# Patient Record
Sex: FEMALE | Race: WHITE | Employment: OTHER | URBAN - METROPOLITAN AREA
[De-identification: names, ages, dates, MRNs, and addresses within clinical notes are randomized per-mention and may not be internally consistent; named-entity substitution may affect disease eponyms.]

---

## 2017-01-02 ENCOUNTER — HOSPITAL ENCOUNTER (INPATIENT)
Facility: HOSPITAL | Age: 72
LOS: 3 days | Discharge: HOME | DRG: 392 | End: 2017-01-06
Attending: EMERGENCY MEDICINE | Admitting: FAMILY MEDICINE
Payer: MEDICARE

## 2017-01-02 ENCOUNTER — APPOINTMENT (OUTPATIENT)
Dept: CT IMAGING | Facility: HOSPITAL | Age: 72
DRG: 392 | End: 2017-01-02
Attending: EMERGENCY MEDICINE
Payer: MEDICARE

## 2017-01-02 DIAGNOSIS — R10.9 ABDOMINAL PAIN, ACUTE: Primary | ICD-10-CM

## 2017-01-02 DIAGNOSIS — K59.00 CONSTIPATION, UNSPECIFIED CONSTIPATION TYPE: ICD-10-CM

## 2017-01-02 LAB
ALBUMIN SERPL-MCNC: 3.4 G/DL (ref 3.5–4.8)
ALP LIVER SERPL-CCNC: 83 U/L (ref 55–142)
ALT SERPL-CCNC: 19 U/L (ref 14–54)
AST SERPL-CCNC: 10 U/L (ref 15–41)
BASOPHILS # BLD AUTO: 0.02 X10(3) UL (ref 0–0.1)
BASOPHILS NFR BLD AUTO: 0.5 %
BILIRUB SERPL-MCNC: 0.5 MG/DL (ref 0.1–2)
BUN BLD-MCNC: 28 MG/DL (ref 8–20)
CALCIUM BLD-MCNC: 8.9 MG/DL (ref 8.3–10.3)
CHLORIDE: 99 MMOL/L (ref 101–111)
CO2: 33 MMOL/L (ref 22–32)
CREAT BLD-MCNC: 1.27 MG/DL (ref 0.55–1.02)
EOSINOPHIL # BLD AUTO: 0.08 X10(3) UL (ref 0–0.3)
EOSINOPHIL NFR BLD AUTO: 2.1 %
ERYTHROCYTE [DISTWIDTH] IN BLOOD BY AUTOMATED COUNT: 13.2 % (ref 11.5–16)
GLUCOSE BLD-MCNC: 227 MG/DL (ref 70–99)
HCT VFR BLD AUTO: 39 % (ref 34–50)
HGB BLD-MCNC: 12.7 G/DL (ref 12–16)
IMMATURE GRANULOCYTE COUNT: 0.01 X10(3) UL (ref 0–1)
IMMATURE GRANULOCYTE RATIO %: 0.3 %
LIPASE: 165 U/L (ref 73–393)
LYMPHOCYTES # BLD AUTO: 0.37 X10(3) UL (ref 0.9–4)
LYMPHOCYTES NFR BLD AUTO: 9.6 %
M PROTEIN MFR SERPL ELPH: 7.3 G/DL (ref 6.1–8.3)
MCH RBC QN AUTO: 32.2 PG (ref 27–33.2)
MCHC RBC AUTO-ENTMCNC: 32.6 G/DL (ref 31–37)
MCV RBC AUTO: 99 FL (ref 81–100)
MONOCYTES # BLD AUTO: 0.31 X10(3) UL (ref 0.1–0.6)
MONOCYTES NFR BLD AUTO: 8 %
NEUTROPHIL ABS PRELIM: 3.07 X10 (3) UL (ref 1.3–6.7)
NEUTROPHILS # BLD AUTO: 3.07 X10(3) UL (ref 1.3–6.7)
NEUTROPHILS NFR BLD AUTO: 79.5 %
PLATELET # BLD AUTO: 151 10(3)UL (ref 150–450)
POTASSIUM SERPL-SCNC: 4.3 MMOL/L (ref 3.6–5.1)
RBC # BLD AUTO: 3.94 X10(6)UL (ref 3.8–5.1)
RED CELL DISTRIBUTION WIDTH-SD: 47.9 FL (ref 35.1–46.3)
SODIUM SERPL-SCNC: 138 MMOL/L (ref 136–144)
WBC # BLD AUTO: 3.9 X10(3) UL (ref 4–13)

## 2017-01-02 PROCEDURE — 80053 COMPREHEN METABOLIC PANEL: CPT | Performed by: EMERGENCY MEDICINE

## 2017-01-02 PROCEDURE — 93005 ELECTROCARDIOGRAM TRACING: CPT

## 2017-01-02 PROCEDURE — 83690 ASSAY OF LIPASE: CPT | Performed by: EMERGENCY MEDICINE

## 2017-01-02 PROCEDURE — 99285 EMERGENCY DEPT VISIT HI MDM: CPT

## 2017-01-02 PROCEDURE — 96361 HYDRATE IV INFUSION ADD-ON: CPT

## 2017-01-02 PROCEDURE — 74177 CT ABD & PELVIS W/CONTRAST: CPT

## 2017-01-02 PROCEDURE — 96375 TX/PRO/DX INJ NEW DRUG ADDON: CPT

## 2017-01-02 PROCEDURE — 96376 TX/PRO/DX INJ SAME DRUG ADON: CPT

## 2017-01-02 PROCEDURE — 85025 COMPLETE CBC W/AUTO DIFF WBC: CPT | Performed by: EMERGENCY MEDICINE

## 2017-01-02 PROCEDURE — 93010 ELECTROCARDIOGRAM REPORT: CPT

## 2017-01-02 PROCEDURE — 96365 THER/PROPH/DIAG IV INF INIT: CPT

## 2017-01-02 RX ORDER — HYDROMORPHONE HYDROCHLORIDE 1 MG/ML
1 INJECTION, SOLUTION INTRAMUSCULAR; INTRAVENOUS; SUBCUTANEOUS EVERY 30 MIN PRN
Status: DISCONTINUED | OUTPATIENT
Start: 2017-01-02 | End: 2017-01-03

## 2017-01-02 RX ORDER — ONDANSETRON 2 MG/ML
4 INJECTION INTRAMUSCULAR; INTRAVENOUS ONCE
Status: COMPLETED | OUTPATIENT
Start: 2017-01-02 | End: 2017-01-02

## 2017-01-03 PROBLEM — R11.2 NAUSEA AND VOMITING: Status: ACTIVE | Noted: 2017-01-03

## 2017-01-03 PROBLEM — K56.609 LARGE BOWEL OBSTRUCTION (HCC): Status: ACTIVE | Noted: 2017-01-03

## 2017-01-03 PROBLEM — I10 HYPERTENSION: Chronic | Status: ACTIVE | Noted: 2017-01-03

## 2017-01-03 PROBLEM — E11.29 TYPE 2 DIABETES MELLITUS WITH RENAL MANIFESTATIONS (HCC): Chronic | Status: ACTIVE | Noted: 2017-01-03

## 2017-01-03 PROBLEM — K63.89: Status: ACTIVE | Noted: 2017-01-03

## 2017-01-03 PROBLEM — K59.00 CONSTIPATION, UNSPECIFIED CONSTIPATION TYPE: Status: ACTIVE | Noted: 2017-01-03

## 2017-01-03 PROBLEM — E86.0 DEHYDRATION: Status: ACTIVE | Noted: 2017-01-03

## 2017-01-03 PROBLEM — R10.9 ABDOMINAL PAIN, ACUTE: Status: ACTIVE | Noted: 2017-01-03

## 2017-01-03 LAB
ATRIAL RATE: 102 BPM
GLUCOSE BLD-MCNC: 131 MG/DL (ref 65–99)
GLUCOSE BLD-MCNC: 159 MG/DL (ref 65–99)
GLUCOSE BLD-MCNC: 267 MG/DL (ref 65–99)
P AXIS: 71 DEGREES
P-R INTERVAL: 132 MS
Q-T INTERVAL: 350 MS
QRS DURATION: 86 MS
QTC CALCULATION (BEZET): 456 MS
R AXIS: 37 DEGREES
T AXIS: 18 DEGREES
VENTRICULAR RATE: 102 BPM

## 2017-01-03 PROCEDURE — 82962 GLUCOSE BLOOD TEST: CPT

## 2017-01-03 RX ORDER — LEVOFLOXACIN 5 MG/ML
750 INJECTION, SOLUTION INTRAVENOUS ONCE
Status: COMPLETED | OUTPATIENT
Start: 2017-01-03 | End: 2017-01-03

## 2017-01-03 RX ORDER — SODIUM CHLORIDE 9 MG/ML
INJECTION, SOLUTION INTRAVENOUS CONTINUOUS
Status: DISCONTINUED | OUTPATIENT
Start: 2017-01-03 | End: 2017-01-06

## 2017-01-03 RX ORDER — METRONIDAZOLE 500 MG/100ML
500 INJECTION, SOLUTION INTRAVENOUS EVERY 8 HOURS
Status: DISCONTINUED | OUTPATIENT
Start: 2017-01-03 | End: 2017-01-06

## 2017-01-03 RX ORDER — METRONIDAZOLE 500 MG/100ML
500 INJECTION, SOLUTION INTRAVENOUS ONCE
Status: COMPLETED | OUTPATIENT
Start: 2017-01-03 | End: 2017-01-03

## 2017-01-03 RX ORDER — ONDANSETRON 2 MG/ML
8 INJECTION INTRAMUSCULAR; INTRAVENOUS EVERY 6 HOURS PRN
Status: DISCONTINUED | OUTPATIENT
Start: 2017-01-03 | End: 2017-01-06

## 2017-01-03 RX ORDER — ONDANSETRON 2 MG/ML
4 INJECTION INTRAMUSCULAR; INTRAVENOUS EVERY 6 HOURS PRN
Status: DISCONTINUED | OUTPATIENT
Start: 2017-01-03 | End: 2017-01-03

## 2017-01-03 RX ORDER — HYDROMORPHONE HYDROCHLORIDE 1 MG/ML
1 INJECTION, SOLUTION INTRAMUSCULAR; INTRAVENOUS; SUBCUTANEOUS EVERY 2 HOUR PRN
Status: DISCONTINUED | OUTPATIENT
Start: 2017-01-03 | End: 2017-01-03

## 2017-01-03 RX ORDER — METOCLOPRAMIDE HYDROCHLORIDE 5 MG/ML
10 INJECTION INTRAMUSCULAR; INTRAVENOUS ONCE
Status: COMPLETED | OUTPATIENT
Start: 2017-01-03 | End: 2017-01-03

## 2017-01-03 RX ORDER — HYDROMORPHONE HYDROCHLORIDE 1 MG/ML
0.5 INJECTION, SOLUTION INTRAMUSCULAR; INTRAVENOUS; SUBCUTANEOUS EVERY 2 HOUR PRN
Status: DISCONTINUED | OUTPATIENT
Start: 2017-01-03 | End: 2017-01-06

## 2017-01-03 RX ORDER — DEXTROSE MONOHYDRATE 25 G/50ML
50 INJECTION, SOLUTION INTRAVENOUS
Status: DISCONTINUED | OUTPATIENT
Start: 2017-01-03 | End: 2017-01-05

## 2017-01-03 RX ORDER — LEVOFLOXACIN 5 MG/ML
750 INJECTION, SOLUTION INTRAVENOUS
Status: DISCONTINUED | OUTPATIENT
Start: 2017-01-05 | End: 2017-01-06

## 2017-01-03 RX ORDER — METOCLOPRAMIDE HYDROCHLORIDE 5 MG/ML
5 INJECTION INTRAMUSCULAR; INTRAVENOUS EVERY 6 HOURS
Status: DISCONTINUED | OUTPATIENT
Start: 2017-01-03 | End: 2017-01-04

## 2017-01-03 RX ORDER — HYDROMORPHONE HYDROCHLORIDE 1 MG/ML
1 INJECTION, SOLUTION INTRAMUSCULAR; INTRAVENOUS; SUBCUTANEOUS EVERY 2 HOUR PRN
Status: DISCONTINUED | OUTPATIENT
Start: 2017-01-03 | End: 2017-01-06

## 2017-01-03 RX ORDER — METOCLOPRAMIDE HYDROCHLORIDE 5 MG/ML
10 INJECTION INTRAMUSCULAR; INTRAVENOUS EVERY 6 HOURS PRN
Status: DISCONTINUED | OUTPATIENT
Start: 2017-01-03 | End: 2017-01-03

## 2017-01-03 NOTE — ED PROVIDER NOTES
Patient Seen in: [facility] 3nw-a    History   Patient presents with:  Nausea/Vomiting/Diarrhea (gastrointestinal)    Stated Complaint: abdominal pain    HPI    Patient is a 60-year-old female comes in the emergency with chief complaint of abdominal UNITS Oral Cap,     CVS VITAMIN B-12 1000 MCG Oral Tab,     cholestyramine light (PREVALITE) 4 G Oral Powd Pack,  Take 4 g by mouth daily.        Family History   Problem Relation Age of Onset   • Cancer Mother      breast and bladder         Smoking Status dry with normal appearance. No rashes or lesions. NEUROLOGICAL:  Motor strength intact all groups.   normal sensation, speech intact    ED Course     Labs Reviewed   COMP METABOLIC PANEL (14) - Abnormal; Notable for the following:     Glucose 227 (*) no other questions, complaints or concerns. Patient will be admitted to the hospital for further workup.         Disposition and Plan     Clinical Impression:  Abdominal pain, acute  (primary encounter diagnosis)  Constipation, unspecified constipation type

## 2017-01-03 NOTE — PROGRESS NOTES
NURSING ADMISSION NOTE      Patient admitted via Cart  Oriented to room. Safety precautions initiated. Bed in low position. Call light in reach. Pt admitted from ER in stable condition.  Pt reports nausea, denies vomiting or abd pain at present ti

## 2017-01-03 NOTE — CONSULTS
[facility] Pharmacy Note:  Renal Adjustment for Levaquin (levofloxacin)    Norbert Ayala is a 70year old female who has been prescribed Levaquin (levofloxacin) 500 mg every 24 hrs. CrCl is estimated creatinine clearance is 38 mL/min (based on Cr of 1.27).  s

## 2017-01-03 NOTE — PROGRESS NOTES
Patient resting calmly in bed. Denies pain or nausea at this time. States it comes and goes intermittantly, but denies at this time.

## 2017-01-03 NOTE — PROGRESS NOTES
Pharmacy Note: Renal dose adjustment (Reglan)    Ramona Lowe has been prescribed Metoclopramide (Reglan) 10 mg every 6 hours. Estimated Creatinine Clearance: 38 mL/min (based on Cr of 1.27).     Her calculated creatinine clearance is < 40 mL/min, t

## 2017-01-03 NOTE — CERTIFICATION
**Certification      PHYSICIAN Certification of Need for Inpatient Hospitalization - Initial Certification    Patient will require inpatient services that will reasonably be expected to span two midnight's based on the clinical documentation in H+P.    Base

## 2017-01-03 NOTE — H&P
[address] [address] Patient Status:  Inpatient    1945 MRN [id number]   [facility] 3NW-A Attending Lolis Khan MD   Hosp Day # 1 PCP Ronda Bullion     Date:  1/3/2017  Date of Admission: 71/ SaO2 75%/ CPAP    • Rheumatoid arthritis(714.0)    • Diabetes ([facility] [place] [address]    • Sleep apnea    • High blood pressure    • Cancer (HCC)      Uterine CA   • Back problem      DISH         Past Surgical History    HERNIA SURGERY       BMI 40.37 kg/m2  SpO2 93%    General Appearance:    Alert, cooperative, no distress, appears stated age   Head:    Normocephalic, without obvious abnormality, atraumatic   Eyes:    PERRL, conjunctiva/corneas clear, EOM's intact, fundi     benign, both eyes 01/02/2017   ALT 19 01/02/2017    01/02/2017       Imaging:  CT Scan Abdomen/Pelvis noted.      Assessment:  Patient Active Problem List:     Recurrent UTI     Incomplete bladder emptying     GAYLE (obstructive sleep apnea)     Chronic gouty arthritis

## 2017-01-04 ENCOUNTER — APPOINTMENT (OUTPATIENT)
Dept: GENERAL RADIOLOGY | Facility: HOSPITAL | Age: 72
DRG: 392 | End: 2017-01-04
Attending: FAMILY MEDICINE
Payer: MEDICARE

## 2017-01-04 LAB
BUN BLD-MCNC: 18 MG/DL (ref 8–20)
CALCIUM BLD-MCNC: 8.4 MG/DL (ref 8.3–10.3)
CHLORIDE: 106 MMOL/L (ref 101–111)
CO2: 30 MMOL/L (ref 22–32)
CREAT BLD-MCNC: 1.16 MG/DL (ref 0.55–1.02)
EST. AVERAGE GLUCOSE BLD GHB EST-MCNC: 169 MG/DL (ref 68–126)
GLUCOSE BLD-MCNC: 139 MG/DL (ref 65–99)
GLUCOSE BLD-MCNC: 152 MG/DL (ref 65–99)
GLUCOSE BLD-MCNC: 153 MG/DL (ref 65–99)
GLUCOSE BLD-MCNC: 158 MG/DL (ref 70–99)
GLUCOSE BLD-MCNC: 171 MG/DL (ref 65–99)
HAV IGM SER QL: 1.9 MG/DL (ref 1.7–3)
HBA1C MFR BLD HPLC: 7.5 % (ref ?–5.7)
POTASSIUM SERPL-SCNC: 4.3 MMOL/L (ref 3.6–5.1)
SODIUM SERPL-SCNC: 141 MMOL/L (ref 136–144)

## 2017-01-04 PROCEDURE — 82962 GLUCOSE BLOOD TEST: CPT

## 2017-01-04 PROCEDURE — 74000 XR ABDOMEN AP (UPRIGHT)  (CPT=74000): CPT

## 2017-01-04 PROCEDURE — 83735 ASSAY OF MAGNESIUM: CPT | Performed by: FAMILY MEDICINE

## 2017-01-04 PROCEDURE — 80048 BASIC METABOLIC PNL TOTAL CA: CPT | Performed by: FAMILY MEDICINE

## 2017-01-04 PROCEDURE — 83036 HEMOGLOBIN GLYCOSYLATED A1C: CPT | Performed by: FAMILY MEDICINE

## 2017-01-04 PROCEDURE — 97162 PT EVAL MOD COMPLEX 30 MIN: CPT

## 2017-01-04 RX ORDER — METOCLOPRAMIDE HYDROCHLORIDE 5 MG/ML
5 INJECTION INTRAMUSCULAR; INTRAVENOUS EVERY 6 HOURS PRN
Status: DISCONTINUED | OUTPATIENT
Start: 2017-01-04 | End: 2017-01-06

## 2017-01-04 NOTE — PROGRESS NOTES
Low grade temp of 100.1. Incentive spirometer given to patient. Instructive patient on use and reasoning for use. Patient demonstrates. Temp turned down in patients room and blankets removed. Will continue to monitor.

## 2017-01-04 NOTE — IMAGING NOTE
PT REFUSED TO LAY DOWN FOR HER OBS SERIES TEST. PT SHARES SHE HASNT LAYED DOWN TO SLEEP IN 2 YEARS. HUA SPOKE TO LOPEZ ARMIJO AT 930AM AND TOLD HER PT REFUSED THE SUPINE IMAGING.  HUA TECH DID UPRIGHT VIEWS ONLY AND CHANGED ORDER TO UPRIGHT KUB SO IMAGING WOULD

## 2017-01-04 NOTE — CONSULTS
[facility]                       Gastroenterology [address] [facility] Gastroenterology    Nacho Hayward Patient Status:  Inpatient    1945 MRN [id number]   [facility] 3NW-A Attending Dannielle Michele MD   [facility] Day # 1 PCP Past Surgical History    HERNIA SURGERY            EYE SURGERY      Comment eye    FOOT SURGERY      Comment foot    SHOULDER SURG PROC UNLISTED      Comment shoulder    CYSTOURETHROSCOPY  3-1-11    Comment cysto flow US, dr Gilford Norma 750 mg 750 mg Intravenous Q48H   metRONIDAZOLE in NaCl (FLAGYL) 5 mg/ml IVPB premix 500 mg 500 mg Intravenous Q8H   [COMPLETED] sodium chloride 0.9% IV bolus 1,000 mL 1,000 mL Intravenous Once   [COMPLETED] ondansetron HCl (ZOFRAN) injection 4 mg 4 mg Intr history of seizure, stroke, or frequent headaches  PE: /75 mmHg  Pulse 98  Temp(Src) 98.5 °F (36.9 °C) (Oral)  Resp 20  Ht 5' 6\" (1.676 m)  Wt 250 lb (113.399 kg)  BMI 40.37 kg/m2  SpO2 100%  Gen: AAO x 3, able to speak in complete sentences  HENT: non-ionic intravenous contrast material. Post contrast coronal MIP imaging was performed.  Dose reduction techniques were used.  Dose information is   transmitted to the [facility] [employer]) Jimmy Soares [address] ([address]) which inclu lymph nodes with a left-sided mildly enlarged 2.3 x 1 cm lymph node noted. PELVIC ORGANS:  Status post hysterectomy. BONES:  Multiple level degenerative disc disease with bridging osteophytes mid to lower lumbar spine and lower dorsal spine.  There is mar CAITLIN  1:41 PM  1/3/2017  [facility] Gastroenterology  [phone]    Attending physician addendum:   I personally saw and examined the patient, agree with the above findings, assessment and plan.  Large bowel obstruction due to inflammatory changes rectosigmo

## 2017-01-04 NOTE — PROGRESS NOTES
Gastroenterology Progress Note  Alva Walsh Patient Status:  Inpatient    1945 MRN [id number]   [facility] 3NW-A Attending Anahi Boggs MD   Hosp Day # 2 PCP Clementeen Page Continue course of antibiotics  3.  Tentatively plan for an outpatient colonoscopy in 6 weeks   CAITLIN Pa  9:53 AM  1/4/2017  [facility] Gastroenterology  [phone]    Attending physician addendum:   I personally saw and examined the patient, agr

## 2017-01-04 NOTE — PHYSICAL THERAPY NOTE
PHYSICAL THERAPY QUICK EVALUATION - INPATIENT    Room Number: 212/809-F  Evaluation Date: 1/4/2017  Presenting Problem: abdominal pain  Physician Order: PT Eval and Treat    Problem List  Principal Problem:    Abdominal pain, acute  Active Problems:    GAYLE promotion; Body mechanics; Relaxation   RANGE OF MOTION AND STRENGTH ASSESSMENT  Upper extremity ROM and strength are within functional limits     Lower extremity ROM is within functional limits     Lower extremity strength is within functional limits     NE verbalizes understanding. Patient End of Session: Up in chair;Needs met;Call light within reach;RN aware of session/findings; All patient questions and concerns addressed    ASSESSMENT   Pt appears at baseline at this time and is not open to increasing

## 2017-01-04 NOTE — CM/SW NOTE
01/04/17 0900   CM/SW Referral Data   Referral Source Social Work (self-referral); Physician   Reason for Referral Discharge planning   Informant Patient   Pertinent Medical Hx   Primary Care Physician Name Jg Pester   Social History   Domestic/Partner Viol

## 2017-01-04 NOTE — PLAN OF CARE
Diabetes/Glucose Control    • Glucose maintained within prescribed range Progressing        GASTROINTESTINAL - ADULT    • Minimal or absence of nausea and vomiting Progressing    • Maintains or returns to baseline bowel function Progressing        PAIN - A

## 2017-01-04 NOTE — PROGRESS NOTES
[facility]  Progress Note    Cristopher Mayers Patient Status:  Inpatient    1945 MRN [id number]   [facility] 3NW-A Attending Paige Nunes MD   [facility] Day # 2 PCP Liss Eubanks       SUBJECTIVE:  No CP, SOB, or N/V - no abd injection 50 mL 50 mL Intravenous Q15 Min PRN   Or      glucose (DEX4) oral liquid 30 g 30 g Oral Q15 Min PRN   Or      Glucose-Vitamin C (DEX-4) 4-0.006 G chewable tab 8 tablet 8 tablet Oral Q15 Min PRN   ondansetron HCl (ZOFRAN) injection 8 mg 8 mg Intra could be given for prophylactic rectosigmoid resection to prevent future episodes of acute Diverticulitis.       Prophylaxis:   DVT with SCDs - due to active bowel inflammation, potential for GI bleed, possible need for surgery, and ambulatory status, I do

## 2017-01-05 LAB
BUN BLD-MCNC: 16 MG/DL (ref 8–20)
CALCIUM BLD-MCNC: 8.5 MG/DL (ref 8.3–10.3)
CHLORIDE: 107 MMOL/L (ref 101–111)
CO2: 30 MMOL/L (ref 22–32)
CREAT BLD-MCNC: 1.12 MG/DL (ref 0.55–1.02)
GLUCOSE BLD-MCNC: 165 MG/DL (ref 65–99)
GLUCOSE BLD-MCNC: 167 MG/DL (ref 65–99)
GLUCOSE BLD-MCNC: 173 MG/DL (ref 70–99)
GLUCOSE BLD-MCNC: 177 MG/DL (ref 65–99)
GLUCOSE BLD-MCNC: 186 MG/DL (ref 65–99)
HAV IGM SER QL: 1.9 MG/DL (ref 1.7–3)
POTASSIUM SERPL-SCNC: 4.3 MMOL/L (ref 3.6–5.1)
SODIUM SERPL-SCNC: 141 MMOL/L (ref 136–144)

## 2017-01-05 PROCEDURE — 82962 GLUCOSE BLOOD TEST: CPT

## 2017-01-05 PROCEDURE — 80048 BASIC METABOLIC PNL TOTAL CA: CPT | Performed by: FAMILY MEDICINE

## 2017-01-05 PROCEDURE — 83735 ASSAY OF MAGNESIUM: CPT | Performed by: FAMILY MEDICINE

## 2017-01-05 RX ORDER — ACETAMINOPHEN 325 MG/1
650 TABLET ORAL EVERY 4 HOURS PRN
Status: DISCONTINUED | OUTPATIENT
Start: 2017-01-05 | End: 2017-01-06

## 2017-01-05 RX ORDER — METOPROLOL SUCCINATE 25 MG/1
25 TABLET, EXTENDED RELEASE ORAL
Status: DISCONTINUED | OUTPATIENT
Start: 2017-01-05 | End: 2017-01-06

## 2017-01-05 RX ORDER — GLIMEPIRIDE 2 MG/1
1 TABLET ORAL
Status: DISCONTINUED | OUTPATIENT
Start: 2017-01-05 | End: 2017-01-06

## 2017-01-05 RX ORDER — LOSARTAN POTASSIUM 100 MG/1
100 TABLET ORAL DAILY
Status: DISCONTINUED | OUTPATIENT
Start: 2017-01-05 | End: 2017-01-06

## 2017-01-05 RX ORDER — ACETAMINOPHEN 325 MG/1
325 TABLET ORAL EVERY 4 HOURS PRN
Status: DISCONTINUED | OUTPATIENT
Start: 2017-01-05 | End: 2017-01-06

## 2017-01-05 NOTE — PROGRESS NOTES
[facility]  Progress Note    Sun Celis Patient Status:  Inpatient    1945 MRN [id number]   [facility] 3NW-A Attending Alina Miller MD   [facility] Day # 3 PCP Mendiola Devoid       SUBJECTIVE:  No CP, SOB.  She tried to ad 325 mg 325 mg Oral Q4H PRN   Or      acetaminophen (TYLENOL) tab 650 mg 650 mg Oral Q4H PRN   Metoclopramide HCl (REGLAN) injection 5 mg 5 mg Intravenous Q6H PRN   0.9%  NaCl infusion  Intravenous Continuous   ondansetron HCl (ZOFRAN) injection 8 mg 8 mg I

## 2017-01-05 NOTE — PLAN OF CARE
Diabetes/Glucose Control    • Glucose maintained within prescribed range Progressing        GASTROINTESTINAL - ADULT    • Minimal or absence of nausea and vomiting Progressing    • Maintains or returns to baseline bowel function Progressing        Impaired

## 2017-01-05 NOTE — PROGRESS NOTES
Gastroenterology Progress Note  Tomás López Patient Status:  Inpatient    1945 MRN [id number]   [facility] 3NW-A Attending Tera Reardon MD   [facility] Day # 3 PCP Della Prinlge

## 2017-01-06 ENCOUNTER — APPOINTMENT (OUTPATIENT)
Dept: GENERAL RADIOLOGY | Facility: HOSPITAL | Age: 72
DRG: 392 | End: 2017-01-06
Attending: FAMILY MEDICINE
Payer: MEDICARE

## 2017-01-06 VITALS
WEIGHT: 250 LBS | SYSTOLIC BLOOD PRESSURE: 145 MMHG | OXYGEN SATURATION: 95 % | RESPIRATION RATE: 20 BRPM | DIASTOLIC BLOOD PRESSURE: 58 MMHG | HEART RATE: 85 BPM | TEMPERATURE: 98 F | HEIGHT: 66 IN | BODY MASS INDEX: 40.18 KG/M2

## 2017-01-06 LAB
GLUCOSE BLD-MCNC: 169 MG/DL (ref 65–99)
GLUCOSE BLD-MCNC: 277 MG/DL (ref 65–99)

## 2017-01-06 PROCEDURE — 74000 XR ABDOMEN AP (UPRIGHT)  (CPT=74000): CPT

## 2017-01-06 PROCEDURE — 82962 GLUCOSE BLOOD TEST: CPT

## 2017-01-06 RX ORDER — AMOXICILLIN AND CLAVULANATE POTASSIUM 562.5; 437.5; 62.5 MG/1; MG/1; MG/1
2 TABLET, FILM COATED, EXTENDED RELEASE ORAL 2 TIMES DAILY
Qty: 28 TABLET | Refills: 0 | Status: SHIPPED | OUTPATIENT
Start: 2017-01-06 | End: 2017-01-06

## 2017-01-06 RX ORDER — SACCHAROMYCES BOULARDII 250 MG
250 CAPSULE ORAL 2 TIMES DAILY
Qty: 14 CAPSULE | Refills: 0 | Status: SHIPPED | OUTPATIENT
Start: 2017-01-06 | End: 2017-01-13

## 2017-01-06 RX ORDER — GLIMEPIRIDE 1 MG/1
1 TABLET ORAL
Qty: 60 TABLET | Refills: 0 | Status: ON HOLD | OUTPATIENT
Start: 2017-01-06 | End: 2018-09-30

## 2017-01-06 RX ORDER — LEVOFLOXACIN 500 MG/1
TABLET, FILM COATED ORAL
Qty: 7 TABLET | Refills: 0 | Status: SHIPPED | OUTPATIENT
Start: 2017-01-06 | End: 2017-02-06 | Stop reason: ALTCHOICE

## 2017-01-06 RX ORDER — ONDANSETRON 4 MG/1
4 TABLET, ORALLY DISINTEGRATING ORAL EVERY 4 HOURS PRN
Qty: 20 TABLET | Refills: 1 | Status: SHIPPED | OUTPATIENT
Start: 2017-01-06 | End: 2017-02-06 | Stop reason: ALTCHOICE

## 2017-01-06 RX ORDER — METRONIDAZOLE 500 MG/1
500 TABLET ORAL 3 TIMES DAILY
Qty: 21 TABLET | Refills: 0 | Status: SHIPPED | OUTPATIENT
Start: 2017-01-06 | End: 2017-01-13

## 2017-01-06 NOTE — PLAN OF CARE
Problem: PAIN - ADULT  Goal: Verbalizes/displays adequate comfort level or patient’s stated pain goal  INTERVENTIONS:  - Encourage pt to monitor pain and request assistance  - Assess pain using appropriate pain scale  - Administer analgesics based on type precautions  - Ambulate 3x/day   Outcome: Adequate for Discharge  Steady gait with walker noted. Comments:   Patient A&OX4 with no neuro deficits noted. Patient encouraged to ambulate at least 3-4 times per day, patient verbalizes understanding.  IV disco

## 2017-01-06 NOTE — PLAN OF CARE
Assumed care at 1600. A&O, VSS, SpO2 >92% on 2L. NSR on tele. Denies pain. IV c/d/i with antbiotic infusing per orders. Denies n/v, tolerating low residue diet. BLE +1 pitting edema, sitting in chair with legs elevated.

## 2017-01-06 NOTE — PROGRESS NOTES
Gastroenterology Progress Note  Sharmila Guanaco Patient Status:  Inpatient    1945 MRN [id number]   [facility] 3NW-A Attending Katharina Trejo MD   [facility] Day # 4 PCP Ghislaine Warner

## 2017-01-06 NOTE — PROGRESS NOTES
[facility]  Progress Note    Neelam Breen Patient Status:  Inpatient    1945 MRN [id number]   [facility] 3NW-A Attending Alhaji Martinez MD   [facility] Day # 4 PCP Jailene Sees     Day 3-4 of Flagyl 500 mg TID and has recei infusion  Intravenous Continuous   ondansetron HCl (ZOFRAN) injection 8 mg 8 mg Intravenous Q6H PRN   HYDROmorphone HCl PF (DILAUDID) 1 MG/ML injection 0.5 mg 0.5 mg Intravenous Q2H PRN   Or      HYDROmorphone HCl PF (DILAUDID) 1 MG/ML injection 1 mg 1 mg

## 2017-01-06 NOTE — PROGRESS NOTES
Written and verbal discharge instructions given to patient and spouse, verbalizes understanding. Prescription given for flagyl, zofran, probiotic, glimiride and possible side effects discussed.  Pt told not to  levoquin due to cost.  per Dr Martir Hill

## 2017-01-07 NOTE — DISCHARGE SUMMARY
[facility]    Discharge Summary    Prudnece Rivera Patient Status:  Inpatient    1945 MRN [id number]   [facility] 3NW-A Attending No att. providers found   Hosp Day # 4 NAGI Parada     Date of Admission: 2017 Disp the discharge day, as well as precautions and monitoring for signs of worsening. Patient will have outpatient Colonoscopy in 6 weeks to confirm bowel healthy otherwise. Consultations: Gastroenterology, PT, Social Work.      Procedures: None    Complicat 100 MCG Tabs   Commonly known as:  SYNTHROID        Take 125 mcg by mouth. Refills:  0       Losartan Potassium 50 MG Tabs   Last time this was given:  100 mg on 1/6/2017  9:46 AM   Commonly known as:  COZAAR        daily.     Refills:  0       MetFORMIN

## 2017-02-28 ENCOUNTER — ANESTHESIA EVENT (OUTPATIENT)
Dept: ENDOSCOPY | Facility: HOSPITAL | Age: 72
End: 2017-02-28
Payer: MEDICARE

## 2017-02-28 ENCOUNTER — HOSPITAL ENCOUNTER (OUTPATIENT)
Facility: HOSPITAL | Age: 72
Setting detail: HOSPITAL OUTPATIENT SURGERY
Discharge: HOME | End: 2017-02-28
Attending: INTERNAL MEDICINE | Admitting: INTERNAL MEDICINE
Payer: MEDICARE

## 2017-02-28 ENCOUNTER — ANESTHESIA (OUTPATIENT)
Dept: ENDOSCOPY | Facility: HOSPITAL | Age: 72
End: 2017-02-28
Payer: MEDICARE

## 2017-02-28 ENCOUNTER — SURGERY (OUTPATIENT)
Age: 72
End: 2017-02-28

## 2017-02-28 VITALS
OXYGEN SATURATION: 95 % | WEIGHT: 274 LBS | SYSTOLIC BLOOD PRESSURE: 140 MMHG | BODY MASS INDEX: 44.03 KG/M2 | HEIGHT: 66 IN | HEART RATE: 80 BPM | RESPIRATION RATE: 18 BRPM | TEMPERATURE: 97 F | DIASTOLIC BLOOD PRESSURE: 68 MMHG

## 2017-02-28 LAB — GLUCOSE BLD-MCNC: 297 MG/DL (ref 65–99)

## 2017-02-28 PROCEDURE — 0DJD8ZZ INSPECTION OF LOWER INTESTINAL TRACT, VIA NATURAL OR ARTIFICIAL OPENING ENDOSCOPIC: ICD-10-PCS | Performed by: INTERNAL MEDICINE

## 2017-02-28 PROCEDURE — 82962 GLUCOSE BLOOD TEST: CPT

## 2017-02-28 RX ORDER — ONDANSETRON 2 MG/ML
4 INJECTION INTRAMUSCULAR; INTRAVENOUS AS NEEDED
Status: DISCONTINUED | OUTPATIENT
Start: 2017-02-28 | End: 2017-02-28

## 2017-02-28 RX ORDER — DEXTROSE MONOHYDRATE 25 G/50ML
50 INJECTION, SOLUTION INTRAVENOUS
Status: DISCONTINUED | OUTPATIENT
Start: 2017-02-28 | End: 2017-02-28

## 2017-02-28 RX ORDER — NALOXONE HYDROCHLORIDE 0.4 MG/ML
80 INJECTION, SOLUTION INTRAMUSCULAR; INTRAVENOUS; SUBCUTANEOUS AS NEEDED
Status: DISCONTINUED | OUTPATIENT
Start: 2017-02-28 | End: 2017-02-28

## 2017-02-28 RX ORDER — SODIUM CHLORIDE, SODIUM LACTATE, POTASSIUM CHLORIDE, CALCIUM CHLORIDE 600; 310; 30; 20 MG/100ML; MG/100ML; MG/100ML; MG/100ML
INJECTION, SOLUTION INTRAVENOUS CONTINUOUS
Status: DISCONTINUED | OUTPATIENT
Start: 2017-02-28 | End: 2017-02-28

## 2017-02-28 NOTE — OPERATIVE REPORT
Laxmi Spence Patient Status:  Hospital Outpatient Surgery    1945 MRN [id number]   [facility] ENDOSCOPY Attending Gianni Foreman MD   Hosp Day # 0 PCP Ayesha Caal     PREOPERATIVE DIAGNOSIS/INDICATION: H/o transient lar adverse drug reactions   - Repeat colonoscopy in 10 years.     Patirce Mcneill  2/28/2017  7:46 AM

## 2017-02-28 NOTE — ANESTHESIA POSTPROCEDURE EVALUATION
[address] [place] [address] Patient Status:  Hospital Outpatient Surgery   Age/Gender 70year old female MRN [id number]   Location [address] [facility]. Attending Flakita Cardozo MD   Hosp Day # 0 PCP Liss Eubanks       Anesthesia Post-o

## 2017-02-28 NOTE — ANESTHESIA PREPROCEDURE EVALUATION
PRE-OP EVALUATION    Patient Name: Jorge Rincon    Pre-op Diagnosis: RECALL COLONOSCOPY    Procedure(s):  COLONOSCOPY    Surgeon(s) and Role:     Valdez Steele MD - Primary    Pre-op vitals reviewed.   Temp: 97.3 °F (36.3 °C)  Pulse: 87  Resp: 1 diabetes  type 2, not using insulin                    (+) rheumatoid arthritis     Pulmonary      (+) asthma              (+) sleep apnea and CPAP      Neuro/Psych                              Sinus tachycardia  Otherwise normal ECG  When compared with EC anesthesia as outlined in the anesthesia consent were reviewed with the patient. The consent was signed without further questions.

## 2017-02-28 NOTE — H&P
[address] [facility] Patient Status:  Hospital Outpatient Surgery    1945 MRN [id number]   [facility] ENDOSCOPY Attending Wero Pena MD   Hosp Day # 0 PCP Monika Gonzales     CC: H/o large bowel 30 g, Oral, Q15 Min PRN **OR** Glucose-Vitamin C (DEX-4) 4-0.006 g chewable tab 8 tablet, 8 tablet, Oral, Q15 Min PRN    Physical Exam:    Blood pressure 145/73, pulse 87, temperature 97.3 °F (36.3 °C), temperature source Oral, resp.  rate 18, height 5' 6\"

## 2017-03-03 PROCEDURE — 87086 URINE CULTURE/COLONY COUNT: CPT | Performed by: PHYSICIAN ASSISTANT

## 2017-03-21 PROCEDURE — 87086 URINE CULTURE/COLONY COUNT: CPT | Performed by: PHYSICIAN ASSISTANT

## 2017-03-29 PROCEDURE — 87086 URINE CULTURE/COLONY COUNT: CPT | Performed by: PHYSICIAN ASSISTANT

## 2017-05-03 PROCEDURE — 81001 URINALYSIS AUTO W/SCOPE: CPT | Performed by: UROLOGY

## 2017-05-03 PROCEDURE — 87086 URINE CULTURE/COLONY COUNT: CPT | Performed by: UROLOGY

## 2017-05-12 PROCEDURE — 87102 FUNGUS ISOLATION CULTURE: CPT | Performed by: PHYSICIAN ASSISTANT

## 2017-05-12 PROCEDURE — 87116 MYCOBACTERIA CULTURE: CPT | Performed by: PHYSICIAN ASSISTANT

## 2017-05-12 PROCEDURE — 87186 SC STD MICRODIL/AGAR DIL: CPT | Performed by: PHYSICIAN ASSISTANT

## 2017-05-12 PROCEDURE — 87086 URINE CULTURE/COLONY COUNT: CPT | Performed by: PHYSICIAN ASSISTANT

## 2017-05-12 PROCEDURE — 87206 SMEAR FLUORESCENT/ACID STAI: CPT | Performed by: PHYSICIAN ASSISTANT

## 2017-05-12 PROCEDURE — 87086 URINE CULTURE/COLONY COUNT: CPT | Performed by: UROLOGY

## 2017-06-01 ENCOUNTER — HOSPITAL ENCOUNTER (EMERGENCY)
Facility: HOSPITAL | Age: 72
Discharge: HOME | End: 2017-06-01
Attending: EMERGENCY MEDICINE
Payer: MEDICARE

## 2017-06-01 ENCOUNTER — APPOINTMENT (OUTPATIENT)
Dept: CT IMAGING | Facility: HOSPITAL | Age: 72
End: 2017-06-01
Attending: EMERGENCY MEDICINE
Payer: MEDICARE

## 2017-06-01 VITALS
HEIGHT: 66 IN | BODY MASS INDEX: 40.18 KG/M2 | SYSTOLIC BLOOD PRESSURE: 118 MMHG | OXYGEN SATURATION: 98 % | DIASTOLIC BLOOD PRESSURE: 61 MMHG | WEIGHT: 250 LBS | HEART RATE: 94 BPM | TEMPERATURE: 97 F | RESPIRATION RATE: 18 BRPM

## 2017-06-01 DIAGNOSIS — R10.9 ABDOMINAL PAIN OF UNKNOWN ETIOLOGY: Primary | ICD-10-CM

## 2017-06-01 PROCEDURE — 96374 THER/PROPH/DIAG INJ IV PUSH: CPT

## 2017-06-01 PROCEDURE — 96376 TX/PRO/DX INJ SAME DRUG ADON: CPT

## 2017-06-01 PROCEDURE — 96361 HYDRATE IV INFUSION ADD-ON: CPT

## 2017-06-01 PROCEDURE — 96375 TX/PRO/DX INJ NEW DRUG ADDON: CPT

## 2017-06-01 PROCEDURE — 87088 URINE BACTERIA CULTURE: CPT | Performed by: EMERGENCY MEDICINE

## 2017-06-01 PROCEDURE — 99285 EMERGENCY DEPT VISIT HI MDM: CPT

## 2017-06-01 PROCEDURE — 80053 COMPREHEN METABOLIC PANEL: CPT | Performed by: EMERGENCY MEDICINE

## 2017-06-01 PROCEDURE — 87086 URINE CULTURE/COLONY COUNT: CPT | Performed by: EMERGENCY MEDICINE

## 2017-06-01 PROCEDURE — 81001 URINALYSIS AUTO W/SCOPE: CPT | Performed by: EMERGENCY MEDICINE

## 2017-06-01 PROCEDURE — 85025 COMPLETE CBC W/AUTO DIFF WBC: CPT | Performed by: EMERGENCY MEDICINE

## 2017-06-01 PROCEDURE — 83690 ASSAY OF LIPASE: CPT | Performed by: EMERGENCY MEDICINE

## 2017-06-01 PROCEDURE — 74177 CT ABD & PELVIS W/CONTRAST: CPT | Performed by: EMERGENCY MEDICINE

## 2017-06-01 PROCEDURE — 87186 SC STD MICRODIL/AGAR DIL: CPT | Performed by: EMERGENCY MEDICINE

## 2017-06-01 RX ORDER — HYDROMORPHONE HYDROCHLORIDE 1 MG/ML
1 INJECTION, SOLUTION INTRAMUSCULAR; INTRAVENOUS; SUBCUTANEOUS EVERY 30 MIN PRN
Status: DISCONTINUED | OUTPATIENT
Start: 2017-06-01 | End: 2017-06-02

## 2017-06-01 RX ORDER — ONDANSETRON 2 MG/ML
4 INJECTION INTRAMUSCULAR; INTRAVENOUS ONCE
Status: COMPLETED | OUTPATIENT
Start: 2017-06-01 | End: 2017-06-01

## 2017-06-01 RX ORDER — TRAMADOL HYDROCHLORIDE 50 MG/1
TABLET ORAL EVERY 4 HOURS PRN
Qty: 20 TABLET | Refills: 0 | Status: SHIPPED | OUTPATIENT
Start: 2017-06-01 | End: 2017-06-08

## 2017-06-01 RX ORDER — ONDANSETRON 4 MG/1
4 TABLET, ORALLY DISINTEGRATING ORAL EVERY 4 HOURS PRN
Qty: 10 TABLET | Refills: 0 | Status: SHIPPED | OUTPATIENT
Start: 2017-06-01 | End: 2017-06-08

## 2017-06-01 NOTE — ED NOTES
Patient reports she was started on antifungal medication for urinary fungal infection, patient reports medication was started x6 days ago, reports abdominal pain started x3 days after taking medication, reports she has been unable to eat for the last x3 da

## 2017-06-01 NOTE — ED PROVIDER NOTES
Patient Seen in: [facility] Emergency Department    History   Patient presents with:  Abdomen/Flank Pain (GI/): Pain in the lower abdominal area after taking an antifungal rxd by her uroloigist.  Nausea and not eating with no bowel movement for 3 2/28/2017    Comment Procedure: COLONOSCOPY;  Surgeon: Kylah Tolbert MD;  Location: [facility] ENDOSCOPY    OTHER SURGICAL HISTORY  5/3/17    Comment cystoscopy Dr. Baxter Courser       Medications :   TraMADol HCl 50 MG Oral Tab,  Take 1-2 tablets ( mg total) by O2 Device 06/01/17 1554 None (Room air)       Current:/50 mmHg  Pulse 91  Temp(Src) 97.4 °F (36.3 °C) (Temporal)  Resp 18  Ht 167.6 cm (5' 6\")  Wt 113.399 kg  BMI 40.37 kg/m2  SpO2 98%        Physical Exam  General: This a pleasant nontoxic appear Lymphocyte Absolute 0.86 (*)     All other components within normal limits   LIPASE - Normal   CBC WITH DIFFERENTIAL WITH PLATELET    Narrative: The following orders were created for panel order CBC WITH DIFFERENTIAL WITH PLATELET.   Procedure tissue at the sigmoid and rectal level that was formed a stricture in the past.  He does not appear to be any acute findings of diverticulitis or colitis. I explained all this to the patient.   I did speak to her primary care physician to arrange for kassandra

## 2017-06-03 RX ORDER — CEPHALEXIN 500 MG/1
500 CAPSULE ORAL 2 TIMES DAILY
Qty: 14 CAPSULE | Refills: 0 | Status: SHIPPED | OUTPATIENT
Start: 2017-06-03 | End: 2017-06-10

## 2017-06-27 PROCEDURE — 87086 URINE CULTURE/COLONY COUNT: CPT | Performed by: UROLOGY

## 2017-06-27 PROCEDURE — 87088 URINE BACTERIA CULTURE: CPT | Performed by: UROLOGY

## 2017-06-27 PROCEDURE — 87186 SC STD MICRODIL/AGAR DIL: CPT | Performed by: UROLOGY

## 2017-07-12 PROCEDURE — 87086 URINE CULTURE/COLONY COUNT: CPT | Performed by: UROLOGY

## 2017-07-26 PROCEDURE — 87086 URINE CULTURE/COLONY COUNT: CPT | Performed by: UROLOGY

## 2017-08-25 ENCOUNTER — HOSPITAL ENCOUNTER (INPATIENT)
Facility: HOSPITAL | Age: 72
LOS: 20 days | Discharge: NURSING, REHAB OR LONG-TERM CARE | DRG: 330 | End: 2017-09-14
Attending: EMERGENCY MEDICINE | Admitting: FAMILY MEDICINE
Payer: MEDICARE

## 2017-08-25 ENCOUNTER — APPOINTMENT (OUTPATIENT)
Dept: CT IMAGING | Facility: HOSPITAL | Age: 72
DRG: 330 | End: 2017-08-25
Attending: EMERGENCY MEDICINE
Payer: MEDICARE

## 2017-08-25 DIAGNOSIS — E11.22 CONTROLLED TYPE 2 DIABETES MELLITUS WITH STAGE 3 CHRONIC KIDNEY DISEASE, WITHOUT LONG-TERM CURRENT USE OF INSULIN (HCC): Chronic | ICD-10-CM

## 2017-08-25 DIAGNOSIS — I10 ESSENTIAL HYPERTENSION: Chronic | ICD-10-CM

## 2017-08-25 DIAGNOSIS — R26.89 IMBALANCE: ICD-10-CM

## 2017-08-25 DIAGNOSIS — K56.609 LARGE BOWEL OBSTRUCTION (HCC): ICD-10-CM

## 2017-08-25 DIAGNOSIS — K63.89 RECTOSIGMOIDITIS: ICD-10-CM

## 2017-08-25 DIAGNOSIS — R93.5 ABNORMAL ABDOMINAL CT SCAN: ICD-10-CM

## 2017-08-25 DIAGNOSIS — D62 ACUTE POSTHEMORRHAGIC ANEMIA: ICD-10-CM

## 2017-08-25 DIAGNOSIS — N18.30 CONTROLLED TYPE 2 DIABETES MELLITUS WITH STAGE 3 CHRONIC KIDNEY DISEASE, WITHOUT LONG-TERM CURRENT USE OF INSULIN (HCC): Chronic | ICD-10-CM

## 2017-08-25 DIAGNOSIS — R10.32 ABDOMINAL PAIN, LEFT LOWER QUADRANT: Primary | ICD-10-CM

## 2017-08-25 PROBLEM — R73.9 HYPERGLYCEMIA: Status: ACTIVE | Noted: 2017-08-25

## 2017-08-25 PROBLEM — R79.89 AZOTEMIA: Status: ACTIVE | Noted: 2017-08-25

## 2017-08-25 PROBLEM — D64.9 ANEMIA: Status: ACTIVE | Noted: 2017-08-25

## 2017-08-25 LAB
ALBUMIN SERPL-MCNC: 3.4 G/DL (ref 3.5–4.8)
ALP LIVER SERPL-CCNC: 70 U/L (ref 55–142)
ALT SERPL-CCNC: 21 U/L (ref 14–54)
ANTIBODY SCREEN: NEGATIVE
AST SERPL-CCNC: 10 U/L (ref 15–41)
BASOPHILS # BLD AUTO: 0.02 X10(3) UL (ref 0–0.1)
BASOPHILS NFR BLD AUTO: 0.5 %
BILIRUB SERPL-MCNC: 0.4 MG/DL (ref 0.1–2)
BILIRUB UR QL STRIP.AUTO: NEGATIVE
BUN BLD-MCNC: 32 MG/DL (ref 8–20)
BUN BLD-MCNC: 35 MG/DL (ref 8–20)
CALCIUM BLD-MCNC: 9.2 MG/DL (ref 8.3–10.3)
CALCIUM BLD-MCNC: 9.4 MG/DL (ref 8.3–10.3)
CHLORIDE: 104 MMOL/L (ref 101–111)
CHLORIDE: 105 MMOL/L (ref 101–111)
CO2: 27 MMOL/L (ref 22–32)
CO2: 28 MMOL/L (ref 22–32)
COLOR UR AUTO: YELLOW
CREAT BLD-MCNC: 1.1 MG/DL (ref 0.55–1.02)
CREAT BLD-MCNC: 1.28 MG/DL (ref 0.55–1.02)
EOSINOPHIL # BLD AUTO: 0.05 X10(3) UL (ref 0–0.3)
EOSINOPHIL NFR BLD AUTO: 1.2 %
ERYTHROCYTE [DISTWIDTH] IN BLOOD BY AUTOMATED COUNT: 13.5 % (ref 11.5–16)
GLUCOSE BLD-MCNC: 132 MG/DL (ref 65–99)
GLUCOSE BLD-MCNC: 143 MG/DL (ref 70–99)
GLUCOSE BLD-MCNC: 154 MG/DL (ref 70–99)
GLUCOSE BLD-MCNC: 160 MG/DL (ref 65–99)
GLUCOSE BLD-MCNC: 165 MG/DL (ref 65–99)
GLUCOSE UR STRIP.AUTO-MCNC: NEGATIVE MG/DL
HCT VFR BLD AUTO: 36 % (ref 34–50)
HGB BLD-MCNC: 11.9 G/DL (ref 12–16)
IMMATURE GRANULOCYTE COUNT: 0.02 X10(3) UL (ref 0–1)
IMMATURE GRANULOCYTE RATIO %: 0.5 %
KETONES UR STRIP.AUTO-MCNC: NEGATIVE MG/DL
LIPASE: 141 U/L (ref 73–393)
LYMPHOCYTES # BLD AUTO: 0.77 X10(3) UL (ref 0.9–4)
LYMPHOCYTES NFR BLD AUTO: 19 %
M PROTEIN MFR SERPL ELPH: 7.2 G/DL (ref 6.1–8.3)
MCH RBC QN AUTO: 30.8 PG (ref 27–33.2)
MCHC RBC AUTO-ENTMCNC: 33.1 G/DL (ref 31–37)
MCV RBC AUTO: 93.3 FL (ref 81–100)
MONOCYTES # BLD AUTO: 0.28 X10(3) UL (ref 0.1–0.6)
MONOCYTES NFR BLD AUTO: 6.9 %
NEUTROPHIL ABS PRELIM: 2.92 X10 (3) UL (ref 1.3–6.7)
NEUTROPHILS # BLD AUTO: 2.92 X10(3) UL (ref 1.3–6.7)
NEUTROPHILS NFR BLD AUTO: 71.9 %
NITRITE UR QL STRIP.AUTO: NEGATIVE
PH UR STRIP.AUTO: 6 [PH] (ref 4.5–8)
PLATELET # BLD AUTO: 150 10(3)UL (ref 150–450)
POTASSIUM SERPL-SCNC: 3.9 MMOL/L (ref 3.6–5.1)
POTASSIUM SERPL-SCNC: 4 MMOL/L (ref 3.6–5.1)
PROT UR STRIP.AUTO-MCNC: 30 MG/DL
RBC # BLD AUTO: 3.86 X10(6)UL (ref 3.8–5.1)
RBC UR QL AUTO: NEGATIVE
RED CELL DISTRIBUTION WIDTH-SD: 45.4 FL (ref 35.1–46.3)
RH BLOOD TYPE: NEGATIVE
SODIUM SERPL-SCNC: 140 MMOL/L (ref 136–144)
SODIUM SERPL-SCNC: 140 MMOL/L (ref 136–144)
SP GR UR STRIP.AUTO: 1.01 (ref 1–1.03)
UROBILINOGEN UR STRIP.AUTO-MCNC: <2 MG/DL
WBC # BLD AUTO: 4.1 X10(3) UL (ref 4–13)
WBC #/AREA URNS AUTO: >50 /HPF

## 2017-08-25 PROCEDURE — 86901 BLOOD TYPING SEROLOGIC RH(D): CPT | Performed by: SURGERY

## 2017-08-25 PROCEDURE — 96361 HYDRATE IV INFUSION ADD-ON: CPT

## 2017-08-25 PROCEDURE — 86850 RBC ANTIBODY SCREEN: CPT | Performed by: SURGERY

## 2017-08-25 PROCEDURE — 99285 EMERGENCY DEPT VISIT HI MDM: CPT

## 2017-08-25 PROCEDURE — 87086 URINE CULTURE/COLONY COUNT: CPT | Performed by: EMERGENCY MEDICINE

## 2017-08-25 PROCEDURE — 81001 URINALYSIS AUTO W/SCOPE: CPT | Performed by: EMERGENCY MEDICINE

## 2017-08-25 PROCEDURE — 83690 ASSAY OF LIPASE: CPT

## 2017-08-25 PROCEDURE — 96374 THER/PROPH/DIAG INJ IV PUSH: CPT

## 2017-08-25 PROCEDURE — 80053 COMPREHEN METABOLIC PANEL: CPT | Performed by: EMERGENCY MEDICINE

## 2017-08-25 PROCEDURE — 86920 COMPATIBILITY TEST SPIN: CPT

## 2017-08-25 PROCEDURE — 80053 COMPREHEN METABOLIC PANEL: CPT

## 2017-08-25 PROCEDURE — 85025 COMPLETE CBC W/AUTO DIFF WBC: CPT | Performed by: EMERGENCY MEDICINE

## 2017-08-25 PROCEDURE — 85025 COMPLETE CBC W/AUTO DIFF WBC: CPT

## 2017-08-25 PROCEDURE — 96375 TX/PRO/DX INJ NEW DRUG ADDON: CPT

## 2017-08-25 PROCEDURE — 86900 BLOOD TYPING SEROLOGIC ABO: CPT | Performed by: SURGERY

## 2017-08-25 PROCEDURE — 74177 CT ABD & PELVIS W/CONTRAST: CPT | Performed by: EMERGENCY MEDICINE

## 2017-08-25 PROCEDURE — 83690 ASSAY OF LIPASE: CPT | Performed by: EMERGENCY MEDICINE

## 2017-08-25 PROCEDURE — 82962 GLUCOSE BLOOD TEST: CPT

## 2017-08-25 RX ORDER — METOPROLOL TARTRATE 5 MG/5ML
5 INJECTION INTRAVENOUS EVERY 6 HOURS
Status: DISCONTINUED | OUTPATIENT
Start: 2017-08-25 | End: 2017-08-27

## 2017-08-25 RX ORDER — ONDANSETRON 2 MG/ML
4 INJECTION INTRAMUSCULAR; INTRAVENOUS EVERY 6 HOURS PRN
Status: DISCONTINUED | OUTPATIENT
Start: 2017-08-25 | End: 2017-08-26 | Stop reason: SDUPTHER

## 2017-08-25 RX ORDER — HYDROMORPHONE HYDROCHLORIDE 1 MG/ML
0.5 INJECTION, SOLUTION INTRAMUSCULAR; INTRAVENOUS; SUBCUTANEOUS EVERY 30 MIN PRN
Status: CANCELLED | OUTPATIENT
Start: 2017-08-25 | End: 2017-08-25

## 2017-08-25 RX ORDER — ONDANSETRON 2 MG/ML
4 INJECTION INTRAMUSCULAR; INTRAVENOUS ONCE
Status: COMPLETED | OUTPATIENT
Start: 2017-08-25 | End: 2017-08-25

## 2017-08-25 RX ORDER — ONDANSETRON 2 MG/ML
4 INJECTION INTRAMUSCULAR; INTRAVENOUS EVERY 4 HOURS PRN
Status: CANCELLED | OUTPATIENT
Start: 2017-08-25

## 2017-08-25 RX ORDER — DEXTROSE MONOHYDRATE 25 G/50ML
50 INJECTION, SOLUTION INTRAVENOUS
Status: DISCONTINUED | OUTPATIENT
Start: 2017-08-25 | End: 2017-09-14

## 2017-08-25 RX ORDER — METOPROLOL TARTRATE 5 MG/5ML
5 INJECTION INTRAVENOUS EVERY 6 HOURS PRN
Status: DISCONTINUED | OUTPATIENT
Start: 2017-08-25 | End: 2017-08-25

## 2017-08-25 RX ORDER — SODIUM CHLORIDE AND POTASSIUM CHLORIDE .9; .15 G/100ML; G/100ML
SOLUTION INTRAVENOUS CONTINUOUS
Status: DISCONTINUED | OUTPATIENT
Start: 2017-08-25 | End: 2017-08-26

## 2017-08-25 RX ORDER — HYDROMORPHONE HYDROCHLORIDE 1 MG/ML
1 INJECTION, SOLUTION INTRAMUSCULAR; INTRAVENOUS; SUBCUTANEOUS EVERY 2 HOUR PRN
Status: DISCONTINUED | OUTPATIENT
Start: 2017-08-25 | End: 2017-08-26

## 2017-08-25 RX ORDER — SODIUM CHLORIDE 9 MG/ML
INJECTION, SOLUTION INTRAVENOUS ONCE
Status: COMPLETED | OUTPATIENT
Start: 2017-08-25 | End: 2017-08-25

## 2017-08-25 RX ORDER — HYDROMORPHONE HYDROCHLORIDE 1 MG/ML
0.5 INJECTION, SOLUTION INTRAMUSCULAR; INTRAVENOUS; SUBCUTANEOUS ONCE
Status: COMPLETED | OUTPATIENT
Start: 2017-08-25 | End: 2017-08-25

## 2017-08-25 RX ORDER — LEVOTHYROXINE SODIUM 0.12 MG/1
125 TABLET ORAL
Status: DISCONTINUED | OUTPATIENT
Start: 2017-08-26 | End: 2017-09-14

## 2017-08-25 RX ORDER — SODIUM CHLORIDE 9 MG/ML
INJECTION, SOLUTION INTRAVENOUS CONTINUOUS
Status: CANCELLED | OUTPATIENT
Start: 2017-08-25 | End: 2017-08-25

## 2017-08-25 RX ORDER — HYDROMORPHONE HYDROCHLORIDE 1 MG/ML
0.5 INJECTION, SOLUTION INTRAMUSCULAR; INTRAVENOUS; SUBCUTANEOUS EVERY 2 HOUR PRN
Status: DISCONTINUED | OUTPATIENT
Start: 2017-08-25 | End: 2017-08-26

## 2017-08-25 RX ORDER — METOCLOPRAMIDE HYDROCHLORIDE 5 MG/ML
10 INJECTION INTRAMUSCULAR; INTRAVENOUS EVERY 6 HOURS PRN
Status: DISCONTINUED | OUTPATIENT
Start: 2017-08-25 | End: 2017-08-27

## 2017-08-25 RX ORDER — LOSARTAN POTASSIUM 50 MG/1
50 TABLET ORAL
Status: DISCONTINUED | OUTPATIENT
Start: 2017-08-25 | End: 2017-08-27

## 2017-08-25 RX ORDER — HYDROMORPHONE HYDROCHLORIDE 1 MG/ML
0.5 INJECTION, SOLUTION INTRAMUSCULAR; INTRAVENOUS; SUBCUTANEOUS EVERY 4 HOURS PRN
Status: DISCONTINUED | OUTPATIENT
Start: 2017-08-25 | End: 2017-08-25

## 2017-08-25 RX ORDER — FEBUXOSTAT 40 MG/1
80 TABLET, FILM COATED ORAL DAILY
Status: DISCONTINUED | OUTPATIENT
Start: 2017-08-25 | End: 2017-09-14

## 2017-08-25 NOTE — PROGRESS NOTES
NURSING ADMISSION NOTE      Patient admitted via Cart  Oriented to room. Safety precautions initiated. Bed in low position. Call light in reach. Patient to SCU from ED in stable condition. A&O X4, VS stable, afebrile.  Patient breathing easy on ro

## 2017-08-25 NOTE — ED PROVIDER NOTES
Patient Seen in: [facility] Emergency Department    History   Patient presents with:  Abdomen/Flank Pain (GI/)    Stated Complaint: abd pain constipation pt seen twice for same symptoms pt thinks she has a block*    HPI    Patient is a pleasant 67 Comment: Procedure: COLONOSCOPY;  Surgeon: Allegra Zavaleta MD;  Location: [facility] ENDOSCOPY  3-1-11: CYSTOURETHROSCOPY      Comment: cysto flow , dr Bertha Bullard  No date: EYE SURGERY      Comment: eye  No date: FOOT SURGERY      Comment: foot  No 0917]  BP: 160/78  Pulse: 70  Resp: 16  Temp: 98.5 °F (36.9 °C)  Temp src: Temporal  SpO2: 94 %  O2 Device: None (Room air)    Current:BP (!) 183/75   Pulse 75   Temp 98.5 °F (36.9 °C) (Temporal)   Resp 16   Ht 167.6 cm (5' 6\")   Wt 113.4 kg   SpO2 97% DIFFERENTIAL WITH PLATELET    Narrative: The following orders were created for panel order CBC WITH DIFFERENTIAL WITH PLATELET.   Procedure                               Abnormality         Status                     --------- the colon the low sigmoid/ high rectal region measuring 6.7 x 2.4 cm. There is extensive dilation of the colon primarily by fluid. There is wall thickening involving the sigmoid colon and portions of the left colon suggesting edema/inflammatory change.  The discussed with the patient/family. History and emergency room evaluation was discussed with Dr. Fred Sheriff. Dr. Fred Sheriff will admit the patient to facilitate additional treatment and surgical intervention. Patient was reviewed with Dr. Salma Glass, who will follow.

## 2017-08-25 NOTE — ED INITIAL ASSESSMENT (HPI)
Pt c/o intermittent LLQ abd pain onset yesterday afternoon, pt sts she has had bladder infection since last November, pt c/o nausea, dry heaves, and constipation, last BM small yesterday afternoon.

## 2017-08-26 ENCOUNTER — SURGERY (OUTPATIENT)
Age: 72
End: 2017-08-26

## 2017-08-26 LAB
GLUCOSE BLD-MCNC: 144 MG/DL (ref 65–99)
GLUCOSE BLD-MCNC: 150 MG/DL (ref 65–99)
GLUCOSE BLD-MCNC: 196 MG/DL (ref 65–99)
GLUCOSE BLD-MCNC: 205 MG/DL (ref 65–99)
HAV IGM SER QL: 1.8 MG/DL (ref 1.7–3)

## 2017-08-26 PROCEDURE — 82962 GLUCOSE BLOOD TEST: CPT

## 2017-08-26 PROCEDURE — 83735 ASSAY OF MAGNESIUM: CPT | Performed by: FAMILY MEDICINE

## 2017-08-26 PROCEDURE — 0TQB0ZZ REPAIR BLADDER, OPEN APPROACH: ICD-10-PCS | Performed by: SURGERY

## 2017-08-26 PROCEDURE — 0DTN0ZZ RESECTION OF SIGMOID COLON, OPEN APPROACH: ICD-10-PCS | Performed by: SURGERY

## 2017-08-26 PROCEDURE — 0D1M0Z4 BYPASS DESCENDING COLON TO CUTANEOUS, OPEN APPROACH: ICD-10-PCS | Performed by: SURGERY

## 2017-08-26 PROCEDURE — 88307 TISSUE EXAM BY PATHOLOGIST: CPT | Performed by: SURGERY

## 2017-08-26 PROCEDURE — 0WQF0ZZ REPAIR ABDOMINAL WALL, OPEN APPROACH: ICD-10-PCS | Performed by: SURGERY

## 2017-08-26 RX ORDER — ONDANSETRON 2 MG/ML
4 INJECTION INTRAMUSCULAR; INTRAVENOUS AS NEEDED
Status: DISCONTINUED | OUTPATIENT
Start: 2017-08-26 | End: 2017-08-26 | Stop reason: HOSPADM

## 2017-08-26 RX ORDER — SODIUM CHLORIDE, SODIUM LACTATE, POTASSIUM CHLORIDE, CALCIUM CHLORIDE 600; 310; 30; 20 MG/100ML; MG/100ML; MG/100ML; MG/100ML
INJECTION, SOLUTION INTRAVENOUS CONTINUOUS
Status: DISCONTINUED | OUTPATIENT
Start: 2017-08-26 | End: 2017-08-27

## 2017-08-26 RX ORDER — HYDROMORPHONE HYDROCHLORIDE 1 MG/ML
0.4 INJECTION, SOLUTION INTRAMUSCULAR; INTRAVENOUS; SUBCUTANEOUS EVERY 30 MIN PRN
Status: DISCONTINUED | OUTPATIENT
Start: 2017-08-26 | End: 2017-09-13

## 2017-08-26 RX ORDER — DIPHENHYDRAMINE HYDROCHLORIDE 50 MG/ML
12.5 INJECTION INTRAMUSCULAR; INTRAVENOUS EVERY 4 HOURS PRN
Status: DISCONTINUED | OUTPATIENT
Start: 2017-08-26 | End: 2017-09-14

## 2017-08-26 RX ORDER — HYDROMORPHONE HYDROCHLORIDE 1 MG/ML
INJECTION, SOLUTION INTRAMUSCULAR; INTRAVENOUS; SUBCUTANEOUS
Status: COMPLETED
Start: 2017-08-26 | End: 2017-08-26

## 2017-08-26 RX ORDER — HYDROCODONE BITARTRATE AND ACETAMINOPHEN 5; 325 MG/1; MG/1
1 TABLET ORAL AS NEEDED
Status: DISCONTINUED | OUTPATIENT
Start: 2017-08-26 | End: 2017-08-26 | Stop reason: HOSPADM

## 2017-08-26 RX ORDER — ONDANSETRON 2 MG/ML
4 INJECTION INTRAMUSCULAR; INTRAVENOUS EVERY 6 HOURS PRN
Status: DISCONTINUED | OUTPATIENT
Start: 2017-08-26 | End: 2017-09-14

## 2017-08-26 RX ORDER — SODIUM CHLORIDE AND POTASSIUM CHLORIDE .9; .15 G/100ML; G/100ML
SOLUTION INTRAVENOUS CONTINUOUS
Status: DISCONTINUED | OUTPATIENT
Start: 2017-08-26 | End: 2017-08-27

## 2017-08-26 RX ORDER — ENOXAPARIN SODIUM 100 MG/ML
40 INJECTION SUBCUTANEOUS NIGHTLY
Status: DISCONTINUED | OUTPATIENT
Start: 2017-08-26 | End: 2017-08-30

## 2017-08-26 RX ORDER — INSULIN ASPART 100 [IU]/ML
INJECTION, SOLUTION INTRAVENOUS; SUBCUTANEOUS
Status: COMPLETED
Start: 2017-08-26 | End: 2017-08-26

## 2017-08-26 RX ORDER — HYDROCODONE BITARTRATE AND ACETAMINOPHEN 5; 325 MG/1; MG/1
2 TABLET ORAL AS NEEDED
Status: DISCONTINUED | OUTPATIENT
Start: 2017-08-26 | End: 2017-08-26 | Stop reason: HOSPADM

## 2017-08-26 RX ORDER — FUROSEMIDE 10 MG/ML
20 INJECTION INTRAMUSCULAR; INTRAVENOUS ONCE
Status: COMPLETED | OUTPATIENT
Start: 2017-08-26 | End: 2017-08-26

## 2017-08-26 RX ORDER — MIDAZOLAM HYDROCHLORIDE 1 MG/ML
INJECTION INTRAMUSCULAR; INTRAVENOUS
Status: COMPLETED
Start: 2017-08-26 | End: 2017-08-26

## 2017-08-26 RX ORDER — NALOXONE HYDROCHLORIDE 0.4 MG/ML
0.08 INJECTION, SOLUTION INTRAMUSCULAR; INTRAVENOUS; SUBCUTANEOUS
Status: DISCONTINUED | OUTPATIENT
Start: 2017-08-26 | End: 2017-08-28

## 2017-08-26 RX ORDER — MEPERIDINE HYDROCHLORIDE 25 MG/ML
INJECTION INTRAMUSCULAR; INTRAVENOUS; SUBCUTANEOUS
Status: COMPLETED
Start: 2017-08-26 | End: 2017-08-26

## 2017-08-26 RX ORDER — INSULIN ASPART 100 [IU]/ML
INJECTION, SOLUTION INTRAVENOUS; SUBCUTANEOUS ONCE
Status: COMPLETED | OUTPATIENT
Start: 2017-08-26 | End: 2017-08-26

## 2017-08-26 RX ORDER — DEXTROSE MONOHYDRATE 25 G/50ML
50 INJECTION, SOLUTION INTRAVENOUS
Status: DISCONTINUED | OUTPATIENT
Start: 2017-08-26 | End: 2017-08-26 | Stop reason: HOSPADM

## 2017-08-26 RX ORDER — MIDAZOLAM HYDROCHLORIDE 1 MG/ML
1 INJECTION INTRAMUSCULAR; INTRAVENOUS EVERY 5 MIN PRN
Status: DISCONTINUED | OUTPATIENT
Start: 2017-08-26 | End: 2017-08-26 | Stop reason: HOSPADM

## 2017-08-26 RX ORDER — NALOXONE HYDROCHLORIDE 0.4 MG/ML
80 INJECTION, SOLUTION INTRAMUSCULAR; INTRAVENOUS; SUBCUTANEOUS AS NEEDED
Status: DISCONTINUED | OUTPATIENT
Start: 2017-08-26 | End: 2017-08-26 | Stop reason: HOSPADM

## 2017-08-26 RX ORDER — ACETAMINOPHEN 10 MG/ML
1000 INJECTION, SOLUTION INTRAVENOUS ONCE
Status: COMPLETED | OUTPATIENT
Start: 2017-08-26 | End: 2017-08-26

## 2017-08-26 RX ORDER — NALBUPHINE HCL 10 MG/ML
2.5 AMPUL (ML) INJECTION EVERY 4 HOURS PRN
Status: DISCONTINUED | OUTPATIENT
Start: 2017-08-26 | End: 2017-08-28

## 2017-08-26 RX ORDER — HYDROMORPHONE HYDROCHLORIDE 1 MG/ML
0.4 INJECTION, SOLUTION INTRAMUSCULAR; INTRAVENOUS; SUBCUTANEOUS EVERY 5 MIN PRN
Status: DISCONTINUED | OUTPATIENT
Start: 2017-08-26 | End: 2017-08-26 | Stop reason: HOSPADM

## 2017-08-26 RX ORDER — ACETAMINOPHEN 10 MG/ML
INJECTION, SOLUTION INTRAVENOUS
Status: COMPLETED
Start: 2017-08-26 | End: 2017-08-26

## 2017-08-26 RX ORDER — KETOROLAC TROMETHAMINE 30 MG/ML
30 INJECTION, SOLUTION INTRAMUSCULAR; INTRAVENOUS EVERY 6 HOURS PRN
Status: DISCONTINUED | OUTPATIENT
Start: 2017-08-26 | End: 2017-08-27

## 2017-08-26 RX ORDER — MEPERIDINE HYDROCHLORIDE 25 MG/ML
12.5 INJECTION INTRAMUSCULAR; INTRAVENOUS; SUBCUTANEOUS AS NEEDED
Status: COMPLETED | OUTPATIENT
Start: 2017-08-26 | End: 2017-08-26

## 2017-08-26 NOTE — RESPIRATORY THERAPY NOTE
Pt denies CPAP use at home. Pt refuses to wear CPAP during hospital stay. GAYLE protocol in place. Will continue to monitor.

## 2017-08-26 NOTE — PROGRESS NOTES
Patient to SCU from PACU in stable condition. Drowsy, responds to verbal stimuli and oriented X3. VS stable. Will continue to monitor.

## 2017-08-26 NOTE — PROGRESS NOTES
Call to dr. Tejas Vaz regarding  Iv lopressor given over night and po cozaar due to be given po this am. B/p 121/57. Left message for dr. Tejas Vaz will hold cozaar for now.

## 2017-08-26 NOTE — CONSULTS
[facility]    PATIENT'S NAME: Delroy Raoryland   ATTENDING PHYSICIAN: Angi Lawson M.D.   CONSULTING PHYSICIAN: Loreta Garcia M.D.    PATIENT ACCOUNT#:   [de-identified]    LOCATION:  [address] [place] [address]  MEDICAL RECORD #:   [id number]       DATE OF ANASTASIA colovesicular fistula; however, history is consistent with that. IMPRESSION:  Large bowel obstruction. No obvious acute surgical abdomen at this time.       PLAN:  The patient will need a sigmoid resection and diverting colostomy, possible repair of col

## 2017-08-26 NOTE — BRIEF OP NOTE
Pre-Operative Diagnosis: Bowel obstruction     Post-Operative Diagnosis: Bowel obstruction     Procedure Performed:   Procedure(s):  EXPLORATORY LAPAROTOMY, SIGMOID RESECTION  WITH COLOSTOMY, primary repair of ventral hernia    Surgeon(s) and Role:

## 2017-08-26 NOTE — H&P
[address] Patient Status:  Inpatient    1945 MRN [id number]   [facility] 3[facility]-A Attending Brian Haro MD   Hosp Day # 0 PCP Carter Harris (Inactive)     Date:  2017  Date o co-morbid conditions.      History:  Past Medical History:   Diagnosis Date   • Anesthesia complication     patient states 'it took me 6 hours to wake up after my hysterectomy'   • Back problem     DISH   • CANCER     uterine   • Cancer [facility])     Uterine CA tablet (1 mg total) by mouth 2 (two) times daily before meals. Disp: 60 tablet Rfl: 0 8/24/2017 at Unknown time   Febuxostat 80 MG Oral Tab Take by mouth daily. Disp:  Rfl:  8/24/2017 at Unknown time   Losartan Potassium (COZAAR) 50 MG Oral Tab daily. tender with guarding in the LLQ, bowel sounds hypoactive all four quadrants, mild distention, no masses, no organomegaly   Genitalia:    Deferred   Rectal:    Deferred   Extremities:   Extremities normal, atraumatic, no cyanosis, stable mild stasis edema Glucose controlled, VSS stable. Her co-morbidities do increase her risk for surgical and post-operative complications - however, she has an unstable problem in the abdomen, and there are no other acceptable options. CLEARED FOR SURGERY TOMORROW.      Pl

## 2017-08-26 NOTE — CONSULTS
[facility] Pharmacy Note:  Pain Consult    Haris López is a 67year old female started on Dilaudid PCA by Dr. Kayla Baum. Pharmacy was consulted to review medication profile and to discontinue previously ordered narcotics and sedatives.     Medication profil

## 2017-08-26 NOTE — OR NURSING
Patient to PACU moaning and crying in pain. Patient will not give a pain score to rate pain. See MAR for interventions.

## 2017-08-26 NOTE — OR NURSING
Patient remains drowsy, but opens eyes to verbal stimuli. Patient asked to rate pain on 0-10 scale. Patient unable to rate pain, quickly falls back to sleep. Patient desats to 88% on 5L, continues to take fast, shallow breaths.   Encouraged patient to ta

## 2017-08-26 NOTE — PLAN OF CARE
Minimal or absence of nausea and vomiting Not Progressing      Maintains or returns to baseline bowel function Not Progressing      Glucose maintained within prescribed range Progressing      Verbalizes/displays adequate comfort level or patient's stated p

## 2017-08-26 NOTE — PROGRESS NOTES
Patient stable. Low urine output noted. 75ml in 3 hours. VS stable. Dr. Beni Mccallum notifiied. Orders received for 1L bolus. Will implement and continue to monitor.

## 2017-08-26 NOTE — OR NURSING
Dilaudid PCA initiated as ordered per anesthesia. Patient unable to learn usage due to drowsiness after narcotic administration. Patient will need reinforcement once more awake and alert.   Continue to encourage patient to take slow deep breaths and cough

## 2017-08-26 NOTE — PROGRESS NOTES
[facility]  Progress Note    Jorge Rincon Patient Status:  Inpatient    1945 MRN [id number]   [facility] PRE OP HOLDING Attending Angélica Altamirano MD   Hosp Day # 1 PCP Mai Cardoso (Inactive)     I am seeing the patien chewable tab 4 tablet 4 tablet Oral Q15 Min PRN   Or      [MAR Hold] dextrose 50% injection 50 mL 50 mL Intravenous Q15 Min PRN   Or      [MAR Hold] glucose (DEX4) oral liquid 30 g 30 g Oral Q15 Min PRN   Or      [MAR Hold] Glucose-Vitamin C (DEX-4) 4-0.00

## 2017-08-27 PROBLEM — E11.22 CONTROLLED TYPE 2 DIABETES MELLITUS WITH STAGE 3 CHRONIC KIDNEY DISEASE, WITHOUT LONG-TERM CURRENT USE OF INSULIN (HCC): Chronic | Status: ACTIVE | Noted: 2017-01-03

## 2017-08-27 PROBLEM — N17.9 ACUTE RENAL FAILURE SUPERIMPOSED ON STAGE 3 CHRONIC KIDNEY DISEASE (HCC): Status: ACTIVE | Noted: 2017-08-27

## 2017-08-27 PROBLEM — Z79.899 HIGH RISK MEDICATIONS (NOT ANTICOAGULANTS) LONG-TERM USE: Chronic | Status: ACTIVE | Noted: 2017-08-27

## 2017-08-27 PROBLEM — N18.30 CONTROLLED TYPE 2 DIABETES MELLITUS WITH STAGE 3 CHRONIC KIDNEY DISEASE, WITHOUT LONG-TERM CURRENT USE OF INSULIN (HCC): Chronic | Status: ACTIVE | Noted: 2017-01-03

## 2017-08-27 PROBLEM — N18.30 ACUTE RENAL FAILURE SUPERIMPOSED ON STAGE 3 CHRONIC KIDNEY DISEASE (HCC): Status: ACTIVE | Noted: 2017-08-27

## 2017-08-27 LAB
ALBUMIN SERPL-MCNC: 2.4 G/DL (ref 3.5–4.8)
ALP LIVER SERPL-CCNC: 52 U/L (ref 55–142)
ALT SERPL-CCNC: 18 U/L (ref 14–54)
AST SERPL-CCNC: 18 U/L (ref 15–41)
BASOPHILS # BLD AUTO: 0.02 X10(3) UL (ref 0–0.1)
BASOPHILS NFR BLD AUTO: 0.2 %
BILIRUB SERPL-MCNC: 0.4 MG/DL (ref 0.1–2)
BUN BLD-MCNC: 24 MG/DL (ref 8–20)
BUN BLD-MCNC: 24 MG/DL (ref 8–20)
CALCIUM BLD-MCNC: 7.2 MG/DL (ref 8.3–10.3)
CALCIUM BLD-MCNC: 7.3 MG/DL (ref 8.3–10.3)
CHLORIDE: 115 MMOL/L (ref 101–111)
CHLORIDE: 116 MMOL/L (ref 101–111)
CO2: 23 MMOL/L (ref 22–32)
CO2: 25 MMOL/L (ref 22–32)
CREAT BLD-MCNC: 1.84 MG/DL (ref 0.55–1.02)
CREAT BLD-MCNC: 1.88 MG/DL (ref 0.55–1.02)
EOSINOPHIL # BLD AUTO: 0 X10(3) UL (ref 0–0.3)
EOSINOPHIL NFR BLD AUTO: 0 %
ERYTHROCYTE [DISTWIDTH] IN BLOOD BY AUTOMATED COUNT: 13.9 % (ref 11.5–16)
GLUCOSE BLD-MCNC: 113 MG/DL (ref 65–99)
GLUCOSE BLD-MCNC: 131 MG/DL (ref 70–99)
GLUCOSE BLD-MCNC: 151 MG/DL (ref 70–99)
GLUCOSE BLD-MCNC: 161 MG/DL (ref 65–99)
GLUCOSE BLD-MCNC: 187 MG/DL (ref 65–99)
GLUCOSE BLD-MCNC: 221 MG/DL (ref 65–99)
HAV IGM SER QL: 1.8 MG/DL (ref 1.7–3)
HCT VFR BLD AUTO: 31.7 % (ref 34–50)
HGB BLD-MCNC: 9.9 G/DL (ref 12–16)
IMMATURE GRANULOCYTE COUNT: 0.02 X10(3) UL (ref 0–1)
IMMATURE GRANULOCYTE RATIO %: 0.2 %
LYMPHOCYTES # BLD AUTO: 0.47 X10(3) UL (ref 0.9–4)
LYMPHOCYTES NFR BLD AUTO: 5.4 %
M PROTEIN MFR SERPL ELPH: 5.7 G/DL (ref 6.1–8.3)
MCH RBC QN AUTO: 31.6 PG (ref 27–33.2)
MCHC RBC AUTO-ENTMCNC: 31.2 G/DL (ref 31–37)
MCV RBC AUTO: 101.3 FL (ref 81–100)
MONOCYTES # BLD AUTO: 0.83 X10(3) UL (ref 0.1–0.6)
MONOCYTES NFR BLD AUTO: 9.6 %
NEUTROPHIL ABS PRELIM: 7.34 X10 (3) UL (ref 1.3–6.7)
NEUTROPHILS # BLD AUTO: 7.34 X10(3) UL (ref 1.3–6.7)
NEUTROPHILS NFR BLD AUTO: 84.6 %
PLATELET # BLD AUTO: 151 10(3)UL (ref 150–450)
POTASSIUM SERPL-SCNC: 5.3 MMOL/L (ref 3.6–5.1)
POTASSIUM SERPL-SCNC: 5.5 MMOL/L (ref 3.6–5.1)
RBC # BLD AUTO: 3.13 X10(6)UL (ref 3.8–5.1)
RED CELL DISTRIBUTION WIDTH-SD: 50.7 FL (ref 35.1–46.3)
SODIUM SERPL-SCNC: 144 MMOL/L (ref 136–144)
SODIUM SERPL-SCNC: 145 MMOL/L (ref 136–144)
WBC # BLD AUTO: 8.7 X10(3) UL (ref 4–13)

## 2017-08-27 PROCEDURE — 94640 AIRWAY INHALATION TREATMENT: CPT

## 2017-08-27 PROCEDURE — 83735 ASSAY OF MAGNESIUM: CPT | Performed by: FAMILY MEDICINE

## 2017-08-27 PROCEDURE — 97166 OT EVAL MOD COMPLEX 45 MIN: CPT

## 2017-08-27 PROCEDURE — 85025 COMPLETE CBC W/AUTO DIFF WBC: CPT | Performed by: FAMILY MEDICINE

## 2017-08-27 PROCEDURE — 97535 SELF CARE MNGMENT TRAINING: CPT

## 2017-08-27 PROCEDURE — 97162 PT EVAL MOD COMPLEX 30 MIN: CPT

## 2017-08-27 PROCEDURE — 80053 COMPREHEN METABOLIC PANEL: CPT | Performed by: FAMILY MEDICINE

## 2017-08-27 PROCEDURE — 82962 GLUCOSE BLOOD TEST: CPT

## 2017-08-27 PROCEDURE — 80048 BASIC METABOLIC PNL TOTAL CA: CPT | Performed by: FAMILY MEDICINE

## 2017-08-27 RX ORDER — SODIUM CHLORIDE 9 MG/ML
INJECTION, SOLUTION INTRAVENOUS CONTINUOUS
Status: ACTIVE | OUTPATIENT
Start: 2017-08-27 | End: 2017-08-27

## 2017-08-27 RX ORDER — ALBUTEROL SULFATE 2.5 MG/3ML
2.5 SOLUTION RESPIRATORY (INHALATION)
Status: DISPENSED | OUTPATIENT
Start: 2017-08-27 | End: 2017-08-29

## 2017-08-27 RX ORDER — MAGNESIUM OXIDE 400 MG (241.3 MG MAGNESIUM) TABLET
400 TABLET ONCE
Status: COMPLETED | OUTPATIENT
Start: 2017-08-27 | End: 2017-08-27

## 2017-08-27 RX ORDER — SODIUM CHLORIDE 450 MG/100ML
INJECTION, SOLUTION INTRAVENOUS CONTINUOUS
Status: DISCONTINUED | OUTPATIENT
Start: 2017-08-27 | End: 2017-08-28

## 2017-08-27 RX ORDER — HYDROCODONE BITARTRATE AND ACETAMINOPHEN 5; 325 MG/1; MG/1
1 TABLET ORAL EVERY 4 HOURS PRN
Status: DISCONTINUED | OUTPATIENT
Start: 2017-08-27 | End: 2017-09-14

## 2017-08-27 RX ORDER — METOCLOPRAMIDE HYDROCHLORIDE 5 MG/ML
5 INJECTION INTRAMUSCULAR; INTRAVENOUS EVERY 6 HOURS PRN
Status: DISCONTINUED | OUTPATIENT
Start: 2017-08-27 | End: 2017-09-03

## 2017-08-27 RX ORDER — HYDROCODONE BITARTRATE AND ACETAMINOPHEN 5; 325 MG/1; MG/1
2 TABLET ORAL EVERY 4 HOURS PRN
Status: DISCONTINUED | OUTPATIENT
Start: 2017-08-27 | End: 2017-09-14

## 2017-08-27 NOTE — PROGRESS NOTES
[facility] Pharmacy Note:  Renal Adjustment for Mefoxin (cefoxitin)    Elmarie Remedies is a 67year old female who has been prescribed Mefoxin (cefoxitin) 2 g every 8 hrs. CrCl is estimated creatinine clearance is 25.3 mL/min (based on SCr of 1.88 mg/dL).  so t

## 2017-08-27 NOTE — PROGRESS NOTES
Dr Juan Johansen & dr Chato Barker here & poc discussed w/ rn & pt. Per dr Juan Johansen, give nss 500cc bolus @ this time for low urine output, then change to 0.45 @ 150cc/hr.

## 2017-08-27 NOTE — PROGRESS NOTES
[facility]  Progress Note    Jenny Aguilar Patient Status:  Inpatient    1945 MRN [id number]   [facility] 3NW-A Attending Maci Bennett MD   [facility] Day # 2 PCP Myrna Park (Inactive)       SUBJECTIVE:  Just moved from [place]. Imaging:  None new.      Meds:     Current Facility-Administered Medications:  0.9%  NaCl infusion  Intravenous Continuous   metoprolol tartrate (LOPRESSOR) partial tablet 12.5 mg 12.5 mg Oral 2x Daily(Beta Blocker)   0.45% NaCl infusion  In of insulin (HCC)    High risk medications (not anticoagulants) long-term use    Recurrent UTI    DISH (diffuse idiopathic skeletal hyperostosis)    Dehydration    Anemia    Azotemia    Hyperglycemia    Perceived Dyspnea     Acute on Chronic Renal Failure.

## 2017-08-27 NOTE — PROGRESS NOTES
Urine harmony/yellow sl cloudy, 100cc for 4 hours dr. Fang Messenger call orders obtain to bolus with iv fluids and follow with lasix. endorsed to pt plan of care.

## 2017-08-27 NOTE — PROGRESS NOTES
uop for 4 hours from 2 am to 6 am 75 cc.dr. bledsoe called informed of this and recent lab results. Orders obtained. States will be in to see pt shortly Plan of care update to pt. Verb understanding.

## 2017-08-27 NOTE — PLAN OF CARE
Absence of urinary retention Not Progressing      Glucose maintained within prescribed range Progressing      Minimal or absence of nausea and vomiting Progressing      Maintains or returns to baseline bowel function Progressing      Verbalizes/displays ad

## 2017-08-27 NOTE — PROGRESS NOTES
Spoke with dr Tanvir Newsome.   md aware of low urine output & current vs.  New orders noted for cmp in am.

## 2017-08-27 NOTE — PROGRESS NOTES
Inc pain  No SOB  AVSS  Marginal u/o overnite  Getting fluid challenge  Chest < in bases  Abd soft ostomy pink + stool  GRANT SS  > act as lorena  Follow renal function.   Rec Renal consult if cont > in Cr

## 2017-08-27 NOTE — PHYSICAL THERAPY NOTE
PHYSICAL THERAPY EVALUATION - INPATIENT     Room Number: 303/303-A  Evaluation Date: 8/27/2017  Type of Evaluation: Initial  Physician Order: PT Eval and Treat    Presenting Problem: sigmoid resection/colostomy/hernia repair  Reason for Therapy: Mobili Comment: Jhon baird/ Dr. Ame Schwartz  07/26/2017: OTHER SURGICAL HISTORY      Comment: jhon Schwartz  No date: REMOVAL GALLBLADDER  No date: SHOULDER SURG PROC UNLISTED      Comment: shoulder    HOME SITUATION  Type of Home: House   Home Layout: Two level  Stairs to 3-/5  Left Hip flexion  3-/5  Right Knee extension  3+/5  Left Knee extension  3+/5  Right Dorsiflexion  3+/5  Left Dorsiflexion  3+/5    BALANCE  Static Sitting: Poor +  Dynamic Sitting: Poor  Static Standing: Poor +  Dynamic Standing: Poor    ADDITIONAL training  Transfer training    Patient End of Session: In bed;Needs met;Call light within reach;RN aware of session/findings; All patient questions and concerns addressed;SCDs in place    ASSESSMENT     Patient is a 67year old female admitted 8/25/2017 for

## 2017-08-27 NOTE — OCCUPATIONAL THERAPY NOTE
OCCUPATIONAL THERAPY EVALUATION - INPATIENT     Room Number: 303/303-A  Evaluation Date: 8/27/2017  Type of Evaluation: Initial  Presenting Problem: sigmoid resection/colostomy/hernia repair    Physician Order: IP Consult to Occupational Therapy  Reason fo obstruction ([facility] [address] 2017       Past Surgical History  Past Surgical History:  No date:   [address] EDW: CHOLECYSTECTOMY  2017: COLONOSCOPY N/A      Comment: Procedure: COLONOSCOPY;  Surgeon: Nohemy Saez MD;  Location cannula  Liters of O2:  3L     ACTIVITIES OF DAILY LIVING ASSESSMENT  AM-PAC ‘6-Clicks’ Inpatient Daily Activity Short Form  How much help from another person does the patient currently need…  -   Putting on and taking off regular lower body clothing?: Tot indicates severe disability in ADL dysfunction in the areas of: bowel and bladder management, grooming, toileting, feeding, functional transfers, functional mobility, dressing, stairs and bathing.  The AM-FELIBERTO ' '6-Clicks' Inpatient Daily Activity Short Form

## 2017-08-27 NOTE — PLAN OF CARE
Problem: PAIN - ADULT  Goal: Verbalizes/displays adequate comfort level or patient's stated pain goal  INTERVENTIONS:  - Encourage pt to monitor pain and request assistance  - Assess pain using appropriate pain scale  - Administer analgesics based on type ADULT  Goal: Minimal or absence of nausea and vomiting  INTERVENTIONS:  - Maintain adequate hydration with IV or PO as ordered and tolerated  - Nasogastric tube to low intermittent suction as ordered  - Evaluate effectiveness of ordered antiemetic medicati

## 2017-08-28 ENCOUNTER — APPOINTMENT (OUTPATIENT)
Dept: GENERAL RADIOLOGY | Facility: HOSPITAL | Age: 72
DRG: 330 | End: 2017-08-28
Attending: FAMILY MEDICINE
Payer: MEDICARE

## 2017-08-28 PROBLEM — E44.1 PROTEIN-CALORIE MALNUTRITION, MILD (HCC): Chronic | Status: ACTIVE | Noted: 2017-08-28

## 2017-08-28 PROBLEM — E87.20 ACIDOSIS: Status: ACTIVE | Noted: 2017-08-28

## 2017-08-28 PROBLEM — E87.2 ACIDOSIS: Status: ACTIVE | Noted: 2017-08-28

## 2017-08-28 LAB
ALBUMIN SERPL-MCNC: 2.1 G/DL (ref 3.5–4.8)
ALLENS TEST: POSITIVE
ALP LIVER SERPL-CCNC: 54 U/L (ref 55–142)
ALT SERPL-CCNC: 14 U/L (ref 14–54)
ARTERIAL BLD GAS O2 SATURATION: 94 % (ref 92–100)
ARTERIAL BLOOD GAS BASE EXCESS: -5
ARTERIAL BLOOD GAS HCO3: 21.4 MEQ/L (ref 22–26)
ARTERIAL BLOOD GAS PCO2: 47 MM HG (ref 35–45)
ARTERIAL BLOOD GAS PH: 7.28 (ref 7.35–7.45)
ARTERIAL BLOOD GAS PO2: 98 MM HG (ref 80–105)
AST SERPL-CCNC: 12 U/L (ref 15–41)
BASOPHILS # BLD AUTO: 0.01 X10(3) UL (ref 0–0.1)
BASOPHILS NFR BLD AUTO: 0.1 %
BILIRUB SERPL-MCNC: 0.5 MG/DL (ref 0.1–2)
BUN BLD-MCNC: 27 MG/DL (ref 8–20)
CALCIUM BLD-MCNC: 7.1 MG/DL (ref 8.3–10.3)
CALCULATED O2 SATURATION: 96 % (ref 92–100)
CARBOXYHEMOGLOBIN: 2.3 % SAT (ref 0–3)
CHLORIDE: 113 MMOL/L (ref 101–111)
CO2: 22 MMOL/L (ref 22–32)
CREAT BLD-MCNC: 1.64 MG/DL (ref 0.55–1.02)
EOSINOPHIL # BLD AUTO: 0.04 X10(3) UL (ref 0–0.3)
EOSINOPHIL NFR BLD AUTO: 0.5 %
ERYTHROCYTE [DISTWIDTH] IN BLOOD BY AUTOMATED COUNT: 14.4 % (ref 11.5–16)
GLUCOSE BLD-MCNC: 160 MG/DL (ref 65–99)
GLUCOSE BLD-MCNC: 165 MG/DL (ref 70–99)
GLUCOSE BLD-MCNC: 178 MG/DL (ref 65–99)
GLUCOSE BLD-MCNC: 183 MG/DL (ref 65–99)
GLUCOSE BLD-MCNC: 195 MG/DL (ref 65–99)
HAV IGM SER QL: 1.8 MG/DL (ref 1.7–3)
HCT VFR BLD AUTO: 23.7 % (ref 34–50)
HGB BLD-MCNC: 7.5 G/DL (ref 12–16)
HGB BLD-MCNC: 9 G/DL (ref 12–16)
IMMATURE GRANULOCYTE COUNT: 0.07 X10(3) UL (ref 0–1)
IMMATURE GRANULOCYTE RATIO %: 0.9 %
L/M: 4 L/MIN
LYMPHOCYTES # BLD AUTO: 0.37 X10(3) UL (ref 0.9–4)
LYMPHOCYTES NFR BLD AUTO: 4.8 %
M PROTEIN MFR SERPL ELPH: 5.2 G/DL (ref 6.1–8.3)
MCH RBC QN AUTO: 31.9 PG (ref 27–33.2)
MCHC RBC AUTO-ENTMCNC: 31.6 G/DL (ref 31–37)
MCV RBC AUTO: 100.9 FL (ref 81–100)
METHEMOGLOBIN: 0.8 % SAT (ref 0.4–1.5)
MONOCYTES # BLD AUTO: 0.58 X10(3) UL (ref 0.1–0.6)
MONOCYTES NFR BLD AUTO: 7.6 %
NEUTROPHIL ABS PRELIM: 6.58 X10 (3) UL (ref 1.3–6.7)
NEUTROPHILS # BLD AUTO: 6.58 X10(3) UL (ref 1.3–6.7)
NEUTROPHILS NFR BLD AUTO: 86.1 %
PATIENT TEMPERATURE: 99 F
PLATELET # BLD AUTO: 116 10(3)UL (ref 150–450)
POTASSIUM SERPL-SCNC: 5.1 MMOL/L (ref 3.6–5.1)
RBC # BLD AUTO: 2.35 X10(6)UL (ref 3.8–5.1)
RED CELL DISTRIBUTION WIDTH-SD: 52.4 FL (ref 35.1–46.3)
SODIUM SERPL-SCNC: 143 MMOL/L (ref 136–144)
TOTAL HEMOGLOBIN: 8.2 G/DL (ref 11.7–16)
WBC # BLD AUTO: 7.7 X10(3) UL (ref 4–13)

## 2017-08-28 PROCEDURE — 85018 HEMOGLOBIN: CPT | Performed by: FAMILY MEDICINE

## 2017-08-28 PROCEDURE — 94640 AIRWAY INHALATION TREATMENT: CPT

## 2017-08-28 PROCEDURE — 82962 GLUCOSE BLOOD TEST: CPT

## 2017-08-28 PROCEDURE — 71010 XR CHEST AP PORTABLE  (CPT=71010): CPT | Performed by: FAMILY MEDICINE

## 2017-08-28 PROCEDURE — 83050 HGB METHEMOGLOBIN QUAN: CPT | Performed by: FAMILY MEDICINE

## 2017-08-28 PROCEDURE — 36600 WITHDRAWAL OF ARTERIAL BLOOD: CPT | Performed by: FAMILY MEDICINE

## 2017-08-28 PROCEDURE — 30233N1 TRANSFUSION OF NONAUTOLOGOUS RED BLOOD CELLS INTO PERIPHERAL VEIN, PERCUTANEOUS APPROACH: ICD-10-PCS | Performed by: FAMILY MEDICINE

## 2017-08-28 PROCEDURE — 83735 ASSAY OF MAGNESIUM: CPT | Performed by: FAMILY MEDICINE

## 2017-08-28 PROCEDURE — 80053 COMPREHEN METABOLIC PANEL: CPT | Performed by: FAMILY MEDICINE

## 2017-08-28 PROCEDURE — 85025 COMPLETE CBC W/AUTO DIFF WBC: CPT | Performed by: SURGERY

## 2017-08-28 PROCEDURE — 82375 ASSAY CARBOXYHB QUANT: CPT | Performed by: FAMILY MEDICINE

## 2017-08-28 PROCEDURE — 82803 BLOOD GASES ANY COMBINATION: CPT | Performed by: FAMILY MEDICINE

## 2017-08-28 RX ORDER — FUROSEMIDE 10 MG/ML
40 INJECTION INTRAMUSCULAR; INTRAVENOUS ONCE
Status: DISCONTINUED | OUTPATIENT
Start: 2017-08-28 | End: 2017-08-28

## 2017-08-28 RX ORDER — ALBUTEROL SULFATE 2.5 MG/3ML
2.5 SOLUTION RESPIRATORY (INHALATION) EVERY 2 HOUR PRN
Status: DISCONTINUED | OUTPATIENT
Start: 2017-08-28 | End: 2017-09-14

## 2017-08-28 RX ORDER — SODIUM CHLORIDE 9 MG/ML
INJECTION, SOLUTION INTRAVENOUS ONCE
Status: COMPLETED | OUTPATIENT
Start: 2017-08-28 | End: 2017-08-28

## 2017-08-28 RX ORDER — FUROSEMIDE 10 MG/ML
INJECTION INTRAMUSCULAR; INTRAVENOUS
Status: COMPLETED
Start: 2017-08-28 | End: 2017-08-28

## 2017-08-28 RX ORDER — FUROSEMIDE 10 MG/ML
40 INJECTION INTRAMUSCULAR; INTRAVENOUS ONCE
Status: COMPLETED | OUTPATIENT
Start: 2017-08-28 | End: 2017-08-28

## 2017-08-28 RX ORDER — PANTOPRAZOLE SODIUM 20 MG/1
20 TABLET, DELAYED RELEASE ORAL
Status: DISCONTINUED | OUTPATIENT
Start: 2017-08-28 | End: 2017-08-30

## 2017-08-28 RX ORDER — HYDROMORPHONE HYDROCHLORIDE 1 MG/ML
0.5 INJECTION, SOLUTION INTRAMUSCULAR; INTRAVENOUS; SUBCUTANEOUS EVERY 4 HOURS PRN
Status: DISCONTINUED | OUTPATIENT
Start: 2017-08-28 | End: 2017-09-13

## 2017-08-28 NOTE — PLAN OF CARE
0330: Patient with increased HR, increased respirations, and labored breathing. Low urine output. Dr. Gregor Jo notified. Orders for lasix, stat chest xray, and stat blood gas. IV fluids stopped. Will call MD with results.

## 2017-08-28 NOTE — PLAN OF CARE
0430: Dr. Jermaine Macario called with ABG and chest xray results. Patient resting more comfortably at this time. No new orders. MD will see patient this AM. Will continue to monitor.

## 2017-08-28 NOTE — PROGRESS NOTES
Inc pain  No further SOB  Occ nausea  AVSS U/O better  Hgb 7.7  Getting 1 u  ABD soft ostomy pink  > act as lorena

## 2017-08-28 NOTE — OCCUPATIONAL THERAPY NOTE
Attempted to see patient for OT services this pm, however per RN request will hold at this time as due to high pain levels and patient receiving blood transfusion. Will reattempt as able.

## 2017-08-28 NOTE — PROGRESS NOTES
[facility]  Progress Note    Karel Camachocesario Patient Status:  Inpatient    1945 MRN [id number]   [facility] 3NW-A Attending Ashu Blackman MD   [facility] Day # 3 PCP Star Poser (Inactive)       SUBJECTIVE:  Feels tired this AST 12 08/28/2017   ALT 14 08/28/2017   MG 1.8 08/28/2017     Lower Hgb noted. ABG with mixed Respiratory and Metabolic Acidosis    Imaging:  pCXR noted - mainly volume overload.      Meds:     Current Facility-Administered Medications:  HYDROmorphone HCl glucose (DEX4) oral liquid 30 g 30 g Oral Q15 Min PRN         Assessment    Principal Problem:    Abdominal pain, left lower quadrant  Active Problems:    Abnormal abdominal CT scan    Bowel obstruction (HCC)    Controlled type 2 diabetes mellitus with s

## 2017-08-28 NOTE — CM/SW NOTE
08/28/17 1400   CM/SW Referral Data   Referral Source Physician   Reason for Referral Discharge planning   Informant Patient;Spouse;Edward Staff; Other  (MD)   Pertinent Medical Hx   Primary Care Physician Name GUCCI Villavicencio MD   Date of Last Contact with  for making choice. He will review list in AM with pt and discuss further with . [facility] ref loaded- will send referral when MO choice known. DON requested.

## 2017-08-28 NOTE — PHYSICAL THERAPY NOTE
Attempted to see patient for PT this pm, however per RN request will hold at this time as due to high pain levels and patient receiving blood transfusion. Will reattempt as schedule permits or 5/91.

## 2017-08-28 NOTE — PLAN OF CARE
Diabetes/Glucose Control    • Glucose maintained within prescribed range Progressing        GASTROINTESTINAL - ADULT    • Minimal or absence of nausea and vomiting Progressing    • Maintains or returns to baseline bowel function Progressing        GENITOUR

## 2017-08-28 NOTE — OPERATIVE REPORT
[facility]    PATIENT'S NAME: Genetta Breath   ATTENDING PHYSICIAN: Luis Alfredo Toscano M.D. OPERATING PHYSICIAN: Landen Santos M.D.    PATIENT ACCOUNT#:   [de-identified]    LOCATION:  [address] [place] [address]  MEDICAL RECORD #:   [id number]       DATE OF BIRTH adhesions, which were taken down with sharp dissection. A Bookwalter was placed. The colon was markedly dilated to the point where there were some areas of deserosalization.   We mobilized the descending colon up into including the splenic flexure, down t correct. Patient tolerated the procedure well. Returned to the postanesthesia recovery in stable condition.       Dictated By Amy Stallworth M.D.  d: 08/28/2017 08:53:58  t: 08/28/2017 09:18:36  [facility] [id number]  ZF/

## 2017-08-29 ENCOUNTER — APPOINTMENT (OUTPATIENT)
Dept: GENERAL RADIOLOGY | Facility: HOSPITAL | Age: 72
DRG: 330 | End: 2017-08-29
Attending: FAMILY MEDICINE
Payer: MEDICARE

## 2017-08-29 ENCOUNTER — APPOINTMENT (OUTPATIENT)
Dept: CT IMAGING | Facility: HOSPITAL | Age: 72
DRG: 330 | End: 2017-08-29
Attending: FAMILY MEDICINE
Payer: MEDICARE

## 2017-08-29 PROBLEM — R41.0 DISORIENTATION: Status: ACTIVE | Noted: 2017-08-29

## 2017-08-29 LAB
ALLENS TEST: POSITIVE
ARTERIAL BLD GAS O2 SATURATION: 95 % (ref 92–100)
ARTERIAL BLOOD GAS BASE EXCESS: -1.7
ARTERIAL BLOOD GAS HCO3: 24.3 MEQ/L (ref 22–26)
ARTERIAL BLOOD GAS PCO2: 47 MM HG (ref 35–45)
ARTERIAL BLOOD GAS PH: 7.33 (ref 7.35–7.45)
ARTERIAL BLOOD GAS PO2: 103 MM HG (ref 80–105)
BASOPHILS # BLD AUTO: 0.01 X10(3) UL (ref 0–0.1)
BASOPHILS NFR BLD AUTO: 0.2 %
BLOOD TYPE BARCODE: 600
BUN BLD-MCNC: 27 MG/DL (ref 8–20)
CALCIUM BLD-MCNC: 8.2 MG/DL (ref 8.3–10.3)
CALCULATED O2 SATURATION: 97 % (ref 92–100)
CARBOXYHEMOGLOBIN: 2.1 % SAT (ref 0–3)
CHLORIDE: 112 MMOL/L (ref 101–111)
CO2: 25 MMOL/L (ref 22–32)
CREAT BLD-MCNC: 1.59 MG/DL (ref 0.55–1.02)
EOSINOPHIL # BLD AUTO: 0.11 X10(3) UL (ref 0–0.3)
EOSINOPHIL NFR BLD AUTO: 1.7 %
ERYTHROCYTE [DISTWIDTH] IN BLOOD BY AUTOMATED COUNT: 15.1 % (ref 11.5–16)
GLUCOSE BLD-MCNC: 128 MG/DL (ref 65–99)
GLUCOSE BLD-MCNC: 140 MG/DL (ref 65–99)
GLUCOSE BLD-MCNC: 150 MG/DL (ref 65–99)
GLUCOSE BLD-MCNC: 153 MG/DL (ref 65–99)
GLUCOSE BLD-MCNC: 161 MG/DL (ref 70–99)
GLUCOSE BLD-MCNC: 175 MG/DL (ref 65–99)
HAV IGM SER QL: 1.8 MG/DL (ref 1.7–3)
HCT VFR BLD AUTO: 25.8 % (ref 34–50)
HGB BLD-MCNC: 8.1 G/DL (ref 12–16)
HGB BLD-MCNC: 8.9 G/DL (ref 12–16)
IMMATURE GRANULOCYTE COUNT: 0.06 X10(3) UL (ref 0–1)
IMMATURE GRANULOCYTE RATIO %: 0.9 %
L/M: 4 L/MIN
LYMPHOCYTES # BLD AUTO: 0.45 X10(3) UL (ref 0.9–4)
LYMPHOCYTES NFR BLD AUTO: 6.8 %
MCH RBC QN AUTO: 31.2 PG (ref 27–33.2)
MCHC RBC AUTO-ENTMCNC: 31.4 G/DL (ref 31–37)
MCV RBC AUTO: 99.2 FL (ref 81–100)
METHEMOGLOBIN: 0.6 % SAT (ref 0.4–1.5)
MONOCYTES # BLD AUTO: 0.53 X10(3) UL (ref 0.1–0.6)
MONOCYTES NFR BLD AUTO: 8.1 %
NEUTROPHIL ABS PRELIM: 5.41 X10 (3) UL (ref 1.3–6.7)
NEUTROPHILS # BLD AUTO: 5.41 X10(3) UL (ref 1.3–6.7)
NEUTROPHILS NFR BLD AUTO: 82.3 %
PATIENT TEMPERATURE: 98.9 F
PLATELET # BLD AUTO: 126 10(3)UL (ref 150–450)
POTASSIUM SERPL-SCNC: 4.5 MMOL/L (ref 3.6–5.1)
RBC # BLD AUTO: 2.6 X10(6)UL (ref 3.8–5.1)
RED CELL DISTRIBUTION WIDTH-SD: 54.7 FL (ref 35.1–46.3)
SODIUM SERPL-SCNC: 144 MMOL/L (ref 136–144)
TOTAL HEMOGLOBIN: 10.7 G/DL (ref 11.7–16)
WBC # BLD AUTO: 6.6 X10(3) UL (ref 4–13)

## 2017-08-29 PROCEDURE — 83735 ASSAY OF MAGNESIUM: CPT | Performed by: FAMILY MEDICINE

## 2017-08-29 PROCEDURE — 82803 BLOOD GASES ANY COMBINATION: CPT | Performed by: FAMILY MEDICINE

## 2017-08-29 PROCEDURE — 80048 BASIC METABOLIC PNL TOTAL CA: CPT | Performed by: FAMILY MEDICINE

## 2017-08-29 PROCEDURE — 82375 ASSAY CARBOXYHB QUANT: CPT | Performed by: FAMILY MEDICINE

## 2017-08-29 PROCEDURE — 85018 HEMOGLOBIN: CPT | Performed by: FAMILY MEDICINE

## 2017-08-29 PROCEDURE — 71010 XR CHEST AP PORTABLE  (CPT=71010): CPT | Performed by: FAMILY MEDICINE

## 2017-08-29 PROCEDURE — 85025 COMPLETE CBC W/AUTO DIFF WBC: CPT | Performed by: FAMILY MEDICINE

## 2017-08-29 PROCEDURE — 36600 WITHDRAWAL OF ARTERIAL BLOOD: CPT | Performed by: FAMILY MEDICINE

## 2017-08-29 PROCEDURE — 97530 THERAPEUTIC ACTIVITIES: CPT

## 2017-08-29 PROCEDURE — 36430 TRANSFUSION BLD/BLD COMPNT: CPT

## 2017-08-29 PROCEDURE — 83050 HGB METHEMOGLOBIN QUAN: CPT | Performed by: FAMILY MEDICINE

## 2017-08-29 PROCEDURE — 94664 DEMO&/EVAL PT USE INHALER: CPT

## 2017-08-29 PROCEDURE — 82962 GLUCOSE BLOOD TEST: CPT

## 2017-08-29 PROCEDURE — 70450 CT HEAD/BRAIN W/O DYE: CPT | Performed by: FAMILY MEDICINE

## 2017-08-29 PROCEDURE — 94640 AIRWAY INHALATION TREATMENT: CPT

## 2017-08-29 RX ORDER — FUROSEMIDE 10 MG/ML
40 INJECTION INTRAMUSCULAR; INTRAVENOUS ONCE
Status: COMPLETED | OUTPATIENT
Start: 2017-08-29 | End: 2017-08-29

## 2017-08-29 RX ORDER — MAGNESIUM OXIDE 400 MG (241.3 MG MAGNESIUM) TABLET
400 TABLET ONCE
Status: COMPLETED | OUTPATIENT
Start: 2017-08-29 | End: 2017-08-29

## 2017-08-29 NOTE — PLAN OF CARE
CARDIOVASCULAR - ADULT    • Maintains optimal cardiac output and hemodynamic stability Progressing        Diabetes/Glucose Control    • Glucose maintained within prescribed range Progressing        GASTROINTESTINAL - ADULT    • Minimal or absence of nausea OCCASIONAL AUDIBLE WHEEZE NOTED WHICH CLEAR AFTER NEB TX, HEART RATE IN 'S 'S, DENIES ANY CHEST DISCOMFORT OR PALPITATIONS, GENERALIZED EDEMA NOTED, ABD ROUND, OCCASIONAL BOWEL SOUNDS NOTED, OSTOMY APPLIANCE INTACT WITH SMALL AMOUNT OF DK BROW

## 2017-08-29 NOTE — CM/SW NOTE
MSW spoke to pt's spouse in room, Pt down for test.     MSW reviewed SNF list, and provided print out directions for several places for spouse to tour. MSW encouraged pt's spouse start process today. MSW to follow for patient MO choice.

## 2017-08-29 NOTE — OCCUPATIONAL THERAPY NOTE
OCCUPATIONAL THERAPY TREATMENT NOTE - INPATIENT     Room Number: 303/303-A  Session: 1   Number of Visits to Meet Established Goals: 7    Presenting Problem: sigmoid resection/colostomy/hernia repair    History related to current admission: Pt is 67 year o obstruction ([facility] [address] 2017       Past Surgical History  Past Surgical History:  No date:   [address] EDW: CHOLECYSTECTOMY  2017: COLONOSCOPY N/A      Comment: Procedure: COLONOSCOPY;  Surgeon: Bernadette Carlos MD;  Location Provided: Pt seen semi supine. Max encouragement to participate in therapy and pt yelling out throughout session. Total (A) to EOB via sheet. Pt not participating in supine to sit transfer, however not resisting. SBA EOB. CGA of 2 sit to stand via RW.  CGA bedside commode:  with supervision-ongoing

## 2017-08-29 NOTE — CONSULTS
[facility]  Report of Inpatient Ostomy Consultation    Mukesh Reyes Patient Status:  Inpatient    1945 MRN [id number]   [facility] 3NW-A Attending Lolis Khan MD     History of Present Illness:  Mukesh Reyes is a 08/29/2017   HGB 8.1 08/29/2017   HCT 25.8 08/29/2017   .0 08/29/2017   CREATSERUM 1.59 08/29/2017   BUN 27 08/29/2017    08/29/2017   K 4.5 08/29/2017    08/29/2017   CO2 25.0 08/29/2017    08/29/2017   PGLU 175 08/29/2017

## 2017-08-29 NOTE — PROGRESS NOTES
1520: Per Dr. Grant Aguilera request writing RN called Xray regarding patient's pending CXR images and results. Xray staff confirmed CXR was done this AM and says they will look into final images/results.      1540: Dr. Grant Aguilera states okay to d/c arnold catheter bu

## 2017-08-29 NOTE — PROGRESS NOTES
Breathing better  Taking some po   AVSS  U/O good  Labs noted  ABD soft   Stool in bag  Cont to > activity Follow labs

## 2017-08-29 NOTE — PLAN OF CARE
CARDIOVASCULAR - ADULT    • Maintains optimal cardiac output and hemodynamic stability Progressing        Diabetes/Glucose Control    • Glucose maintained within prescribed range Progressing        GASTROINTESTINAL - ADULT    • Minimal or absence of nausea diminished to auscultation. Denies SOB, difficulty breathing and chest pain. Abdomen soft and rounded. BS present. Midline incision with staples open to air; clean, dry and intact.  X1 GRANT drain to right abdomen to bulb suction; draining serosanguinous fluid

## 2017-08-29 NOTE — PROGRESS NOTES
[facility]  Progress Note    José Miguel Asher Patient Status:  Inpatient    1945 MRN [id number]   [facility] 3NW-A Attending Mary Mckeon MD   [address] Day # 4 PCP Giovanni Kotharir (Inactive)       SUBJECTIVE:  Complains of abd MG 1.8 08/29/2017         Imaging:  pCXR pending    Meds:     Current Facility-Administered Medications:  magnesium oxide (MAG-OX) tab 400 mg 400 mg Oral Once   insulin detemir (LEVEMIR) 100 UNIT/ML flextouch 10 Units 10 Units Subcutaneous Nightly   HYDR Assessment    Principal Problem:    Abdominal pain, left lower quadrant  Active Problems:    Abnormal abdominal CT scan    Bowel obstruction (HCC)    Obstructive sleep apnea, adult    Controlled type 2 diabetes mellitus with stage 3 chronic kidney (00141)      Liss Eubanks MD  8/29/2017  7:00 AM

## 2017-08-29 NOTE — PHYSICAL THERAPY NOTE
PHYSICAL THERAPY TREATMENT NOTE - INPATIENT    Room Number: 303/303-A     Session: 1   Number of Visits to Meet Established Goals: 5    Presenting Problem: sigmoid resection/colostomy/hernia repair    Problem List  Principal Problem:    Abdominal pain, le cystoscopy Dr. Yoder Factor  06/27/2017: OTHER SURGICAL HISTORY      Comment: Cysto w/ Dr. Yoder Factor  07/26/2017: OTHER SURGICAL HISTORY      Comment: cysto Dr. Yoder Factor  No date: REMOVAL GALLBLADDER  No date: SHOULDER SURG PROC UNLISTED      Comment: shoulder    SUBJECTIVE Therapy Provided: Patient received supine in bed, appeared distressed and confused. Supine>sit w/ max Ax2 w/ HOB elevated to 90deg. Patient not initiating movement with supine>sit, but not physically resistant to assistance.  Patient yelling out \"no\" t/o with assist device: walker - rolling at assistance level: minimum assistance   Goal #4     Goal #5     Goal #6     Goal Comments: Goals established on 8/27/2017    Progressing toward goals 8/29/017

## 2017-08-30 ENCOUNTER — APPOINTMENT (OUTPATIENT)
Dept: GENERAL RADIOLOGY | Facility: HOSPITAL | Age: 72
DRG: 330 | End: 2017-08-30
Attending: FAMILY MEDICINE
Payer: MEDICARE

## 2017-08-30 ENCOUNTER — APPOINTMENT (OUTPATIENT)
Dept: ULTRASOUND IMAGING | Facility: HOSPITAL | Age: 72
DRG: 330 | End: 2017-08-30
Attending: FAMILY MEDICINE
Payer: MEDICARE

## 2017-08-30 ENCOUNTER — APPOINTMENT (OUTPATIENT)
Dept: CV DIAGNOSTICS | Facility: HOSPITAL | Age: 72
DRG: 330 | End: 2017-08-30
Attending: FAMILY MEDICINE
Payer: MEDICARE

## 2017-08-30 ENCOUNTER — APPOINTMENT (OUTPATIENT)
Dept: NUCLEAR MEDICINE | Facility: HOSPITAL | Age: 72
DRG: 330 | End: 2017-08-30
Attending: FAMILY MEDICINE
Payer: MEDICARE

## 2017-08-30 PROBLEM — I48.91 ATRIAL FIBRILLATION WITH RVR (HCC): Status: ACTIVE | Noted: 2017-08-30

## 2017-08-30 LAB
ALBUMIN SERPL-MCNC: 2.1 G/DL (ref 3.5–4.8)
ALP LIVER SERPL-CCNC: 67 U/L (ref 55–142)
ALT SERPL-CCNC: 15 U/L (ref 14–54)
APTT PPP: 28.9 SECONDS (ref 25–34)
APTT PPP: 29.9 SECONDS (ref 25–34)
AST SERPL-CCNC: 7 U/L (ref 15–41)
ATRIAL RATE: 187 BPM
BASOPHILS # BLD AUTO: 0.03 X10(3) UL (ref 0–0.1)
BASOPHILS NFR BLD AUTO: 0.5 %
BILIRUB SERPL-MCNC: 0.3 MG/DL (ref 0.1–2)
BUN BLD-MCNC: 26 MG/DL (ref 8–20)
BUN BLD-MCNC: 27 MG/DL (ref 8–20)
CALCIUM BLD-MCNC: 8.7 MG/DL (ref 8.3–10.3)
CALCIUM BLD-MCNC: 9.2 MG/DL (ref 8.3–10.3)
CHLORIDE: 111 MMOL/L (ref 101–111)
CHLORIDE: 112 MMOL/L (ref 101–111)
CO2: 27 MMOL/L (ref 22–32)
CO2: 29 MMOL/L (ref 22–32)
CREAT BLD-MCNC: 1.76 MG/DL (ref 0.55–1.02)
CREAT BLD-MCNC: 1.78 MG/DL (ref 0.55–1.02)
EOSINOPHIL # BLD AUTO: 0.2 X10(3) UL (ref 0–0.3)
EOSINOPHIL NFR BLD AUTO: 3.4 %
ERYTHROCYTE [DISTWIDTH] IN BLOOD BY AUTOMATED COUNT: 14.6 % (ref 11.5–16)
ERYTHROCYTE [DISTWIDTH] IN BLOOD BY AUTOMATED COUNT: 14.8 % (ref 11.5–16)
FREE T4: 1 NG/DL (ref 0.9–1.8)
GLUCOSE BLD-MCNC: 127 MG/DL (ref 65–99)
GLUCOSE BLD-MCNC: 130 MG/DL (ref 70–99)
GLUCOSE BLD-MCNC: 140 MG/DL (ref 70–99)
GLUCOSE BLD-MCNC: 142 MG/DL (ref 65–99)
GLUCOSE BLD-MCNC: 154 MG/DL (ref 65–99)
GLUCOSE BLD-MCNC: 178 MG/DL (ref 65–99)
HAV IGM SER QL: 1.8 MG/DL (ref 1.7–3)
HCT VFR BLD AUTO: 27.3 % (ref 34–50)
HCT VFR BLD AUTO: 32.1 % (ref 34–50)
HGB BLD-MCNC: 10 G/DL (ref 12–16)
HGB BLD-MCNC: 8.5 G/DL (ref 12–16)
IMMATURE GRANULOCYTE COUNT: 0.03 X10(3) UL (ref 0–1)
IMMATURE GRANULOCYTE RATIO %: 0.5 %
LYMPHOCYTES # BLD AUTO: 0.53 X10(3) UL (ref 0.9–4)
LYMPHOCYTES NFR BLD AUTO: 9.1 %
M PROTEIN MFR SERPL ELPH: 5.9 G/DL (ref 6.1–8.3)
MCH RBC QN AUTO: 31.1 PG (ref 27–33.2)
MCH RBC QN AUTO: 31.5 PG (ref 27–33.2)
MCHC RBC AUTO-ENTMCNC: 31.1 G/DL (ref 31–37)
MCHC RBC AUTO-ENTMCNC: 31.2 G/DL (ref 31–37)
MCV RBC AUTO: 101.1 FL (ref 81–100)
MCV RBC AUTO: 99.7 FL (ref 81–100)
MONOCYTES # BLD AUTO: 0.58 X10(3) UL (ref 0.1–0.6)
MONOCYTES NFR BLD AUTO: 10 %
NEUTROPHIL ABS PRELIM: 4.45 X10 (3) UL (ref 1.3–6.7)
NEUTROPHILS # BLD AUTO: 4.45 X10(3) UL (ref 1.3–6.7)
NEUTROPHILS NFR BLD AUTO: 76.5 %
PLATELET # BLD AUTO: 157 10(3)UL (ref 150–450)
PLATELET # BLD AUTO: 177 10(3)UL (ref 150–450)
POTASSIUM SERPL-SCNC: 4.5 MMOL/L (ref 3.6–5.1)
POTASSIUM SERPL-SCNC: 4.5 MMOL/L (ref 3.6–5.1)
PRO-BETA NATRIURETIC PEPTIDE: 5495 PG/ML (ref ?–125)
Q-T INTERVAL: 294 MS
QRS DURATION: 76 MS
QTC CALCULATION (BEZET): 460 MS
R AXIS: 30 DEGREES
RBC # BLD AUTO: 2.7 X10(6)UL (ref 3.8–5.1)
RBC # BLD AUTO: 3.22 X10(6)UL (ref 3.8–5.1)
RED CELL DISTRIBUTION WIDTH-SD: 52.9 FL (ref 35.1–46.3)
RED CELL DISTRIBUTION WIDTH-SD: 55.1 FL (ref 35.1–46.3)
SODIUM SERPL-SCNC: 145 MMOL/L (ref 136–144)
SODIUM SERPL-SCNC: 146 MMOL/L (ref 136–144)
T AXIS: 269 DEGREES
TSI SER-ACNC: 1.33 MIU/ML (ref 0.35–5.5)
VENTRICULAR RATE: 147 BPM
WBC # BLD AUTO: 5.8 X10(3) UL (ref 4–13)
WBC # BLD AUTO: 6.5 X10(3) UL (ref 4–13)

## 2017-08-30 PROCEDURE — 80053 COMPREHEN METABOLIC PANEL: CPT | Performed by: FAMILY MEDICINE

## 2017-08-30 PROCEDURE — 85730 THROMBOPLASTIN TIME PARTIAL: CPT | Performed by: INTERNAL MEDICINE

## 2017-08-30 PROCEDURE — 36569 INSJ PICC 5 YR+ W/O IMAGING: CPT

## 2017-08-30 PROCEDURE — 93306 TTE W/DOPPLER COMPLETE: CPT | Performed by: FAMILY MEDICINE

## 2017-08-30 PROCEDURE — 74000 XR ABDOMEN (KUB) (1 AP VIEW)  (CPT=74000): CPT | Performed by: FAMILY MEDICINE

## 2017-08-30 PROCEDURE — 85025 COMPLETE CBC W/AUTO DIFF WBC: CPT | Performed by: FAMILY MEDICINE

## 2017-08-30 PROCEDURE — 84439 ASSAY OF FREE THYROXINE: CPT | Performed by: FAMILY MEDICINE

## 2017-08-30 PROCEDURE — 83735 ASSAY OF MAGNESIUM: CPT | Performed by: FAMILY MEDICINE

## 2017-08-30 PROCEDURE — 84443 ASSAY THYROID STIM HORMONE: CPT | Performed by: FAMILY MEDICINE

## 2017-08-30 PROCEDURE — 05H633Z INSERTION OF INFUSION DEVICE INTO LEFT SUBCLAVIAN VEIN, PERCUTANEOUS APPROACH: ICD-10-PCS | Performed by: FAMILY MEDICINE

## 2017-08-30 PROCEDURE — 85027 COMPLETE CBC AUTOMATED: CPT | Performed by: CLINICAL NURSE SPECIALIST

## 2017-08-30 PROCEDURE — 78582 LUNG VENTILAT&PERFUS IMAGING: CPT | Performed by: FAMILY MEDICINE

## 2017-08-30 PROCEDURE — 99211 OFF/OP EST MAY X REQ PHY/QHP: CPT

## 2017-08-30 PROCEDURE — 83880 ASSAY OF NATRIURETIC PEPTIDE: CPT | Performed by: FAMILY MEDICINE

## 2017-08-30 PROCEDURE — 93010 ELECTROCARDIOGRAM REPORT: CPT | Performed by: INTERNAL MEDICINE

## 2017-08-30 PROCEDURE — 85730 THROMBOPLASTIN TIME PARTIAL: CPT | Performed by: CLINICAL NURSE SPECIALIST

## 2017-08-30 PROCEDURE — 82962 GLUCOSE BLOOD TEST: CPT

## 2017-08-30 PROCEDURE — 93970 EXTREMITY STUDY: CPT | Performed by: FAMILY MEDICINE

## 2017-08-30 PROCEDURE — 76937 US GUIDE VASCULAR ACCESS: CPT

## 2017-08-30 PROCEDURE — 93005 ELECTROCARDIOGRAM TRACING: CPT

## 2017-08-30 PROCEDURE — 71010 XR CHEST AP PORTABLE  (CPT=71010): CPT | Performed by: FAMILY MEDICINE

## 2017-08-30 RX ORDER — HEPARIN SODIUM AND DEXTROSE 10000; 5 [USP'U]/100ML; G/100ML
INJECTION INTRAVENOUS CONTINUOUS
Status: DISCONTINUED | OUTPATIENT
Start: 2017-08-30 | End: 2017-09-10

## 2017-08-30 RX ORDER — HEPARIN SODIUM 5000 [USP'U]/ML
3000 INJECTION INTRAVENOUS; SUBCUTANEOUS ONCE
Status: COMPLETED | OUTPATIENT
Start: 2017-08-30 | End: 2017-08-30

## 2017-08-30 RX ORDER — FUROSEMIDE 10 MG/ML
40 INJECTION INTRAMUSCULAR; INTRAVENOUS ONCE
Status: COMPLETED | OUTPATIENT
Start: 2017-08-30 | End: 2017-08-30

## 2017-08-30 RX ORDER — DILTIAZEM HYDROCHLORIDE 5 MG/ML
10 INJECTION INTRAVENOUS
Status: DISCONTINUED | OUTPATIENT
Start: 2017-08-30 | End: 2017-09-14

## 2017-08-30 RX ORDER — DILTIAZEM HYDROCHLORIDE 60 MG/1
60 TABLET, FILM COATED ORAL AS NEEDED
Status: DISCONTINUED | OUTPATIENT
Start: 2017-08-30 | End: 2017-09-14

## 2017-08-30 RX ORDER — HEPARIN SODIUM AND DEXTROSE 10000; 5 [USP'U]/100ML; G/100ML
1000 INJECTION INTRAVENOUS ONCE
Status: COMPLETED | OUTPATIENT
Start: 2017-08-30 | End: 2017-08-30

## 2017-08-30 RX ORDER — DILTIAZEM HYDROCHLORIDE 5 MG/ML
10 INJECTION INTRAVENOUS ONCE
Status: COMPLETED | OUTPATIENT
Start: 2017-08-30 | End: 2017-08-30

## 2017-08-30 RX ORDER — SODIUM CHLORIDE 0.9 % (FLUSH) 0.9 %
10 SYRINGE (ML) INJECTION EVERY 12 HOURS
Status: DISCONTINUED | OUTPATIENT
Start: 2017-08-30 | End: 2017-09-14

## 2017-08-30 RX ORDER — METOPROLOL TARTRATE 5 MG/5ML
2.5 INJECTION INTRAVENOUS EVERY 6 HOURS
Status: DISCONTINUED | OUTPATIENT
Start: 2017-08-30 | End: 2017-09-02

## 2017-08-30 NOTE — HISTORICAL OFFICE NOTE
Verna Pierce  : 1945  ACCOUNT:  [id number]  [phone]  PCP: Dr. Margo Schumacher     TODAY'S DATE: 2011  DICTATED BY:  Allison Butler M.D.]    CHIEF COMPLAINT: [abnormal echo/pfo.]    HPI:  [On 2011, Miah Bullard, a 51-year-old fem feet. HEM/LYMPH: denies easy bruising. ALL: no new food or environmental allergies.       PAST HISTORY: diabetes, gout, hypothyroidism, neuropathy, osteoarthritis, obesity, bunionectomies, hysterectomy, shoulder surgery and hernia repair    FAMILY HISTORY: atherosclerotic risk factors with weight loss, smoking cessation, normalization of her cholesterol and hemoglobin A1C, is extraordinarily more important than a tiny PFO. She was much relieved to hear about this.   Her PVCs are chronic, her cholesterol look

## 2017-08-30 NOTE — PROGRESS NOTES
Patient arrived to floor- tx from SCU- immediately taken to [address] for testing upon arrival

## 2017-08-30 NOTE — CONSULTS
[address] [address] Contact Note    Duard Krabbe Patient Status:  Inpatient    1945 MRN [id number]   [facility] 7NE-A Attending Ashu Blackman MD   [facility] Day # 5 PCP Star Poser (Inactive)     Attempted

## 2017-08-30 NOTE — CONSULTS
kasi  [facility]  Report of Consultation    Carmina Galvan Patient Status:  Inpatient    1945 MRN [id number]   [facility] 7NE-A Attending Harsha Howard MD   Hosp Day # 5 PCP Wilkinson Jen (I tele    History:  Past Medical History:   Diagnosis Date   • Anesthesia complication     patient states 'it took me 6 hours to wake up after my hysterectomy'   • Back problem     DISH   • CANCER     uterine   • Cancer [facility])     Uterine CA   • DIABETES    • Intravenous, Q12H  •  magnesium sulfate IVPB premix 2 g, 2 g, Intravenous, Once  •  heparin (PORCINE) 05276pokwk/250mL infusion INITIAL DOSE, 1,000 Units/hr, Intravenous, Once  •  heparin (PORCINE) drip 85287qkyvd/250mL infusion CONTINUOUS, 200-3,000 Units **OR** [DISCONTINUED] Glucose-Vitamin C (DEX-4) 4-0.006 g chewable tab 8 tablet, 8 tablet, Oral, Q15 Min PRN    Review of Systems:  All systems were reviewed and are negative except as described above in HPI.     Physical Exam:  Blood pressure 118/70, pulse

## 2017-08-30 NOTE — PROGRESS NOTES
[facility]  Progress Note    Paramjit Garcia Patient Status:  Inpatient    1945 MRN [id number]   [facility] 3NW-A Attending Ana Laura Zamarripa MD   [address] Day # 5 PCP Jefferson Vivar (Inactive)       SUBJECTIVE:  She feels a jovan BILT 0.3 08/30/2017   TP 5.9 08/30/2017   AST 7 08/30/2017   ALT 15 08/30/2017   MG 1.8 08/30/2017         Imaging:  Imaging studies: Venous Doppler LEs; V/Q Scan; 2D Echocardiogram all pending.       Meds:     Current Facility-Administered Medications: dextrose 50% injection 50 mL 50 mL Intravenous Q15 Min PRN   Or      glucose (DEX4) oral liquid 30 g 30 g Oral Q15 Min PRN         Assessment    Principal Problem:    Abdominal pain, left lower quadrant  Active Problems:    Abnormal abdominal CT scan management, and decision on anticoagulation. Nurses are limited in cardiac medication therapy on the post-surgical floor, so will transfer to CTU. On Cardizem Drip. Prophylaxis:   DVT with SCDs, VALERIE, Lovenox. GI with Pantoprazole 20 mg BID.    Atroph

## 2017-08-30 NOTE — PROGRESS NOTES
Assumed care of patient from SCU  AOx3, forgetful and drowsy  Constant nausea/ dry heaving- Dr Riana Bryant made aware- KUB ordered, antiemetic given  C/o constant pain with movement- per patient and  this is baseline d/t chronic back pain  Tinsley intact d

## 2017-08-30 NOTE — DIETARY NOTE
[address] ASSESSMENT    Pt is at moderate nutrition risk. Pt does not meet malnutrition criteria.     NUTRITION DIAGNOSIS/PROBLEM:    Inadequate oral intake related to inability to consume sufficient PO as evidenced by NPO/CLD > 4 (2-2.5 grams protein per kg/IBW)  Fluid: ~1 ml/kcal or per MD discretion    MONITOR AND EVALUATE/NUTRITION GOALS:    1. PO intake to meet at least 75% patient nutrition prescription  2. At least 75% intake of oral supplements  3.  No signs of skin breakdown

## 2017-08-30 NOTE — PROGRESS NOTES
Pt sustaining afib in 140's -150's. Dr. Frank Gould paged. Stat EKG done. 2829: Dr. Frank Gould paged again. Waiting for call back. EKG afib with RVR    0646: order for cardizem received, if no response in 30 min ok to repeat dose.  [facility] consult provided

## 2017-08-30 NOTE — PROGRESS NOTES
Dr Blanca Mane called to notify of KUB results at his request-  left for Dr Blanca Mane to c/b- awaiting return call    [phone]- Dr Blanca Mane- returned call, updated on patients KUB results and still with nausea/vomiting. Per Dr Blanca Mane keep NPO- notify Dr Karolyn Abbasi.  Dr Perez Patella

## 2017-08-30 NOTE — OCCUPATIONAL THERAPY NOTE
Attempted to see pt for OT. Pt with acute afib with RVR, stat EKG pending. Second attempt to see pt. Pt transferred to heart hospital. Will need new OT/PT orders when appropriate. RN aware.

## 2017-08-30 NOTE — PHYSICAL THERAPY NOTE
Pt with new onset Afib, transferred from SCU to [address] [place] [address] will require a new PT order when appropriate to participate in skilled PT.

## 2017-08-30 NOTE — PLAN OF CARE
CARDIOVASCULAR - ADULT    • Maintains optimal cardiac output and hemodynamic stability Not Progressing        RECEIVED PT THIS MORNING FROM NIGHT NURSE , HAS A FIB , HR -155, VERY IRRGULAR , NIGHT RN TRIED TO REACH CARDIOLOGIST , UNABLE TO REACH HIM

## 2017-08-30 NOTE — CM/SW NOTE
MSW sent referral to [address] [place] [address] which is pt's spouse 1st choice (per vm he left MSW).     10:00AM   MSW updated via [address], patient has been accepted to [address] [place] [address]

## 2017-08-31 ENCOUNTER — APPOINTMENT (OUTPATIENT)
Dept: GENERAL RADIOLOGY | Facility: HOSPITAL | Age: 72
DRG: 330 | End: 2017-08-31
Attending: FAMILY MEDICINE
Payer: MEDICARE

## 2017-08-31 LAB
APTT PPP: 33.7 SECONDS (ref 25–34)
APTT PPP: 42.5 SECONDS (ref 25–34)
APTT PPP: 54.7 SECONDS (ref 25–34)
BASOPHILS # BLD AUTO: 0.01 X10(3) UL (ref 0–0.1)
BASOPHILS NFR BLD AUTO: 0.2 %
BUN BLD-MCNC: 33 MG/DL (ref 8–20)
CALCIUM BLD-MCNC: 9.5 MG/DL (ref 8.3–10.3)
CHLORIDE: 111 MMOL/L (ref 101–111)
CO2: 25 MMOL/L (ref 22–32)
CREAT BLD-MCNC: 1.76 MG/DL (ref 0.55–1.02)
EOSINOPHIL # BLD AUTO: 0.02 X10(3) UL (ref 0–0.3)
EOSINOPHIL NFR BLD AUTO: 0.3 %
ERYTHROCYTE [DISTWIDTH] IN BLOOD BY AUTOMATED COUNT: 14.3 % (ref 11.5–16)
GLUCOSE BLD-MCNC: 165 MG/DL (ref 65–99)
GLUCOSE BLD-MCNC: 168 MG/DL (ref 70–99)
GLUCOSE BLD-MCNC: 172 MG/DL (ref 65–99)
GLUCOSE BLD-MCNC: 173 MG/DL (ref 65–99)
GLUCOSE BLD-MCNC: 204 MG/DL (ref 65–99)
GLUCOSE BLD-MCNC: 208 MG/DL (ref 65–99)
HAV IGM SER QL: 2 MG/DL (ref 1.7–3)
HAV IGM SER QL: 2.4 MG/DL (ref 1.7–3)
HCT VFR BLD AUTO: 29.8 % (ref 34–50)
HGB BLD-MCNC: 9.5 G/DL (ref 12–16)
IMMATURE GRANULOCYTE COUNT: 0.04 X10(3) UL (ref 0–1)
IMMATURE GRANULOCYTE RATIO %: 0.6 %
LYMPHOCYTES # BLD AUTO: 0.55 X10(3) UL (ref 0.9–4)
LYMPHOCYTES NFR BLD AUTO: 8.5 %
MCH RBC QN AUTO: 31.7 PG (ref 27–33.2)
MCHC RBC AUTO-ENTMCNC: 31.9 G/DL (ref 31–37)
MCV RBC AUTO: 99.3 FL (ref 81–100)
MONOCYTES # BLD AUTO: 0.71 X10(3) UL (ref 0.1–0.6)
MONOCYTES NFR BLD AUTO: 11 %
NEUTROPHIL ABS PRELIM: 5.11 X10 (3) UL (ref 1.3–6.7)
NEUTROPHILS # BLD AUTO: 5.11 X10(3) UL (ref 1.3–6.7)
NEUTROPHILS NFR BLD AUTO: 79.4 %
PLATELET # BLD AUTO: 184 10(3)UL (ref 150–450)
PLATELET # BLD AUTO: 189 10(3)UL (ref 150–450)
POTASSIUM SERPL-SCNC: 4.6 MMOL/L (ref 3.6–5.1)
RBC # BLD AUTO: 3 X10(6)UL (ref 3.8–5.1)
RED CELL DISTRIBUTION WIDTH-SD: 51.6 FL (ref 35.1–46.3)
SODIUM SERPL-SCNC: 147 MMOL/L (ref 136–144)
WBC # BLD AUTO: 6.4 X10(3) UL (ref 4–13)

## 2017-08-31 PROCEDURE — 82962 GLUCOSE BLOOD TEST: CPT

## 2017-08-31 PROCEDURE — 83735 ASSAY OF MAGNESIUM: CPT | Performed by: FAMILY MEDICINE

## 2017-08-31 PROCEDURE — 85730 THROMBOPLASTIN TIME PARTIAL: CPT | Performed by: FAMILY MEDICINE

## 2017-08-31 PROCEDURE — 74000 XR ABDOMEN (1 VIEW) (CPT=74000): CPT | Performed by: FAMILY MEDICINE

## 2017-08-31 PROCEDURE — C9113 INJ PANTOPRAZOLE SODIUM, VIA: HCPCS | Performed by: FAMILY MEDICINE

## 2017-08-31 PROCEDURE — 85049 AUTOMATED PLATELET COUNT: CPT | Performed by: CLINICAL NURSE SPECIALIST

## 2017-08-31 PROCEDURE — 80048 BASIC METABOLIC PNL TOTAL CA: CPT | Performed by: FAMILY MEDICINE

## 2017-08-31 PROCEDURE — 85025 COMPLETE CBC W/AUTO DIFF WBC: CPT | Performed by: FAMILY MEDICINE

## 2017-08-31 RX ORDER — DEXTROSE MONOHYDRATE 50 MG/ML
INJECTION, SOLUTION INTRAVENOUS CONTINUOUS
Status: DISCONTINUED | OUTPATIENT
Start: 2017-08-31 | End: 2017-09-01

## 2017-08-31 RX ORDER — HEPARIN SODIUM 5000 [USP'U]/ML
3000 INJECTION INTRAVENOUS; SUBCUTANEOUS ONCE
Status: COMPLETED | OUTPATIENT
Start: 2017-08-31 | End: 2017-08-31

## 2017-08-31 RX ORDER — LORAZEPAM 2 MG/ML
1 INJECTION INTRAMUSCULAR EVERY 6 HOURS PRN
Status: DISCONTINUED | OUTPATIENT
Start: 2017-08-31 | End: 2017-09-13

## 2017-08-31 RX ORDER — LORAZEPAM 2 MG/ML
0.5 INJECTION INTRAMUSCULAR EVERY 6 HOURS PRN
Status: DISCONTINUED | OUTPATIENT
Start: 2017-08-31 | End: 2017-09-13

## 2017-08-31 NOTE — PLAN OF CARE
Patient in a considerable amount of pain  Pain medication administered per orders, with relief  Small amount of stool in the colostomy bag, formed  Stoma discoloration, MD aware  Urine output adequate  Alert and oriented times four, confused at times  Card

## 2017-08-31 NOTE — PLAN OF CARE
Assumed care of pt 1900. Aox2-3. NPO. Mouth swabbed. Abdominal incision with staples TRI. Lt ostomy with bloody output. Stoma blackish in color. Dr Ciro Riggs notified, no new orders received. Abdomen soft, +BS.  Pt with small episodes of emesis, about 20-30 Impaired Functional Mobility    • Achieve highest/safest level of mobility/gait Progressing        METABOLIC/FLUID AND ELECTROLYTES - ADULT    • Electrolytes maintained within normal limits Progressing    • Hemodynamic stability and optimal renal functio

## 2017-08-31 NOTE — PROGRESS NOTES
Spoke with Dr Darren Bell- on call for sx- updated on patient status and results of KUB- per MD keep patient NPO and continue antiemetics.  Dr Darren Bell made aware of bloody drainage from colostomy bag- no new orders received

## 2017-08-31 NOTE — PROGRESS NOTES
[facility] 22 NOTE-DR MARTINEZ      Subjective:  CONFUSED--SEDATED    Objective/Physical Exam:  General: Alert, orientated x3. Cooperative. No apparent distress.   Vital Signs:  Blood pressure 134/66, pulse 87, temperature 98.7 °F (37.1 °C) 33*   CREATSERUM  1.76*  1.78*  1.76*   CA  8.7  9.2  9.5   NA  145*  146*  147*   K  4.5  4.5  4.6   CL  111  112*  111   CO2  29.0  27.0  25.0       Assessment/Plan:  Patient Active Problem List:     Recurrent UTI     Incomplete bladder emptying     Obst

## 2017-08-31 NOTE — PLAN OF CARE
Dr Felicia Villafana notified of bloody drainage from colostomy, and black color of stoma. MD also notified of episodes of emesis bile in color.  Order to insert NG tube to LIS if pt has another episode of emesis

## 2017-08-31 NOTE — PROGRESS NOTES
[facility]  Progress Note    Tabitha Rosenberg Patient Status:  Inpatient    1945 MRN [id number]   [facility] 7NE-A Attending Therese Mayfield MD   [address] Day # 6 PCP Angi Lawson (Inactive)       Subjective:  Some pain.  Answe 25 mg Oral 2x Daily(Beta Blocker)   • multivitamin with iron  1 tablet Oral Daily   • Insulin Aspart Pen  4-20 Units Subcutaneous TID CC and HS   • febuxostat  80 mg Oral Daily   • Levothyroxine Sodium  125 mcg Oral Daily with breakfast     • dextrose 40 m

## 2017-09-01 ENCOUNTER — APPOINTMENT (OUTPATIENT)
Dept: GENERAL RADIOLOGY | Facility: HOSPITAL | Age: 72
DRG: 330 | End: 2017-09-01
Attending: FAMILY MEDICINE
Payer: MEDICARE

## 2017-09-01 LAB
ALBUMIN SERPL-MCNC: 2.2 G/DL (ref 3.5–4.8)
ALP LIVER SERPL-CCNC: 84 U/L (ref 55–142)
ALT SERPL-CCNC: 16 U/L (ref 14–54)
APTT PPP: 39.5 SECONDS (ref 25–34)
APTT PPP: 68.2 SECONDS (ref 25–34)
AST SERPL-CCNC: 13 U/L (ref 15–41)
BASOPHILS # BLD AUTO: 0.02 X10(3) UL (ref 0–0.1)
BASOPHILS NFR BLD AUTO: 0.3 %
BILIRUB DIRECT SERPL-MCNC: <0.1 MG/DL (ref 0.1–0.5)
BILIRUB SERPL-MCNC: 0.3 MG/DL (ref 0.1–2)
BUN BLD-MCNC: 35 MG/DL (ref 8–20)
CALCIUM BLD-MCNC: 9.3 MG/DL (ref 8.3–10.3)
CHLORIDE: 110 MMOL/L (ref 101–111)
CO2: 27 MMOL/L (ref 22–32)
CREAT BLD-MCNC: 1.81 MG/DL (ref 0.55–1.02)
EOSINOPHIL # BLD AUTO: 0.05 X10(3) UL (ref 0–0.3)
EOSINOPHIL NFR BLD AUTO: 0.7 %
ERYTHROCYTE [DISTWIDTH] IN BLOOD BY AUTOMATED COUNT: 14 % (ref 11.5–16)
GLUCOSE BLD-MCNC: 127 MG/DL (ref 65–99)
GLUCOSE BLD-MCNC: 193 MG/DL (ref 65–99)
GLUCOSE BLD-MCNC: 200 MG/DL (ref 70–99)
GLUCOSE BLD-MCNC: 212 MG/DL (ref 65–99)
GLUCOSE BLD-MCNC: 217 MG/DL (ref 65–99)
HAV IGM SER QL: 2.3 MG/DL (ref 1.7–3)
HCT VFR BLD AUTO: 30.7 % (ref 34–50)
HGB BLD-MCNC: 9.5 G/DL (ref 12–16)
IMMATURE GRANULOCYTE COUNT: 0.09 X10(3) UL (ref 0–1)
IMMATURE GRANULOCYTE RATIO %: 1.3 %
LYMPHOCYTES # BLD AUTO: 0.55 X10(3) UL (ref 0.9–4)
LYMPHOCYTES NFR BLD AUTO: 8.2 %
M PROTEIN MFR SERPL ELPH: 6.5 G/DL (ref 6.1–8.3)
MCH RBC QN AUTO: 31.1 PG (ref 27–33.2)
MCHC RBC AUTO-ENTMCNC: 30.9 G/DL (ref 31–37)
MCV RBC AUTO: 100.7 FL (ref 81–100)
MONOCYTES # BLD AUTO: 0.56 X10(3) UL (ref 0.1–0.6)
MONOCYTES NFR BLD AUTO: 8.4 %
NEUTROPHIL ABS PRELIM: 5.42 X10 (3) UL (ref 1.3–6.7)
NEUTROPHILS # BLD AUTO: 5.42 X10(3) UL (ref 1.3–6.7)
NEUTROPHILS NFR BLD AUTO: 81.1 %
PLATELET # BLD AUTO: 223 10(3)UL (ref 150–450)
POTASSIUM SERPL-SCNC: 4.6 MMOL/L (ref 3.6–5.1)
RBC # BLD AUTO: 3.05 X10(6)UL (ref 3.8–5.1)
RED CELL DISTRIBUTION WIDTH-SD: 51.5 FL (ref 35.1–46.3)
SODIUM SERPL-SCNC: 144 MMOL/L (ref 136–144)
WBC # BLD AUTO: 6.7 X10(3) UL (ref 4–13)

## 2017-09-01 PROCEDURE — 76937 US GUIDE VASCULAR ACCESS: CPT

## 2017-09-01 PROCEDURE — 80048 BASIC METABOLIC PNL TOTAL CA: CPT | Performed by: FAMILY MEDICINE

## 2017-09-01 PROCEDURE — 05H533Z INSERTION OF INFUSION DEVICE INTO RIGHT SUBCLAVIAN VEIN, PERCUTANEOUS APPROACH: ICD-10-PCS | Performed by: FAMILY MEDICINE

## 2017-09-01 PROCEDURE — 82962 GLUCOSE BLOOD TEST: CPT

## 2017-09-01 PROCEDURE — 85025 COMPLETE CBC W/AUTO DIFF WBC: CPT | Performed by: FAMILY MEDICINE

## 2017-09-01 PROCEDURE — 71010 XR CHEST AP PORTABLE  (CPT=71010): CPT | Performed by: FAMILY MEDICINE

## 2017-09-01 PROCEDURE — 36569 INSJ PICC 5 YR+ W/O IMAGING: CPT

## 2017-09-01 PROCEDURE — 83735 ASSAY OF MAGNESIUM: CPT | Performed by: FAMILY MEDICINE

## 2017-09-01 PROCEDURE — 99215 OFFICE O/P EST HI 40 MIN: CPT

## 2017-09-01 PROCEDURE — 99214 OFFICE O/P EST MOD 30 MIN: CPT

## 2017-09-01 PROCEDURE — C9113 INJ PANTOPRAZOLE SODIUM, VIA: HCPCS | Performed by: FAMILY MEDICINE

## 2017-09-01 PROCEDURE — 85730 THROMBOPLASTIN TIME PARTIAL: CPT | Performed by: INTERNAL MEDICINE

## 2017-09-01 PROCEDURE — 80076 HEPATIC FUNCTION PANEL: CPT | Performed by: FAMILY MEDICINE

## 2017-09-01 RX ORDER — FUROSEMIDE 10 MG/ML
20 INJECTION INTRAMUSCULAR; INTRAVENOUS ONCE
Status: COMPLETED | OUTPATIENT
Start: 2017-09-01 | End: 2017-09-01

## 2017-09-01 RX ORDER — FUROSEMIDE 10 MG/ML
40 INJECTION INTRAMUSCULAR; INTRAVENOUS ONCE
Status: COMPLETED | OUTPATIENT
Start: 2017-09-01 | End: 2017-09-01

## 2017-09-01 NOTE — PLAN OF CARE
Assumed care at [address]. Alert and oriented x3. Telemetry monitor reading A-fib. Assessment remains unchanged. Fall precautions maintained per protocol. Beginning assessment remains unchanged. Call light in reach at the bedside and bed in lowest position.  Rolando healing without S/S of infection Progressing

## 2017-09-01 NOTE — PROGRESS NOTES
Surgery  98.2  85  14  156/57  95%  IO  Ostomy 100      PE gen-awake in nad   abd-soft, min distended   Ostomy-pink, viable, active    A/P s/p ex lap/ repair colovesicular fistula   Resolving ileus-start clears   Medical management

## 2017-09-01 NOTE — OCCUPATIONAL THERAPY NOTE
Attempted to see the pt for OT re-evaluation. Pt refused, stating, \"No, I won't be moving. \"  OT will re-attempt to see the pt later if schedule allows.      Second Attempt:  Pt was working with respiratory therapist.  Pt also told OT, \"No, I will scream

## 2017-09-01 NOTE — WOUND PROGRESS NOTE
[facility]  Inpatient Ostomy Consultation    Edwin Hoyos Patient Status:  Inpatient    1945 MRN [id number]   [facility] 7NE-A Attending Christine Ortiz MD   Length of  stay 7 PCP Annabel Vazquez (Inactive)     History of 09/01/2017   .0 09/01/2017   CREATSERUM 1.81 09/01/2017   BUN 35 09/01/2017    09/01/2017   K 4.6 09/01/2017    09/01/2017   CO2 27.0 09/01/2017    09/01/2017   ALB 2.2 09/01/2017   PTT 68.2 09/01/2017   PGLU 127 09/01/2017 HCS-O  Wound, Ostomy, Continence, Foot Care Nurse     9/1/2017  6:08 PM

## 2017-09-01 NOTE — PROGRESS NOTES
Colostomy bag changed, and ostomy assessed with Lico Lock, wound ostomy RN. Stoma looked dark green, inside stoma was changing to dark brown color. Abdomen around the stoma was red , warmer and tender.   Dr. Carlos Guerra, general surgery, notified and new orders at [facility]

## 2017-09-01 NOTE — PROGRESS NOTES
· [facility] Progress Note     Subjective:  Pain controlled, not aware of AFib.   Mild dyspnea earlier, improved after IV Lasix    Objective:  -160s    AFib 80s   afebrile   I/O + 449    BUN/cr 35/1.81  PTT 39.5  Hgb 9.5      Cardiac:S1

## 2017-09-01 NOTE — DIETARY NOTE
1230 [facility] ASSESSMENT    Pt is at moderate nutrition risk. Pt does not meet malnutrition criteria.     NUTRITION DIAGNOSIS/PROBLEM:    Inadequate oral intake related to inability to consume sufficient PO as evidenced by NPO/CLD x oz). This is 216% of IBW  BMI: Body mass index is 45.58 kg/m².   IBW: 59 kg    WEIGHT HISTORY:   Wt Readings from Last 6 Encounters:  08/25/17 : 128.1 kg (282 lb 6.4 oz)  06/01/17 : 113.4 kg (250 lb)  02/06/17 : 124.3 kg (274 lb)  01/02/17 : 113.4 kg (250 l

## 2017-09-01 NOTE — PROGRESS NOTES
[facility]  Progress Note    Dean Head Patient Status:  Inpatient    1945 MRN [id number]   [facility] 7NE-A Attending David Kohli MD   [facility] Day # 7 PCP Caitlin Urena (Inactive)       SUBJECTIVE:  Apparently had a 16 09/01/2017   PTT 39.5 09/01/2017   MG 2.3 09/01/2017         Imaging: pCXR pending    Meds:     Current Facility-Administered Medications:  LORazepam (ATIVAN) injection 0.5 mg 0.5 mg Intravenous Q6H PRN   Or      LORazepam (ATIVAN) injection 1 mg 1 mg I Insulin Aspart Pen (NOVOLOG) 100 UNIT/ML flexpen 4-20 Units 4-20 Units Subcutaneous TID CC and HS   febuxostat (ULORIC) tab 80 mg 80 mg Oral Daily   Levothyroxine Sodium (SYNTHROID, LEVOTHROID) tab 125 mcg 125 mcg Oral Daily with breakfast   glucose (DEX mg BID preferred - I think there would be significant issues with managing outpatient Warfarin due to mobility restrictions etc); await cardiology recommendations. IBEs, and try to get up to chair.       Prophylaxis:   DVT with Heparin  GI with Pantoprazole

## 2017-09-02 LAB
APTT PPP: 62.4 SECONDS (ref 25–34)
BASOPHILS # BLD AUTO: 0.04 X10(3) UL (ref 0–0.1)
BASOPHILS NFR BLD AUTO: 0.6 %
BUN BLD-MCNC: 31 MG/DL (ref 8–20)
BUN BLD-MCNC: 32 MG/DL (ref 8–20)
CALCIUM BLD-MCNC: 8.9 MG/DL (ref 8.3–10.3)
CALCIUM BLD-MCNC: 9.2 MG/DL (ref 8.3–10.3)
CHLORIDE: 108 MMOL/L (ref 101–111)
CHLORIDE: 109 MMOL/L (ref 101–111)
CO2: 27 MMOL/L (ref 22–32)
CO2: 29 MMOL/L (ref 22–32)
CREAT BLD-MCNC: 1.71 MG/DL (ref 0.55–1.02)
CREAT BLD-MCNC: 1.87 MG/DL (ref 0.55–1.02)
EOSINOPHIL # BLD AUTO: 0.1 X10(3) UL (ref 0–0.3)
EOSINOPHIL NFR BLD AUTO: 1.5 %
ERYTHROCYTE [DISTWIDTH] IN BLOOD BY AUTOMATED COUNT: 13.8 % (ref 11.5–16)
ERYTHROCYTE [DISTWIDTH] IN BLOOD BY AUTOMATED COUNT: 13.9 % (ref 11.5–16)
GLUCOSE BLD-MCNC: 112 MG/DL (ref 65–99)
GLUCOSE BLD-MCNC: 171 MG/DL (ref 65–99)
GLUCOSE BLD-MCNC: 199 MG/DL (ref 70–99)
GLUCOSE BLD-MCNC: 205 MG/DL (ref 70–99)
GLUCOSE BLD-MCNC: 222 MG/DL (ref 65–99)
GLUCOSE BLD-MCNC: 234 MG/DL (ref 65–99)
HAV IGM SER QL: 2.1 MG/DL (ref 1.7–3)
HCT VFR BLD AUTO: 31.6 % (ref 34–50)
HCT VFR BLD AUTO: 31.8 % (ref 34–50)
HGB BLD-MCNC: 10 G/DL (ref 12–16)
HGB BLD-MCNC: 9.7 G/DL (ref 12–16)
IMMATURE GRANULOCYTE COUNT: 0.27 X10(3) UL (ref 0–1)
IMMATURE GRANULOCYTE RATIO %: 4.1 %
LYMPHOCYTES # BLD AUTO: 0.69 X10(3) UL (ref 0.9–4)
LYMPHOCYTES NFR BLD AUTO: 10.4 %
MCH RBC QN AUTO: 31.2 PG (ref 27–33.2)
MCH RBC QN AUTO: 31.5 PG (ref 27–33.2)
MCHC RBC AUTO-ENTMCNC: 30.7 G/DL (ref 31–37)
MCHC RBC AUTO-ENTMCNC: 31.4 G/DL (ref 31–37)
MCV RBC AUTO: 100.3 FL (ref 81–100)
MCV RBC AUTO: 101.6 FL (ref 81–100)
MONOCYTES # BLD AUTO: 0.66 X10(3) UL (ref 0.1–0.6)
MONOCYTES NFR BLD AUTO: 10 %
NEUTROPHIL ABS PRELIM: 4.85 X10 (3) UL (ref 1.3–6.7)
NEUTROPHILS # BLD AUTO: 4.85 X10(3) UL (ref 1.3–6.7)
NEUTROPHILS NFR BLD AUTO: 73.4 %
PLATELET # BLD AUTO: 236 10(3)UL (ref 150–450)
PLATELET # BLD AUTO: 243 10(3)UL (ref 150–450)
POTASSIUM SERPL-SCNC: 4.2 MMOL/L (ref 3.6–5.1)
POTASSIUM SERPL-SCNC: 4.5 MMOL/L (ref 3.6–5.1)
RBC # BLD AUTO: 3.11 X10(6)UL (ref 3.8–5.1)
RBC # BLD AUTO: 3.17 X10(6)UL (ref 3.8–5.1)
RED CELL DISTRIBUTION WIDTH-SD: 51 FL (ref 35.1–46.3)
RED CELL DISTRIBUTION WIDTH-SD: 51.3 FL (ref 35.1–46.3)
SODIUM SERPL-SCNC: 143 MMOL/L (ref 136–144)
SODIUM SERPL-SCNC: 144 MMOL/L (ref 136–144)
WBC # BLD AUTO: 6.6 X10(3) UL (ref 4–13)
WBC # BLD AUTO: 6.6 X10(3) UL (ref 4–13)

## 2017-09-02 PROCEDURE — 97110 THERAPEUTIC EXERCISES: CPT

## 2017-09-02 PROCEDURE — 85730 THROMBOPLASTIN TIME PARTIAL: CPT | Performed by: FAMILY MEDICINE

## 2017-09-02 PROCEDURE — 80048 BASIC METABOLIC PNL TOTAL CA: CPT | Performed by: FAMILY MEDICINE

## 2017-09-02 PROCEDURE — 97530 THERAPEUTIC ACTIVITIES: CPT

## 2017-09-02 PROCEDURE — 97166 OT EVAL MOD COMPLEX 45 MIN: CPT

## 2017-09-02 PROCEDURE — 83735 ASSAY OF MAGNESIUM: CPT | Performed by: FAMILY MEDICINE

## 2017-09-02 PROCEDURE — 85027 COMPLETE CBC AUTOMATED: CPT | Performed by: CLINICAL NURSE SPECIALIST

## 2017-09-02 PROCEDURE — 97168 OT RE-EVAL EST PLAN CARE: CPT

## 2017-09-02 PROCEDURE — 97164 PT RE-EVAL EST PLAN CARE: CPT

## 2017-09-02 PROCEDURE — 80048 BASIC METABOLIC PNL TOTAL CA: CPT | Performed by: CLINICAL NURSE SPECIALIST

## 2017-09-02 PROCEDURE — 82962 GLUCOSE BLOOD TEST: CPT

## 2017-09-02 PROCEDURE — C9113 INJ PANTOPRAZOLE SODIUM, VIA: HCPCS | Performed by: FAMILY MEDICINE

## 2017-09-02 PROCEDURE — 85025 COMPLETE CBC W/AUTO DIFF WBC: CPT | Performed by: FAMILY MEDICINE

## 2017-09-02 RX ORDER — FUROSEMIDE 10 MG/ML
20 INJECTION INTRAMUSCULAR; INTRAVENOUS ONCE
Status: COMPLETED | OUTPATIENT
Start: 2017-09-02 | End: 2017-09-02

## 2017-09-02 RX ORDER — PANTOPRAZOLE SODIUM 20 MG/1
20 TABLET, DELAYED RELEASE ORAL
Status: DISCONTINUED | OUTPATIENT
Start: 2017-09-02 | End: 2017-09-03

## 2017-09-02 NOTE — OCCUPATIONAL THERAPY NOTE
OCCUPATIONAL THERAPY EVALUATION - INPATIENT     Room Number: 4508/8405-J  Evaluation Date: 9/2/2017  Type of Evaluation: Re-evaluation  Presenting Problem:  Bowel obstruction,  8/26 sigmoid resection, colostomy, A-fib    Physician Order: IP Consult to zeroboundringPixabilityPlough blood pressure    • OBESITY    • Recurrent UTI     rec UTI   • Rheumatoid arthritis(714.0) ([facility] 2012   • SLEEP APNEA DMG DX 1-23-12 TX 2-9-12    AHI 39/ RDI 48/ REM AHI 71/ SaO2 75%/ CPAP 12/ [employer]   • Sleep apnea    • [facility] obstruction [facility] 1/2017 Impaired  Attention Span:  difficulty dividing attention  Orientation Level:  oriented x4  Following Commands:  follows multistep commands consistently  Initiation: hand over hand to initiate tasks  Safety Judgement:  decreased awareness of need for assist to get me up, are you? \" After educating the pt about importance of mobility and activity promotion, pt nodded and agreed to be out of the bed. Max A supine to sit. Once seated, educated the pt about hand placement and feet placement.   Mod A sit to stand of stay 10 to 12 days)  OT Device Recommendations: Reacher;Sock aid;Raised toilet seat    PLAN  OT Treatment Plan: Balance activities; Energy conservation/work simplification techniques;ADL training;Functional transfer training;UE strengthening/ROM; Patient/

## 2017-09-02 NOTE — PROGRESS NOTES
[facility]  Progress Note    Aly Hammonds Patient Status:  Inpatient    1945 MRN [id number]   [facility] 7NE-A Attending Jill Atkins MD   [facility] Day # 8 PCP Ron Agudelo (Inactive)       SUBJECTIVE:  No new complaint (LOPRESSOR) tab 25 mg 25 mg Oral 2x Daily(Beta Blocker)   furosemide (LASIX) injection 20 mg 20 mg Intravenous Once   Pantoprazole Sodium (PROTONIX) EC tab 20 mg 20 mg Oral QAM AC   LORazepam (ATIVAN) injection 0.5 mg 0.5 mg Intravenous Q6H PRN   Or      L injection 50 mL 50 mL Intravenous Q15 Min PRN   Or      glucose (DEX4) oral liquid 30 g 30 g Oral Q15 Min PRN         Assessment    Principal Problem:     Bowel obstruction (HCC)  Active Problems:    Abdominal pain, left lower quadrant    Abnormal abdominal

## 2017-09-02 NOTE — PLAN OF CARE
NURSING DISCHARGE NOTE    Discharged to [facility] via [address] by daughter   Belongings packed up by daughter    Report given to receiving RN Pipo Rubin at [facility]. All paperwork given to paramedics.  Daughter took all be anticipated neutropenic period Adequate for Discharge        SKIN/TISSUE INTEGRITY - ADULT    • Skin integrity remains intact Adequate for Discharge    • Incision(s), wounds(s) or drain site(s) healing without S/S of infection Adequate for Discharge

## 2017-09-02 NOTE — PLAN OF CARE
Assumed care @ 0700. Pt a/o x4, VSS. Tele A-fib on Cardizem gtt and Heparin gtt. APTT therapeutic. 3LNC, no acute respiratory distress noted. Denies any pain at rest.  Pt refused tp ambulate or sitting in chair.   Diet changed to clear liquid, ice chip and oxygenation Progressing        RISK FOR INFECTION - ADULT    • Absence of fever/infection during anticipated neutropenic period Progressing        SKIN/TISSUE INTEGRITY - ADULT    • Skin integrity remains intact Progressing    • Incision(s), wounds(s)

## 2017-09-02 NOTE — PLAN OF CARE
Assumed care of patient at 0700  AOx4, forgetful at times, 2LNC, Afib on tele  Cardizem gtt d/c'd- HR in the 70's  Heparin gtt infusing at 18 per protocol  VSS  Tolerating clear liquids  Tinsley intact draining clear yellow urine  Colostomy c/d/i with formed fever/infection during anticipated neutropenic period Progressing        SKIN/TISSUE INTEGRITY - ADULT    • Skin integrity remains intact Progressing    • Incision(s), wounds(s) or drain site(s) healing without S/S of infection Progressing

## 2017-09-02 NOTE — PROGRESS NOTES
· [facility] Progress Note     Subjective:  Comfortable is chair - no dyspnea.   Took clear liquids this morning    Objective:  163/82  127/46    AFib 60-70s   afebrile   I/O -669    BUN/cr 32/1.71  PTT 62   Hgb 9.7    Cardiac:S1 S2 irregular

## 2017-09-02 NOTE — PHYSICAL THERAPY NOTE
PHYSICAL THERAPY EVALUATION - INPATIENT     Room Number: 2148/0356-U  Evaluation Date: 9/2/2017  Type of Evaluation: Re-evaluation  Physician Order: PT Eval and Treat    Presenting Problem: sigmoid resection/colostomy/hernia repair  Reason for Therapy: ([facility]    • SLEEP APNEA DMG DX 12 TX 12    AHI 39/ RDI 48/ REM AHI 71/ SaO2 75%/ CPAP    • Sleep apnea    • Small bowel obstruction ([facility] 2017       Past Surgical History  Past Surgical History:  No date:   12 Green EDW one-step commands inconsistently  · Motor Planning: intact  · Awareness of Deficits:  fully aware of deficits  · Problem Solving:  able to problem solve independently    RANGE OF MOTION AND STRENGTH ASSESSMENT  Upper extremity ROM and strength are within f sitting upright in [facility] for re-evaluation and reports she is unwilling to participate in therapy. With much persuasion, patient performed sitting therapeutic exercises.  Discussed the importance of performing exercises while in the hospital and patient lurdes Plan: Bed mobility; Body mechanics; Endurance; Energy conservation;Patient education;Gait training;Stair training;Transfer training;Balance training;Strengthening;Range of motion  Rehab Potential : Fair  Frequency (Obs): 5x/week  Number of Visits to [employer]

## 2017-09-02 NOTE — PROGRESS NOTES
[facility] 65 22 NOTE-DR MARTINEZ      Subjective:  NO PAIN    KIET CLEARS    Objective/Physical Exam:  General: Alert, orientated x3. Cooperative. No apparent distress.   Vital Signs:  Blood pressure (!) 163/82, pulse 74, temperature 98 °F (36 35*  32*   CREATSERUM  1.76*  1.81*  1.71*   CA  9.5  9.3  9.2   NA  147*  144  144   K  4.6  4.6  4.2   CL  111  110  108   CO2  25.0  27.0  29.0       Assessment/Plan:  Patient Active Problem List:     Recurrent UTI     Incomplete bladder emptying     Ob

## 2017-09-02 NOTE — PLAN OF CARE
Assumed care 1930. Alert and oriented x3 with periods of confusion, drowsy. Heparin, Cardizem, IVF infusing. Assessment remains unchanged from beginning of shift. Fall precautions maintained per protocol.  Call light in reach at the bedside and bed in [place] site(s) healing without S/S of infection Progressing

## 2017-09-03 ENCOUNTER — APPOINTMENT (OUTPATIENT)
Dept: GENERAL RADIOLOGY | Facility: HOSPITAL | Age: 72
DRG: 330 | End: 2017-09-03
Attending: SURGERY
Payer: MEDICARE

## 2017-09-03 LAB
APTT PPP: 72.7 SECONDS (ref 25–34)
APTT PPP: 86.8 SECONDS (ref 25–34)
BASOPHILS # BLD AUTO: 0.03 X10(3) UL (ref 0–0.1)
BASOPHILS NFR BLD AUTO: 0.4 %
BUN BLD-MCNC: 31 MG/DL (ref 8–20)
CALCIUM BLD-MCNC: 9.1 MG/DL (ref 8.3–10.3)
CHLORIDE: 108 MMOL/L (ref 101–111)
CO2: 29 MMOL/L (ref 22–32)
CREAT BLD-MCNC: 1.65 MG/DL (ref 0.55–1.02)
EOSINOPHIL # BLD AUTO: 0.11 X10(3) UL (ref 0–0.3)
EOSINOPHIL NFR BLD AUTO: 1.6 %
ERYTHROCYTE [DISTWIDTH] IN BLOOD BY AUTOMATED COUNT: 14 % (ref 11.5–16)
GLUCOSE BLD-MCNC: 104 MG/DL (ref 65–99)
GLUCOSE BLD-MCNC: 105 MG/DL (ref 70–99)
GLUCOSE BLD-MCNC: 114 MG/DL (ref 65–99)
GLUCOSE BLD-MCNC: 115 MG/DL (ref 65–99)
GLUCOSE BLD-MCNC: 120 MG/DL (ref 65–99)
HAV IGM SER QL: 2 MG/DL (ref 1.7–3)
HCT VFR BLD AUTO: 31.6 % (ref 34–50)
HGB BLD-MCNC: 9.8 G/DL (ref 12–16)
IMMATURE GRANULOCYTE COUNT: 0.23 X10(3) UL (ref 0–1)
IMMATURE GRANULOCYTE RATIO %: 3.3 %
LYMPHOCYTES # BLD AUTO: 0.63 X10(3) UL (ref 0.9–4)
LYMPHOCYTES NFR BLD AUTO: 9.1 %
MCH RBC QN AUTO: 31.2 PG (ref 27–33.2)
MCHC RBC AUTO-ENTMCNC: 31 G/DL (ref 31–37)
MCV RBC AUTO: 100.6 FL (ref 81–100)
MONOCYTES # BLD AUTO: 0.59 X10(3) UL (ref 0.1–0.6)
MONOCYTES NFR BLD AUTO: 8.6 %
NEUTROPHIL ABS PRELIM: 5.3 X10 (3) UL (ref 1.3–6.7)
NEUTROPHILS # BLD AUTO: 5.3 X10(3) UL (ref 1.3–6.7)
NEUTROPHILS NFR BLD AUTO: 77 %
PHOSPHATE SERPL-MCNC: 2.3 MG/DL (ref 2.5–4.9)
PLATELET # BLD AUTO: 252 10(3)UL (ref 150–450)
POTASSIUM SERPL-SCNC: 4.6 MMOL/L (ref 3.6–5.1)
RBC # BLD AUTO: 3.14 X10(6)UL (ref 3.8–5.1)
RED CELL DISTRIBUTION WIDTH-SD: 51 FL (ref 35.1–46.3)
SODIUM SERPL-SCNC: 144 MMOL/L (ref 136–144)
WBC # BLD AUTO: 6.9 X10(3) UL (ref 4–13)

## 2017-09-03 PROCEDURE — 84100 ASSAY OF PHOSPHORUS: CPT

## 2017-09-03 PROCEDURE — 85730 THROMBOPLASTIN TIME PARTIAL: CPT | Performed by: INTERNAL MEDICINE

## 2017-09-03 PROCEDURE — 74000 XR ABDOMEN (1 VIEW) (CPT=74000): CPT | Performed by: SURGERY

## 2017-09-03 PROCEDURE — C9113 INJ PANTOPRAZOLE SODIUM, VIA: HCPCS | Performed by: FAMILY MEDICINE

## 2017-09-03 PROCEDURE — 80048 BASIC METABOLIC PNL TOTAL CA: CPT | Performed by: FAMILY MEDICINE

## 2017-09-03 PROCEDURE — 85025 COMPLETE CBC W/AUTO DIFF WBC: CPT | Performed by: FAMILY MEDICINE

## 2017-09-03 PROCEDURE — 83735 ASSAY OF MAGNESIUM: CPT | Performed by: FAMILY MEDICINE

## 2017-09-03 PROCEDURE — 82962 GLUCOSE BLOOD TEST: CPT

## 2017-09-03 PROCEDURE — 0D9670Z DRAINAGE OF STOMACH WITH DRAINAGE DEVICE, VIA NATURAL OR ARTIFICIAL OPENING: ICD-10-PCS | Performed by: SURGERY

## 2017-09-03 RX ORDER — METOPROLOL TARTRATE 5 MG/5ML
5 INJECTION INTRAVENOUS EVERY 6 HOURS
Status: DISCONTINUED | OUTPATIENT
Start: 2017-09-03 | End: 2017-09-05

## 2017-09-03 RX ORDER — METOCLOPRAMIDE HYDROCHLORIDE 5 MG/ML
5 INJECTION INTRAMUSCULAR; INTRAVENOUS EVERY 6 HOURS
Status: COMPLETED | OUTPATIENT
Start: 2017-09-03 | End: 2017-09-04

## 2017-09-03 NOTE — PROGRESS NOTES
· [facility] Progress Note     Subjective:  Fees miserable. Nause with small emesis.   Just had KUB    Objective:  123/74    AFib 100s today   afebrile   I/O - 1124   BUN/cr 31/1.65 PTT 86.6  Hgb 9.8    Cardiac:S1 S2 irregular  Lungs: decreas

## 2017-09-03 NOTE — PHYSICAL THERAPY NOTE
Attempted treatment today but pt c/o feeling nauseous and vomiting on and off all day. Pt refused participation today. PT to follow up 9/4/17 as schedule allows.

## 2017-09-03 NOTE — PROGRESS NOTES
[facility]  Progress Note    Jim Funes Patient Status:  Inpatient    1945 MRN [id number]   [facility] 7NE-A Attending Bob Santos MD   [address] Day # 9 PCP Emiliana Montanez (Inactive)       SUBJECTIVE:  Chart noted - in Continuous   LORazepam (ATIVAN) injection 0.5 mg 0.5 mg Intravenous Q6H PRN   Or      LORazepam (ATIVAN) injection 1 mg 1 mg Intravenous Q6H PRN   Normal Saline Flush 0.9 % injection 10 mL 10 mL Intravenous Q12H   heparin (PORCINE) drip 64196sulkw/250mL in type 2 diabetes mellitus with stage 3 chronic kidney disease, without long-term current use of insulin (HCC)    Acute renal failure superimposed on stage 3 chronic kidney disease (HCC)    High risk medications (not anticoagulants) long-term use    Recurren

## 2017-09-03 NOTE — PROGRESS NOTES
[facility] 65 22 NOTE-DR MARTINEZ      Subjective:  NAUSEA     GOOD STOOL OUTPUT    Objective/Physical Exam:  General: Alert, orientated x3. Cooperative. No apparent distress.   Vital Signs:  Blood pressure 124/74, pulse 101, temperature 98.6 hyperostosis)     Psoriasis     Osteoarthritis, multiple sites     Imbalance     Myoclonus     RA (rheumatoid arthritis) ([facility] [address]     Encounter for long-term (current) use of other medications     Abdominal pain, acute     Large bowel obstruction ([facility] [address]     Re

## 2017-09-03 NOTE — PLAN OF CARE
At present, pt continues vomiting small amounts bile even with antiemetics.  Placed a 16 [place] NG tube to LIS with immediate return of 2100 ml dark brown green contents

## 2017-09-03 NOTE — PLAN OF CARE
Assumed care at [address]. Alert and oriented x3. Up in chair until 2300 needed sit to stand to get back into bed. Telemetry monitor reading A-fib. Heparin and IVF infusing. Rt. GRANT drain with small amount of red serosanguinous output. Colostomy on LT.  With soft neutropenic period Progressing        SKIN/TISSUE INTEGRITY - ADULT    • Skin integrity remains intact Progressing    • Incision(s), wounds(s) or drain site(s) healing without S/S of infection Progressing

## 2017-09-04 LAB
ALBUMIN SERPL-MCNC: 1.9 G/DL (ref 3.5–4.8)
ALP LIVER SERPL-CCNC: 72 U/L (ref 55–142)
ALT SERPL-CCNC: 18 U/L (ref 14–54)
APTT PPP: 61.5 SECONDS (ref 25–34)
APTT PPP: 86.3 SECONDS (ref 25–34)
APTT PPP: 93.4 SECONDS (ref 25–34)
AST SERPL-CCNC: 7 U/L (ref 15–41)
BASOPHILS # BLD AUTO: 0.04 X10(3) UL (ref 0–0.1)
BASOPHILS NFR BLD AUTO: 0.5 %
BILIRUB DIRECT SERPL-MCNC: 0.1 MG/DL (ref 0.1–0.5)
BILIRUB SERPL-MCNC: 0.3 MG/DL (ref 0.1–2)
BUN BLD-MCNC: 34 MG/DL (ref 8–20)
CALCIUM BLD-MCNC: 8.6 MG/DL (ref 8.3–10.3)
CHLORIDE: 109 MMOL/L (ref 101–111)
CO2: 28 MMOL/L (ref 22–32)
CREAT BLD-MCNC: 1.78 MG/DL (ref 0.55–1.02)
EOSINOPHIL # BLD AUTO: 0.06 X10(3) UL (ref 0–0.3)
EOSINOPHIL NFR BLD AUTO: 0.7 %
ERYTHROCYTE [DISTWIDTH] IN BLOOD BY AUTOMATED COUNT: 13.9 % (ref 11.5–16)
GLUCOSE BLD-MCNC: 107 MG/DL (ref 65–99)
GLUCOSE BLD-MCNC: 108 MG/DL (ref 70–99)
GLUCOSE BLD-MCNC: 112 MG/DL (ref 65–99)
GLUCOSE BLD-MCNC: 113 MG/DL (ref 65–99)
GLUCOSE BLD-MCNC: 121 MG/DL (ref 65–99)
HAV IGM SER QL: 2.1 MG/DL (ref 1.7–3)
HCT VFR BLD AUTO: 32.4 % (ref 34–50)
HGB BLD-MCNC: 10 G/DL (ref 12–16)
IMMATURE GRANULOCYTE COUNT: 0.23 X10(3) UL (ref 0–1)
IMMATURE GRANULOCYTE RATIO %: 2.7 %
LYMPHOCYTES # BLD AUTO: 0.75 X10(3) UL (ref 0.9–4)
LYMPHOCYTES NFR BLD AUTO: 8.9 %
M PROTEIN MFR SERPL ELPH: 5.7 G/DL (ref 6.1–8.3)
MCH RBC QN AUTO: 31.4 PG (ref 27–33.2)
MCHC RBC AUTO-ENTMCNC: 30.9 G/DL (ref 31–37)
MCV RBC AUTO: 101.9 FL (ref 81–100)
MONOCYTES # BLD AUTO: 0.52 X10(3) UL (ref 0.1–0.6)
MONOCYTES NFR BLD AUTO: 6.2 %
NEUTROPHIL ABS PRELIM: 6.8 X10 (3) UL (ref 1.3–6.7)
NEUTROPHILS # BLD AUTO: 6.8 X10(3) UL (ref 1.3–6.7)
NEUTROPHILS NFR BLD AUTO: 81 %
PHOSPHATE SERPL-MCNC: 3.2 MG/DL (ref 2.5–4.9)
PLATELET # BLD AUTO: 248 10(3)UL (ref 150–450)
POTASSIUM SERPL-SCNC: 4.7 MMOL/L (ref 3.6–5.1)
RBC # BLD AUTO: 3.18 X10(6)UL (ref 3.8–5.1)
RED CELL DISTRIBUTION WIDTH-SD: 52 FL (ref 35.1–46.3)
SODIUM SERPL-SCNC: 144 MMOL/L (ref 136–144)
WBC # BLD AUTO: 8.4 X10(3) UL (ref 4–13)

## 2017-09-04 PROCEDURE — 3E0336Z INTRODUCTION OF NUTRITIONAL SUBSTANCE INTO PERIPHERAL VEIN, PERCUTANEOUS APPROACH: ICD-10-PCS | Performed by: SURGERY

## 2017-09-04 PROCEDURE — 80048 BASIC METABOLIC PNL TOTAL CA: CPT | Performed by: FAMILY MEDICINE

## 2017-09-04 PROCEDURE — 82962 GLUCOSE BLOOD TEST: CPT

## 2017-09-04 PROCEDURE — C9113 INJ PANTOPRAZOLE SODIUM, VIA: HCPCS | Performed by: FAMILY MEDICINE

## 2017-09-04 PROCEDURE — 85025 COMPLETE CBC W/AUTO DIFF WBC: CPT | Performed by: FAMILY MEDICINE

## 2017-09-04 PROCEDURE — 85730 THROMBOPLASTIN TIME PARTIAL: CPT | Performed by: FAMILY MEDICINE

## 2017-09-04 PROCEDURE — 80076 HEPATIC FUNCTION PANEL: CPT | Performed by: FAMILY MEDICINE

## 2017-09-04 PROCEDURE — 36592 COLLECT BLOOD FROM PICC: CPT

## 2017-09-04 PROCEDURE — 83735 ASSAY OF MAGNESIUM: CPT | Performed by: FAMILY MEDICINE

## 2017-09-04 PROCEDURE — 84100 ASSAY OF PHOSPHORUS: CPT | Performed by: FAMILY MEDICINE

## 2017-09-04 NOTE — PLAN OF CARE
Assumed care of patient at 0700  AOx4, forgetful at times, 2L NC, Afib on tele  Denies pain  VSS  NGT LIS with brown/green output, remains NPO- ice chips ok  Heparin gtt infusing per protocol  Tinsley intact draining clear yellow urine  Right GRANT drain  Midli oxygenation Progressing        RISK FOR INFECTION - ADULT    • Absence of fever/infection during anticipated neutropenic period Progressing        SKIN/TISSUE INTEGRITY - ADULT    • Skin integrity remains intact Progressing    • Incision(s), wounds(s) or d

## 2017-09-04 NOTE — PROGRESS NOTES
[facility]  Progress Note    Nanci Garcia Patient Status:  Inpatient    1945 MRN [id number]   [facility] 7NE-A Attending Angie Maravilla MD   Hosp Day # 10 PCP Augusto Butler (Inactive)       SUBJECTIVE: (none)    OBJECT Chloride 0.9 % 10 mL IV push 40 mg Intravenous Daily   Metoclopramide HCl (REGLAN) injection 5 mg 5 mg Intravenous Q6H   LORazepam (ATIVAN) injection 0.5 mg 0.5 mg Intravenous Q6H PRN   Or      LORazepam (ATIVAN) injection 1 mg 1 mg Intravenous Q6H PRN   N Obstructive sleep apnea, adult    Controlled type 2 diabetes mellitus with stage 3 chronic kidney disease, without long-term current use of insulin (HCC)    Acute renal failure superimposed on stage 3 chronic kidney disease (HCC)    High risk medications (

## 2017-09-04 NOTE — PROGRESS NOTES
[facility] 65 22 NOTE-DR MARTINEZ      Subjective:  NAUSEA    Objective/Physical Exam:  General: Alert, orientated x3. Cooperative. No apparent distress.   Vital Signs:  Blood pressure 129/58, pulse 102, temperature 98.5 °F (36.9 °C), temperat Controlled type 2 diabetes mellitus with stage 3 chronic kidney disease, without long-term current use of insulin (HCC)     Hypertension     Dehydration     Anemia     Azotemia     Hyperglycemia     Abdominal pain, left lower quadrant     Abnormal abdomina

## 2017-09-04 NOTE — PLAN OF CARE
Assumed care at 1900. No acute issues overnight. A/o x 4.   2l/  Afib on tele. HR 90's to low 100's. On scheduled Lopressor. Hep gtt infusing at 16ml/hour. PTT in am.   ML with staples, johnny. Right GRANT with ss output.    Colostomy with soft brown sto

## 2017-09-04 NOTE — PROGRESS NOTES
[facility]  Progress Note    Rohan Justice Patient Status:  Inpatient    1945 MRN [id number]   [facility] 7NE-A Attending Aramis Mccallum MD   [address] Day # 10 PCP Bimal Guzman (Inactive)       Assessment and Plan:  Angelo 6.4 oz (128.1 kg)   SpO2 98%   BMI 45.58 kg/m²     Temp (24hrs), Av.5 °F (36.9 °C), Min:98.2 °F (36.8 °C), Max:98.8 °F (37.1 °C)      Intake/Output:    Intake/Output Summary (Last 24 hours) at 17 1205  Last data filed at 17 0900   Gross per potassium chloride 20 mEq infusion  Intravenous Continuous   LORazepam (ATIVAN) injection 0.5 mg 0.5 mg Intravenous Q6H PRN   Or      LORazepam (ATIVAN) injection 1 mg 1 mg Intravenous Q6H PRN   Normal Saline Flush 0.9 % injection 10 mL 10 mL Intravenous Q

## 2017-09-05 ENCOUNTER — APPOINTMENT (OUTPATIENT)
Dept: GENERAL RADIOLOGY | Facility: HOSPITAL | Age: 72
DRG: 330 | End: 2017-09-05
Attending: FAMILY MEDICINE
Payer: MEDICARE

## 2017-09-05 LAB
APTT PPP: 55.3 SECONDS (ref 25–34)
BASOPHILS # BLD AUTO: 0.03 X10(3) UL (ref 0–0.1)
BASOPHILS NFR BLD AUTO: 0.4 %
BUN BLD-MCNC: 40 MG/DL (ref 8–20)
CALCIUM BLD-MCNC: 8.6 MG/DL (ref 8.3–10.3)
CHLORIDE: 111 MMOL/L (ref 101–111)
CO2: 25 MMOL/L (ref 22–32)
CREAT BLD-MCNC: 1.72 MG/DL (ref 0.55–1.02)
EOSINOPHIL # BLD AUTO: 0.08 X10(3) UL (ref 0–0.3)
EOSINOPHIL NFR BLD AUTO: 1 %
ERYTHROCYTE [DISTWIDTH] IN BLOOD BY AUTOMATED COUNT: 13.9 % (ref 11.5–16)
GLUCOSE BLD-MCNC: 102 MG/DL (ref 70–99)
GLUCOSE BLD-MCNC: 111 MG/DL (ref 65–99)
GLUCOSE BLD-MCNC: 131 MG/DL (ref 65–99)
GLUCOSE BLD-MCNC: 136 MG/DL (ref 65–99)
HAV IGM SER QL: 2.1 MG/DL (ref 1.7–3)
HCT VFR BLD AUTO: 31.3 % (ref 34–50)
HGB BLD-MCNC: 9.6 G/DL (ref 12–16)
IMMATURE GRANULOCYTE COUNT: 0.14 X10(3) UL (ref 0–1)
IMMATURE GRANULOCYTE RATIO %: 1.8 %
LYMPHOCYTES # BLD AUTO: 0.62 X10(3) UL (ref 0.9–4)
LYMPHOCYTES NFR BLD AUTO: 7.9 %
MCH RBC QN AUTO: 31 PG (ref 27–33.2)
MCHC RBC AUTO-ENTMCNC: 30.7 G/DL (ref 31–37)
MCV RBC AUTO: 101 FL (ref 81–100)
MONOCYTES # BLD AUTO: 0.39 X10(3) UL (ref 0.1–0.6)
MONOCYTES NFR BLD AUTO: 5 %
NEUTROPHIL ABS PRELIM: 6.59 X10 (3) UL (ref 1.3–6.7)
NEUTROPHILS # BLD AUTO: 6.59 X10(3) UL (ref 1.3–6.7)
NEUTROPHILS NFR BLD AUTO: 83.9 %
PHOSPHATE SERPL-MCNC: 3.6 MG/DL (ref 2.5–4.9)
PLATELET # BLD AUTO: 263 10(3)UL (ref 150–450)
POTASSIUM SERPL-SCNC: 5 MMOL/L (ref 3.6–5.1)
RBC # BLD AUTO: 3.1 X10(6)UL (ref 3.8–5.1)
RED CELL DISTRIBUTION WIDTH-SD: 51.3 FL (ref 35.1–46.3)
SODIUM SERPL-SCNC: 145 MMOL/L (ref 136–144)
WBC # BLD AUTO: 7.9 X10(3) UL (ref 4–13)

## 2017-09-05 PROCEDURE — 36569 INSJ PICC 5 YR+ W/O IMAGING: CPT

## 2017-09-05 PROCEDURE — 97110 THERAPEUTIC EXERCISES: CPT

## 2017-09-05 PROCEDURE — 76937 US GUIDE VASCULAR ACCESS: CPT

## 2017-09-05 PROCEDURE — 97530 THERAPEUTIC ACTIVITIES: CPT

## 2017-09-05 PROCEDURE — 82962 GLUCOSE BLOOD TEST: CPT

## 2017-09-05 PROCEDURE — C9113 INJ PANTOPRAZOLE SODIUM, VIA: HCPCS | Performed by: FAMILY MEDICINE

## 2017-09-05 PROCEDURE — 84100 ASSAY OF PHOSPHORUS: CPT | Performed by: FAMILY MEDICINE

## 2017-09-05 PROCEDURE — 85025 COMPLETE CBC W/AUTO DIFF WBC: CPT | Performed by: FAMILY MEDICINE

## 2017-09-05 PROCEDURE — 02HV33Z INSERTION OF INFUSION DEVICE INTO SUPERIOR VENA CAVA, PERCUTANEOUS APPROACH: ICD-10-PCS | Performed by: FAMILY MEDICINE

## 2017-09-05 PROCEDURE — 36592 COLLECT BLOOD FROM PICC: CPT

## 2017-09-05 PROCEDURE — 80048 BASIC METABOLIC PNL TOTAL CA: CPT | Performed by: FAMILY MEDICINE

## 2017-09-05 PROCEDURE — 83735 ASSAY OF MAGNESIUM: CPT | Performed by: FAMILY MEDICINE

## 2017-09-05 PROCEDURE — 97116 GAIT TRAINING THERAPY: CPT

## 2017-09-05 PROCEDURE — 71010 XR CHEST AP PORTABLE  (CPT=71010): CPT | Performed by: FAMILY MEDICINE

## 2017-09-05 PROCEDURE — 85730 THROMBOPLASTIN TIME PARTIAL: CPT | Performed by: INTERNAL MEDICINE

## 2017-09-05 RX ORDER — DEXTROSE MONOHYDRATE 100 MG/ML
INJECTION, SOLUTION INTRAVENOUS CONTINUOUS PRN
Status: DISCONTINUED | OUTPATIENT
Start: 2017-09-05 | End: 2017-09-14

## 2017-09-05 RX ORDER — METOCLOPRAMIDE HYDROCHLORIDE 5 MG/ML
5 INJECTION INTRAMUSCULAR; INTRAVENOUS EVERY 6 HOURS PRN
Status: DISCONTINUED | OUTPATIENT
Start: 2017-09-05 | End: 2017-09-13

## 2017-09-05 RX ORDER — METOPROLOL TARTRATE 5 MG/5ML
5 INJECTION INTRAVENOUS EVERY 4 HOURS
Status: DISCONTINUED | OUTPATIENT
Start: 2017-09-05 | End: 2017-09-07

## 2017-09-05 RX ORDER — SODIUM CHLORIDE 0.9 % (FLUSH) 0.9 %
10 SYRINGE (ML) INJECTION AS NEEDED
Status: DISCONTINUED | OUTPATIENT
Start: 2017-09-05 | End: 2017-09-14

## 2017-09-05 NOTE — PHYSICAL THERAPY NOTE
PHYSICAL THERAPY TREATMENT NOTE - INPATIENT    Room Number: 9557/9031-A     Session: 1   Number of Visits to Meet Established Goals: 5    Presenting Problem: sigmoid resection/colostomy/hernia repair    Problem List  Principal Problem:     Bowel obstructio OTHER SURGICAL HISTORY      Comment: cystoscopy Dr. Bonny Little  06/27/2017: OTHER SURGICAL HISTORY      Comment: Cysto w/ Dr. Bonny Little  07/26/2017: OTHER SURGICAL HISTORY      Comment: cysto Dr. Bonny Little  No date: REMOVAL GALLBLADDER  No date: SHOULDER SURG [facility] walker  Pattern: Shuffle  Stoop/Curb Assistance: Not tested  Comment : above per FIM    Skilled Therapy Provided:     Pt presented in supine upon PT/OT arrival - Pt required max encouragement to participate in session.   Pt required increased time for all m Bed mobility; Body mechanics; Endurance; Energy conservation;Patient education;Gait training;Stair training;Transfer training;Balance training;Strengthening;Range of motion  Rehab Potential : Fair  Frequency (Obs): 5x/week    CURRENT GOALS     Goal #1 Patient

## 2017-09-05 NOTE — PROGRESS NOTES
[facility]  Progress Note    José Miguel Asher Patient Status:  Inpatient    1945 MRN [id number]   [facility] 7NE-A Attending Mary Mckeon MD   [facility] Day # 11 PCP Oralee Allie (Inactive)   POD # 10    SUBJECTIVE:  No CP, injection 5 mg 5 mg Intravenous Q6H   Pantoprazole Sodium (PROTONIX) 40 mg in Sodium Chloride 0.9 % 10 mL IV push 40 mg Intravenous Daily   LORazepam (ATIVAN) injection 0.5 mg 0.5 mg Intravenous Q6H PRN   Or      LORazepam (ATIVAN) injection 1 mg 1 mg Intr ([facility]    Obstructive sleep apnea, adult    Controlled type 2 diabetes mellitus with stage 3 chronic kidney disease, without long-term current use of insulin (HCC)    Acute renal failure superimposed on stage 3 chronic kidney disease ([facility]    High risk medi

## 2017-09-05 NOTE — PHYSICAL THERAPY NOTE
Attempted to see Pt this AM - Spoke to Reclamador - Pt currently off floor at PICC line. Will re-attempt as able.

## 2017-09-05 NOTE — OCCUPATIONAL THERAPY NOTE
Attempted to see the pt for OT session. Pt is currently occupied with PICC line placement. OT will attempt to see the pt this afternoon.

## 2017-09-05 NOTE — PROGRESS NOTES
[facility] 65 22 NOTE-DR MARTINEZ      Subjective:  NO PAIN    WANTS APPLE JUICE    Objective/Physical Exam:  General: Alert, orientated x3. Cooperative. No apparent distress.   Vital Signs:  Blood pressure 105/55, pulse 132, temperature 98.9 Rectosigmoiditis     Nausea and vomiting     Controlled type 2 diabetes mellitus with stage 3 chronic kidney disease, without long-term current use of insulin (HCC)     Hypertension     Dehydration     Anemia     Azotemia     Hyperglycemia     Abdominal pa

## 2017-09-05 NOTE — OCCUPATIONAL THERAPY NOTE
OCCUPATIONAL THERAPY TREATMENT NOTE - INPATIENT     Room Number: 0262/2530-P  Session: 1   Number of Visits to Meet Established Goals: 7    Presenting Problem:  Bowel obstruction,  8/26 sigmoid resection, colostomy, A-fib    History related to current admis DMG DX 12 TX 12    AHI 39/ RDI 48/ REM AHI 71/ SaO2 75%/ CPAP 12 [employer]   • Sleep apnea    • Small bowel obstruction ([address] 2017       Past Surgical History  Past Surgical History:  No date:   12 Green EDW: CHOLECYSTECTOMY   meals?: None    AM-PAC Score:  Score: 16  Approx Degree of Impairment: 53.32%  Standardized Score (AM-PAC Scale): 35.96  CMS Modifier (G-Code): CK    FUNCTIONAL TRANSFER ASSESSMENT  Supine to Sit : Maximum assistance  Sit to Stand:  Moderate assistance    S eval/education; Compensatory technique education  Rehab Potential : Good  Frequency (Obs): 5x/week      OT Goals:   ADL Goals  Patient will perform upper body dressing:  with supervision-ongoing  Patient will perform lower body dressing:  with min assist an

## 2017-09-05 NOTE — PLAN OF CARE
Assumed care of pt at 1100. Pt went down for a right picc line then returned to floor. Up to chair with sit to stand and PT. NG drains 850 ml dark brown/green drainage since 0700. Hr a fib 80s and 90s with burst nonsustained to 140 with acitivty.  bp 113/64

## 2017-09-05 NOTE — DIETARY NOTE
1230 [facility] ASSESSMENT    Pt is at moderate nutrition risk. Pt does not meet malnutrition criteria.     NUTRITION DIAGNOSIS/PROBLEM:    Inadequate oral intake related to inability to consume sufficient PO as evidenced by NPO, need warranted. 630-72 year old presented for abdominal pain, noted with large bowel obstruction and now post op ex lap, sigmoid resection with colostomy. Diet slow to advance, appetite decreased. Supplement modified.  Pt with new onset AF    ANTHROPOMETRIC

## 2017-09-05 NOTE — PROGRESS NOTES
[facility] Progress Note    Subjective:  Wants apple juice. Denies any surgical pain.      Objective:  /55   Pulse 132   Temp 98.9 °F (37.2 °C) (Oral)   Resp 18   Ht 167.6 cm (5' 6\")   Wt 282 lb 6.4 oz (128.1 kg)   SpO2 98%   BMI 45.58 kg/m²

## 2017-09-05 NOTE — PLAN OF CARE
Assumed care at 2300. Pt alert tonight, pleasant and conversational.  Not drowsy as previously reported. Pain limited to during repositioning, but pt states severity of pain is less. 400ml watery green output from NG. Consumed 120mls in ice chips.   1

## 2017-09-06 ENCOUNTER — APPOINTMENT (OUTPATIENT)
Dept: GENERAL RADIOLOGY | Facility: HOSPITAL | Age: 72
DRG: 330 | End: 2017-09-06
Attending: SURGERY
Payer: MEDICARE

## 2017-09-06 LAB
ALBUMIN SERPL-MCNC: 1.8 G/DL (ref 3.5–4.8)
ALP LIVER SERPL-CCNC: 61 U/L (ref 55–142)
ALT SERPL-CCNC: 13 U/L (ref 14–54)
APTT PPP: 52.5 SECONDS (ref 25–34)
AST SERPL-CCNC: 8 U/L (ref 15–41)
ATRIAL RATE: 292 BPM
BASOPHILS # BLD AUTO: 0.02 X10(3) UL (ref 0–0.1)
BASOPHILS NFR BLD AUTO: 0.3 %
BILIRUB SERPL-MCNC: 0.2 MG/DL (ref 0.1–2)
BUN BLD-MCNC: 38 MG/DL (ref 8–20)
CALCIUM BLD-MCNC: 8.9 MG/DL (ref 8.3–10.3)
CHLORIDE: 109 MMOL/L (ref 101–111)
CO2: 27 MMOL/L (ref 22–32)
CREAT BLD-MCNC: 1.61 MG/DL (ref 0.55–1.02)
EOSINOPHIL # BLD AUTO: 0.14 X10(3) UL (ref 0–0.3)
EOSINOPHIL NFR BLD AUTO: 1.9 %
ERYTHROCYTE [DISTWIDTH] IN BLOOD BY AUTOMATED COUNT: 13.7 % (ref 11.5–16)
GLUCOSE BLD-MCNC: 178 MG/DL (ref 65–99)
GLUCOSE BLD-MCNC: 185 MG/DL (ref 65–99)
GLUCOSE BLD-MCNC: 186 MG/DL (ref 70–99)
GLUCOSE BLD-MCNC: 187 MG/DL (ref 65–99)
GLUCOSE BLD-MCNC: 209 MG/DL (ref 65–99)
GLUCOSE BLD-MCNC: 212 MG/DL (ref 65–99)
HAV IGM SER QL: 2.1 MG/DL (ref 1.7–3)
HCT VFR BLD AUTO: 27.8 % (ref 34–50)
HGB BLD-MCNC: 8.9 G/DL (ref 12–16)
IMMATURE GRANULOCYTE COUNT: 0.15 X10(3) UL (ref 0–1)
IMMATURE GRANULOCYTE RATIO %: 2 %
IRON SATURATION: 9 % (ref 13–45)
IRON: 21 UG/DL (ref 28–170)
LYMPHOCYTES # BLD AUTO: 0.49 X10(3) UL (ref 0.9–4)
LYMPHOCYTES NFR BLD AUTO: 6.5 %
M PROTEIN MFR SERPL ELPH: 5.4 G/DL (ref 6.1–8.3)
MCH RBC QN AUTO: 31.7 PG (ref 27–33.2)
MCHC RBC AUTO-ENTMCNC: 32 G/DL (ref 31–37)
MCV RBC AUTO: 98.9 FL (ref 81–100)
MONOCYTES # BLD AUTO: 0.57 X10(3) UL (ref 0.1–0.6)
MONOCYTES NFR BLD AUTO: 7.6 %
NEUTROPHIL ABS PRELIM: 6.15 X10 (3) UL (ref 1.3–6.7)
NEUTROPHILS # BLD AUTO: 6.15 X10(3) UL (ref 1.3–6.7)
NEUTROPHILS NFR BLD AUTO: 81.7 %
PHOSPHATE SERPL-MCNC: 2.4 MG/DL (ref 2.5–4.9)
PLATELET # BLD AUTO: 231 10(3)UL (ref 150–450)
POTASSIUM SERPL-SCNC: 4.2 MMOL/L (ref 3.6–5.1)
Q-T INTERVAL: 406 MS
QRS DURATION: 90 MS
QTC CALCULATION (BEZET): 447 MS
R AXIS: 20 DEGREES
RBC # BLD AUTO: 2.81 X10(6)UL (ref 3.8–5.1)
RED CELL DISTRIBUTION WIDTH-SD: 49.2 FL (ref 35.1–46.3)
SODIUM SERPL-SCNC: 143 MMOL/L (ref 136–144)
T AXIS: 2 DEGREES
TOTAL IRON BINDING CAPACITY: 225 UG/DL (ref 298–536)
TRANSFERRIN: 151 MG/DL (ref 200–360)
TRIGLYCERIDES: 190 MG/DL (ref ?–150)
VENTRICULAR RATE: 73 BPM
WBC # BLD AUTO: 7.5 X10(3) UL (ref 4–13)

## 2017-09-06 PROCEDURE — 97530 THERAPEUTIC ACTIVITIES: CPT

## 2017-09-06 PROCEDURE — 93010 ELECTROCARDIOGRAM REPORT: CPT | Performed by: INTERNAL MEDICINE

## 2017-09-06 PROCEDURE — 84478 ASSAY OF TRIGLYCERIDES: CPT | Performed by: FAMILY MEDICINE

## 2017-09-06 PROCEDURE — 83550 IRON BINDING TEST: CPT | Performed by: NURSE PRACTITIONER

## 2017-09-06 PROCEDURE — 82962 GLUCOSE BLOOD TEST: CPT

## 2017-09-06 PROCEDURE — 74020 XR ABDOMEN, OBSTRUCTIVE SERIES (CPT=74020): CPT | Performed by: SURGERY

## 2017-09-06 PROCEDURE — 85025 COMPLETE CBC W/AUTO DIFF WBC: CPT | Performed by: FAMILY MEDICINE

## 2017-09-06 PROCEDURE — 80053 COMPREHEN METABOLIC PANEL: CPT | Performed by: FAMILY MEDICINE

## 2017-09-06 PROCEDURE — 83735 ASSAY OF MAGNESIUM: CPT | Performed by: FAMILY MEDICINE

## 2017-09-06 PROCEDURE — 93005 ELECTROCARDIOGRAM TRACING: CPT

## 2017-09-06 PROCEDURE — 85730 THROMBOPLASTIN TIME PARTIAL: CPT | Performed by: INTERNAL MEDICINE

## 2017-09-06 PROCEDURE — 83540 ASSAY OF IRON: CPT | Performed by: NURSE PRACTITIONER

## 2017-09-06 PROCEDURE — 97110 THERAPEUTIC EXERCISES: CPT

## 2017-09-06 PROCEDURE — 97116 GAIT TRAINING THERAPY: CPT

## 2017-09-06 PROCEDURE — C9113 INJ PANTOPRAZOLE SODIUM, VIA: HCPCS | Performed by: FAMILY MEDICINE

## 2017-09-06 PROCEDURE — 84100 ASSAY OF PHOSPHORUS: CPT | Performed by: FAMILY MEDICINE

## 2017-09-06 NOTE — PLAN OF CARE
750ml output from NG. Small amount of stool from ostomy. Better urinary output. Denies abd pain, just generalized pain and weakness with repositioning and returning back to bed. TPN infused without complication. Lung sounds clear but diminished.

## 2017-09-06 NOTE — CM/SW NOTE
Updates sent to [facility]. Samm. Debbie Newby at [facility]. Venita's states that they can take patient with TPN. They would need the script early in the day to prepare TPN for patient. If script received after 2pm, TPN would need to be sent with patient.  /case amelia

## 2017-09-06 NOTE — DIETARY NOTE
Nutrition Short Note-TPN    TPN infusing without difficulty. TPN to be increased to 75% of needs tonight to provide 645 dextrose calories, 350 lipid calories, 85 grams protein in 1440 ml fluid (60 mL/hr per MD).  Pt may be at refeeding risk, therefore appro

## 2017-09-06 NOTE — PROGRESS NOTES
[facility]  Progress Note    Elmarie Remedies Patient Status:  Inpatient    1945 MRN [id number]   [facility] 7NE-A Attending Lolis Khan MD   [facility] Day # 12 PCP Ronda Adams (Inactive)       SUBJECTIVE:  No CP, SOB, or 1.8 09/06/2017   ALKPHO 61 09/06/2017   BILT 0.2 09/06/2017   TP 5.4 09/06/2017   AST 8 09/06/2017   ALT 13 09/06/2017   PTT 52.5 09/06/2017   MG 2.1 09/06/2017   PHOS 2.4 09/06/2017     Labs stable. Glucoscans up = 180 - 210 range.      Imaging:  KUB - sti PRN   HYDROmorphone HCl PF (DILAUDID) 1 MG/ML injection 0.4 mg 0.4 mg Intravenous Q30 Min PRN   febuxostat (ULORIC) tab 80 mg 80 mg Oral Daily   Levothyroxine Sodium (SYNTHROID, LEVOTHROID) tab 125 mcg 125 mcg Oral Daily with breakfast   glucose (DEX4) ora to MO at discharge ([facility]). Questions/concerns were discussed with patient and/or family by bedside.     Total Time spent with patient and coordinating care:  40 minutes      Luis Alfredo Toscano MD  9/6/2017  9:32 AM

## 2017-09-06 NOTE — PROGRESS NOTES
[facility] 65 22 NOTE-DR MARTINEZ      Subjective:  NO COMPLAINTS    WANT TO DRINK    Objective/Physical Exam:  General: Alert, orientated x3. Cooperative. No apparent distress.   Vital Signs:  Blood pressure 120/53, pulse 104, temperature 98 disease, without long-term current use of insulin (HCC)     Hypertension     Dehydration     Anemia     Azotemia     Hyperglycemia     Abdominal pain, left lower quadrant     Abnormal abdominal CT scan     Bowel obstruction (HCC)     High risk medications

## 2017-09-06 NOTE — PROGRESS NOTES
[facility]  Cardiology Progress Note    Mukesh Reyes Patient Status:  Inpatient    1945 MRN [id number]   [facility] 7NE-A Attending Lolis Khan MD   [facility] Day # 12 PCP Ronda Adams (Inactive)     Subjective:  Wants Saline Flush  10 mL Intravenous Q12H   • multivitamin with iron  1 tablet Oral Daily   • febuxostat  80 mg Oral Daily   • Levothyroxine Sodium  125 mcg Oral Daily with breakfast     • dextrose Stopped (09/05/17 2124)   • adult 3 in 1 TPN 60 mL/hr at 09/05/

## 2017-09-06 NOTE — PHYSICAL THERAPY NOTE
PHYSICAL THERAPY TREATMENT NOTE - INPATIENT    Room Number: 4073/7941-U     Session: 2   Number of Visits to Meet Established Goals: 5    Presenting Problem: sigmoid resection/colostomy/hernia repair    Problem List  Principal Problem:     Bowel obstructio OTHER SURGICAL HISTORY      Comment: cystoscopy Dr. Paolo Santiago  06/27/2017: OTHER SURGICAL HISTORY      Comment: Cysto w/ Dr. Paolo Santiago  07/26/2017: OTHER SURGICAL HISTORY      Comment: cysto Dr. Paolo Santiago  No date: REMOVAL GALLBLADDER  No date: SHOULDER SURG [facility] Assistance: Dependent assistance (per FIM distance - Min A)  Distance (ft): 25  Assistive Device: Rolling walker  Pattern: Shuffle  Stoop/Curb Assistance: Not tested  Comment : above per FIM    Skilled Therapy Provided:     Pt presented in supine upon PT/O baseline. Recommend MO upon [facility] d/c.       DISCHARGE RECOMMENDATIONS  PT Discharge Recommendations: Sub-acute rehabilitation (ELOS 10-12 days)     PLAN  PT Treatment Plan: Bed mobility; Body mechanics; Endurance; Energy conservation;Patient educa

## 2017-09-07 ENCOUNTER — APPOINTMENT (OUTPATIENT)
Dept: GENERAL RADIOLOGY | Facility: HOSPITAL | Age: 72
DRG: 330 | End: 2017-09-07
Attending: SURGERY
Payer: MEDICARE

## 2017-09-07 LAB
ALBUMIN SERPL-MCNC: 2.1 G/DL (ref 3.5–4.8)
ALP LIVER SERPL-CCNC: 73 U/L (ref 55–142)
ALT SERPL-CCNC: 15 U/L (ref 14–54)
APTT PPP: 54.9 SECONDS (ref 25–34)
AST SERPL-CCNC: 8 U/L (ref 15–41)
BASOPHILS # BLD AUTO: 0.03 X10(3) UL (ref 0–0.1)
BASOPHILS NFR BLD AUTO: 0.4 %
BILIRUB DIRECT SERPL-MCNC: <0.1 MG/DL (ref 0.1–0.5)
BILIRUB SERPL-MCNC: 0.2 MG/DL (ref 0.1–2)
BUN BLD-MCNC: 40 MG/DL (ref 8–20)
CALCIUM BLD-MCNC: 9 MG/DL (ref 8.3–10.3)
CHLORIDE: 106 MMOL/L (ref 101–111)
CO2: 28 MMOL/L (ref 22–32)
CREAT BLD-MCNC: 1.49 MG/DL (ref 0.55–1.02)
EOSINOPHIL # BLD AUTO: 0.1 X10(3) UL (ref 0–0.3)
EOSINOPHIL NFR BLD AUTO: 1.5 %
ERYTHROCYTE [DISTWIDTH] IN BLOOD BY AUTOMATED COUNT: 14 % (ref 11.5–16)
GLUCOSE BLD-MCNC: 174 MG/DL (ref 65–99)
GLUCOSE BLD-MCNC: 231 MG/DL (ref 65–99)
GLUCOSE BLD-MCNC: 241 MG/DL (ref 65–99)
GLUCOSE BLD-MCNC: 247 MG/DL (ref 65–99)
GLUCOSE BLD-MCNC: 253 MG/DL (ref 70–99)
HAV IGM SER QL: 2.1 MG/DL (ref 1.7–3)
HCT VFR BLD AUTO: 29.2 % (ref 34–50)
HGB BLD-MCNC: 9.4 G/DL (ref 12–16)
IMMATURE GRANULOCYTE COUNT: 0.19 X10(3) UL (ref 0–1)
IMMATURE GRANULOCYTE RATIO %: 2.8 %
LYMPHOCYTES # BLD AUTO: 0.53 X10(3) UL (ref 0.9–4)
LYMPHOCYTES NFR BLD AUTO: 7.9 %
M PROTEIN MFR SERPL ELPH: 6.1 G/DL (ref 6.1–8.3)
MCH RBC QN AUTO: 31 PG (ref 27–33.2)
MCHC RBC AUTO-ENTMCNC: 32.2 G/DL (ref 31–37)
MCV RBC AUTO: 96.4 FL (ref 81–100)
MONOCYTES # BLD AUTO: 0.68 X10(3) UL (ref 0.1–0.6)
MONOCYTES NFR BLD AUTO: 10.1 %
NEUTROPHIL ABS PRELIM: 5.21 X10 (3) UL (ref 1.3–6.7)
NEUTROPHILS # BLD AUTO: 5.21 X10(3) UL (ref 1.3–6.7)
NEUTROPHILS NFR BLD AUTO: 77.3 %
PHOSPHATE SERPL-MCNC: 1.7 MG/DL (ref 2.5–4.9)
PLATELET # BLD AUTO: 224 10(3)UL (ref 150–450)
POTASSIUM SERPL-SCNC: 3.9 MMOL/L (ref 3.6–5.1)
RBC # BLD AUTO: 3.03 X10(6)UL (ref 3.8–5.1)
RED CELL DISTRIBUTION WIDTH-SD: 48.8 FL (ref 35.1–46.3)
SODIUM SERPL-SCNC: 142 MMOL/L (ref 136–144)
WBC # BLD AUTO: 6.7 X10(3) UL (ref 4–13)

## 2017-09-07 PROCEDURE — 83735 ASSAY OF MAGNESIUM: CPT | Performed by: FAMILY MEDICINE

## 2017-09-07 PROCEDURE — 82962 GLUCOSE BLOOD TEST: CPT

## 2017-09-07 PROCEDURE — 85730 THROMBOPLASTIN TIME PARTIAL: CPT | Performed by: FAMILY MEDICINE

## 2017-09-07 PROCEDURE — 97530 THERAPEUTIC ACTIVITIES: CPT

## 2017-09-07 PROCEDURE — 82248 BILIRUBIN DIRECT: CPT | Performed by: FAMILY MEDICINE

## 2017-09-07 PROCEDURE — 84100 ASSAY OF PHOSPHORUS: CPT | Performed by: FAMILY MEDICINE

## 2017-09-07 PROCEDURE — 97110 THERAPEUTIC EXERCISES: CPT

## 2017-09-07 PROCEDURE — 97116 GAIT TRAINING THERAPY: CPT

## 2017-09-07 PROCEDURE — 74000 XR ABDOMEN (KUB) (1 AP VIEW)  (CPT=74000): CPT | Performed by: SURGERY

## 2017-09-07 PROCEDURE — 85025 COMPLETE CBC W/AUTO DIFF WBC: CPT | Performed by: FAMILY MEDICINE

## 2017-09-07 PROCEDURE — 74020 XR ABDOMEN, OBSTRUCTIVE SERIES (CPT=74020): CPT | Performed by: SURGERY

## 2017-09-07 PROCEDURE — C9113 INJ PANTOPRAZOLE SODIUM, VIA: HCPCS | Performed by: FAMILY MEDICINE

## 2017-09-07 PROCEDURE — 80053 COMPREHEN METABOLIC PANEL: CPT | Performed by: FAMILY MEDICINE

## 2017-09-07 RX ORDER — METOPROLOL TARTRATE 5 MG/5ML
5 INJECTION INTRAVENOUS EVERY 4 HOURS
Status: DISCONTINUED | OUTPATIENT
Start: 2017-09-07 | End: 2017-09-10

## 2017-09-07 RX ORDER — METOPROLOL TARTRATE 5 MG/5ML
7.5 INJECTION INTRAVENOUS EVERY 4 HOURS
Status: DISCONTINUED | OUTPATIENT
Start: 2017-09-07 | End: 2017-09-07

## 2017-09-07 NOTE — PHYSICAL THERAPY NOTE
PHYSICAL THERAPY TREATMENT NOTE - INPATIENT    Room Number: 1350/1164-S     Session: 3   Number of Visits to Meet Established Goals: 5    Presenting Problem: sigmoid resection/colostomy/hernia repair    Problem List  Principal Problem:     Bowel obstructio OTHER SURGICAL HISTORY      Comment: cystoscopy Dr. Lindy Arzate  06/27/2017: OTHER SURGICAL HISTORY      Comment: Cysto w/ Dr. Lindy Arzate  07/26/2017: OTHER SURGICAL HISTORY      Comment: cysto Dr. Lindy Arzate  No date: REMOVAL GALLBLADDER  No date: SHOULDER SURG [facility] distance - otherwise Min A)  Distance (ft): 50  Assistive Device: Rolling walker  Pattern: Shuffle  Stoop/Curb Assistance: Not tested  Comment : above per FIM    Skilled Therapy Provided:     Pt presented up to [facility] recliner upon PT/OT arrival - Pt required Treatment Plan: Bed mobility; Body mechanics; Endurance; Energy conservation;Patient education;Gait training;Stair training;Transfer training;Balance training;Strengthening;Range of motion  Rehab Potential : Fair  Frequency (Obs): 5x/week    CURRENT GOALS

## 2017-09-07 NOTE — OCCUPATIONAL THERAPY NOTE
OCCUPATIONAL THERAPY TREATMENT NOTE - INPATIENT     Room Number: 4139/9199-I  Session: 3   Number of Visits to Meet Established Goals: 7    Presenting Problem:  Bowel obstruction,  8/26 sigmoid resection, colostomy, A-fib    History related to current admis DMG DX 12 TX 12    AHI 39/ RDI 48/ REM AHI 71/ SaO2 75%/ CPAP 12 [employer]   • Sleep apnea    • Small bowel obstruction ([address] 2017       Past Surgical History  Past Surgical History:  No date:   12 Green EDW: CHOLECYSTECTOMY   16  Approx Degree of Impairment: 53.32%  Standardized Score (AM-PAC Scale): 35.96  CMS Modifier (G-Code): CK    FUNCTIONAL TRANSFER ASSESSMENT  Supine to Sit : Not tested  Sit to Stand: Dependent assistance (from the chair, 3-person assistance)    Skilled training;UE strengthening/ROM; Patient/Family education;Patient/Family training;Equipment eval/education; Compensatory technique education  Rehab Potential : Good  Frequency (Obs): 5x/week      OT Goals:   ADL Goals  Patient will perform upper body dressing:

## 2017-09-07 NOTE — PLAN OF CARE
Problem: PAIN - ADULT  Goal: Verbalizes/displays adequate comfort level or patient's stated pain goal  INTERVENTIONS:  - Encourage pt to monitor pain and request assistance  - Assess pain using appropriate pain scale  - Administer analgesics based on type within target range  - Assess barriers to adequate nutritional intake and initiate nutrition consult as needed  - Instruct patient on self management of diabetes    Outcome: Progressing  accuchecks q6hrs with SS.     Problem: GENITOURINARY - ADULT  Goal: Ab Progressing      Problem: CARDIOVASCULAR - ADULT  Goal: Maintains optimal cardiac output and hemodynamic stability  INTERVENTIONS:  - Monitor vital signs, rhythm, and trends  - Monitor for bleeding, hypotension and signs of decreased cardiac output  - Eval glucose within target range  - Assess barriers to adequate nutritional intake and initiate nutrition consult as needed  - Instruct patient on self management of diabetes    Outcome: Progressing  accuchecks q6hrs with SS.     Problem: SKIN/TISSUE INTEGRITY - limitations with patient/family   Outcome: Progressing  Weakness, PT/OT. Comments: A/ox3-4. 02-2L nc. . GAYLE. IS-750. Tele afib. Heparin gtt at 12cc/hr. Next PTT in am. Lopressor IV. lorena clear liquids, poor appetite. accuchecks q6hrs. TPN.  NG dc'd per

## 2017-09-07 NOTE — PROGRESS NOTES
Hungry  No N/V  Some stool in bag  AVSS  WBC 7  ABD soft < dist ostomy dusky  Will check KUB D/C ng if better

## 2017-09-07 NOTE — PLAN OF CARE
Assumed care of patient at 0730, a+ox4, follows all commands. Tinsley in place with clear yellow urine. Midline incision is johnny, well approximated with cristina drain in place with serous drainage.  ngt in place with brown/green output, denies nausea or pain at [facility]

## 2017-09-07 NOTE — PROGRESS NOTES
[facility]  Cardiology Progress Note    Lizbet Thompson Patient Status:  Inpatient    1945 MRN [id number]   [facility] 7NE-A Attending Madison Alva MD   [facility] Day # 13 PCP Asha Olson (Inactive)     Subjective:  No vaibhav • metoprolol Tartrate  5 mg Intravenous Q4H   • Insulin Aspart Pen  4-20 Units Subcutaneous 4 times per day   • pantoprazole (PROTONIX) IV push  40 mg Intravenous Daily   • Normal Saline Flush  10 mL Intravenous Q12H   • multivitamin with iron  1 tablet

## 2017-09-07 NOTE — PROGRESS NOTES
[facility]  Progress Note    Hieu Lizafrancis Patient Status:  Inpatient    1945 MRN [id number]   [facility] 7NE-A Attending Tera Reardon MD   [address] Day # 13 PCP Della Pringle (Inactive)       SUBJECTIVE:  Patient off alexx injection 10 mL 10 mL Intravenous Q12H   heparin (PORCINE) drip [id number]/250mL infusion CONTINUOUS 200-3,000 Units/hr Intravenous Continuous   DilTIAZem HCl (CARDIZEM) injection 10 mg 10 mg Intravenous Q1H PRN   DilTIAZem HCl (CARDIZEM) tab 60 mg 60 mg O Azotemia    Hyperglycemia    Acidosis    Protein-calorie malnutrition, mild (HCC)    Disorientation    Patient/Labs N/A this am.  However, anticipate TPN advancement to 100% on needs next 24 hours. Venofer ordered by Cardiology.  Glucoscans running 180 - 22

## 2017-09-07 NOTE — PLAN OF CARE
Assumed care at [address]. Alert and oriented x3-4 drowsy, but easy to arouse. Telemetry monitor reading A-flutter. Rt. UA PICC with TPN infusing assessed and maintained. Lt. UA midline with Heparin infusing assessed and maintained.  Patient requesting to stay i Verbalizes/displays adequate comfort level or patient's stated pain goal Progressing        RESPIRATORY - ADULT    • Achieves optimal ventilation and oxygenation Progressing        RISK FOR INFECTION - ADULT    • Absence of fever/infection during anticipat

## 2017-09-08 LAB
ALBUMIN SERPL-MCNC: 2 G/DL (ref 3.5–4.8)
ALP LIVER SERPL-CCNC: 70 U/L (ref 55–142)
ALT SERPL-CCNC: 14 U/L (ref 14–54)
APTT PPP: 64.5 SECONDS (ref 25–34)
AST SERPL-CCNC: 10 U/L (ref 15–41)
BAND %: 8 %
BASOPHIL % MANUAL: 0 %
BASOPHIL ABSOLUTE MANUAL: 0 X10(3) UL (ref 0–0.1)
BILIRUB SERPL-MCNC: 0.2 MG/DL (ref 0.1–2)
BILIRUB UR QL STRIP.AUTO: NEGATIVE
BUN BLD-MCNC: 41 MG/DL (ref 8–20)
CALCIUM BLD-MCNC: 8.8 MG/DL (ref 8.3–10.3)
CHLORIDE: 108 MMOL/L (ref 101–111)
CLARITY UR REFRACT.AUTO: CLEAR
CO2: 26 MMOL/L (ref 22–32)
CREAT BLD-MCNC: 1.32 MG/DL (ref 0.55–1.02)
EOSINOPHIL % MANUAL: 1 %
EOSINOPHIL ABSOLUTE MANUAL: 0.07 X10(3) UL (ref 0–0.3)
ERYTHROCYTE [DISTWIDTH] IN BLOOD BY AUTOMATED COUNT: 13.9 % (ref 11.5–16)
GLUCOSE BLD-MCNC: 153 MG/DL (ref 65–99)
GLUCOSE BLD-MCNC: 194 MG/DL (ref 65–99)
GLUCOSE BLD-MCNC: 205 MG/DL (ref 70–99)
GLUCOSE BLD-MCNC: 208 MG/DL (ref 65–99)
GLUCOSE BLD-MCNC: 215 MG/DL (ref 65–99)
GLUCOSE BLD-MCNC: 223 MG/DL (ref 65–99)
GLUCOSE UR STRIP.AUTO-MCNC: NEGATIVE MG/DL
HAV IGM SER QL: 2.1 MG/DL (ref 1.7–3)
HCT VFR BLD AUTO: 27.5 % (ref 34–50)
HGB BLD-MCNC: 8.7 G/DL (ref 12–16)
KETONES UR STRIP.AUTO-MCNC: NEGATIVE MG/DL
LEUKOCYTE ESTERASE UR QL STRIP.AUTO: NEGATIVE
LYMPHOCYTE % MANUAL: 14 %
LYMPHOCYTE ABSOLUTE MANUAL: 0.95 X10(3) UL (ref 0.9–4)
M PROTEIN MFR SERPL ELPH: 5.7 G/DL (ref 6.1–8.3)
MCH RBC QN AUTO: 30.7 PG (ref 27–33.2)
MCHC RBC AUTO-ENTMCNC: 31.6 G/DL (ref 31–37)
MCV RBC AUTO: 97.2 FL (ref 81–100)
METAMYELOCYTE %: 1 %
METAMYELOCYTE ABSOLUTE MANUAL: 0.07 X10(3) UL (ref ?–0.01)
MONOCYTE % MANUAL: 4 %
MONOCYTE ABSOLUTE MANUAL: 0.27 X10(3) UL (ref 0.1–0.6)
MORPHOLOGY: NORMAL
MYELOCYTE %: 2 %
MYELOCYTE ABSOLUTE MANUAL: 0.14 X10(3) UL (ref ?–0.01)
NEUTROPHIL ABS PRELIM: 4.48 X10 (3) UL (ref 1.3–6.7)
NEUTROPHIL ABSOLUTE MANUAL: 5.3 X10(3) UL (ref 1.3–6.7)
NEUTROPHILS % MANUAL: 70 %
NITRITE UR QL STRIP.AUTO: NEGATIVE
PH UR STRIP.AUTO: 5 [PH] (ref 4.5–8)
PHOSPHATE SERPL-MCNC: 2.7 MG/DL (ref 2.5–4.9)
PLATELET # BLD AUTO: 234 10(3)UL (ref 150–450)
PLATELET MORPHOLOGY: NORMAL
POTASSIUM SERPL-SCNC: 3.6 MMOL/L (ref 3.6–5.1)
PROT UR STRIP.AUTO-MCNC: NEGATIVE MG/DL
RBC # BLD AUTO: 2.83 X10(6)UL (ref 3.8–5.1)
RBC UR QL AUTO: NEGATIVE
RED CELL DISTRIBUTION WIDTH-SD: 49.1 FL (ref 35.1–46.3)
SODIUM SERPL-SCNC: 143 MMOL/L (ref 136–144)
SP GR UR STRIP.AUTO: 1.01 (ref 1–1.03)
TOTAL CELLS COUNTED: 100
UROBILINOGEN UR STRIP.AUTO-MCNC: <2 MG/DL
WBC # BLD AUTO: 6.8 X10(3) UL (ref 4–13)

## 2017-09-08 PROCEDURE — 83735 ASSAY OF MAGNESIUM: CPT | Performed by: FAMILY MEDICINE

## 2017-09-08 PROCEDURE — 80053 COMPREHEN METABOLIC PANEL: CPT | Performed by: FAMILY MEDICINE

## 2017-09-08 PROCEDURE — 85027 COMPLETE CBC AUTOMATED: CPT | Performed by: FAMILY MEDICINE

## 2017-09-08 PROCEDURE — 97535 SELF CARE MNGMENT TRAINING: CPT

## 2017-09-08 PROCEDURE — 97530 THERAPEUTIC ACTIVITIES: CPT

## 2017-09-08 PROCEDURE — 99213 OFFICE O/P EST LOW 20 MIN: CPT

## 2017-09-08 PROCEDURE — 81003 URINALYSIS AUTO W/O SCOPE: CPT | Performed by: FAMILY MEDICINE

## 2017-09-08 PROCEDURE — 85025 COMPLETE CBC W/AUTO DIFF WBC: CPT | Performed by: FAMILY MEDICINE

## 2017-09-08 PROCEDURE — 85730 THROMBOPLASTIN TIME PARTIAL: CPT | Performed by: INTERNAL MEDICINE

## 2017-09-08 PROCEDURE — 85007 BL SMEAR W/DIFF WBC COUNT: CPT | Performed by: FAMILY MEDICINE

## 2017-09-08 PROCEDURE — 82962 GLUCOSE BLOOD TEST: CPT

## 2017-09-08 PROCEDURE — 97116 GAIT TRAINING THERAPY: CPT

## 2017-09-08 PROCEDURE — 84100 ASSAY OF PHOSPHORUS: CPT | Performed by: FAMILY MEDICINE

## 2017-09-08 PROCEDURE — C9113 INJ PANTOPRAZOLE SODIUM, VIA: HCPCS | Performed by: FAMILY MEDICINE

## 2017-09-08 RX ORDER — FUROSEMIDE 10 MG/ML
40 INJECTION INTRAMUSCULAR; INTRAVENOUS ONCE
Status: COMPLETED | OUTPATIENT
Start: 2017-09-08 | End: 2017-09-08

## 2017-09-08 NOTE — CONSULTS
[facility]  Report of Inpatient Ostomy Consultation    Mark Cruz Patient Status:  Inpatient    1945 MRN [id number]   [facility] 7NE-A Attending Ge Lan MD     History of Present Illness:  Mark Cruz is a reports that she drinks alcohol. She reports that she does not use drugs.       Labs:      Lab Results  Component Value Date   WBC 6.8 09/08/2017   HGB 8.7 09/08/2017   HCT 27.5 09/08/2017   .0 09/08/2017   CREATSERUM 1.32 09/08/2017   BUN 41 09/08/2

## 2017-09-08 NOTE — OCCUPATIONAL THERAPY NOTE
OCCUPATIONAL THERAPY TREATMENT NOTE - INPATIENT     Room Number: 5482/9831-S  Session: 4   Number of Visits to Meet Established Goals: 7    Presenting Problem:  Bowel obstruction,  8/26 sigmoid resection, colostomy, A-fib    History related to current admis DMG DX 12 TX 12    AHI 39/ RDI 48/ REM AHI 71/ SaO2 75%/ CPAP 12 [employer]   • Sleep apnea    • Small bowel obstruction ([address] 2017       Past Surgical History  Past Surgical History:  No date:   12 [address] EDW: CHOLECYSTECTOMY   (G-Code): CK    FUNCTIONAL TRANSFER ASSESSMENT  Supine to Sit : Not tested  Sit to Stand: Dependent assistance    Skilled Therapy Provided: Pt was seated in the recliner with soaked, soiled gown and linens.   Total A to remove socks, complete light hygiene training;Equipment eval/education; Compensatory technique education  Rehab Potential : Good  Frequency (Obs): 5x/week      OT Goals:   ADL Goals  Patient will perform upper body dressing:  with supervision-ongoing  Patient will perform lower body dressing:

## 2017-09-08 NOTE — PLAN OF CARE
Assumed care @ 0700. AOx4. Right GRANT drain with scant s/s drainage. Colostomy draining liquid brown stool. Midline with staples c/d/i and TRI. Patient up and walked with PT/OT today. Tolerating FLD. PTT 64.5; next PTT in am.  Aflutter on tele.   Vitas Progressing    • Incision(s), wounds(s) or drain site(s) healing without S/S of infection Progressing

## 2017-09-08 NOTE — PLAN OF CARE
Assumed care at 2130. Alert and oriented x3 drowsy. Telemetry monitor reading A-fib/A-flutter. Heparin and TPN infusing assessed and maintained. Assessment remains unchanged from beginning of shift. Fall precautions maintained per protocol.  Call light in r drain site(s) healing without S/S of infection Progressing

## 2017-09-08 NOTE — PROGRESS NOTES
[facility]  Progress Note    Sridhar Mc Patient Status:  Inpatient    1945 MRN [id number]   [facility] 7NE-A Attending Jenny Perez MD   [address] Day # 14 PCP Paulina Parada (Inactive)       SUBJECTIVE: No complaints of 09/07/2017   PTT 54.9 09/07/2017   MG 2.1 09/07/2017   PHOS 1.7 09/07/2017     CMP pending this am.     Imaging:  None new    Meds:     Current Facility-Administered Medications:  furosemide (LASIX) injection 40 mg 40 mg Intravenous Once   insulin detemir HCl (BENADRYL) injection 12.5 mg 12.5 mg Intravenous Q4H PRN   HYDROmorphone HCl PF (DILAUDID) 1 MG/ML injection 0.4 mg 0.4 mg Intravenous Q30 Min PRN   febuxostat (ULORIC) tab 80 mg 80 mg Oral Daily   Levothyroxine Sodium (SYNTHROID, LEVOTHROID) tab 125 m patient to be up to chair and to do IBEs every several hours. Standing lab orders in place. Diet advancement per Surgery Service. Check pCXR in am. Will check a UA (UCS if indicated). Increase Levemir to 20 units daily; continue High Dose Insulin regiment.

## 2017-09-08 NOTE — PHYSICAL THERAPY NOTE
PHYSICAL THERAPY TREATMENT NOTE - INPATIENT    Room Number: 6288/9493-Q     Session: 4   Number of Visits to Meet Established Goals: 5    Presenting Problem: sigmoid resection/colostomy/hernia repair    Problem List  Principal Problem:     Bowel obstructio OTHER SURGICAL HISTORY      Comment: cystoscopy Dr. Veronica Patel  06/27/2017: OTHER SURGICAL HISTORY      Comment: Cysto w/ Dr. Veronica Patel  07/26/2017: OTHER SURGICAL HISTORY      Comment: cysto Dr. Veronica Patel  No date: REMOVAL GALLBLADDER  No date: SHOULDER SURG [address] [facility] FIM distance - otherwise Min A)  Distance (ft): 100  Assistive Device: Rolling walker (chair follow, line management, SBA for safety = total 4 )  Pattern: Shuffle;R Foot drag;L Foot drag (kyphotic posture)  Stoop/Curb Assistance: Not tested  Comment : Rosanna Hardy Research supports that patients with this level of impairment often benefit from MO. At this time, Pt. presents with decreased balance, impaired strength, difficulty with gait/transfers resulting in downgrade of overall functional mobility.   Due to abo

## 2017-09-08 NOTE — PROGRESS NOTES
[facility] Cardiology Progress Note    Subjective:  Breathing is ok. Told me she was eating, yet just taking a few sips of clear liquids per .       Objective:  BP (!) 166/64 (BP Location: Left arm)   Pulse 77   Temp 98.7 °F (37.1 °C) (Oral)   Resp 16

## 2017-09-08 NOTE — DIETARY NOTE
1230 [facility] ASSESSMENT    Pt is at moderate nutrition risk. Pt does not meet malnutrition criteria.     NUTRITION DIAGNOSIS/PROBLEM:    Inadequate oral intake related to inability to consume sufficient PO as evidenced by need for ensure clear. Diet being advanced per surgery. Per MD note, still with clinical signs of ileus, though passage of stool yesterday/today a good sign, nutrition still a significant issue.  If pt unable to tolerate po and diet is unable to be advanced, would r

## 2017-09-09 ENCOUNTER — APPOINTMENT (OUTPATIENT)
Dept: GENERAL RADIOLOGY | Facility: HOSPITAL | Age: 72
DRG: 330 | End: 2017-09-09
Attending: FAMILY MEDICINE
Payer: MEDICARE

## 2017-09-09 LAB
ALBUMIN SERPL-MCNC: 1.9 G/DL (ref 3.5–4.8)
ALP LIVER SERPL-CCNC: 71 U/L (ref 55–142)
ALT SERPL-CCNC: 14 U/L (ref 14–54)
APTT PPP: 72 SECONDS (ref 25–34)
AST SERPL-CCNC: 9 U/L (ref 15–41)
BAND %: 15 %
BASOPHIL % MANUAL: 0 %
BASOPHIL ABSOLUTE MANUAL: 0 X10(3) UL (ref 0–0.1)
BILIRUB SERPL-MCNC: 0.2 MG/DL (ref 0.1–2)
BUN BLD-MCNC: 47 MG/DL (ref 8–20)
CALCIUM BLD-MCNC: 8.6 MG/DL (ref 8.3–10.3)
CHLORIDE: 108 MMOL/L (ref 101–111)
CO2: 27 MMOL/L (ref 22–32)
CREAT BLD-MCNC: 1.36 MG/DL (ref 0.55–1.02)
EOSINOPHIL % MANUAL: 2 %
EOSINOPHIL ABSOLUTE MANUAL: 0.14 X10(3) UL (ref 0–0.3)
ERYTHROCYTE [DISTWIDTH] IN BLOOD BY AUTOMATED COUNT: 14.5 % (ref 11.5–16)
FOLATE (FOLIC ACID), SERUM: 9.5 NG/ML (ref 8.7–24)
GLUCOSE BLD-MCNC: 195 MG/DL (ref 65–99)
GLUCOSE BLD-MCNC: 201 MG/DL (ref 65–99)
GLUCOSE BLD-MCNC: 236 MG/DL (ref 65–99)
GLUCOSE BLD-MCNC: 240 MG/DL (ref 70–99)
GLUCOSE BLD-MCNC: 251 MG/DL (ref 65–99)
HAV AB SERPL IA-ACNC: 1946 PG/ML (ref 193–986)
HAV IGM SER QL: 1.9 MG/DL (ref 1.7–3)
HCT VFR BLD AUTO: 26.4 % (ref 34–50)
HGB BLD-MCNC: 8.4 G/DL (ref 12–16)
LYMPHOCYTE % MANUAL: 18 %
LYMPHOCYTE ABSOLUTE MANUAL: 1.28 X10(3) UL (ref 0.9–4)
M PROTEIN MFR SERPL ELPH: 5.6 G/DL (ref 6.1–8.3)
MCH RBC QN AUTO: 31.3 PG (ref 27–33.2)
MCHC RBC AUTO-ENTMCNC: 31.8 G/DL (ref 31–37)
MCV RBC AUTO: 98.5 FL (ref 81–100)
METAMYELOCYTE %: 1 %
METAMYELOCYTE ABSOLUTE MANUAL: 0.07 X10(3) UL (ref ?–0.01)
MONOCYTE % MANUAL: 5 %
MONOCYTE ABSOLUTE MANUAL: 0.36 X10(3) UL (ref 0.1–0.6)
MORPHOLOGY: NORMAL
MYELOCYTE %: 1 %
MYELOCYTE ABSOLUTE MANUAL: 0.07 X10(3) UL (ref ?–0.01)
NEUTROPHIL ABS PRELIM: 4.45 X10 (3) UL (ref 1.3–6.7)
NEUTROPHIL ABSOLUTE MANUAL: 5.18 X10(3) UL (ref 1.3–6.7)
NEUTROPHILS % MANUAL: 58 %
NRBC CALCULATED: 1
PHOSPHATE SERPL-MCNC: 3.9 MG/DL (ref 2.5–4.9)
PLATELET # BLD AUTO: 249 10(3)UL (ref 150–450)
PLATELET MORPHOLOGY: NORMAL
POTASSIUM SERPL-SCNC: 3.4 MMOL/L (ref 3.6–5.1)
RBC # BLD AUTO: 2.68 X10(6)UL (ref 3.8–5.1)
RED CELL DISTRIBUTION WIDTH-SD: 51.8 FL (ref 35.1–46.3)
RETIC ABS CALC AUTO: 96.3 X10(3) UL (ref 22.5–147.5)
RETIC IRF CALC: 0.33 RATIO (ref 0.09–0.3)
RETIC%: 3.5 % (ref 0.5–2.5)
RETICULOCYTE HEMOGLOBIN EQUIVALENT: 36.5 PG (ref 28.2–36.3)
SODIUM SERPL-SCNC: 143 MMOL/L (ref 136–144)
TOTAL CELLS COUNTED: 100
WBC # BLD AUTO: 7.1 X10(3) UL (ref 4–13)

## 2017-09-09 PROCEDURE — 82962 GLUCOSE BLOOD TEST: CPT

## 2017-09-09 PROCEDURE — 82746 ASSAY OF FOLIC ACID SERUM: CPT | Performed by: FAMILY MEDICINE

## 2017-09-09 PROCEDURE — C9113 INJ PANTOPRAZOLE SODIUM, VIA: HCPCS | Performed by: FAMILY MEDICINE

## 2017-09-09 PROCEDURE — 85045 AUTOMATED RETICULOCYTE COUNT: CPT | Performed by: FAMILY MEDICINE

## 2017-09-09 PROCEDURE — 83735 ASSAY OF MAGNESIUM: CPT | Performed by: FAMILY MEDICINE

## 2017-09-09 PROCEDURE — 85025 COMPLETE CBC W/AUTO DIFF WBC: CPT | Performed by: FAMILY MEDICINE

## 2017-09-09 PROCEDURE — 85027 COMPLETE CBC AUTOMATED: CPT | Performed by: FAMILY MEDICINE

## 2017-09-09 PROCEDURE — 85007 BL SMEAR W/DIFF WBC COUNT: CPT | Performed by: FAMILY MEDICINE

## 2017-09-09 PROCEDURE — 80053 COMPREHEN METABOLIC PANEL: CPT | Performed by: FAMILY MEDICINE

## 2017-09-09 PROCEDURE — 71010 XR CHEST AP PORTABLE  (CPT=71010): CPT | Performed by: FAMILY MEDICINE

## 2017-09-09 PROCEDURE — 82607 VITAMIN B-12: CPT | Performed by: FAMILY MEDICINE

## 2017-09-09 PROCEDURE — 84100 ASSAY OF PHOSPHORUS: CPT | Performed by: FAMILY MEDICINE

## 2017-09-09 PROCEDURE — 85730 THROMBOPLASTIN TIME PARTIAL: CPT | Performed by: FAMILY MEDICINE

## 2017-09-09 PROCEDURE — 36592 COLLECT BLOOD FROM PICC: CPT

## 2017-09-09 NOTE — CM/SW NOTE
Completed prior auth with [facility] for Eliquis. ([phone])  Med approved however pt cost even with Omar Cantu is $156.78 / month. Verified with pt's preferred pharmacy [facility] ([phone]) that prior auth went through with pt cost as noted.   C

## 2017-09-09 NOTE — DIETARY NOTE
Nutrition Short Note-TPN    TPN infusing at goal without difficulty. TPN tonight to provide 790 dextrose calories, 440 lipid calories (25% lipids), 120 grams protein . This will provide 1700 calories, 120 grams protein meeting 100% of pt's needs.  Electrolyt

## 2017-09-09 NOTE — PLAN OF CARE
Hypoactive bowel sounds. Gas noted in ostomy bag, along with stool. Only 5ml serous fluid from R GRANT drain. Aflutter 70s-90s. Productive cough of clear phlegm. Anxious at 3am.  Pt requested ativan.       CARDIOVASCULAR - ADULT    • Maintains optimal c

## 2017-09-09 NOTE — PROGRESS NOTES
[facility]  Progress Note    Shea Moctezuma Patient Status:  Inpatient    1945 MRN [id number]   [facility] 7NE-A Attending Tara Boyle MD   [facility] Day # 15 PCP Estelle Huddleston (Inactive)       SUBJECTIVE:  No CP, SOB, or but still generally around 200. Imaging:  pCXR looks stable to slightly improved (report noted).      Meds:     Current Facility-Administered Medications:  potassium chloride 40 mEq in sodium chloride 0.9 % 250 mL IVPB 40 mEq Intravenous Once   insulin PRN   HYDROmorphone HCl PF (DILAUDID) 1 MG/ML injection 0.4 mg 0.4 mg Intravenous Q30 Min PRN   febuxostat (ULORIC) tab 80 mg 80 mg Oral Daily   Levothyroxine Sodium (SYNTHROID, LEVOTHROID) tab 125 mcg 125 mcg Oral Daily with breakfast   glucose (DEX4) ora well, I think a plan to transfer to [facility] by mid week would be reasonable. CV Status looks stable this am - plan for IV Lasix every 2 to 3 days. Prophylaxis:   DVT with Heparin  GI with Pantoprazole  Atrophy Prophylaxis ongoing/stable. Dispo:  [place]

## 2017-09-09 NOTE — PLAN OF CARE
Assumed care @0700. Pt AO but forgetful. L and R arm precautions. Heparin gtt infusing per order. Colostomy with soft, brown output. R GRANT drain with minimal output. Tinsley in place, discussed with MD. Keep for now.    Midline incision: approximated integrity remains intact Progressing    • Incision(s), wounds(s) or drain site(s) healing without S/S of infection Progressing

## 2017-09-09 NOTE — CM/SW NOTE
No response from pt's pharmacy on Eliquis. I called to [facility] and was informed pt would require prior auth. [phone]. I spoke with CAITLIN Arechiga and made her aware of above.  Per Travon Arechiga, pt not discharging today, so I will try to see if I can get a weekend

## 2017-09-09 NOTE — CM/SW NOTE
Received call from  to assist with Eliquis. Faxed Rx to pt's preferred pharmacy [facility] (fax [phone]; phonne [phone]). Await outcome of pt's coverage.   Kimmy Menchaca, 09/09/17, 10:10 AM

## 2017-09-09 NOTE — PROGRESS NOTES
[facility] Cardiology Progress Note    Subjective:  Walked a short distance yesterday, no issues overnight.  Husbands research show eliquis to be 400/month    Objective:  /59 (BP Location: Left arm)   Pulse 98   Temp 98 °F (36.7 °C) (Oral)   Resp 20   Ht

## 2017-09-09 NOTE — PROGRESS NOTES
Patient is alert and oriented x2. Forgetful and refused to get out of bed. Patient needs lot of encouragement. Transferred patient out of bed using sit to stand lift machine plus 3 people assist. Room air O2 dropped to 87%.  Applied 2L/NC O2 to patient ,

## 2017-09-10 LAB
ALBUMIN SERPL-MCNC: 1.9 G/DL (ref 3.5–4.8)
ALP LIVER SERPL-CCNC: 71 U/L (ref 55–142)
ALT SERPL-CCNC: 13 U/L (ref 14–54)
APTT PPP: 59.7 SECONDS (ref 25–34)
AST SERPL-CCNC: 7 U/L (ref 15–41)
BILIRUB SERPL-MCNC: 0.2 MG/DL (ref 0.1–2)
BUN BLD-MCNC: 54 MG/DL (ref 8–20)
CALCIUM BLD-MCNC: 8.7 MG/DL (ref 8.3–10.3)
CHLORIDE: 110 MMOL/L (ref 101–111)
CO2: 27 MMOL/L (ref 22–32)
CREAT BLD-MCNC: 1.39 MG/DL (ref 0.55–1.02)
GLUCOSE BLD-MCNC: 193 MG/DL (ref 65–99)
GLUCOSE BLD-MCNC: 222 MG/DL (ref 65–99)
GLUCOSE BLD-MCNC: 227 MG/DL (ref 70–99)
GLUCOSE BLD-MCNC: 243 MG/DL (ref 65–99)
GLUCOSE BLD-MCNC: 254 MG/DL (ref 65–99)
GLUCOSE BLD-MCNC: 266 MG/DL (ref 65–99)
HAPTOGLOBIN: 346 MG/DL (ref 30–200)
HAV IGM SER QL: 2.1 MG/DL (ref 1.7–3)
M PROTEIN MFR SERPL ELPH: 5.6 G/DL (ref 6.1–8.3)
PHOSPHATE SERPL-MCNC: 4.3 MG/DL (ref 2.5–4.9)
POTASSIUM SERPL-SCNC: 4.4 MMOL/L (ref 3.6–5.1)
SODIUM SERPL-SCNC: 143 MMOL/L (ref 136–144)

## 2017-09-10 PROCEDURE — 36592 COLLECT BLOOD FROM PICC: CPT

## 2017-09-10 PROCEDURE — 80053 COMPREHEN METABOLIC PANEL: CPT | Performed by: FAMILY MEDICINE

## 2017-09-10 PROCEDURE — 83010 ASSAY OF HAPTOGLOBIN QUANT: CPT | Performed by: FAMILY MEDICINE

## 2017-09-10 PROCEDURE — C9113 INJ PANTOPRAZOLE SODIUM, VIA: HCPCS | Performed by: FAMILY MEDICINE

## 2017-09-10 PROCEDURE — 84100 ASSAY OF PHOSPHORUS: CPT | Performed by: FAMILY MEDICINE

## 2017-09-10 PROCEDURE — 82962 GLUCOSE BLOOD TEST: CPT

## 2017-09-10 PROCEDURE — 85730 THROMBOPLASTIN TIME PARTIAL: CPT | Performed by: FAMILY MEDICINE

## 2017-09-10 PROCEDURE — 83735 ASSAY OF MAGNESIUM: CPT | Performed by: FAMILY MEDICINE

## 2017-09-10 RX ORDER — METOPROLOL TARTRATE 5 MG/5ML
5 INJECTION INTRAVENOUS EVERY 6 HOURS PRN
Status: DISCONTINUED | OUTPATIENT
Start: 2017-09-10 | End: 2017-09-14

## 2017-09-10 RX ORDER — PANTOPRAZOLE SODIUM 20 MG/1
20 TABLET, DELAYED RELEASE ORAL
Status: DISCONTINUED | OUTPATIENT
Start: 2017-09-10 | End: 2017-09-14

## 2017-09-10 NOTE — PLAN OF CARE
Assumed care @ 0700  A&Ox3, VSS, afib on tele  Weaned to RA  Midline incision with staples TRI. GRANT with minimal serous output. Colostomy with loose stool output  Tolerating full liquid diet. Denies nausea.  Pt states she doesn't like food on the menu and re Progressing        RESPIRATORY - ADULT    • Achieves optimal ventilation and oxygenation Progressing        RISK FOR INFECTION - ADULT    • Absence of fever/infection during anticipated neutropenic period Progressing        SKIN/TISSUE INTEGRITY - ADULT

## 2017-09-10 NOTE — DIETARY NOTE
Nutrition Short Note-TPN    TPN infusing at goal without difficulty. Diet advanced to full liquid diet.   Pt with moderate intake, TPN to be reduced tonight to meet 75% of needs and decrease fluid intake from 1.8L to 1.5L from TPN and hopefully increase guille

## 2017-09-10 NOTE — PROGRESS NOTES
[facility]  Progress Note    Tristen Blancas Patient Status:  Inpatient    1945 MRN [id number]   [facility] 7NE-A Attending Rodrigo Caballero MD   [facility] Day # 16 PCP Rodrigo Medellin (Inactive)       SUBJECTIVE:  No CP, SOB, or in 1 tpn  Intravenous Continuous TPN   Insulin Aspart Pen (NOVOLOG) 100 UNIT/ML flexpen 4-20 Units 4-20 Units Subcutaneous TID CC and HS   metoprolol Tartrate (LOPRESSOR) 5 MG/5ML injection 5 mg 5 mg Intravenous Q4H   phenol (CHLORASEPTIC) 1.4 % oral liqui tab 4 tablet 4 tablet Oral Q15 Min PRN   Or      dextrose 50% injection 50 mL 50 mL Intravenous Q15 Min PRN   Or      glucose (DEX4) oral liquid 30 g 30 g Oral Q15 Min PRN         Assessment    Principal Problem:     Bowel obstruction (HCC)  Active Problems

## 2017-09-10 NOTE — PROGRESS NOTES
Surgery  98.2  76  18  110/50  96%  IO  Po 220 ost 440    PE gen-awake in nad   abd-soft, min distended, ostomy-pink, viable, active    A/p s/p ex lap, colostomy, enterovesicular fistula repair   Tolerating po   Continue present management

## 2017-09-10 NOTE — PROGRESS NOTES
[facility]  Progress Note    Laxmi Spence Patient Status:  Inpatient    1945 MRN [id number]   [facility] 7NE-A Attending Renay Rogel MD   [facility] Day # 16 PCP Ayesha Caal (Inactive)       Assessment and Plan:  Angelo Wt 282 lb 6.4 oz (128.1 kg)   SpO2 99%   BMI 45.58 kg/m²     Temp (24hrs), Av.4 °F (36.9 °C), Min:98 °F (36.7 °C), Max:98.7 °F (37.1 °C)      Intake/Output:    Intake/Output Summary (Last 24 hours) at 09/10/17 1031  Last data filed at 09/10/17 0841   G spray 1-5 spray Mouth/Throat Q2H PRN   Metoclopramide HCl (REGLAN) injection 5 mg 5 mg Intravenous Q6H PRN   Normal Saline Flush 0.9 % injection 10 mL 10 mL Intravenous PRN   dextrose 10 % infusion  Intravenous Continuous PRN   Pantoprazole Sodium (PROTONI

## 2017-09-10 NOTE — PLAN OF CARE
Patient a&o, drowsy, vss, afib on tele, on 2L per NC, no c/o pain. IV heparin gtt infusing via left ML, TPN infusing via Right PICC. LLQ colostomy with good output, stoma dark (stable per previous RN). Midline incsion with staples, TRI. Tisnley draining.  Lala Lou Incision(s), wounds(s) or drain site(s) healing without S/S of infection Progressing

## 2017-09-11 ENCOUNTER — APPOINTMENT (OUTPATIENT)
Dept: GENERAL RADIOLOGY | Facility: HOSPITAL | Age: 72
DRG: 330 | End: 2017-09-11
Attending: FAMILY MEDICINE
Payer: MEDICARE

## 2017-09-11 LAB
ALBUMIN SERPL-MCNC: 1.9 G/DL (ref 3.5–4.8)
ALP LIVER SERPL-CCNC: 71 U/L (ref 55–142)
ALT SERPL-CCNC: 13 U/L (ref 14–54)
APTT PPP: 37.1 SECONDS (ref 25–34)
AST SERPL-CCNC: 8 U/L (ref 15–41)
BAND %: 2 %
BASOPHIL % MANUAL: 0 %
BASOPHIL ABSOLUTE MANUAL: 0 X10(3) UL (ref 0–0.1)
BILIRUB SERPL-MCNC: 0.2 MG/DL (ref 0.1–2)
BUN BLD-MCNC: 59 MG/DL (ref 8–20)
CALCIUM BLD-MCNC: 8.7 MG/DL (ref 8.3–10.3)
CHLORIDE: 110 MMOL/L (ref 101–111)
CO2: 25 MMOL/L (ref 22–32)
CREAT BLD-MCNC: 1.35 MG/DL (ref 0.55–1.02)
EOSINOPHIL % MANUAL: 6 %
EOSINOPHIL ABSOLUTE MANUAL: 0.44 X10(3) UL (ref 0–0.3)
ERYTHROCYTE [DISTWIDTH] IN BLOOD BY AUTOMATED COUNT: 14.6 % (ref 11.5–16)
GLUCOSE BLD-MCNC: 166 MG/DL (ref 70–99)
GLUCOSE BLD-MCNC: 174 MG/DL (ref 65–99)
GLUCOSE BLD-MCNC: 215 MG/DL (ref 65–99)
GLUCOSE BLD-MCNC: 216 MG/DL (ref 65–99)
GLUCOSE BLD-MCNC: 262 MG/DL (ref 65–99)
HAV IGM SER QL: 2.1 MG/DL (ref 1.7–3)
HCT VFR BLD AUTO: 26 % (ref 34–50)
HGB BLD-MCNC: 8.3 G/DL (ref 12–16)
LYMPHOCYTE % MANUAL: 16 %
LYMPHOCYTE ABSOLUTE MANUAL: 1.18 X10(3) UL (ref 0.9–4)
M PROTEIN MFR SERPL ELPH: 5.7 G/DL (ref 6.1–8.3)
MCH RBC QN AUTO: 31.7 PG (ref 27–33.2)
MCHC RBC AUTO-ENTMCNC: 31.9 G/DL (ref 31–37)
MCV RBC AUTO: 99.2 FL (ref 81–100)
MONOCYTE % MANUAL: 5 %
MONOCYTE ABSOLUTE MANUAL: 0.37 X10(3) UL (ref 0.1–0.6)
MORPHOLOGY: NORMAL
NEUTROPHIL ABS PRELIM: 4.02 X10 (3) UL (ref 1.3–6.7)
NEUTROPHIL ABSOLUTE MANUAL: 5.4 X10(3) UL (ref 1.3–6.7)
NEUTROPHILS % MANUAL: 71 %
PHOSPHATE SERPL-MCNC: 3.5 MG/DL (ref 2.5–4.9)
PLATELET # BLD AUTO: 255 10(3)UL (ref 150–450)
PLATELET MORPHOLOGY: NORMAL
POTASSIUM SERPL-SCNC: 4.5 MMOL/L (ref 3.6–5.1)
RBC # BLD AUTO: 2.62 X10(6)UL (ref 3.8–5.1)
RED CELL DISTRIBUTION WIDTH-SD: 52.5 FL (ref 35.1–46.3)
SODIUM SERPL-SCNC: 141 MMOL/L (ref 136–144)
TOTAL CELLS COUNTED: 100
WBC # BLD AUTO: 7.4 X10(3) UL (ref 4–13)

## 2017-09-11 PROCEDURE — 83735 ASSAY OF MAGNESIUM: CPT | Performed by: FAMILY MEDICINE

## 2017-09-11 PROCEDURE — 85025 COMPLETE CBC W/AUTO DIFF WBC: CPT | Performed by: FAMILY MEDICINE

## 2017-09-11 PROCEDURE — 84100 ASSAY OF PHOSPHORUS: CPT | Performed by: FAMILY MEDICINE

## 2017-09-11 PROCEDURE — 97116 GAIT TRAINING THERAPY: CPT

## 2017-09-11 PROCEDURE — 85730 THROMBOPLASTIN TIME PARTIAL: CPT | Performed by: INTERNAL MEDICINE

## 2017-09-11 PROCEDURE — 85007 BL SMEAR W/DIFF WBC COUNT: CPT | Performed by: FAMILY MEDICINE

## 2017-09-11 PROCEDURE — 97530 THERAPEUTIC ACTIVITIES: CPT

## 2017-09-11 PROCEDURE — 85027 COMPLETE CBC AUTOMATED: CPT | Performed by: FAMILY MEDICINE

## 2017-09-11 PROCEDURE — 74000 XR ABDOMEN (1 VIEW) (CPT=74000): CPT | Performed by: FAMILY MEDICINE

## 2017-09-11 PROCEDURE — 80053 COMPREHEN METABOLIC PANEL: CPT | Performed by: FAMILY MEDICINE

## 2017-09-11 PROCEDURE — 97110 THERAPEUTIC EXERCISES: CPT

## 2017-09-11 PROCEDURE — 82962 GLUCOSE BLOOD TEST: CPT

## 2017-09-11 NOTE — PROGRESS NOTES
Cardiology Progress Note     PRIMARY CARDIOLOGIST: [facility]      CONSULT FOR: PERIOP AFIB       SUBJECTIVE: GENERALIZED WEAKNESS AND ABD PAIN.  DENIES CHEST PAIN OR SOB      Scheduled Meds:   • metoprolol tartrate  25 mg Oral TID Beta Blocker/Cardiac   • Pantopr normal, atraumatic, no cyanosis or edema. Pulses: 2+ and symmetric all four extremities. Skin: Skin color, texture, turgor normal. No rashes or lesions   Neurologic: CNII-XII intact. Normal strength and sensation throughout.        Data Review:     HEM:

## 2017-09-11 NOTE — PLAN OF CARE
Assumed care at 0700. Abdominal xray today normal. Tolerating low fiber diet with increased appetite. Stoma dark in color, minimal output. Scant brown drainage from GRANT  No c/o n/v or pain  TPN infusing at 62.5 ml/hr  Walked in halls with PT.  Sitting in c healing without S/S of infection Progressing

## 2017-09-11 NOTE — PHYSICAL THERAPY NOTE
PHYSICAL THERAPY TREATMENT NOTE - INPATIENT    Room Number: 6637/1389-B     Session: 5 - extend 3 sessions to progress standing tolerance/gait distance   Number of Visits to Meet Established Goals: 5    Presenting Problem: sigmoid resection/colostomy/esau HERNIA SURGERY  No date: HYSTERECTOMY      Comment: CAROL 2007  5/3/17: OTHER SURGICAL HISTORY      Comment: cystoscopy Dr. Lisa Egan  06/27/2017: OTHER SURGICAL HISTORY      Comment: Cysto w/ Dr. Lisa Egan  07/26/2017: OTHER SURGICAL HISTORY      Comment: cysto Dr. Lisa Egan Gait Assistance: Maximum assistance (per FIM distance - otherwise Min A)  Distance (ft): 75  Assistive Device: Rolling walker (chair follow, line management, SBA for safety = total 4 )  Pattern: Shuffle;R Foot drag;L Foot drag (kyphotic posture)  Stoop/C difficulty with gait/transfers resulting in downgrade of overall functional mobility. Due to above deficits, Pt will benefit from continued IP PT, so that patient may achieve highest functional independence/return to baseline.  Recommend MO upon [employer]

## 2017-09-11 NOTE — DIETARY NOTE
Nutrition Short Note-TPN    TPN infusing without difficulty. Diet advanced to low fiber diet with TPN meeting 75% of needs. TPN tonight to provide 600 dextrose calories, 350 lipid calories (26% lipids), 90 grams protein in 1500 ml fluid.  This will provide

## 2017-09-11 NOTE — PROGRESS NOTES
[facility]  Progress Note    Luz Elena Villa Patient Status:  Inpatient    1945 MRN [id number]   [facility] 7NE-A Attending Erendira Jarquin MD   [address] Day # 17 PCP Judy Germain (Inactive)       SUBJECTIVE: She noted increa 09/11/2017   BILT 0.2 09/11/2017   TP 5.7 09/11/2017   AST 8 09/11/2017   ALT 13 09/11/2017   PTT 37.1 09/11/2017   MG 2.1 09/11/2017   PHOS 3.5 09/11/2017         Imaging:  KUB Pending today    Meds:     Current Facility-Administered Medications:  metopro HYDROmorphone HCl PF (DILAUDID) 1 MG/ML injection 0.4 mg 0.4 mg Intravenous Q30 Min PRN   febuxostat (ULORIC) tab 80 mg 80 mg Oral Daily   Levothyroxine Sodium (SYNTHROID, LEVOTHROID) tab 125 mcg 125 mcg Oral Daily with breakfast   glucose (DEX4) oral li Eliquis  GI with Pantoprazole  Atrophy Prophylaxis Ongoing - stable. Dispo: Inpatient - HD # 17; POD #16. Questions/concerns were discussed with patient and/or family by bedside.     Total Time spent with patient and coordinating care:  45 minutes

## 2017-09-11 NOTE — OCCUPATIONAL THERAPY NOTE
OCCUPATIONAL THERAPY TREATMENT NOTE - INPATIENT     Room Number: 6052/6099-L  Session: 5   Number of Visits to Meet Established Goals: 7    Presenting Problem:  Bowel obstruction,  8/26 sigmoid resection, colostomy, A-fib    History related to current admis DMG DX 12 TX 12    AHI 39/ RDI 48/ REM AHI 71/ SaO2 75%/ CPAP 12 [employer]   • Sleep apnea    • Small bowel obstruction ([address] 2017       Past Surgical History  Past Surgical History:  No date:   12 [address] EDW: CHOLECYSTECTOMY   such as brushing teeth?: A Little  -   Eating meals?: None    AM-PAC Score:  Score: 16  Approx Degree of Impairment: 53.32%  Standardized Score (AM-PAC Scale): 35.96  CMS Modifier (G-Code): CK    FUNCTIONAL TRANSFER ASSESSMENT  Supine to Sit : Moderate ass simplification techniques;ADL training;Functional transfer training;UE strengthening/ROM; Patient/Family education;Patient/Family training;Equipment eval/education; Compensatory technique education  Rehab Potential : Good  Frequency (Obs): 5x/week      OT Go

## 2017-09-12 LAB
ALBUMIN SERPL-MCNC: 2 G/DL (ref 3.5–4.8)
ALP LIVER SERPL-CCNC: 74 U/L (ref 55–142)
ALT SERPL-CCNC: 13 U/L (ref 14–54)
AST SERPL-CCNC: 7 U/L (ref 15–41)
BAND %: 2 %
BASOPHIL % MANUAL: 0 %
BASOPHIL ABSOLUTE MANUAL: 0 X10(3) UL (ref 0–0.1)
BILIRUB SERPL-MCNC: 0.2 MG/DL (ref 0.1–2)
BUN BLD-MCNC: 60 MG/DL (ref 8–20)
CALCIUM BLD-MCNC: 9.3 MG/DL (ref 8.3–10.3)
CHLORIDE: 109 MMOL/L (ref 101–111)
CO2: 24 MMOL/L (ref 22–32)
CREAT BLD-MCNC: 1.39 MG/DL (ref 0.55–1.02)
EOSINOPHIL % MANUAL: 2 %
EOSINOPHIL ABSOLUTE MANUAL: 0.14 X10(3) UL (ref 0–0.3)
ERYTHROCYTE [DISTWIDTH] IN BLOOD BY AUTOMATED COUNT: 14.9 % (ref 11.5–16)
GLUCOSE BLD-MCNC: 174 MG/DL (ref 65–99)
GLUCOSE BLD-MCNC: 189 MG/DL (ref 65–99)
GLUCOSE BLD-MCNC: 203 MG/DL (ref 70–99)
GLUCOSE BLD-MCNC: 209 MG/DL (ref 65–99)
GLUCOSE BLD-MCNC: 244 MG/DL (ref 65–99)
GLUCOSE BLD-MCNC: 262 MG/DL (ref 65–99)
HAV IGM SER QL: 2.4 MG/DL (ref 1.7–3)
HCT VFR BLD AUTO: 27.9 % (ref 34–50)
HGB BLD-MCNC: 8.4 G/DL (ref 12–16)
LYMPHOCYTE % MANUAL: 11 %
LYMPHOCYTE ABSOLUTE MANUAL: 0.77 X10(3) UL (ref 0.9–4)
M PROTEIN MFR SERPL ELPH: 6.1 G/DL (ref 6.1–8.3)
MCH RBC QN AUTO: 31 PG (ref 27–33.2)
MCHC RBC AUTO-ENTMCNC: 30.1 G/DL (ref 31–37)
MCV RBC AUTO: 103 FL (ref 81–100)
METAMYELOCYTE %: 1 %
METAMYELOCYTE ABSOLUTE MANUAL: 0.07 X10(3) UL (ref ?–0.01)
MONOCYTE % MANUAL: 9 %
MONOCYTE ABSOLUTE MANUAL: 0.63 X10(3) UL (ref 0.1–0.6)
MYELOCYTE %: 4 %
MYELOCYTE ABSOLUTE MANUAL: 0.28 X10(3) UL (ref ?–0.01)
NEUTROPHIL ABS PRELIM: 4.33 X10 (3) UL (ref 1.3–6.7)
NEUTROPHIL ABSOLUTE MANUAL: 5.11 X10(3) UL (ref 1.3–6.7)
NEUTROPHILS % MANUAL: 71 %
PHOSPHATE SERPL-MCNC: 4.2 MG/DL (ref 2.5–4.9)
PLATELET # BLD AUTO: 250 10(3)UL (ref 150–450)
PLATELET MORPHOLOGY: NORMAL
POTASSIUM SERPL-SCNC: 5.2 MMOL/L (ref 3.6–5.1)
RBC # BLD AUTO: 2.71 X10(6)UL (ref 3.8–5.1)
RED CELL DISTRIBUTION WIDTH-SD: 54.7 FL (ref 35.1–46.3)
SODIUM SERPL-SCNC: 140 MMOL/L (ref 136–144)
TOTAL CELLS COUNTED: 100
TRIGLYCERIDES: 104 MG/DL (ref ?–150)
WBC # BLD AUTO: 7 X10(3) UL (ref 4–13)

## 2017-09-12 PROCEDURE — 85025 COMPLETE CBC W/AUTO DIFF WBC: CPT | Performed by: FAMILY MEDICINE

## 2017-09-12 PROCEDURE — 97530 THERAPEUTIC ACTIVITIES: CPT

## 2017-09-12 PROCEDURE — 83735 ASSAY OF MAGNESIUM: CPT | Performed by: FAMILY MEDICINE

## 2017-09-12 PROCEDURE — 80053 COMPREHEN METABOLIC PANEL: CPT | Performed by: FAMILY MEDICINE

## 2017-09-12 PROCEDURE — 85027 COMPLETE CBC AUTOMATED: CPT | Performed by: FAMILY MEDICINE

## 2017-09-12 PROCEDURE — 84100 ASSAY OF PHOSPHORUS: CPT | Performed by: FAMILY MEDICINE

## 2017-09-12 PROCEDURE — 84478 ASSAY OF TRIGLYCERIDES: CPT | Performed by: FAMILY MEDICINE

## 2017-09-12 PROCEDURE — 82962 GLUCOSE BLOOD TEST: CPT

## 2017-09-12 PROCEDURE — 97116 GAIT TRAINING THERAPY: CPT

## 2017-09-12 PROCEDURE — 85007 BL SMEAR W/DIFF WBC COUNT: CPT | Performed by: FAMILY MEDICINE

## 2017-09-12 PROCEDURE — 97110 THERAPEUTIC EXERCISES: CPT

## 2017-09-12 RX ORDER — METOPROLOL TARTRATE 50 MG/1
50 TABLET, FILM COATED ORAL
Status: DISCONTINUED | OUTPATIENT
Start: 2017-09-12 | End: 2017-09-14

## 2017-09-12 NOTE — PROGRESS NOTES
[facility]  Progress Note    Jackey Barthel Patient Status:  Inpatient    1945 MRN [id number]   [facility] 7NE-A Attending Katharina Trejo MD   [facility] Day # 18 PCP Ghislaine Warner (Inactive)       Subjective:  Feels stronger Normal Saline Flush  10 mL Intravenous Q12H   • multivitamin with iron  1 tablet Oral Daily   • febuxostat  80 mg Oral Daily   • Levothyroxine Sodium  125 mcg Oral Daily with breakfast     • adult 3 in 1 TPN 62.5 mL/hr at 09/11/17 2100   • dextrose Stopped

## 2017-09-12 NOTE — PLAN OF CARE
Assumed care at 0700  Metropolol increased to 50 mg BID  Tinsley d/c'd at 1730  Dr. Silva Kimball called - will round tomorrow and remove GRANT drain  Colostomy dark in color with moderate output  Tolerating soft diet  C/o pain while ambulating or standing

## 2017-09-12 NOTE — HISTORICAL OFFICE NOTE
Harish Leny  : 1945  ACCOUNT:  [id number]  [phone]  PCP: Dr. Mckeon Host     TODAY'S DATE: 2011  DICTATED BY:  Nathan Lucero M.D.]    CHIEF COMPLAINT: [abnormal echo/pfo.]    HPI:  [On 2011, Emily Leary, a 78-year-old fem feet. HEM/LYMPH: denies easy bruising. ALL: no new food or environmental allergies.       PAST HISTORY: diabetes, gout, hypothyroidism, neuropathy, osteoarthritis, obesity, bunionectomies, hysterectomy, shoulder surgery and hernia repair    FAMILY HISTORY: atherosclerotic risk factors with weight loss, smoking cessation, normalization of her cholesterol and hemoglobin A1C, is extraordinarily more important than a tiny PFO. She was much relieved to hear about this.   Her PVCs are chronic, her cholesterol look

## 2017-09-12 NOTE — PHYSICAL THERAPY NOTE
PHYSICAL THERAPY TREATMENT NOTE - INPATIENT    Room Number: 3019/2179-O     Session: 6 - extend 3 sessions to progress standing tolerance/gait distance   Number of Visits to Meet Established Goals: 5    Presenting Problem: sigmoid resection/colostomy/esau HERNIA SURGERY  No date: HYSTERECTOMY      Comment: CAROL 2007  5/3/17: OTHER SURGICAL HISTORY      Comment: cystoscopy Dr. Crum Grade  06/27/2017: OTHER SURGICAL HISTORY      Comment: Cysto w/ Dr. Crum Grade  07/26/2017: OTHER SURGICAL HISTORY      Comment: cysto Dr. Radames Robert assistance (per FIM distance - otherwise Min A)  Distance (ft): 75 - 25  Assistive Device: Rolling walker (chair follow, line management, SBA for safety = total 4 )  Pattern: Shuffle;R Foot drag;L Foot drag (kyphotic posture)  Stoop/Curb Assistance: Not te (ELOS 10-12 days)     PLAN  PT Treatment Plan: Bed mobility; Body mechanics; Endurance; Energy conservation;Patient education;Gait training;Stair training;Transfer training;Balance training;Strengthening;Range of motion  Rehab Potential : Fair  Frequency (Obs

## 2017-09-12 NOTE — DIETARY NOTE
Nutrition Short Note-TPN    TPN infusing without difficulty. Pt on low fiber diet, pt reports tolerating diet well with fair po. TPN to meet 50% of needs tonight.  TPN tonight to provide 390 dextrose calories, 220 lipid calories, 60 grams protein in 1500 ml

## 2017-09-12 NOTE — PROGRESS NOTES
[facility]  Progress Note    Catarino Andino Patient Status:  Inpatient    1945 MRN [id number]   [facility] 7NE-A Attending Danielle Peralta MD   [facility] Day # 18 PCP Zia Weeks (Inactive)     POD #17    SUBJECTIVE:  No CP, to TPN. CBC stable to improved.      Imaging:  KUB yesterday was improved/normal.     Meds:     Current Facility-Administered Medications:  insulin detemir (LEVEMIR) 100 UNIT/ML flextouch 30 Units 30 Units Subcutaneous Nightly   adult 3 in 1 tpn  Intravenou 80 mg Oral Daily   Levothyroxine Sodium (SYNTHROID, LEVOTHROID) tab 125 mcg 125 mcg Oral Daily with breakfast   glucose (DEX4) oral liquid 15 g 15 g Oral Q15 Min PRN   Or      Glucose-Vitamin C (DEX-4) 4-0.006 g chewable tab 4 tablet 4 tablet Oral Q15 Min

## 2017-09-12 NOTE — PLAN OF CARE
Denies abd pain except with transfer from chair to bed. Bowel sounds present, with stool from ostomy. Stoma dark grey. Picc dressing changed. 2L needed during sleep. Pt questing when arnold will be removed. Hospitalist to be made aware.       Diabetes/

## 2017-09-12 NOTE — OCCUPATIONAL THERAPY NOTE
OCCUPATIONAL THERAPY TREATMENT NOTE - INPATIENT     Room Number: 4514/1232-D  Session: 6   Number of Visits to Meet Established Goals: 7    Presenting Problem:  Bowel obstruction,  8/26 sigmoid resection, colostomy, A-fib    History related to current admis DMG DX 12 TX 12    AHI 39/ RDI 48/ REM AHI 71/ SaO2 75%/ CPAP 12 [employer]   • Sleep apnea    • Small bowel obstruction ([address] 2017       Past Surgical History  Past Surgical History:  No date:   12 Green EDW: CHOLECYSTECTOMY   None    AM-PAC Score:  Score: 16  Approx Degree of Impairment: 53.32%  Standardized Score (AM-PAC Scale): 35.96  CMS Modifier (G-Code): CK    FUNCTIONAL TRANSFER ASSESSMENT  Supine to Sit : Moderate assistance  Sit to Stand: Dependent assistance    Skilled conservation/work simplification techniques;ADL training;Functional transfer training;UE strengthening/ROM; Patient/Family education;Patient/Family training;Equipment eval/education; Compensatory technique education  Rehab Potential : Good  Frequency (Obs):

## 2017-09-13 LAB
ALBUMIN SERPL-MCNC: 2.1 G/DL (ref 3.5–4.8)
ALP LIVER SERPL-CCNC: 73 U/L (ref 55–142)
ALT SERPL-CCNC: 14 U/L (ref 14–54)
AST SERPL-CCNC: 8 U/L (ref 15–41)
BAND %: 9 %
BASOPHIL % MANUAL: 0 %
BASOPHIL ABSOLUTE MANUAL: 0 X10(3) UL (ref 0–0.1)
BILIRUB SERPL-MCNC: 0.2 MG/DL (ref 0.1–2)
BUN BLD-MCNC: 58 MG/DL (ref 8–20)
CALCIUM BLD-MCNC: 9.1 MG/DL (ref 8.3–10.3)
CHLORIDE: 110 MMOL/L (ref 101–111)
CO2: 24 MMOL/L (ref 22–32)
CREAT BLD-MCNC: 1.33 MG/DL (ref 0.55–1.02)
EOSINOPHIL % MANUAL: 2 %
EOSINOPHIL ABSOLUTE MANUAL: 0.13 X10(3) UL (ref 0–0.3)
ERYTHROCYTE [DISTWIDTH] IN BLOOD BY AUTOMATED COUNT: 14.9 % (ref 11.5–16)
GLUCOSE BLD-MCNC: 154 MG/DL (ref 70–99)
GLUCOSE BLD-MCNC: 159 MG/DL (ref 65–99)
GLUCOSE BLD-MCNC: 210 MG/DL (ref 65–99)
GLUCOSE BLD-MCNC: 230 MG/DL (ref 65–99)
HAV IGM SER QL: 2.4 MG/DL (ref 1.7–3)
HCT VFR BLD AUTO: 27.2 % (ref 34–50)
HGB BLD-MCNC: 8.4 G/DL (ref 12–16)
LYMPHOCYTE % MANUAL: 11 %
LYMPHOCYTE ABSOLUTE MANUAL: 0.73 X10(3) UL (ref 0.9–4)
M PROTEIN MFR SERPL ELPH: 6 G/DL (ref 6.1–8.3)
MCH RBC QN AUTO: 31.3 PG (ref 27–33.2)
MCHC RBC AUTO-ENTMCNC: 30.9 G/DL (ref 31–37)
MCV RBC AUTO: 101.5 FL (ref 81–100)
METAMYELOCYTE %: 2 %
METAMYELOCYTE ABSOLUTE MANUAL: 0.13 X10(3) UL (ref ?–0.01)
MONOCYTE % MANUAL: 8 %
MONOCYTE ABSOLUTE MANUAL: 0.53 X10(3) UL (ref 0.1–0.6)
MYELOCYTE %: 2 %
MYELOCYTE ABSOLUTE MANUAL: 0.13 X10(3) UL (ref ?–0.01)
NEUTROPHIL ABS PRELIM: 3.88 X10 (3) UL (ref 1.3–6.7)
NEUTROPHIL ABSOLUTE MANUAL: 4.95 X10(3) UL (ref 1.3–6.7)
NEUTROPHILS % MANUAL: 66 %
PHOSPHATE SERPL-MCNC: 2.7 MG/DL (ref 2.5–4.9)
PLATELET # BLD AUTO: 229 10(3)UL (ref 150–450)
PLATELET MORPHOLOGY: NORMAL
POTASSIUM SERPL-SCNC: 5 MMOL/L (ref 3.6–5.1)
RBC # BLD AUTO: 2.68 X10(6)UL (ref 3.8–5.1)
RED CELL DISTRIBUTION WIDTH-SD: 53.8 FL (ref 35.1–46.3)
SODIUM SERPL-SCNC: 141 MMOL/L (ref 136–144)
TOTAL CELLS COUNTED: 100
WBC # BLD AUTO: 6.6 X10(3) UL (ref 4–13)

## 2017-09-13 PROCEDURE — 84100 ASSAY OF PHOSPHORUS: CPT | Performed by: FAMILY MEDICINE

## 2017-09-13 PROCEDURE — 85007 BL SMEAR W/DIFF WBC COUNT: CPT | Performed by: FAMILY MEDICINE

## 2017-09-13 PROCEDURE — 97116 GAIT TRAINING THERAPY: CPT

## 2017-09-13 PROCEDURE — 85027 COMPLETE CBC AUTOMATED: CPT | Performed by: FAMILY MEDICINE

## 2017-09-13 PROCEDURE — 97530 THERAPEUTIC ACTIVITIES: CPT

## 2017-09-13 PROCEDURE — 80053 COMPREHEN METABOLIC PANEL: CPT | Performed by: FAMILY MEDICINE

## 2017-09-13 PROCEDURE — 83735 ASSAY OF MAGNESIUM: CPT | Performed by: FAMILY MEDICINE

## 2017-09-13 PROCEDURE — 85025 COMPLETE CBC W/AUTO DIFF WBC: CPT | Performed by: FAMILY MEDICINE

## 2017-09-13 PROCEDURE — 82962 GLUCOSE BLOOD TEST: CPT

## 2017-09-13 RX ORDER — ALPRAZOLAM 0.25 MG/1
0.25 TABLET ORAL EVERY 6 HOURS PRN
Status: DISCONTINUED | OUTPATIENT
Start: 2017-09-13 | End: 2017-09-14

## 2017-09-13 RX ORDER — ALPRAZOLAM 0.25 MG/1
0.25 TABLET ORAL EVERY 6 HOURS PRN
Qty: 30 TABLET | Refills: 0 | Status: SHIPPED | OUTPATIENT
Start: 2017-09-13 | End: 2018-10-24 | Stop reason: ALTCHOICE

## 2017-09-13 RX ORDER — ESCITALOPRAM OXALATE 5 MG/1
5 TABLET ORAL DAILY
Status: DISCONTINUED | OUTPATIENT
Start: 2017-09-13 | End: 2017-09-14

## 2017-09-13 RX ORDER — HYDROCODONE BITARTRATE AND ACETAMINOPHEN 5; 325 MG/1; MG/1
1 TABLET ORAL EVERY 4 HOURS PRN
Qty: 40 TABLET | Refills: 0 | Status: SHIPPED | OUTPATIENT
Start: 2017-09-13 | End: 2018-10-24 | Stop reason: ALTCHOICE

## 2017-09-13 RX ORDER — METOPROLOL TARTRATE 50 MG/1
50 TABLET, FILM COATED ORAL
Qty: 60 TABLET | Refills: 2 | Status: ON HOLD | OUTPATIENT
Start: 2017-09-13 | End: 2020-06-19

## 2017-09-13 RX ORDER — PANTOPRAZOLE SODIUM 20 MG/1
20 TABLET, DELAYED RELEASE ORAL
Qty: 60 TABLET | Refills: 1 | Status: ON HOLD | OUTPATIENT
Start: 2017-09-13 | End: 2018-09-30

## 2017-09-13 RX ORDER — FOLIC ACID 1 MG/1
1 TABLET ORAL DAILY
Status: DISCONTINUED | OUTPATIENT
Start: 2017-09-13 | End: 2020-12-28

## 2017-09-13 RX ORDER — ESCITALOPRAM OXALATE 5 MG/1
5 TABLET ORAL DAILY
Qty: 30 TABLET | Refills: 3 | Status: ON HOLD | OUTPATIENT
Start: 2017-09-13 | End: 2020-02-05

## 2017-09-13 NOTE — PROGRESS NOTES
[facility]  Progress Note    Edwin Hoyos Patient Status:  Inpatient    1945 MRN [id number]   [facility] 7NE-A Attending Christine Ortiz MD   [facility] Day # 23 PCP Annabel Vazquez (Inactive)       SUBJECTIVE/Chart review:  No 09/13/2017   AST 8 09/13/2017   ALT 14 09/13/2017   MG 2.4 09/13/2017   PHOS 2.7 09/13/2017         Imaging:  None new    Meds:     Current Facility-Administered Medications:  escitalopram (LEXAPRO) tablet 5 mg 5 mg Oral Daily   ALPRAZolam (XANAX) tab 0.25 PRN   Or      glucose (DEX4) oral liquid 30 g 30 g Oral Q15 Min PRN         Assessment    Principal Problem:     Bowel obstruction (HCC)  Active Problems:    Abdominal pain, left lower quadrant    Abnormal abdominal CT scan    Atrial fibrillation with RVR (

## 2017-09-13 NOTE — DIETARY NOTE
1230 [facility] ASSESSMENT    Pt is at moderate nutrition risk. Pt does not meet malnutrition criteria.     NUTRITION DIAGNOSIS/PROBLEM:    Inadequate oral intake related to inability to consume sufficient PO as evidenced by previous pt now NPO, PICC line placed, TPN to be initiated tonight, all discussed with MD and RN. PT with prolonged post op small bowel ileus, had NGT placed.      9/1- Pt's diet advanced this morning to clear liquids, pt reports tolerated fine, agreeable to ensure skin breakdown  4.  Maintain lean body mass     MEDICATIONS:  Noted    LABS:  Noted    FOLLOW-UP DATE: 9/18/17    Irena Manzo MA, [address], [address] [facility]  Pager [phone]

## 2017-09-13 NOTE — PHYSICAL THERAPY NOTE
PHYSICAL THERAPY TREATMENT NOTE - INPATIENT    Room Number: 0382/3331-O     Session: 7  Number of Visits to Meet Established Goals: 5    Presenting Problem: sigmoid resection/colostomy/hernia repair    Problem List  Principal Problem:     Bowel obstruction OTHER SURGICAL HISTORY      Comment: cystoscopy Dr. Margarito Pak  06/27/2017: OTHER SURGICAL HISTORY      Comment: Cysto w/ Dr. Margarito Pak  07/26/2017: OTHER SURGICAL HISTORY      Comment: cysto Dr. Margarito Pak  No date: REMOVAL GALLBLADDER  No date: SHOULDER SURG [facility] 25  Assistive Device: Rolling walker (chair follow, line management, SBA for safety = total 4 )  Pattern: Shuffle;R Foot drag;L Foot drag (kyphotic posture)  Stoop/Curb Assistance: Not tested  Comment : above scores based on dept protocol    Skilled Therap days)     PLAN  PT Treatment Plan: Bed mobility; Body mechanics; Endurance; Energy conservation;Patient education;Gait training;Stair training;Transfer training;Balance training;Strengthening;Range of motion  Rehab Potential : Fair  Frequency (Obs): 5x/week

## 2017-09-13 NOTE — PLAN OF CARE
Pt A/O X4. Anxious at times. Ativan IVP given once. 1L O2 NC. A fib on tele. Bowel sounds active. Midline abdominal incision with staples, TRI, CDI. RT GRANT drain with serous output. LT colostomy with soft, brown output. Denies pain.  Pt is sleeping comfortab healing without S/S of infection Progressing          CARDIOVASCULAR - ADULT    • Maintains optimal cardiac output and hemodynamic stability Progressing        Diabetes/Glucose Control    • Glucose maintained within prescribed range Progressing        BIGG

## 2017-09-13 NOTE — PLAN OF CARE
Assumed care around 0730. Pt drowsy, oriented x4. VSS. Afib per tele. Rate controlled. On 1L of oxygen. . Lungs clear/diminished. . Encouraged to continue using. Afebrile. Midabdominal incision with staples TRI. MD order to remove.  Notified Dr. Sherine Ivy improved neurological status Progressing        PAIN - ADULT    • Verbalizes/displays adequate comfort level or patient's stated pain goal Progressing        RESPIRATORY - ADULT    • Achieves optimal ventilation and oxygenation Progressing        RISK FOR

## 2017-09-13 NOTE — PROGRESS NOTES
[facility]  Cardiology Progress Note           Jimmymanuel Yehmeghana Patient Status:  Inpatient    1945 MRN [id number]   [facility] 7NE-A Attending Ashu Blackman MD   [facility] Day # 19 PCP Star Poser (Inactive)      Subjective: • apixaban  5 mg Oral BID   • Insulin Aspart Pen  4-20 Units Subcutaneous TID CC and HS   • Normal Saline Flush  10 mL Intravenous Q12H   • multivitamin with iron  1 tablet Oral Daily   • febuxostat  80 mg Oral Daily   • Levothyroxine Sodium  125 mcg Ora

## 2017-09-13 NOTE — OCCUPATIONAL THERAPY NOTE
OCCUPATIONAL THERAPY TREATMENT NOTE - INPATIENT     Room Number: 6996/0403-W  Session: 7   Number of Visits to Meet Established Goals: 10 (3 more added on 9/13)    Presenting Problem:  Bowel obstruction,  8/26 sigmoid resection, colostomy, A-fib    History    • SLEEP APNEA DMG DX 12 TX 12    AHI 39/ RDI 48/ REM AHI 71/ SaO2 75%/ CPAP    • Sleep apnea    • Small bowel obstruction ([facility] 2017       Past Surgical History  Past Surgical History:  No date:   12 Green EDW: CHOL (AM-PAC Scale): 35.96  CMS Modifier (G-Code): CK    FUNCTIONAL TRANSFER ASSESSMENT  Supine to Sit : Moderate assistance  Sit to Stand: Dependent assistance    Skilled Therapy Provided: Mod A supine to sit.   Required mod encouragement again to participate i education;Patient/Family training;Equipment eval/education; Compensatory technique education  Rehab Potential : Good  Frequency (Obs): 5x/week      OT Goals: MODIFIED on 9/13/17  ADL Goals  Patient will perform upper body dressing:  with supervision-ongoing

## 2017-09-14 VITALS
HEART RATE: 72 BPM | TEMPERATURE: 99 F | RESPIRATION RATE: 17 BRPM | SYSTOLIC BLOOD PRESSURE: 140 MMHG | HEIGHT: 66 IN | DIASTOLIC BLOOD PRESSURE: 65 MMHG | BODY MASS INDEX: 45.38 KG/M2 | WEIGHT: 282.38 LBS | OXYGEN SATURATION: 98 %

## 2017-09-14 PROBLEM — R73.9 HYPERGLYCEMIA: Status: RESOLVED | Noted: 2017-08-25 | Resolved: 2017-09-14

## 2017-09-14 LAB
GLUCOSE BLD-MCNC: 185 MG/DL (ref 65–99)
GLUCOSE BLD-MCNC: 204 MG/DL (ref 65–99)

## 2017-09-14 PROCEDURE — 82962 GLUCOSE BLOOD TEST: CPT

## 2017-09-14 NOTE — PLAN OF CARE
Assumed care around 0730. Pt alert and oriented x4. Afib per tele. Rate controlled. On room air. 1L when sleeping. Lungs clear/diminished. . Encouraged to continue using. Afebrile. Denies any pain. Mid-abdominal incision with steri strips.  Notified D Completed    • Hemodynamic stability and optimal renal function maintained Completed    • Glucose maintained within prescribed range Completed        MUSCULOSKELETAL - ADULT    • Return mobility to safest level of function Completed        NEUROLOGICAL - A

## 2017-09-14 NOTE — PLAN OF CARE
Patient alert and oriented times four but drowsy and forgetful. Patient given meds per [facility]. Patient's TPN stopped tonight. Tolerating diet with no N/V. Vital signs stable. Denies any pain or discomfort. No change in abdominal incision during shift. without S/S of infection Progressing

## 2017-09-14 NOTE — CM/SW NOTE
Please call report to #[phone] @ [address] [place] [address].  [facility] to pick-up at [place].

## 2017-09-14 NOTE — PROGRESS NOTES
[facility]  Progress Note    Veronica Rogel Patient Status:  Inpatient    1945 MRN [id number]   [facility] 7NE-A Attending Sonny Mitchell MD   [facility] Day # 20 PCP Garo Arzola (Inactive)       SUBJECTIVE:  No new complain infusion  Intravenous Continuous PRN   Normal Saline Flush 0.9 % injection 10 mL 10 mL Intravenous Q12H   DilTIAZem HCl (CARDIZEM) injection 10 mg 10 mg Intravenous Q1H PRN   DilTIAZem HCl (CARDIZEM) tab 60 mg 60 mg Oral PRN   albuterol sulfate (VENTOLIN) Prophylaxis Stable. Dispo: Inpatient (HD #20, POD #19) - to MO today.       Questions/concerns were discussed with patient by bedside (all discharge documentation completed - I also wrote up special medication information and instructions for the patie

## 2017-09-14 NOTE — DISCHARGE SUMMARY
[facility]    Discharge Summary    Jackey Barthel Patient Status:  Inpatient    1945 MRN [id number]   [facility] 7NE-A Attending No att. providers found   Hosp Day # 20 PCP Ghislaine Warner (Inactive)     Date of Admission: 8 and recurrent UTIs. Hydrated, stabilized, and taken to OR on first hospital day for Exploratory Laparotomy, Open Sigmoid Colon Resection and Colostomy. No intraoperative complications.  Post operative course was complicated by her comorbid conditions of Obe 5 MG Tabs  Commonly known as:  ELIQUIS      Take 1 tablet (5 mg total) by mouth 2 (two) times daily. Quantity:  60 tablet  Refills:  6     escitalopram 5 MG Tabs  Commonly known as:  LEXAPRO      Take 1 tablet (5 mg total) by mouth daily.    Quantity:  30 Bring a paper prescription for each of these medications  · ALPRAZolam 0.25 MG Tabs  · apixaban 5 MG Tabs  · escitalopram 5 MG Tabs  · HYDROcodone-acetaminophen 5-325 MG Tabs  · Metoprolol Tartrate 50 MG Tabs  · multivitamin with iron Tabs  · Pantoprazol

## 2017-09-15 ENCOUNTER — SNF ADMIT/H&P (OUTPATIENT)
Dept: INTERNAL MEDICINE CLINIC | Age: 72
End: 2017-09-15

## 2017-09-15 DIAGNOSIS — N18.30 CONTROLLED TYPE 2 DIABETES MELLITUS WITH STAGE 3 CHRONIC KIDNEY DISEASE, WITHOUT LONG-TERM CURRENT USE OF INSULIN (HCC): ICD-10-CM

## 2017-09-15 DIAGNOSIS — E11.22 CONTROLLED TYPE 2 DIABETES MELLITUS WITH STAGE 3 CHRONIC KIDNEY DISEASE, WITHOUT LONG-TERM CURRENT USE OF INSULIN (HCC): ICD-10-CM

## 2017-09-15 DIAGNOSIS — R53.81 PHYSICAL DECONDITIONING: Primary | ICD-10-CM

## 2017-09-15 DIAGNOSIS — Z93.3 COLOSTOMY PRESENT (HCC): ICD-10-CM

## 2017-09-15 DIAGNOSIS — F32.A DEPRESSION, UNSPECIFIED DEPRESSION TYPE: ICD-10-CM

## 2017-09-15 PROCEDURE — 99309 SBSQ NF CARE MODERATE MDM 30: CPT | Performed by: NURSE PRACTITIONER

## 2017-09-15 PROCEDURE — 99356 PROLONGED SERV,INPATIENT,1ST HR: CPT | Performed by: NURSE PRACTITIONER

## 2017-09-15 NOTE — CM/SW NOTE
09/15/17 0900   Discharge disposition   Discharged to: Skilled Nurs   Name of [address] [address]   Patient is Discharged to a [address] [place] [address] [place] Yes   Discharge transportation [employer]

## 2017-09-17 ENCOUNTER — HOSPITAL ENCOUNTER (EMERGENCY)
Facility: HOSPITAL | Age: 72
Discharge: OTHER INSTITUTION | End: 2017-09-18
Attending: EMERGENCY MEDICINE
Payer: MEDICARE

## 2017-09-17 ENCOUNTER — APPOINTMENT (OUTPATIENT)
Dept: CT IMAGING | Facility: HOSPITAL | Age: 72
End: 2017-09-17
Attending: EMERGENCY MEDICINE
Payer: MEDICARE

## 2017-09-17 ENCOUNTER — SNF ADMIT/H&P (OUTPATIENT)
Dept: FAMILY MEDICINE CLINIC | Facility: CLINIC | Age: 72
End: 2017-09-17

## 2017-09-17 VITALS
BODY MASS INDEX: 45.35 KG/M2 | SYSTOLIC BLOOD PRESSURE: 158 MMHG | HEART RATE: 74 BPM | HEIGHT: 66 IN | OXYGEN SATURATION: 90 % | TEMPERATURE: 98 F | RESPIRATION RATE: 28 BRPM | WEIGHT: 282.19 LBS | DIASTOLIC BLOOD PRESSURE: 85 MMHG

## 2017-09-17 VITALS
RESPIRATION RATE: 20 BRPM | DIASTOLIC BLOOD PRESSURE: 86 MMHG | SYSTOLIC BLOOD PRESSURE: 153 MMHG | HEART RATE: 76 BPM | TEMPERATURE: 98 F | OXYGEN SATURATION: 96 %

## 2017-09-17 DIAGNOSIS — I48.91 ATRIAL FIBRILLATION WITH RVR (HCC): ICD-10-CM

## 2017-09-17 DIAGNOSIS — F32.A ANXIETY AND DEPRESSION: ICD-10-CM

## 2017-09-17 DIAGNOSIS — N18.30 CONTROLLED TYPE 2 DIABETES MELLITUS WITH STAGE 3 CHRONIC KIDNEY DISEASE, WITHOUT LONG-TERM CURRENT USE OF INSULIN (HCC): Chronic | ICD-10-CM

## 2017-09-17 DIAGNOSIS — M1A.9XX0 CHRONIC GOUT WITHOUT TOPHUS, UNSPECIFIED CAUSE, UNSPECIFIED SITE: ICD-10-CM

## 2017-09-17 DIAGNOSIS — E46 MALNUTRITION, UNSPECIFIED TYPE (HCC): ICD-10-CM

## 2017-09-17 DIAGNOSIS — R53.1 WEAKNESS: Primary | ICD-10-CM

## 2017-09-17 DIAGNOSIS — F41.9 ANXIETY AND DEPRESSION: ICD-10-CM

## 2017-09-17 DIAGNOSIS — E11.22 CONTROLLED TYPE 2 DIABETES MELLITUS WITH STAGE 3 CHRONIC KIDNEY DISEASE, WITHOUT LONG-TERM CURRENT USE OF INSULIN (HCC): Chronic | ICD-10-CM

## 2017-09-17 DIAGNOSIS — D64.9 ANEMIA FOLLOWING SURGERY: ICD-10-CM

## 2017-09-17 DIAGNOSIS — G47.00 INSOMNIA, UNSPECIFIED TYPE: ICD-10-CM

## 2017-09-17 DIAGNOSIS — Z90.49 S/P PARTIAL RESECTION OF COLON: ICD-10-CM

## 2017-09-17 DIAGNOSIS — Z93.3 S/P COLOSTOMY (HCC): ICD-10-CM

## 2017-09-17 DIAGNOSIS — Z48.89 ENCOUNTER FOR POSTOPERATIVE WOUND CHECK: Primary | ICD-10-CM

## 2017-09-17 DIAGNOSIS — I10 ESSENTIAL HYPERTENSION: Chronic | ICD-10-CM

## 2017-09-17 LAB
ALBUMIN SERPL-MCNC: 2.4 G/DL (ref 3.5–4.8)
ALP LIVER SERPL-CCNC: 87 U/L (ref 55–142)
ALT SERPL-CCNC: 19 U/L (ref 14–54)
APTT PPP: 37.6 SECONDS (ref 25–34)
AST SERPL-CCNC: 9 U/L (ref 15–41)
BASOPHILS # BLD AUTO: 0.02 X10(3) UL (ref 0–0.1)
BASOPHILS NFR BLD AUTO: 0.3 %
BILIRUB SERPL-MCNC: 0.3 MG/DL (ref 0.1–2)
BUN BLD-MCNC: 31 MG/DL (ref 8–20)
CALCIUM BLD-MCNC: 9.1 MG/DL (ref 8.3–10.3)
CHLORIDE: 109 MMOL/L (ref 101–111)
CO2: 28 MMOL/L (ref 22–32)
CREAT BLD-MCNC: 1.28 MG/DL (ref 0.55–1.02)
EOSINOPHIL # BLD AUTO: 0.12 X10(3) UL (ref 0–0.3)
EOSINOPHIL NFR BLD AUTO: 1.8 %
ERYTHROCYTE [DISTWIDTH] IN BLOOD BY AUTOMATED COUNT: 15 % (ref 11.5–16)
GLUCOSE BLD-MCNC: 167 MG/DL (ref 70–99)
HCT VFR BLD AUTO: 30.4 % (ref 34–50)
HGB BLD-MCNC: 9.4 G/DL (ref 12–16)
IMMATURE GRANULOCYTE COUNT: 0.05 X10(3) UL (ref 0–1)
IMMATURE GRANULOCYTE RATIO %: 0.8 %
INR BLD: 1.63 (ref 0.89–1.11)
LYMPHOCYTES # BLD AUTO: 0.76 X10(3) UL (ref 0.9–4)
LYMPHOCYTES NFR BLD AUTO: 11.5 %
M PROTEIN MFR SERPL ELPH: 7 G/DL (ref 6.1–8.3)
MCH RBC QN AUTO: 31.2 PG (ref 27–33.2)
MCHC RBC AUTO-ENTMCNC: 30.9 G/DL (ref 31–37)
MCV RBC AUTO: 101 FL (ref 81–100)
MONOCYTES # BLD AUTO: 0.64 X10(3) UL (ref 0.1–0.6)
MONOCYTES NFR BLD AUTO: 9.7 %
NEUTROPHIL ABS PRELIM: 5.04 X10 (3) UL (ref 1.3–6.7)
NEUTROPHILS # BLD AUTO: 5.04 X10(3) UL (ref 1.3–6.7)
NEUTROPHILS NFR BLD AUTO: 75.9 %
PLATELET # BLD AUTO: 212 10(3)UL (ref 150–450)
POTASSIUM SERPL-SCNC: 4.8 MMOL/L (ref 3.6–5.1)
PSA SERPL DL<=0.01 NG/ML-MCNC: 19.5 SECONDS (ref 12–14.3)
RBC # BLD AUTO: 3.01 X10(6)UL (ref 3.8–5.1)
RED CELL DISTRIBUTION WIDTH-SD: 55.4 FL (ref 35.1–46.3)
SODIUM SERPL-SCNC: 144 MMOL/L (ref 136–144)
WBC # BLD AUTO: 6.6 X10(3) UL (ref 4–13)

## 2017-09-17 PROCEDURE — 80053 COMPREHEN METABOLIC PANEL: CPT | Performed by: EMERGENCY MEDICINE

## 2017-09-17 PROCEDURE — 36415 COLL VENOUS BLD VENIPUNCTURE: CPT

## 2017-09-17 PROCEDURE — 74177 CT ABD & PELVIS W/CONTRAST: CPT | Performed by: EMERGENCY MEDICINE

## 2017-09-17 PROCEDURE — 85025 COMPLETE CBC W/AUTO DIFF WBC: CPT | Performed by: EMERGENCY MEDICINE

## 2017-09-17 PROCEDURE — 99284 EMERGENCY DEPT VISIT MOD MDM: CPT

## 2017-09-17 PROCEDURE — 85730 THROMBOPLASTIN TIME PARTIAL: CPT | Performed by: EMERGENCY MEDICINE

## 2017-09-17 PROCEDURE — 99285 EMERGENCY DEPT VISIT HI MDM: CPT

## 2017-09-17 PROCEDURE — 85610 PROTHROMBIN TIME: CPT | Performed by: EMERGENCY MEDICINE

## 2017-09-17 PROCEDURE — 99306 1ST NF CARE HIGH MDM 50: CPT | Performed by: FAMILY MEDICINE

## 2017-09-18 ENCOUNTER — SNF VISIT (OUTPATIENT)
Dept: INTERNAL MEDICINE CLINIC | Age: 72
End: 2017-09-18

## 2017-09-18 VITALS
HEART RATE: 89 BPM | SYSTOLIC BLOOD PRESSURE: 141 MMHG | OXYGEN SATURATION: 94 % | TEMPERATURE: 99 F | DIASTOLIC BLOOD PRESSURE: 73 MMHG | RESPIRATION RATE: 18 BRPM

## 2017-09-18 DIAGNOSIS — R53.81 PHYSICAL DECONDITIONING: ICD-10-CM

## 2017-09-18 DIAGNOSIS — Z93.3 COLOSTOMY PRESENT (HCC): ICD-10-CM

## 2017-09-18 DIAGNOSIS — E11.65 TYPE 2 DIABETES MELLITUS WITH HYPERGLYCEMIA, UNSPECIFIED LONG TERM INSULIN USE STATUS: ICD-10-CM

## 2017-09-18 DIAGNOSIS — F32.A DEPRESSION, UNSPECIFIED DEPRESSION TYPE: ICD-10-CM

## 2017-09-18 DIAGNOSIS — G47.00 INSOMNIA, UNSPECIFIED TYPE: ICD-10-CM

## 2017-09-18 DIAGNOSIS — R06.02 SHORTNESS OF BREATH: Primary | ICD-10-CM

## 2017-09-18 PROCEDURE — 99310 SBSQ NF CARE HIGH MDM 45: CPT | Performed by: NURSE PRACTITIONER

## 2017-09-18 RX ORDER — INSULIN LISPRO 100 [IU]/ML
INJECTION, SOLUTION INTRAVENOUS; SUBCUTANEOUS
COMMUNITY
End: 2018-10-24 | Stop reason: ALTCHOICE

## 2017-09-18 RX ORDER — MELATONIN
3 NIGHTLY
COMMUNITY
End: 2018-10-24 | Stop reason: ALTCHOICE

## 2017-09-18 NOTE — ED NOTES
Pt's colostomy bag checked to be drained. Very small amount watery brown stool at bottom of bag only. No abdominal pain. Stoma site remains with appearance of blackish in color.

## 2017-09-18 NOTE — ED PROVIDER NOTES
Patient Seen in: [facility] Emergency Department    History   Patient presents with:  Cath Tube Problem (gastrointestinal, urinary, integumentary)  Nausea/Vomiting/Diarrhea (gastrointestinal)    Stated Complaint:     HPI    66-year-old female with a COLONOSCOPY;  Surgeon: Charito Head MD;  Location: [address] [place] [address] ENDOSCOPY  3-1-11: CYSTOURETHROSCOPY      Comment: cysto flow US, dr Mary Tobias  No date: EYE SURGERY      Comment: eye  No date: FOOT SURGERY      Comment: foot  No date: HERNIA SURGERY lymphadenopathy or carotid bruit. Cardiovascular exam: Regular rate and rhythm. There are no murmurs, rubs, gallops. Lungs: Clear to auscultation bilaterally. There is no audible wheezes, Rales, rhonchi. Abdomen: Soft, nontender, nondistended.   Colost PLATELET.   Procedure                               Abnormality         Status                     ---------                               -----------         ------                     CBC W/ DIFFERENTIAL[[id number]]          Abnormal            Final resul 0991  Comment: Patient CT findings were discussed with general surgery Dr. Renato Short. He is aware of her physical exam findings and states that this was similar to prior to her discharge.   He states that this is a normal \"sloughing off\" of the stoma tissu for a visit  DR. HALE AND  @ [facility] WILL ASSIST WITH THIS      Medications Prescribed:  Current Discharge Medication List

## 2017-09-18 NOTE — ED NOTES
Report called to [facility].   Included in report that  had concerns regarding how pt's plan of care is going at [facility] and that Dr. Henrique Mccall spoke with Dr. Leroy Pinon regarding including  at [facility] to speak to  to answer questions

## 2017-09-18 NOTE — CM/SW NOTE
Spoke with Ming Butt,  of pt. Pt has several questions relating to Dinorah Vallecillo [facility] plan of care. I spoke with SW at the facility, [facility]. She will take the lead and speak with the pt . I called Ming Butt, gave him [facility] #. [phone]. .  He w

## 2017-09-18 NOTE — ED NOTES
Pt repositioned. O2 off. Pt's O2 sat at 91% room air. Per Dr. Claire Mckeon ok to discharge. Call placed for transport.   ETA approx 45 minutes

## 2017-09-18 NOTE — PROGRESS NOTES
Maria Estherrodney López  : 1945  Age 67year old  female patient is admitted to Facility: [address] [place] [address] [address] Residence for [address] [place] [address] date:  17  Discharge date to [facility]:  17  ELOS:  10-12 days  Anticipated discharge date:  17 Osteoarthritis, multiple sites, Imbalance, Myoclonus, RA (rheumatoid arthritis) ([address] Encounter for long-term (current) use of other medications, Abdominal pain, acute, Large bowel obstruction ([address] Rectosigmoiditis, Nausea and vomiting, Controlled type HISTORY      Comment: cysto Dr. Holland Guaman  No date: REMOVAL GALLBLADDER  No date: SHOULDER SURG PROC UNLISTED      Comment: shoulder  Family History   Problem Relation Age of Onset   • Cancer Mother      breast and bladder     Smoking status: Former Smoker before breakfast.   Disp:  Rfl:        VITALS:  /86   Pulse 76   Temp 97.8 °F (36.6 °C)   Resp 20   SpO2 96%      REVIEW OF SYSTEMS:  GENERAL HEALTH:fatigues easily  SKIN: denies any unusual skin lesions or rashes  WOUNDS: abdomen   EYES:no visual co extremity  EXTREMITIES/VASCULAR:radial pulses 2+ and dorsalis pedal pulses 2+  NEUROLOGIC: cranial nerves intact II-XII, follows commands  PSYCHIATRIC: alert and oriented x 3; affect appropriate, flat    MEDICAL DECISION MAKING    DIAGNOSTICS REVIEWED AT T notify if no output in 2 shifts    Bowel Regimen  1. Docusate sol'n BID  2. Milk of Magnesia 30mL po BID    Pain  1. Hydrocodone 5mg tab--1 po q 5 hrs PRN    RA, DISH (Diffuse Idiopathic Skeletal Hyperostosis), OA, gouty arthritis  1.  Febuxostat 80mg po da

## 2017-09-18 NOTE — ED NOTES
O2 started at 2l/min via nasal cannula. O2 sat immediately up to 98%.   Dr. Geovany White notified of room air O2 sat

## 2017-09-18 NOTE — ED INITIAL ASSESSMENT (HPI)
Pt sent from [facility], had a colostomy placed on 9/1/17. Staff at [facility] noted intestine protruding through colostomy. Pt denies c/o abdominal pain.

## 2017-09-19 NOTE — PROGRESS NOTES
Mark Simpson, 4/25/1945, 67year old, female    Chief Complaint:  Patient presents with:  Depression  Wound Recheck  Weakness  Insomnia  Dyspnea HELENA SOB (respiratory)     [facility] Admit date:  8/25/17  Discharge date to [facility]:  9/14/17  FRANCHESKA:  10 mild ([address] Disorientation, Atrial fibrillation with RVR ([address]    Pt sent to subacute rehab for PT/OT. TODAY:  Subjective:  Pt has a host of concerns today: feeling shortness of breath/sat is 93% on room air.   Pt in no apparent distress, no apparent dys intermittently refusing medications. Awaiting psych feedback/recs. Will discuss with MO PCP. RN to call patient's spouse and encourage visits/po intake, participation in therapy.      Objective:  VITALS:  /73   Pulse 89   Temp 98.8 °F (37.1 °C) Final  EOSINOPHILS, ABS 0.12 THO/mm3 0.0 - 0.50 Final  BASOPHILS, ABS 0.02 THO/mm3 0.0 - 0.10 Final  IMMATURE GRANS, ABS 0.04 THO/mm3 0.0 - 0.10 Final  NEUTROPHILS % 76.0 % Final  LYMPHOCYTES % 12.3 % Final  MONOCYTES % 8.5 % Final  EOSINOPHILS % 2.1 % Fin hs  2. Glimeperide/Amaryl 1mg po BID before meals  3. LCS diet   4.  Sliding scale insulin for treatment of blood glucoses greater than 150; 1 unit for 150-200, 2 units for 201-250, 3 units for 251-300, 4 units for 301-350, notify if greater than 351 or les

## 2017-09-21 NOTE — PROGRESS NOTES
Joann Blanton, 4/25/1945, 67year old, female    Chief Complaint:  Patient presents with:  Medication Problem  Weakness  Med Reconcilliation     [facility] Admit date:  8/25/17  Discharge date to [facility]:  9/14/17  ELOS:  10-12 days  Anticipated disch Atrial fibrillation with RVR ([facility] [address]    Pt sent to subacute rehab for PT/OT. TODAY:  Subjective:  Nursing staff have been reporting that Mrs. Tala Cadena is non-compliant with the prescribed medication regimen.   She is refusing to take medications that have First, hope to improve medication compliance, optimize sleep/ adequate sleep and complete psych evaluation. Will monitor progress/status and revisit goals/plan of care.      Upon discharge/care transition, pt will need:  Colostomy supplies, PT/OT/ST, hospi and Colostomy (hx necrosis at the ostomy stump)  1. Monitor stoma/dusky in appearance; reportedly known per surgeon and to be assessed 9/15  2. Wound RN  3. Monitor stool output q shift; notify if no output in 2 shifts  4.  Pt sent to ED 9/17 for eval of ne Follow-Ups:  1. Dr. Nathan Simons within 1 week  2. Cardiology within 1-2 weeks following MO discharge    Pt c/o SOB; refusing CXR----RESOLVED  1. Sat 93% on room air  2. O2 via NC applied  3.  Anxiolytic to be admin CAITLIN Wyatt  9/20/2017

## 2017-09-22 ENCOUNTER — SNF VISIT (OUTPATIENT)
Dept: INTERNAL MEDICINE CLINIC | Age: 72
End: 2017-09-22

## 2017-09-22 DIAGNOSIS — Z93.3 COLOSTOMY PRESENT (HCC): ICD-10-CM

## 2017-09-22 DIAGNOSIS — R53.81 PHYSICAL DECONDITIONING: Primary | ICD-10-CM

## 2017-09-22 DIAGNOSIS — F32.A DEPRESSION, UNSPECIFIED DEPRESSION TYPE: ICD-10-CM

## 2017-09-22 PROCEDURE — 99308 SBSQ NF CARE LOW MDM 20: CPT | Performed by: NURSE PRACTITIONER

## 2017-09-23 ENCOUNTER — SNF VISIT (OUTPATIENT)
Dept: FAMILY MEDICINE CLINIC | Facility: CLINIC | Age: 72
End: 2017-09-23

## 2017-09-23 VITALS
DIASTOLIC BLOOD PRESSURE: 80 MMHG | TEMPERATURE: 97 F | SYSTOLIC BLOOD PRESSURE: 136 MMHG | OXYGEN SATURATION: 98 % | RESPIRATION RATE: 18 BRPM | HEART RATE: 72 BPM

## 2017-09-23 DIAGNOSIS — F32.A DEPRESSION, UNSPECIFIED DEPRESSION TYPE: Primary | ICD-10-CM

## 2017-09-23 DIAGNOSIS — Z93.3 S/P COLOSTOMY (HCC): ICD-10-CM

## 2017-09-23 DIAGNOSIS — R53.1 WEAKNESS: ICD-10-CM

## 2017-09-23 PROCEDURE — 99308 SBSQ NF CARE LOW MDM 20: CPT | Performed by: FAMILY MEDICINE

## 2017-09-24 NOTE — PROGRESS NOTES
Yeni Hernandez, 4/25/1945, 67year old, female    Chief Complaint:  Patient presents with:  Weakness  Medication Administration: follow-up / monitoring compliance  Wound: [facility] Admit date:  8/25/17  Discharge date to [facility]:  9/14/17  JAQUELIN Protein-calorie malnutrition, mild ([address] Disorientation, Atrial fibrillation with RVR ([facility] [address] Pt sent to subacute rehab for PT/OT.     TODAY:  Subjective:  Since meeting with patient and her spouse, and her MO team care plan meeting; compliance has improv extremity  EXTREMITIES/VASCULAR:radial pulses 2+ and dorsalis pedal pulses 2+  NEUROLOGIC: cranial nerves intact II-XII, follows commands  PSYCHIATRIC: alert and oriented x 3, mood improved    MEDICAL DECISION MAKING    DIAGNOSTICS REVIEWED AT THIS VISIT: arthritis  1. Febuxostat 80mg po daily for gout proph. HTN, hx A Fib with RVR  1. VS q shift  2. Metoprolol 50mg po BID    DVT/Stroke Prophylaxis  1. Eliquis 5mg po BID    Hypothyroidism  1. Levothyroxine 125mcg po daily before breakfast  2.  TSH, Free T

## 2017-09-25 ENCOUNTER — SNF VISIT (OUTPATIENT)
Dept: INTERNAL MEDICINE CLINIC | Age: 72
End: 2017-09-25

## 2017-09-25 VITALS
DIASTOLIC BLOOD PRESSURE: 69 MMHG | TEMPERATURE: 98 F | HEART RATE: 73 BPM | RESPIRATION RATE: 18 BRPM | OXYGEN SATURATION: 95 % | SYSTOLIC BLOOD PRESSURE: 133 MMHG

## 2017-09-25 DIAGNOSIS — R39.15 URINARY URGENCY: Primary | ICD-10-CM

## 2017-09-25 DIAGNOSIS — Z93.3 COLOSTOMY PRESENT (HCC): ICD-10-CM

## 2017-09-25 DIAGNOSIS — F32.A DEPRESSION, UNSPECIFIED DEPRESSION TYPE: ICD-10-CM

## 2017-09-25 DIAGNOSIS — R53.81 PHYSICAL DECONDITIONING: ICD-10-CM

## 2017-09-25 PROCEDURE — 99309 SBSQ NF CARE MODERATE MDM 30: CPT | Performed by: NURSE PRACTITIONER

## 2017-09-25 NOTE — PROGRESS NOTES
Edwin Hoyos, 4/25/1945, 67year old, female    Chief Complaint:  Patient presents with:  Urinary Urgency  Weakness  Depression     [address] date:  8/25/17  Discharge date to [facility]:  9/14/17  ELOS:  10-12 days  Anticipated discharge date: Disorientation, Atrial fibrillation with RVR ([address] Pt sent to subacute rehab for PT/OT. TODAY:  Subjective:   Medication compliance much improved; mood/affect noticeably improved.   Pt participation in therapy has reportedly improved/ MO therapy team Deconditioning/Weakness  1. PT/OT to evaluate and treat  2. Assist with repositioning q 2 hrs  3. Assist to bed q hs  4. Recommendation for LTC, hospital bed and wheelchair  5.  Discharge extended to 10/2; pt hopes to discharge home with     Urinary Prophylaxis  1. Eliquis 5mg po BID    Hypothyroidism  1. Levothyroxine 125mcg po daily before breakfast  2. TSH, Free T4--WNL; last checked August/2017    Nausea/Vomiting/GERD symptoms  1.  Pantoprazole 20mg po BID before meals    Vitamins/Supplements r/t d

## 2017-09-27 ENCOUNTER — SNF VISIT (OUTPATIENT)
Dept: INTERNAL MEDICINE CLINIC | Age: 72
End: 2017-09-27

## 2017-09-27 DIAGNOSIS — S31.109A WOUND OF ABDOMEN: Primary | ICD-10-CM

## 2017-09-27 DIAGNOSIS — F32.A DEPRESSION, UNSPECIFIED DEPRESSION TYPE: ICD-10-CM

## 2017-09-27 DIAGNOSIS — Z93.3 COLOSTOMY PRESENT (HCC): ICD-10-CM

## 2017-09-27 DIAGNOSIS — R53.81 PHYSICAL DECONDITIONING: ICD-10-CM

## 2017-09-27 PROCEDURE — 99307 SBSQ NF CARE SF MDM 10: CPT | Performed by: NURSE PRACTITIONER

## 2017-09-28 VITALS
DIASTOLIC BLOOD PRESSURE: 70 MMHG | RESPIRATION RATE: 18 BRPM | TEMPERATURE: 98 F | OXYGEN SATURATION: 97 % | HEART RATE: 76 BPM | SYSTOLIC BLOOD PRESSURE: 128 MMHG

## 2017-09-28 NOTE — PROGRESS NOTES
Edwin Hoyos, 4/25/1945, 67year old, female    Chief Complaint:  Patient presents with:  Wound Recheck: r/t surgical site  Weakness  Lab Results     [address] date:  8/25/17  Discharge date to [facility]:  9/14/17  ELOS:  10-12 days  Anticipated Disorientation, Atrial fibrillation with RVR ([facility] [address] Pt sent to subacute rehab for PT/OT. TODAY:  Subjective:    Therapy updates:  Sit to stand mod assist, toilet min assist, ambulating approx 40 feet with rolling walker and contact-guard, mechanical sof per pt/spouse/staff.      Objective:  VITALS:  /70   Pulse 76   Temp 98.4 °F (36.9 °C)   Resp 18   SpO2 97%     PHYSICAL EXAM:  GENERAL HEALTH: obese, well-nourished, lying in bed, undergoing wound care;  present and learning for home care  NHI necrotic stump  5. Pt saw surg 9/19; deemed okay; next follow-up rec in October 6. Continue ostomy teaching with patient and spouse in preparation for discharge home  7.  Alginate applied to open abd wound; approx 5 cm deep;  educated on wound care Follow-Ups:  1. Dr. Mendiola Begin within 1 week  2. Cardiology within 1-2 weeks following MO discharge    Pt c/o SOB; refusing CXR----RESOLVED  1. Sat 93% on room air  2. O2 via NC applied  3.  Anxiolytic to be admin PRN    CAITLIN Barrera  9/27/2017

## 2017-09-29 ENCOUNTER — SNF DISCHARGE (OUTPATIENT)
Dept: INTERNAL MEDICINE CLINIC | Age: 72
End: 2017-09-29

## 2017-09-29 VITALS
OXYGEN SATURATION: 96 % | SYSTOLIC BLOOD PRESSURE: 140 MMHG | TEMPERATURE: 98 F | RESPIRATION RATE: 18 BRPM | DIASTOLIC BLOOD PRESSURE: 68 MMHG | HEART RATE: 80 BPM

## 2017-09-29 DIAGNOSIS — F32.A DEPRESSION, UNSPECIFIED DEPRESSION TYPE: ICD-10-CM

## 2017-09-29 DIAGNOSIS — S31.109A WOUND OF ABDOMEN: ICD-10-CM

## 2017-09-29 DIAGNOSIS — Z93.3 COLOSTOMY PRESENT (HCC): ICD-10-CM

## 2017-09-29 DIAGNOSIS — R53.81 PHYSICAL DECONDITIONING: Primary | ICD-10-CM

## 2017-09-29 PROCEDURE — 99316 NF DSCHRG MGMT 30 MIN+: CPT | Performed by: NURSE PRACTITIONER

## 2017-10-26 ENCOUNTER — LAB REQUISITION (OUTPATIENT)
Dept: LAB | Facility: HOSPITAL | Age: 72
End: 2017-10-26
Attending: FAMILY MEDICINE
Payer: MEDICARE

## 2017-10-26 DIAGNOSIS — R69 ILLNESS: ICD-10-CM

## 2017-10-26 PROCEDURE — 87086 URINE CULTURE/COLONY COUNT: CPT | Performed by: FAMILY MEDICINE

## 2017-10-26 PROCEDURE — 81001 URINALYSIS AUTO W/SCOPE: CPT | Performed by: FAMILY MEDICINE

## 2017-10-26 PROCEDURE — 87186 SC STD MICRODIL/AGAR DIL: CPT | Performed by: FAMILY MEDICINE

## 2017-10-26 PROCEDURE — 87088 URINE BACTERIA CULTURE: CPT | Performed by: FAMILY MEDICINE

## 2017-10-30 ENCOUNTER — PRIOR ORIGINAL RECORDS (OUTPATIENT)
Dept: OTHER | Age: 72
End: 2017-10-30

## 2017-11-08 ENCOUNTER — PRIOR ORIGINAL RECORDS (OUTPATIENT)
Dept: OTHER | Age: 72
End: 2017-11-08

## 2017-11-09 ENCOUNTER — HOSPITAL ENCOUNTER (OUTPATIENT)
Dept: CV DIAGNOSTICS | Facility: HOSPITAL | Age: 72
Discharge: HOME | End: 2017-11-09
Attending: INTERNAL MEDICINE
Payer: MEDICARE

## 2017-11-09 ENCOUNTER — LAB ENCOUNTER (OUTPATIENT)
Dept: LAB | Facility: HOSPITAL | Age: 72
End: 2017-11-09
Attending: INTERNAL MEDICINE
Payer: MEDICARE

## 2017-11-09 ENCOUNTER — PRIOR ORIGINAL RECORDS (OUTPATIENT)
Dept: OTHER | Age: 72
End: 2017-11-09

## 2017-11-09 ENCOUNTER — HOSPITAL ENCOUNTER (OUTPATIENT)
Dept: CARDIOLOGY CLINIC | Facility: HOSPITAL | Age: 72
Discharge: HOME | End: 2017-11-09
Attending: INTERNAL MEDICINE

## 2017-11-09 DIAGNOSIS — I48.91 ATRIAL FIBRILLATION (HCC): ICD-10-CM

## 2017-11-09 DIAGNOSIS — I25.10 CORONARY ATHEROSCLEROSIS OF NATIVE CORONARY ARTERY: Primary | ICD-10-CM

## 2017-11-09 DIAGNOSIS — I65.23 BILATERAL CAROTID ARTERY STENOSIS: ICD-10-CM

## 2017-11-09 DIAGNOSIS — I48.91 ATRIAL FIBRILLATION, UNSPECIFIED TYPE (HCC): ICD-10-CM

## 2017-11-09 PROCEDURE — 80048 BASIC METABOLIC PNL TOTAL CA: CPT

## 2017-11-09 PROCEDURE — 93225 XTRNL ECG REC<48 HRS REC: CPT | Performed by: INTERNAL MEDICINE

## 2017-11-09 PROCEDURE — 36415 COLL VENOUS BLD VENIPUNCTURE: CPT

## 2017-11-09 PROCEDURE — 84443 ASSAY THYROID STIM HORMONE: CPT

## 2017-11-09 PROCEDURE — 93227 XTRNL ECG REC<48 HR R&I: CPT | Performed by: INTERNAL MEDICINE

## 2017-11-09 PROCEDURE — 93226 XTRNL ECG REC<48 HR SCAN A/R: CPT | Performed by: INTERNAL MEDICINE

## 2017-11-11 VITALS
SYSTOLIC BLOOD PRESSURE: 140 MMHG | RESPIRATION RATE: 18 BRPM | TEMPERATURE: 98 F | HEART RATE: 73 BPM | OXYGEN SATURATION: 91 % | DIASTOLIC BLOOD PRESSURE: 61 MMHG

## 2017-11-11 VITALS
OXYGEN SATURATION: 95 % | HEART RATE: 80 BPM | TEMPERATURE: 97 F | SYSTOLIC BLOOD PRESSURE: 120 MMHG | DIASTOLIC BLOOD PRESSURE: 60 MMHG | RESPIRATION RATE: 20 BRPM

## 2017-11-11 NOTE — PROGRESS NOTES
HPI:    Patient ID: Mark Simpson is a 67year old female. Depression is improving. Advised increase of lexapro to 10mg daily. No suicidal thoughts.     Per surgeon colostomy is fine and necrotic parts will slough off and then it will look normal a UNITS Oral Cap Take 50,000 Units by mouth once a week.    Disp:  Rfl:    CVS VITAMIN B-12 1000 MCG Oral Tab  Disp:  Rfl:    Levothyroxine Sodium 100 MCG Oral Tab Take 125 mcg by mouth before breakfast.   Disp:  Rfl:      Allergies:  Macrobid [Nitrofura*

## 2017-11-11 NOTE — PROGRESS NOTES
Marlee Cliffjames  : 1945  Age 67year old  female patient is admitted to [facility] for rehab and nursing s/p sigmoid colon resection and colostomy.       [facility] Admit date:  17  Discharge date to [facility]:  17  FRANCHESKA: cysto flow , dr Mary Tobias  No date: EYE SURGERY      Comment: eye  No date: FOOT SURGERY      Comment: foot  No date: HERNIA SURGERY  No date: HYSTERECTOMY      Comment: CAROL 2007  5/3/17: OTHER SURGICAL HISTORY      Comment: cystoscopy Dr. Ame Schwartz  06/27/2017: Romy Francois (two) times daily before meals. Disp: 60 tablet Rfl: 0   Febuxostat 80 MG Oral Tab Take 80 mg by mouth daily. Disp:  Rfl:    Vitamin D, Ergocalciferol, (DRISDOL) 49592 UNITS Oral Cap Take 50,000 Units by mouth once a week.    Disp:  Rfl:    CVS VITAMIN B- S1, S2 normal, RRR; no S3, no S4;   ABDOMEN:  normal active BS+, soft, nondistended; no organomegaly, no masses; no bruits; nontender, no guarding, + tenderness along incision and around colostomy.   :Deferred  LYMPHATIC:no lymphedema  MUSCULOSKELETAL: no

## 2017-11-17 ENCOUNTER — PRIOR ORIGINAL RECORDS (OUTPATIENT)
Dept: OTHER | Age: 72
End: 2017-11-17

## 2017-11-21 ENCOUNTER — HOSPITAL ENCOUNTER (OUTPATIENT)
Dept: ULTRASOUND IMAGING | Facility: HOSPITAL | Age: 72
Discharge: HOME | End: 2017-11-21
Attending: FAMILY MEDICINE
Payer: MEDICARE

## 2017-11-21 DIAGNOSIS — N13.5 URETERIC OBSTRUCTION: ICD-10-CM

## 2017-11-21 DIAGNOSIS — N17.9 ACUTE KIDNEY FAILURE, UNSPECIFIED (HCC): ICD-10-CM

## 2017-11-21 LAB
BUN: 29 MG/DL
CHLORIDE: 93 MEQ/L
CREATININE, SERUM: 3.08 MG/DL
GLUCOSE: 192 MG/DL
HEMATOCRIT: 28.1 %
HEMOGLOBIN: 8.5 G/DL
PLATELETS: 325 K/UL
POTASSIUM, SERUM: 4.9 MEQ/L
RED BLOOD COUNT: 2.9 X 10-6/U
SODIUM: 133 MEQ/L
WHITE BLOOD COUNT: 9.2 X 10-3/U

## 2017-11-21 PROCEDURE — 76770 US EXAM ABDO BACK WALL COMP: CPT | Performed by: FAMILY MEDICINE

## 2017-11-28 ENCOUNTER — PRIOR ORIGINAL RECORDS (OUTPATIENT)
Dept: OTHER | Age: 72
End: 2017-11-28

## 2017-12-10 ENCOUNTER — HOSPITAL ENCOUNTER (EMERGENCY)
Facility: HOSPITAL | Age: 72
Discharge: HOME | End: 2017-12-10
Attending: EMERGENCY MEDICINE
Payer: MEDICARE

## 2017-12-10 VITALS
WEIGHT: 248 LBS | DIASTOLIC BLOOD PRESSURE: 102 MMHG | HEIGHT: 66 IN | BODY MASS INDEX: 39.86 KG/M2 | OXYGEN SATURATION: 99 % | TEMPERATURE: 98 F | SYSTOLIC BLOOD PRESSURE: 120 MMHG | HEART RATE: 102 BPM | RESPIRATION RATE: 22 BRPM

## 2017-12-10 DIAGNOSIS — K94.01 BLEEDING FROM COLOSTOMY STOMA (HCC): Primary | ICD-10-CM

## 2017-12-10 PROCEDURE — 80053 COMPREHEN METABOLIC PANEL: CPT | Performed by: EMERGENCY MEDICINE

## 2017-12-10 PROCEDURE — 99284 EMERGENCY DEPT VISIT MOD MDM: CPT

## 2017-12-10 PROCEDURE — 96360 HYDRATION IV INFUSION INIT: CPT

## 2017-12-10 PROCEDURE — 96361 HYDRATE IV INFUSION ADD-ON: CPT

## 2017-12-10 PROCEDURE — 85025 COMPLETE CBC W/AUTO DIFF WBC: CPT | Performed by: EMERGENCY MEDICINE

## 2017-12-10 PROCEDURE — 83690 ASSAY OF LIPASE: CPT | Performed by: EMERGENCY MEDICINE

## 2017-12-10 RX ORDER — SODIUM CHLORIDE 9 MG/ML
INJECTION, SOLUTION INTRAVENOUS CONTINUOUS
Status: DISCONTINUED | OUTPATIENT
Start: 2017-12-10 | End: 2017-12-10

## 2017-12-10 RX ORDER — FUROSEMIDE 20 MG/1
20 TABLET ORAL 2 TIMES DAILY
Status: ON HOLD | COMMUNITY
End: 2018-09-30

## 2017-12-10 NOTE — ED INITIAL ASSESSMENT (HPI)
Pt presents to ER with bleeding into ostomy bag. Ostomy was placed in August. Pt first noted bleeding yesterday. Blood in red in color. Pt states only pain is around stoma. No dizziness. No fatigue. No fever. No urinary complaints. Pt is speaking clearly.

## 2017-12-10 NOTE — ED PROVIDER NOTES
Patient Seen in: [facility] Emergency Department    History   Patient presents with:  Postop/Procedure Problem    Stated Complaint: bleeding stoma    HPI    60-year-old with a history of hypertension, rheumatoid arthritis, sleep apnea, obesity, atri Len  07/26/2017: OTHER SURGICAL HISTORY      Comment: jhon Branham  No date: REMOVAL GALLBLADDER  No date: SHOULDER SURG PROC UNLISTED      Comment: shoulder        Smoking status: Former Smoker and dry, no diaphoresis    ED Course     Labs Reviewed   COMP METABOLIC PANEL (14) - Abnormal; Notable for the following:        Result Value    Glucose 140 (*)     BUN 37 (*)     Creatinine 2.30 (*)     GFR 21 (*)     AST 10 (*)     Albumin 3.1 (*)     CO certainly need to come back if she had any further bleeding or particularly if her stool itself appeared bloody. Otherwise she should follow-up with her surgeon as an outpatient. She understands and agrees with the plan of care.         Disposition and Pl

## 2017-12-11 ENCOUNTER — PRIOR ORIGINAL RECORDS (OUTPATIENT)
Dept: OTHER | Age: 72
End: 2017-12-11

## 2017-12-13 ENCOUNTER — PRIOR ORIGINAL RECORDS (OUTPATIENT)
Dept: OTHER | Age: 72
End: 2017-12-13

## 2017-12-18 ENCOUNTER — PRIOR ORIGINAL RECORDS (OUTPATIENT)
Dept: OTHER | Age: 72
End: 2017-12-18

## 2018-01-05 LAB
BUN: 33 MG/DL
CALCIUM: 9.3 MG/DL
CHLORIDE: 101 MEQ/L
CHOLESTEROL, TOTAL: 164 MG/DL
CREATININE, SERUM: 1.8 MG/DL
GLUCOSE: 92 MG/DL
HDL CHOLESTEROL: 54 MG/DL
LDL CHOLESTEROL: 85 MG/DL
POTASSIUM, SERUM: 4.6 MEQ/L
SGOT (AST): 15 IU/L
SGPT (ALT): 10 IU/L
SODIUM: 141 MEQ/L
TRIGLYCERIDES: 151 MG/DL

## 2018-01-08 LAB
ALBUMIN: 3.1 G/DL
ALKALINE PHOSPHATATE(ALK PHOS): 73 IU/L
BILIRUBIN TOTAL: 0.4 MG/DL
BUN: 37 MG/DL
CALCIUM: 9.1 MG/DL
CHLORIDE: 101 MEQ/L
CREATININE, SERUM: 2.3 MG/DL
GLUCOSE: 140 MG/DL
HEMATOCRIT: 28.5 %
HEMOGLOBIN: 8.8 G/DL
IRON, TOTAL: 60 MCG/DL
PLATELETS: 186 K/UL
POTASSIUM, SERUM: 3.6 MEQ/L
PROTEIN, TOTAL: 7.4 G/DL
RED BLOOD COUNT: 2.94 X 10-6/U
SGOT (AST): 10 IU/L
SGPT (ALT): 16 IU/L
SODIUM: 140 MEQ/L
WHITE BLOOD COUNT: 5 X 10-3/U

## 2018-01-17 ENCOUNTER — PRIOR ORIGINAL RECORDS (OUTPATIENT)
Dept: OTHER | Age: 73
End: 2018-01-17

## 2018-01-26 ENCOUNTER — PRIOR ORIGINAL RECORDS (OUTPATIENT)
Dept: OTHER | Age: 73
End: 2018-01-26

## 2018-01-29 LAB — IRON, TOTAL: 45 MCG/DL

## 2018-04-02 ENCOUNTER — PRIOR ORIGINAL RECORDS (OUTPATIENT)
Dept: OTHER | Age: 73
End: 2018-04-02

## 2018-04-16 ENCOUNTER — HOSPITAL ENCOUNTER (OUTPATIENT)
Dept: LAB | Facility: HOSPITAL | Age: 73
Discharge: HOME | End: 2018-04-16
Attending: INTERNAL MEDICINE
Payer: MEDICARE

## 2018-04-16 ENCOUNTER — HOSPITAL ENCOUNTER (OUTPATIENT)
Dept: CV DIAGNOSTICS | Facility: HOSPITAL | Age: 73
Discharge: HOME | End: 2018-04-16
Attending: INTERNAL MEDICINE
Payer: MEDICARE

## 2018-04-16 ENCOUNTER — PRIOR ORIGINAL RECORDS (OUTPATIENT)
Dept: OTHER | Age: 73
End: 2018-04-16

## 2018-04-16 DIAGNOSIS — R94.31 ABNORMAL ELECTROCARDIOGRAM: ICD-10-CM

## 2018-04-16 DIAGNOSIS — I48.0 AF (PAROXYSMAL ATRIAL FIBRILLATION) (HCC): ICD-10-CM

## 2018-04-16 PROCEDURE — 93017 CV STRESS TEST TRACING ONLY: CPT | Performed by: INTERNAL MEDICINE

## 2018-04-16 PROCEDURE — 78452 HT MUSCLE IMAGE SPECT MULT: CPT | Performed by: INTERNAL MEDICINE

## 2018-04-16 PROCEDURE — 80061 LIPID PANEL: CPT | Performed by: INTERNAL MEDICINE

## 2018-04-16 PROCEDURE — 36415 COLL VENOUS BLD VENIPUNCTURE: CPT | Performed by: INTERNAL MEDICINE

## 2018-04-16 PROCEDURE — 93018 CV STRESS TEST I&R ONLY: CPT | Performed by: INTERNAL MEDICINE

## 2018-04-17 ENCOUNTER — PRIOR ORIGINAL RECORDS (OUTPATIENT)
Dept: OTHER | Age: 73
End: 2018-04-17

## 2018-05-01 LAB
CHOLESTEROL, TOTAL: 157 MG/DL
HDL CHOLESTEROL: 64 MG/DL
LDL CHOLESTEROL: 73 MG/DL
TRIGLYCERIDES: 101 MG/DL

## 2018-06-25 ENCOUNTER — DIABETIC EDUCATION (OUTPATIENT)
Dept: ENDOCRINOLOGY CLINIC | Facility: CLINIC | Age: 73
End: 2018-06-25

## 2018-06-25 DIAGNOSIS — Z79.4 TYPE 2 DIABETES MELLITUS WITH HYPERGLYCEMIA, WITH LONG-TERM CURRENT USE OF INSULIN (HCC): Primary | ICD-10-CM

## 2018-06-25 DIAGNOSIS — E11.65 TYPE 2 DIABETES MELLITUS WITH HYPERGLYCEMIA, WITH LONG-TERM CURRENT USE OF INSULIN (HCC): Primary | ICD-10-CM

## 2018-06-25 PROCEDURE — G0108 DIAB MANAGE TRN  PER INDIV: HCPCS | Performed by: DIETITIAN, REGISTERED

## 2018-08-22 PROCEDURE — 81001 URINALYSIS AUTO W/SCOPE: CPT | Performed by: UROLOGY

## 2018-08-22 PROCEDURE — 87086 URINE CULTURE/COLONY COUNT: CPT | Performed by: UROLOGY

## 2018-08-29 PROCEDURE — 87086 URINE CULTURE/COLONY COUNT: CPT | Performed by: UROLOGY

## 2018-09-07 PROCEDURE — 87086 URINE CULTURE/COLONY COUNT: CPT | Performed by: UROLOGY

## 2018-09-25 ENCOUNTER — DIABETIC EDUCATION (OUTPATIENT)
Dept: ENDOCRINOLOGY CLINIC | Facility: CLINIC | Age: 73
End: 2018-09-25
Payer: MEDICARE

## 2018-09-25 DIAGNOSIS — N18.30 CONTROLLED TYPE 2 DIABETES MELLITUS WITH STAGE 3 CHRONIC KIDNEY DISEASE, WITHOUT LONG-TERM CURRENT USE OF INSULIN (HCC): Primary | Chronic | ICD-10-CM

## 2018-09-25 DIAGNOSIS — E11.22 CONTROLLED TYPE 2 DIABETES MELLITUS WITH STAGE 3 CHRONIC KIDNEY DISEASE, WITHOUT LONG-TERM CURRENT USE OF INSULIN (HCC): Primary | Chronic | ICD-10-CM

## 2018-09-25 PROCEDURE — 95250 CONT GLUC MNTR PHYS/QHP EQP: CPT | Performed by: DIETITIAN, REGISTERED

## 2018-09-25 NOTE — PROGRESS NOTES
Start time 1:30  End time 2:30    Thinks her last A1C was 9%. Type 2 diabetes x 23 years.     Diet: reduced chips, still with ice cream sometimes BID    DB Meds: reviewed insulin action times  Continue Lantus 40 units each morning    Start Humalog for meal

## 2018-09-25 NOTE — PATIENT INSTRUCTIONS
Continue Lantus 40 units daily    Start Humalog for meal time insulin:  5 units (ideally 15-30 minutes) before breakfast and lunch and take 8 unit before dinner

## 2018-09-28 ENCOUNTER — TELEPHONE (OUTPATIENT)
Dept: ENDOCRINOLOGY CLINIC | Facility: CLINIC | Age: 73
End: 2018-09-28

## 2018-09-28 NOTE — TELEPHONE ENCOUNTER
Pt. Had questions about whether or not she should still inject her insulin, because she had been sick and can not keep anything down. I advised her to test her BG levels before Every meal and before HS.   I instructed her to continue injecting the Lantus 4

## 2018-09-29 ENCOUNTER — APPOINTMENT (OUTPATIENT)
Dept: CT IMAGING | Facility: HOSPITAL | Age: 73
DRG: 348 | End: 2018-09-29
Attending: EMERGENCY MEDICINE
Payer: MEDICARE

## 2018-09-29 ENCOUNTER — HOSPITAL ENCOUNTER (INPATIENT)
Facility: HOSPITAL | Age: 73
LOS: 1 days | Discharge: HOME | DRG: 348 | End: 2018-09-30
Attending: EMERGENCY MEDICINE | Admitting: FAMILY MEDICINE
Payer: MEDICARE

## 2018-09-29 DIAGNOSIS — G47.30 SLEEP APNEA, UNSPECIFIED TYPE: ICD-10-CM

## 2018-09-29 DIAGNOSIS — N18.30 STAGE 3 CHRONIC KIDNEY DISEASE (HCC): ICD-10-CM

## 2018-09-29 DIAGNOSIS — Z93.3 S/P COLOSTOMY (HCC): ICD-10-CM

## 2018-09-29 DIAGNOSIS — R10.9 ABDOMINAL PAIN, ACUTE: Primary | ICD-10-CM

## 2018-09-29 DIAGNOSIS — I48.92 ATRIAL FLUTTER, UNSPECIFIED TYPE (HCC): ICD-10-CM

## 2018-09-29 DIAGNOSIS — E11.65 TYPE 2 DIABETES MELLITUS WITH HYPERGLYCEMIA, WITH LONG-TERM CURRENT USE OF INSULIN (HCC): ICD-10-CM

## 2018-09-29 DIAGNOSIS — Z79.4 TYPE 2 DIABETES MELLITUS WITH HYPERGLYCEMIA, WITH LONG-TERM CURRENT USE OF INSULIN (HCC): ICD-10-CM

## 2018-09-29 PROBLEM — N30.10 INTERSTITIAL CYSTITIS: Chronic | Status: ACTIVE | Noted: 2018-09-29

## 2018-09-29 PROBLEM — E87.20 ACIDOSIS: Status: RESOLVED | Noted: 2017-08-28 | Resolved: 2018-09-29

## 2018-09-29 PROBLEM — E87.2 ACIDOSIS: Status: RESOLVED | Noted: 2017-08-28 | Resolved: 2018-09-29

## 2018-09-29 PROBLEM — K63.89: Status: RESOLVED | Noted: 2017-01-03 | Resolved: 2018-09-29

## 2018-09-29 PROBLEM — E86.0 DEHYDRATION: Status: RESOLVED | Noted: 2017-01-03 | Resolved: 2018-09-29

## 2018-09-29 PROBLEM — Z85.42 HISTORY OF ENDOMETRIAL CANCER: Chronic | Status: ACTIVE | Noted: 2018-09-29

## 2018-09-29 PROBLEM — R93.5 ABNORMAL ABDOMINAL CT SCAN: Status: RESOLVED | Noted: 2017-08-25 | Resolved: 2018-09-29

## 2018-09-29 PROBLEM — E11.22 CONTROLLED TYPE 2 DIABETES MELLITUS WITH STAGE 3 CHRONIC KIDNEY DISEASE, WITHOUT LONG-TERM CURRENT USE OF INSULIN (HCC): Chronic | Status: RESOLVED | Noted: 2017-01-03 | Resolved: 2018-09-29

## 2018-09-29 PROBLEM — K56.609 LARGE BOWEL OBSTRUCTION (HCC): Status: RESOLVED | Noted: 2017-01-03 | Resolved: 2018-09-29

## 2018-09-29 PROBLEM — R41.0 DISORIENTATION: Status: RESOLVED | Noted: 2017-08-29 | Resolved: 2018-09-29

## 2018-09-29 PROBLEM — R10.32 ABDOMINAL PAIN, LEFT LOWER QUADRANT: Status: RESOLVED | Noted: 2017-08-25 | Resolved: 2018-09-29

## 2018-09-29 PROBLEM — N17.9 ACUTE RENAL FAILURE SUPERIMPOSED ON STAGE 3 CHRONIC KIDNEY DISEASE (HCC): Status: RESOLVED | Noted: 2017-08-27 | Resolved: 2018-09-29

## 2018-09-29 PROBLEM — E44.1 PROTEIN-CALORIE MALNUTRITION, MILD (HCC): Chronic | Status: RESOLVED | Noted: 2017-08-28 | Resolved: 2018-09-29

## 2018-09-29 PROBLEM — Z79.01 ANTICOAGULANT LONG-TERM USE: Chronic | Status: ACTIVE | Noted: 2018-09-29

## 2018-09-29 PROCEDURE — 96374 THER/PROPH/DIAG INJ IV PUSH: CPT

## 2018-09-29 PROCEDURE — 36410 VNPNXR 3YR/> PHY/QHP DX/THER: CPT

## 2018-09-29 PROCEDURE — 99285 EMERGENCY DEPT VISIT HI MDM: CPT

## 2018-09-29 PROCEDURE — 83735 ASSAY OF MAGNESIUM: CPT | Performed by: INTERNAL MEDICINE

## 2018-09-29 PROCEDURE — 74176 CT ABD & PELVIS W/O CONTRAST: CPT | Performed by: EMERGENCY MEDICINE

## 2018-09-29 PROCEDURE — 85025 COMPLETE CBC W/AUTO DIFF WBC: CPT | Performed by: EMERGENCY MEDICINE

## 2018-09-29 PROCEDURE — 83690 ASSAY OF LIPASE: CPT | Performed by: EMERGENCY MEDICINE

## 2018-09-29 PROCEDURE — 96375 TX/PRO/DX INJ NEW DRUG ADDON: CPT

## 2018-09-29 PROCEDURE — 85730 THROMBOPLASTIN TIME PARTIAL: CPT | Performed by: EMERGENCY MEDICINE

## 2018-09-29 PROCEDURE — 80053 COMPREHEN METABOLIC PANEL: CPT | Performed by: EMERGENCY MEDICINE

## 2018-09-29 PROCEDURE — 93010 ELECTROCARDIOGRAM REPORT: CPT

## 2018-09-29 PROCEDURE — 96361 HYDRATE IV INFUSION ADD-ON: CPT

## 2018-09-29 PROCEDURE — 0WQFXZ2 REPAIR ABDOMINAL WALL, STOMA, EXTERNAL APPROACH: ICD-10-PCS | Performed by: SURGERY

## 2018-09-29 PROCEDURE — 93005 ELECTROCARDIOGRAM TRACING: CPT

## 2018-09-29 PROCEDURE — 76937 US GUIDE VASCULAR ACCESS: CPT

## 2018-09-29 PROCEDURE — C9113 INJ PANTOPRAZOLE SODIUM, VIA: HCPCS | Performed by: FAMILY MEDICINE

## 2018-09-29 PROCEDURE — 82962 GLUCOSE BLOOD TEST: CPT

## 2018-09-29 RX ORDER — MORPHINE SULFATE 4 MG/ML
2 INJECTION, SOLUTION INTRAMUSCULAR; INTRAVENOUS EVERY 30 MIN PRN
Status: DISCONTINUED | OUTPATIENT
Start: 2018-09-29 | End: 2018-09-29 | Stop reason: ALTCHOICE

## 2018-09-29 RX ORDER — ONDANSETRON 2 MG/ML
4 INJECTION INTRAMUSCULAR; INTRAVENOUS EVERY 4 HOURS PRN
Status: DISCONTINUED | OUTPATIENT
Start: 2018-09-29 | End: 2018-09-30

## 2018-09-29 RX ORDER — MORPHINE SULFATE 4 MG/ML
4 INJECTION, SOLUTION INTRAMUSCULAR; INTRAVENOUS EVERY 2 HOUR PRN
Status: DISCONTINUED | OUTPATIENT
Start: 2018-09-29 | End: 2018-09-30

## 2018-09-29 RX ORDER — LORAZEPAM 2 MG/ML
0.5 INJECTION INTRAMUSCULAR EVERY 8 HOURS PRN
Status: DISCONTINUED | OUTPATIENT
Start: 2018-09-29 | End: 2018-09-30

## 2018-09-29 RX ORDER — POTASSIUM CHLORIDE AND SODIUM CHLORIDE 900; 300 MG/100ML; MG/100ML
INJECTION, SOLUTION INTRAVENOUS CONTINUOUS
Status: DISCONTINUED | OUTPATIENT
Start: 2018-09-29 | End: 2018-09-30

## 2018-09-29 RX ORDER — DEXTROSE MONOHYDRATE 25 G/50ML
50 INJECTION, SOLUTION INTRAVENOUS
Status: DISCONTINUED | OUTPATIENT
Start: 2018-09-29 | End: 2018-09-30

## 2018-09-29 RX ORDER — SODIUM CHLORIDE 9 MG/ML
INJECTION, SOLUTION INTRAVENOUS CONTINUOUS
Status: DISCONTINUED | OUTPATIENT
Start: 2018-09-29 | End: 2018-09-29

## 2018-09-29 RX ORDER — MORPHINE SULFATE 4 MG/ML
4 INJECTION, SOLUTION INTRAMUSCULAR; INTRAVENOUS EVERY 30 MIN PRN
Status: DISCONTINUED | OUTPATIENT
Start: 2018-09-29 | End: 2018-09-29

## 2018-09-29 RX ORDER — METOPROLOL TARTRATE 5 MG/5ML
5 INJECTION INTRAVENOUS EVERY 4 HOURS
Status: DISCONTINUED | OUTPATIENT
Start: 2018-09-29 | End: 2018-09-30

## 2018-09-29 RX ORDER — MORPHINE SULFATE 4 MG/ML
2 INJECTION, SOLUTION INTRAMUSCULAR; INTRAVENOUS EVERY 2 HOUR PRN
Status: DISCONTINUED | OUTPATIENT
Start: 2018-09-29 | End: 2018-09-30

## 2018-09-29 RX ORDER — METOPROLOL TARTRATE 5 MG/5ML
5 INJECTION INTRAVENOUS EVERY 6 HOURS
Status: DISCONTINUED | OUTPATIENT
Start: 2018-09-29 | End: 2018-09-29

## 2018-09-29 RX ORDER — ENOXAPARIN SODIUM 150 MG/ML
1 INJECTION SUBCUTANEOUS EVERY 24 HOURS
Status: DISCONTINUED | OUTPATIENT
Start: 2018-09-29 | End: 2018-09-30

## 2018-09-29 RX ORDER — ONDANSETRON 2 MG/ML
4 INJECTION INTRAMUSCULAR; INTRAVENOUS ONCE
Status: COMPLETED | OUTPATIENT
Start: 2018-09-29 | End: 2018-09-29

## 2018-09-29 NOTE — H&P
[address] Patient Status:  Inpatient    1945 MRN [id number]   [facility] 3NW-A Attending Renay Rogel MD   Hosp Day # 0 PCP Ayesha Caal (Inactive)     Date:  2018  Date o RESECTION LEFT; N/A      Comment:  Performed by Jensen Camacho MD at [facility] MAIN OR  2/28/2017: COLONOSCOPY; N/A      Comment:  Procedure: COLONOSCOPY;  Surgeon: Gianni Foreman MD;               Location: [facility] ENDOSCOPY  2/28/2017: COLONOSCOPY; N/A      C times daily before meals and nightly. 1 unit for blood glucose 150-200, 2 units for b.g. 201-250, 3 units for 251-300, 4 units for 301-350; notify if greater than 351   apixaban 5 MG Oral Tab Take 1 tablet (5 mg total) by mouth 2 (two) times daily.    escit (ACEs/ARBs have variably been on hold due to hypotension); she also developed Atrial Fibrillation/Flutter during this initial post-operative time time and has been on long term Eliquis Therapy.  She has known Gouty Arthropathy, and was diagnosed at one time Gait not assessed, Motor system without focal deficit - 4+/5 symmetrical strength, Reflexes 1+/2+ all 4 ext, Sensory intact to LT throughout. MSE is at normal baseline, speech is normal. No tremors.      Cervical Papanicolaou contraindicated    Results: for reversal at any time. Obstructive sleep apnea, adult  Stable - she has been compliant with CPAP and has been getting benefit.       Uncontrolled type 2 diabetes mellitus with insulin therapy ([facility]  For now, will institute a Sliding Scale regiment

## 2018-09-29 NOTE — CONSULTS
[facility]  Report of Consultation    Joann Blanton Patient Status:  Inpatient    1945 MRN [id number]   [facility] 3NW-A Attending Lakesha Jimenez MD   Hosp Day # 0 PCP Gabriella Estradaes (Inactive)     Reason for [facility] Comment:  eye  No date: FOOT SURGERY      Comment:  foot  No date: HERNIA SURGERY  No date: HYSTERECTOMY      Comment:  CAROL 2007  5/3/17: OTHER SURGICAL HISTORY      Comment:  cystoscopy Dr. Arcelia Almanzar  06/27/2017: OTHER SURGICAL HISTORY      Comment:  Cysto w/ D Subcutaneous, TID CC and HS  •  0.9 % NaCl 1000ml with KCl 40 mEq infusion, , Intravenous, Continuous  •  LORazepam (ATIVAN) injection 0.5 mg, 0.5 mg, Intravenous, Q8H PRN  •  potassium chloride 40 mEq in sodium chloride 0.9 % 250 mL IVPB, 40 mEq, Intraven acute     Nausea and vomiting     Hypertension     Anemia     Azotemia     Bowel obstruction (HCC)     High risk medications (not anticoagulants) long-term use     Atrial fibrillation with RVR (HCC)     S/P colostomy (HCC)     Stage 3 chronic kidney diseas

## 2018-09-29 NOTE — ED INITIAL ASSESSMENT (HPI)
Pt with abd pain and nausea for past 3 days. Pt with colostomy. Reports she has had it for about 1 year.

## 2018-09-29 NOTE — HISTORICAL OFFICE NOTE
Erwin Minor  : 1945  ACCOUNT:  [id number]  [phone]  PCP: Dr. Jai Aguero     TODAY'S DATE: 2018  DICTATED BY:  [Dr. Leila Goldman: [atrial flutter, Followup of Atrial fibrillation and paroxysmal.]    HPI:    [On  repair and colon removal 2017    PAST CV HISTORY: new onset afib/flutter aug 2017 after bowel surgery    FAMILY HISTORY: Negative for premature CAD. Negative for AAA. SOCIAL HISTORY: SMOKING: Former tobacco use. 30-40 pack-year history of smoking, quit.  C [1.  We had a nice discussion today regarding her various options. They include continued rate control versus a trial of cardioversion versus even a catheter ablation procedure for her atrial flutter.   That being said she has very high chance of having r Followup of Carotid artery stenosis, bilateral, Followup of Edema, generalized, Followup of Pulm HTN and other secondary.]    HPI:    [On 04/02/2018, Dwight Hancock, a 67year old female, presented with no interim cardiac complaints.]    [She presents for osteoarthritis, obesity; mushtaq not using cpap, bunionectomies, hysterectomy, shoulder surgery, hernia repair and colon removal 2017    PAST CV HISTORY: new onset afib/flutter aug 2017 after bowel surgery    FAMILY HISTORY: Negative for premature CAD.  Negativ therapy has been helping her anemia. She needs to stay on Eliquis because she still in atrial fibrillation.   She is willing to see our electrophysiologist about what to do for her atrial fib whether cardioverting her would have any benefit versus ablation

## 2018-09-29 NOTE — PROGRESS NOTES
PT HAS ANTECUBITAL IV WHICH KEEPS ALARMING OCCLUSION, ATTEMPTED TO RESTART IV X2 AND UNABLE TO RESTART, PT STATES HAS VERY POOR VEINS, SPOKE WITH DR Chavez Seek AND ORDER FOR VASCULAR ACCESS TO INSERT IV RECEIVED,

## 2018-09-29 NOTE — ED PROVIDER NOTES
Patient Seen in: [facility] Emergency Department    History   Patient presents with:  Abdomen/Flank Pain (GI/)    Stated Complaint: ABD PAIN, N/V x 3 DAYS    HPI    51-year-old female with a history of a colostomy, history of diverticulitis status Comment:  eye  No date: FOOT SURGERY      Comment:  foot  No date: HERNIA SURGERY  No date: HYSTERECTOMY      Comment:  CAROL 2007  5/3/17: OTHER SURGICAL HISTORY      Comment:  cystoscopy Dr. Alba Betancourt  06/27/2017: OTHER SURGICAL HISTORY      Comment:  Cysto w/ D distended. There is bowel sounds throughout 4 quadrants. Mild generalized tenderness. Colostomy site is intact. There is dark brown stool in the colostomy bag. Extremities: There is no clubbing, cyanosis, edema.   Neurologic exam: Cranial nerves 2-12 a ABDOMEN AND PELVIS noncontrast      IMPRESSION:  Status post partial colectomy with left lower quadrant colostomy. Large amount of stool within the colon leading up to the ostomy opening which appears narrowed.   This appearance could be due to an obstruct will be admitted for IV fluid hydration and further evaluation. Patient admitted to primary care physician Dr. Jermaine Macario. Surgery consultation Dr. Francisco J Bocanegra.           Disposition and Plan     Clinical Impression:  Abdominal pain, acute  (primary encounter feli

## 2018-09-29 NOTE — PROGRESS NOTES
HEART RATE THIS AFTERNOON AFTER LOPRESSOR GIVEN AT 1500 IS IN THE HIGH 90'S BUT GRADUALLY INCREASES TO 'S, 120'S AND HAS BEEN MOSTLY IN 'S BUT FOR BRIEF PERIODS OF TIME IN 'S, PT DENIES ANY PALPITATIONS OR FEELING OF RAPID HEART RATE,

## 2018-09-29 NOTE — PROGRESS NOTES
[facility] Pharmacy Note:  Renal Dose Adjustment for Enoxaparin (LOVENOX)    Lizbet Thompson has been prescribed Enoxaparin (LOVENOX) 1mg/kg subcutaneously every 12 hours. Estimated Creatinine Clearance: 28.3 mL/min (A) (based on SCr of 1.66 mg/dL (H)).     H

## 2018-09-30 VITALS
HEIGHT: 66 IN | TEMPERATURE: 99 F | WEIGHT: 262.38 LBS | BODY MASS INDEX: 42.17 KG/M2 | RESPIRATION RATE: 19 BRPM | DIASTOLIC BLOOD PRESSURE: 55 MMHG | HEART RATE: 71 BPM | OXYGEN SATURATION: 98 % | SYSTOLIC BLOOD PRESSURE: 109 MMHG

## 2018-09-30 PROCEDURE — 80048 BASIC METABOLIC PNL TOTAL CA: CPT | Performed by: FAMILY MEDICINE

## 2018-09-30 PROCEDURE — 82962 GLUCOSE BLOOD TEST: CPT

## 2018-09-30 PROCEDURE — 85025 COMPLETE CBC W/AUTO DIFF WBC: CPT | Performed by: FAMILY MEDICINE

## 2018-09-30 PROCEDURE — 83735 ASSAY OF MAGNESIUM: CPT | Performed by: FAMILY MEDICINE

## 2018-09-30 PROCEDURE — 84132 ASSAY OF SERUM POTASSIUM: CPT | Performed by: FAMILY MEDICINE

## 2018-09-30 RX ORDER — PANTOPRAZOLE SODIUM 20 MG/1
20 TABLET, DELAYED RELEASE ORAL
Refills: 0 | Status: SHIPPED | COMMUNITY
Start: 2018-09-30 | End: 2018-09-30

## 2018-09-30 RX ORDER — FEBUXOSTAT 40 MG/1
80 TABLET, FILM COATED ORAL DAILY
Status: DISCONTINUED | OUTPATIENT
Start: 2018-09-30 | End: 2018-09-30

## 2018-09-30 RX ORDER — METOPROLOL TARTRATE 50 MG/1
50 TABLET, FILM COATED ORAL
Status: DISCONTINUED | OUTPATIENT
Start: 2018-09-30 | End: 2018-09-30

## 2018-09-30 RX ORDER — METOPROLOL TARTRATE 50 MG/1
50 TABLET, FILM COATED ORAL
Refills: 0 | Status: SHIPPED | COMMUNITY
Start: 2018-09-30 | End: 2018-10-24 | Stop reason: ALTCHOICE

## 2018-09-30 RX ORDER — FUROSEMIDE 20 MG/1
TABLET ORAL
Refills: 0 | Status: SHIPPED | COMMUNITY
Start: 2018-09-30 | End: 2018-11-05

## 2018-09-30 RX ORDER — PANTOPRAZOLE SODIUM 20 MG/1
20 TABLET, DELAYED RELEASE ORAL
Status: DISCONTINUED | OUTPATIENT
Start: 2018-09-30 | End: 2018-09-30

## 2018-09-30 RX ORDER — LEVOTHYROXINE SODIUM 0.12 MG/1
125 TABLET ORAL
Status: DISCONTINUED | OUTPATIENT
Start: 2018-09-30 | End: 2018-09-30

## 2018-09-30 NOTE — PROGRESS NOTES
Report dict  Stenotic colostomy dilated at beside with large amount of stool and gas O/P. Will start po. May need repeat dilations which can be done by pt or in my office. At some point she may need colostomy revision.

## 2018-09-30 NOTE — PROGRESS NOTES
NURSING DISCHARGE NOTE    Discharged Home via Wheelchair. Accompanied by Support staff  Belongings Taken by patient/family. PT DISCHARGED TO HOME IN STABLE CONDITION WITH INCENTIVE SPIROMETER. 02 SATS 90'S ON RA WHILE AWAKE.  COLOSTOMY WITH GAS AND STO

## 2018-09-30 NOTE — PROGRESS NOTES
[facility] Cardiology Progress Note    Subjective:  Feeling much better after dilation.  Eating full liquid this am.     Objective:  /49 (BP Location: Right arm)   Pulse 52   Temp 99.1 °F (37.3 °C) (Oral)   Resp 20   Ht 167.6 cm (5' 6\")   Wt 262 lb 6.4

## 2018-09-30 NOTE — PROGRESS NOTES
[facility]  Progress Note    Catarino Andino Patient Status:  Inpatient    1945 MRN [id number]   [facility] 3NW-A Attending Danielle Peralta MD   Hosp Day # 1 PCP Zia Weeks (Inactive)   Chart reviewed - Dr. Kevin Mendes Oral Daily   Levothyroxine Sodium (SYNTHROID, LEVOTHROID) tab 125 mcg 125 mcg Oral Before breakfast   apixaban (ELIQUIS) tab 5 mg 5 mg Oral BID   Metoprolol Tartrate (LOPRESSOR) tab 50 mg 50 mg Oral 2x Daily(Beta Blocker)   ondansetron HCl (ZOFRAN) injecti home later today if OK with Surgery. Questions/concerns were discussed with patient and/or family by bedside. Total Time spent with patient and coordinating care:  45 minutes (possible discharge day care).        Caitlin Urena MD  9/30/2018  8:26

## 2018-10-01 NOTE — DISCHARGE SUMMARY
[facility]    Discharge Summary    Yeni Hernandez Patient Status:  Inpatient    1945 MRN [id number]   [facility] 3NW-A Attending No att. providers found   Hosp Day # 1 PCP Jailene Hector (Inactive)     Date of Admission:   tablet (50 mg total) by mouth 2x Daily(Beta Blocker). Quantity:  60 tablet  Refills:  2     Metoprolol Tartrate 50 MG Tabs  Commonly known as:  LOPRESSOR  What changed:   You were already taking a medication with the same name, and this prescription was a known as:  SYNTHROID      Take 125 mcg by mouth before breakfast.   Refills:  0     melatonin 3 MG Tabs      Take 3 mg by mouth nightly. Refills:  0     multivitamin with iron Tabs      Take 1 tablet by mouth daily.    Quantity:  100 tablet  Refills:  3

## 2018-10-02 NOTE — CONSULTS
[facility]    PATIENT'S NAME: Noam Diaz   ATTENDING PHYSICIAN: Ronda Adams M.D.   CONSULTING PHYSICIAN: Miguel A Parker M.D.    PATIENT ACCOUNT#:   [de-identified]    LOCATION:  [facility]  MEDICAL RECORD #:   [id number]       DATE OF ANASTASIA

## 2018-10-04 PROCEDURE — 81015 MICROSCOPIC EXAM OF URINE: CPT | Performed by: UROLOGY

## 2018-10-04 PROCEDURE — 87086 URINE CULTURE/COLONY COUNT: CPT | Performed by: UROLOGY

## 2018-10-08 ENCOUNTER — PRIOR ORIGINAL RECORDS (OUTPATIENT)
Dept: OTHER | Age: 73
End: 2018-10-08

## 2018-10-08 ENCOUNTER — MYAURORA ACCOUNT LINK (OUTPATIENT)
Dept: OTHER | Age: 73
End: 2018-10-08

## 2018-10-10 PROCEDURE — 87106 FUNGI IDENTIFICATION YEAST: CPT | Performed by: UROLOGY

## 2018-10-10 PROCEDURE — 81001 URINALYSIS AUTO W/SCOPE: CPT | Performed by: UROLOGY

## 2018-10-10 PROCEDURE — 87102 FUNGUS ISOLATION CULTURE: CPT | Performed by: UROLOGY

## 2018-10-10 PROCEDURE — 87086 URINE CULTURE/COLONY COUNT: CPT | Performed by: UROLOGY

## 2018-10-10 PROCEDURE — 88108 CYTOPATH CONCENTRATE TECH: CPT | Performed by: UROLOGY

## 2018-10-16 ENCOUNTER — PRIOR ORIGINAL RECORDS (OUTPATIENT)
Dept: OTHER | Age: 73
End: 2018-10-16

## 2018-10-19 ENCOUNTER — DIABETIC EDUCATION (OUTPATIENT)
Dept: ENDOCRINOLOGY CLINIC | Facility: CLINIC | Age: 73
End: 2018-10-19
Payer: MEDICARE

## 2018-10-19 DIAGNOSIS — E11.65 TYPE 2 DIABETES MELLITUS WITH HYPERGLYCEMIA, WITH LONG-TERM CURRENT USE OF INSULIN (HCC): Primary | ICD-10-CM

## 2018-10-19 DIAGNOSIS — Z79.4 TYPE 2 DIABETES MELLITUS WITH HYPERGLYCEMIA, WITH LONG-TERM CURRENT USE OF INSULIN (HCC): Primary | ICD-10-CM

## 2018-10-19 PROCEDURE — G0108 DIAB MANAGE TRN  PER INDIV: HCPCS | Performed by: DIETITIAN, REGISTERED

## 2018-10-19 NOTE — PROGRESS NOTES
Start time: 8:00  End time: 8:30    Downloaded Freestyle Roderick (clinic-owned). Pt wore for 5 days then was admitted for clogged colostomy and it was inadvertently ripped off. Average glucose showed 190.  Explained to pt that this represents, for those 2 we

## 2018-10-24 PROCEDURE — 87106 FUNGI IDENTIFICATION YEAST: CPT | Performed by: UROLOGY

## 2018-10-24 PROCEDURE — 87186 SC STD MICRODIL/AGAR DIL: CPT | Performed by: UROLOGY

## 2018-10-24 PROCEDURE — 81001 URINALYSIS AUTO W/SCOPE: CPT | Performed by: UROLOGY

## 2018-10-24 PROCEDURE — 87102 FUNGUS ISOLATION CULTURE: CPT | Performed by: UROLOGY

## 2018-10-24 PROCEDURE — 87086 URINE CULTURE/COLONY COUNT: CPT | Performed by: UROLOGY

## 2018-10-25 ENCOUNTER — PRIOR ORIGINAL RECORDS (OUTPATIENT)
Dept: OTHER | Age: 73
End: 2018-10-25

## 2018-11-05 NOTE — H&P
Lupillo Rivas  : 1945  ACCOUNT:  [id number]  [phone]  PCP: Dr. Magen Booker     TODAY'S DATE: 10/16/2018  DICTATED BY:  [Dr. Henrique Flores: [Followup of Atrial fibrillation, paroxysmal.]    HPI:    [On 10/16/2018, Vipul Leo 2017 after bowel surgery and pulmonary hypertention    FAMILY HISTORY: Negative for premature CAD. Negative for AAA.   ALLERGIES: Codeine Phosphate - Injection    MEDICATIONS: Selected prescriptions see below    VITAL SIGNS: [B/P - 130/70 , Pulse - 88, Frutoso Sour atrial flutter ablation although as stated above she remains asymptomatic. She did hold her Eliquis for about a day when they were fixing her ostomy so we will proceed with a cardioversion in 4 weeks time.   Given her untreated sleep apnea would like to do

## 2018-11-07 ENCOUNTER — ANESTHESIA EVENT (OUTPATIENT)
Dept: INTERVENTIONAL RADIOLOGY/VASCULAR | Facility: HOSPITAL | Age: 73
End: 2018-11-07
Payer: MEDICARE

## 2018-11-07 ENCOUNTER — HOSPITAL ENCOUNTER (OUTPATIENT)
Dept: INTERVENTIONAL RADIOLOGY/VASCULAR | Facility: HOSPITAL | Age: 73
Discharge: HOME | End: 2018-11-07
Attending: INTERNAL MEDICINE | Admitting: INTERNAL MEDICINE
Payer: MEDICARE

## 2018-11-07 ENCOUNTER — PRIOR ORIGINAL RECORDS (OUTPATIENT)
Dept: OTHER | Age: 73
End: 2018-11-07

## 2018-11-07 VITALS
OXYGEN SATURATION: 96 % | BODY MASS INDEX: 40.18 KG/M2 | HEART RATE: 78 BPM | SYSTOLIC BLOOD PRESSURE: 124 MMHG | DIASTOLIC BLOOD PRESSURE: 62 MMHG | TEMPERATURE: 98 F | WEIGHT: 250 LBS | RESPIRATION RATE: 20 BRPM | HEIGHT: 66 IN

## 2018-11-07 DIAGNOSIS — I48.91 A-FIB (HCC): ICD-10-CM

## 2018-11-07 PROCEDURE — 92960 CARDIOVERSION ELECTRIC EXT: CPT

## 2018-11-07 PROCEDURE — 82962 GLUCOSE BLOOD TEST: CPT

## 2018-11-07 PROCEDURE — 93010 ELECTROCARDIOGRAM REPORT: CPT | Performed by: INTERNAL MEDICINE

## 2018-11-07 PROCEDURE — 36415 COLL VENOUS BLD VENIPUNCTURE: CPT

## 2018-11-07 PROCEDURE — 5A2204Z RESTORATION OF CARDIAC RHYTHM, SINGLE: ICD-10-PCS | Performed by: INTERNAL MEDICINE

## 2018-11-07 PROCEDURE — 80048 BASIC METABOLIC PNL TOTAL CA: CPT | Performed by: INTERNAL MEDICINE

## 2018-11-07 PROCEDURE — 93005 ELECTROCARDIOGRAM TRACING: CPT

## 2018-11-07 RX ORDER — SODIUM CHLORIDE, SODIUM LACTATE, POTASSIUM CHLORIDE, CALCIUM CHLORIDE 600; 310; 30; 20 MG/100ML; MG/100ML; MG/100ML; MG/100ML
INJECTION, SOLUTION INTRAVENOUS CONTINUOUS
Status: DISCONTINUED | OUTPATIENT
Start: 2018-11-07 | End: 2018-11-07

## 2018-11-07 RX ORDER — SODIUM CHLORIDE 9 MG/ML
INJECTION, SOLUTION INTRAVENOUS CONTINUOUS
Status: DISCONTINUED | OUTPATIENT
Start: 2018-11-07 | End: 2018-11-07

## 2018-11-07 RX ADMIN — SODIUM CHLORIDE: 9 INJECTION, SOLUTION INTRAVENOUS at 07:30:00

## 2018-11-07 NOTE — PROCEDURES
PROCEDURE(S) PERFORMED:    1. Cardioversion. 2.     Sedation     :  Donnie López MD     ANESTHESIA:  IV sedation. INDICATION:  Persistent atrial flutter     COMPLICATIONS:  None.      METHODS:  The patient was brought to the outpatient cardia

## 2018-11-07 NOTE — PROGRESS NOTES
Pt s/p cardioversion with anesthesia, pt now in NSR-- EKG done to confirm. VS stable. Discharge instructions given and explained to pt, pt verbalized understanding of all instructions. IV removed, fully intact.  Pt in stable condition, able to ambulate in r

## 2018-11-07 NOTE — H&P
[facility]  H&P    Jackey Barthel Patient Status:  Outpatient in a Bed    1945 MRN [id number]   Location [address] [facility] Attending Kelsey Fuentes MD   Hosp Day # 0 PCP Ghislaine Warner (In Procedure: COLONOSCOPY;  Surgeon: Graeme Kemp MD;  Location: [facility] ENDOSCOPY   • COLONOSCOPY N/A 2/28/2017    Performed by Graeme Kemp MD at [facility] ENDOSCOPY   • CYSTOURETHROSCOPY  3-1-11    cysto flow US, dr Vaishali Bryson   • EYE SURGERY      eye   • FOOT SOUSA wheezes, rales, rhonchi or dullness. Normal excursions and effort. Abdomen: Soft, non-tender. Extremities: Without clubbing, cyanosis or edema. Peripheral pulses are 2+. Neurologic: Alert and oriented, normal affect. Skin: Warm and dry.      Zac Mora

## 2018-11-07 NOTE — ANESTHESIA PREPROCEDURE EVALUATION
PRE-OP EVALUATION    Patient Name: Jenny Aguilar    Pre-op Diagnosis: * No pre-op diagnosis entered *    * No procedures listed *    * No surgeons found in log *    Pre-op vitals reviewed.   Temp: 98.3 °F (36.8 °C)  Pulse: 82  Resp: 16  BP: 137/69  SpO2 Macrobid [Nitrofurantoin Monohydrate Macrocrystals]; Codeine      Anesthesia Evaluation    Patient summary reviewed.     Anesthetic Complications           GI/Hepatic/Renal             (+) chronic renal disease                    Cardiovascular .0 09/30/2018     Lab Results   Component Value Date     09/30/2018    K 5.2 (H) 09/30/2018    K 5.2 (H) 09/30/2018     (H) 09/30/2018    CO2 25.0 09/30/2018    BUN 27 (H) 09/30/2018    CREATSERUM 1.32 (H) 09/30/2018    GLU 93 09/30/2018

## 2018-11-13 ENCOUNTER — DIABETIC EDUCATION (OUTPATIENT)
Dept: ENDOCRINOLOGY CLINIC | Facility: CLINIC | Age: 73
End: 2018-11-13
Payer: MEDICARE

## 2018-11-13 DIAGNOSIS — E11.65 TYPE 2 DIABETES MELLITUS WITH HYPERGLYCEMIA, WITH LONG-TERM CURRENT USE OF INSULIN (HCC): Primary | ICD-10-CM

## 2018-11-13 DIAGNOSIS — Z79.4 TYPE 2 DIABETES MELLITUS WITH HYPERGLYCEMIA, WITH LONG-TERM CURRENT USE OF INSULIN (HCC): Primary | ICD-10-CM

## 2018-11-13 PROCEDURE — G0108 DIAB MANAGE TRN  PER INDIV: HCPCS | Performed by: DIETITIAN, REGISTERED

## 2018-11-13 NOTE — PROGRESS NOTES
Start time: 1:30  End time: 2:00    Diet: reviewed, has small breakfst and lunch, snacks. She's aware sunflower seed kernels are 200 calores/ 1/4 cup. Having fewer sweets at HS (ice cream and cookies-ana cristina if homemade cookies.  Also bought Halloween candy she

## 2018-11-16 ENCOUNTER — TELEPHONE (OUTPATIENT)
Dept: ENDOCRINOLOGY CLINIC | Facility: CLINIC | Age: 73
End: 2018-11-16

## 2018-11-16 NOTE — TELEPHONE ENCOUNTER
Last appt earlier this week Lantus increased from 45 to 50 and kept Humalog at 8-8-12. Pt states FBS's 134, 124, 144. Two hours post-B 163, 172, 172    Two hours post-D in the evenin and 60. Pt has been having large salad.  To reduce pre-dinner H

## 2018-11-23 ENCOUNTER — MYAURORA ACCOUNT LINK (OUTPATIENT)
Dept: OTHER | Age: 73
End: 2018-11-23

## 2018-11-23 ENCOUNTER — PRIOR ORIGINAL RECORDS (OUTPATIENT)
Dept: OTHER | Age: 73
End: 2018-11-23

## 2018-11-26 LAB
BUN: 39 MG/DL
CALCIUM: 9.1 MG/DL
CHLORIDE: 104 MEQ/L
CREATININE, SERUM: 1.57 MG/DL
GLUCOSE: 182 MG/DL
POTASSIUM, SERUM: 4.6 MEQ/L
SODIUM: 142 MEQ/L

## 2018-11-27 ENCOUNTER — PATIENT OUTREACH (OUTPATIENT)
Dept: INFECTIOUS DISEASE | Facility: CLINIC | Age: 73
End: 2018-11-27

## 2018-11-27 DIAGNOSIS — B37.41 CANDIDA CYSTITIS: Primary | ICD-10-CM

## 2018-11-27 NOTE — PROGRESS NOTES
INFECTIOUS DISEASE CONSULTATION    Manjit Day [id number] MRN [id number]   Location [facility]       PCP Annabel Vazquez (Inactive)     Requested by Dr. Tanna Pennington    Reason for Consultation:  Alondra Krusei UTI    Histor Len   • OTHER SURGICAL HISTORY  07/26/2017    cysto Dr. Paolo Santiago   • OTHER SURGICAL HISTORY  09/07/2018    Sarah Saini    • REMOVAL GALLBLADDER     • SHOULDER SURG PROC UNLISTED      shoulder     Family History   Problem Relation Age of Onset   • Cancer Mother Disp:  Rfl:    CVS VITAMIN B-12 1000 MCG Oral Tab  Disp:  Rfl:    Levothyroxine Sodium 100 MCG Oral Tab Take 150 mcg by mouth before breakfast.   Disp:  Rfl:      Current Facility-Administered Medications on File Prior to Visit:  folic acid (FOLVITE) tab [facility]     Stage 3 chronic kidney disease (HCC)     Atrial flutter, unspecified type ([facility]     History of endometrial cancer     Anticoagulant long-term use     Interstitial cystitis      ASSESSMENT/PLAN:  1.  Alondra Krusei UTI  -Alondra Verner Ice is

## 2018-12-03 ENCOUNTER — DIABETIC EDUCATION (OUTPATIENT)
Dept: ENDOCRINOLOGY CLINIC | Facility: CLINIC | Age: 73
End: 2018-12-03
Payer: MEDICARE

## 2018-12-03 DIAGNOSIS — Z79.4 TYPE 2 DIABETES MELLITUS WITH HYPERGLYCEMIA, WITH LONG-TERM CURRENT USE OF INSULIN (HCC): Primary | ICD-10-CM

## 2018-12-03 DIAGNOSIS — E11.65 TYPE 2 DIABETES MELLITUS WITH HYPERGLYCEMIA, WITH LONG-TERM CURRENT USE OF INSULIN (HCC): Primary | ICD-10-CM

## 2018-12-03 PROCEDURE — G0108 DIAB MANAGE TRN  PER INDIV: HCPCS | Performed by: DIETITIAN, REGISTERED

## 2019-01-01 ENCOUNTER — EXTERNAL RECORD (OUTPATIENT)
Dept: CARDIOLOGY | Age: 74
End: 2019-01-01

## 2019-01-14 ENCOUNTER — PRIOR ORIGINAL RECORDS (OUTPATIENT)
Dept: OTHER | Age: 74
End: 2019-01-14

## 2019-01-17 ENCOUNTER — PRIOR ORIGINAL RECORDS (OUTPATIENT)
Dept: OTHER | Age: 74
End: 2019-01-17

## 2019-01-17 ENCOUNTER — MYAURORA ACCOUNT LINK (OUTPATIENT)
Dept: OTHER | Age: 74
End: 2019-01-17

## 2019-01-22 LAB
ALBUMIN: 4 G/DL
ALKALINE PHOSPHATATE(ALK PHOS): 86 IU/L
ALT (SGPT): 10 U/L
AST (SGOT): 13 U/L
BILIRUBIN TOTAL: 0.4 MG/DL
BUN: 46 MG/DL
CALCIUM: 9.4 MG/DL
CHLORIDE: 103 MEQ/L
CHOLESTEROL, TOTAL: 154 MG/DL
CREATININE, SERUM: 1.67 MG/DL
FREE T4: 1.2 MG/DL
GLOBULIN: 3 G/DL
GLUCOSE: 136 MG/DL
GLUCOSE: 136 MG/DL
HDL CHOLESTEROL: 64 MG/DL
HEMATOCRIT: 32.9 %
HEMOGLOBIN A1C: 6.7 %
HEMOGLOBIN: 10.6 G/DL
LDL CHOLESTEROL: 71 MG/DL
MAGNESIUM: 2.1 MG/DL
PLATELETS: 198 K/UL
POTASSIUM, SERUM: 5 MEQ/L
PROTEIN, TOTAL: 7 G/DL
RED BLOOD COUNT: 3.73 X 10-6/U
SGOT (AST): 13 IU/L
SGPT (ALT): 10 IU/L
SODIUM: 142 MEQ/L
THYROID STIMULATING HORMONE: 0.64 MLU/L
THYROID STIMULATING HORMONE: 0.64 MLU/L
TRIGLYCERIDES: 105 MG/DL
WHITE BLOOD COUNT: 6.4 X 10-3/U

## 2019-01-24 ENCOUNTER — PRIOR ORIGINAL RECORDS (OUTPATIENT)
Dept: OTHER | Age: 74
End: 2019-01-24

## 2019-02-28 VITALS
SYSTOLIC BLOOD PRESSURE: 118 MMHG | HEART RATE: 78 BPM | BODY MASS INDEX: 39.86 KG/M2 | DIASTOLIC BLOOD PRESSURE: 60 MMHG | WEIGHT: 248 LBS | HEIGHT: 66 IN

## 2019-02-28 VITALS
HEIGHT: 66 IN | WEIGHT: 248 LBS | SYSTOLIC BLOOD PRESSURE: 112 MMHG | HEART RATE: 78 BPM | DIASTOLIC BLOOD PRESSURE: 62 MMHG | BODY MASS INDEX: 39.86 KG/M2

## 2019-02-28 VITALS
BODY MASS INDEX: 40.02 KG/M2 | HEIGHT: 66 IN | SYSTOLIC BLOOD PRESSURE: 106 MMHG | DIASTOLIC BLOOD PRESSURE: 50 MMHG | HEART RATE: 66 BPM | WEIGHT: 249 LBS

## 2019-02-28 VITALS
SYSTOLIC BLOOD PRESSURE: 100 MMHG | HEART RATE: 56 BPM | WEIGHT: 250 LBS | DIASTOLIC BLOOD PRESSURE: 56 MMHG | BODY MASS INDEX: 40.18 KG/M2 | HEIGHT: 66 IN

## 2019-02-28 VITALS
BODY MASS INDEX: 40.18 KG/M2 | SYSTOLIC BLOOD PRESSURE: 112 MMHG | HEIGHT: 66 IN | DIASTOLIC BLOOD PRESSURE: 70 MMHG | WEIGHT: 250 LBS | HEART RATE: 80 BPM

## 2019-02-28 VITALS — DIASTOLIC BLOOD PRESSURE: 54 MMHG | SYSTOLIC BLOOD PRESSURE: 112 MMHG | HEART RATE: 74 BPM | HEIGHT: 66 IN

## 2019-02-28 VITALS — SYSTOLIC BLOOD PRESSURE: 128 MMHG | DIASTOLIC BLOOD PRESSURE: 60 MMHG | HEART RATE: 84 BPM

## 2019-02-28 VITALS — DIASTOLIC BLOOD PRESSURE: 70 MMHG | HEART RATE: 88 BPM | SYSTOLIC BLOOD PRESSURE: 130 MMHG

## 2019-04-04 RX ORDER — PRAVASTATIN SODIUM 20 MG
10 TABLET ORAL
COMMUNITY
Start: 2017-12-11

## 2019-04-04 RX ORDER — METOPROLOL TARTRATE 50 MG/1
50 TABLET, FILM COATED ORAL DAILY
COMMUNITY
Start: 2017-10-30

## 2019-04-04 RX ORDER — LEVOTHYROXINE SODIUM 0.1 MG/1
TABLET ORAL DAILY
COMMUNITY
Start: 2017-12-11 | End: 2021-05-31 | Stop reason: DRUGHIGH

## 2019-04-04 RX ORDER — ESCITALOPRAM OXALATE 5 MG/1
TABLET ORAL
COMMUNITY
Start: 2017-10-30

## 2019-04-04 RX ORDER — POTASSIUM CHLORIDE 1500 MG/1
TABLET, EXTENDED RELEASE ORAL
COMMUNITY

## 2019-04-04 RX ORDER — TORSEMIDE 20 MG/1
TABLET ORAL
COMMUNITY
Start: 2018-10-08 | End: 2020-01-01 | Stop reason: DRUGHIGH

## 2019-04-04 RX ORDER — FEBUXOSTAT 80 MG/1
TABLET, FILM COATED ORAL
COMMUNITY
Start: 2018-04-02 | End: 2020-07-30

## 2019-07-01 ENCOUNTER — OFFICE VISIT (OUTPATIENT)
Dept: CARDIOLOGY | Age: 74
End: 2019-07-01

## 2019-07-01 VITALS
HEART RATE: 72 BPM | HEIGHT: 66 IN | BODY MASS INDEX: 40.35 KG/M2 | DIASTOLIC BLOOD PRESSURE: 70 MMHG | SYSTOLIC BLOOD PRESSURE: 120 MMHG

## 2019-07-01 DIAGNOSIS — I65.23 ASYMPTOMATIC CAROTID ARTERY STENOSIS, BILATERAL: Primary | ICD-10-CM

## 2019-07-01 DIAGNOSIS — E78.00 PURE HYPERCHOLESTEROLEMIA: ICD-10-CM

## 2019-07-01 DIAGNOSIS — R60.1 GENERALIZED EDEMA: ICD-10-CM

## 2019-07-01 PROBLEM — Z79.01 LONG TERM (CURRENT) USE OF ANTICOAGULANTS: Status: ACTIVE | Noted: 2018-10-08

## 2019-07-01 PROBLEM — I48.0 AF (PAROXYSMAL ATRIAL FIBRILLATION) (CMD): Status: ACTIVE | Noted: 2018-10-08

## 2019-07-01 PROCEDURE — 99214 OFFICE O/P EST MOD 30 MIN: CPT | Performed by: INTERNAL MEDICINE

## 2019-07-25 ENCOUNTER — OFFICE VISIT (OUTPATIENT)
Dept: CARDIOLOGY | Age: 74
End: 2019-07-25

## 2019-07-25 VITALS
BODY MASS INDEX: 41.78 KG/M2 | HEART RATE: 70 BPM | SYSTOLIC BLOOD PRESSURE: 130 MMHG | WEIGHT: 260 LBS | DIASTOLIC BLOOD PRESSURE: 64 MMHG | HEIGHT: 66 IN

## 2019-07-25 DIAGNOSIS — I65.23 ASYMPTOMATIC CAROTID ARTERY STENOSIS, BILATERAL: ICD-10-CM

## 2019-07-25 DIAGNOSIS — R60.1 GENERALIZED EDEMA: ICD-10-CM

## 2019-07-25 DIAGNOSIS — I48.91 ATRIAL FIBRILLATION, UNSPECIFIED TYPE (CMD): Primary | ICD-10-CM

## 2019-07-25 PROCEDURE — 99215 OFFICE O/P EST HI 40 MIN: CPT | Performed by: INTERNAL MEDICINE

## 2019-07-25 PROCEDURE — 93000 ELECTROCARDIOGRAM COMPLETE: CPT | Performed by: INTERNAL MEDICINE

## 2019-07-25 RX ORDER — TORSEMIDE 20 MG/1
20 TABLET ORAL DAILY
COMMUNITY
End: 2020-01-01 | Stop reason: DRUGHIGH

## 2019-07-25 RX ORDER — INSULIN DEGLUDEC 100 U/ML
INJECTION, SOLUTION SUBCUTANEOUS
COMMUNITY

## 2019-07-25 ASSESSMENT — ENCOUNTER SYMPTOMS
BRUISES/BLEEDS EASILY: 0
WEIGHT GAIN: 0
FEVER: 0
CHILLS: 0
SUSPICIOUS LESIONS: 0
HEMOPTYSIS: 0
WEIGHT LOSS: 0
ALLERGIC/IMMUNOLOGIC COMMENTS: NO NEW FOOD ALLERGIES
HEMATOCHEZIA: 0
COUGH: 0

## 2019-08-20 ENCOUNTER — OFFICE VISIT (OUTPATIENT)
Dept: PODIATRY CLINIC | Facility: CLINIC | Age: 74
End: 2019-08-20
Payer: MEDICARE

## 2019-08-20 DIAGNOSIS — E11.65 UNCONTROLLED TYPE 2 DIABETES MELLITUS WITH INSULIN THERAPY (HCC): Primary | Chronic | ICD-10-CM

## 2019-08-20 DIAGNOSIS — M20.41 HAMMER TOES OF BOTH FEET: ICD-10-CM

## 2019-08-20 DIAGNOSIS — Z79.4 UNCONTROLLED TYPE 2 DIABETES MELLITUS WITH INSULIN THERAPY (HCC): Primary | Chronic | ICD-10-CM

## 2019-08-20 DIAGNOSIS — M20.42 HAMMER TOES OF BOTH FEET: ICD-10-CM

## 2019-08-20 DIAGNOSIS — E11.42 DIABETIC POLYNEUROPATHY ASSOCIATED WITH TYPE 2 DIABETES MELLITUS (HCC): ICD-10-CM

## 2019-08-20 DIAGNOSIS — M20.5X2 HALLUX LIMITUS OF LEFT FOOT: ICD-10-CM

## 2019-08-20 DIAGNOSIS — M15.3 OTHER SECONDARY OSTEOARTHRITIS OF MULTIPLE SITES: ICD-10-CM

## 2019-08-20 PROCEDURE — 99213 OFFICE O/P EST LOW 20 MIN: CPT | Performed by: PODIATRIST

## 2019-08-20 RX ORDER — INSULIN DEGLUDEC INJECTION 100 U/ML
40 INJECTION, SOLUTION SUBCUTANEOUS DAILY
Refills: 3 | Status: ON HOLD | COMMUNITY
End: 2021-01-29

## 2019-08-20 NOTE — PATIENT INSTRUCTIONS
Caring for Your Feet  There are many things you can do to keep your feet healthy. Here are some recommendations from the American Diabetes Association:    • Take care of your diabetes.  Work with your health care team to keep your blood glucose in your tar

## 2019-08-20 NOTE — PROGRESS NOTES
Jack Young is a 76year old female. Patient presents with:  Diabetic Foot Care: BS this AM 81-A1C not in EPIC- Last time seen by PCP about a month ago- Here for nail and callus trimming.         HPI:     Patient presents today for diabetic foot care by mouth 2x Daily(Beta Blocker). Disp: 60 tablet Rfl: 2   Febuxostat 80 MG Oral Tab Take 80 mg by mouth daily.    Disp:  Rfl:    CVS VITAMIN B-12 1000 MCG Oral Tab  Disp:  Rfl:    Levothyroxine Sodium 100 MCG Oral Tab Take 150 mcg by mouth before breakfast. Years of education: Not on file      Highest education level: Not on file    Tobacco Use      Smoking status: Former Smoker        Packs/day: 0.50        Years: 40.00        Pack years: 21        Quit date: 2003        Years since quittin.2      S performed this was done without incident she felt more comfortable we will see her back in follow-up  And was advised to be very cautious with regarding to her foot structure due to lack of circulation to the foot  The patient indicates understanding of th

## 2019-10-01 ENCOUNTER — HOSPITAL ENCOUNTER (OUTPATIENT)
Dept: CARDIOLOGY CLINIC | Facility: HOSPITAL | Age: 74
Discharge: HOME | End: 2019-10-01
Attending: INTERNAL MEDICINE
Payer: MEDICARE

## 2019-10-01 DIAGNOSIS — I65.23 ASYMPTOMATIC CAROTID ARTERY STENOSIS, BILATERAL: ICD-10-CM

## 2019-10-01 PROCEDURE — 93880 EXTRACRANIAL BILAT STUDY: CPT | Performed by: INTERNAL MEDICINE

## 2019-10-02 ENCOUNTER — DOCUMENTATION (OUTPATIENT)
Dept: CARDIOLOGY | Age: 74
End: 2019-10-02

## 2019-10-03 ENCOUNTER — TELEPHONE (OUTPATIENT)
Dept: CARDIOLOGY | Age: 74
End: 2019-10-03

## 2019-10-03 DIAGNOSIS — I65.23 ASYMPTOMATIC CAROTID ARTERY STENOSIS, BILATERAL: ICD-10-CM

## 2019-10-28 ENCOUNTER — OFFICE VISIT (OUTPATIENT)
Dept: SURGERY | Facility: CLINIC | Age: 74
End: 2019-10-28
Payer: MEDICARE

## 2019-10-28 VITALS
BODY MASS INDEX: 42.38 KG/M2 | HEART RATE: 73 BPM | SYSTOLIC BLOOD PRESSURE: 145 MMHG | WEIGHT: 270 LBS | RESPIRATION RATE: 16 BRPM | DIASTOLIC BLOOD PRESSURE: 81 MMHG | HEIGHT: 67 IN

## 2019-10-28 DIAGNOSIS — K62.5 BRBPR (BRIGHT RED BLOOD PER RECTUM): ICD-10-CM

## 2019-10-28 DIAGNOSIS — K94.03 COLOSTOMY STRICTURE (HCC): Primary | ICD-10-CM

## 2019-10-28 DIAGNOSIS — G47.33 OBSTRUCTIVE SLEEP APNEA, ADULT: ICD-10-CM

## 2019-10-28 DIAGNOSIS — E11.65 UNCONTROLLED TYPE 2 DIABETES MELLITUS WITH INSULIN THERAPY (HCC): Chronic | ICD-10-CM

## 2019-10-28 DIAGNOSIS — Z79.4 UNCONTROLLED TYPE 2 DIABETES MELLITUS WITH INSULIN THERAPY (HCC): Chronic | ICD-10-CM

## 2019-10-28 DIAGNOSIS — I48.91 ATRIAL FIBRILLATION WITH RVR (HCC): ICD-10-CM

## 2019-10-28 PROCEDURE — 99204 OFFICE O/P NEW MOD 45 MIN: CPT | Performed by: COLON & RECTAL SURGERY

## 2019-10-28 RX ORDER — POLYETHYLENE GLYCOL 3350, SODIUM CHLORIDE, SODIUM BICARBONATE, POTASSIUM CHLORIDE 420; 11.2; 5.72; 1.48 G/4L; G/4L; G/4L; G/4L
POWDER, FOR SOLUTION ORAL
Qty: 1 BOTTLE | Refills: 0 | Status: SHIPPED | OUTPATIENT
Start: 2019-10-28 | End: 2019-12-12

## 2019-10-28 NOTE — PATIENT INSTRUCTIONS
Emily Leary presents today in consultation of Dr. Anthony Knapp for a second opinion regarding her colostomy stricture. The patient has a very significant past history of a Chapo procedure performed by Dr. Mayra Anders 2 years ago.   The patient presented to surgical history significant for a total hysterectomy, a laparoscopic cholecystectomy,  section, and a ventral hernia repair with mesh approximately 20 years ago.   It is of note, that Dr. Sonali Hunt op note from the exploratory laparotomy did not co questions from the patient were answered in detail. A description of the procedure and it's possible outcomes was fully discussed. The patient seemed to understand the conversation and its details. Consent for surgery was confirmed with the patient.

## 2019-10-28 NOTE — H&P
New Patient Visit Note       Active Problems      1. Colostomy stricture ([facility]    2. BRBPR (bright red blood per rectum)    3. Uncontrolled type 2 diabetes mellitus with insulin therapy ([facility]    4. Atrial fibrillation with RVR ([facility]    5.  Obstructive sleep every 3 months for her colostomy stricture if she does not want to undergo a colostomy reversal.    The patient also states that earlier this month she began to develop bright red blood per rectum.   She notes that she has noted blood well wiping after havi options. Allergies  Leonor Rizzo is allergic to macrobid [nitrofurantoin monohydrate macrocrystals] and codeine. Past Medical / Surgical / Social / Family History    The past medical and past surgical history have been reviewed by me today.     Past Med file    Tobacco Use      Smoking status: Former Smoker        Packs/day: 0.50        Years: 40.00        Pack years: 21        Quit date: 2003        Years since quittin.4      Smokeless tobacco: Never Used    Substance and Sexual Activity      Al Rfl:   •  CVS VITAMIN B-12 1000 MCG Oral Tab, , Disp: , Rfl:   •  Levothyroxine Sodium 100 MCG Oral Tab, Take 150 mcg by mouth before breakfast.  , Disp: , Rfl:       Review of Systems  The Review of Systems has been reviewed by me during today.   Review of wheezes. She has no rales. Abdominal: Soft. Normal appearance and bowel sounds are normal. She exhibits no shifting dullness, no distension, no fluid wave, no ascites and no mass. There is no hepatosplenomegaly. There is no tenderness.  There is no rigidi She also had a colovesical fistula at that time. She underwent an exploratory laparotomy with creation of a colostomy. The patient states that she had a very difficult recovery following the procedure.   She states she had a large amount of pain and delus history of smoking. She occasionally drinks alcohol. The patient has a past medical history significant for atrial fibrillation, she is on Eliquis. The patient is an insulin dependent diabetic. She also has hypertension and rheumatoid arthritis.     C

## 2019-10-31 ENCOUNTER — TELEPHONE (OUTPATIENT)
Dept: CARDIOLOGY | Age: 74
End: 2019-10-31

## 2019-11-06 ENCOUNTER — TELEPHONE (OUTPATIENT)
Dept: SURGERY | Facility: CLINIC | Age: 74
End: 2019-11-06

## 2019-11-06 NOTE — TELEPHONE ENCOUNTER
Pt phoned office, has colonoscopy tomorrow am with ATIF. Pt a diabetic, on insulin. States she is doing prep, accu check 54. Talked to KYMBERLY CHAUDHRY, explained to pt to take [employer] or some type of sugary juice.  Explained to hold insulin and continue t

## 2019-11-07 ENCOUNTER — LAB REQUISITION (OUTPATIENT)
Dept: LAB | Facility: HOSPITAL | Age: 74
End: 2019-11-07
Payer: MEDICARE

## 2019-11-07 DIAGNOSIS — Z86.010 PERSONAL HISTORY OF COLONIC POLYPS: ICD-10-CM

## 2019-11-07 PROCEDURE — 88305 TISSUE EXAM BY PATHOLOGIST: CPT | Performed by: COLON & RECTAL SURGERY

## 2019-11-11 ENCOUNTER — OFFICE VISIT (OUTPATIENT)
Dept: SURGERY | Facility: CLINIC | Age: 74
End: 2019-11-11
Payer: MEDICARE

## 2019-11-11 VITALS
TEMPERATURE: 97 F | DIASTOLIC BLOOD PRESSURE: 75 MMHG | HEART RATE: 78 BPM | SYSTOLIC BLOOD PRESSURE: 140 MMHG | BODY MASS INDEX: 42.38 KG/M2 | WEIGHT: 270 LBS | HEIGHT: 67 IN

## 2019-11-11 DIAGNOSIS — K62.5 BRBPR (BRIGHT RED BLOOD PER RECTUM): ICD-10-CM

## 2019-11-11 DIAGNOSIS — L29.0 PRURITUS ANI: ICD-10-CM

## 2019-11-11 DIAGNOSIS — K94.03 COLOSTOMY STRICTURE (HCC): ICD-10-CM

## 2019-11-11 DIAGNOSIS — K62.89 PROCTITIS: Primary | ICD-10-CM

## 2019-11-11 PROBLEM — K56.609 BOWEL OBSTRUCTION (HCC): Status: RESOLVED | Noted: 2017-08-25 | Resolved: 2019-11-11

## 2019-11-11 PROCEDURE — 99213 OFFICE O/P EST LOW 20 MIN: CPT | Performed by: COLON & RECTAL SURGERY

## 2019-11-11 RX ORDER — HYDROCORTISONE 100 MG/60ML
100 SUSPENSION RECTAL NIGHTLY
Qty: 60 ENEMA | Refills: 0 | Status: SHIPPED | OUTPATIENT
Start: 2019-11-11 | End: 2020-01-10

## 2019-11-11 NOTE — PATIENT INSTRUCTIONS
I am seeing this patient in further consultation regarding stricture at the colostomy site. She also has bright red blood per rectum. She has anal pain and pressure. Since her last office visit, the patient underwent colonoscopy on November 7, 2019. residual diverticulosis both above and below the colostomy site. The most significant near the rectosigmoid junction which is below the colostomy site. The patient has 2 polyps on the examination that was performed on 11/7/2019.   This will require futu

## 2019-11-11 NOTE — PROGRESS NOTES
Follow Up Visit Note       Active Problems      1. Proctitis    2. Pruritus ani    3. BRBPR (bright red blood per rectum)    4. Colostomy stricture [facility])          Chief Complaint   Patient presents with:  Colonoscopy:  Follow up after colonoscopy done 11/7/ blood that will leak onto her underwear. 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 She has a very long remnant of the colon that remains. This is obviously causing drainage and seepage.   The at [facility] MAIN OR   • COLONOSCOPY  11/07/2019   • COLONOSCOPY N/A 2/28/2017    Performed by Beena Weber MD at [facility] ENDOSCOPY   • CYSTOURETHROSCOPY  3-1-11    cysto flow US, dr Kirill Vazquez   • EYE SURGERY      eye   • FOOT SURGERY      foot   • HERNIA SURGERY , Rfl:   •  torsemide 20 MG Oral Tab, Take 20 mg by mouth every other day.  , Disp: , Rfl:   •  Pravastatin Sodium 20 MG Oral Tab, Take 20 mg by mouth nightly., Disp: , Rfl:   •  Ferrous Sulfate (IRON) 325 (65 Fe) MG Oral Tab, Take by mouth., Disp: , Rfl: urgency. Musculoskeletal: Negative for arthralgias and myalgias. Skin: Negative for color change and rash. Neurological: Negative for tremors, syncope and weakness. Hematological: Negative for adenopathy. Does not bruise/bleed easily.    Psychiatric abdominal pain, distention, nausea, and vomiting. She presented to Dr. Silva Kimball and was found to have a colostomy stricture. She was dilated in the office. 7 months later, she had a similar episode and was dilated once again.       The patient also states routine should be to wake up in the morning, provide herself a cortisone enema, hop in the shower and wash with soap and water lightly and gently, and then placed Desitin generously around the introitus, the anal margin, and buttocks region.   She should re

## 2019-11-12 ENCOUNTER — OFFICE VISIT (OUTPATIENT)
Dept: PODIATRY CLINIC | Facility: CLINIC | Age: 74
End: 2019-11-12
Payer: MEDICARE

## 2019-11-12 DIAGNOSIS — E11.65 UNCONTROLLED TYPE 2 DIABETES MELLITUS WITH INSULIN THERAPY (HCC): Primary | Chronic | ICD-10-CM

## 2019-11-12 DIAGNOSIS — M20.5X2 HALLUX LIMITUS OF LEFT FOOT: ICD-10-CM

## 2019-11-12 DIAGNOSIS — E11.42 DIABETIC POLYNEUROPATHY ASSOCIATED WITH TYPE 2 DIABETES MELLITUS (HCC): ICD-10-CM

## 2019-11-12 DIAGNOSIS — Z79.4 UNCONTROLLED TYPE 2 DIABETES MELLITUS WITH INSULIN THERAPY (HCC): Primary | Chronic | ICD-10-CM

## 2019-11-12 DIAGNOSIS — M15.3 OTHER SECONDARY OSTEOARTHRITIS OF MULTIPLE SITES: ICD-10-CM

## 2019-11-12 DIAGNOSIS — B35.1 ONYCHOMYCOSIS: ICD-10-CM

## 2019-11-12 PROCEDURE — 11721 DEBRIDE NAIL 6 OR MORE: CPT | Performed by: PODIATRIST

## 2019-11-12 NOTE — PROGRESS NOTES
Duard Krabbe is a 76year old female. Patient presents with:  Diabetic Foot Care: FBS was 83 this am, its been 3 months since seeing the PCP, A1C was 6.1 about 3 months ago.  Patient denies any pain, swelling of the legs is normal.         HPI:     Res Metoprolol Tartrate 50 MG Oral Tab Take 1 tablet (50 mg total) by mouth 2x Daily(Beta Blocker). 60 tablet 2   • Febuxostat 80 MG Oral Tab Take 80 mg by mouth daily.        • [facility]S VITAMIN B-12 1000 MCG Oral Tab      • Levothyroxine Sodium 100 MCG Oral Tab Jeremiah Murray Not on file    Tobacco Use      Smoking status: Former Smoker        Packs/day: 0.50        Years: 40.00        Pack years: 21        Quit date: 2003        Years since quittin.5      Smokeless tobacco: Never Used    Substance and Sexual Activity debris, causing impaction, and pain with ambulation. Today, those nails were debrided back to as healthy tissue as normal, being reduced by both manual and mechanical means.   All 10 toes were debrided and we will see her back here follow-up for your foot f

## 2019-12-02 ENCOUNTER — OFFICE VISIT (OUTPATIENT)
Dept: SURGERY | Facility: CLINIC | Age: 74
End: 2019-12-02
Payer: MEDICARE

## 2019-12-02 VITALS
DIASTOLIC BLOOD PRESSURE: 80 MMHG | HEART RATE: 72 BPM | BODY MASS INDEX: 42.38 KG/M2 | RESPIRATION RATE: 18 BRPM | WEIGHT: 270 LBS | HEIGHT: 67 IN | SYSTOLIC BLOOD PRESSURE: 155 MMHG

## 2019-12-02 DIAGNOSIS — E11.65 UNCONTROLLED TYPE 2 DIABETES MELLITUS WITH INSULIN THERAPY (HCC): Chronic | ICD-10-CM

## 2019-12-02 DIAGNOSIS — K62.89 PROCTITIS: Primary | ICD-10-CM

## 2019-12-02 DIAGNOSIS — K94.03 COLOSTOMY STRICTURE (HCC): ICD-10-CM

## 2019-12-02 DIAGNOSIS — Z79.4 UNCONTROLLED TYPE 2 DIABETES MELLITUS WITH INSULIN THERAPY (HCC): Chronic | ICD-10-CM

## 2019-12-02 DIAGNOSIS — L29.0 PRURITUS ANI: ICD-10-CM

## 2019-12-02 PROCEDURE — 99213 OFFICE O/P EST LOW 20 MIN: CPT | Performed by: PHYSICIAN ASSISTANT

## 2019-12-02 NOTE — PROGRESS NOTES
Follow Up Visit Note       Active Problems      1. Proctitis    2. Pruritus ani    3. Colostomy stricture ([address]    4.  Uncontrolled type 2 diabetes mellitus with insulin therapy [facility])          Chief Complaint   Patient presents with:  Anal Problem (GI): EST periodically.     The patient has residual diverticulosis both above and below the colostomy site.   The most significant near the rectosigmoid junction which is below the colostomy site.     The patient has 2 polyps on the examination that was performed on bowel obstruction ([facility] [address] 2017     Past Surgical History:   Procedure Laterality Date   •      • CHOLECYSTECTOMY  12 Green EDW   • COLON RESECTION LEFT N/A 2017    Performed by Merced Lewis MD at [facility] MAIN OR   • COLONOSCOPY  20 Inject into the skin., Disp: , Rfl:   •  TRESIBA FLEXTOUCH 100 UNIT/ML Subcutaneous Solution Pen-injector, INJECT 50 UNITS DAILY AS DIRECTED, Disp: , Rfl: 3  •  POTASSIUM OR, Take 10 mEq by mouth every other day.  , Disp: , Rfl:   •  torsemide 20 MG Oral T Gastrointestinal: Negative for abdominal distention, abdominal pain, anal bleeding, blood in stool, constipation, diarrhea, nausea and vomiting. Genitourinary: Negative for difficulty urinating, dysuria, frequency and urgency.    Musculoskeletal: Temo Velez or guarding. There are no signs of ascites or peritonitis. Liver and spleen nonpalpable. There are no palpable masses or hernias. Ostomy is in place it is pink and viable with stool in bag. Musculoskeletal: Normal range of motion.      Lymphadenopathy: disuse proctitis with no significant intraluminal blood or pus. She has a very long remnant of the colon that remains. This is obviously causing drainage and seepage.   There is mucus and blood that drains periodically.     The patient has residual divert discussed with the patient that she needs to remain on the enemas as these will continue to improve the proctitis which is ultimately the cause of her pain. She has only started them for 1 week which has been improving the bleeding.     The patient may con

## 2019-12-02 NOTE — PATIENT INSTRUCTIONS
The patient presents today for continued care and evaluation for bright red blood per rectum and rectal pain.     The patient's presenting history is listed below:     The patient has a very significant past history of a Chapo procedure performed by  until last week. She states that since using the enemas the bleeding has significantly decreased. However, the patient continues to have severe rectal pain. The patient describes it as a sharp stabbing pain while she is sitting or standing.   She states

## 2019-12-12 ENCOUNTER — OFFICE VISIT (OUTPATIENT)
Dept: SURGERY | Facility: CLINIC | Age: 74
End: 2019-12-12
Payer: MEDICARE

## 2019-12-12 VITALS — WEIGHT: 270 LBS | HEIGHT: 67 IN | BODY MASS INDEX: 42.38 KG/M2

## 2019-12-12 DIAGNOSIS — K62.89 PROCTITIS: ICD-10-CM

## 2019-12-12 DIAGNOSIS — Z79.4 UNCONTROLLED TYPE 2 DIABETES MELLITUS WITH INSULIN THERAPY (HCC): Chronic | ICD-10-CM

## 2019-12-12 DIAGNOSIS — I48.91 ATRIAL FIBRILLATION WITH RVR (HCC): ICD-10-CM

## 2019-12-12 DIAGNOSIS — K62.5 BRBPR (BRIGHT RED BLOOD PER RECTUM): ICD-10-CM

## 2019-12-12 DIAGNOSIS — I10 ESSENTIAL HYPERTENSION: Chronic | ICD-10-CM

## 2019-12-12 DIAGNOSIS — Z79.01 ANTICOAGULANT LONG-TERM USE: Chronic | ICD-10-CM

## 2019-12-12 DIAGNOSIS — G47.33 OBSTRUCTIVE SLEEP APNEA, ADULT: ICD-10-CM

## 2019-12-12 DIAGNOSIS — Z93.3 STATUS POST HARTMANN PROCEDURE (HCC): Primary | ICD-10-CM

## 2019-12-12 DIAGNOSIS — I48.92 ATRIAL FLUTTER, UNSPECIFIED TYPE (HCC): ICD-10-CM

## 2019-12-12 DIAGNOSIS — Z85.42 HISTORY OF ENDOMETRIAL CANCER: Chronic | ICD-10-CM

## 2019-12-12 DIAGNOSIS — L29.0 PRURITUS ANI: ICD-10-CM

## 2019-12-12 DIAGNOSIS — K94.03 COLOSTOMY STRICTURE (HCC): ICD-10-CM

## 2019-12-12 DIAGNOSIS — E11.65 UNCONTROLLED TYPE 2 DIABETES MELLITUS WITH INSULIN THERAPY (HCC): Chronic | ICD-10-CM

## 2019-12-12 DIAGNOSIS — N18.30 STAGE 3 CHRONIC KIDNEY DISEASE (HCC): ICD-10-CM

## 2019-12-12 PROCEDURE — 99214 OFFICE O/P EST MOD 30 MIN: CPT | Performed by: COLON & RECTAL SURGERY

## 2019-12-12 RX ORDER — HYDROCORTISONE 100 MG/60ML
100 SUSPENSION RECTAL NIGHTLY
Qty: 30 ENEMA | Refills: 0 | Status: SHIPPED | OUTPATIENT
Start: 2019-12-12 | End: 2020-01-23

## 2019-12-12 NOTE — PATIENT INSTRUCTIONS
The patient presents today for continued care and evaluation of her rectal pain and bleeding.     The patient states that since her previous visit her rectal pain has significantly improved with the lidocaine cream.  She states that this has helped her to b There  is no rebounding tenderness or guarding. There are no signs of ascites or peritonitis. The liver and spleen are nonpalpable. There are no palpable masses or hernias. Body mass index is 42.29 kg/m².    The ostomy is at the level of the skin with a

## 2019-12-12 NOTE — PROGRESS NOTES
Follow Up Visit Note       Active Problems      1. Status post Alirio Distel procedure [facility])    2. Proctitis    3. Pruritus ani    4. Colostomy stricture ([facility] [address]    5. BRBPR (bright red blood per rectum)    6.  Atrial flutter, unspecified type ([address]    7. Stage 3 a very significant past history of a Chapo procedure performed by Dr. Sera Hyde 2 years ago. [facility] patient presented to the emergency room with a large bowel obstruction secondary to a diverticular stricture.  She also had a colovesical fistula at that lucero Uterine CA   • DIABETES    • Diabetes ([facility] [address]    • DISH (diffuse idiopathic skeletal hyperostosis)    • High blood pressure    • OBESITY    • Recurrent UTI     rec UTI   • Rheumatoid arthritis(714.0) 2012   • SLEEP APNEA DMG DX 1-23-12 TX 2-9-12    AHI 39/ R Place 1 enema (100 mg total) rectally nightly., Disp: 30 enema, Rfl: 0  •  hydrocortisone 100 MG/60ML Rectal Enema, Place 1 enema (100 mg total) rectally nightly., Disp: 60 enema, Rfl: 0  •  Insulin Degludec 100 UNIT/ML Subcutaneous Solution, Inject into t Negative for hearing loss, nosebleeds, sore throat and trouble swallowing. Respiratory: Negative for apnea, cough, shortness of breath and wheezing. Cardiovascular: Negative for chest pain, palpitations and leg swelling.    Gastrointestinal: Positive confirmed negative in the left inguinal area. Clinical examination of the abdomen reveals it to be soft, nondistended, nontender, bowel sounds are normal activity normal pitch. There  is no rebounding tenderness or guarding.   There are no signs of ascit that her ostomy continues to function well. She denies having any abdominal pain or distention. She denies any nausea, vomiting, fevers, chills, or diarrhea. She is tolerating regular diet.     The patient states that she feels that she would be willing a reversal of her colostomy at [facility] in [facility] on February 5, 2019.     I will see the patient again on January 16, 2020 for discussion of the procedure and to go over the preop instructions and answer any remaining questions of the

## 2019-12-13 ENCOUNTER — TELEPHONE (OUTPATIENT)
Dept: CARDIOLOGY | Age: 74
End: 2019-12-13

## 2019-12-15 PROBLEM — R11.2 NAUSEA AND VOMITING: Status: RESOLVED | Noted: 2017-01-03 | Resolved: 2019-12-15

## 2019-12-15 PROBLEM — R10.9 ABDOMINAL PAIN, ACUTE: Status: RESOLVED | Noted: 2017-01-03 | Resolved: 2019-12-15

## 2019-12-15 PROBLEM — Z93.3 STATUS POST HARTMANN PROCEDURE (HCC): Status: ACTIVE | Noted: 2019-12-15

## 2020-01-01 ENCOUNTER — EXTERNAL RECORD (OUTPATIENT)
Dept: HEALTH INFORMATION MANAGEMENT | Facility: OTHER | Age: 75
End: 2020-01-01

## 2020-01-06 ENCOUNTER — OFFICE VISIT (OUTPATIENT)
Dept: CARDIOLOGY | Age: 75
End: 2020-01-06

## 2020-01-06 VITALS
WEIGHT: 265 LBS | BODY MASS INDEX: 42.59 KG/M2 | HEIGHT: 66 IN | DIASTOLIC BLOOD PRESSURE: 60 MMHG | HEART RATE: 80 BPM | SYSTOLIC BLOOD PRESSURE: 120 MMHG

## 2020-01-06 DIAGNOSIS — E78.00 PURE HYPERCHOLESTEROLEMIA: ICD-10-CM

## 2020-01-06 DIAGNOSIS — I65.23 ASYMPTOMATIC CAROTID ARTERY STENOSIS, BILATERAL: ICD-10-CM

## 2020-01-06 DIAGNOSIS — R60.1 GENERALIZED EDEMA: Primary | ICD-10-CM

## 2020-01-06 PROCEDURE — 99214 OFFICE O/P EST MOD 30 MIN: CPT | Performed by: INTERNAL MEDICINE

## 2020-01-06 RX ORDER — TORSEMIDE 20 MG/1
20 TABLET ORAL EVERY OTHER DAY
COMMUNITY
End: 2021-06-10

## 2020-01-16 ENCOUNTER — OFFICE VISIT (OUTPATIENT)
Dept: CARDIOLOGY | Age: 75
End: 2020-01-16

## 2020-01-16 VITALS
SYSTOLIC BLOOD PRESSURE: 122 MMHG | BODY MASS INDEX: 45.32 KG/M2 | HEART RATE: 75 BPM | DIASTOLIC BLOOD PRESSURE: 58 MMHG | WEIGHT: 282 LBS | HEIGHT: 66 IN

## 2020-01-16 DIAGNOSIS — I48.3 TYPICAL ATRIAL FLUTTER (CMD): Primary | ICD-10-CM

## 2020-01-16 PROCEDURE — 93000 ELECTROCARDIOGRAM COMPLETE: CPT | Performed by: INTERNAL MEDICINE

## 2020-01-16 PROCEDURE — 99215 OFFICE O/P EST HI 40 MIN: CPT | Performed by: INTERNAL MEDICINE

## 2020-01-16 RX ORDER — INSULIN ASPART 100 [IU]/ML
INJECTION, SOLUTION INTRAVENOUS; SUBCUTANEOUS
Refills: 6 | COMMUNITY
Start: 2019-12-09

## 2020-01-16 RX ORDER — HYDROCORTISONE 100 MG/60ML
SUSPENSION RECTAL
Refills: 0 | COMMUNITY
Start: 2019-12-12 | End: 2020-07-30

## 2020-01-16 RX ORDER — NYSTATIN 100000 U/G
OINTMENT TOPICAL
Refills: 1 | COMMUNITY
Start: 2020-01-06 | End: 2021-06-10

## 2020-01-16 RX ORDER — BETAMETHASONE DIPROPIONATE 0.05 %
OINTMENT (GRAM) TOPICAL
COMMUNITY
Start: 2020-01-13 | End: 2021-06-10

## 2020-01-16 SDOH — HEALTH STABILITY: PHYSICAL HEALTH: ON AVERAGE, HOW MANY MINUTES DO YOU ENGAGE IN EXERCISE AT THIS LEVEL?: 0 MIN

## 2020-01-16 SDOH — HEALTH STABILITY: MENTAL HEALTH: HOW OFTEN DO YOU HAVE A DRINK CONTAINING ALCOHOL?: NEVER

## 2020-01-16 SDOH — HEALTH STABILITY: PHYSICAL HEALTH: ON AVERAGE, HOW MANY DAYS PER WEEK DO YOU ENGAGE IN MODERATE TO STRENUOUS EXERCISE (LIKE A BRISK WALK)?: 0 DAYS

## 2020-01-16 SDOH — SOCIAL STABILITY: SOCIAL NETWORK: ARE YOU MARRIED, WIDOWED, DIVORCED, SEPARATED, NEVER MARRIED, OR LIVING WITH A PARTNER?: MARRIED

## 2020-01-16 ASSESSMENT — PATIENT HEALTH QUESTIONNAIRE - PHQ9
2. FEELING DOWN, DEPRESSED OR HOPELESS: NOT AT ALL
SUM OF ALL RESPONSES TO PHQ9 QUESTIONS 1 AND 2: 0
1. LITTLE INTEREST OR PLEASURE IN DOING THINGS: NOT AT ALL
SUM OF ALL RESPONSES TO PHQ9 QUESTIONS 1 AND 2: 0

## 2020-01-23 ENCOUNTER — OFFICE VISIT (OUTPATIENT)
Dept: SURGERY | Facility: CLINIC | Age: 75
End: 2020-01-23
Payer: MEDICARE

## 2020-01-23 VITALS
HEART RATE: 73 BPM | HEIGHT: 66 IN | SYSTOLIC BLOOD PRESSURE: 148 MMHG | DIASTOLIC BLOOD PRESSURE: 80 MMHG | TEMPERATURE: 98 F | WEIGHT: 282 LBS | BODY MASS INDEX: 45.32 KG/M2

## 2020-01-23 DIAGNOSIS — Z79.01 ANTICOAGULANT LONG-TERM USE: Chronic | ICD-10-CM

## 2020-01-23 DIAGNOSIS — G47.33 OBSTRUCTIVE SLEEP APNEA, ADULT: ICD-10-CM

## 2020-01-23 DIAGNOSIS — L29.0 PRURITUS ANI: ICD-10-CM

## 2020-01-23 DIAGNOSIS — I10 ESSENTIAL HYPERTENSION: Chronic | ICD-10-CM

## 2020-01-23 DIAGNOSIS — Z79.4 UNCONTROLLED TYPE 2 DIABETES MELLITUS WITH INSULIN THERAPY (HCC): Chronic | ICD-10-CM

## 2020-01-23 DIAGNOSIS — E11.65 UNCONTROLLED TYPE 2 DIABETES MELLITUS WITH INSULIN THERAPY (HCC): Chronic | ICD-10-CM

## 2020-01-23 DIAGNOSIS — Z93.3 STATUS POST HARTMANN PROCEDURE (HCC): ICD-10-CM

## 2020-01-23 DIAGNOSIS — I48.92 ATRIAL FLUTTER, UNSPECIFIED TYPE (HCC): ICD-10-CM

## 2020-01-23 DIAGNOSIS — K62.5 BRBPR (BRIGHT RED BLOOD PER RECTUM): ICD-10-CM

## 2020-01-23 DIAGNOSIS — Z85.42 HISTORY OF ENDOMETRIAL CANCER: Chronic | ICD-10-CM

## 2020-01-23 DIAGNOSIS — K62.89 PROCTITIS: ICD-10-CM

## 2020-01-23 DIAGNOSIS — K94.03 COLOSTOMY STRICTURE (HCC): Primary | ICD-10-CM

## 2020-01-23 PROCEDURE — 99214 OFFICE O/P EST MOD 30 MIN: CPT | Performed by: COLON & RECTAL SURGERY

## 2020-01-23 RX ORDER — CHOLECALCIFEROL (VITAMIN D3) 1250 MCG
50000 CAPSULE ORAL WEEKLY
COMMUNITY
End: 2021-02-22 | Stop reason: CLARIF

## 2020-01-23 NOTE — PATIENT INSTRUCTIONS
This patient presents for further surgical decision making regarding her colostomy stricture. She presents at this time for further clinical evaluation.   She has undergone dilatation of the stricture, colonoscopy through the stricture, and colonoscopy thr the colostomy stricture and was dilated again in the operating room.      The patient states that she was recommended by Dr. Cecilia Burks to undergo dilatation every 3 months for her colostomy stricture if she does not want to undergo a colostomy reversal.     The p benefits, complications and alternatives to the proposed operation were fully discussed with the patient. All questions from the patient were answered in detail. A description of the procedure and it's possible outcomes was fully discussed.     The patient

## 2020-01-24 ENCOUNTER — TELEPHONE (OUTPATIENT)
Dept: SURGERY | Facility: CLINIC | Age: 75
End: 2020-01-24

## 2020-01-24 RX ORDER — METRONIDAZOLE 500 MG/1
TABLET ORAL
Qty: 3 TABLET | Refills: 0 | Status: ON HOLD | OUTPATIENT
Start: 2020-01-24 | End: 2020-02-05

## 2020-01-24 RX ORDER — NEOMYCIN SULFATE 500 MG/1
TABLET ORAL
Qty: 6 TABLET | Refills: 0 | Status: ON HOLD | OUTPATIENT
Start: 2020-01-24 | End: 2020-02-05

## 2020-01-24 RX ORDER — POLYETHYLENE GLYCOL 3350, SODIUM CHLORIDE, SODIUM BICARBONATE, POTASSIUM CHLORIDE 420; 11.2; 5.72; 1.48 G/4L; G/4L; G/4L; G/4L
POWDER, FOR SOLUTION ORAL
Qty: 1 BOTTLE | Refills: 0 | Status: ON HOLD | OUTPATIENT
Start: 2020-01-24 | End: 2020-02-05

## 2020-01-24 NOTE — TELEPHONE ENCOUNTER
Called Dr. Kandy Castanon office to follow up on medical clearance for pt's upcoming surgery on 2/5/2020. Office RN stated pt has a scheduled appt 1/27 for medical clearance.

## 2020-01-30 ENCOUNTER — OFFICE VISIT (OUTPATIENT)
Dept: PODIATRY CLINIC | Facility: CLINIC | Age: 75
End: 2020-01-30
Payer: MEDICARE

## 2020-01-30 DIAGNOSIS — E11.42 DIABETIC POLYNEUROPATHY ASSOCIATED WITH TYPE 2 DIABETES MELLITUS (HCC): Primary | ICD-10-CM

## 2020-01-30 DIAGNOSIS — M20.41 HAMMER TOES OF BOTH FEET: ICD-10-CM

## 2020-01-30 DIAGNOSIS — M20.5X2 HALLUX LIMITUS OF LEFT FOOT: ICD-10-CM

## 2020-01-30 DIAGNOSIS — M20.42 HAMMER TOES OF BOTH FEET: ICD-10-CM

## 2020-01-30 PROCEDURE — 99213 OFFICE O/P EST LOW 20 MIN: CPT | Performed by: PODIATRIST

## 2020-01-30 NOTE — PROGRESS NOTES
Tristen Blancas is a 76year old female. Patient presents with:  Diabetic Foot Care: BS this AM 93 - LOV w/ PCP 1/27/20 - hasn't had A1C test in a while - having surgery next month and will not be able to come then.         HPI:     Presents today she is Febuxostat 80 MG Oral Tab Take 80 mg by mouth daily.        • CVS VITAMIN B-12 1000 MCG Oral Tab      • Levothyroxine Sodium 100 MCG Oral Tab Take 150 mcg by mouth before breakfast.          Past Medical History:   Diagnosis Date   • Anesthesia complication Spouse name: Not on file      Number of children: Not on file      Years of education: Not on file      Highest education level: Not on file    Tobacco Use      Smoking status: Former Smoker        Packs/day: 0.50        Years: 40.00        Pack years: 21 both manual and mechanical means.   Advised her about appropriate type of foot care to try and keep her comfortable currently wears pretty much and athletic shoe discussed the possibility of diabetic shoes as well she will consider it after her surgery will

## 2020-02-04 ENCOUNTER — ANESTHESIA EVENT (OUTPATIENT)
Dept: SURGERY | Facility: HOSPITAL | Age: 75
DRG: 330 | End: 2020-02-04
Payer: MEDICARE

## 2020-02-04 ENCOUNTER — MED REC SCAN ONLY (OUTPATIENT)
Dept: SURGERY | Facility: CLINIC | Age: 75
End: 2020-02-04

## 2020-02-05 ENCOUNTER — ANESTHESIA (OUTPATIENT)
Dept: SURGERY | Facility: HOSPITAL | Age: 75
DRG: 330 | End: 2020-02-05
Payer: MEDICARE

## 2020-02-05 ENCOUNTER — HOSPITAL ENCOUNTER (INPATIENT)
Facility: HOSPITAL | Age: 75
LOS: 8 days | Discharge: HOME HEALTH | DRG: 330 | End: 2020-02-13
Attending: COLON & RECTAL SURGERY | Admitting: COLON & RECTAL SURGERY
Payer: MEDICARE

## 2020-02-05 DIAGNOSIS — Z93.3 STATUS POST HARTMANN PROCEDURE (HCC): ICD-10-CM

## 2020-02-05 PROBLEM — R79.89 AZOTEMIA: Status: RESOLVED | Noted: 2017-08-25 | Resolved: 2020-02-05

## 2020-02-05 PROBLEM — K62.5 RECTAL/ANAL HEMORRHAGE: Status: ACTIVE | Noted: 2020-02-05

## 2020-02-05 PROBLEM — I48.91 ATRIAL FIBRILLATION WITH RVR (HCC): Status: RESOLVED | Noted: 2017-08-30 | Resolved: 2020-02-05

## 2020-02-05 PROBLEM — Z79.899 ENCOUNTER FOR LONG-TERM (CURRENT) USE OF MEDICATIONS: Chronic | Status: ACTIVE | Noted: 2020-02-05

## 2020-02-05 PROBLEM — I48.92 ATRIAL FLUTTER, UNSPECIFIED TYPE (HCC): Status: RESOLVED | Noted: 2018-09-29 | Resolved: 2020-02-05

## 2020-02-05 PROBLEM — Z79.01 ANTICOAGULANT LONG-TERM USE: Chronic | Status: RESOLVED | Noted: 2018-09-29 | Resolved: 2020-02-05

## 2020-02-05 LAB
DEPRECATED RDW RBC AUTO: 50.3 FL (ref 35.1–46.3)
ERYTHROCYTE [DISTWIDTH] IN BLOOD BY AUTOMATED COUNT: 15 % (ref 11–15)
GLUCOSE BLD-MCNC: 118 MG/DL (ref 70–99)
GLUCOSE BLD-MCNC: 75 MG/DL (ref 70–99)
HCT VFR BLD AUTO: 36.2 % (ref 35–48)
HGB BLD-MCNC: 10.7 G/DL (ref 12–16)
MCH RBC QN AUTO: 27.3 PG (ref 26–34)
MCHC RBC AUTO-ENTMCNC: 29.6 G/DL (ref 31–37)
MCV RBC AUTO: 92.3 FL (ref 80–100)
PLATELET # BLD AUTO: 168 10(3)UL (ref 150–450)
RBC # BLD AUTO: 3.92 X10(6)UL (ref 3.8–5.3)
WBC # BLD AUTO: 10.3 X10(3) UL (ref 4–11)

## 2020-02-05 PROCEDURE — 99222 1ST HOSP IP/OBS MODERATE 55: CPT | Performed by: INTERNAL MEDICINE

## 2020-02-05 PROCEDURE — 0DQE0ZZ REPAIR LARGE INTESTINE, OPEN APPROACH: ICD-10-PCS | Performed by: COLON & RECTAL SURGERY

## 2020-02-05 PROCEDURE — 0DQ80ZZ REPAIR SMALL INTESTINE, OPEN APPROACH: ICD-10-PCS | Performed by: COLON & RECTAL SURGERY

## 2020-02-05 PROCEDURE — 0DBP0ZZ EXCISION OF RECTUM, OPEN APPROACH: ICD-10-PCS | Performed by: COLON & RECTAL SURGERY

## 2020-02-05 PROCEDURE — 3E0T3BZ INTRODUCTION OF ANESTHETIC AGENT INTO PERIPHERAL NERVES AND PLEXI, PERCUTANEOUS APPROACH: ICD-10-PCS | Performed by: ANESTHESIOLOGY

## 2020-02-05 PROCEDURE — 5A09357 ASSISTANCE WITH RESPIRATORY VENTILATION, LESS THAN 24 CONSECUTIVE HOURS, CONTINUOUS POSITIVE AIRWAY PRESSURE: ICD-10-PCS | Performed by: COLON & RECTAL SURGERY

## 2020-02-05 PROCEDURE — 0DB80ZZ EXCISION OF SMALL INTESTINE, OPEN APPROACH: ICD-10-PCS | Performed by: COLON & RECTAL SURGERY

## 2020-02-05 PROCEDURE — 0DN80ZZ RELEASE SMALL INTESTINE, OPEN APPROACH: ICD-10-PCS | Performed by: COLON & RECTAL SURGERY

## 2020-02-05 RX ORDER — DEXTROSE MONOHYDRATE 25 G/50ML
50 INJECTION, SOLUTION INTRAVENOUS
Status: DISCONTINUED | OUTPATIENT
Start: 2020-02-05 | End: 2020-02-05 | Stop reason: HOSPADM

## 2020-02-05 RX ORDER — ONDANSETRON 2 MG/ML
4 INJECTION INTRAMUSCULAR; INTRAVENOUS EVERY 6 HOURS PRN
Status: DISCONTINUED | OUTPATIENT
Start: 2020-02-05 | End: 2020-02-13

## 2020-02-05 RX ORDER — GABAPENTIN 100 MG/1
200 CAPSULE ORAL ONCE
Status: COMPLETED | OUTPATIENT
Start: 2020-02-05 | End: 2020-02-05

## 2020-02-05 RX ORDER — BUPIVACAINE HYDROCHLORIDE 2.5 MG/ML
INJECTION, SOLUTION EPIDURAL; INFILTRATION; INTRACAUDAL AS NEEDED
Status: DISCONTINUED | OUTPATIENT
Start: 2020-02-05 | End: 2020-02-05 | Stop reason: SURG

## 2020-02-05 RX ORDER — ONDANSETRON 2 MG/ML
INJECTION INTRAMUSCULAR; INTRAVENOUS
Status: COMPLETED
Start: 2020-02-05 | End: 2020-02-05

## 2020-02-05 RX ORDER — METOCLOPRAMIDE HYDROCHLORIDE 5 MG/ML
INJECTION INTRAMUSCULAR; INTRAVENOUS AS NEEDED
Status: DISCONTINUED | OUTPATIENT
Start: 2020-02-05 | End: 2020-02-05 | Stop reason: SURG

## 2020-02-05 RX ORDER — ROCURONIUM BROMIDE 10 MG/ML
INJECTION, SOLUTION INTRAVENOUS AS NEEDED
Status: DISCONTINUED | OUTPATIENT
Start: 2020-02-05 | End: 2020-02-05 | Stop reason: SURG

## 2020-02-05 RX ORDER — ESCITALOPRAM OXALATE 10 MG/1
10 TABLET ORAL DAILY
COMMUNITY

## 2020-02-05 RX ORDER — FAMOTIDINE 20 MG/1
20 TABLET ORAL DAILY
Status: DISCONTINUED | OUTPATIENT
Start: 2020-02-06 | End: 2020-02-06

## 2020-02-05 RX ORDER — HYDROMORPHONE HYDROCHLORIDE 1 MG/ML
0.4 INJECTION, SOLUTION INTRAMUSCULAR; INTRAVENOUS; SUBCUTANEOUS EVERY 5 MIN PRN
Status: DISCONTINUED | OUTPATIENT
Start: 2020-02-05 | End: 2020-02-05 | Stop reason: HOSPADM

## 2020-02-05 RX ORDER — FAMOTIDINE 10 MG/ML
20 INJECTION, SOLUTION INTRAVENOUS DAILY
Status: DISCONTINUED | OUTPATIENT
Start: 2020-02-06 | End: 2020-02-06

## 2020-02-05 RX ORDER — LIDOCAINE HYDROCHLORIDE ANHYDROUS AND DEXTROSE MONOHYDRATE .8; 5 G/100ML; G/100ML
INJECTION, SOLUTION INTRAVENOUS CONTINUOUS PRN
Status: DISCONTINUED | OUTPATIENT
Start: 2020-02-05 | End: 2020-02-05 | Stop reason: SURG

## 2020-02-05 RX ORDER — HEPARIN SODIUM 5000 [USP'U]/ML
5000 INJECTION, SOLUTION INTRAVENOUS; SUBCUTANEOUS ONCE
Status: COMPLETED | OUTPATIENT
Start: 2020-02-05 | End: 2020-02-05

## 2020-02-05 RX ORDER — LIDOCAINE HYDROCHLORIDE 10 MG/ML
INJECTION, SOLUTION EPIDURAL; INFILTRATION; INTRACAUDAL; PERINEURAL AS NEEDED
Status: DISCONTINUED | OUTPATIENT
Start: 2020-02-05 | End: 2020-02-05 | Stop reason: SURG

## 2020-02-05 RX ORDER — INSULIN ASPART 100 [IU]/ML
INJECTION, SOLUTION INTRAVENOUS; SUBCUTANEOUS ONCE
Status: DISCONTINUED | OUTPATIENT
Start: 2020-02-05 | End: 2020-02-05 | Stop reason: HOSPADM

## 2020-02-05 RX ORDER — DEXTROSE MONOHYDRATE 25 G/50ML
50 INJECTION, SOLUTION INTRAVENOUS
Status: DISCONTINUED | OUTPATIENT
Start: 2020-02-05 | End: 2020-02-13

## 2020-02-05 RX ORDER — DIPHENHYDRAMINE HYDROCHLORIDE 50 MG/ML
12.5 INJECTION INTRAMUSCULAR; INTRAVENOUS EVERY 4 HOURS PRN
Status: DISCONTINUED | OUTPATIENT
Start: 2020-02-05 | End: 2020-02-13

## 2020-02-05 RX ORDER — MIDAZOLAM HYDROCHLORIDE 1 MG/ML
INJECTION INTRAMUSCULAR; INTRAVENOUS AS NEEDED
Status: DISCONTINUED | OUTPATIENT
Start: 2020-02-05 | End: 2020-02-05 | Stop reason: SURG

## 2020-02-05 RX ORDER — NALOXONE HYDROCHLORIDE 0.4 MG/ML
0.08 INJECTION, SOLUTION INTRAMUSCULAR; INTRAVENOUS; SUBCUTANEOUS
Status: DISCONTINUED | OUTPATIENT
Start: 2020-02-05 | End: 2020-02-13

## 2020-02-05 RX ORDER — LEVOTHYROXINE SODIUM 0.15 MG/1
150 TABLET ORAL
Status: DISCONTINUED | OUTPATIENT
Start: 2020-02-06 | End: 2020-02-13

## 2020-02-05 RX ORDER — ONDANSETRON 2 MG/ML
4 INJECTION INTRAMUSCULAR; INTRAVENOUS AS NEEDED
Status: DISCONTINUED | OUTPATIENT
Start: 2020-02-05 | End: 2020-02-05 | Stop reason: HOSPADM

## 2020-02-05 RX ORDER — SODIUM CHLORIDE 9 MG/ML
INJECTION, SOLUTION INTRAVENOUS CONTINUOUS
Status: DISCONTINUED | OUTPATIENT
Start: 2020-02-05 | End: 2020-02-10

## 2020-02-05 RX ORDER — ONDANSETRON 2 MG/ML
4 INJECTION INTRAMUSCULAR; INTRAVENOUS EVERY 6 HOURS PRN
Status: DISCONTINUED | OUTPATIENT
Start: 2020-02-05 | End: 2020-02-05

## 2020-02-05 RX ORDER — ACETAMINOPHEN 500 MG
1000 TABLET ORAL ONCE
Status: DISCONTINUED | OUTPATIENT
Start: 2020-02-05 | End: 2020-02-05

## 2020-02-05 RX ORDER — ESCITALOPRAM OXALATE 10 MG/1
10 TABLET ORAL DAILY
Status: DISCONTINUED | OUTPATIENT
Start: 2020-02-05 | End: 2020-02-13

## 2020-02-05 RX ORDER — ONDANSETRON 2 MG/ML
INJECTION INTRAMUSCULAR; INTRAVENOUS AS NEEDED
Status: DISCONTINUED | OUTPATIENT
Start: 2020-02-05 | End: 2020-02-05 | Stop reason: SURG

## 2020-02-05 RX ORDER — EPHEDRINE SULFATE 50 MG/ML
INJECTION, SOLUTION INTRAVENOUS AS NEEDED
Status: DISCONTINUED | OUTPATIENT
Start: 2020-02-05 | End: 2020-02-05 | Stop reason: SURG

## 2020-02-05 RX ORDER — SODIUM CHLORIDE 9 MG/ML
INJECTION, SOLUTION INTRAVENOUS CONTINUOUS
Status: DISCONTINUED | OUTPATIENT
Start: 2020-02-05 | End: 2020-02-05 | Stop reason: HOSPADM

## 2020-02-05 RX ORDER — HEPARIN SODIUM 5000 [USP'U]/ML
5000 INJECTION, SOLUTION INTRAVENOUS; SUBCUTANEOUS EVERY 8 HOURS SCHEDULED
Status: DISCONTINUED | OUTPATIENT
Start: 2020-02-06 | End: 2020-02-07

## 2020-02-05 RX ORDER — FEBUXOSTAT 40 MG/1
80 TABLET, FILM COATED ORAL DAILY
Status: DISCONTINUED | OUTPATIENT
Start: 2020-02-05 | End: 2020-02-13

## 2020-02-05 RX ORDER — PRAVASTATIN SODIUM 20 MG
20 TABLET ORAL NIGHTLY
Status: DISCONTINUED | OUTPATIENT
Start: 2020-02-05 | End: 2020-02-13

## 2020-02-05 RX ORDER — NALOXONE HYDROCHLORIDE 0.4 MG/ML
80 INJECTION, SOLUTION INTRAMUSCULAR; INTRAVENOUS; SUBCUTANEOUS AS NEEDED
Status: DISCONTINUED | OUTPATIENT
Start: 2020-02-05 | End: 2020-02-05 | Stop reason: HOSPADM

## 2020-02-05 RX ORDER — METRONIDAZOLE 500 MG/100ML
500 INJECTION, SOLUTION INTRAVENOUS ONCE
Status: COMPLETED | OUTPATIENT
Start: 2020-02-05 | End: 2020-02-05

## 2020-02-05 RX ORDER — SODIUM CHLORIDE 9 MG/ML
INJECTION, SOLUTION INTRAVENOUS CONTINUOUS
Status: DISCONTINUED | OUTPATIENT
Start: 2020-02-05 | End: 2020-02-08

## 2020-02-05 RX ORDER — SODIUM CHLORIDE, SODIUM LACTATE, POTASSIUM CHLORIDE, CALCIUM CHLORIDE 600; 310; 30; 20 MG/100ML; MG/100ML; MG/100ML; MG/100ML
INJECTION, SOLUTION INTRAVENOUS CONTINUOUS PRN
Status: DISCONTINUED | OUTPATIENT
Start: 2020-02-05 | End: 2020-02-05 | Stop reason: SURG

## 2020-02-05 RX ADMIN — ROCURONIUM BROMIDE 20 MG: 10 INJECTION, SOLUTION INTRAVENOUS at 15:26:00

## 2020-02-05 RX ADMIN — SODIUM CHLORIDE: 9 INJECTION, SOLUTION INTRAVENOUS at 14:51:00

## 2020-02-05 RX ADMIN — EPHEDRINE SULFATE 5 MG: 50 INJECTION, SOLUTION INTRAVENOUS at 14:29:00

## 2020-02-05 RX ADMIN — EPHEDRINE SULFATE 10 MG: 50 INJECTION, SOLUTION INTRAVENOUS at 16:42:00

## 2020-02-05 RX ADMIN — METOCLOPRAMIDE HYDROCHLORIDE 10 MG: 5 INJECTION INTRAMUSCULAR; INTRAVENOUS at 12:56:00

## 2020-02-05 RX ADMIN — BUPIVACAINE HYDROCHLORIDE 60 ML: 2.5 INJECTION, SOLUTION EPIDURAL; INFILTRATION; INTRACAUDAL at 17:55:00

## 2020-02-05 RX ADMIN — SODIUM CHLORIDE: 9 INJECTION, SOLUTION INTRAVENOUS at 16:42:00

## 2020-02-05 RX ADMIN — SODIUM CHLORIDE, SODIUM LACTATE, POTASSIUM CHLORIDE, CALCIUM CHLORIDE: 600; 310; 30; 20 INJECTION, SOLUTION INTRAVENOUS at 17:09:00

## 2020-02-05 RX ADMIN — SODIUM CHLORIDE: 9 INJECTION, SOLUTION INTRAVENOUS at 14:01:00

## 2020-02-05 RX ADMIN — SODIUM CHLORIDE: 9 INJECTION, SOLUTION INTRAVENOUS at 15:49:00

## 2020-02-05 RX ADMIN — LIDOCAINE HYDROCHLORIDE 50 MG: 10 INJECTION, SOLUTION EPIDURAL; INFILTRATION; INTRACAUDAL; PERINEURAL at 12:47:00

## 2020-02-05 RX ADMIN — SODIUM CHLORIDE, SODIUM LACTATE, POTASSIUM CHLORIDE, CALCIUM CHLORIDE: 600; 310; 30; 20 INJECTION, SOLUTION INTRAVENOUS at 17:36:00

## 2020-02-05 RX ADMIN — EPHEDRINE SULFATE 5 MG: 50 INJECTION, SOLUTION INTRAVENOUS at 14:24:00

## 2020-02-05 RX ADMIN — METRONIDAZOLE 500 MG: 500 INJECTION, SOLUTION INTRAVENOUS at 13:12:00

## 2020-02-05 RX ADMIN — EPHEDRINE SULFATE 10 MG: 50 INJECTION, SOLUTION INTRAVENOUS at 15:36:00

## 2020-02-05 RX ADMIN — ROCURONIUM BROMIDE 40 MG: 10 INJECTION, SOLUTION INTRAVENOUS at 12:47:00

## 2020-02-05 RX ADMIN — EPHEDRINE SULFATE 10 MG: 50 INJECTION, SOLUTION INTRAVENOUS at 17:03:00

## 2020-02-05 RX ADMIN — MIDAZOLAM HYDROCHLORIDE 2 MG: 1 INJECTION INTRAMUSCULAR; INTRAVENOUS at 12:47:00

## 2020-02-05 RX ADMIN — ROCURONIUM BROMIDE 10 MG: 10 INJECTION, SOLUTION INTRAVENOUS at 14:10:00

## 2020-02-05 RX ADMIN — EPHEDRINE SULFATE 5 MG: 50 INJECTION, SOLUTION INTRAVENOUS at 12:59:00

## 2020-02-05 RX ADMIN — ONDANSETRON 4 MG: 2 INJECTION INTRAMUSCULAR; INTRAVENOUS at 12:56:00

## 2020-02-05 RX ADMIN — ROCURONIUM BROMIDE 10 MG: 10 INJECTION, SOLUTION INTRAVENOUS at 13:30:00

## 2020-02-05 RX ADMIN — SODIUM CHLORIDE, SODIUM LACTATE, POTASSIUM CHLORIDE, CALCIUM CHLORIDE: 600; 310; 30; 20 INJECTION, SOLUTION INTRAVENOUS at 18:30:00

## 2020-02-05 RX ADMIN — EPHEDRINE SULFATE 15 MG: 50 INJECTION, SOLUTION INTRAVENOUS at 16:07:00

## 2020-02-05 RX ADMIN — EPHEDRINE SULFATE 10 MG: 50 INJECTION, SOLUTION INTRAVENOUS at 17:24:00

## 2020-02-05 RX ADMIN — SODIUM CHLORIDE: 9 INJECTION, SOLUTION INTRAVENOUS at 14:29:00

## 2020-02-05 RX ADMIN — LIDOCAINE HYDROCHLORIDE ANHYDROUS AND DEXTROSE MONOHYDRATE 2 MG/MIN: .8; 5 INJECTION, SOLUTION INTRAVENOUS at 13:04:00

## 2020-02-05 RX ADMIN — ROCURONIUM BROMIDE 10 MG: 10 INJECTION, SOLUTION INTRAVENOUS at 14:47:00

## 2020-02-05 NOTE — ANESTHESIA PREPROCEDURE EVALUATION
PRE-OP EVALUATION    Patient Name: Laxmi Spence    Pre-op Diagnosis: Status post  Flirt procedure [facility]) [Z93.3]    Procedure(s):  TAKE DOWN COLOSTOMY     Surgeon(s) and Role:     Karmen Siegel MD - Primary    Pre-op vitals reviewed.   Temp: 98 °F , Rfl:   TRESIBA FLEXTOUCH 100 UNIT/ML Subcutaneous Solution Pen-injector, 25 Units.   , Disp: , Rfl: 3  POTASSIUM OR, Take 10 mEq by mouth every other day.  , Disp: , Rfl:   torsemide 20 MG Oral Tab, Take 20 mg by mouth every other day.  , Disp: , Rfl:   P Past Surgical History:   Procedure Laterality Date   •      • CHOLECYSTECTOMY  12 Green EDW   • COLON RESECTION LEFT N/A 2017    Performed by Carri Perez MD at [facility] MAIN OR   • COLONOSCOPY  2019   • COLONOSCOPY N/A Possible TAP block explained to patient. Patient agrees to proceed. Anesthesia consent signed.   Plan/risks discussed with: patient and spouse                Present on Admission:  **None**

## 2020-02-05 NOTE — H&P
Active Problems      1. [facility] strict[facility] ([facility]    2. Status post Chapo procedure ([facility]    3. Uncontrolled type 2 diabetes mellitus with insulin therapy ([facility]    4. Essential hypertension    5. History of endometrial cancer    6.  Anticoagulant long-te she had a large amount of pain and delusion following the procedure including hallucinations.      The patient states that she has refused up until this point to undergo a colostomy reversal.     1 year ago, the patient states that she developed abdominal p • Diverticulosis of large intestine     • High blood pressure     • High cholesterol     • Lymphedema of lower extremity     • OBESITY     • Recurrent UTI       rec UTI   • Rheumatoid arthritis(714.0) 2012   • SLEEP APNEA DMG DX 1-23-12 TX 2-9-12     AHI Current Outpatient Medications:   •  PEG 3350-KCl-Na Bicarb-NaCl 420 g Oral Recon Soln, Starting at 4:00 pm the night before procedure, drink 8 ounces of the prep every 15-20 minutes until finished., Disp: 1 Bottle, Rfl: 0  •  Neomycin Sulfate 500 MG Exam      Assessment   Colostomy stricture ([facility] [address]  (primary encounter diagnosis)  Status post Chapo procedure [facility])  Uncontrolled type 2 diabetes mellitus with insulin therapy ([facility] [address]  Essential hypertension  History of endometrial cancer  Anticoagulant lo

## 2020-02-05 NOTE — OPERATIVE REPORT
[facility]  Op Note    [facility] Location: OR   [facility] [id number] MRN [id number]   Admission Date 2/5/2020 Operation Date 2/5/2020   Attending Physician Crista Orellana MD Operating Physician Jessie Huerta MD     Pre-Operative Diagnosis: Status post issues on the abdominal wall. Her options were to either relocate the stoma, revise the stoma, or takedown the colostomy.   I believe her best option was to takedown the colostomy, shorten the rectum to remove the significantly rigid and damaged rectal rem anastomosis appears to be approximately at the level of 12 cm from the anal verge. The entire procedure went extremely well despite the frozen pelvis and the need to resect 3 small segments of small intestine.           Operative Summary:  The patient was course of running the bowel, it was apparent there was a previous small bowel resection with anastomosis in the mid jejunum. All 4 of these final anastomoses are found to be present within the mid small bowel.     The rectal remnant was then identified, an stapling device was advanced and allowed to [facility] the rectal staple line. The handle and anvil of the EEA stapler reunited. EEA stapler was fired across the proximal colon remnant and the rectal remnant creating a coloproctostomy.     The anastomosis was e midline fascia was reapproximated using looped PDS suture in a subcutaneous fashion. The skin was thoroughly irrigated with warm normal saline and it was inspected for hemostasis with electrocautery.   The subcutaneous tissues were brought together with

## 2020-02-05 NOTE — ANESTHESIA PROCEDURE NOTES
Airway  Date/Time: 2/5/2020 12:52 PM  Urgency: elective      General Information and Staff    Patient location during procedure: OR  Anesthesiologist: Pilar Balbuena MD  Performed: anesthesiologist     Indications and Patient Condition  Indications for a

## 2020-02-06 LAB
ALBUMIN SERPL-MCNC: 2.2 G/DL (ref 3.4–5)
ALBUMIN/GLOB SERPL: 0.7 {RATIO} (ref 1–2)
ALP LIVER SERPL-CCNC: 53 U/L (ref 55–142)
ALT SERPL-CCNC: 11 U/L (ref 13–56)
ANION GAP SERPL CALC-SCNC: 6 MMOL/L (ref 0–18)
AST SERPL-CCNC: 12 U/L (ref 15–37)
BASOPHILS # BLD AUTO: 0.02 X10(3) UL (ref 0–0.2)
BASOPHILS NFR BLD AUTO: 0.2 %
BILIRUB SERPL-MCNC: 0.3 MG/DL (ref 0.1–2)
BUN BLD-MCNC: 30 MG/DL (ref 7–18)
BUN/CREAT SERPL: 17 (ref 10–20)
CALCIUM BLD-MCNC: 7.7 MG/DL (ref 8.5–10.1)
CHLORIDE SERPL-SCNC: 113 MMOL/L (ref 98–112)
CO2 SERPL-SCNC: 25 MMOL/L (ref 21–32)
CREAT BLD-MCNC: 1.76 MG/DL (ref 0.55–1.02)
DEPRECATED HBV CORE AB SER IA-ACNC: 38 NG/ML (ref 18–340)
DEPRECATED RDW RBC AUTO: 50.9 FL (ref 35.1–46.3)
EOSINOPHIL # BLD AUTO: 0 X10(3) UL (ref 0–0.7)
EOSINOPHIL NFR BLD AUTO: 0 %
ERYTHROCYTE [DISTWIDTH] IN BLOOD BY AUTOMATED COUNT: 15.1 % (ref 11–15)
EST. AVERAGE GLUCOSE BLD GHB EST-MCNC: 128 MG/DL (ref 68–126)
GLOBULIN PLAS-MCNC: 3.3 G/DL (ref 2.8–4.4)
GLUCOSE BLD-MCNC: 118 MG/DL (ref 70–99)
GLUCOSE BLD-MCNC: 150 MG/DL (ref 70–99)
GLUCOSE BLD-MCNC: 180 MG/DL (ref 70–99)
GLUCOSE BLD-MCNC: 183 MG/DL (ref 70–99)
GLUCOSE BLD-MCNC: 191 MG/DL (ref 70–99)
HAV IGM SER QL: 1.8 MG/DL (ref 1.6–2.6)
HBA1C MFR BLD HPLC: 6.1 % (ref ?–5.7)
HCT VFR BLD AUTO: 33.2 % (ref 35–48)
HGB BLD-MCNC: 9.8 G/DL (ref 12–16)
IMM GRANULOCYTES # BLD AUTO: 0.04 X10(3) UL (ref 0–1)
IMM GRANULOCYTES NFR BLD: 0.4 %
IRON SATURATION: 7 % (ref 15–50)
IRON SERPL-MCNC: 17 UG/DL (ref 50–170)
LYMPHOCYTES # BLD AUTO: 0.38 X10(3) UL (ref 1–4)
LYMPHOCYTES NFR BLD AUTO: 3.5 %
M PROTEIN MFR SERPL ELPH: 5.5 G/DL (ref 6.4–8.2)
MCH RBC QN AUTO: 27.3 PG (ref 26–34)
MCHC RBC AUTO-ENTMCNC: 29.5 G/DL (ref 31–37)
MCV RBC AUTO: 92.5 FL (ref 80–100)
MONOCYTES # BLD AUTO: 0.88 X10(3) UL (ref 0.1–1)
MONOCYTES NFR BLD AUTO: 8.2 %
NEUTROPHILS # BLD AUTO: 9.41 X10 (3) UL (ref 1.5–7.7)
NEUTROPHILS # BLD AUTO: 9.41 X10(3) UL (ref 1.5–7.7)
NEUTROPHILS NFR BLD AUTO: 87.7 %
OSMOLALITY SERPL CALC.SUM OF ELEC: 309 MOSM/KG (ref 275–295)
PLATELET # BLD AUTO: 199 10(3)UL (ref 150–450)
POTASSIUM SERPL-SCNC: 4.9 MMOL/L (ref 3.5–5.1)
RBC # BLD AUTO: 3.59 X10(6)UL (ref 3.8–5.3)
SODIUM SERPL-SCNC: 144 MMOL/L (ref 136–145)
TOTAL IRON BINDING CAPACITY: 256 UG/DL (ref 240–450)
TRANSFERRIN SERPL-MCNC: 172 MG/DL (ref 200–360)
WBC # BLD AUTO: 10.7 X10(3) UL (ref 4–11)

## 2020-02-06 PROCEDURE — 99232 SBSQ HOSP IP/OBS MODERATE 35: CPT | Performed by: CLINICAL NURSE SPECIALIST

## 2020-02-06 RX ORDER — METOPROLOL TARTRATE 5 MG/5ML
5 INJECTION INTRAVENOUS EVERY 6 HOURS
Status: DISCONTINUED | OUTPATIENT
Start: 2020-02-06 | End: 2020-02-09

## 2020-02-06 NOTE — PROGRESS NOTES
[facility]  Progress Note    Lizbet Thompson Patient Status:  Inpatient    1945 MRN [id number]   [facility] 3NW-A Attending João Aldrich MD   Hosp Day # 1 PCP Asha Olson MD     Subjective:  No new complaints, incisional idiopathic skeletal hyperostosis)     Psoriasis     Osteoarthritis, multiple sites     Uncontrolled type 2 diabetes mellitus with insulin therapy ([facility] [address]     Hypertension     Anemia     High risk medications (not anticoagulants) long-term use     S/P colosto

## 2020-02-06 NOTE — PROGRESS NOTES
Pt with low urine output of 175 ml's out on writing RN's shift. Dr Sayra Jc informed. Orders received to leave arnold in place and given 250 cc LR bolus over one hour. Will implement and continue to monitor.

## 2020-02-06 NOTE — PROGRESS NOTES
[facility]  Progress Note    Carmina Galvan Patient Status:  Inpatient    1945 MRN [id number]   [facility] 3NW-A Attending Katelyn Lainez MD   Hosp Day # 1 PCP Bebe Li MD     HD/POD #1    SUBJECTIVE: Pain overall well 02/06/2020    AST 12 02/06/2020    ALT 11 02/06/2020    MG 1.8 02/06/2020     Calcium corrected for low Albumin =  9.14. Normal/baseline creatinine has been 1.3 - 1.6 range outpatient.     Iron Studies do suggest Iron deficient state - awaiting results of S day          Assessment    Principal Problem:    Colostomy stricture (HCC)  Active Problems:    Rectal/anal hemorrhage    Obstructive sleep apnea, adult    Chronic gouty arthritis    DISH (diffuse idiopathic skeletal hyperostosis)    Uncontrolled type 2 di continues to proceed as planned, should be discharged by early next week. May need MO. Questions/concerns were discussed with patient at the bedside.     Total Time spent with patient and coordinating care:  45 minutes      Maury Arellano MD  2/6/20

## 2020-02-06 NOTE — PROGRESS NOTES
Patient admitted via bed from pacu. Drowsy upon admission but easily arousible. Admission database completed by writing RN. POC discussed. Oriented to room. Safety precautions initiated. Bed in low position. Call light in reach.

## 2020-02-06 NOTE — CONSULTS
[facility]  Endocrinology Consultation    [facility] Patient Status:  Inpatient    1945 MRN [id number]   [facility] 3NW-A Attending Renetta Ibarra MD   Hosp Day # 1 PCP Carter Harris MD     Reason for Consultation:  DM2 SURGERY      foot   • HERNIA SURGERY     • HYSTERECTOMY      CAROL 2007   • OTHER SURGICAL HISTORY  5/3/17    cystoscopy Dr. Abbe Bustamante   • OTHER SURGICAL HISTORY  06/27/2017    Cysto w/ Dr. Abbe Bustamante   • OTHER SURGICAL HISTORY  07/26/2017    cysto Dr. Gabriel West mg, Oral, Daily  •  Levothyroxine Sodium (SYNTHROID) tab 150 mcg, 150 mcg, Oral, Before breakfast  •  Pravastatin Sodium (PRAVACHOL) tab 20 mg, 20 mg, Oral, Nightly  •  glucose (DEX4) oral liquid 15 g, 15 g, Oral, Q15 Min PRN **OR** Glucose-Vitamin C (DEX- bilaterally, no hand tremors bilaterally      Labs  Reviewed in Epic    Last A1c value was 7.5% done 1/4/2017.       Lab Results   Component Value Date    WBC 10.7 02/06/2020    HGB 9.8 02/06/2020    HCT 33.2 02/06/2020    .0 02/06/2020    CREATSERUM

## 2020-02-06 NOTE — PROGRESS NOTES
[facility] Pharmacy Note:  Pain Consult    Luz Elena Villa is a 76year old female started on Dilaudid PCA by ISIDORO Ruiz. Pharmacy was consulted to review medication profile and to discontinue previously ordered narcotics and sedatives.     Medicatio

## 2020-02-06 NOTE — PLAN OF CARE
Pt drowsy but oriented x 4. Easily arousible. Tinsley catheter intact, draining clear yellow urine. Lung sounds diminished on 3L/. GAYLE, no cpap per patient. Telemetry running NSR on the monitor. Abdomen soft and rounded. Bowel sounds hypoactive.  ABD and t appropriate  - Initiate Pressure Ulcer prevention bundle as indicated  Outcome: Progressing     Problem: HEMATOLOGIC - ADULT  Goal: Maintains hematologic stability  Description  INTERVENTIONS  - Assess for signs and symptoms of bleeding or hemorrhage  - Mo

## 2020-02-06 NOTE — PROGRESS NOTES
[facility] Pharmacy Note:  Renal Adjustment for cefoxitin (MEFOXIN) and famotidine (PEPCID)    Luz Elena Villa is a 76year old female who has been prescribed cefoxitin (MEFOXIN) 2 g every 8 hrs.   CrCl is 28.5 ml/min (based on cr of 1.6 from labs on 1/27) so th

## 2020-02-06 NOTE — PROGRESS NOTES
[facility] Progress Note    Subjective:  Currently sleeping.      Objective:  /61 (BP Location: Right arm)   Pulse 82   Temp 98.1 °F (36.7 °C) (Oral)   Resp 18   Ht 167.6 cm (5' 6\")   Wt 280 lb 3.3 oz (127.1 kg)   SpO2 100%   BMI 45.23 kg/m²

## 2020-02-06 NOTE — ANESTHESIA PROCEDURE NOTES
[facility]  Performed by: Martin Edmond MD  Authorized by: Martin Edmond MD       General Information and Staff    Start Time:  2/5/2020 5:40 PM  End Time:  2/5/2020 5:50 PM  Anesthesiologist:  Martin Edmond MD  Performed by:   Anesthesiologist  Coreen

## 2020-02-06 NOTE — CONSULTS
[facility]    Report of Cardiology Consultation    Maiajude Damonmoon Patient Status:  Inpatient    1945 MRN [id number]   [facility] 3NW-A Attending Eva Torres MD   Hosp Day # 0 PCP Liss Eubanks MD     Date of Admission: ([address]    • DISH (diffuse idiopathic skeletal hyperostosis)    • Disorder of thyroid    • Diverticulosis of large intestine    • High blood pressure    • High cholesterol    • Lymphedema of lower extremity    • OBESITY    • Recurrent UTI     rec UTI   • Rhe mg, Intravenous, Daily  cefOXitin Sodium (MEFOXIN) 2 g in sodium chloride 0.9% 100 mL MBP, 2 g, Intravenous, Q12H  0.9% NaCl infusion, , Intravenous, Continuous  HYDROmorphone (DILAUDID) 0.2 mg/ml PCA infusion, , Intravenous, Continuous  HYDROmorphone (DIL every other day. Pravastatin Sodium 20 MG Oral Tab, Take 20 mg by mouth nightly. Ferrous Sulfate (IRON) 325 (65 Fe) MG Oral Tab, Take 1 tablet by mouth daily.     Metoprolol Tartrate 50 MG Oral Tab, Take 1 tablet (50 mg total) by mouth 2x Daily(Beta Blo ok.  Skin: Warm and dry.      Results:     Laboratory Data:  Lab Results   Component Value Date    WBC 10.3 02/05/2020    HGB 10.7 (L) 02/05/2020    HCT 36.2 02/05/2020    .0 02/05/2020    CREATSERUM 1.57 (H) 11/07/2018    BUN 39 (H) 11/07/2018    NA

## 2020-02-06 NOTE — ANESTHESIA POSTPROCEDURE EVALUATION
[address] [facility] Patient Status:  Surgery Admit - Inpt   Age/Gender 76year old female MRN [id number]   Location [address] [facility] Attending João Aldrich MD   Hosp Day # 0 PCP MD Bri Jackmanity

## 2020-02-07 LAB
ALBUMIN SERPL-MCNC: 1.8 G/DL (ref 3.4–5)
ALBUMIN/GLOB SERPL: 0.6 {RATIO} (ref 1–2)
ALP LIVER SERPL-CCNC: 52 U/L (ref 55–142)
ALT SERPL-CCNC: 13 U/L (ref 13–56)
ANION GAP SERPL CALC-SCNC: 2 MMOL/L (ref 0–18)
AST SERPL-CCNC: 10 U/L (ref 15–37)
BASOPHILS # BLD AUTO: 0.03 X10(3) UL (ref 0–0.2)
BASOPHILS NFR BLD AUTO: 0.4 %
BILIRUB SERPL-MCNC: 0.2 MG/DL (ref 0.1–2)
BUN BLD-MCNC: 28 MG/DL (ref 7–18)
BUN/CREAT SERPL: 16.4 (ref 10–20)
CALCIUM BLD-MCNC: 7.9 MG/DL (ref 8.5–10.1)
CHLORIDE SERPL-SCNC: 117 MMOL/L (ref 98–112)
CO2 SERPL-SCNC: 24 MMOL/L (ref 21–32)
CREAT BLD-MCNC: 1.71 MG/DL (ref 0.55–1.02)
DEPRECATED RDW RBC AUTO: 53.4 FL (ref 35.1–46.3)
EOSINOPHIL # BLD AUTO: 0.03 X10(3) UL (ref 0–0.7)
EOSINOPHIL NFR BLD AUTO: 0.4 %
ERYTHROCYTE [DISTWIDTH] IN BLOOD BY AUTOMATED COUNT: 15.4 % (ref 11–15)
GLOBULIN PLAS-MCNC: 3 G/DL (ref 2.8–4.4)
GLUCOSE BLD-MCNC: 108 MG/DL (ref 70–99)
GLUCOSE BLD-MCNC: 111 MG/DL (ref 70–99)
GLUCOSE BLD-MCNC: 124 MG/DL (ref 70–99)
GLUCOSE BLD-MCNC: 129 MG/DL (ref 70–99)
GLUCOSE BLD-MCNC: 68 MG/DL (ref 70–99)
GLUCOSE BLD-MCNC: 82 MG/DL (ref 70–99)
GLUCOSE BLD-MCNC: 92 MG/DL (ref 70–99)
GLUCOSE BLD-MCNC: 94 MG/DL (ref 70–99)
HAV IGM SER QL: 1.7 MG/DL (ref 1.6–2.6)
HCT VFR BLD AUTO: 28.2 % (ref 35–48)
HGB BLD-MCNC: 8.3 G/DL (ref 12–16)
IMM GRANULOCYTES # BLD AUTO: 0.05 X10(3) UL (ref 0–1)
IMM GRANULOCYTES NFR BLD: 0.7 %
LYMPHOCYTES # BLD AUTO: 0.36 X10(3) UL (ref 1–4)
LYMPHOCYTES NFR BLD AUTO: 5.4 %
M PROTEIN MFR SERPL ELPH: 4.8 G/DL (ref 6.4–8.2)
MCH RBC QN AUTO: 27.9 PG (ref 26–34)
MCHC RBC AUTO-ENTMCNC: 29.4 G/DL (ref 31–37)
MCV RBC AUTO: 94.9 FL (ref 80–100)
MONOCYTES # BLD AUTO: 0.56 X10(3) UL (ref 0.1–1)
MONOCYTES NFR BLD AUTO: 8.4 %
NEUTROPHILS # BLD AUTO: 5.66 X10 (3) UL (ref 1.5–7.7)
NEUTROPHILS # BLD AUTO: 5.66 X10(3) UL (ref 1.5–7.7)
NEUTROPHILS NFR BLD AUTO: 84.7 %
OSMOLALITY SERPL CALC.SUM OF ELEC: 301 MOSM/KG (ref 275–295)
PLATELET # BLD AUTO: 182 10(3)UL (ref 150–450)
POTASSIUM SERPL-SCNC: 4.3 MMOL/L (ref 3.5–5.1)
RBC # BLD AUTO: 2.97 X10(6)UL (ref 3.8–5.3)
SODIUM SERPL-SCNC: 143 MMOL/L (ref 136–145)
SOLUBLE TRANSFERRIN RECEPTOR: 5.8 MG/L
WBC # BLD AUTO: 6.7 X10(3) UL (ref 4–11)

## 2020-02-07 PROCEDURE — 99232 SBSQ HOSP IP/OBS MODERATE 35: CPT | Performed by: CLINICAL NURSE SPECIALIST

## 2020-02-07 RX ORDER — SODIUM CHLORIDE 0.9 % (FLUSH) 0.9 %
10 SYRINGE (ML) INJECTION EVERY 12 HOURS
Status: DISCONTINUED | OUTPATIENT
Start: 2020-02-07 | End: 2020-02-13

## 2020-02-07 RX ORDER — DEXTROSE, SODIUM CHLORIDE, AND POTASSIUM CHLORIDE 5; .45; .15 G/100ML; G/100ML; G/100ML
INJECTION INTRAVENOUS CONTINUOUS
Status: DISCONTINUED | OUTPATIENT
Start: 2020-02-07 | End: 2020-02-08

## 2020-02-07 NOTE — PROGRESS NOTES
[facility]  Progress Note    Catarino Andino Patient Status:  Inpatient    1945 MRN [id number]   [facility] 3NW-A Attending Caroll Lanes, MD   Hosp Day # 2 PCP Zia Weeks MD     This is a delayed note completion from my 10 02/07/2020    ALT 13 02/07/2020    MG 1.7 02/07/2020         Imaging: none new.      Meds:   phenol (CHLORASEPTIC) 1.4 % oral liquid spray, , Oral, Q2H PRN  Pantoprazole Sodium (PROTONIX) 40 mg in Sodium Chloride 0.9 % 10 mL IV push, 40 mg, Intravenous, gouty arthritis    DISH (diffuse idiopathic skeletal hyperostosis)    Uncontrolled type 2 diabetes mellitus with insulin therapy ([facility] [address]    Hypertension    Anemia    Stage 3 chronic kidney disease ([facility]    Encounter for long-term (current) use of medications

## 2020-02-07 NOTE — PROGRESS NOTES
[facility]  Progress Note    Luz Elena Villa Patient Status:  Inpatient    1945 MRN [id number]   [facility] 3NW-A Attending Fabian Nazario MD   Hosp Day # 2 PCP Judy Germain MD     Assessment/Plan:  Patient Active Problem L hour   Intake 274 ml   Output 1575 ml   Net -1301 ml       HEENT: Exam is unremarkable. NG tube in place  Neck: Supple. Lungs: Clear bilaterally. Cardiac: Regular rate and rhythm. Abdomen: Bowel sounds present, normoactive. Nontender.   Extremities:  N

## 2020-02-07 NOTE — PLAN OF CARE
Urine output 100ml after 500ml fluid bolus. Message sent to [facility] to notify, await response.

## 2020-02-07 NOTE — PLAN OF CARE
Problem: GASTROINTESTINAL - ADULT  Goal: Minimal or absence of nausea and vomiting  Description  INTERVENTIONS:  - Maintain adequate hydration with IV or PO as ordered and tolerated  - Nasogastric tube to low intermittent suction as ordered  - Evaluate e goal  Description  INTERVENTIONS:  - Encourage pt to monitor pain and request assistance  - Assess pain using appropriate pain scale  - Administer analgesics based on type and severity of pain and evaluate response  - Implement non-pharmacological measures

## 2020-02-07 NOTE — PROGRESS NOTES
[facility] Progress Note    Subjective:  Happy to be getting NG out this evening. Surgical pain manageable.      Objective:  /59 (BP Location: Left arm)   Pulse 93   Temp 97.9 °F (36.6 °C) (Oral)   Resp 18   Ht 167.6 cm (5' 6\")   Wt 280 lb 3

## 2020-02-07 NOTE — PROGRESS NOTES
[facility]  Progress Note    Paramjit Garcia Patient Status:  Inpatient    1945 MRN [id number]   [facility] 3NW-A Attending Gerald Harp MD   Hosp Day # 2 PCP Jefferson Vivar MD     Subjective:   The patient is lying comforta Emesis/NG output:350; Drains:20]    Assessment  Patient Active Problem List:     GAYLE on CPAP     Chronic gouty arthritis     DISH (diffuse idiopathic skeletal hyperostosis)     Psoriasis     Osteoarthritis, multiple sites     Uncontrolled type 2 diabetes m

## 2020-02-08 LAB
ALBUMIN SERPL-MCNC: 1.8 G/DL (ref 3.4–5)
ALBUMIN/GLOB SERPL: 0.5 {RATIO} (ref 1–2)
ALP LIVER SERPL-CCNC: 50 U/L (ref 55–142)
ALT SERPL-CCNC: 11 U/L (ref 13–56)
ANION GAP SERPL CALC-SCNC: 1 MMOL/L (ref 0–18)
ANTIBODY SCREEN: NEGATIVE
AST SERPL-CCNC: 13 U/L (ref 15–37)
BASOPHILS # BLD AUTO: 0.03 X10(3) UL (ref 0–0.2)
BASOPHILS NFR BLD AUTO: 0.5 %
BILIRUB SERPL-MCNC: 0.2 MG/DL (ref 0.1–2)
BUN BLD-MCNC: 21 MG/DL (ref 7–18)
BUN/CREAT SERPL: 14.3 (ref 10–20)
CALCIUM BLD-MCNC: 8.1 MG/DL (ref 8.5–10.1)
CHLORIDE SERPL-SCNC: 117 MMOL/L (ref 98–112)
CO2 SERPL-SCNC: 25 MMOL/L (ref 21–32)
CREAT BLD-MCNC: 1.47 MG/DL (ref 0.55–1.02)
DEPRECATED RDW RBC AUTO: 52.7 FL (ref 35.1–46.3)
EOSINOPHIL # BLD AUTO: 0.14 X10(3) UL (ref 0–0.7)
EOSINOPHIL NFR BLD AUTO: 2.4 %
ERYTHROCYTE [DISTWIDTH] IN BLOOD BY AUTOMATED COUNT: 15.3 % (ref 11–15)
GLOBULIN PLAS-MCNC: 3.5 G/DL (ref 2.8–4.4)
GLUCOSE BLD-MCNC: 123 MG/DL (ref 70–99)
GLUCOSE BLD-MCNC: 123 MG/DL (ref 70–99)
GLUCOSE BLD-MCNC: 127 MG/DL (ref 70–99)
GLUCOSE BLD-MCNC: 141 MG/DL (ref 70–99)
GLUCOSE BLD-MCNC: 143 MG/DL (ref 70–99)
HAV IGM SER QL: 1.7 MG/DL (ref 1.6–2.6)
HCT VFR BLD AUTO: 25.5 % (ref 35–48)
HGB BLD-MCNC: 7.5 G/DL (ref 12–16)
IMM GRANULOCYTES # BLD AUTO: 0.02 X10(3) UL (ref 0–1)
IMM GRANULOCYTES NFR BLD: 0.3 %
LYMPHOCYTES # BLD AUTO: 0.36 X10(3) UL (ref 1–4)
LYMPHOCYTES NFR BLD AUTO: 6.1 %
M PROTEIN MFR SERPL ELPH: 5.3 G/DL (ref 6.4–8.2)
MCH RBC QN AUTO: 27.7 PG (ref 26–34)
MCHC RBC AUTO-ENTMCNC: 29.4 G/DL (ref 31–37)
MCV RBC AUTO: 94.1 FL (ref 80–100)
MONOCYTES # BLD AUTO: 0.47 X10(3) UL (ref 0.1–1)
MONOCYTES NFR BLD AUTO: 8 %
NEUTROPHILS # BLD AUTO: 4.88 X10 (3) UL (ref 1.5–7.7)
NEUTROPHILS # BLD AUTO: 4.88 X10(3) UL (ref 1.5–7.7)
NEUTROPHILS NFR BLD AUTO: 82.7 %
OSMOLALITY SERPL CALC.SUM OF ELEC: 300 MOSM/KG (ref 275–295)
PLATELET # BLD AUTO: 156 10(3)UL (ref 150–450)
POTASSIUM SERPL-SCNC: 4.3 MMOL/L (ref 3.5–5.1)
RBC # BLD AUTO: 2.71 X10(6)UL (ref 3.8–5.3)
RH BLOOD TYPE: NEGATIVE
SODIUM SERPL-SCNC: 143 MMOL/L (ref 136–145)
WBC # BLD AUTO: 5.9 X10(3) UL (ref 4–11)

## 2020-02-08 PROCEDURE — 30233N1 TRANSFUSION OF NONAUTOLOGOUS RED BLOOD CELLS INTO PERIPHERAL VEIN, PERCUTANEOUS APPROACH: ICD-10-PCS | Performed by: COLON & RECTAL SURGERY

## 2020-02-08 RX ORDER — SODIUM CHLORIDE 9 MG/ML
INJECTION, SOLUTION INTRAVENOUS ONCE
Status: COMPLETED | OUTPATIENT
Start: 2020-02-08 | End: 2020-02-08

## 2020-02-08 RX ORDER — TEMAZEPAM 15 MG/1
15 CAPSULE ORAL NIGHTLY PRN
Status: DISCONTINUED | OUTPATIENT
Start: 2020-02-08 | End: 2020-02-08

## 2020-02-08 RX ORDER — MAGNESIUM OXIDE 400 MG (241.3 MG MAGNESIUM) TABLET
400 TABLET ONCE
Status: COMPLETED | OUTPATIENT
Start: 2020-02-08 | End: 2020-02-08

## 2020-02-08 RX ORDER — FUROSEMIDE 10 MG/ML
20 INJECTION INTRAMUSCULAR; INTRAVENOUS ONCE
Status: COMPLETED | OUTPATIENT
Start: 2020-02-08 | End: 2020-02-08

## 2020-02-08 RX ORDER — SODIUM CHLORIDE 9 MG/ML
INJECTION, SOLUTION INTRAVENOUS CONTINUOUS
Status: DISCONTINUED | OUTPATIENT
Start: 2020-02-08 | End: 2020-02-13

## 2020-02-08 NOTE — RESPIRATORY THERAPY NOTE
GAYLE - Equipment Use Daily Summary:                  . Set Mode:                . Usage in hours:                . 90% Pressure (EPAP) level:                . 90% Insp. Pressure (IPAP): Willian Armstrong AHI:                .  Supplemental Oxygen:    LPM

## 2020-02-08 NOTE — PROGRESS NOTES
[facility]  Progress Note    Carmina Galvan Patient Status:  Inpatient    1945 MRN [id number]   [facility] 3NW-A Attending Katelyn Lainez MD   [address] Day # 3 PCP Bebe Li MD     Subjective:  No new complaints, incisional with insulin therapy ([facility] [address]     Hypertension     Anemia     High risk medications (not anticoagulants) long-term use     S/P colostomy (HCC)     Stage 3 chronic kidney disease (HCC)     History of endometrial cancer     Interstitial cystitis     Colostomy s

## 2020-02-08 NOTE — PROGRESS NOTES
[facility]  Progress Note    Maia Francisco Patient Status:  Inpatient    1945 MRN [id number]   [facility] 3NW-A Attending Eva Torres MD   Hosp Day # 3 PCP Liss Eubanks MD       SUBJECTIVE: Feels well - would like to e infusion, , Intravenous, Continuous  phenol (CHLORASEPTIC) 1.4 % oral liquid spray, , Oral, Q2H PRN  Pantoprazole Sodium (PROTONIX) 40 mg in Sodium Chloride 0.9 % 10 mL IV push, 40 mg, Intravenous, Q24H  Insulin Aspart Pen (NOVOLOG) 100 UNIT/ML flexpen 1-3 medications    POD # 3 - progressing well and her mood is good. Noted drop in Hgb further (7.5). Her Iron studies point to Iron deficiency, with a component of chronic renal disease. I think her Ferritin is falsely elevated due to physical stresses.  Glucos

## 2020-02-08 NOTE — PROGRESS NOTES
NG was clamped, pt. Denies nausea or vomiting. Per order, ok to d/c NG tube at 0300. NG dc'd without difficulty. Reminded NPO except ice. Will monitor.

## 2020-02-08 NOTE — PHYSICAL THERAPY NOTE
PHYSICAL THERAPY EVALUATION - INPATIENT     Room Number: 600/433-Y  Evaluation Date: 2/8/2020  Type of Evaluation: Initial  Physician Order: PT Eval and Treat    Presenting Problem: s/p colostomy takedown on 2/5/20, 3 SB anastomosis; expected Ileus; po 39/ RDI 48/ REM AHI 71/ SaO2 75%/ CPAP 12/ [employer]   • Sleep apnea    • Small bowel obstruction ([facility] [address] 2017       Past Surgical History  Past Surgical History:   Procedure Laterality Date   •      • CHOLECYSTECTOMY  12 [address] EDW   • COLON RESE little with nursing earlier and states that she will walk twice more later today. States that she had a very hard time standing from a low toilet. Patient self-stated goal is not get up and ambulate.      OBJECTIVE  Precautions: None  Fall Risk: High f in hospital room?: A Little   -   Climbing 3-5 steps with a railing?: A Lot       AM-PAC Score:  Raw Score: 16   Approx Degree of Impairment: 54.16%   Standardized Score (AM-PAC Scale): 40.78   CMS Modifier (G-Code): CK    FUNCTIONAL ABILITY STATUS  Gait A mobility. Research supports that patients with this level of impairment may benefit from [address] Based on this evaluation, patient's clinical presentation is stable and overall the evaluation complexity is considered moderate.     These impairments and co

## 2020-02-08 NOTE — PROGRESS NOTES
[facility]  Progress Note    Dean Head Patient Status:  Inpatient    1945 MRN [id number]   [facility] 3NW-A Attending Eliot Colorado MD   [facility] Day # 3 PCP Caitlin Urena MD     Assessment/Plan:  Patient Active Problem L SpO2 95 %. Intake/Output Summary (Last 24 hours) at 2/8/2020 0936  Last data filed at 2/8/2020 2165  Gross per 24 hour   Intake 6647 ml   Output 2205 ml   Net 4442 ml       HEENT: Exam is unremarkable. Neck: Supple. Lungs: Clear bilaterally.   Cardiac:

## 2020-02-08 NOTE — PLAN OF CARE
Patient is alert and oriented x3  Up with SBA to chair  Tinsley patent  Bloody/brown liquid noted from rectum. Patient can \"feel it coming out\" but cant \"hold it in or control it. \"  NGT clamped for 2 hours. Patient reported nausea.  NGT placed back to W prevention bundle as indicated  Outcome: Progressing     Problem: HEMATOLOGIC - ADULT  Goal: Maintains hematologic stability  Description  INTERVENTIONS  - Assess for signs and symptoms of bleeding or hemorrhage  - Monitor labs and vital signs for trends

## 2020-02-09 LAB
ANION GAP SERPL CALC-SCNC: 4 MMOL/L (ref 0–18)
BASOPHILS # BLD AUTO: 0.02 X10(3) UL (ref 0–0.2)
BASOPHILS NFR BLD AUTO: 0.4 %
BLOOD TYPE BARCODE: 600
BUN BLD-MCNC: 17 MG/DL (ref 7–18)
BUN/CREAT SERPL: 11.9 (ref 10–20)
CALCIUM BLD-MCNC: 8.4 MG/DL (ref 8.5–10.1)
CHLORIDE SERPL-SCNC: 112 MMOL/L (ref 98–112)
CO2 SERPL-SCNC: 26 MMOL/L (ref 21–32)
CREAT BLD-MCNC: 1.43 MG/DL (ref 0.55–1.02)
DEPRECATED RDW RBC AUTO: 50.2 FL (ref 35.1–46.3)
EOSINOPHIL # BLD AUTO: 0.26 X10(3) UL (ref 0–0.7)
EOSINOPHIL NFR BLD AUTO: 4.8 %
ERYTHROCYTE [DISTWIDTH] IN BLOOD BY AUTOMATED COUNT: 14.8 % (ref 11–15)
GLUCOSE BLD-MCNC: 108 MG/DL (ref 70–99)
GLUCOSE BLD-MCNC: 111 MG/DL (ref 70–99)
GLUCOSE BLD-MCNC: 120 MG/DL (ref 70–99)
GLUCOSE BLD-MCNC: 120 MG/DL (ref 70–99)
GLUCOSE BLD-MCNC: 132 MG/DL (ref 70–99)
HAV IGM SER QL: 1.5 MG/DL (ref 1.6–2.6)
HCT VFR BLD AUTO: 31.4 % (ref 35–48)
HGB BLD-MCNC: 9.6 G/DL (ref 12–16)
IMM GRANULOCYTES # BLD AUTO: 0.02 X10(3) UL (ref 0–1)
IMM GRANULOCYTES NFR BLD: 0.4 %
LYMPHOCYTES # BLD AUTO: 0.36 X10(3) UL (ref 1–4)
LYMPHOCYTES NFR BLD AUTO: 6.7 %
MCH RBC QN AUTO: 28.2 PG (ref 26–34)
MCHC RBC AUTO-ENTMCNC: 30.6 G/DL (ref 31–37)
MCV RBC AUTO: 92.1 FL (ref 80–100)
MONOCYTES # BLD AUTO: 0.42 X10(3) UL (ref 0.1–1)
MONOCYTES NFR BLD AUTO: 7.8 %
NEUTROPHILS # BLD AUTO: 4.32 X10 (3) UL (ref 1.5–7.7)
NEUTROPHILS # BLD AUTO: 4.32 X10(3) UL (ref 1.5–7.7)
NEUTROPHILS NFR BLD AUTO: 79.9 %
OSMOLALITY SERPL CALC.SUM OF ELEC: 297 MOSM/KG (ref 275–295)
PLATELET # BLD AUTO: 164 10(3)UL (ref 150–450)
POTASSIUM SERPL-SCNC: 3.8 MMOL/L (ref 3.5–5.1)
RBC # BLD AUTO: 3.41 X10(6)UL (ref 3.8–5.3)
SODIUM SERPL-SCNC: 142 MMOL/L (ref 136–145)
WBC # BLD AUTO: 5.4 X10(3) UL (ref 4–11)

## 2020-02-09 RX ORDER — HYDRALAZINE HYDROCHLORIDE 20 MG/ML
10 INJECTION INTRAMUSCULAR; INTRAVENOUS EVERY 6 HOURS PRN
Status: DISCONTINUED | OUTPATIENT
Start: 2020-02-09 | End: 2020-02-13

## 2020-02-09 RX ORDER — FUROSEMIDE 10 MG/ML
20 INJECTION INTRAMUSCULAR; INTRAVENOUS ONCE
Status: COMPLETED | OUTPATIENT
Start: 2020-02-09 | End: 2020-02-09

## 2020-02-09 RX ORDER — HEPARIN SODIUM 5000 [USP'U]/ML
5000 INJECTION, SOLUTION INTRAVENOUS; SUBCUTANEOUS EVERY 8 HOURS SCHEDULED
Status: DISCONTINUED | OUTPATIENT
Start: 2020-02-09 | End: 2020-02-09

## 2020-02-09 NOTE — PROGRESS NOTES
[facility]  Progress Note    Jackey Barthel Patient Status:  Inpatient    1945 MRN [id number]   [facility] 3NW-A Attending Bradley Tolbert MD   [facility] Day # 4 PCP Ghislaine Warner MD       SUBJECTIVE: Planning on home next 1 to 2 (LEVEMIR) 100 UNIT/ML flextouch 8 Units, 8 Units, Subcutaneous, Daily  phenol (CHLORASEPTIC) 1.4 % oral liquid spray, , Oral, Q2H PRN  Pantoprazole Sodium (PROTONIX) 40 mg in Sodium Chloride 0.9 % 10 mL IV push, 40 mg, Intravenous, Q24H  metoprolol Tartrat Plan:      Prophylaxis:   DVT with SCDs/declines TEDs. Off Heparin due to drop in Hgb and concern over active bleeding. GI with Pantoprazole PO  Atrophy Prophylaxis - not at urgent risk - increasing activity, advancing diet. Dispo:  Inpatient - PO

## 2020-02-09 NOTE — PLAN OF CARE
Pt is oriented x4, up with one and a walker to the chair and to void. Refused PT today. Encouraged patient to ambulate with PT, she walked in the hallways 2 times with nursing staff. Pt O2 was weaned from 2L to RA.  First unit of blood transfused without S/

## 2020-02-09 NOTE — RESPIRATORY THERAPY NOTE
GAYLE - Equipment Use Daily Summary:  · Set Mode   · Usage in hours:   · 90% Pressure (EPAP) level:   · 90% Insp Pressure (IPAP):   · AHI:   · Supplemental Oxygen:  · Comments: DID NOT USE

## 2020-02-09 NOTE — PROGRESS NOTES
PT RESTING IN BED, EASY NON LABORED BREATHING ON RA. VS WNL. UP WITH SB ASSIST. CPOX AND TELEMETRY IN PLACE. LUNG SOUND ARE DIM. BRIEF IN PLACE FOR URGENCY. PT TOLERATING CLEAR LIQUIDS. MIDLINE AND TRANSVERSE INCISION WITH STAPLES TRI, NO DRAINAGE NOTED.  J

## 2020-02-09 NOTE — PROGRESS NOTES
[facility]  Progress Note    Amy Perez Patient Status:  Inpatient    1945 MRN [id number]   [facility] 3NW-A Attending Dwight Ding MD   [facility] Day # 4 PCP Luis Alfredo Toscano MD     Assessment/Plan:  Patient Active Problem L height 66\", weight 280 lb 3.3 oz (127.1 kg), SpO2 92 %. Intake/Output Summary (Last 24 hours) at 2/9/2020 0753  Last data filed at 2/9/2020 0700  Gross per 24 hour   Intake 3274 ml   Output 475 ml   Net 2799 ml       HEENT: Exam is unremarkable.   Neck:

## 2020-02-09 NOTE — PROGRESS NOTES
Pt Aox4. Lungs are clear;diminished. Needs encouragement with IS. Pt practices up to 250. Abdomen is soft;nondistended. Bowel sounds are hypoactive. Pt states she has passed minimal gas. BM yesterday with intermittent nausea.  Will see if she tolerates f

## 2020-02-09 NOTE — PROGRESS NOTES
[facility]  Progress Note    Mark Lesser Patient Status:  Inpatient    1945 MRN [id number]   [facility] 3NW-A Attending Jareth Dawn MD   [address] Day # 4 PCP Vinita Gusman MD     Subjective:  No new complaints, incisional long-term use     S/P colostomy (HCC)     Stage 3 chronic kidney disease ([facility] [address]     History of endometrial cancer     Interstitial cystitis     Colostomy stricture (HCC)     BRBPR (bright red blood per rectum)     Proctitis     Pruritus ani     Status post

## 2020-02-10 ENCOUNTER — APPOINTMENT (OUTPATIENT)
Dept: GENERAL RADIOLOGY | Facility: HOSPITAL | Age: 75
DRG: 330 | End: 2020-02-10
Attending: COLON & RECTAL SURGERY
Payer: MEDICARE

## 2020-02-10 LAB
ANION GAP SERPL CALC-SCNC: 9 MMOL/L (ref 0–18)
BASOPHILS # BLD AUTO: 0.03 X10(3) UL (ref 0–0.2)
BASOPHILS NFR BLD AUTO: 0.5 %
BLOOD TYPE BARCODE: 600
BUN BLD-MCNC: 14 MG/DL (ref 7–18)
BUN/CREAT SERPL: 9.5 (ref 10–20)
CALCIUM BLD-MCNC: 7.8 MG/DL (ref 8.5–10.1)
CHLORIDE SERPL-SCNC: 107 MMOL/L (ref 98–112)
CO2 SERPL-SCNC: 25 MMOL/L (ref 21–32)
CREAT BLD-MCNC: 1.47 MG/DL (ref 0.55–1.02)
DEPRECATED RDW RBC AUTO: 47.8 FL (ref 35.1–46.3)
EOSINOPHIL # BLD AUTO: 0.21 X10(3) UL (ref 0–0.7)
EOSINOPHIL NFR BLD AUTO: 3.4 %
ERYTHROCYTE [DISTWIDTH] IN BLOOD BY AUTOMATED COUNT: 14.6 % (ref 11–15)
GLUCOSE BLD-MCNC: 112 MG/DL (ref 70–99)
GLUCOSE BLD-MCNC: 116 MG/DL (ref 70–99)
GLUCOSE BLD-MCNC: 125 MG/DL (ref 70–99)
GLUCOSE BLD-MCNC: 80 MG/DL (ref 70–99)
HAV IGM SER QL: 1.4 MG/DL (ref 1.6–2.6)
HCT VFR BLD AUTO: 34.4 % (ref 35–48)
HGB BLD-MCNC: 10.7 G/DL (ref 12–16)
IMM GRANULOCYTES # BLD AUTO: 0.03 X10(3) UL (ref 0–1)
IMM GRANULOCYTES NFR BLD: 0.5 %
LYMPHOCYTES # BLD AUTO: 0.43 X10(3) UL (ref 1–4)
LYMPHOCYTES NFR BLD AUTO: 7 %
MCH RBC QN AUTO: 28 PG (ref 26–34)
MCHC RBC AUTO-ENTMCNC: 31.1 G/DL (ref 31–37)
MCV RBC AUTO: 90.1 FL (ref 80–100)
MONOCYTES # BLD AUTO: 0.49 X10(3) UL (ref 0.1–1)
MONOCYTES NFR BLD AUTO: 8 %
NEUTROPHILS # BLD AUTO: 4.92 X10 (3) UL (ref 1.5–7.7)
NEUTROPHILS # BLD AUTO: 4.92 X10(3) UL (ref 1.5–7.7)
NEUTROPHILS NFR BLD AUTO: 80.6 %
OSMOLALITY SERPL CALC.SUM OF ELEC: 294 MOSM/KG (ref 275–295)
PLATELET # BLD AUTO: 167 10(3)UL (ref 150–450)
POTASSIUM SERPL-SCNC: 3.8 MMOL/L (ref 3.5–5.1)
RBC # BLD AUTO: 3.82 X10(6)UL (ref 3.8–5.3)
SODIUM SERPL-SCNC: 141 MMOL/L (ref 136–145)
WBC # BLD AUTO: 6.1 X10(3) UL (ref 4–11)

## 2020-02-10 PROCEDURE — 74019 RADEX ABDOMEN 2 VIEWS: CPT | Performed by: PHYSICIAN ASSISTANT

## 2020-02-10 RX ORDER — TORSEMIDE 20 MG/1
20 TABLET ORAL DAILY
Status: DISCONTINUED | OUTPATIENT
Start: 2020-02-10 | End: 2020-02-10

## 2020-02-10 RX ORDER — SODIUM CHLORIDE 0.9 % (FLUSH) 0.9 %
10 SYRINGE (ML) INJECTION EVERY 12 HOURS
Status: DISCONTINUED | OUTPATIENT
Start: 2020-02-10 | End: 2020-02-13

## 2020-02-10 RX ORDER — HEPARIN SODIUM 5000 [USP'U]/ML
7500 INJECTION, SOLUTION INTRAVENOUS; SUBCUTANEOUS EVERY 8 HOURS SCHEDULED
Status: DISCONTINUED | OUTPATIENT
Start: 2020-02-10 | End: 2020-02-11

## 2020-02-10 RX ORDER — HEPARIN SODIUM 5000 [USP'U]/ML
5000 INJECTION, SOLUTION INTRAVENOUS; SUBCUTANEOUS EVERY 8 HOURS SCHEDULED
Status: DISCONTINUED | OUTPATIENT
Start: 2020-02-10 | End: 2020-02-10 | Stop reason: DRUGHIGH

## 2020-02-10 RX ORDER — ACETAMINOPHEN 500 MG
500 TABLET ORAL EVERY 6 HOURS PRN
Status: DISCONTINUED | OUTPATIENT
Start: 2020-02-10 | End: 2020-02-13

## 2020-02-10 RX ORDER — METOPROLOL TARTRATE 5 MG/5ML
5 INJECTION INTRAVENOUS EVERY 6 HOURS
Status: DISCONTINUED | OUTPATIENT
Start: 2020-02-10 | End: 2020-02-12

## 2020-02-10 RX ORDER — HYDROCODONE BITARTRATE AND ACETAMINOPHEN 5; 325 MG/1; MG/1
2 TABLET ORAL EVERY 4 HOURS PRN
Status: DISCONTINUED | OUTPATIENT
Start: 2020-02-10 | End: 2020-02-13

## 2020-02-10 RX ORDER — HYDROCODONE BITARTRATE AND ACETAMINOPHEN 5; 325 MG/1; MG/1
1 TABLET ORAL EVERY 4 HOURS PRN
Status: DISCONTINUED | OUTPATIENT
Start: 2020-02-10 | End: 2020-02-13

## 2020-02-10 RX ORDER — DEXTROSE MONOHYDRATE 50 MG/ML
INJECTION, SOLUTION INTRAVENOUS CONTINUOUS
Status: DISCONTINUED | OUTPATIENT
Start: 2020-02-10 | End: 2020-02-13

## 2020-02-10 RX ORDER — POTASSIUM CHLORIDE 14.9 MG/ML
20 INJECTION INTRAVENOUS ONCE
Status: COMPLETED | OUTPATIENT
Start: 2020-02-10 | End: 2020-02-10

## 2020-02-10 RX ORDER — HYDROMORPHONE HYDROCHLORIDE 1 MG/ML
1 INJECTION, SOLUTION INTRAMUSCULAR; INTRAVENOUS; SUBCUTANEOUS EVERY 2 HOUR PRN
Status: DISCONTINUED | OUTPATIENT
Start: 2020-02-10 | End: 2020-02-13

## 2020-02-10 RX ORDER — HYDROMORPHONE HYDROCHLORIDE 1 MG/ML
0.5 INJECTION, SOLUTION INTRAMUSCULAR; INTRAVENOUS; SUBCUTANEOUS EVERY 2 HOUR PRN
Status: DISCONTINUED | OUTPATIENT
Start: 2020-02-10 | End: 2020-02-13

## 2020-02-10 RX ORDER — FUROSEMIDE 10 MG/ML
20 INJECTION INTRAMUSCULAR; INTRAVENOUS ONCE
Status: COMPLETED | OUTPATIENT
Start: 2020-02-10 | End: 2020-02-10

## 2020-02-10 NOTE — PLAN OF CARE
Pt alert and orientatedx4, forgetful at times. Glasses. Room air. GAYLE w/ CPAP but has not worn at all this shift. Encouraged to use IS. Pulse Ox. Tele- NSR. SCSs- refuses. Full liquid diet. Zofran used for nausea.  One emesis this shift 50cc of green output and document risk factors for pressure ulcer development  - Assess and document skin integrity  - Assess and document dressing/incision, wound bed, drain sites and surrounding tissue  - Implement wound care per orders  - Initiate isolation precautions as a Progressing

## 2020-02-10 NOTE — CONSULTS
[facility] Note:  Anticoagulation Weight Dose Adjustment for heparin    Maia Francisco is a 76year old female who has been prescribed heparin 5000 units every 8 hours.       Estimated Creatinine Clearance: 31.4 mL/min (A) (based on SCr of 1.47 mg/dL (

## 2020-02-10 NOTE — CM/SW NOTE
02/10/20 1100   CM/SW Referral Data   Referral Source Social Work (self-referral)   Reason for Referral Discharge planning   Choice of Post-Acute Provider   Pt refuses [address] SITUATION  Type of Home: House   Home Layout: Two lev

## 2020-02-10 NOTE — PROGRESS NOTES
[facility]  Progress Note    Kacey Norris Patient Status:  Inpatient    1945 MRN [id number]   [facility] 3NW-A Attending Renetta Ibarra MD   [facility] Day # 5 PCP Carter Harris MD     Subjective:   The patient had multiple epis therapy ([facility]     Hypertension     Anemia     High risk medications (not anticoagulants) long-term use     S/P colostomy (HCC)     Stage 3 chronic kidney disease (HCC)     History of endometrial cancer     Interstitial cystitis     Colostomy stricture ([facility]

## 2020-02-10 NOTE — PROGRESS NOTES
Pt is AOx4. Lungs are diminished. 2L overnight. . Tele NSR. Denies CP, SOB. Pt is nauseous this am, gave her ice chips and explained not to order full liquids if she is feeling nauseous. Dr. Ute James at bedside, ordered NPO ice chips.  Tolerating wel

## 2020-02-10 NOTE — HOME CARE LIAISON
MET WITH PTNT AND OFFERED CHOICE  OF AGENCIES. PTNT AGREEABLE TO [facility]. MET WITH PTNT TO DISCUSS HOME HEALTH SERVICES AND COVERAGE CRITERIA. PTNT AGREEABLE TO Dre [facility]. PTNT GIVEN RESIDENTIAL BROCHURE.  RESIDENTIAL WITH PROVIDE SN/PT ON DISC

## 2020-02-10 NOTE — CM/SW NOTE
Pt is now agreeable to [facility]. Order for home health care received. Face to Face obtained. Residential home healthcare  P:[phone]  F:[phone] to follow pt after dc.      Meredith Bailey, RN, [facility]    [phone]

## 2020-02-10 NOTE — PROGRESS NOTES
[facility]  Progress Note    Jim Funes Patient Status:  Inpatient    1945 MRN [id number]   [facility] 3NW-A Attending Marysol Ha MD   [facility] Day # 5 PCP Emiliana Montanez MD       SUBJECTIVE: Developed some nausea yester Imaging: None new    Meds:   metoprolol Tartrate (LOPRESSOR) tab 25 mg, 25 mg, Oral, 2x Daily(Beta Blocker)  torsemide (DEMADEX) tab 20 mg, 20 mg, Oral, Daily  hydrALAzine HCl (APRESOLINE) injection 10 mg, 10 mg, Intravenous, Q6H PRN  iron sucrose (JAMSHID idiopathic skeletal hyperostosis)    Uncontrolled type 2 diabetes mellitus with insulin therapy ([facility] [address]    Hypertension    Anemia    Stage 3 chronic kidney disease ([facility] [address]    Encounter for long-term (current) use of medications    Nausea with vomiting    Likel

## 2020-02-10 NOTE — OCCUPATIONAL THERAPY NOTE
OCCUPATIONAL THERAPY QUICK EVALUATION - INPATIENT    Room Number: 743/334-R  Evaluation Date: 2/10/2020     Type of Evaluation: Quick Eval  Presenting Problem: Colostomy takedown 2/5/20    Physician Order: IP Consult to Occupational Therapy  Reason for The COLONOSCOPY N/A 2/28/2017    Performed by Sylvia Schumacher MD at [facility] N/A 2/5/2020    Performed by Lisa Lo MD at [facility] MAIN OR   • CYSTOURETHROSCOPY  3-1-11    cysto flow US, dr Sabi Grant   • EYE SURGERY      eye   • FOOT RACHAEL another person does the patient currently need…  -   Putting on and taking off regular lower body clothing?: A Little   -   Bathing (including washing, rinsing, drying)?: A Little  -   Toileting, which includes using toilet, bedpan or urinal? : A Little  - Clinical Decision Making  LOW - Analysis of occupational profile, problem-focused assessments, limited treatment options    Overall Complexity  LOW     OT Discharge Recommendations: Home  OT Device Recommendations: None    PLAN   Patient has been evaluat

## 2020-02-10 NOTE — PROGRESS NOTES
[facility]  Progress Note    Luz Elena Villa Patient Status:  Inpatient    1945 MRN [id number]   [facility] 3NW-A Attending Fabian Nazario MD   [facility] Day # 5 PCP Judy Germain MD     Assessment/Plan:  Patient Active Problem L height 66\", weight 280 lb 3.3 oz (127.1 kg), SpO2 100 %. Intake/Output Summary (Last 24 hours) at 2/10/2020 0925  Last data filed at 2/10/2020 0924  Gross per 24 hour   Intake 5092 ml   Output 155 ml   Net 4937 ml       HEENT: Exam is unremarkable.   Ne

## 2020-02-11 LAB
ALBUMIN SERPL-MCNC: 2.2 G/DL (ref 3.4–5)
ALP LIVER SERPL-CCNC: 65 U/L (ref 55–142)
ALT SERPL-CCNC: 14 U/L (ref 13–56)
ANION GAP SERPL CALC-SCNC: 6 MMOL/L (ref 0–18)
AST SERPL-CCNC: 14 U/L (ref 15–37)
BASOPHILS # BLD AUTO: 0.03 X10(3) UL (ref 0–0.2)
BASOPHILS NFR BLD AUTO: 0.5 %
BILIRUB DIRECT SERPL-MCNC: 0.1 MG/DL (ref 0–0.2)
BILIRUB SERPL-MCNC: 0.5 MG/DL (ref 0.1–2)
BUN BLD-MCNC: 12 MG/DL (ref 7–18)
BUN/CREAT SERPL: 8.2 (ref 10–20)
CALCIUM BLD-MCNC: 8.4 MG/DL (ref 8.5–10.1)
CHLORIDE SERPL-SCNC: 107 MMOL/L (ref 98–112)
CO2 SERPL-SCNC: 27 MMOL/L (ref 21–32)
CREAT BLD-MCNC: 1.47 MG/DL (ref 0.55–1.02)
DEPRECATED RDW RBC AUTO: 48.3 FL (ref 35.1–46.3)
EOSINOPHIL # BLD AUTO: 0.27 X10(3) UL (ref 0–0.7)
EOSINOPHIL NFR BLD AUTO: 4.5 %
ERYTHROCYTE [DISTWIDTH] IN BLOOD BY AUTOMATED COUNT: 14.9 % (ref 11–15)
GLUCOSE BLD-MCNC: 105 MG/DL (ref 70–99)
GLUCOSE BLD-MCNC: 106 MG/DL (ref 70–99)
GLUCOSE BLD-MCNC: 116 MG/DL (ref 70–99)
GLUCOSE BLD-MCNC: 84 MG/DL (ref 70–99)
GLUCOSE BLD-MCNC: 88 MG/DL (ref 70–99)
GLUCOSE BLD-MCNC: 92 MG/DL (ref 70–99)
HAV IGM SER QL: 2.1 MG/DL (ref 1.6–2.6)
HCT VFR BLD AUTO: 31 % (ref 35–48)
HGB BLD-MCNC: 9.6 G/DL (ref 12–16)
IMM GRANULOCYTES # BLD AUTO: 0.04 X10(3) UL (ref 0–1)
IMM GRANULOCYTES NFR BLD: 0.7 %
LYMPHOCYTES # BLD AUTO: 0.47 X10(3) UL (ref 1–4)
LYMPHOCYTES NFR BLD AUTO: 7.8 %
M PROTEIN MFR SERPL ELPH: 6.1 G/DL (ref 6.4–8.2)
MCH RBC QN AUTO: 27.6 PG (ref 26–34)
MCHC RBC AUTO-ENTMCNC: 31 G/DL (ref 31–37)
MCV RBC AUTO: 89.1 FL (ref 80–100)
MONOCYTES # BLD AUTO: 0.52 X10(3) UL (ref 0.1–1)
MONOCYTES NFR BLD AUTO: 8.6 %
NEUTROPHILS # BLD AUTO: 4.73 X10 (3) UL (ref 1.5–7.7)
NEUTROPHILS # BLD AUTO: 4.73 X10(3) UL (ref 1.5–7.7)
NEUTROPHILS NFR BLD AUTO: 77.9 %
OSMOLALITY SERPL CALC.SUM OF ELEC: 290 MOSM/KG (ref 275–295)
PLATELET # BLD AUTO: 165 10(3)UL (ref 150–450)
POTASSIUM SERPL-SCNC: 3 MMOL/L (ref 3.5–5.1)
POTASSIUM SERPL-SCNC: 3.5 MMOL/L (ref 3.5–5.1)
RBC # BLD AUTO: 3.48 X10(6)UL (ref 3.8–5.3)
SODIUM SERPL-SCNC: 140 MMOL/L (ref 136–145)
WBC # BLD AUTO: 6.1 X10(3) UL (ref 4–11)

## 2020-02-11 RX ORDER — POTASSIUM CHLORIDE 20 MEQ/1
40 TABLET, EXTENDED RELEASE ORAL EVERY 4 HOURS
Status: COMPLETED | OUTPATIENT
Start: 2020-02-11 | End: 2020-02-11

## 2020-02-11 RX ORDER — POTASSIUM CHLORIDE 14.9 MG/ML
20 INJECTION INTRAVENOUS ONCE
Status: COMPLETED | OUTPATIENT
Start: 2020-02-11 | End: 2020-02-12

## 2020-02-11 NOTE — PROGRESS NOTES
[facility]  Progress Note    Amy Perez Patient Status:  Inpatient    1945 MRN [id number]   [facility] 3NW-A Attending Dwight Ding MD   [address] Day # 6 PCP Luis Alfredo Toscano MD     Assessment/Plan:  Patient Active Problem L 67, temperature 98.1 °F (36.7 °C), temperature source Oral, resp. rate 18, height 66\", weight 280 lb 3.3 oz (127.1 kg), SpO2 96 %.     Intake/Output Summary (Last 24 hours) at 2/11/2020 0747  Last data filed at 2/11/2020 0701  Gross per 24 hour   Intake 60

## 2020-02-11 NOTE — PHYSICAL THERAPY NOTE
PHYSICAL THERAPY TREATMENT NOTE - INPATIENT    Room Number: 213/098-P     Session: 1   Number of Visits to Meet Established Goals: 5    Presenting Problem: s/p colostomy takedown on 2/5/20, 3 SB anastomosis; expected Ileus; post op anemia    Problem List • HERNIA SURGERY     • HYSTERECTOMY      CAROL 2007   • OTHER SURGICAL HISTORY  5/3/17    cystoscopy Dr. Arcelia Almanzar   • OTHER SURGICAL HISTORY  06/27/2017    Cysto w/ Dr. Arcelia Almanzar   • OTHER SURGICAL HISTORY  07/26/2017    cysto Dr. Arcelia Almanzar   • OTHER SURGICAL HISTORY  09/0 walker  Pattern: Shuffle(heavy reliance on BUE)  Stoop/Curb Assistance: Not tested(Pt stated, \"I don't have stairs, I don't do them anyway\")  Comment : n/a    Skilled Therapy Provided:     Pt presented seated in BS recliner upon PT arrival. Pt willing to demonstrate asc/gentry 5 steps with railing, and cga - Pt refused goal 2/11/2020       Goal #2 Patient is able to demonstrate transfers Sit to/from Stand at assistance level: minimum assistance - met 2/11/2020       Goal #3 Patient is able to ambulate 150 fee

## 2020-02-11 NOTE — PROGRESS NOTES
[facility]  Progress Note    Marlee Bragg Patient Status:  Inpatient    1945 MRN [id number]   [facility] 3NW-A Attending Jennifer Paz MD   [facility] Day # 6 PCP Kenneth Nixon MD     Subjective:  Overnight events noted.  The p [I.V.:1028.1]  Out: -     PROCEDURE:  XR ABDOMEN OBSTRUCTIVE SERIES ROUTINE(2 VW)(CPT=74019)     TECHNIQUE:  2 view obstructive series of the abdomen and pelvis were obtained. COMPARISON:  REENA , XR, XR ABDOMEN (1 VIEW) (CPT=74000), 9/11/2017, 9:13. and placing SCDs while in chair  4. Drain care  5. GI prophylaxis  6. All questions answered    My total face time with this patient was 24 minutes.   Greater than half of our visit was spent in counseling the patient on the above listed diagnoses and treat

## 2020-02-11 NOTE — PLAN OF CARE
Pt is Ax4, GAYLE-CPAP,  on room air, Tele (NSR), electrolyte protocol, Accucheck Q6H, right arm precaution d/t extended PIV, unit draw, NPO w/ sips of clears & ice chips, heparin is temporarily on hold, up w/ asst, IVF and abt, voids w/ periodic incontine

## 2020-02-11 NOTE — PROGRESS NOTES
[facility]  Progress Note    Paramjit Garcia Patient Status:  Inpatient    1945 MRN [id number]   [facility] 3NW-A Attending Gerald Harp MD   [facility] Day # 6 PCP Jefferson Vivar MD       SUBJECTIVE: some slight nausea this dyllan no major obstruction.      Meds:   Potassium Chloride ER (K-DUR M20) CR tab 40 mEq, 40 mEq, Oral, Q4H  insulin detemir (LEVEMIR) 100 UNIT/ML flextouch 5 Units, 5 Units, Subcutaneous, Daily  Heparin Sodium (Porcine) 5000 UNIT/ML injection 7,500 Units, 7,500 g, Oral, Q15 Min PRN    Or  Glucose-Vitamin C (DEX-4) chewable tab 4 tablet, 4 tablet, Oral, Q15 Min PRN    Or  dextrose 50 % injection 50 mL, 50 mL, Intravenous, Q15 Min PRN    Or  glucose (DEX4) oral liquid 30 g, 30 g, Oral, Q15 Min PRN    Or  Glucose-Vi

## 2020-02-12 LAB
ANION GAP SERPL CALC-SCNC: 2 MMOL/L (ref 0–18)
BASOPHILS # BLD AUTO: 0.03 X10(3) UL (ref 0–0.2)
BASOPHILS NFR BLD AUTO: 0.5 %
BUN BLD-MCNC: 12 MG/DL (ref 7–18)
BUN/CREAT SERPL: 7.9 (ref 10–20)
CALCIUM BLD-MCNC: 8.4 MG/DL (ref 8.5–10.1)
CHLORIDE SERPL-SCNC: 109 MMOL/L (ref 98–112)
CO2 SERPL-SCNC: 26 MMOL/L (ref 21–32)
CREAT BLD-MCNC: 1.51 MG/DL (ref 0.55–1.02)
DEPRECATED RDW RBC AUTO: 50.3 FL (ref 35.1–46.3)
EOSINOPHIL # BLD AUTO: 0.27 X10(3) UL (ref 0–0.7)
EOSINOPHIL NFR BLD AUTO: 4.2 %
ERYTHROCYTE [DISTWIDTH] IN BLOOD BY AUTOMATED COUNT: 14.9 % (ref 11–15)
GLUCOSE BLD-MCNC: 106 MG/DL (ref 70–99)
GLUCOSE BLD-MCNC: 110 MG/DL (ref 70–99)
GLUCOSE BLD-MCNC: 111 MG/DL (ref 70–99)
GLUCOSE BLD-MCNC: 90 MG/DL (ref 70–99)
GLUCOSE BLD-MCNC: 97 MG/DL (ref 70–99)
HAV IGM SER QL: 2 MG/DL (ref 1.6–2.6)
HCT VFR BLD AUTO: 35.8 % (ref 35–48)
HGB BLD-MCNC: 10.6 G/DL (ref 12–16)
IMM GRANULOCYTES # BLD AUTO: 0.07 X10(3) UL (ref 0–1)
IMM GRANULOCYTES NFR BLD: 1.1 %
LYMPHOCYTES # BLD AUTO: 0.73 X10(3) UL (ref 1–4)
LYMPHOCYTES NFR BLD AUTO: 11.4 %
MCH RBC QN AUTO: 27.7 PG (ref 26–34)
MCHC RBC AUTO-ENTMCNC: 29.6 G/DL (ref 31–37)
MCV RBC AUTO: 93.5 FL (ref 80–100)
MONOCYTES # BLD AUTO: 0.64 X10(3) UL (ref 0.1–1)
MONOCYTES NFR BLD AUTO: 10 %
NEUTROPHILS # BLD AUTO: 4.69 X10 (3) UL (ref 1.5–7.7)
NEUTROPHILS # BLD AUTO: 4.69 X10(3) UL (ref 1.5–7.7)
NEUTROPHILS NFR BLD AUTO: 72.8 %
OSMOLALITY SERPL CALC.SUM OF ELEC: 284 MOSM/KG (ref 275–295)
PLATELET # BLD AUTO: 135 10(3)UL (ref 150–450)
POTASSIUM SERPL-SCNC: 4.7 MMOL/L (ref 3.5–5.1)
RBC # BLD AUTO: 3.83 X10(6)UL (ref 3.8–5.3)
SODIUM SERPL-SCNC: 137 MMOL/L (ref 136–145)
WBC # BLD AUTO: 6.4 X10(3) UL (ref 4–11)

## 2020-02-12 RX ORDER — PANTOPRAZOLE SODIUM 20 MG/1
20 TABLET, DELAYED RELEASE ORAL
Status: DISCONTINUED | OUTPATIENT
Start: 2020-02-12 | End: 2020-02-13

## 2020-02-12 NOTE — DIETARY NOTE
Zack Ayers [address] Sharin Spruce     Admitting diagnosis:  Status post Saleh Croon procedure [facility]) [Z93.3]    Ht: 167.6 cm (5' 6\")  Wt: 127.1 kg (280 lb 3.3 oz). This is 215 % of IBW  Body mass index is 45.23 kg/m².   IBW: 59kg    Lab

## 2020-02-12 NOTE — RESPIRATORY THERAPY NOTE
GAYLE - Equipment Use Daily Summary:                  . Set Mode:                . Usage in hours:                . 90% Pressure (EPAP) level:                . 90% Insp. Pressure (IPAP): David Garcia AHI:                .  Supplemental Oxygen:    LPM

## 2020-02-12 NOTE — PROGRESS NOTES
[facility]  Progress Note    José Miguel Asher Patient Status:  Inpatient    1945 MRN [id number]   [facility] 3NW-A Attending Lucie Morris MD   [facility] Day # 7 PCP Giovanni Kelley MD       SUBJECTIVE: She states she passed flatus 26.0 02/12/2020     02/12/2020    CA 8.4 02/12/2020    MG 2.0 02/12/2020         Imaging: None new    Meds:   insulin detemir (LEVEMIR) 100 UNIT/ML flextouch 5 Units, 5 Units, Subcutaneous, Daily  Insulin Aspart Pen (NOVOLOG) 100 UNIT/ML flexpen 1-3 tablet, 4 tablet, Oral, Q15 Min PRN    Or  dextrose 50 % injection 50 mL, 50 mL, Intravenous, Q15 Min PRN    Or  glucose (DEX4) oral liquid 30 g, 30 g, Oral, Q15 Min PRN    Or  Glucose-Vitamin C (DEX-4) chewable tab 8 tablet, 8 tablet, Oral, Q15 Min PRN

## 2020-02-12 NOTE — VASCULAR ACCESS
Vascular note:  Vascular consult to assess existing Extended PIV. Sterile dressing change completed. Site clean, dry, intact. Difficulty obtaining blood return. Easily flushes. Extended dwell functioning properly. OK to continue to use.   Report given

## 2020-02-12 NOTE — PROGRESS NOTES
[facility]  Progress Note    Kacey Norris Patient Status:  Inpatient    1945 MRN [id number]   [facility] 3NW-A Attending Renetta Ibarra MD   [facility] Day # 7 PCP Carter Harris MD     Subjective:  No new complaints, incisional Hypertension     Anemia     High risk medications (not anticoagulants) long-term use     S/P colostomy (HCC)     Stage 3 chronic kidney disease (HCC)     History of endometrial cancer     Interstitial cystitis     Colostomy stricture (HCC)     BRBPR (mary

## 2020-02-12 NOTE — PROGRESS NOTES
[facility]  Progress Note    Hieu Chang Patient Status:  Inpatient    1945 MRN [id number]   [facility] 3NW-A Attending Crista Orellana MD   [facility] Day # 7 PCP Della Pringle MD     Assessment/Plan:  Patient Active Problem L 98 °F (36.7 °C), resp. rate 20, height 66\", weight 280 lb 3.3 oz (127.1 kg), SpO2 96 %.     Intake/Output Summary (Last 24 hours) at 2/12/2020 0746  Last data filed at 2/12/2020 0546  Gross per 24 hour   Intake 1070 ml   Output 385 ml   Net 685 ml       HE

## 2020-02-13 VITALS
BODY MASS INDEX: 45.03 KG/M2 | SYSTOLIC BLOOD PRESSURE: 158 MMHG | WEIGHT: 280.19 LBS | DIASTOLIC BLOOD PRESSURE: 45 MMHG | HEART RATE: 59 BPM | RESPIRATION RATE: 18 BRPM | TEMPERATURE: 98 F | HEIGHT: 66 IN | OXYGEN SATURATION: 96 %

## 2020-02-13 LAB
GLUCOSE BLD-MCNC: 85 MG/DL (ref 70–99)
GLUCOSE BLD-MCNC: 91 MG/DL (ref 70–99)

## 2020-02-13 RX ORDER — PANTOPRAZOLE SODIUM 20 MG/1
20 TABLET, DELAYED RELEASE ORAL
Qty: 30 TABLET | Refills: 1 | Status: SHIPPED | OUTPATIENT
Start: 2020-02-13 | End: 2020-06-19

## 2020-02-13 RX ORDER — HYDROCODONE BITARTRATE AND ACETAMINOPHEN 5; 325 MG/1; MG/1
1 TABLET ORAL EVERY 4 HOURS PRN
Qty: 30 TABLET | Refills: 0 | Status: SHIPPED | OUTPATIENT
Start: 2020-02-13 | End: 2020-02-17 | Stop reason: ALTCHOICE

## 2020-02-13 RX ORDER — ACETAMINOPHEN 325 MG/1
650 TABLET ORAL EVERY 6 HOURS PRN
Refills: 0 | Status: SHIPPED | COMMUNITY
Start: 2020-02-13 | End: 2021-12-09 | Stop reason: ALTCHOICE

## 2020-02-13 NOTE — HOME CARE LIAISON
TNL REC'D CALL FROM [facility] PTNT DOES QUALIFY FOR A COMMODE  PTNT REC'D COMMODE IN 2017  PER MEDICARE RECORDS      Mo Stark

## 2020-02-13 NOTE — PLAN OF CARE
A&Ox4. VSS. BLE EDEMA-NON PITTING. GAYLE PROTOCOL. RA, . REFUSES CPAP. BOWEL AOUNDS ACTIVE. ABDOMEN ROUNDED, SOFT, NONTENDER. INCONTINENT OF STOOL. INCONTINENT OF URINE AT TIMES. DENIES PAIN. UP WITH WALKER AD ADALBERTO. SUCTION DRAIN ON LUQ. DRESSING C/D/I.   HEMATOLOGIC - ADULT  Goal: Maintains hematologic stability  Description  INTERVENTIONS  - Assess for signs and symptoms of bleeding or hemorrhage  - Monitor labs and vital signs for trends  - Administer supportive blood products/factors, fluids and medicat

## 2020-02-13 NOTE — PROGRESS NOTES
[facility]  Progress Note    Yeni Mary Patient Status:  Inpatient    1945 MRN [id number]   [facility] 3NW-A Attending Raquel Xavier MD   [address] [facility] Day # 8 PCP Jailene Hector MD     Subjective:  No new complaints, incisional 8    Plan:  1. Advance to regular diet, if tolerating regular diet may be d/c home today  2. Home with drain  3. Follow up in our office in 1 week for poss drain removal  4. Staples to remain in place for 3 weeks  5.  No lifting >10 lbs, no driving, may mariluz

## 2020-02-13 NOTE — PROGRESS NOTES
[facility]  Progress Note    Mark Lesser Patient Status:  Inpatient    1945 MRN [id number]   [facility] 3NW-A Attending Jareth Dawn MD   [facility] Day # 8 PCP Vinita Gusman MD       SUBJECTIVE: Doing well - had a better ni Q12H  HYDROcodone-acetaminophen (NORCO) 5-325 MG per tab 1 tablet, 1 tablet, Oral, Q4H PRN    Or  HYDROcodone-acetaminophen (NORCO) 5-325 MG per tab 2 tablet, 2 tablet, Oral, Q4H PRN  HYDROmorphone HCl (DILAUDID) 1 MG/ML injection 0.5 mg, 0.5 mg, Intraveno hyperostosis)    Uncontrolled type 2 diabetes mellitus with insulin therapy ([facility] [address]    Hypertension    Anemia    Stage 3 chronic kidney disease ([facility] [address]    Encounter for long-term (current) use of medications    Doing well, advancing diet.  Evening dose of PPI h

## 2020-02-13 NOTE — PROGRESS NOTES
[facility]  Progress Note    Tabitha Rosenberg Patient Status:  Inpatient    1945 MRN [id number]   [facility] 3NW-A Attending Hieu Carey MD   [facility] Day # 8 PCP Angi Lawson MD     Assessment/Plan:  Patient Active Problem L %.    Intake/Output Summary (Last 24 hours) at 2/13/2020 0708  Last data filed at 2/13/2020 0559  Gross per 24 hour   Intake 1924 ml   Output 57 ml   Net 1867 ml       HEENT: Exam is unremarkable. Neck: Supple. Lungs: Clear bilaterally.   Cardiac: Regular

## 2020-02-13 NOTE — PLAN OF CARE
Patient is alert and oriented x3  Up with SBA in hallway and to toilet  Voiding  +BM (some BRBPR during BM)  Tolerating FLD denies nausea/vomiting  Incisions to ABD are CDI  GRANT with small amount of serosang fluid noted  Denies pain      Problem: Saundra Dotson products/factors, fluids and medications as ordered and appropriate  - Administer supportive blood products/factors as ordered and appropriate  Outcome: Adequate for Discharge     Problem: PAIN - ADULT  Goal: Verbalizes/displays adequate comfort level or p

## 2020-02-14 NOTE — DISCHARGE SUMMARY
[facility]  Discharge Summary    Hieu Chang Patient Status:  Inpatient    1945 MRN [id number]   [facility] 3NW-A Attending No att. providers found   Hosp Day # 8 PCP Della Pringle MD     Date of Admission: 2020    D intact, no erythema.     Consultations: Dr. Irvin Lou    Complications: none     Disposition: Home to self care   Discharge Condition: Good    Discharge Medications: Discharge Medication List as of 2/13/2020  4:38 PM    START taking these medications Tab          Follow up Visits: Follow-up with Briana De La Torre in approximately the next 7-10 days.      Other Discharge Instructions:    General diet  No lifting, no driving, the patient may shower  Amigo for pain      Briana De La Torre  2/13/2020  7:36 PM

## 2020-02-17 ENCOUNTER — OFFICE VISIT (OUTPATIENT)
Dept: SURGERY | Facility: CLINIC | Age: 75
End: 2020-02-17

## 2020-02-17 VITALS — TEMPERATURE: 98 F | DIASTOLIC BLOOD PRESSURE: 73 MMHG | HEART RATE: 64 BPM | SYSTOLIC BLOOD PRESSURE: 130 MMHG

## 2020-02-17 DIAGNOSIS — K62.89 PROCTITIS: ICD-10-CM

## 2020-02-17 DIAGNOSIS — L29.0 PRURITUS ANI: ICD-10-CM

## 2020-02-17 DIAGNOSIS — K62.5 RECTAL/ANAL HEMORRHAGE: ICD-10-CM

## 2020-02-17 DIAGNOSIS — Z93.3 S/P COLOSTOMY (HCC): ICD-10-CM

## 2020-02-17 DIAGNOSIS — K94.03 COLOSTOMY STRICTURE (HCC): Primary | ICD-10-CM

## 2020-02-17 DIAGNOSIS — Z93.3 STATUS POST HARTMANN PROCEDURE (HCC): ICD-10-CM

## 2020-02-17 PROCEDURE — 99024 POSTOP FOLLOW-UP VISIT: CPT | Performed by: PHYSICIAN ASSISTANT

## 2020-02-17 NOTE — PATIENT INSTRUCTIONS
The patient presents today for continued care and evaluation after undergoing a colostomy reversal on February 5, 2020. The patient states that she is doing well overall since having the procedure.   The patient states that she is not having any pain at appointment to move the patient's [facility] ASHLEY drain as it was having less than 15 cc of output over the last 48 hours. The patient tolerated the procedure well. There area was covered with gauze and paper tape.   The patient was instructed to keep the area cove

## 2020-02-17 NOTE — PROGRESS NOTES
Post Operative Visit Note       Active Problems  1. Colostomy stricture ([facility]    2. S/P colostomy ([facility]    3. Status post Chapo procedure ([facility]    4. Pruritus ani    5. Proctitis    6.  Rectal/anal hemorrhage         Chief Complaint   Patient presents wi hysterectomy'   • Arrhythmia     HX OF AFIB DX 2017-CARDIOVERSION DONE REVERTED TO NSR   • Back problem     DISH   • CANCER     uterine   • Cancer (HCC)     Uterine CA   • DIABETES    • Diabetes (HCC)    • DISH (diffuse idiopathic skeletal hyperostosis) Smoking status: Former Smoker        Packs/day: 0.50        Years: 40.00        Pack years: 21        Quit date: 2003        Years since quittin.7      Smokeless tobacco: Never Used    Substance and Sexual Activity      Alcohol use: Not Currently appears well-developed and well-nourished. No distress. Abdominal:       Clinical examination reveals the abdomen to be soft, nondistended, nontender, bowel sounds are normoactive anal pitch. There is no rebounding tenderness or guarding.   There are no has been ambulating at home with her walker. The patient states that her stools are starting to get more formed at this time. Initially they were all liquid. She is gaining control of her bowel movements at this time as well.   She states she is able t than 20 pounds until 8 weeks postoperatively. I discussed for the raw irritation around her anus she may use Desitin ointment. All the patient and her 's questions were answered at this time.   I will see the patient again in 1 week on February 2

## 2020-02-20 ENCOUNTER — LAB REQUISITION (OUTPATIENT)
Dept: LAB | Facility: HOSPITAL | Age: 75
End: 2020-02-20
Payer: MEDICARE

## 2020-02-20 DIAGNOSIS — N39.0 URINARY TRACT INFECTION, SITE NOT SPECIFIED: ICD-10-CM

## 2020-02-20 LAB
BILIRUB UR QL STRIP.AUTO: NEGATIVE
GLUCOSE UR STRIP.AUTO-MCNC: NEGATIVE MG/DL
KETONES UR STRIP.AUTO-MCNC: NEGATIVE MG/DL
NITRITE UR QL STRIP.AUTO: NEGATIVE
PH UR STRIP.AUTO: 5 [PH] (ref 4.5–8)
PROT UR STRIP.AUTO-MCNC: NEGATIVE MG/DL
SP GR UR STRIP.AUTO: 1.02 (ref 1–1.03)
UROBILINOGEN UR STRIP.AUTO-MCNC: <2 MG/DL
YEAST URINE: PRESENT

## 2020-02-20 PROCEDURE — 87086 URINE CULTURE/COLONY COUNT: CPT | Performed by: FAMILY MEDICINE

## 2020-02-20 PROCEDURE — 81001 URINALYSIS AUTO W/SCOPE: CPT | Performed by: FAMILY MEDICINE

## 2020-02-20 PROCEDURE — 87186 SC STD MICRODIL/AGAR DIL: CPT | Performed by: FAMILY MEDICINE

## 2020-02-20 PROCEDURE — 87077 CULTURE AEROBIC IDENTIFY: CPT | Performed by: FAMILY MEDICINE

## 2020-02-21 ENCOUNTER — MED REC SCAN ONLY (OUTPATIENT)
Dept: SURGERY | Facility: CLINIC | Age: 75
End: 2020-02-21

## 2020-02-24 ENCOUNTER — OFFICE VISIT (OUTPATIENT)
Dept: SURGERY | Facility: CLINIC | Age: 75
End: 2020-02-24

## 2020-02-24 VITALS — DIASTOLIC BLOOD PRESSURE: 80 MMHG | HEART RATE: 78 BPM | TEMPERATURE: 98 F | SYSTOLIC BLOOD PRESSURE: 129 MMHG

## 2020-02-24 DIAGNOSIS — K94.03 COLOSTOMY STRICTURE (HCC): Primary | ICD-10-CM

## 2020-02-24 DIAGNOSIS — K62.89 PROCTITIS: ICD-10-CM

## 2020-02-24 PROCEDURE — 99024 POSTOP FOLLOW-UP VISIT: CPT | Performed by: PHYSICIAN ASSISTANT

## 2020-02-24 NOTE — PROGRESS NOTES
Post Operative Visit Note       Active Problems  1. Colostomy stricture ([facility]    2.  Proctitis         Chief Complaint   Patient presents with:  Colon Problem: 2/5 Low anterior colon resection, has nausea, unable to control stool - feels [facility]    • DISH (diffuse idiopathic skeletal hyperostosis)    • Disorder of thyroid    • Diverticulosis of large intestine    • High blood pressure    • High cholesterol    • Lymphedema of lower extremity    • OBESITY    • Recurrent UTI     rec UTI   • Rhe Substance and Sexual Activity      Alcohol use: Not Currently      Drug use: No    Other Topics      Concerns:       Current Outpatient Medications:   •  Pantoprazole Sodium 20 MG Oral Tab EC, Take 1 tablet (20 mg total) by mouth Before Dinner., Disp: 30 t Gastrointestinal: Positive for nausea. Negative for abdominal distention, abdominal pain, anal bleeding, blood in stool, constipation, diarrhea and vomiting. Genitourinary: Negative for difficulty urinating, dysuria, frequency and urgency.    Musculoske infection for which she is being treated by her primary care provider Dr. Jermaine Macario and states she is going to see him tomorrow. The patient denies having any associated fevers, chills, vomiting, or abdominal distention.   She states that her abdominal pain and agreement with plan of care. I have no further follow-up scheduled with this patient at this time. This patient can see me or Dr. Josee Escobar on an as-needed basis.   This patient should return urgently for any problems or complications related to the uribe

## 2020-02-24 NOTE — PATIENT INSTRUCTIONS
The patient presents today for continued care and evaluation after undergoing a reversal of her colostomy on February 5, 2020. The patient states that she continues to have some issues with incontinence.   She states that whenever she gets the urge to ha urinary tract infection, we will let Dr. Edouard Somers manage this issue. I took the opportunity today's appointment to remove the patient's remaining staples. The patient tolerated the procedure well.   I placed Steri-Strips over the incisions after the stapl

## 2020-03-05 ENCOUNTER — MED REC SCAN ONLY (OUTPATIENT)
Dept: SURGERY | Facility: CLINIC | Age: 75
End: 2020-03-05

## 2020-04-01 ENCOUNTER — TELEPHONE (OUTPATIENT)
Dept: CARDIOLOGY | Age: 75
End: 2020-04-01

## 2020-04-03 ENCOUNTER — TELEPHONE (OUTPATIENT)
Dept: CARDIOLOGY | Age: 75
End: 2020-04-03

## 2020-04-06 ENCOUNTER — OFFICE VISIT (OUTPATIENT)
Dept: CARDIOLOGY | Age: 75
End: 2020-04-06

## 2020-04-06 VITALS — SYSTOLIC BLOOD PRESSURE: 136 MMHG | DIASTOLIC BLOOD PRESSURE: 65 MMHG | HEART RATE: 54 BPM

## 2020-04-06 DIAGNOSIS — E78.00 PURE HYPERCHOLESTEROLEMIA: ICD-10-CM

## 2020-04-06 DIAGNOSIS — R94.31 ABNORMAL ELECTROCARDIOGRAM (ECG) (EKG): ICD-10-CM

## 2020-04-06 DIAGNOSIS — I10 ESSENTIAL HYPERTENSION: ICD-10-CM

## 2020-04-06 DIAGNOSIS — I65.23 ASYMPTOMATIC CAROTID ARTERY STENOSIS, BILATERAL: Primary | ICD-10-CM

## 2020-04-06 DIAGNOSIS — R60.1 GENERALIZED EDEMA: ICD-10-CM

## 2020-04-06 DIAGNOSIS — I27.20 PULMONARY HYPERTENSION (CMD): ICD-10-CM

## 2020-04-06 PROCEDURE — 99442 TELEPHONE E&M BY PHYSICIAN EST PT NOT ORIG PREV 7 DAYS 11-20 MIN: CPT | Performed by: INTERNAL MEDICINE

## 2020-05-13 ENCOUNTER — TELEPHONE (OUTPATIENT)
Dept: CARDIOLOGY | Age: 75
End: 2020-05-13

## 2020-05-20 ENCOUNTER — TELEPHONE (OUTPATIENT)
Dept: CARDIOLOGY | Age: 75
End: 2020-05-20

## 2020-05-21 ENCOUNTER — OFFICE VISIT (OUTPATIENT)
Dept: PODIATRY CLINIC | Facility: CLINIC | Age: 75
End: 2020-05-21
Payer: MEDICARE

## 2020-05-21 VITALS — TEMPERATURE: 98 F

## 2020-05-21 DIAGNOSIS — E11.42 DIABETIC POLYNEUROPATHY ASSOCIATED WITH TYPE 2 DIABETES MELLITUS (HCC): Primary | ICD-10-CM

## 2020-05-21 DIAGNOSIS — B35.1 ONYCHOMYCOSIS: ICD-10-CM

## 2020-05-21 DIAGNOSIS — I89.0 LYMPHEDEMA: ICD-10-CM

## 2020-05-21 DIAGNOSIS — M20.5X2 HALLUX LIMITUS OF LEFT FOOT: ICD-10-CM

## 2020-05-21 DIAGNOSIS — M20.42 HAMMER TOES OF BOTH FEET: ICD-10-CM

## 2020-05-21 DIAGNOSIS — M20.41 HAMMER TOES OF BOTH FEET: ICD-10-CM

## 2020-05-21 DIAGNOSIS — M15.3 OTHER SECONDARY OSTEOARTHRITIS OF MULTIPLE SITES: ICD-10-CM

## 2020-05-21 PROCEDURE — 99213 OFFICE O/P EST LOW 20 MIN: CPT | Performed by: PODIATRIST

## 2020-05-21 NOTE — PROGRESS NOTES
Tabitha Rosenberg is a 76year old female. Patient presents with:  Diabetic Foot Care: BS this AM 82 - Last A1C of 6.1 on 2/6/20 - LOV w/PCP on 2/17/20 - nail and callus trimming - swelling and pain of her feet is normal for her.         HPI:     Resents to REVERTED TO NSR   • Back problem     DISH   • CANCER     uterine   • Cancer (HCC)     Uterine CA   • DIABETES    • Diabetes (HCC)    • DISH (diffuse idiopathic skeletal hyperostosis)    • Disorder of thyroid    • Diverticulosis of large intestine    • High quittin.0      Smokeless tobacco: Never Used    Substance and Sexual Activity      Alcohol use: Not Currently      Drug use: No    Other Topics      Concerns:          REVIW OF SYSTEMS:   Review of Systems    Today reviewed systems as documented below control. Today appropriate diabetic foot care was rendered. Patient continues to be bothered by nails that are thick, dystrophic with accumulation of subungual debris, causing impaction, and pain with ambulation.  Today, those nails were debrided back to as

## 2020-05-29 ENCOUNTER — LAB ENCOUNTER (OUTPATIENT)
Dept: LAB | Age: 75
End: 2020-05-29
Attending: FAMILY MEDICINE
Payer: MEDICARE

## 2020-05-29 DIAGNOSIS — R30.0 DYSURIA: Primary | ICD-10-CM

## 2020-05-29 PROCEDURE — 87186 SC STD MICRODIL/AGAR DIL: CPT

## 2020-05-29 PROCEDURE — 87086 URINE CULTURE/COLONY COUNT: CPT

## 2020-05-29 PROCEDURE — 81001 URINALYSIS AUTO W/SCOPE: CPT

## 2020-05-29 PROCEDURE — 87077 CULTURE AEROBIC IDENTIFY: CPT

## 2020-06-04 ENCOUNTER — HOSPITAL ENCOUNTER (OUTPATIENT)
Dept: CV DIAGNOSTICS | Facility: HOSPITAL | Age: 75
Discharge: HOME | End: 2020-06-04
Attending: INTERNAL MEDICINE
Payer: MEDICARE

## 2020-06-04 DIAGNOSIS — I27.20 PULMONARY HYPERTENSION (HCC): ICD-10-CM

## 2020-06-04 PROCEDURE — 93306 TTE W/DOPPLER COMPLETE: CPT | Performed by: INTERNAL MEDICINE

## 2020-06-05 DIAGNOSIS — I27.20 PULMONARY HYPERTENSION (CMD): ICD-10-CM

## 2020-06-09 ENCOUNTER — TELEPHONE (OUTPATIENT)
Dept: CARDIOLOGY | Age: 75
End: 2020-06-09

## 2020-06-18 ENCOUNTER — MED REC SCAN ONLY (OUTPATIENT)
Dept: SURGERY | Facility: CLINIC | Age: 75
End: 2020-06-18

## 2020-06-19 ENCOUNTER — HOSPITAL ENCOUNTER (OUTPATIENT)
Facility: HOSPITAL | Age: 75
Setting detail: OBSERVATION
Discharge: HOME HEALTH | End: 2020-06-22
Attending: EMERGENCY MEDICINE | Admitting: HOSPITALIST
Payer: MEDICARE

## 2020-06-19 ENCOUNTER — APPOINTMENT (OUTPATIENT)
Dept: ULTRASOUND IMAGING | Facility: HOSPITAL | Age: 75
End: 2020-06-19
Attending: EMERGENCY MEDICINE
Payer: MEDICARE

## 2020-06-19 ENCOUNTER — APPOINTMENT (OUTPATIENT)
Dept: CT IMAGING | Facility: HOSPITAL | Age: 75
End: 2020-06-19
Attending: EMERGENCY MEDICINE
Payer: MEDICARE

## 2020-06-19 DIAGNOSIS — K92.2 LOWER GI BLEED: Primary | ICD-10-CM

## 2020-06-19 DIAGNOSIS — K62.89 PROCTITIS: ICD-10-CM

## 2020-06-19 DIAGNOSIS — D64.9 ANEMIA, UNSPECIFIED TYPE: ICD-10-CM

## 2020-06-19 DIAGNOSIS — N28.9 RENAL INSUFFICIENCY: ICD-10-CM

## 2020-06-19 LAB
ALBUMIN SERPL-MCNC: 2.7 G/DL (ref 3.4–5)
ALBUMIN/GLOB SERPL: 0.6 {RATIO} (ref 1–2)
ALP LIVER SERPL-CCNC: 89 U/L (ref 55–142)
ALT SERPL-CCNC: 14 U/L (ref 13–56)
ANION GAP SERPL CALC-SCNC: 5 MMOL/L (ref 0–18)
ANTIBODY SCREEN: NEGATIVE
APTT PPP: 30.9 SECONDS (ref 25.4–36.1)
AST SERPL-CCNC: 20 U/L (ref 15–37)
BASOPHILS # BLD AUTO: 0.02 X10(3) UL (ref 0–0.2)
BASOPHILS NFR BLD AUTO: 0.4 %
BILIRUB SERPL-MCNC: 0.4 MG/DL (ref 0.1–2)
BUN BLD-MCNC: 31 MG/DL (ref 7–18)
BUN/CREAT SERPL: 18.8 (ref 10–20)
CALCIUM BLD-MCNC: 8.5 MG/DL (ref 8.5–10.1)
CHLORIDE SERPL-SCNC: 106 MMOL/L (ref 98–112)
CO2 SERPL-SCNC: 27 MMOL/L (ref 21–32)
CREAT BLD-MCNC: 1.65 MG/DL (ref 0.55–1.02)
DEPRECATED HBV CORE AB SER IA-ACNC: 147.2 NG/ML (ref 18–340)
DEPRECATED RDW RBC AUTO: 46.7 FL (ref 35.1–46.3)
EOSINOPHIL # BLD AUTO: 0.19 X10(3) UL (ref 0–0.7)
EOSINOPHIL NFR BLD AUTO: 3.8 %
ERYTHROCYTE [DISTWIDTH] IN BLOOD BY AUTOMATED COUNT: 14.4 % (ref 11–15)
EST. AVERAGE GLUCOSE BLD GHB EST-MCNC: 177 MG/DL (ref 68–126)
GLOBULIN PLAS-MCNC: 4.6 G/DL (ref 2.8–4.4)
GLUCOSE BLD-MCNC: 184 MG/DL (ref 70–99)
GLUCOSE BLD-MCNC: 190 MG/DL (ref 70–99)
GLUCOSE BLD-MCNC: 62 MG/DL (ref 70–99)
GLUCOSE BLD-MCNC: 68 MG/DL (ref 70–99)
GLUCOSE BLD-MCNC: 82 MG/DL (ref 70–99)
GLUCOSE BLD-MCNC: 87 MG/DL (ref 70–99)
HBA1C MFR BLD HPLC: 7.8 % (ref ?–5.7)
HCT VFR BLD AUTO: 30.4 % (ref 35–48)
HCT VFR BLD AUTO: 33.9 % (ref 35–48)
HGB BLD-MCNC: 10.5 G/DL (ref 12–16)
IMM GRANULOCYTES # BLD AUTO: 0.03 X10(3) UL (ref 0–1)
IMM GRANULOCYTES NFR BLD: 0.6 %
INR BLD: 1 (ref 0.89–1.11)
IRON SATURATION: 13 % (ref 15–50)
IRON SERPL-MCNC: 36 UG/DL (ref 50–170)
LYMPHOCYTES # BLD AUTO: 0.79 X10(3) UL (ref 1–4)
LYMPHOCYTES NFR BLD AUTO: 15.8 %
M PROTEIN MFR SERPL ELPH: 7.3 G/DL (ref 6.4–8.2)
MCH RBC QN AUTO: 27.8 PG (ref 26–34)
MCHC RBC AUTO-ENTMCNC: 31 G/DL (ref 31–37)
MCV RBC AUTO: 89.7 FL (ref 80–100)
MONOCYTES # BLD AUTO: 0.49 X10(3) UL (ref 0.1–1)
MONOCYTES NFR BLD AUTO: 9.8 %
NEUTROPHILS # BLD AUTO: 3.47 X10 (3) UL (ref 1.5–7.7)
NEUTROPHILS # BLD AUTO: 3.47 X10(3) UL (ref 1.5–7.7)
NEUTROPHILS NFR BLD AUTO: 69.6 %
OSMOLALITY SERPL CALC.SUM OF ELEC: 292 MOSM/KG (ref 275–295)
PLATELET # BLD AUTO: 196 10(3)UL (ref 150–450)
POTASSIUM SERPL-SCNC: 4 MMOL/L (ref 3.5–5.1)
PSA SERPL DL<=0.01 NG/ML-MCNC: 13.5 SECONDS (ref 12.4–14.6)
RBC # BLD AUTO: 3.78 X10(6)UL (ref 3.8–5.3)
RH BLOOD TYPE: NEGATIVE
SARS-COV-2 RNA RESP QL NAA+PROBE: NOT DETECTED
SODIUM SERPL-SCNC: 138 MMOL/L (ref 136–145)
TOTAL IRON BINDING CAPACITY: 274 UG/DL (ref 240–450)
TRANSFERRIN SERPL-MCNC: 184 MG/DL (ref 200–360)
VIT B12 SERPL-MCNC: >2000 PG/ML (ref 193–986)
WBC # BLD AUTO: 5 X10(3) UL (ref 4–11)

## 2020-06-19 PROCEDURE — 99220 INITIAL OBSERVATION CARE,LEVL III: CPT | Performed by: HOSPITALIST

## 2020-06-19 PROCEDURE — 74176 CT ABD & PELVIS W/O CONTRAST: CPT | Performed by: EMERGENCY MEDICINE

## 2020-06-19 PROCEDURE — 93970 EXTREMITY STUDY: CPT | Performed by: EMERGENCY MEDICINE

## 2020-06-19 PROCEDURE — 99213 OFFICE O/P EST LOW 20 MIN: CPT | Performed by: COLON & RECTAL SURGERY

## 2020-06-19 RX ORDER — SODIUM CHLORIDE 9 MG/ML
INJECTION, SOLUTION INTRAVENOUS CONTINUOUS
Status: DISCONTINUED | OUTPATIENT
Start: 2020-06-19 | End: 2020-06-22

## 2020-06-19 RX ORDER — METOPROLOL SUCCINATE 50 MG/1
50 TABLET, EXTENDED RELEASE ORAL 2 TIMES DAILY
Status: DISCONTINUED | OUTPATIENT
Start: 2020-06-19 | End: 2020-06-22

## 2020-06-19 RX ORDER — ASPIRIN 81 MG/1
81 TABLET ORAL DAILY
Status: ON HOLD | COMMUNITY
End: 2020-06-21

## 2020-06-19 RX ORDER — ACETAMINOPHEN 500 MG
500 TABLET ORAL EVERY 6 HOURS PRN
Status: DISCONTINUED | OUTPATIENT
Start: 2020-06-19 | End: 2020-06-19

## 2020-06-19 RX ORDER — ESCITALOPRAM OXALATE 10 MG/1
10 TABLET ORAL DAILY
Status: DISCONTINUED | OUTPATIENT
Start: 2020-06-19 | End: 2020-06-22

## 2020-06-19 RX ORDER — VANCOMYCIN HYDROCHLORIDE 125 MG/1
125 CAPSULE ORAL 4 TIMES DAILY
COMMUNITY
Start: 2020-06-14 | End: 2020-06-19

## 2020-06-19 RX ORDER — ONDANSETRON 2 MG/ML
4 INJECTION INTRAMUSCULAR; INTRAVENOUS EVERY 6 HOURS PRN
Status: DISCONTINUED | OUTPATIENT
Start: 2020-06-19 | End: 2020-06-22

## 2020-06-19 RX ORDER — DEXTROSE MONOHYDRATE 25 G/50ML
50 INJECTION, SOLUTION INTRAVENOUS
Status: DISCONTINUED | OUTPATIENT
Start: 2020-06-19 | End: 2020-06-22

## 2020-06-19 RX ORDER — VANCOMYCIN HYDROCHLORIDE 125 MG/1
125 CAPSULE ORAL EVERY 6 HOURS
Status: DISCONTINUED | OUTPATIENT
Start: 2020-06-19 | End: 2020-06-22

## 2020-06-19 RX ORDER — METOCLOPRAMIDE HYDROCHLORIDE 5 MG/ML
5 INJECTION INTRAMUSCULAR; INTRAVENOUS EVERY 8 HOURS PRN
Status: DISCONTINUED | OUTPATIENT
Start: 2020-06-19 | End: 2020-06-22

## 2020-06-19 RX ORDER — METOPROLOL SUCCINATE 50 MG/1
50 TABLET, EXTENDED RELEASE ORAL 2 TIMES DAILY
COMMUNITY
End: 2021-02-22 | Stop reason: CLARIF

## 2020-06-19 RX ORDER — LEVOTHYROXINE SODIUM 0.15 MG/1
150 TABLET ORAL
Status: DISCONTINUED | OUTPATIENT
Start: 2020-06-19 | End: 2020-06-22

## 2020-06-19 RX ORDER — ACETAMINOPHEN 325 MG/1
650 TABLET ORAL EVERY 6 HOURS PRN
Status: DISCONTINUED | OUTPATIENT
Start: 2020-06-19 | End: 2020-06-22

## 2020-06-19 NOTE — CONSULTS
[facility]  Report of  Surgical Consultation with History and Physical Exam    Mark Lesser Patient Status:  Emergency    1945 MRN [id number]   Location [address] [facility] Attending Cipriano Riley MD   Hosp Day # 0 [facility] for type 2 diabetes, hypertension, hyperlipidemia, uterine cancer, lower extremity lymphedema, hypothyroidism, and c. Diff. She states that following her first procedure with Dr. Florecita Henson in 2017 she developed a-fib.  She states that Dr. Cm Marte cardioverted her CYSTOURETHROSCOPY  3-1-11    cysto flow US, dr Nando Nuñez   • EYE SURGERY      eye   • FOOT SURGERY      foot   • HERNIA SURGERY     • HYSTERECTOMY      CAROL 2007   • OTHER SURGICAL HISTORY  5/3/17    cystoscopy Dr. Abelardo Ruiz   • OTHER SURGICAL HISTORY  06/27/2017    Cy and rash  Musculoskeletal:  Negative for bone/joint symptoms  Neurological:  Negative for gait disturbance  Psychiatric:  Negative for inappropriate interaction and psychiatric symptoms  Respiratory:  Negative for cough, dyspnea and wheezing    Physical Ex BILT 0.4   TP 7.3         Recent Labs   Lab 06/19/20  0845   PTP 13.5   INR 1.00   PTT 30.9     CT Abdomen and Pelvis 6/19/2020:  FINDINGS:    Lung bases demonstrate mild motion artifact and minimal basilar atelectasis present.      Please note the exam i DISH (diffuse idiopathic skeletal hyperostosis)     Psoriasis     Osteoarthritis, multiple sites     Uncontrolled type 2 diabetes mellitus with insulin therapy (HCC)     Hypertension     Anemia     High risk medications (not anticoagulants) long-term use reviewed the above note and evaluation by the physician assistant. I agree with her physical exam and the data listed in the report, and I have made any relevant changes in editing her note.  I have personally examined the patient and reviewed all relevant ointment to help healing of the anal fissure.  -Defer to gastroenterology for a recommendations as to endoscopic evaluation at this point  -Trend hemoglobin    My total face time with this patient was 60 minutes.   Greater than half of our visit was spent i

## 2020-06-19 NOTE — CONSULTS
[facility]                       Gastroenterology [address] [facility] Gastroenterology    Carol Monik Patient Status:  Observation    1945 MRN [id number]   [facility] 3NW-A Attending Bernard Bradford MD   [facility] arthritis(714.0)    • SLEEP APNEA DMG DX 12 TX 12    AHI 39/ RDI 48/ REM AHI 71/ SaO2 75%/ CPAP    • Sleep apnea    • Small bowel obstruction ([facility] 2017     PSHx:   Past Surgical History:   Procedure Laterality Date   •  infusion, , Intravenous, Continuous  acetaminophen (TYLENOL) tab 650 mg, 650 mg, Oral, Q6H PRN  ondansetron HCl (ZOFRAN) injection 4 mg, 4 mg, Intravenous, Q6H PRN  Metoclopramide HCl (REGLAN) injection 5 mg, 5 mg, Intravenous, Q8H PRN  Insulin Aspart Pen (Oral)   Resp 20   Ht 66\"   Wt 263 lb (119.3 kg)   SpO2 95%   BMI 42.45 kg/m²   Gen: AAO x 3, able to speak in complete sentences  HENT: NCAT, EOMI, oropharynx is clear with moist mucosal membranes  Eyes: Sclerae are anicteric  Neck:  Supple without nucha collection. Tiny nonobstructing right renal stone. Impression:   1. Diarrhea with recent episode of C diff  2.  Hematochezia: DDx includes proctitis/colitis, rectal ulcer, anal fissure, hemorrhoids, SCAD; pt declined rectal exam. Given rectal p

## 2020-06-19 NOTE — ED INITIAL ASSESSMENT (HPI)
Pt states she has cdiff right now, dx last Sunday, had a bm and had bright red blood in toilet this am

## 2020-06-19 NOTE — PLAN OF CARE
NURSING ADMISSION NOTE      Patient admitted via ER  Oriented to room. Safety precautions initiated. Bed in low position. Call light in reach. Tolerating CLD. Stool specimens sent.

## 2020-06-19 NOTE — H&P
[facility]  History and Physical     Carmina Galvan Patient Status:  Observation    1945 MRN [id number]   [facility] 3NW-A Attending Mine Raphael MD   Hosp Day # 0 PCP Bebe Li MD     Chief Complaint: Rectal bleed      • CHOLECYSTECTOMY  [address] [place] EDW   • COLON RESECTION LEFT N/A 2017    Performed by Eboni Camarena MD at [address]   • COLONOSCOPY  2019   • COLONOSCOPY N/A 2017    Performed by Kevin Ortega MD at [facility] ENDOSCOPY   • day., Disp: , Rfl:   acetaminophen 500 MG Oral Tab, Take 1 tablet (500 mg total) by mouth every 6 (six) hours as needed. , Disp: , Rfl: 0  escitalopram 10 MG Oral Tab, Take 10 mg by mouth daily. , Disp: , Rfl:   Cholecalciferol (VITAMIN D3) 1.25 MG (09804 UT 89.7   .0   INR 1.00       Recent Labs   Lab 06/19/20  0839   GLU 82   BUN 31*   CREATSERUM 1.65*   GFRAA 35*   GFRNAA 30*   CA 8.5   ALB 2.7*      K 4.0      CO2 27.0   ALKPHO 89   AST 20   ALT 14   BILT 0.4   TP 7.3       Estimated Cre

## 2020-06-19 NOTE — ED PROVIDER NOTES
Patient Seen in: [facility] Emergency Department      History   Patient presents with:  GI Bleeding    Stated Complaint: +CDIFF, passed a \"quart of blood out of rectum\"    HPI    Patient with a history of obesity, high blood pressure, high cholest NSR   • Back problem     DISH   • C. difficile diarrhea    • CANCER     uterine   • Cancer (HCC)     Uterine CA   • DIABETES    • Diabetes (HCC)    • DISH (diffuse idiopathic skeletal hyperostosis)    • Disorder of thyroid    • Diverticulosis of large inte vital signs reviewed. All other systems reviewed and negative except as noted above.     Physical Exam     ED Triage Vitals [06/19/20 0829]   /84   Pulse 82   Resp 18   Temp 98.5 °F (36.9 °C)   Temp src    SpO2 98 %   O2 Device None (Room air) components:    RBC 3.78 (*)     HGB 10.5 (*)     HCT 33.9 (*)     RDW-SD 46.7 (*)     Lymphocyte Absolute 0.79 (*)     All other components within normal limits   PROTHROMBIN TIME (PT) - Normal   PTT, ACTIVATED - Normal   RAPID SARS-COV-2 BY PCR - Normal shows normal white count. Hemoglobin is 10.5, stable from previous. Platelets adequate  Metabolic panel shows normal glucose and electrolytes. Creatinine chronically elevated and stable at 1.65.   LFTs normal  Coags normal  COVID-19 rapid test negative

## 2020-06-20 LAB
ANION GAP SERPL CALC-SCNC: 4 MMOL/L (ref 0–18)
BUN BLD-MCNC: 23 MG/DL (ref 7–18)
BUN/CREAT SERPL: 16.4 (ref 10–20)
C DIFF TOX B STL QL: NEGATIVE
CALCIUM BLD-MCNC: 7.8 MG/DL (ref 8.5–10.1)
CHLORIDE SERPL-SCNC: 109 MMOL/L (ref 98–112)
CO2 SERPL-SCNC: 26 MMOL/L (ref 21–32)
CREAT BLD-MCNC: 1.4 MG/DL (ref 0.55–1.02)
GLUCOSE BLD-MCNC: 120 MG/DL (ref 70–99)
GLUCOSE BLD-MCNC: 120 MG/DL (ref 70–99)
GLUCOSE BLD-MCNC: 143 MG/DL (ref 70–99)
GLUCOSE BLD-MCNC: 170 MG/DL (ref 70–99)
GLUCOSE BLD-MCNC: 178 MG/DL (ref 70–99)
HGB BLD-MCNC: 10 G/DL (ref 12–16)
HGB BLD-MCNC: 9.3 G/DL (ref 12–16)
OSMOLALITY SERPL CALC.SUM OF ELEC: 293 MOSM/KG (ref 275–295)
POTASSIUM SERPL-SCNC: 4.2 MMOL/L (ref 3.5–5.1)
SODIUM SERPL-SCNC: 139 MMOL/L (ref 136–145)

## 2020-06-20 PROCEDURE — 99225 SUBSEQUENT OBSERVATION CARE: CPT | Performed by: HOSPITALIST

## 2020-06-20 PROCEDURE — 99225 SUBSEQUENT OBSERVATION CARE: CPT | Performed by: COLON & RECTAL SURGERY

## 2020-06-20 RX ORDER — VANCOMYCIN HYDROCHLORIDE 125 MG/1
125 CAPSULE ORAL EVERY 6 HOURS
Qty: 76 CAPSULE | Refills: 0 | Status: SHIPPED | OUTPATIENT
Start: 2020-06-20 | End: 2020-07-20

## 2020-06-20 RX ORDER — LIDOCAINE AND PRILOCAINE 25; 25 MG/G; MG/G
CREAM TOPICAL 3 TIMES DAILY PRN
Status: DISCONTINUED | OUTPATIENT
Start: 2020-06-20 | End: 2020-06-22

## 2020-06-20 NOTE — PROGRESS NOTES
Patient alert and oriented. Complains of pain to the rectal area. Sitting in chair, states she sleeps in chair. Tolerating IV fluids and PO vanco.  No diarrhea noted on this shift, incontinent stool x1.   Lab unable to run stool yesterday, stool was form

## 2020-06-20 NOTE — PROGRESS NOTES
Gastroenterology Progress Note  Patient Name: Luz Elena Vlila  Chief Complaint: hematochezia  S: Pt reports 1 BM overnight and 1 this am. She still notes soreness of rectum. She denies further hematochezia. C diff not sent as stool was too formed.    O: B empiric Vancomycin for now with tapering course  5. She declined rectal exam for me but did have anal fissure per surgery evaluation, which they are managing  6. Will give dose of IV Iron      We will sign off at this time.  Total time spent with patient an

## 2020-06-20 NOTE — PROGRESS NOTES
[facility]  Progress Note     Jackey Barthel Patient Status:  Observation    1945 MRN [id number]   [facility] 3NW-A Attending Andra Covarrubias MD   Hosp Day # 0 PCP Ghislaine Warner MD     Chief Complaint: rectal bleeding    S: Epic.    Medications:   • zinc oxide   Topical BID   • escitalopram  10 mg Oral Daily   • Levothyroxine Sodium  150 mcg Oral Before breakfast   • Metoprolol Succinate ER  50 mg Oral BID   • Insulin Aspart Pen  1-5 Units Subcutaneous TID CC and HS   • vanco

## 2020-06-20 NOTE — PHYSICAL THERAPY NOTE
PHYSICAL THERAPY QUICK EVALUATION - INPATIENT    Room Number: 325/325-A  Evaluation Date: 6/20/2020  Presenting Problem: Proctitis, lower GI bleed, anemia and renal insufficiency  Physician Order: PT Eval and Treat    RAPID-SARS (-) 6/19/20    Problem Monik Ma • OTHER SURGICAL HISTORY  09/07/2018    Cysto joan    • PART REMOVAL COLON W END COLOSTOMY  2017   • REMOVAL GALLBLADDER     • SHOULDER SURG PROC UNLISTED      shoulder       HOME SITUATION  Type of Home: House   Home Layout: Multi-level; Able to live o sitting on the side of the bed?: A Little   How much help from another person does the patient currently need. ..   -   Moving to and from a bed to a chair (including a wheelchair)?: A Little   -   Need to walk in hospital room?: [address] 3-5 comorbidities and personal factors impacting therapy include s/p Newman Rivera procedure '17, ostomy c reversal and low ant colon resection 2/20, DISH, uterine CA, DM, RA and sleep apnea.   Functional outcome measures completed include The AM-PAC '6-Clicks' Inpa

## 2020-06-20 NOTE — PROGRESS NOTES
[facility]  Progress Note    Dean Head Patient Status:  Observation    1945 MRN [id number]   [facility] 3NW-A Attending Joan Salcedo MD   Hosp Day # 0 PCP Caitlin Urena MD     Subjective:  Patient complains of anal jose miguel for long-term (current) use of medications     Lower GI bleed     Renal insufficiency     Anemia, unspecified type      -Patient continues to have multiple stools. They are too thick for our lab to test for C. difficile per their protocol.   Patient on Sravanthi Roots

## 2020-06-21 LAB
BILIRUB UR QL STRIP.AUTO: NEGATIVE
COLOR UR AUTO: YELLOW
GLUCOSE BLD-MCNC: 141 MG/DL (ref 70–99)
GLUCOSE BLD-MCNC: 170 MG/DL (ref 70–99)
GLUCOSE BLD-MCNC: 193 MG/DL (ref 70–99)
GLUCOSE BLD-MCNC: 211 MG/DL (ref 70–99)
GLUCOSE UR STRIP.AUTO-MCNC: NEGATIVE MG/DL
HGB BLD-MCNC: 9.5 G/DL (ref 12–16)
KETONES UR STRIP.AUTO-MCNC: NEGATIVE MG/DL
NITRITE UR QL STRIP.AUTO: NEGATIVE
PH UR STRIP.AUTO: 5 [PH] (ref 4.5–8)
PROT UR STRIP.AUTO-MCNC: 30 MG/DL
SP GR UR STRIP.AUTO: 1.01 (ref 1–1.03)
UROBILINOGEN UR STRIP.AUTO-MCNC: <2 MG/DL
WBC #/AREA URNS AUTO: >50 /HPF
WBC CLUMPS UR QL AUTO: PRESENT

## 2020-06-21 PROCEDURE — 99225 SUBSEQUENT OBSERVATION CARE: CPT | Performed by: PHYSICIAN ASSISTANT

## 2020-06-21 PROCEDURE — 99225 SUBSEQUENT OBSERVATION CARE: CPT | Performed by: HOSPITALIST

## 2020-06-21 NOTE — PROGRESS NOTES
[facility] Pharmacy Note: Antimicrobial Weight Based Dose Adjustment for: ceftriaxone (ROCEPHIN)    Tristen Blancas is a 76year old female who has been prescribed ceftriaxone (ROCEPHIN) 1 gm every 24 hours.     Estimated Creatinine Clearance: 32.5 mL/min (A) (b

## 2020-06-21 NOTE — PROGRESS NOTES
Gastroenterology Progress Note  Patient Name: Kacey Norris  Chief Complaint: hematochezia  S: Pt reports that diarrhea has improved and stools are more formed. She reports that soreness from rectum has improved. She denies further hematochezia.  She de diff.   4. Continue Vancomycin with tapering course  5. She declined rectal exam for me but did have anal fissure per surgery evaluation, which they are managing        We will sign off at this time.  Total time spent with patient and coordinating care: 27

## 2020-06-21 NOTE — PROGRESS NOTES
[facility]  Progress Note    Carmina Galvan Patient Status:  Observation    1945 MRN [id number]   [facility] 3NW-A Attending Mine Raphael MD   Hosp Day # 0 PCP Bebe Li MD     Subjective:   The patient is sitting up it [facility])     History of endometrial cancer     Interstitial cystitis     Colostomy stricture ([facility] [address]     BRBPR (bright red blood per rectum)     Proctitis     Pruritus ani     Status post Chapo procedure [facility])     Rectal/anal hemorrhage     Encounter for luis eduardo

## 2020-06-21 NOTE — OCCUPATIONAL THERAPY NOTE
OCCUPATIONAL THERAPY QUICK EVALUATION - INPATIENT    Room Number: 325/325-A  Evaluation Date: 6/21/2020     Type of Evaluation: Quick Eval  Presenting Problem: GI bleed, proctitis, anemia    Physician Order: IP Consult to Occupational Therapy  Reason for T CYSTOURETHROSCOPY  3-1-11    cysto flow US, dr Maryann Ho   • EYE SURGERY      eye   • FOOT SURGERY      foot   • HERNIA SURGERY     • HYSTERECTOMY      CAROL 2007   • OTHER SURGICAL HISTORY  5/3/17    cystoscopy Dr. Alba Betancourt   • OTHER SURGICAL HISTORY  06/27/2017    Cy regular upper body clothing?: None  -   Taking care of personal grooming such as brushing teeth?: None  -   Eating meals?: None    AM-PAC Score:  Score: 23  Approx Degree of Impairment: 15.86%  Standardized Score (AM-PAC Scale): 51.12  CMS Modifier (G-Code to achieve the following goals:  Patient able to toilet transfer: safely and independently  Patient able to dress lower extremities: at previous functional level  Patient/Caregiver able to demonstrate safety with ADLS: safely and independently

## 2020-06-21 NOTE — CM/SW NOTE
Order for Home RN and PT. Sw spoke to Rn -waiting to hear when pt will dc.   KALIE spoke to pt- she is active with [facility] sent via [address] [facility]    [facility], [address] [place] [address] /Discharge [address] [facility]  [phone]

## 2020-06-21 NOTE — PLAN OF CARE
Patient alert and oriented  Pain to rectal area when using toilet  New topical cream ordered, patient states it gives her relief  Lungs are diminished, no cough, no SOB  Tolerating ordered diet, no nausea  Remains on isolation for recent history of c-diff. other facility with appropriate resources  Description  INTERVENTIONS:  - Identify barriers to discharge w/pt and caregiver  - Include patient/family/discharge partner in discharge planning  - Arrange for needed discharge resources and transportation as ap

## 2020-06-22 VITALS
TEMPERATURE: 98 F | WEIGHT: 263 LBS | RESPIRATION RATE: 27 BRPM | DIASTOLIC BLOOD PRESSURE: 72 MMHG | SYSTOLIC BLOOD PRESSURE: 162 MMHG | BODY MASS INDEX: 42.27 KG/M2 | HEIGHT: 66 IN | OXYGEN SATURATION: 99 % | HEART RATE: 64 BPM

## 2020-06-22 LAB
GLUCOSE BLD-MCNC: 139 MG/DL (ref 70–99)
GLUCOSE BLD-MCNC: 179 MG/DL (ref 70–99)
HGB BLD-MCNC: 9.2 G/DL (ref 12–16)

## 2020-06-22 PROCEDURE — 99217 OBSERVATION CARE DISCHARGE: CPT | Performed by: HOSPITALIST

## 2020-06-22 RX ORDER — CEPHALEXIN 500 MG/1
500 CAPSULE ORAL 3 TIMES DAILY
Qty: 15 CAPSULE | Refills: 0 | Status: SHIPPED | OUTPATIENT
Start: 2020-06-22 | End: 2020-12-21

## 2020-06-22 NOTE — DISCHARGE SUMMARY
[facility] [place] [facility]  DISCHARGE SUMMARY     Hieu Chang Patient Status:  Observation    1945 MRN [id number]   [facility] 3NW-A Attending Marybel Thibodeaux MD   Hosp Day # 0 PCP Della Pringle MD     Date of Admission: 2020  Date mouth daily. Refills:  0     escitalopram 10 MG Tabs  Commonly known as:  LEXAPRO      Take 10 mg by mouth daily. Refills:  0     Febuxostat 80 MG Tabs      Take 80 mg by mouth daily.    Refills:  0     Insulin Aspart Pen 100 UNIT/ML Sopn  Commonly know [id number]  [phone]    In 1 week  Office will contact the patient/family to set f/u within the next 2 days    Appointments for Next 30 Days 6/22/2020 - 7/22/2020    None          Vital signs:  Temp:  [97.5 °F (36.4 °C)-98.2 °F (36.8 °C)] 97.6 °F (36.4 °C)

## 2020-06-22 NOTE — CM/SW NOTE
06/22/20 1100   Discharge disposition   Expected discharge disposition Home-Health   Name of Johnnie Varghese  ([facility])     Call to [facility] Phone: [phone] and updates sent via aidin to notify of d/c today.

## 2020-06-22 NOTE — PLAN OF CARE
Problem: PAIN - ADULT  Goal: Verbalizes/displays adequate comfort level or patient's stated pain goal  Description  INTERVENTIONS:  - Encourage pt to monitor pain and request assistance  - Assess pain using appropriate pain scale  - Administer analgesics resources and transportation as appropriate  - Identify discharge learning needs (meds, wound care, etc)  - Arrange for interpreters to assist at discharge as needed  - Consider post-discharge preferences of patient/family/discharge partner  - Complete SATHYA

## 2020-06-22 NOTE — PROGRESS NOTES
[facility]  Progress Note     Natashalazarabuck Nancy Patient Status:  Observation    1945 MRN [id number]   [facility] 3NW-A Attending Senthil Stephen MD   Hosp Day # 0 PCP Tee Granados MD     Chief Complaint: rectal bleeding    S: No results for input(s): TROP, CK in the last 168 hours. Imaging: Imaging data reviewed in Epic.     Medications:   • cefTRIAXone  2 g Intravenous Q24H   • zinc oxide   Topical BID   • escitalopram  10 mg Oral Daily   • Levothyroxine Sodium  1

## 2020-06-22 NOTE — PROGRESS NOTES
[facility]  Progress Note    Tristen Blancas Patient Status:  Observation    1945 MRN [id number]   [facility] 3NW-A Attending Adamaris Wallace MD   Hosp Day # 0 PCP Rodrigo Medellin MD     Subjective:   The patient is sitting in [place] Rectal/anal hemorrhage     Encounter for long-term (current) use of medications     Lower GI bleed     Renal insufficiency     Anemia, unspecified type    Anal fissure  UTI  Diarrhea- C diff negative, stool cultures normal    1.  Continue perianal hygiene,

## 2020-06-22 NOTE — CONSULTS
[facility]  Report of Inpatient Wound Care Consultation    Mark Lesser Patient Status:  Observation    1945 MRN [id number]   [facility] 3NW-A Attending Trenton Mcleod MD   Hosp Day # 0 PCP Vinita Gusman MD     Reason for C HISTORY  06/27/2017    Cysto w/ Dr. Migdalia Root   • OTHER SURGICAL HISTORY  07/26/2017    cysto Dr. Migdalia Root   • OTHER SURGICAL HISTORY  09/07/2018    Cysto     • PART REMOVAL COLON W END COLOSTOMY  2017   • REMOVAL GALLBLADDER     • SHOULDER SURG [address] UNLISTED

## 2020-06-22 NOTE — PROGRESS NOTES
DISCHARGED TO HOME PER WHEELCHAIR, DISCHARGED WITH INSTRUCTIONS, ALSO GIVEN RX AND INFO FOR KEFLEX, INSTRUCTED TO  VANCO WHICH WAS SENT TO HER PHARMACY AND GIVEN INFO FOR VANCOMYCIN, GIVEN SUPPLIES FOR DRESSING CHANGES TO LOWER LEGS AND VERBALIZES U

## 2020-06-22 NOTE — PLAN OF CARE
Received patient awake and oriented. On room air, breath sounds clear. Placed on O2 at 2l overnight for sustained sat level mid 80s, improved to mid 90s, Incontinent of B/B, brief in place. Offered no c/o pain. Desitin cream applied to rectum for comfort.

## 2020-07-09 ENCOUNTER — LAB REQUISITION (OUTPATIENT)
Dept: LAB | Facility: HOSPITAL | Age: 75
End: 2020-07-09
Payer: MEDICARE

## 2020-07-09 DIAGNOSIS — N39.0 URINARY TRACT INFECTION, SITE NOT SPECIFIED: ICD-10-CM

## 2020-07-09 LAB
BILIRUB UR QL: NEGATIVE
CLARITY UR: CLEAR
COLOR UR: YELLOW
GLUCOSE UR-MCNC: NEGATIVE MG/DL
HGB UR QL STRIP.AUTO: NEGATIVE
KETONES UR-MCNC: NEGATIVE MG/DL
NITRITE UR QL STRIP.AUTO: NEGATIVE
PH UR: 5 [PH] (ref 5–8)
PROT UR-MCNC: NEGATIVE MG/DL
RBC #/AREA URNS AUTO: <1 /HPF
SP GR UR STRIP: 1 (ref 1–1.03)
UROBILINOGEN UR STRIP-ACNC: <2
WBC #/AREA URNS AUTO: 17 /HPF

## 2020-07-09 PROCEDURE — 81001 URINALYSIS AUTO W/SCOPE: CPT

## 2020-07-09 PROCEDURE — 87077 CULTURE AEROBIC IDENTIFY: CPT

## 2020-07-09 PROCEDURE — 87184 SC STD DISK METHOD PER PLATE: CPT

## 2020-07-09 PROCEDURE — 87086 URINE CULTURE/COLONY COUNT: CPT

## 2020-07-09 PROCEDURE — 87186 SC STD MICRODIL/AGAR DIL: CPT

## 2020-07-21 ENCOUNTER — ORDER TRANSCRIPTION (OUTPATIENT)
Dept: PHYSICAL THERAPY | Facility: HOSPITAL | Age: 75
End: 2020-07-21

## 2020-07-21 DIAGNOSIS — E66.01 MORBID OBESITY (HCC): ICD-10-CM

## 2020-07-21 DIAGNOSIS — R69 TAKING MULTIPLE MEDICATIONS FOR CHRONIC DISEASE: ICD-10-CM

## 2020-07-21 DIAGNOSIS — E11.42 DIABETIC PERIPHERAL NEUROPATHY ASSOCIATED WITH TYPE 2 DIABETES MELLITUS (HCC): Primary | ICD-10-CM

## 2020-07-21 DIAGNOSIS — E11.21: ICD-10-CM

## 2020-07-21 DIAGNOSIS — I89.0 LYMPHEDEMA OF LOWER EXTREMITY: ICD-10-CM

## 2020-07-22 ENCOUNTER — ORDER TRANSCRIPTION (OUTPATIENT)
Dept: PHYSICAL THERAPY | Facility: HOSPITAL | Age: 75
End: 2020-07-22

## 2020-07-22 DIAGNOSIS — E66.01 MORBID OBESITY (HCC): ICD-10-CM

## 2020-07-22 DIAGNOSIS — I89.0 LYMPHEDEMA OF LOWER EXTREMITY: ICD-10-CM

## 2020-07-22 DIAGNOSIS — E11.21: ICD-10-CM

## 2020-07-22 DIAGNOSIS — E11.42 DIABETIC PERIPHERAL NEUROPATHY ASSOCIATED WITH TYPE 2 DIABETES MELLITUS (HCC): Primary | ICD-10-CM

## 2020-07-28 ENCOUNTER — OFFICE VISIT (OUTPATIENT)
Dept: OCCUPATIONAL MEDICINE | Facility: HOSPITAL | Age: 75
End: 2020-07-28
Attending: FAMILY MEDICINE
Payer: MEDICARE

## 2020-07-28 DIAGNOSIS — I89.0 LYMPHEDEMA OF LOWER EXTREMITY: ICD-10-CM

## 2020-07-28 PROCEDURE — 97535 SELF CARE MNGMENT TRAINING: CPT

## 2020-07-28 PROCEDURE — 97167 OT EVAL HIGH COMPLEX 60 MIN: CPT

## 2020-07-28 PROCEDURE — 97140 MANUAL THERAPY 1/> REGIONS: CPT

## 2020-07-28 NOTE — PROGRESS NOTES
LE LYMPHEDEMA EVALUATION:   Referring Physician: Dr. 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 Social History:  Lives with .  Enjoys visiting with grandchildren; watching TV  Reported sleeping position: sleeps in [facility] 1348 Reported Weight: 263 lbs  Pt goals include resolve \"weeping\" in Right lower clinic for her Left LE while being seen in the Wound Care clinic, and then return to lymphedema clinic once this area has resolved. OBJECTIVE:   Desmond Landis was accompanied by her , Beti Deutsch.      Edema/Tissue Observations:  Left LE circumferential lymphe padding and dressing being being adhered to underlying tissue. Self-Care Management/Education:  Education in lymphedema; lymphedema risk reduction practices; skin hygiene and care; compression garments.  Braden Ped arrived wearing old slippers which were s outcome measures, this evaluation involved High Complexity decision making due to 3+ personal factors/comorbidities, 4+ body structures involved/activity limitations. PLAN OF CARE:    Goals:  (to be met in 36 visits)  1.  Pt will tolerate bilateral lower LE Circumferential Measurements (cm)    LEFT    RIGHT    20cm above Superior Border of Pat[place] (SBP)  69.5 70.0   10cm above SBP  67.0 68.5   SBP  62.8 65.0   5cm below SBP  58.8 54.5   10cm below SBP  50.0 49.0   20cm below SBP  53.5 53.7   30cm b

## 2020-07-30 ENCOUNTER — OFFICE VISIT (OUTPATIENT)
Dept: OCCUPATIONAL MEDICINE | Facility: HOSPITAL | Age: 75
End: 2020-07-30
Attending: FAMILY MEDICINE
Payer: MEDICARE

## 2020-07-30 ENCOUNTER — OFFICE VISIT (OUTPATIENT)
Dept: CARDIOLOGY | Age: 75
End: 2020-07-30

## 2020-07-30 VITALS
HEART RATE: 80 BPM | SYSTOLIC BLOOD PRESSURE: 124 MMHG | DIASTOLIC BLOOD PRESSURE: 58 MMHG | HEIGHT: 66 IN | BODY MASS INDEX: 42.27 KG/M2 | WEIGHT: 263 LBS

## 2020-07-30 DIAGNOSIS — I89.0 LYMPHEDEMA OF LOWER EXTREMITY: ICD-10-CM

## 2020-07-30 DIAGNOSIS — I48.3 TYPICAL ATRIAL FLUTTER (CMD): Primary | ICD-10-CM

## 2020-07-30 PROCEDURE — 97140 MANUAL THERAPY 1/> REGIONS: CPT

## 2020-07-30 PROCEDURE — 99215 OFFICE O/P EST HI 40 MIN: CPT | Performed by: INTERNAL MEDICINE

## 2020-07-30 RX ORDER — CHOLECALCIFEROL (VITAMIN D3) 1250 MCG
50000 CAPSULE ORAL
COMMUNITY

## 2020-07-30 SDOH — HEALTH STABILITY: MENTAL HEALTH: HOW OFTEN DO YOU HAVE A DRINK CONTAINING ALCOHOL?: NEVER

## 2020-07-30 SDOH — HEALTH STABILITY: PHYSICAL HEALTH: ON AVERAGE, HOW MANY MINUTES DO YOU ENGAGE IN EXERCISE AT THIS LEVEL?: 0 MIN

## 2020-07-30 SDOH — HEALTH STABILITY: PHYSICAL HEALTH: ON AVERAGE, HOW MANY DAYS PER WEEK DO YOU ENGAGE IN MODERATE TO STRENUOUS EXERCISE (LIKE A BRISK WALK)?: 0 DAYS

## 2020-07-30 SDOH — SOCIAL STABILITY: SOCIAL NETWORK: ARE YOU MARRIED, WIDOWED, DIVORCED, SEPARATED, NEVER MARRIED, OR LIVING WITH A PARTNER?: MARRIED

## 2020-07-30 ASSESSMENT — ENCOUNTER SYMPTOMS
SHORTNESS OF BREATH: 1
HEMOPTYSIS: 0
BRUISES/BLEEDS EASILY: 0
COUGH: 0
SUSPICIOUS LESIONS: 0
WEIGHT GAIN: 0
CHILLS: 0
WEIGHT LOSS: 0
ALLERGIC/IMMUNOLOGIC COMMENTS: NO NEW FOOD ALLERGIES
FEVER: 0
HEMATOCHEZIA: 0

## 2020-07-30 ASSESSMENT — PATIENT HEALTH QUESTIONNAIRE - PHQ9
SUM OF ALL RESPONSES TO PHQ9 QUESTIONS 1 AND 2: 0
SUM OF ALL RESPONSES TO PHQ9 QUESTIONS 1 AND 2: 0
1. LITTLE INTEREST OR PLEASURE IN DOING THINGS: NOT AT ALL
CLINICAL INTERPRETATION OF PHQ2 SCORE: NO FURTHER SCREENING NEEDED
CLINICAL INTERPRETATION OF PHQ9 SCORE: NO FURTHER SCREENING NEEDED
2. FEELING DOWN, DEPRESSED OR HOPELESS: NOT AT ALL

## 2020-07-31 NOTE — PROGRESS NOTES
Dx: Lymphedema of lower extremities (I89.0)           Insurance (Authorized # of Visits):  2/36     Next MD/Plan Renewal Date: 10/26/2020    Authorizing Physician: Dr. Jg Lara   Fall Risk: HIGH        Precautions:  lymphedema; abdominal sxs; HTN DISH; 60+y/ lymphorrhea present. Positive Stemmer's sign bilaterally.     MEASUREMENTS:   None taken   TREATMENT:  *Advised pt that therapist LVM at PCP office yesterday re: recommendation for pt to be seen in Wound Care clinic for Right lower leg; no return call recei bandages: 1, 8cm over ankle/foot; 2, 10cm over lower leg  *Right lower leg: sterile pads over entire distal lower leg and dorsum of foot; stockinet; 1/4\" foam insert over dorsum of foot and anterior ankle; Cellona wrap over ankle/foot;  Rosidal wrap from a

## 2020-08-03 ENCOUNTER — OFFICE VISIT (OUTPATIENT)
Dept: OCCUPATIONAL MEDICINE | Facility: HOSPITAL | Age: 75
End: 2020-08-03
Attending: FAMILY MEDICINE
Payer: MEDICARE

## 2020-08-03 DIAGNOSIS — I89.0 LYMPHEDEMA OF LOWER EXTREMITY: ICD-10-CM

## 2020-08-03 PROCEDURE — 97140 MANUAL THERAPY 1/> REGIONS: CPT

## 2020-08-03 NOTE — PROGRESS NOTES
Dx: Lymphedema of lower extremities (I89.0)           Insurance (Authorized # of Visits):  3/36     Next MD/Plan Renewal Date: 10/26/2020    Authorizing Physician: Dr. Kenny Other   Fall Risk: HIGH        Precautions:  lymphedema; abdominal sxs; HTN DISH; 60+y/ surfaces. Medial and posterior surfaces of of knees are densely boggy, pitting with poor superficial tissue mobility; anterior surfaces are boggy, non-pitting; lateral surfaces are boggy, pitting with poor superficial tissue mobility.  Proximal 1/2 of lower stockinet; 1/4\" foam insert over dorsum of foot and anterior ankle; Cellona wrap over ankle/foot;  Rosidal wrap from ankle to knee; 3 short stretch compression bandages: 1, 8cm over ankle/foot; 2, 10cm over lower leg  *Right lower leg: sterile pads over en

## 2020-08-05 ENCOUNTER — OFFICE VISIT (OUTPATIENT)
Dept: OCCUPATIONAL MEDICINE | Facility: HOSPITAL | Age: 75
End: 2020-08-05
Attending: FAMILY MEDICINE
Payer: MEDICARE

## 2020-08-05 DIAGNOSIS — I89.0 LYMPHEDEMA OF LOWER EXTREMITY: ICD-10-CM

## 2020-08-05 PROCEDURE — 97140 MANUAL THERAPY 1/> REGIONS: CPT

## 2020-08-05 NOTE — PROGRESS NOTES
Dx: Lymphedema of lower extremities (I89.0)           Insurance (Authorized # of Visits):  4/36     Next MD/Plan Renewal Date: 10/26/2020    Authorizing Physician: Dr. Shannan Paul   Fall Risk: HIGH        Precautions:  lymphedema; abdominal sxs; HTN DISH; 60+y/ mobility.  Proximal 1/2 of lower legs are densely boggy to boggy, pitting with fair superficial tissue mobility; distal 1/2 are densely boggy to boggy, non-pitting over medial surfaces with fair superficial tissue mobility / fibrosed to densely boggy, non-p that pt will be able to remove bandage systems at home and complete skin care/hygiene prior to coming to session. Goals:  (to be met in 36 visits)  1. Pt will tolerate bilateral lower leg short stretch compression bandaging. ACHIEVED   2.  Pt will be in

## 2020-08-06 ENCOUNTER — APPOINTMENT (OUTPATIENT)
Dept: OCCUPATIONAL MEDICINE | Facility: HOSPITAL | Age: 75
End: 2020-08-06
Attending: FAMILY MEDICINE
Payer: MEDICARE

## 2020-08-06 ENCOUNTER — TELEPHONE (OUTPATIENT)
Dept: OCCUPATIONAL MEDICINE | Facility: HOSPITAL | Age: 75
End: 2020-08-06

## 2020-08-06 ENCOUNTER — TELEPHONE (OUTPATIENT)
Dept: PHYSICAL THERAPY | Age: 75
End: 2020-08-06

## 2020-08-06 ENCOUNTER — OFFICE VISIT (OUTPATIENT)
Dept: WOUND CARE | Facility: HOSPITAL | Age: 75
End: 2020-08-06
Attending: NURSE PRACTITIONER
Payer: MEDICARE

## 2020-08-06 DIAGNOSIS — E11.628 TYPE 2 DIABETES MELLITUS WITH OTHER SKIN COMPLICATIONS (HCC): ICD-10-CM

## 2020-08-06 DIAGNOSIS — L97.812 NON-PRESSURE CHRONIC ULCER OF OTHER PART OF RIGHT LOWER LEG WITH FAT LAYER EXPOSED (HCC): ICD-10-CM

## 2020-08-06 DIAGNOSIS — Z79.4 UNCONTROLLED TYPE 2 DIABETES MELLITUS WITH INSULIN THERAPY (HCC): Primary | Chronic | ICD-10-CM

## 2020-08-06 DIAGNOSIS — Z79.4 LONG TERM (CURRENT) USE OF INSULIN (HCC): ICD-10-CM

## 2020-08-06 DIAGNOSIS — E11.65 UNCONTROLLED TYPE 2 DIABETES MELLITUS WITH INSULIN THERAPY (HCC): Primary | Chronic | ICD-10-CM

## 2020-08-06 DIAGNOSIS — L97.822 NON-PRESSURE CHRONIC ULCER OF OTHER PART OF LEFT LOWER LEG WITH FAT LAYER EXPOSED (HCC): ICD-10-CM

## 2020-08-06 DIAGNOSIS — I89.0 LYMPHEDEMA, NOT ELSEWHERE CLASSIFIED: ICD-10-CM

## 2020-08-06 LAB — GLUCOSE BLD-MCNC: 124 MG/DL (ref 70–99)

## 2020-08-06 PROCEDURE — 82962 GLUCOSE BLOOD TEST: CPT

## 2020-08-06 PROCEDURE — 29581 APPL MULTLAYER CMPRN SYS LEG: CPT

## 2020-08-06 PROCEDURE — 99215 OFFICE O/P EST HI 40 MIN: CPT

## 2020-08-06 NOTE — PROGRESS NOTES
Subjective    Chief Complaint  This information was obtained from the patient  Patients is here for a initial visit for a bilateral leg. Patients stated started with lymphedema both legs. She been going to lymphedema clinic once a week.     Allergies  codei cup coffee, Alcohol Use - social , Marital Status - , Occupation - retired , Lives in - home , Lives with -     Medical History  This information was obtained from the patient, chart  Patient has a medical history of:  small bowel obstruction (LMP), Painful urination  Psychiatric: Claustrophobia, Nervousness / Tension, Memory Loss  Prior Wound History: Erythema, Malodor    Additional Information  Medication reconciliation completed at today's visit. : Yes  History of chemotherapy?: No  History reports a wound pain of level 0/10. The wound margin is well defined. Wound bed has 51-75% epithelialization, 1-25% pink, firm granulation. The periwound skin exhibited: Brawny Induration, Edema, Friable, Dry/Scaly, Hemosiderosis.  The temperature of the p ulcer of other part of right lower leg with fat layer exposed  (Encounter Diagnosis) I89.0 - Lymphedema, not elsewhere classified  (Encounter Diagnosis) E11.628 - Type 2 diabetes mellitus with other skin complications  (Encounter Diagnosis) Z79.4 - Long te evaluated labs. - June 2020 bun 23, creat 1.4, gfr37    Wound type: - lymphadema  Perfusion assessed by doppler. - strong pulsatile signals at the distal hallux bilaterally  Perfusion assessed by palpation of pulses.   Last A1C Date and Value: - June 2020 a

## 2020-08-07 NOTE — TELEPHONE ENCOUNTER
Message received that pt called to cancel her appts for 8/10 and 8/13 d/t scheduling conflict with Wound Care clinic.

## 2020-08-10 ENCOUNTER — APPOINTMENT (OUTPATIENT)
Dept: OCCUPATIONAL MEDICINE | Facility: HOSPITAL | Age: 75
End: 2020-08-10
Attending: FAMILY MEDICINE
Payer: MEDICARE

## 2020-08-10 ENCOUNTER — OFFICE VISIT (OUTPATIENT)
Dept: WOUND CARE | Facility: HOSPITAL | Age: 75
End: 2020-08-10
Attending: NURSE PRACTITIONER
Payer: MEDICARE

## 2020-08-10 DIAGNOSIS — E11.628 TYPE 2 DIABETES MELLITUS WITH OTHER SKIN COMPLICATIONS (HCC): ICD-10-CM

## 2020-08-10 DIAGNOSIS — Z79.4 LONG TERM (CURRENT) USE OF INSULIN (HCC): ICD-10-CM

## 2020-08-10 DIAGNOSIS — L97.812 NON-PRESSURE CHRONIC ULCER OF OTHER PART OF RIGHT LOWER LEG WITH FAT LAYER EXPOSED (HCC): ICD-10-CM

## 2020-08-10 DIAGNOSIS — I89.0 LYMPHEDEMA, NOT ELSEWHERE CLASSIFIED: ICD-10-CM

## 2020-08-10 DIAGNOSIS — L97.822 NON-PRESSURE CHRONIC ULCER OF OTHER PART OF LEFT LOWER LEG WITH FAT LAYER EXPOSED (HCC): Primary | ICD-10-CM

## 2020-08-10 LAB — GLUCOSE BLD-MCNC: 122 MG/DL (ref 70–99)

## 2020-08-10 PROCEDURE — 29581 APPL MULTLAYER CMPRN SYS LEG: CPT

## 2020-08-10 PROCEDURE — 82962 GLUCOSE BLOOD TEST: CPT

## 2020-08-13 ENCOUNTER — APPOINTMENT (OUTPATIENT)
Dept: OCCUPATIONAL MEDICINE | Facility: HOSPITAL | Age: 75
End: 2020-08-13
Attending: FAMILY MEDICINE
Payer: MEDICARE

## 2020-08-13 ENCOUNTER — TELEPHONE (OUTPATIENT)
Dept: OCCUPATIONAL MEDICINE | Facility: HOSPITAL | Age: 75
End: 2020-08-13

## 2020-08-13 ENCOUNTER — OFFICE VISIT (OUTPATIENT)
Dept: WOUND CARE | Facility: HOSPITAL | Age: 75
End: 2020-08-13
Attending: NURSE PRACTITIONER
Payer: MEDICARE

## 2020-08-13 DIAGNOSIS — E11.628 TYPE 2 DIABETES MELLITUS WITH OTHER SKIN COMPLICATIONS (HCC): ICD-10-CM

## 2020-08-13 DIAGNOSIS — I89.0 LYMPHEDEMA, NOT ELSEWHERE CLASSIFIED: Primary | ICD-10-CM

## 2020-08-13 DIAGNOSIS — L97.822 NON-PRESSURE CHRONIC ULCER OF OTHER PART OF LEFT LOWER LEG WITH FAT LAYER EXPOSED (HCC): ICD-10-CM

## 2020-08-13 DIAGNOSIS — Z79.4 LONG TERM (CURRENT) USE OF INSULIN (HCC): ICD-10-CM

## 2020-08-13 DIAGNOSIS — L97.812 NON-PRESSURE CHRONIC ULCER OF OTHER PART OF RIGHT LOWER LEG WITH FAT LAYER EXPOSED (HCC): ICD-10-CM

## 2020-08-13 LAB — GLUCOSE BLD-MCNC: 120 MG/DL (ref 70–99)

## 2020-08-13 PROCEDURE — 82962 GLUCOSE BLOOD TEST: CPT

## 2020-08-13 PROCEDURE — 29581 APPL MULTLAYER CMPRN SYS LEG: CPT

## 2020-08-13 RX ORDER — FLUCONAZOLE 150 MG/1
150 TABLET ORAL ONCE
Qty: 1 TABLET | Refills: 0 | Status: SHIPPED | OUTPATIENT
Start: 2020-08-13 | End: 2020-08-13

## 2020-08-13 NOTE — TELEPHONE ENCOUNTER
Returned pt's  re: continuing in [facility]. Pt will be continuing in  Wound Care clinic through next week with tentative return the week of 8/24. Therapist advised pt that therapist will take care of cancelling her therapy appts.  Pt also requesti

## 2020-08-13 NOTE — PROGRESS NOTES
Subjective    Chief Complaint  This information was obtained from the patient  Patient here for a follow up visit for bilateral lower legs, no pain or concerns or issues with wraps.  Pt did not bring other set of comprallin wraps today, states she was told she will be able to be transitioned back to lymphadema clinic.     Review of Systems (ROS)  This information was obtained from the patient, chart    Complaints and Symptoms  Patient complains of:  Eyes: Glasses / Contacts  Respiratory: Other (sleep apnea, o Thickness Lymphedema and has received a status of Not Healed. Subsequent wound encounter measurements are 0.1cm length x 0.1cm width x 0.2cm depth, with an area of 0.01 sq cm and a volume of 0.002 cubic cm. No tunneling has been noted.  No sinus tract has b affect.         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  - June 2020 bun 23, cre

## 2020-08-14 ENCOUNTER — APPOINTMENT (OUTPATIENT)
Dept: OCCUPATIONAL MEDICINE | Facility: HOSPITAL | Age: 75
End: 2020-08-14
Attending: FAMILY MEDICINE
Payer: MEDICARE

## 2020-08-17 ENCOUNTER — OFFICE VISIT (OUTPATIENT)
Dept: WOUND CARE | Facility: HOSPITAL | Age: 75
End: 2020-08-17
Attending: NURSE PRACTITIONER
Payer: MEDICARE

## 2020-08-17 ENCOUNTER — APPOINTMENT (OUTPATIENT)
Dept: OCCUPATIONAL MEDICINE | Facility: HOSPITAL | Age: 75
End: 2020-08-17
Attending: FAMILY MEDICINE
Payer: MEDICARE

## 2020-08-17 DIAGNOSIS — L97.812 NON-PRESSURE CHRONIC ULCER OF OTHER PART OF RIGHT LOWER LEG WITH FAT LAYER EXPOSED (HCC): ICD-10-CM

## 2020-08-17 DIAGNOSIS — B37.2 CANDIDIASIS OF SKIN AND NAILS: Primary | ICD-10-CM

## 2020-08-17 DIAGNOSIS — E11.628 TYPE 2 DIABETES MELLITUS WITH OTHER SKIN COMPLICATIONS (HCC): ICD-10-CM

## 2020-08-17 DIAGNOSIS — L97.822 NON-PRESSURE CHRONIC ULCER OF OTHER PART OF LEFT LOWER LEG WITH FAT LAYER EXPOSED (HCC): ICD-10-CM

## 2020-08-17 DIAGNOSIS — I89.0 LYMPHEDEMA, NOT ELSEWHERE CLASSIFIED: ICD-10-CM

## 2020-08-17 LAB — GLUCOSE BLD-MCNC: 162 MG/DL (ref 70–99)

## 2020-08-17 PROCEDURE — 82962 GLUCOSE BLOOD TEST: CPT

## 2020-08-17 PROCEDURE — 29581 APPL MULTLAYER CMPRN SYS LEG: CPT

## 2020-08-19 ENCOUNTER — APPOINTMENT (OUTPATIENT)
Dept: OCCUPATIONAL MEDICINE | Facility: HOSPITAL | Age: 75
End: 2020-08-19
Attending: FAMILY MEDICINE
Payer: MEDICARE

## 2020-08-20 ENCOUNTER — OFFICE VISIT (OUTPATIENT)
Dept: WOUND CARE | Facility: HOSPITAL | Age: 75
End: 2020-08-20
Attending: NURSE PRACTITIONER
Payer: MEDICARE

## 2020-08-20 DIAGNOSIS — I89.0 LYMPHEDEMA, NOT ELSEWHERE CLASSIFIED: ICD-10-CM

## 2020-08-20 DIAGNOSIS — E11.628 TYPE 2 DIABETES MELLITUS WITH OTHER SKIN COMPLICATIONS (HCC): Primary | ICD-10-CM

## 2020-08-20 DIAGNOSIS — B37.2 CANDIDIASIS OF SKIN AND NAILS: ICD-10-CM

## 2020-08-20 DIAGNOSIS — L97.822 NON-PRESSURE CHRONIC ULCER OF OTHER PART OF LEFT LOWER LEG WITH FAT LAYER EXPOSED (HCC): ICD-10-CM

## 2020-08-20 DIAGNOSIS — Z79.4 LONG TERM (CURRENT) USE OF INSULIN (HCC): ICD-10-CM

## 2020-08-20 DIAGNOSIS — L97.812 NON-PRESSURE CHRONIC ULCER OF OTHER PART OF RIGHT LOWER LEG WITH FAT LAYER EXPOSED (HCC): ICD-10-CM

## 2020-08-20 LAB — GLUCOSE BLD-MCNC: 115 MG/DL (ref 70–99)

## 2020-08-20 PROCEDURE — 29581 APPL MULTLAYER CMPRN SYS LEG: CPT

## 2020-08-20 PROCEDURE — 82962 GLUCOSE BLOOD TEST: CPT

## 2020-08-20 NOTE — PROGRESS NOTES
Subjective    Chief Complaint  This information was obtained from the patient  Patient is here for a wound care follow up. She denies any pain or new wound concerns.     Allergies  codeine (Reaction: nausea), Macrobid (Reaction: hives )    HPI  This informa 8-20-20 Patient returns today, she continues to do well, the fungal areas are also improved, I instructed her to go ahead and take the second diflucan. she has an appointmebnt with matti from lymphadema clinic next week on Friday.   I will see her Tuesday The periwound skin moisture is normal. The periwound skin exhibited: Brawny Induration, Edema, Hemosiderosis. The temperature of the periwound skin is WNL. Periwound skin does not exhibit signs or symptoms of infection.  Local Pulse is Normal.    Wound #2 R see wound documentation. Psychiatric:  Judgment and insight intact. Alert and oriented times 3. No evidence of depression, anxiety, or agitation. Calm, cooperative, and communicative. Appropriate interactions and affect.         Assessment    Active Prob Avoid prolonged standing in one place. Elevate leg(s)as much as possible. Additional Orders: Follow-Up Appointments:  Return Appointment in 1 week.  -  ON TUESDAY  [facility]/Additional Orders:  Supplement with a daily

## 2020-08-24 ENCOUNTER — APPOINTMENT (OUTPATIENT)
Dept: OCCUPATIONAL MEDICINE | Facility: HOSPITAL | Age: 75
End: 2020-08-24
Attending: FAMILY MEDICINE
Payer: MEDICARE

## 2020-08-24 ENCOUNTER — OFFICE VISIT (OUTPATIENT)
Dept: CARDIOLOGY | Age: 75
End: 2020-08-24

## 2020-08-24 VITALS — DIASTOLIC BLOOD PRESSURE: 66 MMHG | SYSTOLIC BLOOD PRESSURE: 130 MMHG | HEART RATE: 60 BPM

## 2020-08-24 DIAGNOSIS — I10 ESSENTIAL HYPERTENSION: ICD-10-CM

## 2020-08-24 DIAGNOSIS — I27.20 PULMONARY HYPERTENSION (CMD): ICD-10-CM

## 2020-08-24 DIAGNOSIS — R06.09 DOE (DYSPNEA ON EXERTION): ICD-10-CM

## 2020-08-24 DIAGNOSIS — E78.00 PURE HYPERCHOLESTEROLEMIA: Primary | ICD-10-CM

## 2020-08-24 DIAGNOSIS — I65.23 ASYMPTOMATIC CAROTID ARTERY STENOSIS, BILATERAL: ICD-10-CM

## 2020-08-24 PROCEDURE — 99214 OFFICE O/P EST MOD 30 MIN: CPT | Performed by: INTERNAL MEDICINE

## 2020-08-24 SDOH — HEALTH STABILITY: MENTAL HEALTH: HOW OFTEN DO YOU HAVE A DRINK CONTAINING ALCOHOL?: NEVER

## 2020-08-25 ENCOUNTER — OFFICE VISIT (OUTPATIENT)
Dept: WOUND CARE | Facility: HOSPITAL | Age: 75
End: 2020-08-25
Attending: NURSE PRACTITIONER
Payer: MEDICARE

## 2020-08-25 DIAGNOSIS — L97.812 NON-PRESSURE CHRONIC ULCER OF OTHER PART OF RIGHT LOWER LEG WITH FAT LAYER EXPOSED (HCC): ICD-10-CM

## 2020-08-25 DIAGNOSIS — E11.628 TYPE 2 DIABETES MELLITUS WITH OTHER SKIN COMPLICATIONS (HCC): Primary | ICD-10-CM

## 2020-08-25 DIAGNOSIS — L97.822 NON-PRESSURE CHRONIC ULCER OF OTHER PART OF LEFT LOWER LEG WITH FAT LAYER EXPOSED (HCC): ICD-10-CM

## 2020-08-25 DIAGNOSIS — I89.0 LYMPHEDEMA, NOT ELSEWHERE CLASSIFIED: ICD-10-CM

## 2020-08-25 DIAGNOSIS — B37.2 CANDIDIASIS OF SKIN AND NAILS: ICD-10-CM

## 2020-08-25 DIAGNOSIS — Z79.4 LONG TERM (CURRENT) USE OF INSULIN (HCC): ICD-10-CM

## 2020-08-25 DIAGNOSIS — B35.3 TINEA PEDIS: ICD-10-CM

## 2020-08-25 LAB — GLUCOSE BLD-MCNC: 101 MG/DL (ref 70–99)

## 2020-08-25 PROCEDURE — 29581 APPL MULTLAYER CMPRN SYS LEG: CPT

## 2020-08-25 PROCEDURE — 82962 GLUCOSE BLOOD TEST: CPT

## 2020-08-25 NOTE — PROGRESS NOTES
Subjective    Chief Complaint  This information was obtained from the patient  Patient is here for a wound care follow up for bilateral leg wounds. She denies any pain at this time.     Allergies  codeine (Reaction: nausea), Macrobid (Reaction: hives )    H diflucan. she has an appointmebnt with matti from lymphadema clinic next week on Friday. I will see her Tuesday for her last visit here. no acute s/s of infection, no c/o pain.     8-25-20 patient returns today, it does appear that she has some fungal ar Thickness Lymphedema and has received an outcome of Resolved. Subsequent wound encounter measurements are 0cm length x 0cm width with no measurable depth, with an area of 0 sq cm . No tunneling has been noted. No sinus tract has been noted.  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 No s/s of infection. Perfusion assessed by doppler. - strong pulsatile signals at the distal hallux bilaterally  Perfusion assessed by palpation of pulses.   Last A1C Date and Value: - June

## 2020-08-26 ENCOUNTER — APPOINTMENT (OUTPATIENT)
Dept: OCCUPATIONAL MEDICINE | Facility: HOSPITAL | Age: 75
End: 2020-08-26
Attending: FAMILY MEDICINE
Payer: MEDICARE

## 2020-08-27 ENCOUNTER — APPOINTMENT (OUTPATIENT)
Dept: WOUND CARE | Facility: HOSPITAL | Age: 75
End: 2020-08-27
Attending: NURSE PRACTITIONER
Payer: MEDICARE

## 2020-08-28 ENCOUNTER — OFFICE VISIT (OUTPATIENT)
Dept: OCCUPATIONAL MEDICINE | Facility: HOSPITAL | Age: 75
End: 2020-08-28
Attending: FAMILY MEDICINE
Payer: MEDICARE

## 2020-08-28 DIAGNOSIS — I89.0 LYMPHEDEMA OF LOWER EXTREMITY: ICD-10-CM

## 2020-08-28 PROCEDURE — 97140 MANUAL THERAPY 1/> REGIONS: CPT

## 2020-08-28 NOTE — PROGRESS NOTES
Progress Summary  Pt has attended 5/36 visits in Occupational Therapy. Subjective:   Pt reports she purchased lotrimin spray to bring to session and be applied as recommended by Wound Care clinic APRN. However, she left spray at home.  Has followed th superficial tissue mobility; distal 1/2 are densely boggy to boggy, pitting over medial surfaces with fair superficial tissue mobility / densely boggy, non-pitting over lateral surfaces.  Ankles are densely boggy to boggy with poor superficial tissue mobili have any questions, please contact me at Dept: [phone]. Sincerely,  Electronically signed by therapist: Akira Fontanez. Octaviano, OTR/L, DOM-TONY        Objective:  Arrived wearing bilateral lower leg bandage systems applied at Wound Care clinic.    OBSERVAT needed to place order. *Re-applied compression.     COMPRESSION:  *Left lower leg: sterile pad over dorsum of foot; stockinet; 1/4\" foam insert over dorsum of foot and anterior ankle; Cellona wrap over ankle/foot and lower leg; Rosidal wrap from ankle to

## 2020-08-31 ENCOUNTER — APPOINTMENT (OUTPATIENT)
Dept: OCCUPATIONAL MEDICINE | Facility: HOSPITAL | Age: 75
End: 2020-08-31
Attending: FAMILY MEDICINE
Payer: MEDICARE

## 2020-08-31 ENCOUNTER — OFFICE VISIT (OUTPATIENT)
Dept: PODIATRY CLINIC | Facility: CLINIC | Age: 75
End: 2020-08-31
Payer: MEDICARE

## 2020-08-31 DIAGNOSIS — I89.0 LYMPHEDEMA: ICD-10-CM

## 2020-08-31 DIAGNOSIS — E11.65 UNCONTROLLED TYPE 2 DIABETES MELLITUS WITH INSULIN THERAPY (HCC): ICD-10-CM

## 2020-08-31 DIAGNOSIS — E11.42 DIABETIC POLYNEUROPATHY ASSOCIATED WITH TYPE 2 DIABETES MELLITUS (HCC): Primary | ICD-10-CM

## 2020-08-31 DIAGNOSIS — M20.5X2 HALLUX LIMITUS OF LEFT FOOT: ICD-10-CM

## 2020-08-31 DIAGNOSIS — Z79.4 UNCONTROLLED TYPE 2 DIABETES MELLITUS WITH INSULIN THERAPY (HCC): ICD-10-CM

## 2020-08-31 PROCEDURE — 11721 DEBRIDE NAIL 6 OR MORE: CPT | Performed by: PODIATRIST

## 2020-08-31 NOTE — PROGRESS NOTES
Tristen Blancas is a 76year old female. Patient presents with:  Diabetic Foot Care: BS this  - Last A1C of 9.2 on 6/22/20 - LOV w/ PCP on 2/24/20 - nail trimming - patient is going to wound center due to lymphedema in her lower extremities. my hysterectomy'   • Arrhythmia     HX OF AFIB DX 2017-CARDIOVERSION DONE REVERTED TO NSR   • Back problem     DISH   • C. difficile diarrhea    • CANCER     uterine   • Cancer (HCC)     Uterine CA   • DIABETES    • Diabetes (HCC)    • DISH (diffuse idiopa Packs/day: 0.50        Years: 40.00        Pack years: 21        Quit date: 2003        Years since quittin.3      Smokeless tobacco: Never Used    Substance and Sexual Activity      Alcohol use: Not Currently      Drug use: No    Other Topics could develop after debridement of the nail she should apply Betadine and bacitracin to any area that seems to be irritated. She will contact us if this happens. The patient indicates understanding of these issues and agrees to the plan.     Return in a

## 2020-09-02 ENCOUNTER — OFFICE VISIT (OUTPATIENT)
Dept: OCCUPATIONAL MEDICINE | Facility: HOSPITAL | Age: 75
End: 2020-09-02
Attending: FAMILY MEDICINE
Payer: MEDICARE

## 2020-09-02 DIAGNOSIS — I89.0 LYMPHEDEMA OF LOWER EXTREMITY: ICD-10-CM

## 2020-09-02 PROCEDURE — 97140 MANUAL THERAPY 1/> REGIONS: CPT

## 2020-09-02 NOTE — PROGRESS NOTES
Dx: Lymphedema of lower extremities (I89.0)           Insurance (Authorized # of Visits):  6/36     Next MD/Plan Renewal Date: 10/26/2020    Authorizing Physician: Dr. Kathy Ho   Fall Risk: HIGH        Precautions:  lymphedema; abdominal sxs; HTN DISH; 60+y/ drainage on dressing bilaterally. Other than these areas, no open area/lymphorrhea present. MEASUREMENTS:   None taken   TREATMENT:  *Suggested that pt contact garment provider during session to confirm order. Pt in agreement.  Pt spoke with garment provi time required to care for pt's legs and re-apply compression, unable to initiate MLD or take circumferential measurements of LEs within the alloted 60 min session.  Anticipate once pt transitions into Velcro-closure garments next week that these areas can b

## 2020-09-04 ENCOUNTER — OFFICE VISIT (OUTPATIENT)
Dept: OCCUPATIONAL MEDICINE | Facility: HOSPITAL | Age: 75
End: 2020-09-04
Attending: FAMILY MEDICINE
Payer: MEDICARE

## 2020-09-04 DIAGNOSIS — I89.0 LYMPHEDEMA OF LOWER EXTREMITY: ICD-10-CM

## 2020-09-04 PROCEDURE — 97140 MANUAL THERAPY 1/> REGIONS: CPT

## 2020-09-04 NOTE — PROGRESS NOTES
Dx: Lymphedema of lower extremities (I89.0)           Insurance (Authorized # of Visits):  7/36     Next MD/Plan Renewal Date: 10/26/2020    Authorizing Physician: Dr. Nick Wilkins   Fall Risk: HIGH        Precautions:  lymphedema; abdominal sxs; HTN DISH; 60+y/ systems/dressings and completed skin care and hygiene. *Applied Lotrimin spray over dorsum of feet and between toes. Re-applied compression.     COMPRESSION:  *Left lower leg: sterile pad over dorsum of foot; stockinet; 1/4\" foam insert over dorsum of f precautions for life-long self-management of lymphedema. Plan:   Continue current plan. Awaiting delivery of Velcro-closure garments; will transition into garments once they arrive.       Charges: 4 Manual Therapy    Total Timed Treatment: 60 min  Total

## 2020-09-08 ENCOUNTER — TELEPHONE (OUTPATIENT)
Dept: OCCUPATIONAL MEDICINE | Facility: HOSPITAL | Age: 75
End: 2020-09-08

## 2020-09-08 ENCOUNTER — APPOINTMENT (OUTPATIENT)
Dept: OCCUPATIONAL MEDICINE | Facility: HOSPITAL | Age: 75
End: 2020-09-08
Attending: FAMILY MEDICINE
Payer: MEDICARE

## 2020-09-10 ENCOUNTER — OFFICE VISIT (OUTPATIENT)
Dept: OCCUPATIONAL MEDICINE | Facility: HOSPITAL | Age: 75
End: 2020-09-10
Attending: FAMILY MEDICINE
Payer: MEDICARE

## 2020-09-10 DIAGNOSIS — I89.0 LYMPHEDEMA OF LOWER EXTREMITY: ICD-10-CM

## 2020-09-10 PROCEDURE — 97140 MANUAL THERAPY 1/> REGIONS: CPT

## 2020-09-10 NOTE — PROGRESS NOTES
Dx: Lymphedema of lower extremities (I89.0)           Insurance (Authorized # of Visits):  8/36     Next MD/Plan Renewal Date: 10/26/2020    Authorizing Physician: Dr. Lorelei Cobian   Fall Risk: HIGH        Precautions:  lymphedema; abdominal sxs; HTN DISH; 60+y/ lower leg with dead tissue around ankle/proximal dorsum of foot. Right lower leg with dry, flat, crusted tissue over distal lower leg/ankle/proximal dorsum of foot; tissue in this area with fragile appearance.  On dorsum of distal bilateral feet there is ev legs and apply  New compression, unable to initiate MLD. Anticipate once pt transitions into Velcro-closure garments next week that these areas can begin to be addressed along with planning for progressing compression up over knees/thighs.      Goals:  (to

## 2020-09-11 ENCOUNTER — OFFICE VISIT (OUTPATIENT)
Dept: OCCUPATIONAL MEDICINE | Facility: HOSPITAL | Age: 75
End: 2020-09-11
Attending: FAMILY MEDICINE
Payer: MEDICARE

## 2020-09-11 ENCOUNTER — LAB ENCOUNTER (OUTPATIENT)
Dept: LAB | Facility: HOSPITAL | Age: 75
End: 2020-09-11
Attending: FAMILY MEDICINE
Payer: MEDICARE

## 2020-09-11 DIAGNOSIS — A04.71 RECURRENT CLOSTRIDIUM DIFFICILE DIARRHEA: ICD-10-CM

## 2020-09-11 DIAGNOSIS — K62.5 HEMORRHAGE OF RECTUM AND ANUS: ICD-10-CM

## 2020-09-11 DIAGNOSIS — E11.21: ICD-10-CM

## 2020-09-11 DIAGNOSIS — I89.0 LYMPHEDEMA OF LOWER EXTREMITY: ICD-10-CM

## 2020-09-11 DIAGNOSIS — E11.42 DIABETIC PERIPHERAL NEUROPATHY ASSOCIATED WITH TYPE 2 DIABETES MELLITUS (HCC): ICD-10-CM

## 2020-09-11 DIAGNOSIS — E66.01 MORBID OBESITY (HCC): ICD-10-CM

## 2020-09-11 DIAGNOSIS — R19.7 DIARRHEA OF PRESUMED INFECTIOUS ORIGIN: Primary | ICD-10-CM

## 2020-09-11 PROCEDURE — 97140 MANUAL THERAPY 1/> REGIONS: CPT

## 2020-09-11 PROCEDURE — 87493 C DIFF AMPLIFIED PROBE: CPT

## 2020-09-11 NOTE — PROGRESS NOTES
Dx: Lymphedema of lower extremities (I89.0)           Insurance (Authorized # of Visits):  9/36     Next MD/Plan Renewal Date: 10/26/2020    Authorizing Physician: Dr. Lawyer Pratt   Fall Risk: HIGH        Precautions:  lymphedema; abdominal sxs; HTN DISH; 60+y/ superficial tissue mobility. Feet are slightly boggy, pitting. Positive Stemmer's sign bilaterally. *Left lower leg with dead tissue around ankle/proximal dorsum of foot.  Right lower leg with dry, flat, crusted tissue over distal lower leg/ankle/proxima life-long self-management of lymphedema. Plan:   Continue current plan.        Charges: 4 Manual Therapy    Total Timed Treatment: 60 min  Total Treatment Time: 65 min

## 2020-09-12 LAB — C DIFF TOX B STL QL: POSITIVE

## 2020-09-14 ENCOUNTER — OFFICE VISIT (OUTPATIENT)
Dept: OCCUPATIONAL MEDICINE | Facility: HOSPITAL | Age: 75
End: 2020-09-14
Attending: FAMILY MEDICINE
Payer: MEDICARE

## 2020-09-14 DIAGNOSIS — I89.0 LYMPHEDEMA OF LOWER EXTREMITY: ICD-10-CM

## 2020-09-14 PROCEDURE — 97140 MANUAL THERAPY 1/> REGIONS: CPT

## 2020-09-14 NOTE — PROGRESS NOTES
Dx: Lymphedema of lower extremities (I89.0)           Insurance (Authorized # of Visits):  10/36     Next MD/Plan Renewal Date: 10/26/2020    Authorizing Physician: Dr. Mariza Khan   Fall Risk: HIGH        Precautions:  lymphedema; abdominal sxs; HTN DISH; 60+y over lateral surfaces. Ankles are densely boggy to boggy with poor superficial tissue mobility. Feet are slightly boggy, pitting. Positive Stemmer's sign bilaterally. *Left lower leg with dead tissue around ankle/proximal dorsum of foot.  Right lower leg UNABLE TO ASSIST/PT UNABLE TO COMPLETE D/T REDUCED FUNCTIONAL REACH. 4. Reduce bilateral LE lymphedema volume by 35-70cm to allow pt to return to wearing compression garments.   MET   5. Reduce bilateral lower leg density to boggy to slightly boggy to r

## 2020-09-15 ENCOUNTER — TELEPHONE (OUTPATIENT)
Dept: PHYSICAL THERAPY | Age: 75
End: 2020-09-15

## 2020-09-16 ENCOUNTER — OFFICE VISIT (OUTPATIENT)
Dept: OCCUPATIONAL MEDICINE | Facility: HOSPITAL | Age: 75
End: 2020-09-16
Attending: FAMILY MEDICINE
Payer: MEDICARE

## 2020-09-16 DIAGNOSIS — I89.0 LYMPHEDEMA OF LOWER EXTREMITY: ICD-10-CM

## 2020-09-16 PROCEDURE — 97140 MANUAL THERAPY 1/> REGIONS: CPT

## 2020-09-16 NOTE — PROGRESS NOTES
Dx: Lymphedema of lower extremities (I89.0)           Insurance (Authorized # of Visits):  11/36     Next MD/Plan Renewal Date: 10/26/2020    Authorizing Physician: Dr. Gala Rivera   Fall Risk: HIGH        Precautions:  lymphedema; abdominal sxs; HTN DISH; 60+y mild pitting over medial surfaces with fair superficial tissue mobility / densely boggy, non-pitting over lateral surfaces. Ankles are densely boggy to boggy with poor superficial tissue mobility. Feet are slightly boggy.  Positive Stemmer's sign bilaterall bandaging. ACHIEVED   2. Pt will be independent in decongestive exercises. 3. Pt/Caregiver will be independent in bilateral lower leg short stretch compression bandaging. NOT APPLICABLE.   UNABLE TO ASSIST/PT UNABLE TO COMPLETE D/T REDUCED FUNCTIO

## 2020-09-18 ENCOUNTER — OFFICE VISIT (OUTPATIENT)
Dept: OCCUPATIONAL MEDICINE | Facility: HOSPITAL | Age: 75
End: 2020-09-18
Attending: FAMILY MEDICINE
Payer: MEDICARE

## 2020-09-18 DIAGNOSIS — I89.0 LYMPHEDEMA OF LOWER EXTREMITY: ICD-10-CM

## 2020-09-18 PROCEDURE — 97140 MANUAL THERAPY 1/> REGIONS: CPT

## 2020-09-18 NOTE — PROGRESS NOTES
Dx: Lymphedema of lower extremities (I89.0)           Insurance (Authorized # of Visits):  12/36     Next MD/Plan Renewal Date: 10/26/2020    Authorizing Physician: Dr. Kathy Ho   Fall Risk: HIGH        Precautions:  lymphedema; abdominal sxs; HTN DISH; 60+y boggy , mild pitting over medial surfaces with fair superficial tissue mobility / densely boggy, non-pitting over lateral surfaces. Ankles are densely boggy to boggy with poor superficial tissue mobility. Feet are slightly boggy.  Positive Stemmer's sign bi visits)  1. Pt will tolerate bilateral lower leg short stretch compression bandaging. ACHIEVED   2. Pt will be independent in decongestive exercises. 3. Pt/Caregiver will be independent in bilateral lower leg short stretch compression bandaging.   NOT MICHELL

## 2020-09-23 ENCOUNTER — OFFICE VISIT (OUTPATIENT)
Dept: OCCUPATIONAL MEDICINE | Facility: HOSPITAL | Age: 75
End: 2020-09-23
Attending: FAMILY MEDICINE
Payer: MEDICARE

## 2020-09-23 PROCEDURE — 97140 MANUAL THERAPY 1/> REGIONS: CPT

## 2020-09-23 NOTE — PROGRESS NOTES
Dx: Lymphedema of lower extremities (I89.0)           Insurance (Authorized # of Visits):  13/36     Next MD/Plan Renewal Date: 10/26/2020    Authorizing Physician: Dr. Lorelei Cobian   Fall Risk: HIGH        Precautions:  lymphedema; abdominal sxs; HTN DISH; 60+y non-pitting; lateral surfaces are boggy, pitting with poor superficial tissue mobility.  Proximal 1/2 of lower legs are boggy, pitting with fair superficial tissue mobility; distal 1/2 are  boggy, mild pitting over medial surfaces with fair superficial tiss lateral/posterior ankle;  Farrow Wrap ankle/foot garment, size Small / Long; [employer] lower leg garment, size Small / Regular  *Right lower leg: sterile pad over dorsum of foot/anterior and lateral ankle; stockinet; DEFERRED--Farrow Wrap ankle/foot garme

## 2020-09-24 ENCOUNTER — OFFICE VISIT (OUTPATIENT)
Dept: OCCUPATIONAL MEDICINE | Facility: HOSPITAL | Age: 75
End: 2020-09-24
Attending: FAMILY MEDICINE
Payer: MEDICARE

## 2020-09-24 PROCEDURE — 97140 MANUAL THERAPY 1/> REGIONS: CPT

## 2020-09-25 ENCOUNTER — OFFICE VISIT (OUTPATIENT)
Dept: OCCUPATIONAL MEDICINE | Facility: HOSPITAL | Age: 75
End: 2020-09-25
Attending: FAMILY MEDICINE
Payer: MEDICARE

## 2020-09-25 PROCEDURE — 97140 MANUAL THERAPY 1/> REGIONS: CPT

## 2020-09-25 NOTE — PROGRESS NOTES
Dx: Lymphedema of lower extremities (I89.0)           Insurance (Authorized # of Visits):  14/36     Next MD/Plan Renewal Date: 10/26/2020    Authorizing Physician: Dr. Alexis Carbajal   Fall Risk: HIGH        Precautions:  lymphedema; abdominal sxs; HTN DISH; 60+y superficial tissue mobility. Feet are slightly boggy. Positive Stemmer's sign bilaterally. *Left lower leg with dead tissue around ankle/proximal dorsum of foot.  Right lower leg with dry, flat, crusted tissue over distal lower leg/ankle/proximal dorsum bandages: 1, 6cm, 1, 8cm, 1, 10cm  *Post-op shoes    Assessment:   Right lower leg wound showing signs of healing, h/e volume increase occurring in ankle/foot, as anticipated, d/t no compression applied at last session.  Pt will need assistance from her hus

## 2020-09-25 NOTE — PROGRESS NOTES
Progress Summary  Pt has attended 15/36 visits in Occupational Therapy. Subjective:   Pt reports she tolerated Left thigh/knee compression well, h/e found that is migrated inferiorly overnight.  Pt reports no pain over Right lateral ankle; can tell w been more open to discussing her need for assist with her . EDEMA/TISSUE QUALITY:   *Left LE circumferential lymphedema volume has reduced by 68.0cm since initial eval. Right LE has reduced by 45.0cm.    *Proximal 1/2 of bilateral thighs presenti APPLICABLE.  UNABLE TO ASSIST/PT UNABLE TO COMPLETE D/T REDUCED FUNCTIONAL REACH. 4. Reduce bilateral LE lymphedema volume by 35-70cm to allow pt to return to wearing compression garments.   MET   5. Reduce bilateral lower leg density to boggy to boggy to supple lymphedema; distal 1/2 with boggy, non-pitting lymphedema over anterior surfaces / boggy, pitting over lateral surfaces / densely boggy, pitting with poor superficial tissue mobility over medial and posterior surfaces.  Medial and posterior bilateral lower leg short stretch compression bandaging. ACHIEVED   2. Pt will be independent in decongestive exercises. 3. Pt/Caregiver will be independent in bilateral lower leg short stretch compression bandaging. NOT APPLICABLE.   UNABLE TO AS

## 2020-09-28 ENCOUNTER — OFFICE VISIT (OUTPATIENT)
Dept: OCCUPATIONAL MEDICINE | Facility: HOSPITAL | Age: 75
End: 2020-09-28
Attending: FAMILY MEDICINE
Payer: MEDICARE

## 2020-09-28 PROCEDURE — 97140 MANUAL THERAPY 1/> REGIONS: CPT

## 2020-09-28 NOTE — PROGRESS NOTES
Dx: Lymphedema of lower extremities (I89.0)           Insurance (Authorized # of Visits):  16/36     Next MD/Plan Renewal Date: 10/26/2020    Authorizing Physician: Dr. Viridiana Sharma   Fall Risk: HIGH        Precautions:  lymphedema; abdominal sxs; HTN DISH; 60+y Ankle is boggy with poor superficial tissue mobility. Dorsum of foot is slightly boggy. Positive Stemmer's sign.    *Right LE: Proximal 1/2 of  thigh presenting with slightly boggy to supple lymphedema; distal 1/2 of thigh with boggy, non-pitting lymphedema ankle/foot garment as pt has only one set of garments per lower leg.  Advised pt that she may need to consider getting a second set of ankle/foot garments so that  will have a clean garment daily during this time pt is struggling with healing of her compression garments. MET   5. Reduce bilateral lower leg density to boggy to slightly boggy to reduce infection risk.     6. Pt will be independent in use of compression garments, self-manual lymph drainage, decongestive exercises, compression device and

## 2020-09-29 ENCOUNTER — OFFICE VISIT (OUTPATIENT)
Dept: OCCUPATIONAL MEDICINE | Facility: HOSPITAL | Age: 75
End: 2020-09-29
Attending: FAMILY MEDICINE
Payer: MEDICARE

## 2020-09-29 PROCEDURE — 97140 MANUAL THERAPY 1/> REGIONS: CPT

## 2020-09-29 NOTE — PROGRESS NOTES
Dx: Lymphedema of lower extremities (I89.0)           Insurance (Authorized # of Visits):  17/36     Next MD/Plan Renewal Date: 10/26/2020    Authorizing Physician: Dr. Ramu Alcazar   Fall Risk: HIGH        Precautions:  lymphedema; abdominal sxs; HTN DISH; 60+y to fair superficial tissue mobility / densely boggy, pitting over posterior surface with poor to fair superficial tissue mobility  superficial tissue mobility.  Medial and posterior surfaces of knee are boggy with poor to fair superficial tissue mobility; a lower leg bandage system.    *Left thigh/knee manual lymph drainage   *Discussed transition back to Wound Care clinic; possible need for second pair of ankle/foot garments during healing of feet if Wound Care clinic determines  will need to be provid self-management of lymphedema. Plan:   Continue current plan. Training of  in donning of Farrow Wrap ankle/foot garments, when he is available.     Charges: 4 Manual Therapy    Total Timed Treatment: 60 min  Total Treatment Time: 65 min

## 2020-09-30 ENCOUNTER — OFFICE VISIT (OUTPATIENT)
Dept: OCCUPATIONAL MEDICINE | Facility: HOSPITAL | Age: 75
End: 2020-09-30
Attending: FAMILY MEDICINE
Payer: MEDICARE

## 2020-09-30 PROCEDURE — 97140 MANUAL THERAPY 1/> REGIONS: CPT

## 2020-09-30 NOTE — PROGRESS NOTES
Dx: Lymphedema of lower extremities (I89.0)           Insurance (Authorized # of Visits):  18/36     Next MD/Plan Renewal Date: 10/26/2020    Authorizing Physician: Dr. Riana Bryant   Fall Risk: HIGH        Precautions:  lymphedema; abdominal sxs; HTN DISH; 60+y superficial tissue mobility; distal 1/2 is boggy, pitting over medial surface with fair superficial tissue mobility; densely boggy, pitting over lateral surface. Ankle is boggy with poor superficial tissue mobility. Dorsum of foot is slightly boggy.  Ariane Guerrero replaced. Advised pt to remove current compression on 10/3; complete skin hygiene and care with 's assist; re-apply sterile pads, stockinets and Farrow Wrap garments over bilateral ankles/feet/lower legs. Pt and  in agreement.     *Prepped sofya give instructions to  in [address]. Goals:  (to be met in 36 visits)  1. Pt will tolerate bilateral lower leg short stretch compression bandaging. ACHIEVED   2. Pt will be independent in decongestive exercises.    3. Pt/Caregiver will be indepen

## 2020-10-02 ENCOUNTER — LAB ENCOUNTER (OUTPATIENT)
Dept: LAB | Facility: HOSPITAL | Age: 75
End: 2020-10-02
Attending: FAMILY MEDICINE
Payer: MEDICARE

## 2020-10-02 DIAGNOSIS — A04.71 RECURRENT CLOSTRIDIUM DIFFICILE DIARRHEA: ICD-10-CM

## 2020-10-02 DIAGNOSIS — R19.7 DIARRHEA OF PRESUMED INFECTIOUS ORIGIN: Primary | ICD-10-CM

## 2020-10-02 DIAGNOSIS — K62.5 RECTAL BLEEDING: ICD-10-CM

## 2020-10-02 PROCEDURE — 87493 C DIFF AMPLIFIED PROBE: CPT

## 2020-10-06 ENCOUNTER — TELEPHONE (OUTPATIENT)
Dept: OCCUPATIONAL MEDICINE | Facility: HOSPITAL | Age: 75
End: 2020-10-06

## 2020-10-06 NOTE — TELEPHONE ENCOUNTER
Pt called and LVM. Has had recurrence of C diff. Needed to cancel her Wound Care clinic appt d/t this and will be unavailable for therapy this week. Is aware she needs to re-schedule her Wound Care clinic appt.

## 2020-10-07 ENCOUNTER — APPOINTMENT (OUTPATIENT)
Dept: OCCUPATIONAL MEDICINE | Facility: HOSPITAL | Age: 75
End: 2020-10-07
Attending: FAMILY MEDICINE
Payer: MEDICARE

## 2020-10-07 ENCOUNTER — HOSPITAL ENCOUNTER (OUTPATIENT)
Dept: LAB | Facility: HOSPITAL | Age: 75
Discharge: HOME | End: 2020-10-07
Attending: FAMILY MEDICINE
Payer: MEDICARE

## 2020-10-07 DIAGNOSIS — E11.9 TYPE 2 DIABETES MELLITUS WITHOUT COMPLICATION, WITH LONG-TERM CURRENT USE OF INSULIN (HCC): ICD-10-CM

## 2020-10-07 DIAGNOSIS — N18.31 STAGE 3A CHRONIC KIDNEY DISEASE (HCC): ICD-10-CM

## 2020-10-07 DIAGNOSIS — A09 DIARRHEA OF INFECTIOUS ORIGIN: ICD-10-CM

## 2020-10-07 DIAGNOSIS — Z79.4 TYPE 2 DIABETES MELLITUS WITHOUT COMPLICATION, WITH LONG-TERM CURRENT USE OF INSULIN (HCC): ICD-10-CM

## 2020-10-07 PROCEDURE — 80053 COMPREHEN METABOLIC PANEL: CPT | Performed by: FAMILY MEDICINE

## 2020-10-07 PROCEDURE — 36415 COLL VENOUS BLD VENIPUNCTURE: CPT | Performed by: FAMILY MEDICINE

## 2020-10-07 PROCEDURE — 85025 COMPLETE CBC W/AUTO DIFF WBC: CPT | Performed by: FAMILY MEDICINE

## 2020-10-09 ENCOUNTER — TELEPHONE (OUTPATIENT)
Dept: OCCUPATIONAL MEDICINE | Facility: HOSPITAL | Age: 75
End: 2020-10-09

## 2020-10-09 ENCOUNTER — APPOINTMENT (OUTPATIENT)
Dept: OCCUPATIONAL MEDICINE | Facility: HOSPITAL | Age: 75
End: 2020-10-09
Attending: FAMILY MEDICINE
Payer: MEDICARE

## 2020-10-09 NOTE — TELEPHONE ENCOUNTER
Pt LVM cancelling her appt for 10/13. Reports she has an appt with Wound Care clinic on 10/15 and that her next therapy visit is on 10/16. Contacted pt to alert her that she also has a therapy appt on 10/15. [place] agreement to cancel this visit.    Discuss

## 2020-10-13 ENCOUNTER — APPOINTMENT (OUTPATIENT)
Dept: OCCUPATIONAL MEDICINE | Facility: HOSPITAL | Age: 75
End: 2020-10-13
Attending: FAMILY MEDICINE
Payer: MEDICARE

## 2020-10-15 ENCOUNTER — OFFICE VISIT (OUTPATIENT)
Dept: WOUND CARE | Facility: HOSPITAL | Age: 75
End: 2020-10-15
Attending: NURSE PRACTITIONER
Payer: MEDICARE

## 2020-10-15 ENCOUNTER — TELEPHONE (OUTPATIENT)
Dept: OCCUPATIONAL MEDICINE | Facility: HOSPITAL | Age: 75
End: 2020-10-15

## 2020-10-15 ENCOUNTER — APPOINTMENT (OUTPATIENT)
Dept: OCCUPATIONAL MEDICINE | Facility: HOSPITAL | Age: 75
End: 2020-10-15
Attending: FAMILY MEDICINE
Payer: MEDICARE

## 2020-10-15 DIAGNOSIS — E11.628 TYPE 2 DIABETES MELLITUS WITH OTHER SKIN COMPLICATIONS (HCC): Primary | ICD-10-CM

## 2020-10-15 DIAGNOSIS — I89.0 LYMPHEDEMA, NOT ELSEWHERE CLASSIFIED: ICD-10-CM

## 2020-10-15 DIAGNOSIS — B35.3 TINEA PEDIS: ICD-10-CM

## 2020-10-15 PROCEDURE — 82962 GLUCOSE BLOOD TEST: CPT

## 2020-10-15 PROCEDURE — 99215 OFFICE O/P EST HI 40 MIN: CPT

## 2020-10-15 NOTE — PROGRESS NOTES
Subjective    Chief Complaint  This information was obtained from the patient  Patient is here for treatment and management of lymphedema to BLE . Patient has been using farrow wrap to both legs.     Allergies  codeine (Reaction: nausea), Macrobid (Reaction 10-15-20  REEVALUATION: Cornel Mesa is well known to me from visits in August for wounds on her ble. Since discharge she has been managed by lymphedema clinic and they have been updating me with her status.   She did see podiatry on aug 31 and had her nails nas This information was obtained from the patient, chart  Patient has a medical history of:  small bowel obstruction  rheumatoid arthritis  arrhythmia (afib)  diabetes  cancer uterine  thyroid  Diverticulitis  high blood pressure  high cholesterol  lymphedema insulin aspart U-100 - subcutaneous 100 unit/mL solution  Tresiba FlexTouch U-100 - subcutaneous 100 unit/mL (3 mL) insulin pen  escitalopram oxalate - oral 10 mg tablet  levothyroxine - oral 150 mcg tablet  cholecalciferol (vitamin D3) - oral 50,000 unit Height/Length: 66 in (167.64 cm), Weight: 263 lbs (119.55 kgs), BMI: 42.4, Temperature: 98.1 °F (36.72 °C), Pulse: 76 bpm, Respiratory Rate: 20 breaths/min, Blood Pressure: 132/76 mmHg, Capillary Blood Glucose: 172 mg/dl.     Physical Exam  Constitutional Increase protein into diet  Start or continue taking Speedy  Decrease salt intake  S/S of Infection  Non-adherence    Care summary  Reviewed and evaluated labs.  - June 2020 bun 23, creat 1.4, gfr37    Wound type: - lymphadema/tinea pedis  Perfusion assessed

## 2020-10-15 NOTE — TELEPHONE ENCOUNTER
Contacted pt re: results of Wound Care clinic appt. Pt stating she was advised to contact her podiatrist in regards to her \"foot fungus. \" Reports that while waiting for podiatry appt she was advised to soak her feet in epsom salts every evening, dry the

## 2020-10-16 ENCOUNTER — APPOINTMENT (OUTPATIENT)
Dept: OCCUPATIONAL MEDICINE | Facility: HOSPITAL | Age: 75
End: 2020-10-16
Attending: FAMILY MEDICINE
Payer: MEDICARE

## 2020-10-19 ENCOUNTER — APPOINTMENT (OUTPATIENT)
Dept: OCCUPATIONAL MEDICINE | Facility: HOSPITAL | Age: 75
End: 2020-10-19
Attending: FAMILY MEDICINE
Payer: MEDICARE

## 2020-10-19 ENCOUNTER — OFFICE VISIT (OUTPATIENT)
Dept: PODIATRY CLINIC | Facility: CLINIC | Age: 75
End: 2020-10-19
Payer: MEDICARE

## 2020-10-19 DIAGNOSIS — E11.42 DIABETIC POLYNEUROPATHY ASSOCIATED WITH TYPE 2 DIABETES MELLITUS (HCC): Primary | ICD-10-CM

## 2020-10-19 DIAGNOSIS — M20.5X2 HALLUX LIMITUS OF LEFT FOOT: ICD-10-CM

## 2020-10-19 DIAGNOSIS — B35.1 ONYCHOMYCOSIS: ICD-10-CM

## 2020-10-19 DIAGNOSIS — L30.9 DERMATITIS: ICD-10-CM

## 2020-10-19 DIAGNOSIS — I89.0 LYMPHEDEMA: ICD-10-CM

## 2020-10-19 PROCEDURE — 99213 OFFICE O/P EST LOW 20 MIN: CPT | Performed by: PODIATRIST

## 2020-10-19 NOTE — PROGRESS NOTES
Maia Francisco is a 76year old female. Patient presents with:  Diabetic Foot Care: BS this  - FU from wound center - fungi feet. HPI:     Patient presents today for diabetic foot care.   She has been followed at the wound care center has th problem     DISH   • C. difficile diarrhea    • CANCER     uterine   • Cancer (HCC)     Uterine CA   • DIABETES    • Diabetes (HCC)    • DISH (diffuse idiopathic skeletal hyperostosis)    • Disorder of thyroid    • Diverticulosis of large intestine    • Hi quittin.4      Smokeless tobacco: Never Used    Substance and Sexual Activity      Alcohol use: Not Currently      Drug use: No    Other Topics      Concerns:          REVIEW OF SYSTEMS:   Review of Systems    Today reviewed systems as documented toney bothered by nails that are thick, dystrophic with accumulation of subungual debris, causing impaction, and pain with ambulation. Today, those nails were debrided back to as healthy tissue as normal, being reduced by both manual and mechanical means.   We wi

## 2020-10-21 ENCOUNTER — APPOINTMENT (OUTPATIENT)
Dept: OCCUPATIONAL MEDICINE | Facility: HOSPITAL | Age: 75
End: 2020-10-21
Attending: FAMILY MEDICINE
Payer: MEDICARE

## 2020-10-22 ENCOUNTER — TELEPHONE (OUTPATIENT)
Dept: OCCUPATIONAL MEDICINE | Facility: HOSPITAL | Age: 75
End: 2020-10-22

## 2020-10-22 NOTE — TELEPHONE ENCOUNTER
Contacted pt re: results of Podiatry appt. Pt reports she saw podiatrist on 10/19. Was advised that she will need to see a dermatologist re: the dorsal surface of her feet.  Unable to get an appt until 11/23; is on wait list to be called if someone cancels

## 2020-10-23 ENCOUNTER — APPOINTMENT (OUTPATIENT)
Dept: OCCUPATIONAL MEDICINE | Facility: HOSPITAL | Age: 75
End: 2020-10-23
Attending: FAMILY MEDICINE
Payer: MEDICARE

## 2020-10-26 ENCOUNTER — APPOINTMENT (OUTPATIENT)
Dept: OCCUPATIONAL MEDICINE | Facility: HOSPITAL | Age: 75
End: 2020-10-26
Attending: FAMILY MEDICINE
Payer: MEDICARE

## 2020-10-28 ENCOUNTER — APPOINTMENT (OUTPATIENT)
Dept: OCCUPATIONAL MEDICINE | Facility: HOSPITAL | Age: 75
End: 2020-10-28
Attending: FAMILY MEDICINE
Payer: MEDICARE

## 2020-10-30 ENCOUNTER — APPOINTMENT (OUTPATIENT)
Dept: OCCUPATIONAL MEDICINE | Facility: HOSPITAL | Age: 75
End: 2020-10-30
Attending: FAMILY MEDICINE
Payer: MEDICARE

## 2020-11-30 ENCOUNTER — OFFICE VISIT (OUTPATIENT)
Dept: PODIATRY CLINIC | Facility: CLINIC | Age: 75
End: 2020-11-30
Payer: MEDICARE

## 2020-11-30 DIAGNOSIS — M15.3 OTHER SECONDARY OSTEOARTHRITIS OF MULTIPLE SITES: ICD-10-CM

## 2020-11-30 DIAGNOSIS — M20.41 HAMMER TOES OF BOTH FEET: ICD-10-CM

## 2020-11-30 DIAGNOSIS — M20.5X2 HALLUX LIMITUS OF LEFT FOOT: ICD-10-CM

## 2020-11-30 DIAGNOSIS — L30.9 DERMATITIS: ICD-10-CM

## 2020-11-30 DIAGNOSIS — I89.0 LYMPHEDEMA: ICD-10-CM

## 2020-11-30 DIAGNOSIS — E11.42 DIABETIC POLYNEUROPATHY ASSOCIATED WITH TYPE 2 DIABETES MELLITUS (HCC): Primary | ICD-10-CM

## 2020-11-30 DIAGNOSIS — M20.42 HAMMER TOES OF BOTH FEET: ICD-10-CM

## 2020-11-30 DIAGNOSIS — B35.1 ONYCHOMYCOSIS: ICD-10-CM

## 2020-11-30 PROCEDURE — 99213 OFFICE O/P EST LOW 20 MIN: CPT | Performed by: PODIATRIST

## 2020-11-30 RX ORDER — CIPROFLOXACIN 250 MG/1
TABLET, FILM COATED ORAL
COMMUNITY
Start: 2020-07-13 | End: 2020-12-01 | Stop reason: ALTCHOICE

## 2020-11-30 NOTE — PROGRESS NOTES
Katlin Pineda is a 76year old female. Patient presents with:  Diabetic Foot Care: Last a1c 6/19/2020: 9.2 LOV w/ PCP 2/24/20 - BS this  - nail trimming - heel pain scale 8/10.         HPI:     Patient presents today states that she has seen a glenny Take 20 mg by mouth nightly. • Febuxostat 80 MG Oral Tab Take 80 mg by mouth daily. • Cyanocobalamin ([facility] VITAMIN B-12 OR) Take 1 tablet by mouth daily.        • Levothyroxine Sodium 150 MCG Oral Tab Take 150 mcg by mouth before breakfast. Family History   Problem Relation Age of Onset   • Cancer Mother         breast and bladder      Social History    Socioeconomic History      Marital status:       Spouse name: Not on file      Number of children: Not on file      Years of educ foot    Lymphedema    Dermatitis    Onychomycosis    Hammer toes of both feet    Other secondary osteoarthritis of multiple sites        Plan: All keratotic areas were debrided we will await input from her dermatologist regarding her rash on her feet. Maynor

## 2020-12-01 PROBLEM — E78.00 PURE HYPERCHOLESTEROLEMIA: Status: ACTIVE | Noted: 2018-10-08

## 2020-12-01 PROBLEM — I48.0 AF (PAROXYSMAL ATRIAL FIBRILLATION) (HCC): Status: ACTIVE | Noted: 2017-08-30

## 2020-12-01 PROBLEM — Z79.01 LONG TERM (CURRENT) USE OF ANTICOAGULANTS: Status: ACTIVE | Noted: 2018-09-29

## 2020-12-01 PROBLEM — I65.23 ASYMPTOMATIC CAROTID ARTERY STENOSIS, BILATERAL: Status: ACTIVE | Noted: 2017-12-11

## 2020-12-01 PROBLEM — R60.1 GENERALIZED EDEMA: Status: ACTIVE | Noted: 2017-12-11

## 2020-12-01 PROBLEM — K94.03 COLOSTOMY STENOSIS (HCC): Status: ACTIVE | Noted: 2019-08-28

## 2020-12-01 PROBLEM — I27.20 PULMONARY HYPERTENSION (HCC): Status: ACTIVE | Noted: 2020-04-06

## 2020-12-01 PROBLEM — K62.5 RECTAL BLEEDING: Status: ACTIVE | Noted: 2019-09-26

## 2020-12-21 ENCOUNTER — HOSPITAL ENCOUNTER (INPATIENT)
Facility: HOSPITAL | Age: 75
LOS: 5 days | Discharge: NURSING, REHAB OR LONG-TERM CARE | DRG: 603 | End: 2020-12-26
Attending: EMERGENCY MEDICINE | Admitting: INTERNAL MEDICINE
Payer: MEDICARE

## 2020-12-21 ENCOUNTER — APPOINTMENT (OUTPATIENT)
Dept: GENERAL RADIOLOGY | Facility: HOSPITAL | Age: 75
DRG: 603 | End: 2020-12-21
Attending: EMERGENCY MEDICINE
Payer: MEDICARE

## 2020-12-21 ENCOUNTER — APPOINTMENT (OUTPATIENT)
Dept: ULTRASOUND IMAGING | Facility: HOSPITAL | Age: 75
DRG: 603 | End: 2020-12-21
Attending: EMERGENCY MEDICINE
Payer: MEDICARE

## 2020-12-21 ENCOUNTER — APPOINTMENT (OUTPATIENT)
Dept: CT IMAGING | Facility: HOSPITAL | Age: 75
DRG: 603 | End: 2020-12-21
Attending: INTERNAL MEDICINE
Payer: MEDICARE

## 2020-12-21 DIAGNOSIS — L03.116 LEFT LEG CELLULITIS: Primary | ICD-10-CM

## 2020-12-21 DIAGNOSIS — N18.9 ACUTE ON CHRONIC RENAL INSUFFICIENCY: ICD-10-CM

## 2020-12-21 DIAGNOSIS — N28.9 ACUTE ON CHRONIC RENAL INSUFFICIENCY: ICD-10-CM

## 2020-12-21 LAB
ALBUMIN SERPL-MCNC: 2.4 G/DL (ref 3.4–5)
ALBUMIN/GLOB SERPL: 0.4 {RATIO} (ref 1–2)
ALP LIVER SERPL-CCNC: 113 U/L
ALT SERPL-CCNC: 15 U/L
ANION GAP SERPL CALC-SCNC: 9 MMOL/L (ref 0–18)
AST SERPL-CCNC: 11 U/L (ref 15–37)
BASOPHILS # BLD AUTO: 0.01 X10(3) UL (ref 0–0.2)
BASOPHILS NFR BLD AUTO: 0.1 %
BILIRUB SERPL-MCNC: 0.9 MG/DL (ref 0.1–2)
BUN BLD-MCNC: 76 MG/DL (ref 7–18)
BUN/CREAT SERPL: 25.9 (ref 10–20)
CALCIUM BLD-MCNC: 9.2 MG/DL (ref 8.5–10.1)
CHLORIDE SERPL-SCNC: 101 MMOL/L (ref 98–112)
CO2 SERPL-SCNC: 22 MMOL/L (ref 21–32)
CREAT BLD-MCNC: 2.94 MG/DL
CRP SERPL-MCNC: 35 MG/DL (ref ?–0.3)
DEPRECATED RDW RBC AUTO: 47.3 FL (ref 35.1–46.3)
EOSINOPHIL # BLD AUTO: 0.03 X10(3) UL (ref 0–0.7)
EOSINOPHIL NFR BLD AUTO: 0.3 %
ERYTHROCYTE [DISTWIDTH] IN BLOOD BY AUTOMATED COUNT: 14 % (ref 11–15)
EST. AVERAGE GLUCOSE BLD GHB EST-MCNC: 180 MG/DL (ref 68–126)
GLOBULIN PLAS-MCNC: 5.5 G/DL (ref 2.8–4.4)
GLUCOSE BLD-MCNC: 159 MG/DL (ref 70–99)
GLUCOSE BLD-MCNC: 195 MG/DL (ref 70–99)
GLUCOSE BLD-MCNC: 206 MG/DL (ref 70–99)
HBA1C MFR BLD HPLC: 7.9 % (ref ?–5.7)
HCT VFR BLD AUTO: 41 %
HGB BLD-MCNC: 13 G/DL
IMM GRANULOCYTES # BLD AUTO: 0.11 X10(3) UL (ref 0–1)
IMM GRANULOCYTES NFR BLD: 1.3 %
LYMPHOCYTES # BLD AUTO: 0.41 X10(3) UL (ref 1–4)
LYMPHOCYTES NFR BLD AUTO: 4.7 %
M PROTEIN MFR SERPL ELPH: 7.9 G/DL (ref 6.4–8.2)
MCH RBC QN AUTO: 29.2 PG (ref 26–34)
MCHC RBC AUTO-ENTMCNC: 31.7 G/DL (ref 31–37)
MCV RBC AUTO: 92.1 FL
MONOCYTES # BLD AUTO: 0.43 X10(3) UL (ref 0.1–1)
MONOCYTES NFR BLD AUTO: 5 %
NEUTROPHILS # BLD AUTO: 7.66 X10 (3) UL (ref 1.5–7.7)
NEUTROPHILS # BLD AUTO: 7.66 X10(3) UL (ref 1.5–7.7)
NEUTROPHILS NFR BLD AUTO: 88.6 %
OSMOLALITY SERPL CALC.SUM OF ELEC: 303 MOSM/KG (ref 275–295)
PLATELET # BLD AUTO: 189 10(3)UL (ref 150–450)
POTASSIUM SERPL-SCNC: 4.8 MMOL/L (ref 3.5–5.1)
RBC # BLD AUTO: 4.45 X10(6)UL
SARS-COV-2 RNA RESP QL NAA+PROBE: NOT DETECTED
SED RATE-ML: 66 MM/HR
SODIUM SERPL-SCNC: 132 MMOL/L (ref 136–145)
WBC # BLD AUTO: 8.7 X10(3) UL (ref 4–11)

## 2020-12-21 PROCEDURE — 73610 X-RAY EXAM OF ANKLE: CPT | Performed by: EMERGENCY MEDICINE

## 2020-12-21 PROCEDURE — 93971 EXTREMITY STUDY: CPT | Performed by: EMERGENCY MEDICINE

## 2020-12-21 PROCEDURE — 73590 X-RAY EXAM OF LOWER LEG: CPT | Performed by: EMERGENCY MEDICINE

## 2020-12-21 PROCEDURE — 70450 CT HEAD/BRAIN W/O DYE: CPT | Performed by: INTERNAL MEDICINE

## 2020-12-21 PROCEDURE — 99223 1ST HOSP IP/OBS HIGH 75: CPT | Performed by: INTERNAL MEDICINE

## 2020-12-21 RX ORDER — COVID-19 ANTIGEN TEST
220 KIT MISCELLANEOUS DAILY PRN
Status: ON HOLD | COMMUNITY
End: 2021-01-13

## 2020-12-21 RX ORDER — HYDROCODONE BITARTRATE AND ACETAMINOPHEN 5; 325 MG/1; MG/1
2 TABLET ORAL EVERY 4 HOURS PRN
Status: DISCONTINUED | OUTPATIENT
Start: 2020-12-21 | End: 2020-12-26

## 2020-12-21 RX ORDER — MORPHINE SULFATE 4 MG/ML
4 INJECTION, SOLUTION INTRAMUSCULAR; INTRAVENOUS ONCE
Status: COMPLETED | OUTPATIENT
Start: 2020-12-21 | End: 2020-12-21

## 2020-12-21 RX ORDER — CEFAZOLIN SODIUM/WATER 2 G/20 ML
2 SYRINGE (ML) INTRAVENOUS EVERY 12 HOURS
Status: DISCONTINUED | OUTPATIENT
Start: 2020-12-22 | End: 2020-12-22

## 2020-12-21 RX ORDER — SODIUM CHLORIDE 9 MG/ML
INJECTION, SOLUTION INTRAVENOUS CONTINUOUS
Status: ACTIVE | OUTPATIENT
Start: 2020-12-21 | End: 2020-12-21

## 2020-12-21 RX ORDER — VANCOMYCIN HYDROCHLORIDE 125 MG/1
125 CAPSULE ORAL DAILY
Status: ON HOLD | COMMUNITY
Start: 2020-12-01 | End: 2020-12-26

## 2020-12-21 RX ORDER — ACETAMINOPHEN 325 MG/1
650 TABLET ORAL EVERY 4 HOURS PRN
Status: DISCONTINUED | OUTPATIENT
Start: 2020-12-21 | End: 2020-12-26

## 2020-12-21 RX ORDER — ONDANSETRON 2 MG/ML
4 INJECTION INTRAMUSCULAR; INTRAVENOUS EVERY 6 HOURS PRN
Status: DISCONTINUED | OUTPATIENT
Start: 2020-12-21 | End: 2020-12-26

## 2020-12-21 RX ORDER — MELATONIN
3 NIGHTLY PRN
Status: DISCONTINUED | OUTPATIENT
Start: 2020-12-21 | End: 2020-12-26

## 2020-12-21 RX ORDER — CEFAZOLIN SODIUM/WATER 2 G/20 ML
2 SYRINGE (ML) INTRAVENOUS ONCE
Status: COMPLETED | OUTPATIENT
Start: 2020-12-21 | End: 2020-12-21

## 2020-12-21 RX ORDER — SODIUM CHLORIDE 9 MG/ML
INJECTION, SOLUTION INTRAVENOUS CONTINUOUS
Status: DISCONTINUED | OUTPATIENT
Start: 2020-12-21 | End: 2020-12-23

## 2020-12-21 RX ORDER — VANCOMYCIN HYDROCHLORIDE 125 MG/1
125 CAPSULE ORAL DAILY
Status: DISCONTINUED | OUTPATIENT
Start: 2020-12-21 | End: 2020-12-21

## 2020-12-21 RX ORDER — VANCOMYCIN HYDROCHLORIDE 125 MG/1
125 CAPSULE ORAL DAILY
Status: DISCONTINUED | OUTPATIENT
Start: 2020-12-21 | End: 2020-12-22

## 2020-12-21 RX ORDER — DEXTROSE MONOHYDRATE 25 G/50ML
50 INJECTION, SOLUTION INTRAVENOUS
Status: DISCONTINUED | OUTPATIENT
Start: 2020-12-21 | End: 2020-12-26

## 2020-12-21 RX ORDER — PRAVASTATIN SODIUM 20 MG
20 TABLET ORAL NIGHTLY
Status: DISCONTINUED | OUTPATIENT
Start: 2020-12-21 | End: 2020-12-26

## 2020-12-21 RX ORDER — METOCLOPRAMIDE HYDROCHLORIDE 5 MG/ML
5 INJECTION INTRAMUSCULAR; INTRAVENOUS EVERY 8 HOURS PRN
Status: DISCONTINUED | OUTPATIENT
Start: 2020-12-21 | End: 2020-12-26

## 2020-12-21 RX ORDER — HYDROCODONE BITARTRATE AND ACETAMINOPHEN 5; 325 MG/1; MG/1
1 TABLET ORAL EVERY 4 HOURS PRN
Status: DISCONTINUED | OUTPATIENT
Start: 2020-12-21 | End: 2020-12-26

## 2020-12-21 RX ORDER — LEVOTHYROXINE SODIUM 0.15 MG/1
150 TABLET ORAL
Status: DISCONTINUED | OUTPATIENT
Start: 2020-12-21 | End: 2020-12-26

## 2020-12-21 RX ORDER — METOCLOPRAMIDE HYDROCHLORIDE 5 MG/ML
10 INJECTION INTRAMUSCULAR; INTRAVENOUS EVERY 8 HOURS PRN
Status: DISCONTINUED | OUTPATIENT
Start: 2020-12-21 | End: 2020-12-21

## 2020-12-21 RX ORDER — ONDANSETRON 2 MG/ML
4 INJECTION INTRAMUSCULAR; INTRAVENOUS ONCE
Status: COMPLETED | OUTPATIENT
Start: 2020-12-21 | End: 2020-12-21

## 2020-12-21 RX ORDER — CEFAZOLIN SODIUM/WATER 2 G/20 ML
2 SYRINGE (ML) INTRAVENOUS EVERY 8 HOURS
Status: DISCONTINUED | OUTPATIENT
Start: 2020-12-21 | End: 2020-12-21

## 2020-12-21 RX ORDER — SODIUM CHLORIDE 9 MG/ML
1000 INJECTION, SOLUTION INTRAVENOUS ONCE
Status: COMPLETED | OUTPATIENT
Start: 2020-12-21 | End: 2020-12-21

## 2020-12-21 RX ORDER — ACETAMINOPHEN 500 MG
500 TABLET ORAL EVERY 6 HOURS PRN
Status: DISCONTINUED | OUTPATIENT
Start: 2020-12-21 | End: 2020-12-21

## 2020-12-21 RX ORDER — ESCITALOPRAM OXALATE 10 MG/1
10 TABLET ORAL DAILY
Status: DISCONTINUED | OUTPATIENT
Start: 2020-12-21 | End: 2020-12-26

## 2020-12-21 RX ORDER — HEPARIN SODIUM 5000 [USP'U]/ML
7500 INJECTION, SOLUTION INTRAVENOUS; SUBCUTANEOUS EVERY 8 HOURS SCHEDULED
Status: DISCONTINUED | OUTPATIENT
Start: 2020-12-21 | End: 2020-12-26

## 2020-12-21 RX ORDER — METOPROLOL SUCCINATE 50 MG/1
50 TABLET, EXTENDED RELEASE ORAL
Status: DISCONTINUED | OUTPATIENT
Start: 2020-12-22 | End: 2020-12-26

## 2020-12-21 NOTE — CONSULTS
[address] [facility]  TEL: [phone]  FAX: [phone]    Marlee Bragg Patient Status:  Inpatient    1945 MRN [id number]   [facility] 4NW-A Attending Susie Justice DO   Hosp Day # 0 PCP Tyrone Velez • CORRECT BUNION,OTHR METHODS     • CYSTOURETHROSCOPY  3-1-11    cysto flow US, dr Jennifer Mena   • EYE SURGERY      eye   • FOOT SURGERY      foot   • HERNIA SURGERY     • HYSTERECTOMY      CAROL 2007   • OTHER SURGICAL HISTORY  5/3/17    cystoscopy Dr. Rishabh Stephenson   • OT dextrose 50 % injection 50 mL, 50 mL, Intravenous, Q15 Min PRN **OR** glucose (DEX4) oral liquid 30 g, 30 g, Oral, Q15 Min PRN **OR** Glucose-Vitamin C (DEX-4) chewable tab 8 tablet, 8 tablet, Oral, Q15 Min PRN  •  0.9% NaCl infusion, , Intravenous, Contin the thigh region. Really tender over the pretibial area.   No necrotizing soft tissue process noted    Laboratory Data:  Lab Results   Component Value Date    WBC 8.7 12/21/2020    HGB 13.0 12/21/2020    HCT 41.0 12/21/2020    .0 12/21/2020    CREAT factor was a fall. No fracture. #History of endometrial cancer with lymph node dissection. Results of edema  History of C. difficile. On prophylactic oral vancomycin outpatient setting. Had considered fecal transplant. PLAN:  Continue Ancef.   We will

## 2020-12-21 NOTE — PROGRESS NOTES
[facility] Pharmacy Note:  Renal Adjustment for cefazolin (ANCEF)    Jim Funes is a 76year old patient who has been prescribed cefazolin (ANCEF) 2 g every 8 hrs. The estimated creatinine clearance is 15.5 mL/min (A) (based on SCr of 2.94 mg/dL (H)).  so

## 2020-12-21 NOTE — PROGRESS NOTES
[facility] Pharmacy Note: Antimicrobial Weight Based Dose Adjustment for: cefazolin (ANCEF)    Haris López is a 76year old patient who has been prescribed cefazolin (ANCEF) 1 g every 8 hours.       Estimated Creatinine Clearance: 15.5 mL/min (A) (based on S

## 2020-12-21 NOTE — PROGRESS NOTES
[facility] Pharmacy Note:  Anticoagulation Weight Dose Adjustment for heparin    Yeni Hernandez is a 76year old patient who has been prescribed heparin 5,000 units every 8 hours.       Estimated Creatinine Clearance: 15.5 mL/min (A) (based on SCr of 2.94 mg/dL

## 2020-12-21 NOTE — ED PROVIDER NOTES
Patient Seen in: [facility] Emergency Department      History   Patient presents with:  Leg or Foot Injury    Stated Complaint: lower leg swelling     HPI    Patient is a 66-year-old female who presents with diffuse left lower extremity swelling, re soft.   Musculoskeletal: Normal range of motion. Comments: Diffuse erythema with increased warmth from about the middle of the left shin distally covering the entire ankle and dorsum of the foot. No discrete areas of fluctuance or induration.   There HISTORY: (As transcribed by Technologist)  Patient has left leg weeping, redness, and swelling for 3 days. FINDINGS:  No evidence of acute displaced fracture or dislocation. Normal mineralization. Old fracture deformity of the distal left fibula.   Ab Left lower leg swelling  TECHNIQUE:  Real time, grey scale, and duplex ultrasound was used to evaluate the lower extremity venous system. B-mode two-dimensional images of the vascular structures, Doppler spectral analysis, and color flow.   Doppler imaging her gently. Venous Doppler is little limited as the patient refused it distally due to pain but nothing visualized otherwise. X-rays are unremarkable. She will need to be admitted, discussed with her primary care physician Dr. Blanca Mane.   He requested at h

## 2020-12-21 NOTE — H&P
[facility]  History and Physical     [facility]    1945 MRN [id number]   [facility] 4NW-A Attending Chely Waterman, DO   Hosp Day # 0 PCP Della Pringle MD     Chief Complaint: Lower leg sw Sleep apnea    • Small bowel obstruction ([employer] [facility] [address] 2017        Past Surgical History:   Past Surgical History:   Procedure Laterality Date   •      • CHOLECYSTECTOMY  [address] [place] EDW   • COLON RESECTION LEFT N/A 2017    Performed by Katerin skin daily before lunch., Disp: , Rfl:     •  Insulin Aspart Pen 100 UNIT/ML Subcutaneous Solution Pen-injector, Inject 10 Units into the skin every evening., Disp: , Rfl:     •  Metoprolol Succinate ER 50 MG Oral Tablet 24 Hr, Take 50 mg by mouth 2 (two) neurological deficits. CNII-XII grossly intact. Musculoskeletal: Moves all extremities. Extremities: ++ LE pitting edema b/l, LLE is circumferentially erythematous and tender, RLE less so but 2nd toe is swollen  Integument: No rashes or lesions.    Psychi

## 2020-12-22 LAB
ANION GAP SERPL CALC-SCNC: 5 MMOL/L (ref 0–18)
BASOPHILS # BLD AUTO: 0.01 X10(3) UL (ref 0–0.2)
BASOPHILS NFR BLD AUTO: 0.2 %
BILIRUB UR QL STRIP.AUTO: NEGATIVE
BUN BLD-MCNC: 66 MG/DL (ref 7–18)
BUN/CREAT SERPL: 27.8 (ref 10–20)
CALCIUM BLD-MCNC: 8.6 MG/DL (ref 8.5–10.1)
CHLORIDE SERPL-SCNC: 108 MMOL/L (ref 98–112)
CO2 SERPL-SCNC: 24 MMOL/L (ref 21–32)
CREAT BLD-MCNC: 2.37 MG/DL
DEPRECATED RDW RBC AUTO: 49.1 FL (ref 35.1–46.3)
EOSINOPHIL # BLD AUTO: 0.04 X10(3) UL (ref 0–0.7)
EOSINOPHIL NFR BLD AUTO: 0.7 %
ERYTHROCYTE [DISTWIDTH] IN BLOOD BY AUTOMATED COUNT: 14 % (ref 11–15)
GLUCOSE BLD-MCNC: 119 MG/DL (ref 70–99)
GLUCOSE BLD-MCNC: 125 MG/DL (ref 70–99)
GLUCOSE BLD-MCNC: 126 MG/DL (ref 70–99)
GLUCOSE BLD-MCNC: 167 MG/DL (ref 70–99)
GLUCOSE BLD-MCNC: 47 MG/DL (ref 70–99)
GLUCOSE BLD-MCNC: 49 MG/DL (ref 70–99)
GLUCOSE BLD-MCNC: 68 MG/DL (ref 70–99)
GLUCOSE BLD-MCNC: 94 MG/DL (ref 70–99)
GLUCOSE UR STRIP.AUTO-MCNC: NEGATIVE MG/DL
HCT VFR BLD AUTO: 35.9 %
HGB BLD-MCNC: 10.8 G/DL
IMM GRANULOCYTES # BLD AUTO: 0.04 X10(3) UL (ref 0–1)
IMM GRANULOCYTES NFR BLD: 0.7 %
KETONES UR STRIP.AUTO-MCNC: NEGATIVE MG/DL
LYMPHOCYTES # BLD AUTO: 0.27 X10(3) UL (ref 1–4)
LYMPHOCYTES NFR BLD AUTO: 4.6 %
MCH RBC QN AUTO: 28.7 PG (ref 26–34)
MCHC RBC AUTO-ENTMCNC: 30.1 G/DL (ref 31–37)
MCV RBC AUTO: 95.5 FL
MONOCYTES # BLD AUTO: 0.32 X10(3) UL (ref 0.1–1)
MONOCYTES NFR BLD AUTO: 5.4 %
NEUTROPHILS # BLD AUTO: 5.25 X10 (3) UL (ref 1.5–7.7)
NEUTROPHILS # BLD AUTO: 5.25 X10(3) UL (ref 1.5–7.7)
NEUTROPHILS NFR BLD AUTO: 88.4 %
NITRITE UR QL STRIP.AUTO: NEGATIVE
OSMOLALITY SERPL CALC.SUM OF ELEC: 304 MOSM/KG (ref 275–295)
PH UR STRIP.AUTO: 6 [PH] (ref 4.5–8)
PLATELET # BLD AUTO: 165 10(3)UL (ref 150–450)
POTASSIUM SERPL-SCNC: 4.3 MMOL/L (ref 3.5–5.1)
PROT UR STRIP.AUTO-MCNC: 100 MG/DL
RBC # BLD AUTO: 3.76 X10(6)UL
RBC #/AREA URNS AUTO: >10 /HPF
SODIUM SERPL-SCNC: 137 MMOL/L (ref 136–145)
SP GR UR STRIP.AUTO: 1.02 (ref 1–1.03)
UROBILINOGEN UR STRIP.AUTO-MCNC: <2 MG/DL
WBC # BLD AUTO: 5.9 X10(3) UL (ref 4–11)
WBC #/AREA URNS AUTO: >50 /HPF
WBC CLUMPS UR QL AUTO: PRESENT

## 2020-12-22 PROCEDURE — 99232 SBSQ HOSP IP/OBS MODERATE 35: CPT | Performed by: INTERNAL MEDICINE

## 2020-12-22 RX ORDER — VANCOMYCIN HYDROCHLORIDE 125 MG/1
125 CAPSULE ORAL 2 TIMES DAILY
Status: DISCONTINUED | OUTPATIENT
Start: 2020-12-22 | End: 2020-12-26

## 2020-12-22 NOTE — PROGRESS NOTES
[facility]  Progress Note     Tabitha Rosenberg Patient Status:  Inpatient    1945 MRN [id number]   [facility] 4NW-A Attending Odalis Neal, DO   Hosp Day # 1 PCP Angi Lawson MD     Chief Complaint: weakness, LE swelling data reviewed in Epic.     Medications:   • meropenem  500 mg Intravenous Q12H   • vancomycin HCl  125 mg Oral Daily   • escitalopram  10 mg Oral Daily   • Insulin Aspart Pen  8 Units Subcutaneous QAM   • Insulin Aspart Pen  8 Units Subcutaneous Daily befor

## 2020-12-22 NOTE — OCCUPATIONAL THERAPY NOTE
OCCUPATIONAL THERAPY EVALUATION - INPATIENT     Room Number: 247/244-F  Evaluation Date: 12/22/2020  Type of Evaluation: Initial  Presenting Problem: Left leg cellulitis    Physician Order: IP Consult to Occupational Therapy  Reason for Therapy: ADL/IADL D CORRECT BUNION,OTHR METHODS     • CYSTOURETHROSCOPY  3-1-11    cysto flow US, dr Carl Lin   • EYE SURGERY      eye   • FOOT SURGERY      foot   • HERNIA SURGERY     • HYSTERECTOMY      CAROL 2007   • OTHER SURGICAL HISTORY  5/3/17    cystoscopy Dr. Oli Smart   • OTHER functional limits     Upper extremity strength is within functional limits     COORDINATION  Gross Motor    impaired   Fine Motor    WFL      ADDITIONAL TESTS                                    NEUROLOGICAL FINDINGS                   ACTIVITY TOLERANCE complete tasks presented.   Supine to EOB w/ max A  EOB sitting with supervision  EOB to stand with max A w/ max encouragement, bed elevated and cueing for sequencing  EOB to chair w/ mod a of 2 and use of RW, cueing for sequencing  Discussed with nursing t detailed assessments, several treatment options    Overall Complexity MODERATE     OT Discharge Recommendations: Sub-acute rehabilitation  OT Device Recommendations: TBD    PLAN  OT Treatment Plan: Balance activities; Energy conservation/work simplification

## 2020-12-22 NOTE — PLAN OF CARE
Pt received alert and oriented. Pt is able to answer orientation questions x4 but has limited concentration in longer conversation. Pt legs are red, warm, and swollen. Pt verbalizes extreme discomfort with change in positioning but denies pain otherwise.  P

## 2020-12-22 NOTE — CONSULTS
[facility] Pharmacy Note:  Renal Adjustment for meropenem (MERREM)    Rohan Justice is a 76year old patient who has been prescribed meropenem (MERREM) 500 mg every 8 hrs.   CrCl is estimated creatinine clearance is 15.5 mL/min (A) (based on SCr of 2.94 mg/dL

## 2020-12-22 NOTE — PLAN OF CARE
Assumed care of patient at [address] [place] [address]. Patient A&Ox4, forgetful at times. Was on 2L this AM, weaned to RA. No SOB noted. NSR on telemetry. Accucheck QID. Pain medicine given prior to PT/OT. Patient in pain when rolling side-to-side and with mobility.  Up labs, urine output, blood pressure (other measures as available)  - Encourage oral intake as appropriate  - Instruct patient on fluid and nutrition restrictions as appropriate  Outcome: Progressing     Problem: SKIN/TISSUE INTEGRITY - ADULT  Goal: Skin int and ambulation  Outcome: Progressing     Problem: Impaired Activities of Daily Living  Goal: Achieve highest/safest level of independence in self care  Description: Interventions:  - Assess ability and encourage patient to participate in ADLs to maximize f

## 2020-12-22 NOTE — PROGRESS NOTES
[facility] Pharmacy Note: Antimicrobial Weight Based Dose Adjustment for: piperacillin/tazobactam (Bryanna Herbert)    Mark Simpson is a 76year old patient who has been prescribed piperacillin/tazobactam (ZOSYN) 3.375 gm every 8 hours.       Estimated Creatinine Clear

## 2020-12-22 NOTE — PHYSICAL THERAPY NOTE
PHYSICAL THERAPY EVALUATION - INPATIENT     Room Number: 117/151-X  Evaluation Date: 12/22/2020  Type of Evaluation: Initial  Physician Order: PT Eval and Treat    Presenting Problem: BLE edema, cellulitis, fall  Reason for Therapy: Mobility Dysfunct NEERAJ NORMAN METHODS     • CYSTOURETHROSCOPY  3-1-11    cysto flow US, dr Farah Face   • EYE SURGERY      eye   • FOOT SURGERY      foot   • HERNIA SURGERY     • HYSTERECTOMY      CAROL 2007   • OTHER SURGICAL HISTORY  5/3/17    cystoscopy Dr. Josette Ingramer   • [address] [place] [address] 3-/5  Right Knee flexion  3+/5  Left Knee flexion  3/5  Right Dorsiflexion  3+/5  Left Dorsiflexion  3+/5    BALANCE  Static Sitting: Fair -  Dynamic Sitting: Poor +  Static Standing: Poor +  Dynamic Standing: Poor    ADDITIONAL TESTS Provided:  Bed mobility  Functional activity tolerated  Gait training  Transfer training    Patient End of Session: Up in chair;Needs met;Call light within reach;RN aware of session/findings; All patient questions and concerns addressed; Alarm set    ASSESSM mask, eyewear. Pt was masked during session.

## 2020-12-23 LAB
ANION GAP SERPL CALC-SCNC: 5 MMOL/L (ref 0–18)
BASOPHILS # BLD AUTO: 0.01 X10(3) UL (ref 0–0.2)
BASOPHILS NFR BLD AUTO: 0.2 %
BUN BLD-MCNC: 66 MG/DL (ref 7–18)
BUN/CREAT SERPL: 33.2 (ref 10–20)
CALCIUM BLD-MCNC: 8.4 MG/DL (ref 8.5–10.1)
CHLORIDE SERPL-SCNC: 110 MMOL/L (ref 98–112)
CO2 SERPL-SCNC: 24 MMOL/L (ref 21–32)
CREAT BLD-MCNC: 1.99 MG/DL
DEPRECATED RDW RBC AUTO: 50.1 FL (ref 35.1–46.3)
EOSINOPHIL # BLD AUTO: 0.06 X10(3) UL (ref 0–0.7)
EOSINOPHIL NFR BLD AUTO: 1.2 %
ERYTHROCYTE [DISTWIDTH] IN BLOOD BY AUTOMATED COUNT: 14.1 % (ref 11–15)
GLUCOSE BLD-MCNC: 144 MG/DL (ref 70–99)
GLUCOSE BLD-MCNC: 149 MG/DL (ref 70–99)
GLUCOSE BLD-MCNC: 152 MG/DL (ref 70–99)
GLUCOSE BLD-MCNC: 156 MG/DL (ref 70–99)
GLUCOSE BLD-MCNC: 164 MG/DL (ref 70–99)
HCT VFR BLD AUTO: 33.2 %
HGB BLD-MCNC: 10 G/DL
IMM GRANULOCYTES # BLD AUTO: 0.05 X10(3) UL (ref 0–1)
IMM GRANULOCYTES NFR BLD: 1 %
LYMPHOCYTES # BLD AUTO: 0.44 X10(3) UL (ref 1–4)
LYMPHOCYTES NFR BLD AUTO: 9 %
MCH RBC QN AUTO: 29 PG (ref 26–34)
MCHC RBC AUTO-ENTMCNC: 30.1 G/DL (ref 31–37)
MCV RBC AUTO: 96.2 FL
MONOCYTES # BLD AUTO: 0.59 X10(3) UL (ref 0.1–1)
MONOCYTES NFR BLD AUTO: 12.1 %
NEUTROPHILS # BLD AUTO: 3.74 X10 (3) UL (ref 1.5–7.7)
NEUTROPHILS # BLD AUTO: 3.74 X10(3) UL (ref 1.5–7.7)
NEUTROPHILS NFR BLD AUTO: 76.5 %
OSMOLALITY SERPL CALC.SUM OF ELEC: 310 MOSM/KG (ref 275–295)
PLATELET # BLD AUTO: 161 10(3)UL (ref 150–450)
POTASSIUM SERPL-SCNC: 4.6 MMOL/L (ref 3.5–5.1)
RBC # BLD AUTO: 3.45 X10(6)UL
SODIUM SERPL-SCNC: 139 MMOL/L (ref 136–145)
WBC # BLD AUTO: 4.9 X10(3) UL (ref 4–11)

## 2020-12-23 PROCEDURE — 99232 SBSQ HOSP IP/OBS MODERATE 35: CPT | Performed by: INTERNAL MEDICINE

## 2020-12-23 RX ORDER — CEPHALEXIN 500 MG/1
500 CAPSULE ORAL 3 TIMES DAILY
Qty: 30 CAPSULE | Refills: 0 | Status: SHIPPED | OUTPATIENT
Start: 2020-12-23 | End: 2020-12-24

## 2020-12-23 RX ORDER — CEFAZOLIN SODIUM/WATER 2 G/20 ML
2 SYRINGE (ML) INTRAVENOUS EVERY 12 HOURS
Status: DISCONTINUED | OUTPATIENT
Start: 2020-12-23 | End: 2020-12-23

## 2020-12-23 NOTE — PROGRESS NOTES
[facility] Pharmacy Note:  Renal Adjustment for cefazolin (ANCEF)    Elmarie Remedies is a 76year old patient who has been prescribed cefazolin (ANCEF) 1 g every 8 hrs. CrCl is estimated creatinine clearance is 22.9 mL/min (A) (based on SCr of 1.99 mg/dL (H)).

## 2020-12-23 NOTE — PROGRESS NOTES
[facility]  Progress Note     Dean Head Patient Status:  Inpatient    1945 MRN [id number]   [facility] 4NW-A Attending Braden Mays, DO   Hosp Day # 2 PCP Caitlin Urena MD     Chief Complaint: weakness, LE swelling results for input(s): PTP, INR in the last 168 hours. No results for input(s): TROP, CK in the last 168 hours. Imaging: Imaging data reviewed in Epic.     Medications:   • [START ON 12/24/2020] insulin detemir  15 Units Subcutaneous Daily   • naresh no  Estimated date of discharge: tbd  Discharge is dependent on: progress  At this point Ms. Jenna Casillas is expected to be discharge to: [facility]    Plan of care discussed with patient    Cammie Hsu DO

## 2020-12-23 NOTE — CM/SW NOTE
12/23/20 1100   CM/SW Referral Data   Referral Source Social Work (self-referral)   Reason for Referral Discharge planning   Informant Patient   Patient Info   Patient's Mental Status Alert;Oriented   Patient's Home Environment House   Patient lives wit

## 2020-12-23 NOTE — PROGRESS NOTES
[facility]                INFECTIOUS DISEASE PROGRESS NOTE    Luz Elena Villa Patient Status:  Inpatient    1945 MRN [id number]   [facility] 4NW-A Attending Portillo Ruggiero,    Hosp Day # 2 PCP Judy Germain MD     Anti None    Collection Time: 12/22/20 12:30 PM    Specimen: Urine, clean catch   Result Value Ref Range    Urine Culture  N/A     10-50,000 cfu/ml Three or more species of Gram negative bacilli; none predominant. No further workup performed.    2. BLOOD CULTURE

## 2020-12-23 NOTE — PHYSICAL THERAPY NOTE
PHYSICAL THERAPY TREATMENT NOTE - INPATIENT    Room Number: 382/920-B     Session: 1   Number of Visits to Meet Established Goals: 5    Presenting Problem: BLE edema, cellulitis, fall     History related to current admission: Pt admittefd from home with brie eye   • FOOT SURGERY      foot   • HERNIA SURGERY     • HYSTERECTOMY      CAROL 2007   • OTHER SURGICAL HISTORY  5/3/17    cystoscopy Dr. Brock Olmos   • OTHER SURGICAL HISTORY  06/27/2017    Cysto w/ Dr. Brock Olmos   • OTHER SURGICAL HISTORY  07/26/2017    cysto Dr. Jeff Suazo STATUS  Gait Assessment   Gait Assistance:  Moderate assistance  Distance (ft): 6  Assistive Device: Rolling walker  Pattern: L Decreased stance time;R Decreased stance time          Skilled Therapy Provided: Pt performed supine to sit with min A x 2 for tr PLAN  PT Treatment Plan: Bed mobility; Endurance; Energy conservation;Patient education;Gait training;Range of motion;Transfer training  Rehab Potential : Fair  Frequency (Obs): 5x/week    CURRENT GOALS     Goal #1 Patient is able to demonstrate supine -

## 2020-12-23 NOTE — CONSULTS
[address] [facility]  TEL: [phone]  FAX: [phone]    Jenny Aguilar Patient Status:  Inpatient    1945 MRN [id number]   [facility] 4NW-A Attending Porfirio Duffy, DO   Hosp Day # 1 PCP Katherine Perez SURGERY      foot   • HERNIA SURGERY     • HYSTERECTOMY      CAROL 2007   • OTHER SURGICAL HISTORY  5/3/17    cystoscopy Dr. Tanna Pennington   • OTHER SURGICAL HISTORY  06/27/2017    Cysto w/ Dr. Tanna Pennington   • OTHER SURGICAL HISTORY  07/26/2017    cysto Dr. Pete Lopez (DEX4) oral liquid 30 g, 30 g, Oral, Q15 Min PRN **OR** Glucose-Vitamin C (DEX-4) chewable tab 8 tablet, 8 tablet, Oral, Q15 Min PRN  •  0.9% NaCl infusion, , Intravenous, Continuous  •  Heparin Sodium (Porcine) 5000 UNIT/ML injection 7,500 Units, 7,500 Un 12/22/2020    HCT 35.9 12/22/2020    .0 12/22/2020    CREATSERUM 2.37 12/22/2020    BUN 66 12/22/2020     12/22/2020    K 4.3 12/22/2020     12/22/2020    CO2 24.0 12/22/2020     12/22/2020    CA 8.6 12/22/2020    PGLU 125 12/22/2 blood. Repeat blood cultures tomorrow morning. Await UA and urinalysis with reflex to urine culture if necessary. Monitor left leg exam.  May need outpatient IV antibiotic therapy depending on identification of the gram-negative amalia and improvement.   We

## 2020-12-23 NOTE — PLAN OF CARE
Pt alert and oriented x4. Received up in a chair. Two assist with walker into bed. Pt lungs are clear bilaterally. No cough. Pt heart rate ranges from normal sinus to sinus tachy. One episode of skipped beat.  Limited range of motion in the legs due to bila

## 2020-12-24 LAB
DEPRECATED HBV CORE AB SER IA-ACNC: 337.2 NG/ML
GLUCOSE BLD-MCNC: 179 MG/DL (ref 70–99)
GLUCOSE BLD-MCNC: 185 MG/DL (ref 70–99)
GLUCOSE BLD-MCNC: 197 MG/DL (ref 70–99)
GLUCOSE BLD-MCNC: 204 MG/DL (ref 70–99)
IRON SATURATION: 16 %
IRON SERPL-MCNC: 31 UG/DL
TOTAL IRON BINDING CAPACITY: 192 UG/DL (ref 240–450)
TRANSFERRIN SERPL-MCNC: 129 MG/DL (ref 200–360)
TRANSFERRIN SERPL-MCNC: 129 MG/DL (ref 200–360)
VIT B12 SERPL-MCNC: >2000 PG/ML (ref 193–986)

## 2020-12-24 PROCEDURE — 99232 SBSQ HOSP IP/OBS MODERATE 35: CPT | Performed by: INTERNAL MEDICINE

## 2020-12-24 RX ORDER — SODIUM CHLORIDE 9 MG/ML
INJECTION, SOLUTION INTRAVENOUS CONTINUOUS
Status: DISCONTINUED | OUTPATIENT
Start: 2020-12-24 | End: 2020-12-25

## 2020-12-24 RX ORDER — LEVOFLOXACIN 750 MG/1
750 TABLET ORAL DAILY
Qty: 7 TABLET | Refills: 0 | Status: SHIPPED | OUTPATIENT
Start: 2020-12-24 | End: 2020-12-31

## 2020-12-24 NOTE — PROGRESS NOTES
[facility]  Progress Note     Mark Lesser Patient Status:  Inpatient    1945 MRN [id number]   [facility] 4NW-A Attending Mike Sims DO   Hosp Day # 3 PCP Vinita Gusman MD     Chief Complaint: weakness, LE swelling TP 7.9  --   --        Estimated Creatinine Clearance: 22.9 mL/min (A) (based on SCr of 1.99 mg/dL (H)). No results for input(s): PTP, INR in the last 168 hours. No results for input(s): TROP, CK in the last 168 hours.          Imaging: Imaging vaibhav above    Quality:  · DVT Prophylaxis: Heparin  · CODE status: Full  · Tinsley: no  · Central line: no  · If COVID testing is negative, may discontinue isolation: yes     Will the patient be referred to [facility] on discharge?: no  Estimated date of discharge: tbd

## 2020-12-24 NOTE — PLAN OF CARE
Pt alert and oriented x4. VSS. Pt reporting pain with movement of BLE. Bilateral calves red and warm to touch. LLE with blisters and slight weeping. RLE scaly. SpO2 92-93% on room air while awake. 2LO2 at bedside for when pt sleeping. IVF intiated.  GI cons

## 2020-12-24 NOTE — PLAN OF CARE
Pt alert/oriented x 4. Bilateral lower extremity edema and redness noted. LLE cellulitis, weeping. C/o BLE tenderness, LLE pain. Norco given with moderate control of pain. IV antibiotics given as scheduled.   Up with PT, sat up in chair during most of appropriate  - Instruct patient on fluid and nutrition restrictions as appropriate  Outcome: Progressing     Problem: MUSCULOSKELETAL - ADULT  Goal: Return mobility to safest level of function  Description: INTERVENTIONS:  - Assess patient stability and ac and document risk factors for pressure ulcer development  - Assess and document skin integrity  - Assess and document dressing/incision, wound bed, drain sites and surrounding tissue  - Implement wound care per orders  - Initiate isolation precautions as a

## 2020-12-24 NOTE — CONSULTS
[facility]                       Gastroenterology [address] [facility] Gastroenterology    [facility] Patient Status:  Inpatient    1945 MRN [id number]   [facility] 4NW-A Attending Edwin Luna, [address] [facility] CANCER     uterine   • Cancer (HCC)     Uterine CA   • Cataract    • DIABETES    • Diabetes (HCC)    • DISH (diffuse idiopathic skeletal hyperostosis)    • Disorder of thyroid    • Diverticulosis of large intestine    • High blood pressure    • High choles ondansetron HCl (ZOFRAN) injection 4 mg, 4 mg, Intravenous, Once    •  [COMPLETED] ceFAZolin sodium (ANCEF/KEFZOL) 2 GM/20ML premix IV syringe 2 g, 2 g, Intravenous, Once    •  [COMPLETED] 0.9% NaCl infusion, 1,000 mL, Intravenous, Once    •  [] 0.9 PRN        Allergies:   Macrobid [Nitrofura*    HIVES  Codeine                 NAUSEA ONLY    Comment:From Tylenol #3    SocHx:        Smoking status: Former Smoker        Quit date: 5/7/2003      Alcohol use: Yes        Frequency: 2-4 times a month      D rhythm, with normal S1 and S2    Resp: Clear to auscultation bilaterally without wheezes; rubs, rhonchi, or rales    Abdomen: Obese, soft, non-tender, non-distended with the presence of bowel sounds; No hepatosplenomegaly; no rebound or guarding;  No ascite per rectum and anemia. Hgb 10 from 10.8 yesterday AM with recent baseline of 12-13 however pt's Hgb 9-10 earlier this year. No further bleeding. Can plan for endoscopic evaluation once infection improves     Recommendations:    1.  Consider outpatient colon studies  -On p.o. vancomycin prophylactically given broad-spectrum antibiotics for cellulitis    Hans Guerin, 12/24/20, 2:05 [address] [place] [address] Gastroenterology  [phone]

## 2020-12-24 NOTE — PROGRESS NOTES
[facility]                INFECTIOUS DISEASE PROGRESS NOTE    Edwin Hoyos Patient Status:  Inpatient    1945 MRN [id number]   [facility] 4NW-A Attending Jesus Ruiz,    Hosp Day # 3 PCP Annabel Vazquez MD     Anti Specimen: Urine, clean catch   Result Value Ref Range    Urine Culture  N/A     10-50,000 cfu/ml Three or more species of Gram negative bacilli; none predominant. No further workup performed.    2. BLOOD CULTURE     Status: None (Preliminary result)    Petty

## 2020-12-25 LAB
ANION GAP SERPL CALC-SCNC: 1 MMOL/L (ref 0–18)
BASOPHILS # BLD AUTO: 0.02 X10(3) UL (ref 0–0.2)
BASOPHILS NFR BLD AUTO: 0.5 %
BUN BLD-MCNC: 55 MG/DL (ref 7–18)
BUN/CREAT SERPL: 35.3 (ref 10–20)
CALCIUM BLD-MCNC: 8.4 MG/DL (ref 8.5–10.1)
CHLORIDE SERPL-SCNC: 113 MMOL/L (ref 98–112)
CO2 SERPL-SCNC: 25 MMOL/L (ref 21–32)
CREAT BLD-MCNC: 1.56 MG/DL
DEPRECATED RDW RBC AUTO: 48.6 FL (ref 35.1–46.3)
EOSINOPHIL # BLD AUTO: 0.05 X10(3) UL (ref 0–0.7)
EOSINOPHIL NFR BLD AUTO: 1.3 %
ERYTHROCYTE [DISTWIDTH] IN BLOOD BY AUTOMATED COUNT: 13.9 % (ref 11–15)
GLUCOSE BLD-MCNC: 149 MG/DL (ref 70–99)
GLUCOSE BLD-MCNC: 157 MG/DL (ref 70–99)
GLUCOSE BLD-MCNC: 160 MG/DL (ref 70–99)
GLUCOSE BLD-MCNC: 170 MG/DL (ref 70–99)
GLUCOSE BLD-MCNC: 213 MG/DL (ref 70–99)
HCT VFR BLD AUTO: 30.6 %
HGB BLD-MCNC: 9.5 G/DL
IMM GRANULOCYTES # BLD AUTO: 0.15 X10(3) UL (ref 0–1)
IMM GRANULOCYTES NFR BLD: 4 %
LYMPHOCYTES # BLD AUTO: 0.51 X10(3) UL (ref 1–4)
LYMPHOCYTES NFR BLD AUTO: 13.7 %
MCH RBC QN AUTO: 29.4 PG (ref 26–34)
MCHC RBC AUTO-ENTMCNC: 31 G/DL (ref 31–37)
MCV RBC AUTO: 94.7 FL
MONOCYTES # BLD AUTO: 0.39 X10(3) UL (ref 0.1–1)
MONOCYTES NFR BLD AUTO: 10.5 %
NEUTROPHILS # BLD AUTO: 2.59 X10 (3) UL (ref 1.5–7.7)
NEUTROPHILS # BLD AUTO: 2.59 X10(3) UL (ref 1.5–7.7)
NEUTROPHILS NFR BLD AUTO: 70 %
OSMOLALITY SERPL CALC.SUM OF ELEC: 307 MOSM/KG (ref 275–295)
PLATELET # BLD AUTO: 178 10(3)UL (ref 150–450)
POTASSIUM SERPL-SCNC: 5.2 MMOL/L (ref 3.5–5.1)
RBC # BLD AUTO: 3.23 X10(6)UL
SODIUM SERPL-SCNC: 139 MMOL/L (ref 136–145)
WBC # BLD AUTO: 3.7 X10(3) UL (ref 4–11)

## 2020-12-25 PROCEDURE — 99232 SBSQ HOSP IP/OBS MODERATE 35: CPT | Performed by: INTERNAL MEDICINE

## 2020-12-25 NOTE — PLAN OF CARE
Problem: GASTROINTESTINAL - ADULT  Goal: Maintains or returns to baseline bowel function  Description: INTERVENTIONS:  - Assess bowel function  - Maintain adequate hydration with IV or PO as ordered and tolerated  - Evaluate effectiveness of GI medicatio wiping, cleaned and Repositioned.

## 2020-12-25 NOTE — PROGRESS NOTES
[address] [facility] Gastroenterology     Rashawnhenrique Villa Patient Status:  Inpatient    1945 MRN [id number]   [facility] 4NW-A Attending Portillo Ruggiero DO   Hosp Day # 4 PCP Judy Germain MD supple  CV: RRR, extremities in lower extremity are warm b/l  Resp: CTAB, no respiratory distress  Abd: Soft, non-tender, non-distended. No rebound tenderness, no guarding. Skin:  Jaundice absent. Neuro: Aox3.      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 Admitted w/ cellulitis and course c/b bacteremia. Anemia  BRBPR    1. No overt GI bleeding overnight.  Chronic ane

## 2020-12-26 VITALS
OXYGEN SATURATION: 93 % | SYSTOLIC BLOOD PRESSURE: 159 MMHG | TEMPERATURE: 98 F | RESPIRATION RATE: 18 BRPM | WEIGHT: 293 LBS | DIASTOLIC BLOOD PRESSURE: 67 MMHG | HEART RATE: 82 BPM | HEIGHT: 66 IN | BODY MASS INDEX: 47.09 KG/M2

## 2020-12-26 LAB
GLUCOSE BLD-MCNC: 128 MG/DL (ref 70–99)
GLUCOSE BLD-MCNC: 149 MG/DL (ref 70–99)
GLUCOSE BLD-MCNC: 153 MG/DL (ref 70–99)

## 2020-12-26 PROCEDURE — 99239 HOSP IP/OBS DSCHRG MGMT >30: CPT | Performed by: HOSPITALIST

## 2020-12-26 NOTE — CM/SW NOTE
12:19pm  MSW spoke to pt over the phone, pt is nervous about rehab because of Covid. Pt states she needs 2 people to help her and her  wants her to go to rehab. Pt agreeable for MSW to send referrals. MSW sent referrals to [facility]-pending as this time.

## 2020-12-26 NOTE — PLAN OF CARE
Pt alert and oriented x4. VSS. Complains of pain in BLE when moved/touched. No N/V/D. Several soft stools today. Pt stating she feels when she needs to have a BM, but can not control it. Ambulating with 2-assist and a walker. Milagro area red and sore.  Bernice

## 2020-12-26 NOTE — PROGRESS NOTES
[facility] HOSPITALIST  Progress Note     Veronicasophia Byrnebaldev Patient Status:  Inpatient    1945 MRN [id number]   [facility] 4NW-A Attending Daniela Cardoza, DO   Hosp Day # 5 PCP Garo Arzola MD     Chief Complaint: weakness, LE swelling ALKPHO 113  --   --   --    AST 11*  --   --   --    ALT 15  --   --   --    BILT 0.9  --   --   --    TP 7.9  --   --   --        Estimated Creatinine Clearance: 29.2 mL/min (A) (based on SCr of 1.56 mg/dL (H)).     No results for input(s): PTP, INR in t stool  9. HLD - Statin  10. Essential HTN  11. Hx of PAF  1. NSR  2. BB  3. Tele  12. Morbid Obesity  1.  BMI ~42    Plan of care:   Quality:  · DVT Prophylaxis: Heparin  · CODE status: Full  · Tinsley: no  · Central line: no  · If COVID testing is negative,

## 2020-12-26 NOTE — PROGRESS NOTES
[facility]                INFECTIOUS DISEASE PROGRESS NOTE    Mark Cruz Patient Status:  Inpatient    1945 MRN [id number]   [facility] 4NW-A Attending Stephanie Coker DO   Hosp Day # 5 PCP Tee Granados MD     [address] Encounter on 12/21/20   1.  URINE CULTURE, ROUTINE     Status: None    Collection Time: 12/22/20 12:30 PM    Specimen: Urine, clean catch   Result Value Ref Range    Urine Culture  N/A     10-50,000 cfu/ml Three or more speci

## 2020-12-26 NOTE — PROGRESS NOTES
Pt alert and oriented x4. Pt responds appropriately. Pt complained of pain x1. Medication given. Remains afebrile. Had one episode of incontinence, bowel and bladde. Pt changed. Legs are red, edematous and weeping bilaterally.  Pt has limited movement in th

## 2020-12-26 NOTE — PROGRESS NOTES
[address] [facility] Gastroenterology     Lena Ortez Patient Status:  Inpatient    1945 MRN [id number]   [facility] 4NW-A Attending Kim Davidson, DO   Hosp Day # 5 PCP Mary Galindo MD icterus, MMM; neck supple  CV: RRR, extremities in lower extremity are warm b/l  Resp: CTAB, no respiratory distress  Abd: Soft, non-tender, non-distended. No rebound tenderness, no guarding. Skin:  Jaundice absent. Neuro: Aox3.      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 Admitted w/ cellulitis and course c/b bacteremia. Anemia  BRBPR    1.  No overt GI bleeding o

## 2020-12-26 NOTE — PLAN OF CARE
Problem: GASTROINTESTINAL - ADULT  Goal: Maintains or returns to baseline bowel function  Description: INTERVENTIONS:  - Assess bowel function  - Maintain adequate hydration with IV or PO as ordered and tolerated  - Evaluate effectiveness of GI medicatio ADULT  Goal: Return mobility to safest level of function  Description: INTERVENTIONS:  - Assess patient stability and activity tolerance for standing, transferring and ambulating w/ or w/o assistive devices  - Assist with transfers and ambulation using saf

## 2020-12-27 NOTE — PLAN OF CARE
NURSING DISCHARGE NOTE    Discharged Rehab facility via Ambulance. Accompanied by Support staff  Belongings Taken by patient/family. Discharge instructions given to pt and report called to [facility].

## 2020-12-28 ENCOUNTER — NURSE ONLY (OUTPATIENT)
Dept: LAB | Age: 75
End: 2020-12-28
Attending: FAMILY MEDICINE
Payer: MEDICARE

## 2020-12-28 ENCOUNTER — INITIAL APN SNF VISIT (OUTPATIENT)
Dept: INTERNAL MEDICINE CLINIC | Age: 75
End: 2020-12-28

## 2020-12-28 VITALS
HEART RATE: 85 BPM | SYSTOLIC BLOOD PRESSURE: 184 MMHG | TEMPERATURE: 97 F | DIASTOLIC BLOOD PRESSURE: 84 MMHG | RESPIRATION RATE: 18 BRPM | BODY MASS INDEX: 45 KG/M2 | OXYGEN SATURATION: 97 % | WEIGHT: 280.38 LBS

## 2020-12-28 DIAGNOSIS — R53.1 WEAKNESS GENERALIZED: ICD-10-CM

## 2020-12-28 DIAGNOSIS — R53.81 PHYSICAL DECONDITIONING: ICD-10-CM

## 2020-12-28 DIAGNOSIS — J96.91 RESPIRATORY FAILURE WITH HYPOXIA, UNSPECIFIED CHRONICITY (HCC): ICD-10-CM

## 2020-12-28 DIAGNOSIS — N10 ACUTE PYELONEPHRITIS: ICD-10-CM

## 2020-12-28 DIAGNOSIS — I48.0 AF (PAROXYSMAL ATRIAL FIBRILLATION) (HCC): ICD-10-CM

## 2020-12-28 DIAGNOSIS — L03.116 CELLULITIS OF LEFT LOWER EXTREMITY: ICD-10-CM

## 2020-12-28 DIAGNOSIS — Z79.4 UNCONTROLLED TYPE 2 DIABETES MELLITUS WITH INSULIN THERAPY (HCC): Primary | ICD-10-CM

## 2020-12-28 DIAGNOSIS — E11.65 UNCONTROLLED TYPE 2 DIABETES MELLITUS WITH INSULIN THERAPY (HCC): Primary | ICD-10-CM

## 2020-12-28 DIAGNOSIS — D62 ANEMIA DUE TO ACUTE BLOOD LOSS: ICD-10-CM

## 2020-12-28 DIAGNOSIS — I10 ESSENTIAL HYPERTENSION: ICD-10-CM

## 2020-12-28 DIAGNOSIS — L03.90 CELLULITIS OF SKIN WITH LYMPHANGITIS: Primary | ICD-10-CM

## 2020-12-28 PROCEDURE — 85025 COMPLETE CBC W/AUTO DIFF WBC: CPT

## 2020-12-28 PROCEDURE — 1111F DSCHRG MED/CURRENT MED MERGE: CPT | Performed by: NURSE PRACTITIONER

## 2020-12-28 PROCEDURE — 80053 COMPREHEN METABOLIC PANEL: CPT

## 2020-12-28 PROCEDURE — 99310 SBSQ NF CARE HIGH MDM 45: CPT | Performed by: NURSE PRACTITIONER

## 2020-12-28 PROCEDURE — 83735 ASSAY OF MAGNESIUM: CPT

## 2020-12-28 PROCEDURE — 82306 VITAMIN D 25 HYDROXY: CPT

## 2020-12-28 NOTE — DISCHARGE SUMMARY
[facility] HOSPITALIST  DISCHARGE SUMMARY     Carmina Galvan Patient Status:  Inpatient    1945 MRN [id number]   [facility] 4NW-A Attending No att. providers found   Hosp Day # 5 PCP Bebe Li MD     Date of Admission:  started on antibiotics for UTI so. She had AMINATA and CKD and was placed on some IV fluids with improvement in her creatinine. She had some complaints of bright red blood per rectum but remained hemodynamically stable.   She was seen by GI who is recommendin NOVOLOG      Inject 8 Units into the skin daily before lunch. Refills: 0     Insulin Aspart Pen 100 UNIT/ML Sopn  Commonly known as: NOVOLOG      Inject 10 Units into the skin every evening.    Refills: 0     Levothyroxine Sodium 150 MCG Tabs  Commonly kn

## 2020-12-28 NOTE — PROGRESS NOTES
Laxmi Spence  : 1945  Age 76year old  female patient is admitted to Facility: The [address] [place] [address] for subacute rehab.     [facility] Admit date: 2020  Discharge date to [facility]: 2020  ELOS: 14 days  An now which she is on intermittently at 2 L. Denies dyspnea or orthopnea. Blood pressure has been high. Blood sugars appear managed. Discussed with nursing.     Past Medical History:   Diagnosis Date   • Anesthesia complication     patient states 'it took Family History   Problem Relation Age of Onset   • Cancer Mother         breast and bladder     Social History    Tobacco Use      Smoking status: Former Smoker        Packs/day: 0.50        Years: 40.00        Pack years: 21        Quit date: 5/7/2003 Oral Tab Take 150 mcg by mouth before breakfast.           VITALS:  BP (!) 184/84   Pulse 85   Temp 97.1 °F (36.2 °C)   Resp 18   Wt 280 lb 6.4 oz (127.2 kg)   SpO2 97%   BMI 45.26 kg/m²      REVIEW OF SYSTEMS:  GENERAL HEALTH:feels well otherwise  SKIN: - Bilateral lower extremities  MUSCULOSKELETAL: no acute synovitis upper or lower extremity  EXTREMITIES/VASCULAR:radial pulses 2+ and dorsalis pedal pulses 2+  NEUROLOGIC: A&OX3, no focal deficits, follows commands  PSYCHIATRIC: calm, cooperative, mood and [2]    Patient's total score:6        Hospital score=Risk level/Risk of potentially avoidable 30-day readmission:  0-4 points=Low risk/                    5.8%  5-6 points=Intermediate risk/      12.0%  7 or more points=High risk/ weekly and as needed  No fevers    AMINATA on CKD 3  Monitoring creatinine was over 2 in the hospital  1.1 today  Monitoring urine output and CMP weekly and as needed  Avoid nephrotoxic medications  Renal dose medications as needed    Anemia  Bright red blood ambulation    GI prophylaxis  None     Labs  CBC, CMP weekly and as needed  Next on Thursday    Follow Ups:  PCP within 7 days of DC  Podiatry Dr. Valery Tran [phone], March 1, 2021 at 1 PM    Please note, voice recognition software was used to create [facility]

## 2020-12-29 ENCOUNTER — EXTERNAL FACILITY (OUTPATIENT)
Dept: FAMILY MEDICINE CLINIC | Facility: CLINIC | Age: 75
End: 2020-12-29

## 2020-12-29 DIAGNOSIS — Z93.3 S/P COLOSTOMY (HCC): ICD-10-CM

## 2020-12-29 DIAGNOSIS — E11.65 UNCONTROLLED TYPE 2 DIABETES MELLITUS WITH INSULIN THERAPY (HCC): Chronic | ICD-10-CM

## 2020-12-29 DIAGNOSIS — R53.1 GENERALIZED WEAKNESS: ICD-10-CM

## 2020-12-29 DIAGNOSIS — I48.0 AF (PAROXYSMAL ATRIAL FIBRILLATION) (HCC): ICD-10-CM

## 2020-12-29 DIAGNOSIS — Z99.89 OSA ON CPAP: ICD-10-CM

## 2020-12-29 DIAGNOSIS — N18.9 ACUTE ON CHRONIC RENAL INSUFFICIENCY: ICD-10-CM

## 2020-12-29 DIAGNOSIS — Z79.4 UNCONTROLLED TYPE 2 DIABETES MELLITUS WITH INSULIN THERAPY (HCC): Chronic | ICD-10-CM

## 2020-12-29 DIAGNOSIS — G47.33 OSA ON CPAP: ICD-10-CM

## 2020-12-29 DIAGNOSIS — N28.9 ACUTE ON CHRONIC RENAL INSUFFICIENCY: ICD-10-CM

## 2020-12-29 DIAGNOSIS — E78.00 PURE HYPERCHOLESTEROLEMIA: ICD-10-CM

## 2020-12-29 DIAGNOSIS — I10 ESSENTIAL HYPERTENSION: Chronic | ICD-10-CM

## 2020-12-29 DIAGNOSIS — R60.1 GENERALIZED EDEMA: ICD-10-CM

## 2020-12-29 DIAGNOSIS — D64.9 ANEMIA, UNSPECIFIED TYPE: ICD-10-CM

## 2020-12-29 DIAGNOSIS — N18.30 STAGE 3 CHRONIC KIDNEY DISEASE, UNSPECIFIED WHETHER STAGE 3A OR 3B CKD (HCC): ICD-10-CM

## 2020-12-29 DIAGNOSIS — R53.81 PHYSICAL DECONDITIONING: ICD-10-CM

## 2020-12-29 DIAGNOSIS — M15.3 OTHER SECONDARY OSTEOARTHRITIS OF MULTIPLE SITES: ICD-10-CM

## 2020-12-29 DIAGNOSIS — Z79.01 LONG TERM (CURRENT) USE OF ANTICOAGULANTS: ICD-10-CM

## 2020-12-29 DIAGNOSIS — I27.20 PULMONARY HYPERTENSION (HCC): ICD-10-CM

## 2020-12-29 DIAGNOSIS — L03.116 LEFT LEG CELLULITIS: Primary | ICD-10-CM

## 2020-12-29 PROCEDURE — 99306 1ST NF CARE HIGH MDM 50: CPT | Performed by: FAMILY MEDICINE

## 2020-12-30 ENCOUNTER — SNF VISIT (OUTPATIENT)
Dept: INTERNAL MEDICINE CLINIC | Age: 75
End: 2020-12-30

## 2020-12-30 VITALS
TEMPERATURE: 98 F | SYSTOLIC BLOOD PRESSURE: 122 MMHG | WEIGHT: 281.69 LBS | OXYGEN SATURATION: 97 % | DIASTOLIC BLOOD PRESSURE: 60 MMHG | BODY MASS INDEX: 45 KG/M2 | RESPIRATION RATE: 18 BRPM | HEART RATE: 76 BPM

## 2020-12-30 DIAGNOSIS — J96.91 RESPIRATORY FAILURE WITH HYPOXIA, UNSPECIFIED CHRONICITY (HCC): ICD-10-CM

## 2020-12-30 DIAGNOSIS — L03.116 CELLULITIS OF LEFT LOWER EXTREMITY: Primary | ICD-10-CM

## 2020-12-30 DIAGNOSIS — D62 ANEMIA DUE TO ACUTE BLOOD LOSS: ICD-10-CM

## 2020-12-30 DIAGNOSIS — Z79.4 UNCONTROLLED TYPE 2 DIABETES MELLITUS WITH INSULIN THERAPY (HCC): ICD-10-CM

## 2020-12-30 DIAGNOSIS — I48.0 AF (PAROXYSMAL ATRIAL FIBRILLATION) (HCC): ICD-10-CM

## 2020-12-30 DIAGNOSIS — R53.1 WEAKNESS GENERALIZED: ICD-10-CM

## 2020-12-30 DIAGNOSIS — N10 ACUTE PYELONEPHRITIS: ICD-10-CM

## 2020-12-30 DIAGNOSIS — R53.81 PHYSICAL DECONDITIONING: ICD-10-CM

## 2020-12-30 DIAGNOSIS — I10 ESSENTIAL HYPERTENSION: ICD-10-CM

## 2020-12-30 DIAGNOSIS — E11.65 UNCONTROLLED TYPE 2 DIABETES MELLITUS WITH INSULIN THERAPY (HCC): ICD-10-CM

## 2020-12-30 PROBLEM — K92.2 LOWER GI BLEED: Status: RESOLVED | Noted: 2020-06-19 | Resolved: 2020-12-30

## 2020-12-30 PROBLEM — K62.5 RECTAL BLEEDING: Status: RESOLVED | Noted: 2019-09-26 | Resolved: 2020-12-30

## 2020-12-30 PROBLEM — D64.9 ANEMIA: Status: RESOLVED | Noted: 2017-08-25 | Resolved: 2020-12-30

## 2020-12-30 PROBLEM — N28.9 RENAL INSUFFICIENCY: Status: RESOLVED | Noted: 2020-06-19 | Resolved: 2020-12-30

## 2020-12-30 PROBLEM — K62.5 BRBPR (BRIGHT RED BLOOD PER RECTUM): Status: RESOLVED | Noted: 2019-10-28 | Resolved: 2020-12-30

## 2020-12-30 PROCEDURE — 99309 SBSQ NF CARE MODERATE MDM 30: CPT | Performed by: NURSE PRACTITIONER

## 2020-12-30 RX ORDER — VANCOMYCIN HYDROCHLORIDE 125 MG/1
125 CAPSULE ORAL 2 TIMES DAILY
Status: ON HOLD | COMMUNITY
End: 2021-01-13

## 2020-12-30 NOTE — PROGRESS NOTES
[address]: Michele Oglesby MD      MRN [id number]   [facility]  Admission 20      Last Hospital Discharge 20 PCP Ron Agudelo [facility] of Discharge 20       Date of Admis Inject 8 Units into the skin every morning. •  Insulin Aspart Pen 100 UNIT/ML Subcutaneous Solution Pen-injector, Inject 8 Units into the skin daily before lunch.     •  Insulin Aspart Pen 100 UNIT/ML Subcutaneous Solution Pen-injector, Inject 10 Units i surgical history (07/26/2017); other surgical history (09/07/2018); colonoscopy (N/A, 2/28/2017); colonoscopy (11/07/2019); part removal colon w end colostomy (2017); and correct bunion,othr methods. She family history includes Cancer in her mother.    Carlos Arizmendi tenderness. There is no guarding or rebound. Hernia: No hernia is present. Musculoskeletal: Normal range of motion. General: No tenderness. Lymphadenopathy:      Cervical: No cervical adenopathy. Skin:     General: Skin is warm and dry. 12:18 PM       Xr Tibia + Fibula (2 Views), Left (cpt=73590)    Result Date: 12/21/2020  PROCEDURE:  XR TIBIA + FIBULA (2 VIEWS), LEFT (CPT=73590)  TECHNIQUE:  AP and lateral views of the tibia and fibula were obtained.   COMPARISON:  EDWARD , XR, XR ANKLE collections. There is no midline shift. There are no acute intraparenchymal brain abnormalities. There is nothing specific for acute infarct. There is no hemorrhage or mass lesion. SINUSES:           No sign of acute sinusitis.   MASTOIDS:          No D, 25-HYDROXY    Collection Time: 12/28/20  7:19 AM   Result Value Ref Range    Vitamin D, 25OH, Total 34.6 30.0 - 100.0 ng/mL   MAGNESIUM    Collection Time: 12/28/20  7:19 AM   Result Value Ref Range    Magnesium 1.5 (L) 1.6 - 2.6 mg/dL   COMP METABOLIC % 0.8 %    Immature Granulocyte % 2.6 %           ASSESSMENT/ PLAN:   76year old female     Left leg cellulitis  (primary encounter diagnosis)  Uncontrolled type 2 diabetes mellitus with insulin therapy ([facility] [address]  Stage 3 chronic kidney disease, unspecified w

## 2020-12-30 NOTE — PROGRESS NOTES
Luz Elena Villa, 4/25/1945, 76year old, female    Chief Complaint:  Patient presents with:   Follow - Up: sepsis, Cellulitis, lymphedema, DM type II  Weakness       Subjective:    HPI:  Leslie Pfeiffer is a 44-year-old female with previous medical history includi EXAM:  GENERAL HEALTH: well developed, well nourished, in no apparent distress  LINES, TUBES, DRAINS:  none  SKIN: pink, warm, dry  WOUND: Under right breast fungal with open skin, bilateral lower extremities with thickening, erythema, white patches rednes TP 6.5 12/28/2020    ALB 1.7 (L) 12/28/2020    GLOBULIN 4.8 (H) 12/28/2020     12/28/2020    K 5.1 12/28/2020     12/28/2020    CO2 28.0 12/28/2020     Lab Results   Component Value Date    MG 1.5 (L) 12/28/2020       Assessment and plan:    Ch elevated now improved on lisinopril  metoprolol 50 mg p.o. twice daily pulse 60  lisinopril 10 mg p.o. daily and monitor  Pravastatin 20 mg p.o. nightly  BP every shift and as needed    Acute respiratory failure with hypoxia  Weaning oxygen as able  Daily

## 2020-12-31 ENCOUNTER — NURSE ONLY (OUTPATIENT)
Dept: LAB | Age: 75
End: 2020-12-31
Attending: FAMILY MEDICINE
Payer: MEDICARE

## 2020-12-31 DIAGNOSIS — U07.1 INFECTION DUE TO 2019-NCOV: ICD-10-CM

## 2020-12-31 DIAGNOSIS — L03.116 CELLULITIS OF LEFT FOOT: Primary | ICD-10-CM

## 2020-12-31 DIAGNOSIS — Z11.59 SCREENING EXAMINATION FOR POLIOMYELITIS: ICD-10-CM

## 2020-12-31 LAB
ALBUMIN SERPL-MCNC: 1.9 G/DL (ref 3.4–5)
ALBUMIN/GLOB SERPL: 0.5 {RATIO} (ref 1–2)
ALP LIVER SERPL-CCNC: 81 U/L
ALT SERPL-CCNC: 10 U/L
ANION GAP SERPL CALC-SCNC: <0 MMOL/L (ref 0–18)
AST SERPL-CCNC: 8 U/L (ref 15–37)
BASOPHILS # BLD AUTO: 0.03 X10(3) UL (ref 0–0.2)
BASOPHILS NFR BLD AUTO: 0.6 %
BILIRUB SERPL-MCNC: 0.2 MG/DL (ref 0.1–2)
BUN BLD-MCNC: 26 MG/DL (ref 7–18)
BUN/CREAT SERPL: 20.6 (ref 10–20)
CALCIUM BLD-MCNC: 8.4 MG/DL (ref 8.5–10.1)
CHLORIDE SERPL-SCNC: 112 MMOL/L (ref 98–112)
CO2 SERPL-SCNC: 33 MMOL/L (ref 21–32)
CREAT BLD-MCNC: 1.26 MG/DL
DEPRECATED RDW RBC AUTO: 48.7 FL (ref 35.1–46.3)
EOSINOPHIL # BLD AUTO: 0.08 X10(3) UL (ref 0–0.7)
EOSINOPHIL NFR BLD AUTO: 1.6 %
ERYTHROCYTE [DISTWIDTH] IN BLOOD BY AUTOMATED COUNT: 13.6 % (ref 11–15)
GLOBULIN PLAS-MCNC: 3.5 G/DL (ref 2.8–4.4)
GLUCOSE BLD-MCNC: 74 MG/DL (ref 70–99)
HAV IGM SER QL: 1.4 MG/DL (ref 1.6–2.6)
HCT VFR BLD AUTO: 31.8 %
HGB BLD-MCNC: 9.5 G/DL
IMM GRANULOCYTES # BLD AUTO: 0.07 X10(3) UL (ref 0–1)
IMM GRANULOCYTES NFR BLD: 1.4 %
LYMPHOCYTES # BLD AUTO: 0.71 X10(3) UL (ref 1–4)
LYMPHOCYTES NFR BLD AUTO: 14.5 %
M PROTEIN MFR SERPL ELPH: 5.4 G/DL (ref 6.4–8.2)
MCH RBC QN AUTO: 29.2 PG (ref 26–34)
MCHC RBC AUTO-ENTMCNC: 29.9 G/DL (ref 31–37)
MCV RBC AUTO: 97.8 FL
MONOCYTES # BLD AUTO: 0.38 X10(3) UL (ref 0.1–1)
MONOCYTES NFR BLD AUTO: 7.8 %
NEUTROPHILS # BLD AUTO: 3.62 X10 (3) UL (ref 1.5–7.7)
NEUTROPHILS # BLD AUTO: 3.62 X10(3) UL (ref 1.5–7.7)
NEUTROPHILS NFR BLD AUTO: 74.1 %
OSMOLALITY SERPL CALC.SUM OF ELEC: 301 MOSM/KG (ref 275–295)
PATIENT FASTING Y/N/NP: YES
PLATELET # BLD AUTO: 178 10(3)UL (ref 150–450)
POTASSIUM SERPL-SCNC: 5.5 MMOL/L (ref 3.5–5.1)
RBC # BLD AUTO: 3.25 X10(6)UL
SODIUM SERPL-SCNC: 144 MMOL/L (ref 136–145)
WBC # BLD AUTO: 4.9 X10(3) UL (ref 4–11)

## 2020-12-31 PROCEDURE — 85025 COMPLETE CBC W/AUTO DIFF WBC: CPT

## 2020-12-31 PROCEDURE — 80053 COMPREHEN METABOLIC PANEL: CPT

## 2020-12-31 PROCEDURE — 83735 ASSAY OF MAGNESIUM: CPT

## 2021-01-01 ENCOUNTER — EXTERNAL RECORD (OUTPATIENT)
Dept: HEALTH INFORMATION MANAGEMENT | Facility: OTHER | Age: 76
End: 2021-01-01

## 2021-01-01 LAB — SARS-COV-2 RNA RESP QL NAA+PROBE: NOT DETECTED

## 2021-01-04 ENCOUNTER — SNF VISIT (OUTPATIENT)
Dept: INTERNAL MEDICINE CLINIC | Age: 76
End: 2021-01-04

## 2021-01-04 ENCOUNTER — NURSE ONLY (OUTPATIENT)
Dept: LAB | Age: 76
End: 2021-01-04
Attending: NURSE PRACTITIONER
Payer: MEDICARE

## 2021-01-04 VITALS
WEIGHT: 281.69 LBS | HEART RATE: 80 BPM | DIASTOLIC BLOOD PRESSURE: 77 MMHG | RESPIRATION RATE: 18 BRPM | SYSTOLIC BLOOD PRESSURE: 184 MMHG | OXYGEN SATURATION: 95 % | TEMPERATURE: 98 F | BODY MASS INDEX: 45 KG/M2

## 2021-01-04 DIAGNOSIS — L03.116 CELLULITIS OF LEFT FOOT: Primary | ICD-10-CM

## 2021-01-04 DIAGNOSIS — Z79.4 UNCONTROLLED TYPE 2 DIABETES MELLITUS WITH INSULIN THERAPY (HCC): ICD-10-CM

## 2021-01-04 DIAGNOSIS — J96.91 RESPIRATORY FAILURE WITH HYPOXIA, UNSPECIFIED CHRONICITY (HCC): ICD-10-CM

## 2021-01-04 DIAGNOSIS — E87.5 HYPERKALEMIA: ICD-10-CM

## 2021-01-04 DIAGNOSIS — L03.116 CELLULITIS OF LEFT LOWER EXTREMITY: Primary | ICD-10-CM

## 2021-01-04 DIAGNOSIS — I10 ESSENTIAL HYPERTENSION: ICD-10-CM

## 2021-01-04 DIAGNOSIS — R53.1 WEAKNESS GENERALIZED: ICD-10-CM

## 2021-01-04 DIAGNOSIS — D62 ANEMIA DUE TO ACUTE BLOOD LOSS: ICD-10-CM

## 2021-01-04 DIAGNOSIS — E11.65 UNCONTROLLED TYPE 2 DIABETES MELLITUS WITH INSULIN THERAPY (HCC): ICD-10-CM

## 2021-01-04 DIAGNOSIS — I48.0 AF (PAROXYSMAL ATRIAL FIBRILLATION) (HCC): ICD-10-CM

## 2021-01-04 LAB
ALBUMIN SERPL-MCNC: 2.4 G/DL (ref 3.4–5)
ALBUMIN/GLOB SERPL: 0.6 {RATIO} (ref 1–2)
ALP LIVER SERPL-CCNC: 94 U/L
ALT SERPL-CCNC: 12 U/L
ANION GAP SERPL CALC-SCNC: 0 MMOL/L (ref 0–18)
AST SERPL-CCNC: 13 U/L (ref 15–37)
BASOPHILS # BLD AUTO: 0.02 X10(3) UL (ref 0–0.2)
BASOPHILS NFR BLD AUTO: 0.4 %
BILIRUB SERPL-MCNC: 0.4 MG/DL (ref 0.1–2)
BUN BLD-MCNC: 30 MG/DL (ref 7–18)
BUN/CREAT SERPL: 26.5 (ref 10–20)
CALCIUM BLD-MCNC: 8.5 MG/DL (ref 8.5–10.1)
CHLORIDE SERPL-SCNC: 107 MMOL/L (ref 98–112)
CO2 SERPL-SCNC: 35 MMOL/L (ref 21–32)
CREAT BLD-MCNC: 1.13 MG/DL
DEPRECATED RDW RBC AUTO: 49.4 FL (ref 35.1–46.3)
EOSINOPHIL # BLD AUTO: 0.05 X10(3) UL (ref 0–0.7)
EOSINOPHIL NFR BLD AUTO: 1 %
ERYTHROCYTE [DISTWIDTH] IN BLOOD BY AUTOMATED COUNT: 13.3 % (ref 11–15)
GLOBULIN PLAS-MCNC: 4 G/DL (ref 2.8–4.4)
GLUCOSE BLD-MCNC: 95 MG/DL (ref 70–99)
HCT VFR BLD AUTO: 35.8 %
HGB BLD-MCNC: 10.2 G/DL
IMM GRANULOCYTES # BLD AUTO: 0.15 X10(3) UL (ref 0–1)
IMM GRANULOCYTES NFR BLD: 3 %
LYMPHOCYTES # BLD AUTO: 0.47 X10(3) UL (ref 1–4)
LYMPHOCYTES NFR BLD AUTO: 9.5 %
M PROTEIN MFR SERPL ELPH: 6.4 G/DL (ref 6.4–8.2)
MCH RBC QN AUTO: 28.6 PG (ref 26–34)
MCHC RBC AUTO-ENTMCNC: 28.5 G/DL (ref 31–37)
MCV RBC AUTO: 100.3 FL
MONOCYTES # BLD AUTO: 0.45 X10(3) UL (ref 0.1–1)
MONOCYTES NFR BLD AUTO: 9.1 %
NEUTROPHILS # BLD AUTO: 3.8 X10 (3) UL (ref 1.5–7.7)
NEUTROPHILS # BLD AUTO: 3.8 X10(3) UL (ref 1.5–7.7)
NEUTROPHILS NFR BLD AUTO: 77 %
OSMOLALITY SERPL CALC.SUM OF ELEC: 300 MOSM/KG (ref 275–295)
PATIENT FASTING Y/N/NP: YES
PLATELET # BLD AUTO: 167 10(3)UL (ref 150–450)
POTASSIUM SERPL-SCNC: 5.7 MMOL/L (ref 3.5–5.1)
RBC # BLD AUTO: 3.57 X10(6)UL
SODIUM SERPL-SCNC: 142 MMOL/L (ref 136–145)
WBC # BLD AUTO: 4.9 X10(3) UL (ref 4–11)

## 2021-01-04 PROCEDURE — 99309 SBSQ NF CARE MODERATE MDM 30: CPT | Performed by: NURSE PRACTITIONER

## 2021-01-04 PROCEDURE — 85025 COMPLETE CBC W/AUTO DIFF WBC: CPT

## 2021-01-04 PROCEDURE — 80053 COMPREHEN METABOLIC PANEL: CPT

## 2021-01-04 RX ORDER — HYDRALAZINE HYDROCHLORIDE 25 MG/1
25 TABLET, FILM COATED ORAL 3 TIMES DAILY
Status: ON HOLD | COMMUNITY
End: 2021-01-29

## 2021-01-04 NOTE — PROGRESS NOTES
Mukesh Reyes, 4/25/1945, 76year old, female    Chief Complaint:  Patient presents with:   Follow - Up  Weakness  HTN  Potassium Problem: nausea       Subjective:    HPI:  Maricel Rodrigues is a 66-year-old female with previous medical history including type 2 di EXAM:  GENERAL HEALTH: well developed, well nourished, in no apparent distress  LINES, TUBES, DRAINS:  none  SKIN: pink, warm, dry  WOUND: Under right breast fungal with open skin,  bilateral lower extremities with thickening, erythema, white patches redne 6.4 01/04/2021    ALB 2.4 (L) 01/04/2021    GLOBULIN 4.0 01/04/2021     01/04/2021    K 5.7 (H) 01/04/2021     01/04/2021    CO2 35.0 (H) 01/04/2021       Assessment and plan:    Chronic lymphedema with bilateral acute cellulitis left greater t hypertension  BP elevated now improved on lisinopril  metoprolol 50 mg p.o. twice daily pulse 60  lisinopril 10 mg p.o. daily and monitor DC with elevated K  Hydralazine 25 mg was BID increase to TID  Pravastatin 20 mg p.o. nightly  BP every shift and as n

## 2021-01-05 ENCOUNTER — NURSE ONLY (OUTPATIENT)
Dept: LAB | Age: 76
End: 2021-01-05
Attending: FAMILY MEDICINE
Payer: MEDICARE

## 2021-01-05 DIAGNOSIS — L03.116 CELLULITIS OF LEFT FOOT: Primary | ICD-10-CM

## 2021-01-05 LAB — POTASSIUM SERPL-SCNC: 5.4 MMOL/L (ref 3.5–5.1)

## 2021-01-05 PROCEDURE — 84132 ASSAY OF SERUM POTASSIUM: CPT

## 2021-01-06 ENCOUNTER — SNF VISIT (OUTPATIENT)
Dept: INTERNAL MEDICINE CLINIC | Age: 76
End: 2021-01-06

## 2021-01-06 ENCOUNTER — NURSE ONLY (OUTPATIENT)
Dept: LAB | Age: 76
End: 2021-01-06
Attending: FAMILY MEDICINE
Payer: MEDICARE

## 2021-01-06 VITALS
SYSTOLIC BLOOD PRESSURE: 156 MMHG | DIASTOLIC BLOOD PRESSURE: 82 MMHG | OXYGEN SATURATION: 95 % | TEMPERATURE: 98 F | RESPIRATION RATE: 18 BRPM | HEART RATE: 81 BPM

## 2021-01-06 DIAGNOSIS — E87.5 HYPERKALEMIA: ICD-10-CM

## 2021-01-06 DIAGNOSIS — E11.65 UNCONTROLLED TYPE 2 DIABETES MELLITUS WITH INSULIN THERAPY (HCC): ICD-10-CM

## 2021-01-06 DIAGNOSIS — I10 ESSENTIAL HYPERTENSION: Primary | ICD-10-CM

## 2021-01-06 DIAGNOSIS — Z79.4 UNCONTROLLED TYPE 2 DIABETES MELLITUS WITH INSULIN THERAPY (HCC): ICD-10-CM

## 2021-01-06 DIAGNOSIS — I48.0 AF (PAROXYSMAL ATRIAL FIBRILLATION) (HCC): ICD-10-CM

## 2021-01-06 DIAGNOSIS — J96.91 RESPIRATORY FAILURE WITH HYPOXIA, UNSPECIFIED CHRONICITY (HCC): ICD-10-CM

## 2021-01-06 DIAGNOSIS — E87.5 HYPERKALEMIA: Primary | ICD-10-CM

## 2021-01-06 DIAGNOSIS — D62 ANEMIA DUE TO ACUTE BLOOD LOSS: ICD-10-CM

## 2021-01-06 DIAGNOSIS — R53.81 PHYSICAL DECONDITIONING: ICD-10-CM

## 2021-01-06 LAB — POTASSIUM SERPL-SCNC: 4.7 MMOL/L (ref 3.5–5.1)

## 2021-01-06 PROCEDURE — 84132 ASSAY OF SERUM POTASSIUM: CPT

## 2021-01-06 PROCEDURE — 99309 SBSQ NF CARE MODERATE MDM 30: CPT | Performed by: NURSE PRACTITIONER

## 2021-01-06 NOTE — PROGRESS NOTES
Shea Moctezuma, 4/25/1945, 76year old, female    Chief Complaint:  Patient presents with:   Follow - Up: lymphedema, DM type II, hypoxia  Lab Results  Weakness       Subjective:    HPI:  Emilee Jones is a 15-year-old female with previous medical history inclu developed, well nourished, in no apparent distress  LINES, TUBES, DRAINS:  none  SKIN: pink, warm, dry  WOUND: Under right breast fungal with open skin,  bilateral lower extremities with thickening, erythema, improved  EYES: sclera anicteric, conjunctiva n (L) 01/04/2021    GLOBULIN 4.0 01/04/2021     01/04/2021    K 4.7 01/06/2021     01/04/2021    CO2 35.0 (H) 01/04/2021       Assessment and plan:    Chronic lymphedema with bilateral acute cellulitis left greater than right  Wound care nurse fo as needed    Acute respiratory failure with hypoxia  Weaning oxygen as able  Daily weights stable   Monitoring fluid status with Pulm HTN  Chest xray mild pulmonary vascular congestion     GAYLE  CPAP with all sleep non complaint  Using O2 for sleep     Gout

## 2021-01-07 ENCOUNTER — NURSE ONLY (OUTPATIENT)
Dept: LAB | Age: 76
End: 2021-01-07
Attending: FAMILY MEDICINE
Payer: MEDICARE

## 2021-01-07 DIAGNOSIS — E56.9 VITAMIN DISEASE: ICD-10-CM

## 2021-01-07 DIAGNOSIS — Z11.59 SPECIAL SCREENING EXAMINATION FOR VIRAL DISEASE: Primary | ICD-10-CM

## 2021-01-07 DIAGNOSIS — I50.9 HEART FAILURE, UNSPECIFIED (HCC): ICD-10-CM

## 2021-01-07 DIAGNOSIS — L03.116 CELLULITIS OF LEFT FOOT: ICD-10-CM

## 2021-01-07 LAB
ALBUMIN SERPL-MCNC: 2.3 G/DL (ref 3.4–5)
ALBUMIN/GLOB SERPL: 0.6 {RATIO} (ref 1–2)
ALP LIVER SERPL-CCNC: 92 U/L
ALT SERPL-CCNC: 12 U/L
ANION GAP SERPL CALC-SCNC: 2 MMOL/L (ref 0–18)
AST SERPL-CCNC: 13 U/L (ref 15–37)
BASOPHILS # BLD AUTO: 0.03 X10(3) UL (ref 0–0.2)
BASOPHILS NFR BLD AUTO: 0.8 %
BILIRUB SERPL-MCNC: 0.4 MG/DL (ref 0.1–2)
BUN BLD-MCNC: 25 MG/DL (ref 7–18)
BUN/CREAT SERPL: 20.7 (ref 10–20)
CALCIUM BLD-MCNC: 8.3 MG/DL (ref 8.5–10.1)
CHLORIDE SERPL-SCNC: 105 MMOL/L (ref 98–112)
CO2 SERPL-SCNC: 38 MMOL/L (ref 21–32)
CREAT BLD-MCNC: 1.21 MG/DL
DEPRECATED RDW RBC AUTO: 49.8 FL (ref 35.1–46.3)
EOSINOPHIL # BLD AUTO: 0.07 X10(3) UL (ref 0–0.7)
EOSINOPHIL NFR BLD AUTO: 1.8 %
ERYTHROCYTE [DISTWIDTH] IN BLOOD BY AUTOMATED COUNT: 13.5 % (ref 11–15)
GLOBULIN PLAS-MCNC: 3.7 G/DL (ref 2.8–4.4)
GLUCOSE BLD-MCNC: 45 MG/DL (ref 70–99)
HCT VFR BLD AUTO: 33.1 %
HGB BLD-MCNC: 9.4 G/DL
IMM GRANULOCYTES # BLD AUTO: 0.03 X10(3) UL (ref 0–1)
IMM GRANULOCYTES NFR BLD: 0.8 %
LYMPHOCYTES # BLD AUTO: 0.57 X10(3) UL (ref 1–4)
LYMPHOCYTES NFR BLD AUTO: 14.4 %
M PROTEIN MFR SERPL ELPH: 6 G/DL (ref 6.4–8.2)
MCH RBC QN AUTO: 28.5 PG (ref 26–34)
MCHC RBC AUTO-ENTMCNC: 28.4 G/DL (ref 31–37)
MCV RBC AUTO: 100.3 FL
MONOCYTES # BLD AUTO: 0.48 X10(3) UL (ref 0.1–1)
MONOCYTES NFR BLD AUTO: 12.1 %
NEUTROPHILS # BLD AUTO: 2.79 X10 (3) UL (ref 1.5–7.7)
NEUTROPHILS # BLD AUTO: 2.79 X10(3) UL (ref 1.5–7.7)
NEUTROPHILS NFR BLD AUTO: 70.1 %
OSMOLALITY SERPL CALC.SUM OF ELEC: 301 MOSM/KG (ref 275–295)
PATIENT FASTING Y/N/NP: YES
PLATELET # BLD AUTO: 168 10(3)UL (ref 150–450)
POTASSIUM SERPL-SCNC: 4.6 MMOL/L (ref 3.5–5.1)
RBC # BLD AUTO: 3.3 X10(6)UL
SODIUM SERPL-SCNC: 145 MMOL/L (ref 136–145)
WBC # BLD AUTO: 4 X10(3) UL (ref 4–11)

## 2021-01-07 PROCEDURE — 80053 COMPREHEN METABOLIC PANEL: CPT

## 2021-01-07 PROCEDURE — 85025 COMPLETE CBC W/AUTO DIFF WBC: CPT

## 2021-01-08 LAB — SARS-COV-2 RNA RESP QL NAA+PROBE: NOT DETECTED

## 2021-01-11 ENCOUNTER — APPOINTMENT (OUTPATIENT)
Dept: GENERAL RADIOLOGY | Facility: HOSPITAL | Age: 76
DRG: 314 | End: 2021-01-11
Attending: EMERGENCY MEDICINE
Payer: MEDICARE

## 2021-01-11 ENCOUNTER — APPOINTMENT (OUTPATIENT)
Dept: CT IMAGING | Facility: HOSPITAL | Age: 76
DRG: 314 | End: 2021-01-11
Attending: EMERGENCY MEDICINE
Payer: MEDICARE

## 2021-01-11 ENCOUNTER — NURSE ONLY (OUTPATIENT)
Dept: LAB | Age: 76
End: 2021-01-11
Attending: FAMILY MEDICINE
Payer: MEDICARE

## 2021-01-11 ENCOUNTER — HOSPITAL ENCOUNTER (INPATIENT)
Facility: HOSPITAL | Age: 76
LOS: 18 days | Discharge: NURSING, REHAB OR LONG-TERM CARE | DRG: 314 | End: 2021-01-29
Attending: EMERGENCY MEDICINE | Admitting: HOSPITALIST
Payer: MEDICARE

## 2021-01-11 ENCOUNTER — SNF VISIT (OUTPATIENT)
Dept: INTERNAL MEDICINE CLINIC | Age: 76
End: 2021-01-11

## 2021-01-11 VITALS
OXYGEN SATURATION: 96 % | SYSTOLIC BLOOD PRESSURE: 110 MMHG | RESPIRATION RATE: 18 BRPM | TEMPERATURE: 98 F | HEART RATE: 68 BPM | DIASTOLIC BLOOD PRESSURE: 60 MMHG

## 2021-01-11 DIAGNOSIS — E87.5 HYPERKALEMIA: ICD-10-CM

## 2021-01-11 DIAGNOSIS — L03.116 CELLULITIS OF LEFT FOOT: Primary | ICD-10-CM

## 2021-01-11 DIAGNOSIS — I10 ESSENTIAL HYPERTENSION: ICD-10-CM
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
## 2021-01-11 DIAGNOSIS — N30.01 ACUTE CYSTITIS WITH HEMATURIA: ICD-10-CM

## 2021-01-11 DIAGNOSIS — J96.22 ACUTE ON CHRONIC RESPIRATORY FAILURE WITH HYPERCAPNIA (HCC): Primary | ICD-10-CM

## 2021-01-11 DIAGNOSIS — J96.91 RESPIRATORY FAILURE WITH HYPOXIA, UNSPECIFIED CHRONICITY (HCC): ICD-10-CM

## 2021-01-11 DIAGNOSIS — J18.9 NOSOCOMIAL PNEUMONIA: ICD-10-CM

## 2021-01-11 DIAGNOSIS — I48.0 AF (PAROXYSMAL ATRIAL FIBRILLATION) (HCC): ICD-10-CM

## 2021-01-11 DIAGNOSIS — R53.83 LETHARGY: ICD-10-CM

## 2021-01-11 DIAGNOSIS — Z79.4 UNCONTROLLED TYPE 2 DIABETES MELLITUS WITH INSULIN THERAPY (HCC): ICD-10-CM

## 2021-01-11 DIAGNOSIS — R53.81 PHYSICAL DECONDITIONING: ICD-10-CM

## 2021-01-11 DIAGNOSIS — N17.9 ACUTE KIDNEY INJURY (HCC): ICD-10-CM

## 2021-01-11 DIAGNOSIS — E11.65 UNCONTROLLED TYPE 2 DIABETES MELLITUS WITH INSULIN THERAPY (HCC): ICD-10-CM

## 2021-01-11 DIAGNOSIS — E87.5 HYPERKALEMIA: Primary | ICD-10-CM

## 2021-01-11 DIAGNOSIS — N17.9 AKI (ACUTE KIDNEY INJURY) (HCC): ICD-10-CM

## 2021-01-11 PROBLEM — D64.9 ANEMIA: Status: ACTIVE | Noted: 2021-01-11

## 2021-01-11 PROBLEM — E87.2 RESPIRATORY ACIDOSIS: Status: ACTIVE | Noted: 2021-01-11

## 2021-01-11 PROBLEM — E87.3 METABOLIC ALKALOSIS: Status: ACTIVE | Noted: 2021-01-11

## 2021-01-11 PROBLEM — E87.29 RESPIRATORY ACIDOSIS: Status: ACTIVE | Noted: 2021-01-11

## 2021-01-11 LAB
ALBUMIN SERPL-MCNC: 2.5 G/DL (ref 3.4–5)
ALBUMIN SERPL-MCNC: 2.5 G/DL (ref 3.4–5)
ALBUMIN/GLOB SERPL: 0.5 {RATIO} (ref 1–2)
ALBUMIN/GLOB SERPL: 0.5 {RATIO} (ref 1–2)
ALLENS TEST: POSITIVE
ALLENS TEST: POSITIVE
ALP LIVER SERPL-CCNC: 103 U/L
ALP LIVER SERPL-CCNC: 109 U/L
ALT SERPL-CCNC: 12 U/L
ALT SERPL-CCNC: 15 U/L
ANION GAP SERPL CALC-SCNC: 1 MMOL/L (ref 0–18)
ANION GAP SERPL CALC-SCNC: 1 MMOL/L (ref 0–18)
ARTERIAL BLD GAS O2 SATURATION: 88 % (ref 92–100)
ARTERIAL BLD GAS O2 SATURATION: 96 % (ref 92–100)
ARTERIAL BLOOD GAS BASE EXCESS: 10 MMOL/L (ref ?–2)
ARTERIAL BLOOD GAS BASE EXCESS: 9.2 MMOL/L (ref ?–2)
ARTERIAL BLOOD GAS HCO3: 38.1 MEQ/L (ref 22–26)
ARTERIAL BLOOD GAS HCO3: 42.7 MEQ/L (ref 22–26)
ARTERIAL BLOOD GAS PCO2: 140 MM HG (ref 35–45)
ARTERIAL BLOOD GAS PCO2: 84 MM HG (ref 35–45)
ARTERIAL BLOOD GAS PH: 7.1 (ref 7.35–7.45)
ARTERIAL BLOOD GAS PH: 7.28 (ref 7.35–7.45)
ARTERIAL BLOOD GAS PO2: 210 MM HG (ref 80–105)
ARTERIAL BLOOD GAS PO2: 60 MM HG (ref 80–105)
AST SERPL-CCNC: 16 U/L (ref 15–37)
AST SERPL-CCNC: 19 U/L (ref 15–37)
ATRIAL RATE: 67 BPM
BASOPHILS # BLD AUTO: 0.06 X10(3) UL (ref 0–0.2)
BASOPHILS # BLD AUTO: 0.07 X10(3) UL (ref 0–0.2)
BASOPHILS NFR BLD AUTO: 1.1 %
BASOPHILS NFR BLD AUTO: 1.2 %
BILIRUB SERPL-MCNC: 0.6 MG/DL (ref 0.1–2)
BILIRUB SERPL-MCNC: 0.7 MG/DL (ref 0.1–2)
BILIRUB UR QL STRIP.AUTO: NEGATIVE
BUN BLD-MCNC: 47 MG/DL (ref 7–18)
BUN BLD-MCNC: 52 MG/DL (ref 7–18)
BUN/CREAT SERPL: 18.6 (ref 10–20)
BUN/CREAT SERPL: 18.9 (ref 10–20)
CALCIUM BLD-MCNC: 8.6 MG/DL (ref 8.5–10.1)
CALCIUM BLD-MCNC: 9 MG/DL (ref 8.5–10.1)
CALCULATED O2 SATURATION: 88 % (ref 92–100)
CALCULATED O2 SATURATION: 99 % (ref 92–100)
CARBOXYHEMOGLOBIN: 2.9 % SAT (ref 0–3)
CARBOXYHEMOGLOBIN: 2.9 % SAT (ref 0–3)
CHLORIDE SERPL-SCNC: 102 MMOL/L (ref 98–112)
CHLORIDE SERPL-SCNC: 103 MMOL/L (ref 98–112)
CO2 SERPL-SCNC: 35 MMOL/L (ref 21–32)
CO2 SERPL-SCNC: 36 MMOL/L (ref 21–32)
CREAT BLD-MCNC: 2.49 MG/DL
CREAT BLD-MCNC: 2.8 MG/DL
DEPRECATED RDW RBC AUTO: 55.4 FL (ref 35.1–46.3)
DEPRECATED RDW RBC AUTO: 55.7 FL (ref 35.1–46.3)
EOSINOPHIL # BLD AUTO: 0.02 X10(3) UL (ref 0–0.7)
EOSINOPHIL # BLD AUTO: 0.03 X10(3) UL (ref 0–0.7)
EOSINOPHIL NFR BLD AUTO: 0.4 %
EOSINOPHIL NFR BLD AUTO: 0.5 %
ERYTHROCYTE [DISTWIDTH] IN BLOOD BY AUTOMATED COUNT: 14.3 % (ref 11–15)
ERYTHROCYTE [DISTWIDTH] IN BLOOD BY AUTOMATED COUNT: 14.4 % (ref 11–15)
EXPIRATORY PRESSURE: 6 CM H2O
FIO2: 30 %
GLOBULIN PLAS-MCNC: 4.6 G/DL (ref 2.8–4.4)
GLOBULIN PLAS-MCNC: 4.9 G/DL (ref 2.8–4.4)
GLUCOSE BLD-MCNC: 101 MG/DL (ref 70–99)
GLUCOSE BLD-MCNC: 114 MG/DL (ref 70–99)
GLUCOSE BLD-MCNC: 84 MG/DL (ref 70–99)
GLUCOSE BLD-MCNC: 86 MG/DL (ref 70–99)
GLUCOSE BLD-MCNC: 87 MG/DL (ref 70–99)
GLUCOSE UR STRIP.AUTO-MCNC: NEGATIVE MG/DL
HCT VFR BLD AUTO: 36.3 %
HCT VFR BLD AUTO: 36.6 %
HGB BLD-MCNC: 10 G/DL
HGB BLD-MCNC: 10.2 G/DL
IMM GRANULOCYTES # BLD AUTO: 0.22 X10(3) UL (ref 0–1)
IMM GRANULOCYTES # BLD AUTO: 0.23 X10(3) UL (ref 0–1)
IMM GRANULOCYTES NFR BLD: 4 %
IMM GRANULOCYTES NFR BLD: 4.1 %
IONIZED CALCIUM: 1.15 MMOL/L (ref 1.12–1.32)
IONIZED CALCIUM: 1.19 MMOL/L (ref 1.12–1.32)
KETONES UR STRIP.AUTO-MCNC: NEGATIVE MG/DL
L/M: 3 L/MIN
LACTIC ACID ARTERIAL: <1.6 MMOL/L (ref 0.5–2)
LACTIC ACID ARTERIAL: <1.6 MMOL/L (ref 0.5–2)
LYMPHOCYTES # BLD AUTO: 0.54 X10(3) UL (ref 1–4)
LYMPHOCYTES # BLD AUTO: 0.6 X10(3) UL (ref 1–4)
LYMPHOCYTES NFR BLD AUTO: 10 %
LYMPHOCYTES NFR BLD AUTO: 10.6 %
M PROTEIN MFR SERPL ELPH: 7.1 G/DL (ref 6.4–8.2)
M PROTEIN MFR SERPL ELPH: 7.4 G/DL (ref 6.4–8.2)
MCH RBC QN AUTO: 28.9 PG (ref 26–34)
MCH RBC QN AUTO: 29.5 PG (ref 26–34)
MCHC RBC AUTO-ENTMCNC: 27.5 G/DL (ref 31–37)
MCHC RBC AUTO-ENTMCNC: 27.9 G/DL (ref 31–37)
MCV RBC AUTO: 104.9 FL
MCV RBC AUTO: 105.8 FL
METHEMOGLOBIN: 0.7 % SAT (ref 0.4–1.5)
METHEMOGLOBIN: 0.8 % SAT (ref 0.4–1.5)
MONOCYTES # BLD AUTO: 0.4 X10(3) UL (ref 0.1–1)
MONOCYTES # BLD AUTO: 0.61 X10(3) UL (ref 0.1–1)
MONOCYTES NFR BLD AUTO: 10.7 %
MONOCYTES NFR BLD AUTO: 7.4 %
NEUTROPHILS # BLD AUTO: 4.14 X10 (3) UL (ref 1.5–7.7)
NEUTROPHILS # BLD AUTO: 4.14 X10 (3) UL (ref 1.5–7.7)
NEUTROPHILS # BLD AUTO: 4.14 X10(3) UL (ref 1.5–7.7)
NEUTROPHILS # BLD AUTO: 4.14 X10(3) UL (ref 1.5–7.7)
NEUTROPHILS NFR BLD AUTO: 73 %
NEUTROPHILS NFR BLD AUTO: 77 %
NITRITE UR QL STRIP.AUTO: NEGATIVE
NT-PROBNP SERPL-MCNC: ABNORMAL PG/ML (ref ?–450)
OSMOLALITY SERPL CALC.SUM OF ELEC: 300 MOSM/KG (ref 275–295)
OSMOLALITY SERPL CALC.SUM OF ELEC: 301 MOSM/KG (ref 275–295)
P AXIS: 46 DEGREES
P-R INTERVAL: 118 MS
P/F RATIO: 1000.9 MMHG
P/F RATIO: 200.1 MMHG
PATIENT FASTING Y/N/NP: YES
PATIENT TEMPERATURE: 98.6 F
PATIENT TEMPERATURE: 98.6 F
PH UR STRIP.AUTO: 5 [PH] (ref 4.5–8)
PLATELET # BLD AUTO: 181 10(3)UL (ref 150–450)
PLATELET # BLD AUTO: 185 10(3)UL (ref 150–450)
POTASSIUM BLOOD GAS: 5.9 MMOL/L (ref 3.6–5.1)
POTASSIUM BLOOD GAS: 6.1 MMOL/L (ref 3.6–5.1)
POTASSIUM SERPL-SCNC: 6.1 MMOL/L (ref 3.5–5.1)
POTASSIUM SERPL-SCNC: 6.2 MMOL/L (ref 3.5–5.1)
PROCALCITONIN SERPL-MCNC: 0.05 NG/ML (ref ?–0.16)
PROT UR STRIP.AUTO-MCNC: 100 MG/DL
Q-T INTERVAL: 420 MS
QRS DURATION: 84 MS
QTC CALCULATION (BEZET): 443 MS
R AXIS: 70 DEGREES
RBC # BLD AUTO: 3.46 X10(6)UL
RBC # BLD AUTO: 3.46 X10(6)UL
RBC #/AREA URNS AUTO: >10 /HPF
SARS-COV-2 RNA RESP QL NAA+PROBE: NOT DETECTED
SODIUM BLOOD GAS: 142 MMOL/L (ref 136–144)
SODIUM BLOOD GAS: 143 MMOL/L (ref 136–144)
SODIUM SERPL-SCNC: 139 MMOL/L (ref 136–145)
SODIUM SERPL-SCNC: 139 MMOL/L (ref 136–145)
SP GR UR STRIP.AUTO: 1.02 (ref 1–1.03)
T AXIS: 30 DEGREES
TIDAL VOLUME: 500 ML
TOTAL HEMOGLOBIN: 9.5 G/DL
TOTAL HEMOGLOBIN: 9.7 G/DL
TROPONIN I SERPL-MCNC: <0.045 NG/ML (ref ?–0.04)
UROBILINOGEN UR STRIP.AUTO-MCNC: <2 MG/DL
VENT RATE: 24 /MIN
VENTRICULAR RATE: 67 BPM
WBC # BLD AUTO: 5.4 X10(3) UL (ref 4–11)
WBC # BLD AUTO: 5.7 X10(3) UL (ref 4–11)
WBC #/AREA URNS AUTO: >50 /HPF
WBC CLUMPS UR QL AUTO: PRESENT

## 2021-01-11 PROCEDURE — 5A09357 ASSISTANCE WITH RESPIRATORY VENTILATION, LESS THAN 24 CONSECUTIVE HOURS, CONTINUOUS POSITIVE AIRWAY PRESSURE: ICD-10-PCS | Performed by: HOSPITALIST

## 2021-01-11 PROCEDURE — 85025 COMPLETE CBC W/AUTO DIFF WBC: CPT

## 2021-01-11 PROCEDURE — 70450 CT HEAD/BRAIN W/O DYE: CPT | Performed by: EMERGENCY MEDICINE

## 2021-01-11 PROCEDURE — 80053 COMPREHEN METABOLIC PANEL: CPT

## 2021-01-11 PROCEDURE — 99310 SBSQ NF CARE HIGH MDM 45: CPT | Performed by: NURSE PRACTITIONER

## 2021-01-11 PROCEDURE — 71045 X-RAY EXAM CHEST 1 VIEW: CPT | Performed by: EMERGENCY MEDICINE

## 2021-01-11 PROCEDURE — 99223 1ST HOSP IP/OBS HIGH 75: CPT | Performed by: HOSPITALIST

## 2021-01-11 RX ORDER — NYSTATIN 100000 [USP'U]/G
POWDER TOPICAL 2 TIMES DAILY
COMMUNITY
End: 2022-01-20 | Stop reason: CLARIF

## 2021-01-11 RX ORDER — SODIUM POLYSTYRENE SULFONATE 4.1 MEQ/G
15 POWDER, FOR SUSPENSION ORAL; RECTAL ONCE
Status: DISCONTINUED | OUTPATIENT
Start: 2021-01-11 | End: 2021-01-11

## 2021-01-11 RX ORDER — ONDANSETRON 4 MG/1
4 TABLET, FILM COATED ORAL EVERY 8 HOURS PRN
Status: ON HOLD | COMMUNITY
End: 2021-02-12

## 2021-01-11 RX ORDER — DEXTROSE MONOHYDRATE 25 G/50ML
50 INJECTION, SOLUTION INTRAVENOUS ONCE
Status: COMPLETED | OUTPATIENT
Start: 2021-01-11 | End: 2021-01-11

## 2021-01-11 RX ORDER — ACETAMINOPHEN 325 MG/1
650 TABLET ORAL EVERY 6 HOURS PRN
Status: DISCONTINUED | OUTPATIENT
Start: 2021-01-11 | End: 2021-01-29

## 2021-01-11 RX ORDER — ONDANSETRON 2 MG/ML
4 INJECTION INTRAMUSCULAR; INTRAVENOUS EVERY 6 HOURS PRN
Status: DISCONTINUED | OUTPATIENT
Start: 2021-01-11 | End: 2021-01-29

## 2021-01-11 RX ORDER — HEPARIN SODIUM 5000 [USP'U]/ML
5000 INJECTION, SOLUTION INTRAVENOUS; SUBCUTANEOUS EVERY 8 HOURS SCHEDULED
Status: DISCONTINUED | OUTPATIENT
Start: 2021-01-11 | End: 2021-01-12

## 2021-01-11 RX ORDER — MAGNESIUM OXIDE 400 MG (241.3 MG MAGNESIUM) TABLET
400 TABLET DAILY
COMMUNITY
End: 2021-12-09

## 2021-01-11 RX ORDER — NAPROXEN SODIUM 220 MG
1 TABLET ORAL DAILY PRN
Status: ON HOLD | COMMUNITY
End: 2021-01-29

## 2021-01-11 RX ORDER — FUROSEMIDE 10 MG/ML
40 INJECTION INTRAMUSCULAR; INTRAVENOUS ONCE
Status: COMPLETED | OUTPATIENT
Start: 2021-01-11 | End: 2021-01-11

## 2021-01-11 RX ORDER — METOCLOPRAMIDE HYDROCHLORIDE 5 MG/ML
5 INJECTION INTRAMUSCULAR; INTRAVENOUS EVERY 8 HOURS PRN
Status: DISCONTINUED | OUTPATIENT
Start: 2021-01-11 | End: 2021-01-29

## 2021-01-11 RX ORDER — DEXTROSE MONOHYDRATE 25 G/50ML
50 INJECTION, SOLUTION INTRAVENOUS
Status: DISCONTINUED | OUTPATIENT
Start: 2021-01-11 | End: 2021-01-29

## 2021-01-11 NOTE — ED PROVIDER NOTES
Patient Seen in: [facility] Emergency Department      History   Patient presents with:  Altered Mental Status    Stated Complaint: lethargy, left facial droop, slurred speech since 3pm yesterday    HPI/Subjective:   HPI    49-year-old with history o Laterality Date   •      • CHOLECYSTECTOMY  [address]   • COLON RESECTION LEFT N/A 2017    Performed by Merced Lewis MD at [address]   • COLONOSCOPY  2019   • COLONOSCOPY N/A 2017    Performed by Severo Posey, MD follow some commands. HEENT:  Pupils are equal and round. Extraocular muscles are intact. Sclera are not icteric. Conjunctivae within normal limits. His membranes appear dry.   Cardiovascular: Regular rate and rhythm  Respiratory: Patient is mildly dys (*)     Total Hemoglobin 9.7 (*)     Potassium Blood Gas 6.1 (*)     All other components within normal limits   ABG PANEL W ELECT AND LACTATE - Abnormal; Notable for the following components:    ABG pH 7.28 (*)     ABG pCO2 84 (*)     ABG pO2 60 (*)     A Negative Rods Tobias Ceja [facility])            Susceptibility     Morganella morganii     Not Specified    Ampicillin >=32  Resistant    Ampicillin + Sulbactam >=32  Resistant    Cefazolin >=64  Resistant    Cefepime <=1  Sensitive    Ceftriaxone <=1  Sensitive likely result in sudden, clinically significant or life threatening deterioration of  clinical condition. Necessary history is obtained from patient, , previous records. The patient required my constant attention.  Time was spent ordering, reviewing,

## 2021-01-11 NOTE — H&P
[facility] HOSPITALIST  History and Physical     Marlee Bragg Patient Status:  Emergency    1945 MRN [id number]   Location [address] [facility] Attending Reg Rebolledo MD   Hosp Day # 0 PCP Kenneth Nixon MD     Chief Compl Procedure Laterality Date   •      • CHOLECYSTECTOMY  [address] [facility]   • COLON RESECTION LEFT N/A 2017    Performed by Carri Perez MD at [facility] MAIN OR   • COLONOSCOPY  2019   • COLONOSCOPY N/A 2017    Performed by Deysi Doran, Rfl:     •  Metoprolol Succinate ER 50 MG Oral Tablet 24 Hr, Take 50 mg by mouth 2 (two) times a day., Disp: , Rfl:     •  acetaminophen 500 MG Oral Tab, Take 1 tablet (500 mg total) by mouth every 6 (six) hours as needed. , Disp: , Rfl: 0    •  escitalopra organomegaly. Neurologic: No focal neurological deficits. CNII-XII grossly intact. Musculoskeletal: Moves all extremities. Extremities: venous stasis changes. Integument: No rashes or lesions. Psychiatric: Appropriate mood and affect.       Diagnostic with pt and .     Braxton Hartman MD  1/11/2021

## 2021-01-11 NOTE — ED NOTES
Pt noted to have tremors to arms. Pt's skin slightly cool to touch. Warm blanket placed for comfort.

## 2021-01-11 NOTE — ED INITIAL ASSESSMENT (HPI)
Pt arrived to ED from [facility] with complaints of altered mental status. Per medics, pt has been more lethargic, had slurred speech and facial droop since yesterday at [facility] also state pt had K of 6.1 when labs were drawn yesterday.  Pt lying o

## 2021-01-11 NOTE — ED NOTES
Pt restless, scooting down on cart despite multiple attempts by RN to reorient pt to surroundings and instruct pt to remain on cart. To attempt to place another IV with use of US when available, restart antibiotics and go to unit.

## 2021-01-11 NOTE — ED NOTES
Pt very difficult stick. 2nd blood culture obtained but minimal blood obtained so will call phlebotomy. Pt resting comfortably on cart. Pt does appear to have slightly labored resps, continues on 4l/nc.     Pt repositioned on cart for comfort, warm blan

## 2021-01-11 NOTE — ED NOTES
Pt had gold colored necklace with cross and hoop earrings given to spouse in sealed bag. Pt with glasses at bedside will send with her to unit.

## 2021-01-11 NOTE — ED NOTES
IV removed. Pt repositioned on cart for comfort. Pt much more alert, but remains confused. Pt reoriented to surroundings. To place another IV and administer 2nd antibiotic and lasix.

## 2021-01-11 NOTE — ED NOTES
Pt awake and alert, but still not following commands appropriately. Pt attempting to get off cart. Will reposition pt again on cart, administer meds and send to unit.

## 2021-01-11 NOTE — PROGRESS NOTES
Paramjit Garcia, 4/25/1945, 76year old, female    Chief Complaint:  Patient presents with:   Follow - Up: lethargy, low BP, concern for change in status  Lab Results       Subjective:    HPI:  Belinda Adams is a 17-year-old female with previous medical history PHYSICAL EXAM:  GENERAL HEALTH: well developed, well nourished, lethargic  LINES, TUBES, DRAINS:  none  SKIN: pink, warm, dry  WOUND: Under right breast fungal with open skin,  bilateral lower extremities with thickening, erythema, improved  EYES: scle 01/11/2021    CO2 36.0 (H) 01/11/2021       Assessment and plan:    Acute lethargy and unresponsive, AMINATA on CKD III, hyperkalemia  Sent to ER for eval and treatment  O2 3L/NC  Spouse Rustam Woo made aware he will meet her  Nursing to call ED CM with updates.   Rec history PAF, pulmonary hypertension  BP improved   metoprolol 50 mg p.o. twice daily pulse 60  Hydralazine 25 mg TID  Pravastatin 20 mg p.o. nightly  BP every shift and as needed    Acute respiratory failure with hypoxia  Weaning oxygen as able  Daily noemí

## 2021-01-11 NOTE — ED NOTES
Pt quite leghargic, not following all commands appropriately. Pt will open eyes upon command and will raise eyebrows, able to wiggle toes upon command but will not lift legs. Pt able to speak rather clearly intermittently but then with garbled speech.

## 2021-01-11 NOTE — ED NOTES
[facility] right [facility] with use of US but unable to obtain blood specimens.

## 2021-01-11 NOTE — ED NOTES
Upon starting antibiotics, right arm appears slightly edematous and firm to touch. Do not feel comfortable infusing antibiotics through IV. IV d/c'd and antibiotics on hold at this time.

## 2021-01-11 NOTE — PROGRESS NOTES
01/11/21 1658   AVAPS   Min IPAP 15   Max IPAP 25   EPAP Level 6   Set rate 24   Tidal volume 500   titrated pts pressures as pt became more alert and compliant.

## 2021-01-11 NOTE — ED NOTES
Pt seated on cart, tolerating bipap well. Pt with eyes open does appear more alert, following simple commands when asked, following nurse with eyes when asked.

## 2021-01-12 ENCOUNTER — APPOINTMENT (OUTPATIENT)
Dept: GENERAL RADIOLOGY | Facility: HOSPITAL | Age: 76
DRG: 314 | End: 2021-01-12
Attending: EMERGENCY MEDICINE
Payer: MEDICARE

## 2021-01-12 LAB
ALBUMIN SERPL-MCNC: 2.6 G/DL (ref 3.4–5)
ALBUMIN/GLOB SERPL: 0.6 {RATIO} (ref 1–2)
ALLENS TEST: POSITIVE
ALP LIVER SERPL-CCNC: 94 U/L
ALT SERPL-CCNC: 14 U/L
ANION GAP SERPL CALC-SCNC: 3 MMOL/L (ref 0–18)
ARTERIAL BLD GAS O2 SATURATION: 94 % (ref 92–100)
ARTERIAL BLOOD GAS BASE EXCESS: 12.6 MMOL/L (ref ?–2)
ARTERIAL BLOOD GAS HCO3: 39.7 MEQ/L (ref 22–26)
ARTERIAL BLOOD GAS PCO2: 71 MM HG (ref 35–45)
ARTERIAL BLOOD GAS PH: 7.37 (ref 7.35–7.45)
ARTERIAL BLOOD GAS PO2: 90 MM HG (ref 80–105)
AST SERPL-CCNC: 18 U/L (ref 15–37)
BASOPHILS # BLD AUTO: 0.03 X10(3) UL (ref 0–0.2)
BASOPHILS NFR BLD AUTO: 0.6 %
BILIRUB SERPL-MCNC: 0.6 MG/DL (ref 0.1–2)
BUN BLD-MCNC: 56 MG/DL (ref 7–18)
BUN/CREAT SERPL: 18.2 (ref 10–20)
CALCIUM BLD-MCNC: 9.1 MG/DL (ref 8.5–10.1)
CALCULATED O2 SATURATION: 97 % (ref 92–100)
CARBOXYHEMOGLOBIN: 2.2 % SAT (ref 0–3)
CHLORIDE SERPL-SCNC: 102 MMOL/L (ref 98–112)
CO2 SERPL-SCNC: 39 MMOL/L (ref 21–32)
CREAT BLD-MCNC: 3.07 MG/DL
DEPRECATED RDW RBC AUTO: 51.3 FL (ref 35.1–46.3)
EOSINOPHIL # BLD AUTO: 0.05 X10(3) UL (ref 0–0.7)
EOSINOPHIL NFR BLD AUTO: 1 %
ERYTHROCYTE [DISTWIDTH] IN BLOOD BY AUTOMATED COUNT: 14.2 % (ref 11–15)
EXPIRATORY PRESSURE: 6 CM H2O
FIO2: 35 %
GLOBULIN PLAS-MCNC: 4.1 G/DL (ref 2.8–4.4)
GLUCOSE BLD-MCNC: 113 MG/DL (ref 70–99)
GLUCOSE BLD-MCNC: 175 MG/DL (ref 70–99)
GLUCOSE BLD-MCNC: 34 MG/DL (ref 70–99)
GLUCOSE BLD-MCNC: 34 MG/DL (ref 70–99)
GLUCOSE BLD-MCNC: 37 MG/DL (ref 70–99)
GLUCOSE BLD-MCNC: 54 MG/DL (ref 70–99)
GLUCOSE BLD-MCNC: 71 MG/DL (ref 70–99)
GLUCOSE BLD-MCNC: 72 MG/DL (ref 70–99)
GLUCOSE BLD-MCNC: 86 MG/DL (ref 70–99)
GLUCOSE BLD-MCNC: 87 MG/DL (ref 70–99)
GLUCOSE BLD-MCNC: 89 MG/DL (ref 70–99)
GLUCOSE BLD-MCNC: 93 MG/DL (ref 70–99)
HCT VFR BLD AUTO: 30.7 %
HGB BLD-MCNC: 9 G/DL
IMM GRANULOCYTES # BLD AUTO: 0.08 X10(3) UL (ref 0–1)
IMM GRANULOCYTES NFR BLD: 1.6 %
LYMPHOCYTES # BLD AUTO: 0.83 X10(3) UL (ref 1–4)
LYMPHOCYTES NFR BLD AUTO: 17 %
M PROTEIN MFR SERPL ELPH: 6.7 G/DL (ref 6.4–8.2)
MCH RBC QN AUTO: 29.3 PG (ref 26–34)
MCHC RBC AUTO-ENTMCNC: 29.3 G/DL (ref 31–37)
MCV RBC AUTO: 100 FL
METHEMOGLOBIN: 0.6 % SAT (ref 0.4–1.5)
MONOCYTES # BLD AUTO: 0.57 X10(3) UL (ref 0.1–1)
MONOCYTES NFR BLD AUTO: 11.7 %
NEUTROPHILS # BLD AUTO: 3.32 X10 (3) UL (ref 1.5–7.7)
NEUTROPHILS # BLD AUTO: 3.32 X10(3) UL (ref 1.5–7.7)
NEUTROPHILS NFR BLD AUTO: 68.1 %
OSMOLALITY SERPL CALC.SUM OF ELEC: 310 MOSM/KG (ref 275–295)
PATIENT TEMPERATURE: 98.6 F
PLATELET # BLD AUTO: 155 10(3)UL (ref 150–450)
POTASSIUM SERPL-SCNC: 5.1 MMOL/L (ref 3.5–5.1)
RBC # BLD AUTO: 3.07 X10(6)UL
SARS-COV-2 RNA RESP QL NAA+PROBE: NOT DETECTED
SODIUM SERPL-SCNC: 144 MMOL/L (ref 136–145)
TIDAL VOLUME: 500 ML
TOTAL HEMOGLOBIN: 8.6 G/DL
VENT RATE: 24 /MIN
WBC # BLD AUTO: 4.9 X10(3) UL (ref 4–11)

## 2021-01-12 PROCEDURE — 05HF33Z INSERTION OF INFUSION DEVICE INTO LEFT CEPHALIC VEIN, PERCUTANEOUS APPROACH: ICD-10-PCS | Performed by: HOSPITALIST

## 2021-01-12 PROCEDURE — 71045 X-RAY EXAM CHEST 1 VIEW: CPT | Performed by: NURSE PRACTITIONER

## 2021-01-12 PROCEDURE — 99233 SBSQ HOSP IP/OBS HIGH 50: CPT | Performed by: HOSPITALIST

## 2021-01-12 PROCEDURE — 99223 1ST HOSP IP/OBS HIGH 75: CPT | Performed by: INTERNAL MEDICINE

## 2021-01-12 PROCEDURE — 99291 CRITICAL CARE FIRST HOUR: CPT | Performed by: NURSE PRACTITIONER

## 2021-01-12 RX ORDER — DEXMEDETOMIDINE HYDROCHLORIDE 4 UG/ML
INJECTION, SOLUTION INTRAVENOUS CONTINUOUS
Status: DISCONTINUED | OUTPATIENT
Start: 2021-01-12 | End: 2021-01-12

## 2021-01-12 RX ORDER — HALOPERIDOL 5 MG/ML
INJECTION INTRAMUSCULAR
Status: DISPENSED
Start: 2021-01-12 | End: 2021-01-13

## 2021-01-12 RX ORDER — HALOPERIDOL 5 MG/ML
2 INJECTION INTRAMUSCULAR EVERY 6 HOURS PRN
Status: DISCONTINUED | OUTPATIENT
Start: 2021-01-12 | End: 2021-01-12

## 2021-01-12 RX ORDER — SODIUM CHLORIDE, SODIUM LACTATE, POTASSIUM CHLORIDE, CALCIUM CHLORIDE 600; 310; 30; 20 MG/100ML; MG/100ML; MG/100ML; MG/100ML
INJECTION, SOLUTION INTRAVENOUS CONTINUOUS
Status: DISCONTINUED | OUTPATIENT
Start: 2021-01-12 | End: 2021-01-12

## 2021-01-12 RX ORDER — DEXTROSE, SODIUM CHLORIDE, SODIUM LACTATE, POTASSIUM CHLORIDE, AND CALCIUM CHLORIDE 5; .6; .31; .03; .02 G/100ML; G/100ML; G/100ML; G/100ML; G/100ML
INJECTION, SOLUTION INTRAVENOUS CONTINUOUS
Status: DISCONTINUED | OUTPATIENT
Start: 2021-01-12 | End: 2021-01-13

## 2021-01-12 RX ORDER — DEXMEDETOMIDINE HYDROCHLORIDE 4 UG/ML
INJECTION, SOLUTION INTRAVENOUS
Status: COMPLETED
Start: 2021-01-12 | End: 2021-01-12

## 2021-01-12 RX ORDER — HEPARIN SODIUM 5000 [USP'U]/ML
7500 INJECTION, SOLUTION INTRAVENOUS; SUBCUTANEOUS EVERY 8 HOURS SCHEDULED
Status: DISCONTINUED | OUTPATIENT
Start: 2021-01-12 | End: 2021-01-13

## 2021-01-12 RX ORDER — HALOPERIDOL 5 MG/ML
2 INJECTION INTRAMUSCULAR EVERY 4 HOURS PRN
Status: DISCONTINUED | OUTPATIENT
Start: 2021-01-12 | End: 2021-01-29

## 2021-01-12 NOTE — PHYSICAL THERAPY NOTE
Order received for PT eval per functional mobility screen. Attempted to see Pt this am, however, Pt agitated and unable to safely participate in therapy. Will continue to follow.

## 2021-01-12 NOTE — PROGRESS NOTES
01/11/21 3085   BiPAP   $ RT Standby Charge (per 15 min) 1   Device V60   Mode AVAPS   Interface Full face mask   Mask Size Medium   Control Settings   Set Rate 24 breaths/min   Oxygen Percent 35 %   Inspiratory time 1   Insp rise time 2   AVAPS   Min I

## 2021-01-12 NOTE — CONSULTS
Pulmonary Consult     Assessment / Plan:  1. Acute on chronic hypercapnic respiratory failure   - wean Bipap as tolerated   - hx of GAYLE but does not use CPAP  - Monitor with VBG  - COVID PCR and rapid negative    2.  Altered mental status   - Likely metabol skeletal hyperostosis)    • Disorder of thyroid    • Diverticulosis of large intestine    • High blood pressure    • High cholesterol    • Lymphedema of lower extremity    • OBESITY    • Osteoarthritis    • Recurrent UTI     rec UTI   • Renal failure    • Rfl:     •  hydrALAzine HCl 25 MG Oral Tab, Take 25 mg by mouth 3 (three) times daily.   , Disp: , Rfl: , Taking    •  Metoprolol Succinate ER 50 MG Oral Tablet 24 Hr, Take 50 mg by mouth 2 (two) times a day., Disp: , Rfl: , Taking    •  acetaminophen 500 M Nausea., Disp: , Rfl:     •  hydrALAzine HCl 25 MG Oral Tab, Take 25 mg by mouth 3 (three) times daily.   , Disp: , Rfl:     •  Metoprolol Succinate ER 50 MG Oral Tablet 24 Hr, Take 50 mg by mouth 2 (two) times a day., Disp: , Rfl:     •  acetaminophen 500 moist mucous membranes  Neck: trachea midline with no thyromegaly  Heart: regular rate and rhythm, no murmurs / rubs / gallops  Lungs: clear bilaterally, normal respiratory effort, no accessory muscle use  Extremities: trace edema  Psych: Aox1    Labs:  Re

## 2021-01-12 NOTE — PROGRESS NOTES
01/12/21 0356   BiPAP   Device V60   BiPAP / CPAP CE# [id number]   Mode AVAPS   Interface Full face mask   Mask Size Medium   Control Settings   Oxygen Percent 35 %   Inspiratory time 1   Insp rise time 2   AVAPS   Min IPAP 15   Max IPAP 25   EPAP Level 6

## 2021-01-12 NOTE — PLAN OF CARE
Assume care in am. Patient awake, slightly confused, trial on 3LNC. Patient confused,  restless,agitated and screaming. Restraints on, Patient pulling monitor and Iv lines. Started on precedex. Patient became too drowsy and bradycardic. Precedex off.  Rue Cousins

## 2021-01-12 NOTE — ED NOTES
Pt lying on cart, eyes open, skin cool and dry, resps appear slightly labored with bipap on. Pt continues to move around on cart despite attempts of RN to instruct pt she needs to stay on cart.

## 2021-01-12 NOTE — CM/SW NOTE
sw reviewed chart for dc planning. Pt admitted from the [facility] where she has been receiving MO. Anticipate pt will need to return to [facility] upon [place]  Will need to confirm plan with spouse when appropriate, pt currentl

## 2021-01-12 NOTE — DIETARY NOTE
[address] [facility]    Pt does not meet malnutrition criteria at this time.     NUTRITION DIAGNOSIS/PROBLEM:    Inadequate energy intake related to decreased ability to consume sufficient energy intakes (decreased appetite, SOB, Allergies: No  Cultural/Ethnic/Mandaeism Preferences Addresses: Yes    NUTRITION PRESCRIPTION: 61.4 kg (IBW)  Calories: 1055-3444 calories/day (25-30 calories per kg)  Protein:  grams protein/day (1.5-1.7 grams protein per kg)  Fluid: ~1 ml/kcal or p

## 2021-01-12 NOTE — OCCUPATIONAL THERAPY NOTE
Order received for OT eval per functional mobility screen. Attempted to see Pt this am, however, Pt agitated and unable to safely participate in therapy.  Will continue to follow

## 2021-01-12 NOTE — PLAN OF CARE
Pt alert, communicates limitedly. Mentation improving slowly. At shift change, patient not responding to questions, agitated, follow commands BUE. In AM, patient oriented to name, answers questions appropriately, agitation.  Restraints in place, no complica

## 2021-01-12 NOTE — PLAN OF CARE
Pt to ICU from ED. Patient's diaper and bedding saturated from urine after lasix. Patient cleansed and orders received to insert arnold. Patient constantly pulling off bipap with no evidence of learning - bilateral wrist restraints applied.

## 2021-01-12 NOTE — PROGRESS NOTES
ICU  Critical Care APRN Progress Note    NAME: Carmina Galvan - ROOM: 543/198-H - MRN: [id number] - Age: 76year old - :1945    History Of Present Illness:  Carmina Galvan is a 76year old female with PMHx significant for HTN, HLD, DM, CKD St • Osteoarthritis    • Recurrent UTI     rec UTI   • Renal failure    • Rheumatoid arthritis(714.0) 2012   • SLEEP APNEA DMG DX 1-23-12 TX 2-9-12    AHI 39/ RDI 48/ REM AHI 71/ SaO2 75%/ CPAP 12/ [employer]   • Sleep apnea    • Small bowel obstruction ([facility] [address] 1/ agitated, no acute distress, on BIPAP  Neck: No JVD, neck supple, no adenopathy, trachea midline, no carotid bruits  Lungs: Diminished to auscultation bilaterally, respirations BIPAP assisted and currently unlabored  Heart: Regular rate and rhythm, S1 and 01/11/2021    CA 8.6 01/11/2021    ALB 2.5 01/11/2021    ALKPHO 109 01/11/2021    BILT 0.7 01/11/2021    TP 7.4 01/11/2021    AST 16 01/11/2021    ALT 12 01/11/2021     Assessment/Plan:  1.   Acute on chronic hypercapnic respiratory failure--respiratory aci APRN  Critical Care NP  Marcella 227: [phone]  Pgr: [id number]    A total of 35 minutes of critical care time (exclusive of billable procedures) was administered.  This involved direct patient intervention, complex decision making, and/or extensive discus

## 2021-01-12 NOTE — PROGRESS NOTES
Pharmacy Note:    Anticoagulation Weight Dose Adjustment for heparin         Haris López is a 76year old female who has been prescribed heparin 5000units q8hr for VTE prophylaxis.         Wt Readings from Last 6 Encounters:  01/11/21 : 127.8 kg (28

## 2021-01-12 NOTE — CONSULTS
[facility]  Report of Consultation    José Miguel Asher Patient Status:  Inpatient    1945 MRN [id number]   [facility] 4SW-A Attending Clinton Cheung MD   Hosp Day # 1 PCP Giovanni Kelley MD       Assessment / Plan:    1) AMINATA- hyperostosis)    • Disorder of thyroid    • Diverticulosis of large intestine    • High blood pressure    • High cholesterol    • Lymphedema of lower extremity    • OBESITY    • Osteoarthritis    • Recurrent UTI     rec UTI   • Renal failure    • Rheumatoi UNIT/ML injection 7,500 Units, 7,500 Units, Subcutaneous, Q8H [facility] & longterm  •  Dexmedetomidine HCl (PRECEDEX) 800 mcg in sodium chloride 0.9% 100 mL infusion, 0.2-1.5 mcg/kg/hr (Dosing Weight), Intravenous, Continuous  •  Miconazole Nitrate 2 % powder, , Topical, B palpable organomegaly  Extremities: Without clubbing, cyanosis; no edema  Neurologic: Cranial nerves grossly intact, moving all extremities  Skin: Warm and dry, no rashes      Laboratory Data:  Lab Results   Component Value Date    WBC 4.9 01/12/2021    HG

## 2021-01-12 NOTE — PROGRESS NOTES
[facility]  Progress Note     Mark Cruz Patient Status:  Inpatient    1945 MRN [id number]   [facility] 4SW-A Attending Anita Alves MD   Hosp Day # 1 PCP Tee Granados MD     Chief Complaint: AMS    S: Patient re PTP, INR in the last 168 hours. Recent Labs   Lab 01/11/21  1235   TROP <0.045            Imaging: Imaging data reviewed in Epic.     Medications:   • Heparin Sodium (Porcine)  7,500 Units Subcutaneous Q8H [facility] & group home   • Insulin Aspart Pen  2-10 Units Subcutane

## 2021-01-13 ENCOUNTER — APPOINTMENT (OUTPATIENT)
Dept: NUCLEAR MEDICINE | Facility: HOSPITAL | Age: 76
DRG: 314 | End: 2021-01-13
Attending: INTERNAL MEDICINE
Payer: MEDICARE

## 2021-01-13 ENCOUNTER — APPOINTMENT (OUTPATIENT)
Dept: CV DIAGNOSTICS | Facility: HOSPITAL | Age: 76
DRG: 314 | End: 2021-01-13
Attending: INTERNAL MEDICINE
Payer: MEDICARE

## 2021-01-13 ENCOUNTER — APPOINTMENT (OUTPATIENT)
Dept: GENERAL RADIOLOGY | Facility: HOSPITAL | Age: 76
DRG: 314 | End: 2021-01-13
Attending: INTERNAL MEDICINE
Payer: MEDICARE

## 2021-01-13 PROBLEM — I27.20 PULMONARY HTN (HCC): Status: ACTIVE | Noted: 2020-04-06

## 2021-01-13 LAB
ANION GAP SERPL CALC-SCNC: 2 MMOL/L (ref 0–18)
BUN BLD-MCNC: 54 MG/DL (ref 7–18)
BUN/CREAT SERPL: 21.1 (ref 10–20)
C DIFF TOX B STL QL: NEGATIVE
CALCIUM BLD-MCNC: 8.4 MG/DL (ref 8.5–10.1)
CHLORIDE SERPL-SCNC: 105 MMOL/L (ref 98–112)
CO2 SERPL-SCNC: 38 MMOL/L (ref 21–32)
CREAT BLD-MCNC: 2.56 MG/DL
D-DIMER: 2.96 UG/ML FEU (ref ?–0.75)
DEPRECATED RDW RBC AUTO: 52 FL (ref 35.1–46.3)
ERYTHROCYTE [DISTWIDTH] IN BLOOD BY AUTOMATED COUNT: 14.3 % (ref 11–15)
GLUCOSE BLD-MCNC: 113 MG/DL (ref 70–99)
GLUCOSE BLD-MCNC: 189 MG/DL (ref 70–99)
GLUCOSE BLD-MCNC: 230 MG/DL (ref 70–99)
GLUCOSE BLD-MCNC: 275 MG/DL (ref 70–99)
GLUCOSE BLD-MCNC: 97 MG/DL (ref 70–99)
HAV IGM SER QL: 1.7 MG/DL (ref 1.6–2.6)
HCT VFR BLD AUTO: 26 %
HGB BLD-MCNC: 7.7 G/DL
MCH RBC QN AUTO: 29.5 PG (ref 26–34)
MCHC RBC AUTO-ENTMCNC: 29.6 G/DL (ref 31–37)
MCV RBC AUTO: 99.6 FL
OSMOLALITY SERPL CALC.SUM OF ELEC: 315 MOSM/KG (ref 275–295)
PHOSPHATE SERPL-MCNC: 2.5 MG/DL (ref 2.5–4.9)
PLATELET # BLD AUTO: 138 10(3)UL (ref 150–450)
POTASSIUM SERPL-SCNC: 4.1 MMOL/L (ref 3.5–5.1)
RBC # BLD AUTO: 2.61 X10(6)UL
SODIUM SERPL-SCNC: 145 MMOL/L (ref 136–145)
WBC # BLD AUTO: 3 X10(3) UL (ref 4–11)

## 2021-01-13 PROCEDURE — 93306 TTE W/DOPPLER COMPLETE: CPT | Performed by: INTERNAL MEDICINE

## 2021-01-13 PROCEDURE — 71045 X-RAY EXAM CHEST 1 VIEW: CPT | Performed by: INTERNAL MEDICINE

## 2021-01-13 PROCEDURE — 99233 SBSQ HOSP IP/OBS HIGH 50: CPT | Performed by: HOSPITALIST

## 2021-01-13 PROCEDURE — 99291 CRITICAL CARE FIRST HOUR: CPT | Performed by: INTERNAL MEDICINE

## 2021-01-13 PROCEDURE — 78580 LUNG PERFUSION IMAGING: CPT | Performed by: INTERNAL MEDICINE

## 2021-01-13 RX ORDER — ESCITALOPRAM OXALATE 10 MG/1
10 TABLET ORAL DAILY
Status: DISCONTINUED | OUTPATIENT
Start: 2021-01-13 | End: 2021-01-29

## 2021-01-13 RX ORDER — HEPARIN SODIUM AND DEXTROSE 10000; 5 [USP'U]/100ML; G/100ML
INJECTION INTRAVENOUS CONTINUOUS
Status: DISCONTINUED | OUTPATIENT
Start: 2021-01-13 | End: 2021-01-13

## 2021-01-13 RX ORDER — HEPARIN SODIUM 5000 [USP'U]/ML
80 INJECTION INTRAVENOUS; SUBCUTANEOUS ONCE
Status: DISCONTINUED | OUTPATIENT
Start: 2021-01-13 | End: 2021-01-13

## 2021-01-13 RX ORDER — SODIUM CHLORIDE, SODIUM LACTATE, POTASSIUM CHLORIDE, CALCIUM CHLORIDE 600; 310; 30; 20 MG/100ML; MG/100ML; MG/100ML; MG/100ML
INJECTION, SOLUTION INTRAVENOUS CONTINUOUS
Status: DISCONTINUED | OUTPATIENT
Start: 2021-01-13 | End: 2021-01-14

## 2021-01-13 RX ORDER — HEPARIN SODIUM 5000 [USP'U]/ML
7500 INJECTION, SOLUTION INTRAVENOUS; SUBCUTANEOUS EVERY 8 HOURS SCHEDULED
Status: DISCONTINUED | OUTPATIENT
Start: 2021-01-13 | End: 2021-01-29

## 2021-01-13 RX ORDER — METOPROLOL SUCCINATE 50 MG/1
50 TABLET, EXTENDED RELEASE ORAL 2 TIMES DAILY
Status: DISCONTINUED | OUTPATIENT
Start: 2021-01-13 | End: 2021-01-29

## 2021-01-13 RX ORDER — PRAVASTATIN SODIUM 20 MG
20 TABLET ORAL NIGHTLY
Status: DISCONTINUED | OUTPATIENT
Start: 2021-01-13 | End: 2021-01-29

## 2021-01-13 RX ORDER — HEPARIN SODIUM AND DEXTROSE 10000; 5 [USP'U]/100ML; G/100ML
18 INJECTION INTRAVENOUS ONCE
Status: DISCONTINUED | OUTPATIENT
Start: 2021-01-13 | End: 2021-01-13

## 2021-01-13 RX ORDER — LEVOTHYROXINE SODIUM 0.15 MG/1
150 TABLET ORAL
Status: DISCONTINUED | OUTPATIENT
Start: 2021-01-13 | End: 2021-01-29

## 2021-01-13 NOTE — PROGRESS NOTES
[facility]  Nephrology Progress Note    Kacey Norris Attending:  Jason James, DO       Assessment and Plan:    1) AMINATA- due to volume depletion/prerenal azotemia in the setting of probable urinary tract infection/sepsis; does not appear volume o edema  Neurologic: Cranial nerves grossly intact, moving all extremities  Skin: Warm and dry, no rashes       Labs:   Lab Results   Component Value Date    WBC 3.0 01/13/2021    HGB 7.7 01/13/2021    HCT 26.0 01/13/2021    .0 01/13/2021    CREATSERU

## 2021-01-13 NOTE — PHYSICAL THERAPY NOTE
PHYSICAL THERAPY EVALUATION - INPATIENT     Room Number: 466/466-A  Evaluation Date: 1/13/2021  Type of Evaluation: Initial  Physician Order: PT Eval and Treat    Presenting Problem: ARF, TME  Reason for Therapy: Mobility Dysfunction and Discharge Pl History:   Procedure Laterality Date   •      • CHOLECYSTECTOMY  [address] EDW   • COLON RESECTION LEFT N/A 2017    Performed by Leona Méndez MD at [facility] MAIN OR   • COLONOSCOPY  2019   • COLONOSCOPY N/A 2017    Performed by except for the following:    Right Knee extension  3+/5  Left Knee extension  3+/5  Right Dorsiflexion  4/5  Left Dorsiflexion  4/5    BALANCE  Static Sitting: Fair  Dynamic Sitting: Fair  Static Standing: Poor +  Dynamic Standing: Poor    ADDITIONAL TESTS pattern and excessive kyphosis. Pt given VC for posture and sequencing. Pt returned to sitting. Pt performed second trial sit-stand with RW and MOD assist. Pt stood statically for 2 min with MIN assist for further pericare by RN.  Pt reports dyspnea with ac rehabilitation patient should achieve MOD I level in functional mobility. DISCHARGE RECOMMENDATIONS  PT Discharge Recommendations: Sub-acute rehabilitation(ELOS 12-14)    PLAN  PT Treatment Plan: Bed mobility; Endurance; Energy conservation;Patient educatio

## 2021-01-13 NOTE — PLAN OF CARE
Received care of patient at 0730. Patient is A&OX3-4, forgetful at times. 2L NC. NSR/ ST on tele. VSS. Denies pain. Tolerating PO. VQ scan to be done. Will continue to monitor. Family updated, verbalized understanding.

## 2021-01-13 NOTE — PLAN OF CARE
Pt received agitated/restless, she had pulled off her restraints and ripped her IV out and threw it across the room. Precedex restarted. Pt now calm. Repeat blood sugar 72- s. Ronny updated, dextrose added to ivf and haldol modified to q4 prn.    Eliseo panda

## 2021-01-13 NOTE — PROGRESS NOTES
[facility]  Progress Note     Haris López Patient Status:  Inpatient    1945 MRN [id number]   [facility] 4SW-A Attending Marc Hernández MD   Hosp Day # 2 PCP Maury Arellano MD     Chief Complaint: AMS    S: Patient se --    TP 7.1 7.4 6.7  --        Estimated Creatinine Clearance: 18.5 mL/min (A) (based on SCr of 2.56 mg/dL (H)). No results for input(s): PTP, INR in the last 168 hours.     Recent Labs   Lab 01/11/21  1235   TROP <0.045            Imaging: Imaging data Need for Inpatient Hospitalization - Initial Certification    Patient will require inpatient services that will reasonably be expected to span two midnight's based on the clinical documentation in H+P.    Based on patients current state of illness, I antici

## 2021-01-13 NOTE — OCCUPATIONAL THERAPY NOTE
OCCUPATIONAL THERAPY EVALUATION - INPATIENT     Room Number: 466/466-A  Evaluation Date: 1/13/2021  Type of Evaluation: Initial  Presenting Problem: Resp Failure    Physician Order: IP Consult to Occupational Therapy  Reason for Therapy: ADL/IADL Dysfuncti at [facility] MAIN OR   • COLONOSCOPY  11/07/2019   • COLONOSCOPY N/A 2/28/2017    Performed by Trent Mendoza MD at [facility] N/A 2/5/2020    Performed by Sarita Durbin MD at [facility]     • [facility] ADDITIONAL TESTS                                    NEUROLOGICAL FINDINGS                   ACTIVITY TOLERANCE                         O2 SATURATIONS                ACTIVITIES OF DAILY LIVING ASSESSMENT  AM-PAC ‘6-Clicks’ Inpatient Daily Activity Short activities of daily living, rest and sleep, work, leisure and social participation.      The patient is functioning below her previous functional level and would benefit from skilled inpatient OT to address the above deficits, maximizing patient’s ability t routine    PPE worn: hair net, goggles, surgical mask, iso gown, gloves.   Pt unable to tolerate mask

## 2021-01-13 NOTE — PROGRESS NOTES
Critical Care Progress Note     Assessment / Plan:  1.  Acute on chronic hypercapnic respiratory failure   - O2 supplementation during the day and Bipap at night  - hx of GAYLE but does not use CPAP  - COVID PCR and rapid negative    - on isolation due to [facility] appropriate in responses    Medications:  Reviewed in EMR    Lab Data:  Reviewed in EMR    Imaging:  I independently visualized all relevant chest imaging in PACS and agree with radiology interpretation except where noted.

## 2021-01-14 LAB
ANION GAP SERPL CALC-SCNC: <0 MMOL/L (ref 0–18)
BUN BLD-MCNC: 47 MG/DL (ref 7–18)
BUN/CREAT SERPL: 22.2 (ref 10–20)
CALCIUM BLD-MCNC: 7.8 MG/DL (ref 8.5–10.1)
CHLORIDE SERPL-SCNC: 106 MMOL/L (ref 98–112)
CO2 SERPL-SCNC: 39 MMOL/L (ref 21–32)
CREAT BLD-MCNC: 2.12 MG/DL
DEPRECATED RDW RBC AUTO: 52.9 FL (ref 35.1–46.3)
ERYTHROCYTE [DISTWIDTH] IN BLOOD BY AUTOMATED COUNT: 14.4 % (ref 11–15)
GLUCOSE BLD-MCNC: 105 MG/DL (ref 70–99)
GLUCOSE BLD-MCNC: 129 MG/DL (ref 70–99)
GLUCOSE BLD-MCNC: 135 MG/DL (ref 70–99)
GLUCOSE BLD-MCNC: 169 MG/DL (ref 70–99)
GLUCOSE BLD-MCNC: 99 MG/DL (ref 70–99)
HCT VFR BLD AUTO: 27.8 %
HGB BLD-MCNC: 8 G/DL
MCH RBC QN AUTO: 28.9 PG (ref 26–34)
MCHC RBC AUTO-ENTMCNC: 28.8 G/DL (ref 31–37)
MCV RBC AUTO: 100.4 FL
OSMOLALITY SERPL CALC.SUM OF ELEC: 311 MOSM/KG (ref 275–295)
PLATELET # BLD AUTO: 145 10(3)UL (ref 150–450)
POTASSIUM SERPL-SCNC: 4.6 MMOL/L (ref 3.5–5.1)
RBC # BLD AUTO: 2.77 X10(6)UL
SODIUM SERPL-SCNC: 144 MMOL/L (ref 136–145)
WBC # BLD AUTO: 3.7 X10(3) UL (ref 4–11)

## 2021-01-14 PROCEDURE — 99233 SBSQ HOSP IP/OBS HIGH 50: CPT | Performed by: INTERNAL MEDICINE

## 2021-01-14 PROCEDURE — 99232 SBSQ HOSP IP/OBS MODERATE 35: CPT | Performed by: HOSPITALIST

## 2021-01-14 NOTE — CM/SW NOTE
Anticipate pt will be able to transfer out of ICU today. Updates sent to the [facility]. Pt is accepted there pending bed availability.  sw left VM to spouse to confirm return to [facility] and alternate facility choice if there is no bed available- awaiting

## 2021-01-14 NOTE — PROGRESS NOTES
Before Your Surgery      Call your surgeon if there is any change in your health. This includes signs of a cold or flu (such as a sore throat, runny nose, cough, rash or fever).    Do not smoke, drink alcohol or take over the counter medicine (unless your surgeon or primary care doctor tells you to) for the 24 hours before and after surgery.    If you take prescribed drugs: Follow your doctor s orders about which medicines to take and which to stop until after surgery.    Eating and drinking prior to surgery: follow the instructions from your surgeon    Take a shower or bath the night before surgery. Use the soap your surgeon gave you to gently clean your skin. If you do not have soap from your surgeon, use your regular soap. Do not shave or scrub the surgery site.  Wear clean pajamas and have clean sheets on your bed.    [facility]  Nephrology Progress Note    Lizbet Thompson Attending:  Lianet Olson DO       Assessment and Plan:    1) AMINATA- due to volume depletion/prerenal azotemia in the setting of probable urinary tract infection/sepsis- improving.  PLAN-  IV extremities  Skin: Warm and dry, no rashes       Labs:   Lab Results   Component Value Date    WBC 3.7 01/14/2021    HGB 8.0 01/14/2021    HCT 27.8 01/14/2021    .0 01/14/2021    CREATSERUM 2.12 01/14/2021    BUN 47 01/14/2021     01/14/2021 family by bedside.           Severiano Guerra  1/14/2021  1129 AM

## 2021-01-14 NOTE — HOME CARE LIAISON
Patient is pending with [address] [place] [address] Dr. Patient will need PATSY order entered on or before date of discharge.

## 2021-01-14 NOTE — PROGRESS NOTES
Critical Care Progress Note     Assessment / Plan:  1.  Acute on chronic hypercapnic respiratory failure   - O2 supplementation during the day and Bipap at night  - hx of GAYLE but does not use CPAP  - COVID PCR and rapid negative    - on isolation due to [facility] Data:  Reviewed in EMR    Imaging:  I independently visualized all relevant chest imaging in PACS and agree with radiology interpretation except where noted.

## 2021-01-14 NOTE — CM/SW NOTE
rico received call from spouse who confirms pt will return to the [facility] upon DC. sw notified him that the [facility] has accepted pt back pending a bed being available upon DC. sw notified him that additional referrals have been started in the event that the

## 2021-01-14 NOTE — RESPIRATORY THERAPY NOTE
Patient has refused to wear BIPAP c/o \"it makes too much noise. \" RN aware, will continue to monitor on NC, bipap on standby.

## 2021-01-14 NOTE — PHYSICAL THERAPY NOTE
PHYSICAL THERAPY TREATMENT NOTE - INPATIENT    Room Number: 466/466-A     Session: 1  Number of Visits to Meet Established Goals: 5    Presenting Problem: ARF, TME   History related to current admission: Pt is a 76 y.o. female admitted from [place] [address] 1/11/21 wi RESECTION LEFT N/A 8/26/2017    Performed by Nuris John MD at [facility]   • COLONOSCOPY  11/07/2019   • COLONOSCOPY N/A 2/28/2017    Performed by Carla Mratinez MD at [facility] ENDOSCOPY   • COLOSTOMY TAKEDOWN N/A 2/5/2020    Performed by Opal Malin railing?: Total       AM-PAC Score:  Raw Score: 11   Approx Degree of Impairment: 72.57%   Standardized Score (AM-PAC Scale): 33.86   CMS Modifier (G-Code): CL    FUNCTIONAL ABILITY STATUS  Gait Assessment   Gait Assistance:  Moderate assistance  Distance ( Discharge Recommendations: Sub-acute rehabilitation(ELOS 12-14)     PLAN  PT Treatment Plan: Bed mobility; Endurance; Energy conservation;Patient education;Gait training;Strengthening;Transfer training;Balance training  Rehab Potential : Good  Frequency (Obs

## 2021-01-14 NOTE — PROGRESS NOTES
[facility]  Progress Note     Natashakiana Cruz Patient Status:  Inpatient    1945 MRN [id number]   [facility] 4SW-A Attending Anita Alves MD   [facility] Day # 3 PCP Tee Granados MD     Chief Complaint: AMS    S: Patient se AST 19 16 18  --   --    ALT 15 12* 14  --   --    BILT 0.6 0.7 0.6  --   --    TP 7.1 7.4 6.7  --   --        Estimated Creatinine Clearance: 22.3 mL/min (A) (based on SCr of 2.12 mg/dL (H)). No results for input(s): PTP, INR in the last 168 hours. expected to be discharge to: [facility]    Plan of care discussed with pt and RN.     Connor Bone DO          **Certification      PHYSICIAN Certification of Need for Inpatient Hospitalization - Initial Certification    Patient will require inpatient services th

## 2021-01-14 NOTE — PLAN OF CARE
Pt received on 2L nc. Denies pain/shortness of breath. C/o loose stools- cdiff (-)  02 titrated for sats >92%. Productive cough- clear secretions per patient. Refusing bipap overnight because of cough. Dextrose d/cd from ivf- hypoglycemia resolved.

## 2021-01-15 LAB
ANION GAP SERPL CALC-SCNC: <0 MMOL/L (ref 0–18)
BUN BLD-MCNC: 40 MG/DL (ref 7–18)
BUN/CREAT SERPL: 20.3 (ref 10–20)
CALCIUM BLD-MCNC: 8.1 MG/DL (ref 8.5–10.1)
CHLORIDE SERPL-SCNC: 106 MMOL/L (ref 98–112)
CO2 SERPL-SCNC: 40 MMOL/L (ref 21–32)
CREAT BLD-MCNC: 1.97 MG/DL
DEPRECATED RDW RBC AUTO: 54.2 FL (ref 35.1–46.3)
ERYTHROCYTE [DISTWIDTH] IN BLOOD BY AUTOMATED COUNT: 14.2 % (ref 11–15)
GLUCOSE BLD-MCNC: 108 MG/DL (ref 70–99)
GLUCOSE BLD-MCNC: 129 MG/DL (ref 70–99)
GLUCOSE BLD-MCNC: 138 MG/DL (ref 70–99)
GLUCOSE BLD-MCNC: 175 MG/DL (ref 70–99)
GLUCOSE BLD-MCNC: 184 MG/DL (ref 70–99)
HAV IGM SER QL: 1.8 MG/DL (ref 1.6–2.6)
HCT VFR BLD AUTO: 29.1 %
HGB BLD-MCNC: 8.1 G/DL
MCH RBC QN AUTO: 28.9 PG (ref 26–34)
MCHC RBC AUTO-ENTMCNC: 27.8 G/DL (ref 31–37)
MCV RBC AUTO: 103.9 FL
OSMOLALITY SERPL CALC.SUM OF ELEC: 311 MOSM/KG (ref 275–295)
PHOSPHATE SERPL-MCNC: 3.3 MG/DL (ref 2.5–4.9)
PLATELET # BLD AUTO: 137 10(3)UL (ref 150–450)
POTASSIUM SERPL-SCNC: 5.3 MMOL/L (ref 3.5–5.1)
RBC # BLD AUTO: 2.8 X10(6)UL
SODIUM SERPL-SCNC: 145 MMOL/L (ref 136–145)
WBC # BLD AUTO: 3.6 X10(3) UL (ref 4–11)

## 2021-01-15 PROCEDURE — 99232 SBSQ HOSP IP/OBS MODERATE 35: CPT | Performed by: HOSPITALIST

## 2021-01-15 PROCEDURE — 99233 SBSQ HOSP IP/OBS HIGH 50: CPT | Performed by: INTERNAL MEDICINE

## 2021-01-15 RX ORDER — FUROSEMIDE 10 MG/ML
40 INJECTION INTRAMUSCULAR; INTRAVENOUS ONCE
Status: COMPLETED | OUTPATIENT
Start: 2021-01-15 | End: 2021-01-15

## 2021-01-15 NOTE — PLAN OF CARE
A/ox4. On 3L nc. Placed on avaps for sleep. Hemodynamically stable. Pt with orders to transfer to the floor, awaiting open bed.

## 2021-01-15 NOTE — OCCUPATIONAL THERAPY NOTE
OCCUPATIONAL THERAPY TREATMENT NOTE - INPATIENT     Room Number: 331/889-U  Session: 2  Number of Visits to Meet Established Goals: 5    Presenting Problem: Resp Failure    History related to current admission: Pt is C 8 year old female admit on 1/11/2021 [facility] ENDOSCOPY   • COLOSTOMY TAKEDOWN N/A 2/5/2020    Performed by João Aldrich MD at [facility]     • CYSTOURETHROSCOPY  3-1-11    cysto flow US, dr Landa Cushing   • EYE SURGERY      eye   • FOOT SURGERY      foot   • HERNIA SURGERY increase independence with ADLs. Patient received sitting in chair. Noted patient was soiled and patient reports she is unable to hold it. Sit to stand from chair with min A.   Tolerated standing x 2 min during kt-car with c/o fatigue and requested t supervision  Patient will transfer from sit to stand:  with supervision  Patient will transfer to toilet:  with supervision     UE Exercise Program Goal  Patient will be supervision with bilateral AROM HEP (home exercise program).     Additional Goals:  Pt

## 2021-01-15 NOTE — DIETARY NOTE
[facility] ASSESSMENT    Pt does not meet malnutrition criteria at this time.     NUTRITION DIAGNOSIS/PROBLEM:    Inadequate energy intake related to decreased ability to consume sufficient energy intakes (decreased appetite, SOB oz)  12/28/20 : 127.2 kg (280 lb 6.4 oz)  12/26/20 : 132.9 kg (292 lb 15.9 oz)  12/01/20 : 119.3 kg (263 lb)  06/19/20 : 119.3 kg (263 lb)  02/05/20 : 127.1 kg (280 lb 3.3 oz)  01/23/20 : 127.9 kg (282 lb)  12/12/19 : 122.5 kg (270 lb)    NUTRITION:  Diet:

## 2021-01-15 NOTE — PROGRESS NOTES
Patient tolerated the bipap well for majority of the night. At this point would like to keep it off. Patient is now on 3 lpm nasal cannula. SpO2 100%. Will continue to monitor.       01/15/21 0354   BiPAP   $ RT Standby Charge (per 15 min) 1  (standby)

## 2021-01-15 NOTE — PROGRESS NOTES
[facility]  Progress Note     Jeaneth Montemayor Patient Status:  Inpatient    1945 MRN [id number]   [facility] 4SW-A Attending Truman Simons MD   [facility] Day # 4 PCP Regina Tipton MD     Chief Complaint: AMS    S: Patient se 4.1 4.6 5.3*    103 102 105 106 106   CO2 36.0* 35.0* 39.0* 38.0* 39.0* 40.0*   ALKPHO 103 109 94  --   --   --    AST 19 16 18  --   --   --    ALT 15 12* 14  --   --   --    BILT 0.6 0.7 0.6  --   --   --    TP 7.1 7.4 6.7  --   --   --        Salma obesity  1.  BMI 41    Quality:  · DVT Prophylaxis: heparin  · CODE status: Full  · Tinsley: no  · Central line: no  · If COVID testing is negative, may discontinue isolation: yes     Will the patient be referred to [facility] on discharge?: tbd  Estimated date of d

## 2021-01-15 NOTE — PHYSICAL THERAPY NOTE
PHYSICAL THERAPY TREATMENT NOTE - INPATIENT    Room Number: 466/466-A     Session: 2  Number of Visits to Meet Established Goals: 5    Presenting Problem: ARF, TME   History related to current admission: Pt is a 76 y.o. female admitted from [place] [address] 1/11/21 wi RESECTION LEFT N/A 8/26/2017    Performed by Jv Persaud MD at [address] [place] [address]   • COLONOSCOPY  11/07/2019   • COLONOSCOPY N/A 2/28/2017    Performed by Leobardo Walker MD at [facility] ENDOSCOPY   • COLOSTOMY TAKEDOWN N/A 2/5/2020    Performed by Hieu Carey wheelchair)?: A Lot   -   Need to walk in hospital room?: A Lot   -   Climbing 3-5 steps with a railing?: Total       AM-PAC Score:  Raw Score: 11   Approx Degree of Impairment: 72.57%   Standardized Score (AM-PAC Scale): 33.86   CMS Modifier (G-Code): CL above deficits to assist patient in returning to prior to level of function. Subacute rehab is recommended for 12-14 days. After this period of rehabilitation patient should achieve MOD I level in functional mobility.     DISCHARGE RECOMMENDATIONS  PT Disch

## 2021-01-15 NOTE — PROGRESS NOTES
[facility]  Nephrology Progress Note    Jorge Rincon Attending:  Pedro Greenwood DO       Assessment and Plan:    1) AMINATA- due to volume depletion/prerenal azotemia in the setting of probable urinary tract infection/sepsis- improving.       2) Acute Value Date    WBC 3.6 01/15/2021    HGB 8.1 01/15/2021    HCT 29.1 01/15/2021    .0 01/15/2021    CREATSERUM 1.97 01/15/2021    BUN 40 01/15/2021     01/15/2021    K 5.3 01/15/2021     01/15/2021    CO2 40.0 01/15/2021     01/15/2

## 2021-01-15 NOTE — OCCUPATIONAL THERAPY NOTE
OCCUPATIONAL THERAPY TREATMENT NOTE - INPATIENT     Room Number: 466/466-A  Session: 1   Number of Visits to Meet Established Goals: 5    Presenting Problem: Resp Failure    History related to current admission: Pt is a 76year old female admit on 1/11/202 at [facility] ENDOSCOPY   • COLOSTOMY TAKEDOWN N/A 2/5/2020    Performed by Katelyn Lainez MD at [facility]     • CYSTOURETHROSCOPY  3-1-11    cysto flow US, dr Mary Tobias   • EYE SURGERY      eye   • FOOT SURGERY      foot   • HERNIA SURG therapist assist pt to complete therex up in chair for BUE and BLE. Patient End of Session: Up in chair;Needs met;Call light within reach; All patient questions and concerns addressed;SCDs in place; Alarm set; Family present    ASSESSMENT   Patient is a 76 y

## 2021-01-15 NOTE — PLAN OF CARE
Alert and oriented x 4. Denies pain. Tolerating 3L nasal cannula comfortably; denies shortness of breath. Up to chair with 2 assist and walker. Tolerating diabetic diet.  Accuchecks ACHS--patient refused dinner glucose check and insisted she was not eating

## 2021-01-15 NOTE — PROGRESS NOTES
Pulmonary Progress Note        NAME: Rohan Justice - ROOM: 039/650-F - MRN: [id number] - Age: 76year old - : 1945    Past Medical History:   Diagnosis Date   • Anesthesia complication     patient states 'it took me 6 hours to wake up after my h (121.2 kg)   SpO2 98%   BMI 41.85 kg/m²   General:  Alert, no distress   Lungs:   Clear to auscultation bilaterally. Heart:   S1, S2 normal,    Abdomen:   Soft, non-tender, non distended   Extremities:  no cyanosis or edema.    Neurologic: Oriented  Times

## 2021-01-15 NOTE — PLAN OF CARE
Alert and oriented x 4, slightly forgetful at times. Denies pain. Tolerating 3L nasal cannula comfortably; denies shortness of breath. Up to chair with 2 assist and walker. K 5.3, IV lasix administered. Purewick in place.  Liquid brown stool, Cdiff negative

## 2021-01-16 LAB
ANION GAP SERPL CALC-SCNC: <0 MMOL/L (ref 0–18)
BUN BLD-MCNC: 34 MG/DL (ref 7–18)
BUN/CREAT SERPL: 18.3 (ref 10–20)
CALCIUM BLD-MCNC: 8.4 MG/DL (ref 8.5–10.1)
CHLORIDE SERPL-SCNC: 105 MMOL/L (ref 98–112)
CO2 SERPL-SCNC: 42 MMOL/L (ref 21–32)
CREAT BLD-MCNC: 1.86 MG/DL
GLUCOSE BLD-MCNC: 121 MG/DL (ref 70–99)
GLUCOSE BLD-MCNC: 137 MG/DL (ref 70–99)
GLUCOSE BLD-MCNC: 154 MG/DL (ref 70–99)
GLUCOSE BLD-MCNC: 164 MG/DL (ref 70–99)
GLUCOSE BLD-MCNC: 167 MG/DL (ref 70–99)
GLUCOSE BLD-MCNC: 187 MG/DL (ref 70–99)
OSMOLALITY SERPL CALC.SUM OF ELEC: 308 MOSM/KG (ref 275–295)
POTASSIUM SERPL-SCNC: 5.1 MMOL/L (ref 3.5–5.1)
SODIUM SERPL-SCNC: 144 MMOL/L (ref 136–145)

## 2021-01-16 PROCEDURE — 99232 SBSQ HOSP IP/OBS MODERATE 35: CPT | Performed by: INTERNAL MEDICINE

## 2021-01-16 PROCEDURE — 99232 SBSQ HOSP IP/OBS MODERATE 35: CPT | Performed by: HOSPITALIST

## 2021-01-16 NOTE — PLAN OF CARE
Pt is A/O x4, forgetful at times. Glasses. VSS. Maintaining O2 sat above 92% on 2L NC, will wean as tolerated. GAYLE/CPAP at night. NSR on tele. Heparin. Standing DW. Incontinent of bowel and bladder. Perineum red, cream applied. Pt denies pain or n/v.  Up wi of electrolyte imbalances  - Administer electrolyte replacement as ordered  - Monitor response to electrolyte replacements, including rhythm and repeat lab results as appropriate  - Fluid restriction as ordered  - Instruct patient on fluid and nutrition re

## 2021-01-16 NOTE — PROGRESS NOTES
[facility]  Progress Note     Laxmi Crape Patient Status:  Inpatient    1945 MRN [id number]   [facility] 4SW-A Attending Whitney Heck MD   [facility] Day # 5 PCP Ayesha Caal MD     Chief Complaint: AMS    S: Patient se --     139 144   < > 144 145 144   K 6.1* 6.2* 5.1   < > 4.6 5.3* 5.1    103 102   < > 106 106 105   CO2 36.0* 35.0* 39.0*   < > 39.0* 40.0* 42.0*   ALKPHO 103 109 94  --   --   --   --    AST 19 16 18  --   --   --   --    ALT 15 12* 14  -- now   15. H/o uterine cancer  14. H/o PAF  15. HTN  1. BB  16. DL  1. Statin   17. Morbid obesity  1.  BMI 41    Quality:  · DVT Prophylaxis: heparin  · CODE status: Full  · Tinsley: no  · Central line: no  · If COVID testing is negative, may discontinue isol

## 2021-01-16 NOTE — PROGRESS NOTES
[facility]  Nephrology Progress Note    Catarino Andino Patient Status:  Inpatient    1945 MRN [id number]   [facility] 3NW-A Attending Rocky Null DO   Hosp Day # 5 PCP Zia Weeks MD       SUBJECTIVE:  Feels better tod 2.80* 3.07* 2.56* 2.12* 1.97*   CA 8.6 9.1 8.4* 7.8* 8.1*   MG  --   --  1.7  --  1.8   PHOS  --   --  2.5  --  3.3   GLU 84 37* 97 105* 129*       Recent Labs   Lab 01/11/21  0750 01/11/21  1235 01/12/21  0431   ALT 15 12* 14   AST 19 16 18   ALB 2.5* 2.5 up today. No intervention needed, will follow2    Questions/concerns were discussed with patient and/or family by bedside.           Teodoro Gonzales  1/16/2021  9:31 AM

## 2021-01-16 NOTE — PLAN OF CARE
Alert and oriented. Resting in bed, denies pain. Midline with minimal blood return, not enough for ordered lab. Reordered lab to be drawn by . VSS, will continue to monitor.     Problem: Delirium  Goal: Minimize duration of delirium  Description prescribed range  Description: INTERVENTIONS:  - Monitor Blood Glucose as ordered  - Assess for signs and symptoms of hyperglycemia and hypoglycemia  - Administer ordered medications to maintain glucose within target range  - Assess barriers to adequate nu

## 2021-01-17 LAB
ALLENS TEST: POSITIVE
ANION GAP SERPL CALC-SCNC: <0 MMOL/L (ref 0–18)
ARTERIAL BLD GAS O2 SATURATION: 85 % (ref 92–100)
ARTERIAL BLD GAS O2 SATURATION: 91 % (ref 92–100)
ARTERIAL BLD GAS O2 SATURATION: 94 % (ref 92–100)
ARTERIAL BLOOD GAS BASE EXCESS: 12 MMOL/L (ref ?–2)
ARTERIAL BLOOD GAS BASE EXCESS: 12.1 MMOL/L (ref ?–2)
ARTERIAL BLOOD GAS BASE EXCESS: 13.1 MMOL/L (ref ?–2)
ARTERIAL BLOOD GAS HCO3: 42.1 MEQ/L (ref 22–26)
ARTERIAL BLOOD GAS HCO3: 42.4 MEQ/L (ref 22–26)
ARTERIAL BLOOD GAS HCO3: 44.9 MEQ/L (ref 22–26)
ARTERIAL BLOOD GAS PCO2: 104 MM HG (ref 35–45)
ARTERIAL BLOOD GAS PCO2: 127 MM HG (ref 35–45)
ARTERIAL BLOOD GAS PCO2: 99 MM HG (ref 35–45)
ARTERIAL BLOOD GAS PH: 7.16 (ref 7.35–7.45)
ARTERIAL BLOOD GAS PH: 7.23 (ref 7.35–7.45)
ARTERIAL BLOOD GAS PH: 7.24 (ref 7.35–7.45)
ARTERIAL BLOOD GAS PO2: 53 MM HG (ref 80–105)
ARTERIAL BLOOD GAS PO2: 73 MM HG (ref 80–105)
ARTERIAL BLOOD GAS PO2: 87 MM HG (ref 80–105)
BUN BLD-MCNC: 32 MG/DL (ref 7–18)
BUN/CREAT SERPL: 20.6 (ref 10–20)
CALCIUM BLD-MCNC: 7.8 MG/DL (ref 8.5–10.1)
CALCULATED O2 SATURATION: 82 % (ref 92–100)
CALCULATED O2 SATURATION: 91 % (ref 92–100)
CALCULATED O2 SATURATION: 96 % (ref 92–100)
CARBOXYHEMOGLOBIN: 2.4 % SAT (ref 0–3)
CARBOXYHEMOGLOBIN: 2.4 % SAT (ref 0–3)
CARBOXYHEMOGLOBIN: 2.5 % SAT (ref 0–3)
CHLORIDE SERPL-SCNC: 106 MMOL/L (ref 98–112)
CO2 SERPL-SCNC: 39 MMOL/L (ref 21–32)
CREAT BLD-MCNC: 1.55 MG/DL
FIO2: 30 %
FIO2: 40 %
GLUCOSE BLD-MCNC: 109 MG/DL (ref 70–99)
GLUCOSE BLD-MCNC: 115 MG/DL (ref 70–99)
GLUCOSE BLD-MCNC: 142 MG/DL (ref 70–99)
GLUCOSE BLD-MCNC: 154 MG/DL (ref 70–99)
GLUCOSE BLD-MCNC: 172 MG/DL (ref 70–99)
INSPIRATORY TIME: 1 %
L/M: 3 L/MIN
METHEMOGLOBIN: 0.6 % SAT (ref 0.4–1.5)
METHEMOGLOBIN: 0.7 % SAT (ref 0.4–1.5)
METHEMOGLOBIN: 0.7 % SAT (ref 0.4–1.5)
OSMOLALITY SERPL CALC.SUM OF ELEC: 305 MOSM/KG (ref 275–295)
P/F RATIO: 181 MMHG
P/F RATIO: 230 MMHG
P/F RATIO: 236 MMHG
PATIENT TEMPERATURE: 96.8 F
PATIENT TEMPERATURE: 97.7 F
PATIENT TEMPERATURE: 98 F
PEEP: 6 CM H2O
PEEP: 6 CM H2O
POTASSIUM SERPL-SCNC: 4.9 MMOL/L (ref 3.5–5.1)
SODIUM SERPL-SCNC: 143 MMOL/L (ref 136–145)
TIDAL VOLUME: 500 ML
TIDAL VOLUME: 500 ML
TOTAL HEMOGLOBIN: 9.1 G/DL
TOTAL HEMOGLOBIN: 9.2 G/DL
TOTAL HEMOGLOBIN: 9.2 G/DL
TOTAL PEAK INSPIRATORY PRESSURE: 21 CM H2O
VENT RATE: 14 /MIN
VENT RATE: 14 /MIN

## 2021-01-17 PROCEDURE — 99232 SBSQ HOSP IP/OBS MODERATE 35: CPT | Performed by: HOSPITALIST

## 2021-01-17 PROCEDURE — 99232 SBSQ HOSP IP/OBS MODERATE 35: CPT | Performed by: INTERNAL MEDICINE

## 2021-01-17 PROCEDURE — 5A09457 ASSISTANCE WITH RESPIRATORY VENTILATION, 24-96 CONSECUTIVE HOURS, CONTINUOUS POSITIVE AIRWAY PRESSURE: ICD-10-PCS | Performed by: HOSPITALIST

## 2021-01-17 RX ORDER — HYDRALAZINE HYDROCHLORIDE 20 MG/ML
10 INJECTION INTRAMUSCULAR; INTRAVENOUS ONCE
Status: COMPLETED | OUTPATIENT
Start: 2021-01-17 | End: 2021-01-17

## 2021-01-17 NOTE — PLAN OF CARE
Received pt at 0700. Pt is A/O X4. Pt noted to be drowsy but arousable to speech. 0900-Pt still drowsy. Lalita TUCKER and Dr. Maureen Issa notified, ABGs ordered. 1000- ABGs resulted PH 7.16, PCO2 127, PO2- 73.  APN notified, BIPAP ordered  1030- BIPAP (AVAPS) oxygenation  Description: INTERVENTIONS:  - Assess for changes in respiratory status  - Assess for changes in mentation and behavior  - Position to facilitate oxygenation and minimize respiratory effort  - Oxygen supplementation based on oxygen saturation patient/family in tolerated functional activity level and precautions during self-care  Outcome: Progressing     Problem: Patient/Family Goals  Goal: Patient/Family Long Term Goal  Description: Patient's Long Term Goal: [facility]

## 2021-01-17 NOTE — PLAN OF CARE
Patient is alert and oriented x4. Denies any chest pain or shortness of breath. On 2L NC maintaining O2 sat above 92%. NSR on tele. Incontinent of B/B. Midline dressing c/d/i- draws blood and flushes easily. Fall precautions in place.  Call light within zarina Progressing     Problem: Delirium  Goal: Minimize duration of delirium  Description: Interventions:  - Encourage use of hearing aids, eye glasses  - Promote highest level of mobility daily  - Provide frequent reorientation  - Promote wakefulness i.e. light

## 2021-01-17 NOTE — PROGRESS NOTES
[facility]  Nephrology Progress Note    Jenny Aguilar Patient Status:  Inpatient    1945 MRN [id number]   [facility] 3NW-A Attending Lazarus Handy,    Hosp Day # 6 PCP Myrna Park MD       SUBJECTIVE:  More obtunded th --   --    PHOS 2.5  --  3.3  --   --    GLU 97 105* 129* 137* 142*       Recent Labs   Lab 01/11/21  0750 01/11/21  1235 01/12/21  0431   ALT 15 12* 14   AST 19 16 18   ALB 2.5* 2.5* 2.6*       Recent Labs   Lab 01/16/21  1404 01/16/21  1740 01/16/21  205 discussed with patient and/or family by bedside.         Pau Cortez MD  1/17/2021  1:54 PM

## 2021-01-17 NOTE — PROGRESS NOTES
Pulmonary Progress Note        NAME: Maia Francisco - ROOM: 818/272-A - MRN: [id number] - Age: 76year old - : 1945    Past Medical History:   Diagnosis Date   • Anesthesia complication     patient states 'it took me 6 hours to wake up after my h SpO2 95%   BMI 41.85 kg/m²   General:  Alert to voice and not needing intubation    Lungs:   Clear to auscultation bilaterally. Heart:   S1, S2 normal,    Abdomen:   Soft, non-tender, non distended   Extremities:  no cyanosis or edema.    Neurologic: Let CO2 retention until December 2020. Now again with acute CO2 retention related to non compliance with CPAP at night   - AVAPS   2. Obstructive sleep apnea - AHI 39. Has not been complaint with CPAP at home or here   -AVAPS for now due to above   3.  Bryanna Abdullahi

## 2021-01-17 NOTE — PROGRESS NOTES
[facility]  Progress Note     Tabitha Rosenberg Patient Status:  Inpatient    1945 MRN [id number]   [facility] 4SW-A Attending Darvin George MD   [facility] Day # 6 PCP Angi Lawson MD     Chief Complaint: AMS    S: Patient se 145 144 143   K 6.1* 6.2* 5.1   < > 5.3* 5.1 4.9    103 102   < > 106 105 106   CO2 36.0* 35.0* 39.0*   < > 40.0* 42.0* 39.0*   ALKPHO 103 109 94  --   --   --   --    AST 19 16 18  --   --   --   --    ALT 15 12* 14  --   --   --   --    BILT 0.6 0. protocol  1. Hold long acting insulin for now   13. H/o uterine cancer  14. H/o PAF  15. HTN  1. BB  16. DL  1. Statin   17. Morbid obesity  1.  BMI 41    Quality:  · DVT Prophylaxis: heparin  · CODE status: Full  · Tinsley: no  · Central line: no  · If COVID

## 2021-01-18 LAB
ALLENS TEST: POSITIVE
ANION GAP SERPL CALC-SCNC: <0 MMOL/L (ref 0–18)
ARTERIAL BLD GAS O2 SATURATION: 94 % (ref 92–100)
ARTERIAL BLOOD GAS BASE EXCESS: 12.3 MMOL/L (ref ?–2)
ARTERIAL BLOOD GAS HCO3: 41 MEQ/L (ref 22–26)
ARTERIAL BLOOD GAS PCO2: 86 MM HG (ref 35–45)
ARTERIAL BLOOD GAS PH: 7.3 (ref 7.35–7.45)
ARTERIAL BLOOD GAS PO2: 102 MM HG (ref 80–105)
BASOPHILS # BLD AUTO: 0.03 X10(3) UL (ref 0–0.2)
BASOPHILS NFR BLD AUTO: 0.8 %
BUN BLD-MCNC: 34 MG/DL (ref 7–18)
BUN/CREAT SERPL: 23.9 (ref 10–20)
CALCIUM BLD-MCNC: 9.3 MG/DL (ref 8.5–10.1)
CALCULATED O2 SATURATION: 97 % (ref 92–100)
CARBOXYHEMOGLOBIN: 2.5 % SAT (ref 0–3)
CHLORIDE SERPL-SCNC: 105 MMOL/L (ref 98–112)
CO2 SERPL-SCNC: 43 MMOL/L (ref 21–32)
CREAT BLD-MCNC: 1.42 MG/DL
DEPRECATED RDW RBC AUTO: 55.5 FL (ref 35.1–46.3)
EOSINOPHIL # BLD AUTO: 0.07 X10(3) UL (ref 0–0.7)
EOSINOPHIL NFR BLD AUTO: 1.9 %
ERYTHROCYTE [DISTWIDTH] IN BLOOD BY AUTOMATED COUNT: 14.6 % (ref 11–15)
GLUCOSE BLD-MCNC: 191 MG/DL (ref 70–99)
GLUCOSE BLD-MCNC: 201 MG/DL (ref 70–99)
GLUCOSE BLD-MCNC: 70 MG/DL (ref 70–99)
GLUCOSE BLD-MCNC: 71 MG/DL (ref 70–99)
GLUCOSE BLD-MCNC: 79 MG/DL (ref 70–99)
GLUCOSE BLD-MCNC: 81 MG/DL (ref 70–99)
GLUCOSE BLD-MCNC: 85 MG/DL (ref 70–99)
GLUCOSE BLD-MCNC: 87 MG/DL (ref 70–99)
HCT VFR BLD AUTO: 30.6 %
HGB BLD-MCNC: 8.6 G/DL
IMM GRANULOCYTES # BLD AUTO: 0.15 X10(3) UL (ref 0–1)
IMM GRANULOCYTES NFR BLD: 4.1 %
IONIZED CALCIUM: 1.22 MMOL/L (ref 1.12–1.32)
LACTIC ACID ARTERIAL: <1.6 MMOL/L (ref 0.5–2)
LYMPHOCYTES # BLD AUTO: 0.5 X10(3) UL (ref 1–4)
LYMPHOCYTES NFR BLD AUTO: 13.5 %
MCH RBC QN AUTO: 29.4 PG (ref 26–34)
MCHC RBC AUTO-ENTMCNC: 28.1 G/DL (ref 31–37)
MCV RBC AUTO: 104.4 FL
METHEMOGLOBIN: 0.6 % SAT (ref 0.4–1.5)
MONOCYTES # BLD AUTO: 0.36 X10(3) UL (ref 0.1–1)
MONOCYTES NFR BLD AUTO: 9.7 %
NEUTROPHILS # BLD AUTO: 2.59 X10 (3) UL (ref 1.5–7.7)
NEUTROPHILS # BLD AUTO: 2.59 X10(3) UL (ref 1.5–7.7)
NEUTROPHILS NFR BLD AUTO: 70 %
OSMOLALITY SERPL CALC.SUM OF ELEC: 309 MOSM/KG (ref 275–295)
PATIENT TEMPERATURE: 97.7 F
PLATELET # BLD AUTO: 122 10(3)UL (ref 150–450)
POTASSIUM BLOOD GAS: 5.2 MMOL/L (ref 3.6–5.1)
POTASSIUM SERPL-SCNC: 5.2 MMOL/L (ref 3.5–5.1)
RBC # BLD AUTO: 2.93 X10(6)UL
SODIUM BLOOD GAS: 146 MMOL/L (ref 136–144)
SODIUM SERPL-SCNC: 146 MMOL/L (ref 136–145)
TOTAL HEMOGLOBIN: 8.5 G/DL
WBC # BLD AUTO: 3.7 X10(3) UL (ref 4–11)

## 2021-01-18 PROCEDURE — 99232 SBSQ HOSP IP/OBS MODERATE 35: CPT | Performed by: HOSPITALIST

## 2021-01-18 PROCEDURE — 99232 SBSQ HOSP IP/OBS MODERATE 35: CPT | Performed by: INTERNAL MEDICINE

## 2021-01-18 RX ORDER — HYDRALAZINE HYDROCHLORIDE 20 MG/ML
10 INJECTION INTRAMUSCULAR; INTRAVENOUS EVERY 6 HOURS PRN
Status: DISCONTINUED | OUTPATIENT
Start: 2021-01-18 | End: 2021-01-29

## 2021-01-18 NOTE — PROGRESS NOTES
[facility]  Progress Note     Mark Lesser Patient Status:  Inpatient    1945 MRN [id number]   [facility] 4SW-A Attending Marbella Roldan MD   [facility] Day # 7 PCP Vinita Gusman MD     Chief Complaint: AMS    S: Patient se 39.0* 43.0*   ALKPHO 109 94  --   --   --   --    AST 16 18  --   --   --   --    ALT 12* 14  --   --   --   --    BILT 0.7 0.6  --   --   --   --    TP 7.4 6.7  --   --   --   --     < > = values in this interval not displayed.        Estimated Creatinine PAF  16. HTN  1. BB  17. DL  1. Statin   18. Morbid obesity  1.  BMI 41    Quality:  · DVT Prophylaxis: heparin  · CODE status: Full  · Tinsley: no  · Central line: no  · If COVID testing is negative, may discontinue isolation: yes     Will the patient be ref

## 2021-01-18 NOTE — PLAN OF CARE
Problem: Delirium  Goal: Minimize duration of delirium  Description: Interventions:  - Encourage use of hearing aids, eye glasses  - Promote highest level of mobility daily  - Provide frequent reorientation  - Promote wakefulness i.e. lights on, blinds o ordered  - Assess for signs and symptoms of hyperglycemia and hypoglycemia  - Administer ordered medications to maintain glucose within target range  - Assess barriers to adequate nutritional intake and initiate nutrition consult as needed  - Instruct светлана pressure of 167/60, awaiting return phone call. Safety and comfort measures provided, will monitor.

## 2021-01-18 NOTE — PROGRESS NOTES
01/18/21 0439   BiPAP   $ RT Standby Charge (per 15 min) 1   $ BiPAP in use daily Yes   Device V60   BiPAP / CPAP CE# 6027   Mode AVAPS   Interface Full face mask   Mask Size Medium   Control Settings   Oxygen Percent 40 %   Inspiratory time 1   Insp ri

## 2021-01-18 NOTE — PLAN OF CARE
Problem: Delirium  Goal: Minimize duration of delirium  Description: Interventions:  - Encourage use of hearing aids, eye glasses  - Promote highest level of mobility daily  - Provide frequent reorientation  - Promote wakefulness i.e. lights on, blinds o maintain glucose within target range  - Assess barriers to adequate nutritional intake and initiate nutrition consult as needed  - Instruct patient on self management of diabetes  Outcome: Progressing     Problem: Impaired Activities of Daily Living  Goal:

## 2021-01-18 NOTE — PROGRESS NOTES
01/18/21 0630   Provider Notification   Reason for Communication Critical value   Test/Procedure Name sepsis bpa   Provider Name Other (comment)  (dr Meryl Escalante)   Method of Communication Page   Response No new orders   Notification Time 0630       Pt's se

## 2021-01-18 NOTE — PROGRESS NOTES
[facility]  Nephrology Progress Note    Lizbet Thompson Attending:  Lianet Olson DO       Assessment and Plan:    1) AMINATA- due to volume depletion/prerenal azotemia in the setting of probable urinary tract infection/sepsis- improving.       2) Acute .0 01/18/2021    CREATSERUM 1.42 01/18/2021    BUN 34 01/18/2021     01/18/2021    K 5.2 01/18/2021     01/18/2021    CO2 43.0 01/18/2021    GLU 85 01/18/2021    CA 9.3 01/18/2021    PGLU 87 01/18/2021       Imaging:   All imaging stud

## 2021-01-18 NOTE — PROGRESS NOTES
Pulmonary Progress Note      NAME: Amy Perez - ROOM: [facility]-I - MRN: [id number] - Age: 76year old - : 1945    Assessment/Plan:  1. Acute hypercapnic respiratory failure due to altered mental status.   - On review of her labs she had no evid GI: Soft, NTND, +normoactive BS   Ext: Venous stasis changes    Recent Labs   Lab 01/18/21  0554   RBC 2.93*   HGB 8.6*   HCT 30.6*   .4*   MCH 29.4   MCHC 28.1*   RDW 14.6   NEPRELIM 2.59   WBC 3.7*   .0*     Recent Labs   Lab 01/11/21  12

## 2021-01-18 NOTE — PROGRESS NOTES
01/18/21 0815   BiPAP   $ RT Standby Charge (per 15 min) 1   $ Vent Care / Non-Invasive Subsequent Day Yes   Device V60   BiPAP / CPAP CE# `[id number]   Mode AVAPS   Interface Face mask   Mask Size Medium   Control Settings   Set Rate 18 breaths/min   Set EPA

## 2021-01-18 NOTE — PROGRESS NOTES
Attempted IP PT, per chart review and EMR, pt newly on BIPAP for increased O2, requirements, with soft wrist restraints applied. Will hold PT at this time and follow up when medically appropriate.     Kermit Emery, PT,DPT

## 2021-01-18 NOTE — CM/SW NOTE
Care Progression Note:  Active Acute Medical Issue:   Acute on chronic respiratory failure with hypercapnia ([facility] [address]   Transferred from ICU on 1/15  Now on AVAPS  Some intermittent confusion, requiring restraints    Other Contributing Medical Factors/Dx. :DM,

## 2021-01-19 LAB
ANION GAP SERPL CALC-SCNC: <0 MMOL/L (ref 0–18)
BUN BLD-MCNC: 30 MG/DL (ref 7–18)
BUN/CREAT SERPL: 19.1 (ref 10–20)
CALCIUM BLD-MCNC: 9.4 MG/DL (ref 8.5–10.1)
CHLORIDE SERPL-SCNC: 106 MMOL/L (ref 98–112)
CO2 SERPL-SCNC: 42 MMOL/L (ref 21–32)
CREAT BLD-MCNC: 1.57 MG/DL
DEPRECATED RDW RBC AUTO: 55.8 FL (ref 35.1–46.3)
ERYTHROCYTE [DISTWIDTH] IN BLOOD BY AUTOMATED COUNT: 14.8 % (ref 11–15)
GLUCOSE BLD-MCNC: 105 MG/DL (ref 70–99)
GLUCOSE BLD-MCNC: 148 MG/DL (ref 70–99)
GLUCOSE BLD-MCNC: 151 MG/DL (ref 70–99)
GLUCOSE BLD-MCNC: 173 MG/DL (ref 70–99)
GLUCOSE BLD-MCNC: 179 MG/DL (ref 70–99)
GLUCOSE BLD-MCNC: 97 MG/DL (ref 70–99)
HCT VFR BLD AUTO: 26.9 %
HGB BLD-MCNC: 7.5 G/DL
MCH RBC QN AUTO: 29.2 PG (ref 26–34)
MCHC RBC AUTO-ENTMCNC: 27.9 G/DL (ref 31–37)
MCV RBC AUTO: 104.7 FL
OSMOLALITY SERPL CALC.SUM OF ELEC: 308 MOSM/KG (ref 275–295)
PLATELET # BLD AUTO: 127 10(3)UL (ref 150–450)
POTASSIUM SERPL-SCNC: 4.9 MMOL/L (ref 3.5–5.1)
RBC # BLD AUTO: 2.57 X10(6)UL
SODIUM SERPL-SCNC: 146 MMOL/L (ref 136–145)
WBC # BLD AUTO: 4.3 X10(3) UL (ref 4–11)

## 2021-01-19 PROCEDURE — 99232 SBSQ HOSP IP/OBS MODERATE 35: CPT | Performed by: INTERNAL MEDICINE

## 2021-01-19 PROCEDURE — 99232 SBSQ HOSP IP/OBS MODERATE 35: CPT | Performed by: HOSPITALIST

## 2021-01-19 RX ORDER — HYDROXYZINE HYDROCHLORIDE 25 MG/1
25 TABLET, FILM COATED ORAL 3 TIMES DAILY PRN
Status: DISCONTINUED | OUTPATIENT
Start: 2021-01-19 | End: 2021-01-29

## 2021-01-19 NOTE — PROGRESS NOTES
[facility]  Nephrology Progress Note    Duard Krabbe Attending:  Savage Mendoza,        Assessment and Plan:    1) AMINATA- due to volume depletion/prerenal azotemia- ongoing improvement.       2) Acute on chronic hypercapnic resp failure- [facility]'s note Imaging: All imaging studies reviewed.     Meds:     •  hydrALAzine HCl (APRESOLINE) injection 10 mg, 10 mg, Intravenous, Q6H PRN    •  escitalopram (LEXAPRO) tablet 10 mg, 10 mg, Oral, Daily    •  Levothyroxine Sodium (SYNTHROID) tab 150 mcg, 150 mc

## 2021-01-19 NOTE — PHYSICAL THERAPY NOTE
PHYSICAL THERAPY TREATMENT NOTE - INPATIENT    Room Number: 144   Session: 3  Number of Visits to Meet Established Goals: 5    Presenting Problem: ARF, TME   History related to current admission: Pt is a 76 y.o. female admitted from [place] [address] 1/11/21 with AMS a LEFT N/A 8/26/2017    Performed by Kallie Parker MD at [facility] MAIN OR   • COLONOSCOPY  11/07/2019   • COLONOSCOPY N/A 2/28/2017    Performed by Santiago Moctezuma MD at [facility] N/A 2/5/2020    Performed by Abdi Medeiros MD at  Moving to and from a bed to a chair (including a wheelchair)?: A Little   -   Need to walk in hospital room?: A Lot   -   Climbing 3-5 steps with a railing?: A Lot       AM-PAC Score:  Raw Score: 15   Approx Degree of Impairment: 57.7%   Standardized Score conservation;Patient education;Gait training;Strengthening;Transfer training;Balance training  Rehab Potential : Good  Frequency (Obs): 3-5x/week    CURRENT GOALS     Goal #1 Patient is able to demonstrate supine - sit EOB @ level: minimum assistance   met

## 2021-01-19 NOTE — PROGRESS NOTES
[facility]  Progress Note     Katlin Pineda Patient Status:  Inpatient    1945 MRN [id number]   [facility] 4SW-A Attending Daphne Mathews MD   [facility] Day # 8 PCP Monika Gonzales MD     Chief Complaint: AMS    S: Patient se Imaging: Imaging data reviewed in Epic.     Medications:   • escitalopram  10 mg Oral Daily   • Levothyroxine Sodium  150 mcg Oral Before breakfast   • Metoprolol Succinate ER  50 mg Oral BID   • Pravastatin Sodium  20 mg Oral Nightly   • Heparin Sodium to be discharge to: [facility]    Plan of care discussed with patient and RN.     Johnie Reagan, DO

## 2021-01-19 NOTE — COVID NURSING ASSESSMENT
COVID-19 Daily Discharge Readiness-Nursing    O2 Sat at Rest:     %   O2 Sat with Exertion: SPO2 Ambulation on Oxygen: 85  % on Ambulation oxygen flow (liters per minute): 1  liters   Temperature max from last 24 hrs: Temp (24hrs), Av.9 °F (36.6 °C), M

## 2021-01-19 NOTE — PROGRESS NOTES
Pulmonary Progress Note      NAME: Jeaneth Montemayor - ROOM: 971/017-Z - MRN: [id number] - Age: 76year old - : 1945    Assessment/Plan:  1.  Acute hypercapnic respiratory failure  - On review of her labs she had no evidence of chronic CO2 retention .4* 104.7*   MCH 29.4 29.2   MCHC 28.1* 27.9*   RDW 14.6 14.8   NEPRELIM 2.59  --    WBC 3.7* 4.3   .0* 127.0*     Recent Labs   Lab 01/16/21  0838 01/17/21  0531 01/18/21  0554   * 142* 85   BUN 34* 32* 34*   CREATSERUM 1.86* 1.55*

## 2021-01-19 NOTE — OCCUPATIONAL THERAPY NOTE
OCCUPATIONAL THERAPY TREATMENT NOTE - INPATIENT     Room Number: 719/994-G  Session: 3   Number of Visits to Meet Established Goals: 5    Presenting Problem: Resp Failure      History related to current admission: Pt is a 76year old female admit on 1/11/2 MD [facility] ENDOSCOPY   • COLOSTOMY TAKEDOWN N/A 2/5/2020    Performed by Cameron Melara MD at [facility]     • CYSTOURETHROSCOPY  3-1-11    cysto flow US, dr Domonique Chen   • EYE SURGERY      eye   • FOOT SURGERY      foot   • HERNIA S verbal/visual/tactile cues for safety with RW use and hand placement. Pt performed functional mobility with mod assistance and RW x approx 2ft>chair. Pt required total A for extended kt-care and depends management. Pt was educated on safety precautions. training; Endurance training;UE strengthening/ROM; Functional transfer training  Rehab Potential : Good  Frequency (Obs): 3-5x/week      OT Goals: progressing as of 1/19/2021, LS     ADL Goals   Patient will perform upper body dressing:  with supervision  Pa

## 2021-01-19 NOTE — CM/SW NOTE
Discussed pt during daily rounds. Pt has been out of restraints since yesterday morning. Still requiring 5L at rest.   Updates sent to the [facility].      Elidia Ornelas RN, [address] [facility]  Extension [phone]

## 2021-01-20 ENCOUNTER — APPOINTMENT (OUTPATIENT)
Dept: GENERAL RADIOLOGY | Facility: HOSPITAL | Age: 76
DRG: 314 | End: 2021-01-20
Attending: HOSPITALIST
Payer: MEDICARE

## 2021-01-20 LAB
ANION GAP SERPL CALC-SCNC: <0 MMOL/L (ref 0–18)
BASOPHILS # BLD AUTO: 0.04 X10(3) UL (ref 0–0.2)
BASOPHILS NFR BLD AUTO: 1 %
BUN BLD-MCNC: 30 MG/DL (ref 7–18)
BUN/CREAT SERPL: 21 (ref 10–20)
CALCIUM BLD-MCNC: 8.6 MG/DL (ref 8.5–10.1)
CHLORIDE SERPL-SCNC: 105 MMOL/L (ref 98–112)
CO2 SERPL-SCNC: 43 MMOL/L (ref 21–32)
CREAT BLD-MCNC: 1.43 MG/DL
DEPRECATED RDW RBC AUTO: 57.5 FL (ref 35.1–46.3)
EOSINOPHIL # BLD AUTO: 0.1 X10(3) UL (ref 0–0.7)
EOSINOPHIL NFR BLD AUTO: 2.5 %
ERYTHROCYTE [DISTWIDTH] IN BLOOD BY AUTOMATED COUNT: 14.9 % (ref 11–15)
GLUCOSE BLD-MCNC: 101 MG/DL (ref 70–99)
GLUCOSE BLD-MCNC: 105 MG/DL (ref 70–99)
GLUCOSE BLD-MCNC: 171 MG/DL (ref 70–99)
GLUCOSE BLD-MCNC: 207 MG/DL (ref 70–99)
GLUCOSE BLD-MCNC: 217 MG/DL (ref 70–99)
HCT VFR BLD AUTO: 28.6 %
HGB BLD-MCNC: 8 G/DL
IMM GRANULOCYTES # BLD AUTO: 0.11 X10(3) UL (ref 0–1)
IMM GRANULOCYTES NFR BLD: 2.8 %
LYMPHOCYTES # BLD AUTO: 0.61 X10(3) UL (ref 1–4)
LYMPHOCYTES NFR BLD AUTO: 15.5 %
MCH RBC QN AUTO: 29.5 PG (ref 26–34)
MCHC RBC AUTO-ENTMCNC: 28 G/DL (ref 31–37)
MCV RBC AUTO: 105.5 FL
MONOCYTES # BLD AUTO: 0.48 X10(3) UL (ref 0.1–1)
MONOCYTES NFR BLD AUTO: 12.2 %
NEUTROPHILS # BLD AUTO: 2.59 X10 (3) UL (ref 1.5–7.7)
NEUTROPHILS # BLD AUTO: 2.59 X10(3) UL (ref 1.5–7.7)
NEUTROPHILS NFR BLD AUTO: 66 %
OSMOLALITY SERPL CALC.SUM OF ELEC: 307 MOSM/KG (ref 275–295)
PLATELET # BLD AUTO: 119 10(3)UL (ref 150–450)
POTASSIUM SERPL-SCNC: 4.8 MMOL/L (ref 3.5–5.1)
RBC # BLD AUTO: 2.71 X10(6)UL
SODIUM SERPL-SCNC: 145 MMOL/L (ref 136–145)
WBC # BLD AUTO: 3.9 X10(3) UL (ref 4–11)

## 2021-01-20 PROCEDURE — 71045 X-RAY EXAM CHEST 1 VIEW: CPT | Performed by: HOSPITALIST

## 2021-01-20 PROCEDURE — 99233 SBSQ HOSP IP/OBS HIGH 50: CPT | Performed by: HOSPITALIST

## 2021-01-20 RX ORDER — FUROSEMIDE 10 MG/ML
40 INJECTION INTRAMUSCULAR; INTRAVENOUS
Status: DISCONTINUED | OUTPATIENT
Start: 2021-01-20 | End: 2021-01-22

## 2021-01-20 RX ORDER — ACETAZOLAMIDE 250 MG/1
250 TABLET ORAL ONCE
Status: COMPLETED | OUTPATIENT
Start: 2021-01-20 | End: 2021-01-20

## 2021-01-20 NOTE — PHYSICAL THERAPY NOTE
PHYSICAL THERAPY TREATMENT NOTE - INPATIENT    Room Number: 351   Session: 4  Number of Visits to Meet Established Goals: 5    Presenting Problem: ARF, TME   History related to current admission: Pt is a 76 y.o. female admitted from [place] [address] 1/11/21 with AMS a LEFT N/A 8/26/2017    Performed by Nerissa Hill MD at [facility] MAIN OR   • COLONOSCOPY  11/07/2019   • COLONOSCOPY N/A 2/28/2017    Performed by Susana Martinez MD at [facility] N/A 2/5/2020    Performed by Crista Orellana MD at  wheelchair)?: A Little   -   Need to walk in hospital room?: A Lot   -   Climbing 3-5 steps with a railing?: A Lot       AM-PAC Score:  Raw Score: 15   Approx Degree of Impairment: 57.7%   Standardized Score (AM-PAC Scale): 39.45   CMS Modifier (G-Code): C 3-5x/week    CURRENT GOALS     Goal #1 Patient is able to demonstrate supine - sit EOB @ level: minimum assistance   met 1/19/21  New: supervision   Goal #2 Patient is able to demonstrate transfers EOB to/from [facility] at assistance level: minimum assistance

## 2021-01-20 NOTE — DIETARY NOTE
[facility] ASSESSMENT    Pt does not meet malnutrition criteria at this time.     NUTRITION DIAGNOSIS/PROBLEM:    Inadequate energy intake related to decreased ability to consume sufficient energy intakes as evidenced by previous Readings from Last 10 Encounters:  01/20/21 : 128 kg (282 lb 3.2 oz)  01/04/21 : 127.8 kg (281 lb 11.2 oz)  12/30/20 : 127.8 kg (281 lb 11.2 oz)  12/28/20 : 127.2 kg (280 lb 6.4 oz)  12/26/20 : 132.9 kg (292 lb 15.9 oz)  12/01/20 : 119.3 kg (263 lb)  06/19

## 2021-01-20 NOTE — PLAN OF CARE
Pt awake & on 3L NC once AVAPS removed.  at bedside. Pt ate about 50% of breakfast then C/O feeling SOB. 02 sats 88% placed back on AVAPS.

## 2021-01-20 NOTE — PROGRESS NOTES
Pulmonary Progress Note      NAME: Joann Blanton - ROOM: [address] [place] [address] - MRN: [id number] - Age: 76year old - : 1945    Assessment/Plan:  1.  Acute on chronic hypercapnic respiratory failure  - AVAPS with sleep and naps  - CXR today for ongoing dyspn Lab 01/18/21  0554 01/19/21  0740 01/20/21  0803   GLU 85 97 105*   BUN 34* 30* 30*   CREATSERUM 1.42* 1.57* 1.43*   GFRAA 42* 37* 41*   GFRNAA 36* 32* 36*   CA 9.3 9.4 8.6   * 146* 145   K 5.2* 4.9 4.8    106 105   CO2 43.0* 42.0* 43.0*

## 2021-01-20 NOTE — PLAN OF CARE
Problem: AMS d/t UTI, retaining Co2  Data: A/Ox4 during day, confused at night. Attempted to take off AVAPSx1 overnight. Was able to get to put back on. 2L during day. RA baseline. Tele, nsr. Heparin subcutaneous. BM during shift, soft. Briefed.  Aware of n in CO2, and blood pressure  Interventions:   - avaps  - IS  - medications  - sit in chair for all meals  - See additional Care Plan goals for specific interventions  Outcome: Progressing

## 2021-01-20 NOTE — CM/SW NOTE
KALIE sent updates to [address]- pt is still on AVAPS at night. [facility] cannot accept her on AVAPS- they can accept on CPAP though.   KALIE will check with nette on plans    [facility]  /[facility] Planner  [phone]    Kalie spoke to Dr. Abad Ramos

## 2021-01-20 NOTE — PROGRESS NOTES
Reviewed chest imaging. Pt appears to have b/l effusions and vascular congestion. Will discuss the possibility of diuresis with nephrology.      Donnie Eubanks MD

## 2021-01-20 NOTE — PROGRESS NOTES
Patient was placed on AVAPs for the night. No distress at this time. Routine care given.        01/20/21 0454   BiPAP   $ RT Standby Charge (per 15 min) 1   Device V60   BiPAP / CPAP CE# [id number]   Mode AVAPS   Interface Full face mask   Mask Size Medium   Cont

## 2021-01-20 NOTE — PROGRESS NOTES
[facility]  Progress Note     Jeaneth Montemayor Patient Status:  Inpatient    1945 MRN [id number]   [facility] 4SW-A Attending Fish Hendrix MD   [facility] Day # 9 PCP Regina Tipton MD     Chief Complaint: AMS    S: Patient  A Sodium  150 mcg Oral Before breakfast   • Metoprolol Succinate ER  50 mg Oral BID   • Pravastatin Sodium  20 mg Oral Nightly   • Heparin Sodium (Porcine)  7,500 Units Subcutaneous Q8H [address]   • Insulin Aspart Pen  2-10 Units Subcutaneous TID CC and HS AVAPS over noc  Needed  AVAPS  This am   3 liters now   Pt therapy MO again   Refused itz do much w/ them due to c/o fatigue  [facility] will not take her back if needs AVAPS  Discussed w/ SW     Quality:  · DVT Prophylaxis: heparin  · CODE status: Full  · F

## 2021-01-21 LAB
ATRIAL RATE: 278 BPM
GLUCOSE BLD-MCNC: 132 MG/DL (ref 70–99)
GLUCOSE BLD-MCNC: 144 MG/DL (ref 70–99)
GLUCOSE BLD-MCNC: 188 MG/DL (ref 70–99)
GLUCOSE BLD-MCNC: 216 MG/DL (ref 70–99)
Q-T INTERVAL: 326 MS
QRS DURATION: 78 MS
QTC CALCULATION (BEZET): 443 MS
R AXIS: 20 DEGREES
T AXIS: 11 DEGREES
VENTRICULAR RATE: 111 BPM

## 2021-01-21 PROCEDURE — 99233 SBSQ HOSP IP/OBS HIGH 50: CPT | Performed by: INTERNAL MEDICINE

## 2021-01-21 PROCEDURE — 99222 1ST HOSP IP/OBS MODERATE 55: CPT | Performed by: INTERNAL MEDICINE

## 2021-01-21 PROCEDURE — 99233 SBSQ HOSP IP/OBS HIGH 50: CPT | Performed by: HOSPITALIST

## 2021-01-21 RX ORDER — ASPIRIN 81 MG/1
81 TABLET, CHEWABLE ORAL DAILY
Status: DISCONTINUED | OUTPATIENT
Start: 2021-01-21 | End: 2021-01-29

## 2021-01-21 RX ORDER — DILTIAZEM HYDROCHLORIDE 5 MG/ML
5 INJECTION INTRAVENOUS
Status: DISCONTINUED | OUTPATIENT
Start: 2021-01-21 | End: 2021-01-21

## 2021-01-21 RX ORDER — ACETAZOLAMIDE 250 MG/1
250 TABLET ORAL ONCE
Status: COMPLETED | OUTPATIENT
Start: 2021-01-21 | End: 2021-01-21

## 2021-01-21 RX ORDER — DILTIAZEM HYDROCHLORIDE 5 MG/ML
10 INJECTION INTRAVENOUS EVERY 2 HOUR PRN
Status: DISCONTINUED | OUTPATIENT
Start: 2021-01-21 | End: 2021-01-29

## 2021-01-21 NOTE — PROGRESS NOTES
Pt converted to Afib rhythm at 1150; controlled rate. MD notified. Pt has hx of afib rhythm. Awaiting to hear back from MD.    4101- Spoke with hospitalist PA. PA will be by to see patient this afternoon.

## 2021-01-21 NOTE — PROGRESS NOTES
Midline IV dressing changed. Pt tolerated. Purwick in place, draining clear yellow urine. Pt fatigued, pt resting in bed. Pt repositioned with pillow support. , at 96%, Tele, Afib HR 's. PA aware. PA will round on patient this afternoon.  Will cont

## 2021-01-21 NOTE — CM/SW NOTE
Care Progression Note:  Active Acute Medical Issue:   Acute on chronic respiratory failure with hypercapnia (HCC)   Still requiring AVAPS for naps and overnight. Pulm aware need to convert to CPAP in order to discharge to [facility].    Per renal, continue diures

## 2021-01-21 NOTE — PROGRESS NOTES
Pulmonary Progress Note      NAME: Hieu Chang - ROOM: 602/583-X - MRN: [id number] - Age: 76year old - : 1945    Assessment/Plan:  1.  Acute on chronic hypercapnic respiratory failure  - AVAPS with sleep and naps  - CXR with volume, d/w renal. LE edema     Recent Labs   Lab 01/20/21  0803   RBC 2.71*   HGB 8.0*   HCT 28.6*   .5*   MCH 29.5   MCHC 28.0*   RDW 14.9   NEPRELIM 2.59   WBC 3.9*   .0*     Recent Labs   Lab 01/18/21  0554 01/19/21  0740 01/20/21  0803   GLU 85 97 105*   B

## 2021-01-21 NOTE — PLAN OF CARE
Pt alert but sleepy. Follows commands rolling in bed. Had AVAPS on & off today. Increased NC to 3L d;/t slight desaturation. Poor appetite. NSR on tele. Incontinent, total lift. Frequent caller on the light.

## 2021-01-21 NOTE — HISTORICAL OFFICE NOTE
Progress Notes  - documented in this encounter  Awa Graham MD - 07/30/2020 10:00 AM CDT  Formatting of this note might be different from the original.  7/30/2020    [address] [place] [address])  [address] [place] [address] • Smoking status: Former Smoker   Packs/day: 0.00   Types: Cigarettes   Last attempt to quit: 2012   Years since quittin.5   • Smokeless tobacco: Never Used   • Tobacco comment: Former tobacco use.  30-40 pack-year history of smoking, quit   Substance Positive for shortness of breath. Negative for cough and hemoptysis. Hematologic/Lymphatic: Does not bruise/bleed easily. Skin: Negative for rash and suspicious lesions. Musculoskeletal: Positive for joint swelling (bilateral).  Negative for arthritis stay off anticoagulation for the time being. Did offer to give her a event monitor as well but she would like to hold off particularly since prior testing that she is done has not revealed any further A. fib. 3. I will see her back in 1 year time.     Just

## 2021-01-21 NOTE — PROGRESS NOTES
[facility]  Nephrology Progress Note    Jeaneth Montemayor Attending:  Joe Trujillo,        Assessment and Plan:    1) AMINATA- due to volume depletion/prerenal azotemia- ongoing improvement.       2) Acute on chronic hypercapnic resp failure- AB[facility]'s note PRN    •  hydrALAzine HCl (APRESOLINE) injection 10 mg, 10 mg, Intravenous, Q6H PRN    •  escitalopram (LEXAPRO) tablet 10 mg, 10 mg, Oral, Daily    •  Levothyroxine Sodium (SYNTHROID) tab 150 mcg, 150 mcg, Oral, Before breakfast    •  Metoprolol Succinate

## 2021-01-21 NOTE — PROGRESS NOTES
[facility]  Progress Note     Veronica Rogel Patient Status:  Inpatient    1945 MRN [id number]   [facility] 4SW-A Attending Rosio Stevenson MD   Hosp Day # 10 PCP Garo Arzola MD     Chief Complaint: AMS    S: Patient p WBC 3.6* 3.7* 4.3 3.9*   HGB 8.1* 8.6* 7.5* 8.0*   .9* 104.4* 104.7* 105.5*   .0* 122.0* 127.0* 119.0*       Recent Labs   Lab 01/18/21  0554 01/19/21  0740 01/20/21  0803   GLU 85 97 105*   BUN 34* 30* 30*   CREATSERUM 1.42* 1.57* 1.43* Contaminant  1. Repeat blood culture negative   2. Off antibiotics  8. Recent cellulitis/bacterermia  1. Completed a course of antibiotics  9. Metabolic alkalosis  10. Hypernatremia  1. Per renal   11. Pancytopenia  1. Counts relatively stable  2.  Monitor IR/IR  Diminished BS b/l  Soft, NT, ND, +BS  +ve LE edema    Wean oxygen as able, inc activity, return to MO on dc when cleared by all.     Prince Woo  1/21/2021

## 2021-01-21 NOTE — PLAN OF CARE
Patient alert and oriented x4, Pt fatigued, relieved with rest, forgetful at times. Pt weaned from AVAP to NC 3L with RT; pt tolerated well. , at 96% on 3L. Tele, NSR, HR 80's. Pt incontinent of urine/stool, purwick on.  Pt reports she doesn't have \"muc Monitor for signs/symptoms of CO2 retention  1/21/2021 1117 by Glenn Abrams RN  Outcome: Progressing  1/21/2021 1117 by Glenn Abrams RN  Outcome: Progressing     Problem: METABOLIC/FLUID AND ELECTROLYTES - ADULT  Goal: Electrolytes maintained withi Term Goal[place]    Interventions:  - medications  - cluster care  - See additional Care Plan goals for specific interventions  1/21/2021 1117 by Lino Cano, RN  Outcome: Progressing  1/21/2021 1117 by Lino Cano, RN  Outcome: Pr

## 2021-01-21 NOTE — PROGRESS NOTES
Nurse practitioner for pt at bedside; NP stated she will call cardiology and alert them of new consult. [phone]- Paged MD to notify that 12 lead EKG is in Epic and update regarding findings. Dre Leonard with MD and updated MD regarding pt's HR.  MD faina

## 2021-01-22 LAB
ALBUMIN SERPL-MCNC: 2.7 G/DL (ref 3.4–5)
ALBUMIN/GLOB SERPL: 0.9 {RATIO} (ref 1–2)
ALP LIVER SERPL-CCNC: 67 U/L
ALT SERPL-CCNC: 14 U/L
ANION GAP SERPL CALC-SCNC: 1 MMOL/L (ref 0–18)
AST SERPL-CCNC: 14 U/L (ref 15–37)
BASOPHILS # BLD AUTO: 0.03 X10(3) UL (ref 0–0.2)
BASOPHILS NFR BLD AUTO: 0.8 %
BILIRUB SERPL-MCNC: 0.4 MG/DL (ref 0.1–2)
BUN BLD-MCNC: 35 MG/DL (ref 7–18)
BUN/CREAT SERPL: 22.6 (ref 10–20)
CALCIUM BLD-MCNC: 8.6 MG/DL (ref 8.5–10.1)
CHLORIDE SERPL-SCNC: 100 MMOL/L (ref 98–112)
CO2 SERPL-SCNC: 43 MMOL/L (ref 21–32)
CREAT BLD-MCNC: 1.55 MG/DL
DEPRECATED RDW RBC AUTO: 57.7 FL (ref 35.1–46.3)
EOSINOPHIL # BLD AUTO: 0.08 X10(3) UL (ref 0–0.7)
EOSINOPHIL NFR BLD AUTO: 2 %
ERYTHROCYTE [DISTWIDTH] IN BLOOD BY AUTOMATED COUNT: 15.3 % (ref 11–15)
GLOBULIN PLAS-MCNC: 3.1 G/DL (ref 2.8–4.4)
GLUCOSE BLD-MCNC: 119 MG/DL (ref 70–99)
GLUCOSE BLD-MCNC: 131 MG/DL (ref 70–99)
GLUCOSE BLD-MCNC: 181 MG/DL (ref 70–99)
GLUCOSE BLD-MCNC: 184 MG/DL (ref 70–99)
GLUCOSE BLD-MCNC: 230 MG/DL (ref 70–99)
HAV IGM SER QL: 1.6 MG/DL (ref 1.6–2.6)
HCT VFR BLD AUTO: 29.4 %
HGB BLD-MCNC: 8.2 G/DL
IMM GRANULOCYTES # BLD AUTO: 0.17 X10(3) UL (ref 0–1)
IMM GRANULOCYTES NFR BLD: 4.3 %
LYMPHOCYTES # BLD AUTO: 0.66 X10(3) UL (ref 1–4)
LYMPHOCYTES NFR BLD AUTO: 16.7 %
M PROTEIN MFR SERPL ELPH: 5.8 G/DL (ref 6.4–8.2)
MCH RBC QN AUTO: 29.5 PG (ref 26–34)
MCHC RBC AUTO-ENTMCNC: 27.9 G/DL (ref 31–37)
MCV RBC AUTO: 105.8 FL
MONOCYTES # BLD AUTO: 0.36 X10(3) UL (ref 0.1–1)
MONOCYTES NFR BLD AUTO: 9.1 %
NEUTROPHILS # BLD AUTO: 2.66 X10 (3) UL (ref 1.5–7.7)
NEUTROPHILS # BLD AUTO: 2.66 X10(3) UL (ref 1.5–7.7)
NEUTROPHILS NFR BLD AUTO: 67.1 %
OSMOLALITY SERPL CALC.SUM OF ELEC: 307 MOSM/KG (ref 275–295)
PLATELET # BLD AUTO: 112 10(3)UL (ref 150–450)
POTASSIUM SERPL-SCNC: 4.9 MMOL/L (ref 3.5–5.1)
RBC # BLD AUTO: 2.78 X10(6)UL
SODIUM SERPL-SCNC: 144 MMOL/L (ref 136–145)
TSI SER-ACNC: 2.7 MIU/ML (ref 0.36–3.74)
WBC # BLD AUTO: 4 X10(3) UL (ref 4–11)

## 2021-01-22 PROCEDURE — 99233 SBSQ HOSP IP/OBS HIGH 50: CPT | Performed by: INTERNAL MEDICINE

## 2021-01-22 PROCEDURE — 99233 SBSQ HOSP IP/OBS HIGH 50: CPT | Performed by: HOSPITALIST

## 2021-01-22 PROCEDURE — 99232 SBSQ HOSP IP/OBS MODERATE 35: CPT | Performed by: INTERNAL MEDICINE

## 2021-01-22 NOTE — PROGRESS NOTES
Critical Co2 43. Paged Dr. Homer Parks. Awaiting call back. 10:11: Call received. No further orders at this time.

## 2021-01-22 NOTE — PROGRESS NOTES
[facility] Cardiology Progress Note    Subjective:  Currently on AVAPS, but awake and nodding head that she is ok. .     Objective:  BP (!) 162/54 (BP Location: Right arm)   Pulse 65   Temp 98 °F (36.7 °C) (Oral)   Resp 18   Ht 170.2 cm (5' 7\")   Wt 282 lb 3. Can give 30 day even on discharge and if recurrent afib/aflutter can decide on anticoagulation or even Watchman given GIB in the past  2. Will sign off over weekend. Please call with questions.     Rell Reynolds MD  [facility]  Cardiac [place]

## 2021-01-22 NOTE — PLAN OF CARE
Patient is alert and oriented x4. Patient on 3L of oxygen. O2 sat 95%. Lung sounds are clear/diminshed. Telemtry with NSR. Abdomen is soft and non-distended. Bruising on the lower quadrants. Redness under both breasts. Redness around the kt- area.  Wound symptoms of electrolyte imbalances  - Administer electrolyte replacement as ordered  - Monitor response to electrolyte replacements, including rhythm and repeat lab results as appropriate  - Fluid restriction as ordered  - Instruct patient on fluid and nut

## 2021-01-22 NOTE — PHYSICAL THERAPY NOTE
PHYSICAL THERAPY TREATMENT NOTE - INPATIENT    Room Number: 651   Session: 5  Number of Visits to Meet Established Goals: 5    Presenting Problem: ARF, TME   History related to current admission: Pt is a 76 y.o. female admitted from [place] [address] 1/11/21 with AMS a LEFT N/A 8/26/2017    Performed by Lilly Hartman MD at [facility] OR   • COLONOSCOPY  11/07/2019   • COLONOSCOPY N/A 2/28/2017    Performed by Carlos Diego MD at [facility] N/A 2/5/2020    Performed by Javi Tejada MD at  hospital room?: Total   -   Climbing 3-5 steps with a railing?: Total       AM-PAC Score:  Raw Score: 9   Approx Degree of Impairment: 81.38%   Standardized Score (AM-PAC Scale): 30.55   CMS Modifier (G-Code): CM    FUNCTIONAL ABILITY STATUS  Gait Assessme education;Gait training;Strengthening;Transfer training;Balance training  Rehab Potential : Good  Frequency (Obs): 3-5x/week    CURRENT GOALS     Goal #1 Patient is able to demonstrate supine - sit EOB @ level: minimum assistance   met 1/19/21  New: Vi Wick

## 2021-01-22 NOTE — PROGRESS NOTES
[facility] Pulmonary, Critical Care and Sleep    Amy Perez Patient Status:  Inpatient    1945 MRN [id number]   [facility] 3NW-A Attending Karen Jackson MD   [facility] Day # 11 PCP Luis Alfredo Toscano MD       Date of Admission: 2021 11 Lungs: Clear bilaterally. Abd: Nontender, non distended. Ext: No edema. Skin: No rashes.      Recent Labs   Lab 01/19/21  0740 01/20/21  0803 01/22/21  0701   WBC 4.3 3.9* 4.0   HGB 7.5* 8.0* 8.2*   HCT 26.9* 28.6* 29.4*   .0* 119.0* 112.0*

## 2021-01-22 NOTE — PROGRESS NOTES
1344: Cardiology APN notified that pt is SB with HRs 49-50 and dipped as low as 43 while asleep on AVAPs. No further orders, continue to monitor.

## 2021-01-22 NOTE — CM/SW NOTE
Discussed pt during daily rounds. She is still requiring AVAPS at night. She will need to be on CPAP in order to discharge to [facility]. Updates sent to the [address]. PT recommendations remain for [facility].  / to remain available for [facility]

## 2021-01-22 NOTE — PROGRESS NOTES
[facility]  Progress Note     Jorge Rincon Patient Status:  Inpatient    1945 MRN [id number]   [facility] 4SW-A Attending Cleveland Barnard MD   [facility] Day # 11 PCP Mai Cardoso MD     Chief Complaint: AMS    S: No acute Epic.    Medications:   • acetaZOLAMIDE  250 mg Oral BID (Diuretic)   • dilTIAZem HCl  30 mg Oral TID   • aspirin  81 mg Oral Daily   • escitalopram  10 mg Oral Daily   • Levothyroxine Sodium  150 mcg Oral Before breakfast   • Metoprolol Succinate ER  50 m heparin  · CODE status: Full  · Tinsley: no  · Central line: no  · If COVID testing is negative, may discontinue isolation: yes     Will the patient be referred to TCC on discharge?: tbd  Estimated date of discharge: TBD  Discharge is dependent on: progress

## 2021-01-22 NOTE — CONSULTS
[facility] Cardiology  Report of Consultation    Dean Sonido Patient Status:  Inpatient    1945 MRN [id number]   [facility] 3NW-A Attending Hollis Palmer MD   Hosp Day # 10 PCP Caitlin Urena MD     Reason for C morbidly obese with BMI of 44.2 and has sleep apnea and apparently does not use CPAP at home but is on BiPAP here. She has CO2 retention which improved with positive pressure airway.   COVID-19 infection was negative but she is isolated with nursing home s Rheumatoid arthritis(714.0) 2012   • SLEEP APNEA DMG DX 1-23-12 [place] 2-9-12    AHI 39/ RDI 48/ REM AHI 71/ SaO2 75%/ CPAP 12/ [employer]   • Sleep apnea    • Small bowel obstruction ([facility] [address] 1/2017   • Visual impairment      Past Surgical History:   Procedure Drenda Fresh (LEXAPRO) tablet 10 mg, 10 mg, Oral, Daily  •  Levothyroxine Sodium (SYNTHROID) tab 150 mcg, 150 mcg, Oral, Before breakfast  •  Metoprolol Succinate ER (Toprol XL) 24 hr tab 50 mg, 50 mg, Oral, BID  •  Pravastatin Sodium (PRAVACHOL) tab 20 mg, 20 mg, Oral obesity. Gastrointestinal: Negative for any bleeding. No abdominal pain. No diarrhea. Genitourinary: No hematuria. Neurological: Alert and oriented, no headaches, no focal weakness or new paresthesias.   Allergic/Immunologic: no rhinitis or environmenta since 3pm yesterday, abnormal labs--K of 6.1  TECHNIQUE:  Noncontrast CT scanning is performed through the brain. Dose reduction techniques were used.  Dose information is transmitted to the ACR ([facility] [employer] of Radiology) Jimmy Soares [facility] Radiology Data and body habitus limits evaluation. No significant segmental perfusion defects are identified. Nonsegmental perfusion defects are seen at the lung bases which correspond to infiltrates and pleural effusions on the chest radiograph.             CONCLUSION insufficiency. Multiple medical problems. 2. History of paroxysmal atrial fibrillation/flutter with successful cardioversion few years ago, on beta-blocker and off anticoagulation since cardioversion time.   She has been in sinus rhythm for 10 days and f may need GI work-up at some point  -Would go with baby aspirin only at present time but if she goes to more A. fib then we have to think about Eliquis and GI eval  -No need for additional cardiac medications or cardiac testing at present time focus on A.  f

## 2021-01-22 NOTE — PLAN OF CARE
Pt a/ox3 upon assessment, AVAP nighty, NSR on telemetry, Cardizem dottie and prn for afib. Purewick intact w/yellow urine. Redness around kt-area. Bilat redness under breast. L heel deep tissue injury w/mepilex. Total lift, PT following.  Accu check coverage Monitor response to electrolyte replacements, including rhythm and repeat lab results as appropriate  - Fluid restriction as ordered  - Instruct patient on fluid and nutrition restrictions as appropriate  Outcome: Progressing  Goal: Glucose maintained with

## 2021-01-22 NOTE — PROGRESS NOTES
[facility]  Nephrology Progress Note    Katlin Pineda Attending:  Danilo Fuller DO       Assessment and Plan:    1) AMINATA- due to volume depletion/prerenal azotemia- improved     2) Acute on chronic hypercapnic resp failure- ABG's noted with marked 01/22/2021     01/22/2021    K 4.9 01/22/2021     01/22/2021    CO2 43.0 01/22/2021     01/22/2021    CA 8.6 01/22/2021    ALB 2.7 01/22/2021    ALKPHO 67 01/22/2021    BILT 0.4 01/22/2021    TP 5.8 01/22/2021    AST 14 01/22/2021    ALT Intravenous, Q6H PRN    •  Metoclopramide HCl (REGLAN) injection 5 mg, 5 mg, Intravenous, Q8H PRN          Questions/concerns were discussed with patient and/or family by bedside.           Severiano Guerra  1/22/2021  1124 AM

## 2021-01-23 LAB
ANION GAP SERPL CALC-SCNC: <0 MMOL/L (ref 0–18)
BUN BLD-MCNC: 34 MG/DL (ref 7–18)
BUN/CREAT SERPL: 22.8 (ref 10–20)
CALCIUM BLD-MCNC: 8.5 MG/DL (ref 8.5–10.1)
CHLORIDE SERPL-SCNC: 101 MMOL/L (ref 98–112)
CO2 SERPL-SCNC: 44 MMOL/L (ref 21–32)
CREAT BLD-MCNC: 1.49 MG/DL
GLUCOSE BLD-MCNC: 121 MG/DL (ref 70–99)
GLUCOSE BLD-MCNC: 156 MG/DL (ref 70–99)
GLUCOSE BLD-MCNC: 160 MG/DL (ref 70–99)
GLUCOSE BLD-MCNC: 196 MG/DL (ref 70–99)
GLUCOSE BLD-MCNC: 211 MG/DL (ref 70–99)
OSMOLALITY SERPL CALC.SUM OF ELEC: 307 MOSM/KG (ref 275–295)
POTASSIUM SERPL-SCNC: 4.4 MMOL/L (ref 3.5–5.1)
SODIUM SERPL-SCNC: 144 MMOL/L (ref 136–145)

## 2021-01-23 PROCEDURE — 99232 SBSQ HOSP IP/OBS MODERATE 35: CPT | Performed by: HOSPITALIST

## 2021-01-23 PROCEDURE — 99232 SBSQ HOSP IP/OBS MODERATE 35: CPT | Performed by: INTERNAL MEDICINE

## 2021-01-23 NOTE — PROGRESS NOTES
[facility]  Progress Note     Shea Jose Elias Patient Status:  Inpatient    1945 MRN [id number]   [facility] 4SW-A Attending John Rios MD   [facility] Day # 12 PCP Estelle Huddleston MD     Chief Complaint: AMS    S: Pt on JAH Oral Daily   • escitalopram  10 mg Oral Daily   • Levothyroxine Sodium  150 mcg Oral Before breakfast   • Metoprolol Succinate ER  50 mg Oral BID   • Pravastatin Sodium  20 mg Oral Nightly   • Heparin Sodium (Porcine)  7,500 Units Subcutaneous Q8H [facility]   • discharge?: tbd  Estimated date of discharge: TBD  Discharge is dependent on: progress  At this point Ms. Malcolm Salas is expected to be discharge to: [facility] of care discussed with patient and RN.       Jeanie Vazquez  1/23/2021

## 2021-01-23 NOTE — PLAN OF CARE
A&O x4, forgetful at times. Denies any CP, HELENA, or calf pain at present. VSS and afebrile. Saturating at 95% on 3L NC, unlabored and shallow breathing, diminished lung sounds bilaterally. NSR on tele. Abdomen soft, nontender, nondistended.  1800 ADA - poor normal limits  Description: INTERVENTIONS:  - Monitor labs and rhythm and assess patient for signs and symptoms of electrolyte imbalances  - Administer electrolyte replacement as ordered  - Monitor response to electrolyte replacements, including rhythm and

## 2021-01-23 NOTE — PROGRESS NOTES
[facility] Pulmonary, Critical Care and Sleep     Patient Status:  Inpatient    1945 MRN [id number]   [facility] 3NW-A Attending Casey Grant MD   [facility] Day # 12 PCP Ron Agudelo MD       Date of Admission: 2021 11 S1, S2, no S4, S3 or murmur. Lungs: Clear bilaterally. Abd: Nontender, non distended. Ext: No edema. Skin: No rashes.      Recent Labs   Lab 01/19/21  0740 01/20/21  0803 01/22/21  0701   WBC 4.3 3.9* 4.0   HGB 7.5* 8.0* 8.2*   HCT 26.9* 28.6* 29.4* Monday. My best regards,     Oj Rosa MD  Pulmonary, Critical Care and Sleep Medicine.

## 2021-01-23 NOTE — PROGRESS NOTES
[facility]  Nephrology Progress Note    Jack Young Attending:  Phil Cook,        Assessment and Plan:    1) AMINATA- due to volume depletion/prerenal azotemia- improved     2) Acute on chronic hypercapnic resp failure- ABG's noted with marked CA 8.5 01/23/2021    PGLU 156 01/23/2021       Imaging: All imaging studies reviewed.     Meds:     •  acetaZOLAMIDE (DIAMOX) oral suspension 250 mg, 250 mg, Oral, BID (Diuretic)    •  dilTIAZem HCl (cardIZEM) tab 30 mg, 30 mg, Oral, TID    •  dilTIAZem

## 2021-01-23 NOTE — PLAN OF CARE
Patient alert and oriented, but became forgetful and confused overnight. No complaint of pain. Continued on 3L O2, Avaps while sleeping. Purewick in place. +bm last night. Slightly hypertensive overnight, required hydralazine. Telemetry SR/SB.  Otherwise V

## 2021-01-24 LAB
ANION GAP SERPL CALC-SCNC: <0 MMOL/L (ref 0–18)
BUN BLD-MCNC: 33 MG/DL (ref 7–18)
BUN/CREAT SERPL: 21.7 (ref 10–20)
CALCIUM BLD-MCNC: 9 MG/DL (ref 8.5–10.1)
CHLORIDE SERPL-SCNC: 102 MMOL/L (ref 98–112)
CO2 SERPL-SCNC: 40 MMOL/L (ref 21–32)
CREAT BLD-MCNC: 1.52 MG/DL
GLUCOSE BLD-MCNC: 123 MG/DL (ref 70–99)
GLUCOSE BLD-MCNC: 130 MG/DL (ref 70–99)
GLUCOSE BLD-MCNC: 145 MG/DL (ref 70–99)
GLUCOSE BLD-MCNC: 205 MG/DL (ref 70–99)
GLUCOSE BLD-MCNC: 217 MG/DL (ref 70–99)
OSMOLALITY SERPL CALC.SUM OF ELEC: 301 MOSM/KG (ref 275–295)
POTASSIUM SERPL-SCNC: 4.7 MMOL/L (ref 3.5–5.1)
SODIUM SERPL-SCNC: 141 MMOL/L (ref 136–145)

## 2021-01-24 PROCEDURE — 99232 SBSQ HOSP IP/OBS MODERATE 35: CPT | Performed by: INTERNAL MEDICINE

## 2021-01-24 PROCEDURE — 99232 SBSQ HOSP IP/OBS MODERATE 35: CPT | Performed by: HOSPITALIST

## 2021-01-24 NOTE — PROGRESS NOTES
[facility]  Progress Note     Yeni Hernandez Patient Status:  Inpatient    1945 MRN [id number]   [facility] 4SW-A Attending Matty Bassett MD   [facility] Day # 15 PCP Jailene Hector MD     Chief Complaint: AMS    S: Pt on JAH Daily   • Levothyroxine Sodium  150 mcg Oral Before breakfast   • Metoprolol Succinate ER  50 mg Oral BID   • Pravastatin Sodium  20 mg Oral Nightly   • Heparin Sodium (Porcine)  7,500 Units Subcutaneous Q8H [address]   • Insulin Aspart Pen  2-10 Units Subcutane of discharge: 1/25  Discharge is dependent on: progress  At this point Ms. Jenna Casillas is expected to be discharge to: [facility] of care discussed with patient and RN.       Braxton Hartman MD

## 2021-01-24 NOTE — PLAN OF CARE
Pt a&ox4, forgetful at times. VSS, afebrile. Wearing 3L NC while awake, AVAPS while sleeping. Tele monitoring, NSR. Abdomen soft, nondistended. Incontinent of bowel/bladder. Purewick and brief in place. Denies pain. Mepilex to left heel.   Left limb precaut rhythm and repeat lab results as appropriate  - Fluid restriction as ordered  - Instruct patient on fluid and nutrition restrictions as appropriate  Outcome: Progressing  Goal: Glucose maintained within prescribed range  Description: INTERVENTIONS:  - Cate

## 2021-01-24 NOTE — PROGRESS NOTES
[facility]  Nephrology Progress Note    Katlin Pineda Attending:  Danilo Fuller DO       Assessment and Plan:    1) AMINATA- due to volume depletion/prerenal azotemia- improved     2) Acute on chronic hypercapnic resp failure- ABG's noted with marked  01/24/2021    CA 9.0 01/24/2021    PGLU 130 01/24/2021       Imaging: All imaging studies reviewed.     Meds:     •  acetaZOLAMIDE (DIAMOX) oral suspension 250 mg, 250 mg, Oral, BID (Diuretic)    •  dilTIAZem HCl (cardIZEM) tab 30 mg, 30 mg, Oral,

## 2021-01-24 NOTE — PROGRESS NOTES
01/24/21 0321   BiPAP   $ RT Standby Charge (per 15 min) 1   $ BiPAP in use daily Yes   Device V60   BiPAP / CPAP CE# 6096   Mode AVAPS   Interface Full face mask   Mask Size Medium   Control Settings   Set Rate 18 breaths/min   Set EPAP 6   Oxygen Perc

## 2021-01-25 LAB
ANION GAP SERPL CALC-SCNC: <0 MMOL/L (ref 0–18)
BUN BLD-MCNC: 37 MG/DL (ref 7–18)
BUN/CREAT SERPL: 21.3 (ref 10–20)
CALCIUM BLD-MCNC: 8.9 MG/DL (ref 8.5–10.1)
CHLORIDE SERPL-SCNC: 104 MMOL/L (ref 98–112)
CO2 SERPL-SCNC: 42 MMOL/L (ref 21–32)
CREAT BLD-MCNC: 1.74 MG/DL
GLUCOSE BLD-MCNC: 109 MG/DL (ref 70–99)
GLUCOSE BLD-MCNC: 124 MG/DL (ref 70–99)
GLUCOSE BLD-MCNC: 176 MG/DL (ref 70–99)
GLUCOSE BLD-MCNC: 179 MG/DL (ref 70–99)
GLUCOSE BLD-MCNC: 214 MG/DL (ref 70–99)
OSMOLALITY SERPL CALC.SUM OF ELEC: 307 MOSM/KG (ref 275–295)
POTASSIUM SERPL-SCNC: 4.4 MMOL/L (ref 3.5–5.1)
SODIUM SERPL-SCNC: 144 MMOL/L (ref 136–145)

## 2021-01-25 PROCEDURE — 99232 SBSQ HOSP IP/OBS MODERATE 35: CPT | Performed by: NURSE PRACTITIONER

## 2021-01-25 PROCEDURE — 99233 SBSQ HOSP IP/OBS HIGH 50: CPT | Performed by: INTERNAL MEDICINE

## 2021-01-25 PROCEDURE — 99232 SBSQ HOSP IP/OBS MODERATE 35: CPT | Performed by: HOSPITALIST

## 2021-01-25 NOTE — PLAN OF CARE
Pt a&ox4, VSS, afebrile. Forgetful at times, appears fatigued. 3L NC while awake, AVAPS while asleep. Tele monitoring in place, NSR/SB, carvedilol held tonigh hr <65. Incontinent of bowel/bladder, purewick and brief in place. Denies pain.  Mepilex to left h repeat lab results as appropriate  - Fluid restriction as ordered  - Instruct patient on fluid and nutrition restrictions as appropriate  Outcome: Progressing  Goal: Glucose maintained within prescribed range  Description: INTERVENTIONS:  - Monitor Blood G

## 2021-01-25 NOTE — DIETARY NOTE
[facility] ASSESSMENT    Pt does not meet malnutrition criteria at this time.     NUTRITION DIAGNOSIS/PROBLEM:    Inadequate energy intake related to decreased ability to consume sufficient energy intakes as evidenced by previous intakes. If pt is unable to tolerate coming off the BIPAP, may need to consider enteral nutrition, as medically feasible. PMH: DM, CKD, HTN, obesity, recurrent UTI, uterine CA, SBO.     ANTHROPOMETRICS:  Ht: 170.2 cm (5' 7\")  Wt: 123.3 kg (271 lb 14.4 oz

## 2021-01-25 NOTE — PROGRESS NOTES
Pulmonary Progress Note        NAME: Katlin Pineda - ROOM: [address] [place] [address]- - MRN: [id number] - Age: 76year old - : 1945        Last 24hrs: No events overnight, feels that her breathing is so-so    OBJECTIVE:   21  0445 21  0900 21 prophylaxis   - Heparin subcutaneous  - SCDs  - ADAT  6. Full code   7.  Dispo   - Will follow  -if tolerates CPAP overnight should be able to dc tomorrow from pulm perspective    Charis Schneider [facility] Pulmonary and Critical Care

## 2021-01-25 NOTE — PLAN OF CARE
Patient is alert and oriented x4. Patient is drowsy, can be forgetful. Up with max assist. NSR/Sinus dina on tele. Vital signs stable. Patient 02 sats 96% on 3L nasal cannula while awake. Uses AVAPS when asleep. Briefed/incontinent with purewick.  Last bm restriction as ordered  - Instruct patient on fluid and nutrition restrictions as appropriate  Outcome: Progressing  Goal: Glucose maintained within prescribed range  Description: INTERVENTIONS:  - Monitor Blood Glucose as ordered  - Assess for signs and s

## 2021-01-25 NOTE — PHYSICAL THERAPY NOTE
PHYSICAL THERAPY TREATMENT NOTE - INPATIENT    Room Number: 754   Session: 1/5, extend additional 5 sessions  Number of Visits to Meet Established Goals: 5    Presenting Problem: ARF, TME   History related to current admission: Pt is a 76 y.o. female admi 5-17-12 [facility]   • COLON RESECTION LEFT N/A 8/26/2017    Performed by Lilly Hartman MD at [facility]   • COLONOSCOPY  11/07/2019   • COLONOSCOPY N/A 2/28/2017    Performed by Carlos Diego MD at [facility] N/A 2/5/2020 another person does the patient currently need. ..   -   Moving to and from a bed to a chair (including a wheelchair)?: Total   -   Need to walk in hospital room?: Total   -   Climbing 3-5 steps with a railing?: Total       AM-PAC Score:  Raw Score: 8   Rafita 1/19/21  New: supervision   Goal #2 Patient is able to demonstrate transfers EOB to/from [facility] at assistance level: minimum assistance      Goal #3 Patient is able to ambulate 15 feet with assist device: walker - rolling at assistance level: minimum assistan

## 2021-01-25 NOTE — PROGRESS NOTES
[facility]  Nephrology Progress Note    Luz Elena Villa Attending:  Rikki West,        Assessment and Plan:    1) AMINATA- due to volume depletion/prerenal azotemia- renal function stable     2) Acute on chronic hypercapnic resp failure- ABG's note CO2 42.0 01/25/2021     01/25/2021    CA 8.9 01/25/2021    PGLU 124 01/25/2021       Imaging: All imaging studies reviewed.     Meds:     •  acetaZOLAMIDE (DIAMOX) oral suspension 250 mg, 250 mg, Oral, BID (Diuretic)    •  dilTIAZem HCl (cardIZEM) t

## 2021-01-25 NOTE — PROGRESS NOTES
[facility]  Cardiology Progress Note    Subjective:  No chest pain or shortness of breath.     Objective:  /54   Pulse 65   Temp 97.5 °F (36.4 °C) (Oral)   Resp 18   Ht 5' 7\" (1.702 m)   Wt 271 lb 14.4 oz (123.3 kg)   SpO2 96%   BMI 42.59 kg/m² through central scheduling. Test is ordered, see AVS.  - f/u in office Dr. Aixa Robbins or CAITLIN ~2 weeks, Dr. Rojelio Weeks ~6 weeks (complete 30 day event recorder prior).     Govind Montalvo, APRN  1/25/2021  12:44 PM

## 2021-01-25 NOTE — PROGRESS NOTES
[facility]  Progress Note     Jeaneth Montemayor Patient Status:  Inpatient    1945 MRN [id number]   [facility] 4SW-A Attending Fish Hendrix MD   [facility] Day # 15 PCP Regina Tipton MD     Chief Complaint: AMS    S: Pt  Awake --    BILT 0.4  --   --   --    TP 5.8*  --   --   --           Imaging: Imaging data reviewed in Epic.     Medications:   • aspirin  81 mg Oral Daily   • escitalopram  10 mg Oral Daily   • Levothyroxine Sodium  150 mcg Oral Before breakfast   • Metoprolol wear CPAP tonight and see what her mental status looks like in the morning.   She cannot return to the [address] [place] [address] if she requires AVAPS, still on O2  CCB dc'ed due to bradycardia  ASA only   Cr 1.74   She remains severely decompensated and unwilling to do anyt

## 2021-01-25 NOTE — CM/SW NOTE
Patient received first dose of COVID vaccine at [facility] on 1/6/21 and is due for the second dose on 1/27/21.

## 2021-01-25 NOTE — PLAN OF CARE
Patient is alert and oriented x4. Patient tends to be forgetful. Lung sounds diminished in all lobes. Uses AVAP at night. Right now the patient is using 3 L of oxygen via nasal cannula. O2 sat 96% Patient on telemetry with NSR.  Patient is incontinent of ur maintained within normal limits  Description: INTERVENTIONS:  - Monitor labs and rhythm and assess patient for signs and symptoms of electrolyte imbalances  - Administer electrolyte replacement as ordered  - Monitor response to electrolyte replacements, in

## 2021-01-25 NOTE — CM/SW NOTE
Care Progression Note:  Active Acute Medical Issue:   Acute on chronic respiratory failure with hypercapnia (HCC)   Pt continues to require 3L during day and AVAPS at night and for naps.  It was determined last week the [facility] cannot take pt on AVAPS,

## 2021-01-26 ENCOUNTER — APPOINTMENT (OUTPATIENT)
Dept: GENERAL RADIOLOGY | Facility: HOSPITAL | Age: 76
DRG: 314 | End: 2021-01-26
Attending: NURSE PRACTITIONER
Payer: MEDICARE

## 2021-01-26 DIAGNOSIS — Z23 NEED FOR VACCINATION: ICD-10-CM

## 2021-01-26 LAB
ALLENS TEST: POSITIVE
ANION GAP SERPL CALC-SCNC: <0 MMOL/L (ref 0–18)
ARTERIAL BLD GAS O2 SATURATION: 87 % (ref 92–100)
ARTERIAL BLD GAS O2 SATURATION: 93 % (ref 92–100)
ARTERIAL BLD GAS O2 SATURATION: 95 % (ref 92–100)
ARTERIAL BLOOD GAS BASE EXCESS: 11.6 MMOL/L (ref ?–2)
ARTERIAL BLOOD GAS BASE EXCESS: 13.2 MMOL/L (ref ?–2)
ARTERIAL BLOOD GAS BASE EXCESS: 13.6 MMOL/L (ref ?–2)
ARTERIAL BLOOD GAS HCO3: 41.9 MEQ/L (ref 22–26)
ARTERIAL BLOOD GAS HCO3: 42.5 MEQ/L (ref 22–26)
ARTERIAL BLOOD GAS HCO3: 44.4 MEQ/L (ref 22–26)
ARTERIAL BLOOD GAS PCO2: 132 MM HG (ref 35–45)
ARTERIAL BLOOD GAS PCO2: 90 MM HG (ref 35–45)
ARTERIAL BLOOD GAS PCO2: 94 MM HG (ref 35–45)
ARTERIAL BLOOD GAS PH: 7.14 (ref 7.35–7.45)
ARTERIAL BLOOD GAS PH: 7.27 (ref 7.35–7.45)
ARTERIAL BLOOD GAS PH: 7.29 (ref 7.35–7.45)
ARTERIAL BLOOD GAS PO2: 111 MM HG (ref 80–105)
ARTERIAL BLOOD GAS PO2: 53 MM HG (ref 80–105)
ARTERIAL BLOOD GAS PO2: 72 MM HG (ref 80–105)
BUN BLD-MCNC: 39 MG/DL (ref 7–18)
BUN/CREAT SERPL: 23.2 (ref 10–20)
CALCIUM BLD-MCNC: 8.7 MG/DL (ref 8.5–10.1)
CALCULATED O2 SATURATION: 84 % (ref 92–100)
CALCULATED O2 SATURATION: 93 % (ref 92–100)
CALCULATED O2 SATURATION: 97 % (ref 92–100)
CARBOXYHEMOGLOBIN: 3.1 % SAT (ref 0–3)
CARBOXYHEMOGLOBIN: 3.2 % SAT (ref 0–3)
CARBOXYHEMOGLOBIN: 3.3 % SAT (ref 0–3)
CHLORIDE SERPL-SCNC: 102 MMOL/L (ref 98–112)
CO2 SERPL-SCNC: 41 MMOL/L (ref 21–32)
CREAT BLD-MCNC: 1.68 MG/DL
DEPRECATED RDW RBC AUTO: 61.4 FL (ref 35.1–46.3)
ERYTHROCYTE [DISTWIDTH] IN BLOOD BY AUTOMATED COUNT: 16.1 % (ref 11–15)
EXPIRATORY PRESSURE: 12 CM H2O
EXPIRATORY PRESSURE: 12 CM H2O
GLUCOSE BLD-MCNC: 122 MG/DL (ref 70–99)
GLUCOSE BLD-MCNC: 123 MG/DL (ref 70–99)
GLUCOSE BLD-MCNC: 127 MG/DL (ref 70–99)
GLUCOSE BLD-MCNC: 146 MG/DL (ref 70–99)
GLUCOSE BLD-MCNC: 224 MG/DL (ref 70–99)
HCT VFR BLD AUTO: 27 %
HGB BLD-MCNC: 7.9 G/DL
IONIZED CALCIUM: 1.21 MMOL/L (ref 1.12–1.32)
L/M: 10 L/MIN
L/M: 5 L/MIN
L/M: 8 L/MIN
LACTIC ACID ARTERIAL: <1.6 MMOL/L (ref 0.5–2)
MCH RBC QN AUTO: 30.7 PG (ref 26–34)
MCHC RBC AUTO-ENTMCNC: 29.3 G/DL (ref 31–37)
MCV RBC AUTO: 105.1 FL
METHEMOGLOBIN: 0.5 % SAT (ref 0.4–1.5)
METHEMOGLOBIN: 0.5 % SAT (ref 0.4–1.5)
METHEMOGLOBIN: 0.7 % SAT (ref 0.4–1.5)
NT-PROBNP SERPL-MCNC: 7881 PG/ML (ref ?–450)
OSMOLALITY SERPL CALC.SUM OF ELEC: 305 MOSM/KG (ref 275–295)
PATIENT TEMPERATURE: 97.6 F
PATIENT TEMPERATURE: 97.8 F
PATIENT TEMPERATURE: 97.8 F
PLATELET # BLD AUTO: 105 10(3)UL (ref 150–450)
POTASSIUM BLOOD GAS: 4.8 MMOL/L (ref 3.6–5.1)
POTASSIUM SERPL-SCNC: 4.6 MMOL/L (ref 3.5–5.1)
PROCALCITONIN SERPL-MCNC: 0.09 NG/ML (ref ?–0.16)
RBC # BLD AUTO: 2.57 X10(6)UL
SODIUM BLOOD GAS: 144 MMOL/L (ref 136–144)
SODIUM SERPL-SCNC: 142 MMOL/L (ref 136–145)
TIDAL VOLUME: 550 ML
TIDAL VOLUME: 600 ML
TOTAL HEMOGLOBIN: 10.5 G/DL
TOTAL HEMOGLOBIN: 7.6 G/DL
TOTAL HEMOGLOBIN: 7.9 G/DL
VENT RATE: 20 /MIN
VENT RATE: 26 /MIN
WBC # BLD AUTO: 5.3 X10(3) UL (ref 4–11)

## 2021-01-26 PROCEDURE — 5A09357 ASSISTANCE WITH RESPIRATORY VENTILATION, LESS THAN 24 CONSECUTIVE HOURS, CONTINUOUS POSITIVE AIRWAY PRESSURE: ICD-10-PCS | Performed by: HOSPITALIST

## 2021-01-26 PROCEDURE — 71045 X-RAY EXAM CHEST 1 VIEW: CPT | Performed by: NURSE PRACTITIONER

## 2021-01-26 PROCEDURE — 99232 SBSQ HOSP IP/OBS MODERATE 35: CPT | Performed by: HOSPITALIST

## 2021-01-26 PROCEDURE — 99232 SBSQ HOSP IP/OBS MODERATE 35: CPT | Performed by: INTERNAL MEDICINE

## 2021-01-26 RX ORDER — FUROSEMIDE 10 MG/ML
40 INJECTION INTRAMUSCULAR; INTRAVENOUS 3 TIMES DAILY
Status: DISCONTINUED | OUTPATIENT
Start: 2021-01-26 | End: 2021-01-29

## 2021-01-26 NOTE — PROGRESS NOTES
[facility]  Progress Note     Tabitha Rosenberg Patient Status:  Inpatient    1945 MRN [id number]   [facility] 4SW-A Attending Phuong Duran MD   [facility] Day # 13 PCP Angi Lawson MD     Chief Complaint: AMS    S: Pt   VERY --   --   --     < > = values in this interval not displayed. Imaging: Imaging data reviewed in Epic.     Medications:   • aspirin  81 mg Oral Daily   • escitalopram  10 mg Oral Daily   • Levothyroxine Sodium  150 mcg Oral Before breakfast   • Meto heparin  · CODE status: Full  · Tinsley: no  · Central line: no  · If COVID testing is negative, may discontinue isolation: yes     Will the patient be referred to TCC on discharge?: tbd  Estimated date of discharge: tbd  Discharge is dependent on: progress

## 2021-01-26 NOTE — RESPIRATORY THERAPY NOTE
Received pt on 5L NC sats 98% now, last night pt used cpap 12-17 with 8L O2 bleeding in about 10 hours, tolerated well, sats remained in mid 90s. Will continue to monitor pt closely.

## 2021-01-26 NOTE — PROGRESS NOTES
1450: Paged Dr. Karel Mcpherson regarding order for soft wrist restraints d/t patient's disorientation. Patient kept trying to take off AVAP mask and O2 sats dropped to the 50's.     1458: Order received by Dr. Karel Mcpherson.      1535: Paged Aissatou Marcial NP regarding P [General Appearance - Well Developed] : well developed [Normal Appearance] : normal appearance [Well Groomed] : well groomed [General Appearance - Well Nourished] : well nourished [No Deformities] : no deformities [General Appearance - In No Acute Distress] : no acute distress [Normal Conjunctiva] : the conjunctiva exhibited no abnormalities [Eyelids - No Xanthelasma] : the eyelids demonstrated no xanthelasmas [Normal Oral Mucosa] : normal oral mucosa [No Oral Pallor] : no oral pallor [No Oral Cyanosis] : no oral cyanosis [Normal Jugular Venous A Waves Present] : normal jugular venous A waves present [Normal Jugular Venous V Waves Present] : normal jugular venous V waves present [No Jugular Venous Donovan A Waves] : no jugular venous donovan A waves [Respiration, Rhythm And Depth] : normal respiratory rhythm and effort [Exaggerated Use Of Accessory Muscles For Inspiration] : no accessory muscle use [Auscultation Breath Sounds / Voice Sounds] : lungs were clear to auscultation bilaterally [Heart Rate And Rhythm] : heart rate and rhythm were normal [Heart Sounds] : normal S1 and S2 [Abdomen Soft] : soft [Abdomen Tenderness] : non-tender [Abdomen Mass (___ Cm)] : no abdominal mass palpated [Abnormal Walk] : normal gait [Gait - Sufficient For Exercise Testing] : the gait was sufficient for exercise testing [Nail Clubbing] : no clubbing of the fingernails [Cyanosis, Localized] : no localized cyanosis [Petechial Hemorrhages (___cm)] : no petechial hemorrhages [Skin Color & Pigmentation] : normal skin color and pigmentation [] : no rash [No Venous Stasis] : no venous stasis [Skin Lesions] : no skin lesions [No Skin Ulcers] : no skin ulcer [No Xanthoma] : no  xanthoma was observed [Oriented To Time, Place, And Person] : oriented to person, place, and time [Affect] : the affect was normal [Mood] : the mood was normal [No Anxiety] : not feeling anxious [FreeTextEntry1] : HSM apex

## 2021-01-26 NOTE — CM/SW NOTE
Per RN, pt with worsening lethargy and tachypnea this afternoon. ABGs look worse, pt back on AVAPS. Will send more referrals to [place] [address] facilities that may be able to accept pt on AVAPS.      Milind John RN, [address] [facility] [address]

## 2021-01-26 NOTE — PROGRESS NOTES
[facility]  Nephrology Progress Note    Kacey Norris Attending:  Jason James,        Assessment and Plan:    1) AMINATA- due to volume depletion/prerenal azotemia- renal function stable     2) Acute on chronic hypercapnic resp failure- ABG's note  01/26/2021    CA 8.7 01/26/2021    PGLU 179 01/25/2021       Imaging: All imaging studies reviewed.     Meds:     •  dilTIAZem HCl (cardIZEM) injection 10 mg, 10 mg, Intravenous, Q2H PRN    •  aspirin chewable tab 81 mg, 81 mg, Oral, Daily    •  hy

## 2021-01-26 NOTE — PLAN OF CARE
Patient is alert and oriented x4. Patient is forgetful at times. Lung sounds diminished in all lobes. Patient c/o fatigue, SOB, and has tachypnea. Patient is on 5L of oxygen via nasal cannula. O2 sat 95%. Patient is on telemetry with NSR.  Incontinent of assess patient for signs and symptoms of electrolyte imbalances  - Administer electrolyte replacement as ordered  - Monitor response to electrolyte replacements, including rhythm and repeat lab results as appropriate  - Fluid restriction as ordered  - Inst

## 2021-01-26 NOTE — RESPIRATORY THERAPY NOTE
GAYLE - Equipment Use Daily Summary:                  . Set Mode: AUTO CPAP WITH C-FLEX                . Usage in hours: 10:06                . 90% Pressure (EPAP) level: 13.5                . 90% Insp. Pressure (IPAP): James Tello  AHI: 0.2

## 2021-01-26 NOTE — RESPIRATORY THERAPY NOTE
Rn called for abg, pt is more lethargic.did abg:ph 7.14/132/111/44, texted to pulm. , put avaps on pt with settings:20/550/15-30/12. abg will follow. Continue to monitor pt closely.

## 2021-01-26 NOTE — PLAN OF CARE
Pt is A&O x4, forgetful at times, VSS, and denies pain. Pt is on 4L NC with bilateral diminished lung sounds, . GAYLE with CPAP- tolerating well. NSR/SB on telemetry. Abdomen is round, soft, and nontender with active bowel sounds.  Generalized bruising to maintained within normal limits  Description: INTERVENTIONS:  - Monitor labs and rhythm and assess patient for signs and symptoms of electrolyte imbalances  - Administer electrolyte replacement as ordered  - Monitor response to electrolyte replacements, in

## 2021-01-26 NOTE — RESPIRATORY THERAPY NOTE
Received patient on 5L nasal cannula. Patient placed on cpap 12-17 overnight. Oxygen  increased to 8L. Sats remained in mid 90's. Will titrate O2 back down as able.

## 2021-01-26 NOTE — PROGRESS NOTES
[facility]  Progress Note     Shea Vinitayuly Patient Status:  Inpatient    1945 MRN [id number]   [facility] 4SW-A Attending John Rios MD   [facility] Day # 15 PCP Estelle Huddleston MD     Chief Complaint: AMS    S: Pt  Awake --   --    ALT 14  --   --   --   --    BILT 0.4  --   --   --   --    TP 5.8*  --   --   --   --     < > = values in this interval not displayed. Imaging: Imaging data reviewed in Epic.     Medications:   • aspirin  81 mg Oral Daily   • escitalopr 41  20. Disposition  1. Discharge planning to [place], ?tomorrow  NELLY CAPONE  At [facility] plan is for patient to try to wear CPAP tonight and see what her mental status looks like in the morning.   She cannot return to the [address] [place] [address] if she requires AVAPS, still on O

## 2021-01-26 NOTE — CM/SW NOTE
Discussed pt during daily rounds. She did tolerate the cpap overnight. Updates, RT notes sent to the [facility]. I heard back from [facility] with [facility] and they will be able to meet her current oxygen needs with CPAP.  They will have a bed for her

## 2021-01-26 NOTE — PROGRESS NOTES
NURSING DISCHARGE NOTE    Discharged Home via Wheelchair. Accompanied by Spouse  Belongings Taken by patient/family. Patient's 2 IV's on the left arm were taken out. Prescriptions sent to the patient's pharmacy.  Discharge Instructions given to

## 2021-01-26 NOTE — PROGRESS NOTES
Pulmonary Progress Note        NAME: Jenny Aguilar - ROOM: 602/048-X - MRN: [id number] - Age: 76year old - : 1945        Last 24hrs: No events overnight, this morning was noted to be very lethargic but was able to interact with staff, as the da infiltrates on right    ASSESSMENT/PLAN:  1.  Acute on chronic hypercapnic respiratory failure  - worse today after night of CPAP, apneas were well controlled but suspect that pt was hypoventilating or her pulmonary edema prevented adequate gas exchange  -r

## 2021-01-27 ENCOUNTER — APPOINTMENT (OUTPATIENT)
Dept: CT IMAGING | Facility: HOSPITAL | Age: 76
DRG: 314 | End: 2021-01-27
Attending: INTERNAL MEDICINE
Payer: MEDICARE

## 2021-01-27 LAB
ALLENS TEST: POSITIVE
ANION GAP SERPL CALC-SCNC: 1 MMOL/L (ref 0–18)
ARTERIAL BLD GAS O2 SATURATION: 94 % (ref 92–100)
ARTERIAL BLOOD GAS BASE EXCESS: 15.9 MMOL/L (ref ?–2)
ARTERIAL BLOOD GAS HCO3: 43.3 MEQ/L (ref 22–26)
ARTERIAL BLOOD GAS PCO2: 76 MM HG (ref 35–45)
ARTERIAL BLOOD GAS PH: 7.38 (ref 7.35–7.45)
ARTERIAL BLOOD GAS PO2: 68 MM HG (ref 80–105)
BASOPHILS # BLD AUTO: 0.02 X10(3) UL (ref 0–0.2)
BASOPHILS NFR BLD AUTO: 0.4 %
BUN BLD-MCNC: 44 MG/DL (ref 7–18)
BUN/CREAT SERPL: 25.9 (ref 10–20)
CALCIUM BLD-MCNC: 8.8 MG/DL (ref 8.5–10.1)
CALCULATED O2 SATURATION: 94 % (ref 92–100)
CARBOXYHEMOGLOBIN: 2.8 % SAT (ref 0–3)
CHLORIDE SERPL-SCNC: 100 MMOL/L (ref 98–112)
CO2 SERPL-SCNC: 41 MMOL/L (ref 21–32)
CREAT BLD-MCNC: 1.7 MG/DL
DEPRECATED RDW RBC AUTO: 60.2 FL (ref 35.1–46.3)
EOSINOPHIL # BLD AUTO: 0.11 X10(3) UL (ref 0–0.7)
EOSINOPHIL NFR BLD AUTO: 2.4 %
ERYTHROCYTE [DISTWIDTH] IN BLOOD BY AUTOMATED COUNT: 16.2 % (ref 11–15)
GLUCOSE BLD-MCNC: 107 MG/DL (ref 70–99)
GLUCOSE BLD-MCNC: 112 MG/DL (ref 70–99)
GLUCOSE BLD-MCNC: 204 MG/DL (ref 70–99)
GLUCOSE BLD-MCNC: 206 MG/DL (ref 70–99)
GLUCOSE BLD-MCNC: 94 MG/DL (ref 70–99)
HCT VFR BLD AUTO: 25.7 %
HGB BLD-MCNC: 7.3 G/DL
IMM GRANULOCYTES # BLD AUTO: 0.11 X10(3) UL (ref 0–1)
IMM GRANULOCYTES NFR BLD: 2.4 %
IONIZED CALCIUM: 1.15 MMOL/L (ref 1.12–1.32)
LACTIC ACID ARTERIAL: <1.6 MMOL/L (ref 0.5–2)
LYMPHOCYTES # BLD AUTO: 0.76 X10(3) UL (ref 1–4)
LYMPHOCYTES NFR BLD AUTO: 16.7 %
MCH RBC QN AUTO: 29.8 PG (ref 26–34)
MCHC RBC AUTO-ENTMCNC: 28.4 G/DL (ref 31–37)
MCV RBC AUTO: 104.9 FL
METHEMOGLOBIN: 0.5 % SAT (ref 0.4–1.5)
MONOCYTES # BLD AUTO: 0.39 X10(3) UL (ref 0.1–1)
MONOCYTES NFR BLD AUTO: 8.6 %
NEUTROPHILS # BLD AUTO: 3.16 X10 (3) UL (ref 1.5–7.7)
NEUTROPHILS # BLD AUTO: 3.16 X10(3) UL (ref 1.5–7.7)
NEUTROPHILS NFR BLD AUTO: 69.5 %
NT-PROBNP SERPL-MCNC: 8843 PG/ML (ref ?–450)
OSMOLALITY SERPL CALC.SUM OF ELEC: 305 MOSM/KG (ref 275–295)
PATIENT TEMPERATURE: 98.5 F
PLATELET # BLD AUTO: 109 10(3)UL (ref 150–450)
POTASSIUM BLOOD GAS: 3.8 MMOL/L (ref 3.6–5.1)
POTASSIUM SERPL-SCNC: 3.8 MMOL/L (ref 3.5–5.1)
RBC # BLD AUTO: 2.45 X10(6)UL
SODIUM BLOOD GAS: 145 MMOL/L (ref 136–144)
SODIUM SERPL-SCNC: 142 MMOL/L (ref 136–145)
TOTAL HEMOGLOBIN: 8.1 G/DL
WBC # BLD AUTO: 4.6 X10(3) UL (ref 4–11)

## 2021-01-27 PROCEDURE — 71250 CT THORAX DX C-: CPT | Performed by: INTERNAL MEDICINE

## 2021-01-27 PROCEDURE — 99233 SBSQ HOSP IP/OBS HIGH 50: CPT | Performed by: INTERNAL MEDICINE

## 2021-01-27 PROCEDURE — 99232 SBSQ HOSP IP/OBS MODERATE 35: CPT | Performed by: HOSPITALIST

## 2021-01-27 RX ORDER — POTASSIUM CHLORIDE 20 MEQ/1
40 TABLET, EXTENDED RELEASE ORAL ONCE
Status: COMPLETED | OUTPATIENT
Start: 2021-01-27 | End: 2021-01-27

## 2021-01-27 NOTE — IMAGING NOTE
Spoke with Emilia Sandoval, Rn on floor, notified her of US Thoracentesis being done on 01/28/2021 at 0845 am, NPO for 4 hours prior to procedure time, PT/INR to be drawn in am, Hold Heparin subcutaneous now, last does given at 1409 today,

## 2021-01-27 NOTE — PROGRESS NOTES
[facility]  Nephrology Progress Note    Jazmine Sanders Attending:  Hetal Erwin,        Assessment and Plan:    1) AMINATA- due to volume depletion/prerenal azotemia- renal function stable     2) Acute on chronic hypercapnic resp failure- ABG's note 01/27/2021    K 3.8 01/27/2021     01/27/2021    CO2 41.0 01/27/2021    GLU 94 01/27/2021    CA 8.8 01/27/2021    PGLU 107 01/27/2021       Imaging: All imaging studies reviewed.     Meds:     •  furosemide (LASIX) injection 40 mg, 40 mg, Intravenous

## 2021-01-27 NOTE — PROGRESS NOTES
Pt. Desaturating to 80's. Respiratory called to check pt. Resp charge called to check AVAP, pt. Desaturating to 70%, with good pleth at times. Changed CPOX sensor, still not a good pleth. RT in with pt. Saturations up to % now.

## 2021-01-27 NOTE — CM/SW NOTE
CALVIN spoke with [facility] () he would like his wife to go to [facility] when cleared for discharge. Facility reserved in [address] CALVIN spoke with Gerardo Trotter (liaison @ [facility]) and informed him he will need to order the patient an AVAPS machine.  Settings se

## 2021-01-27 NOTE — PROGRESS NOTES
Pulmonary Progress Note        NAME: Nanci Garcia - ROOM: [facility]578-G - MRN: [id number] - Age: 76year old - : 1945        Last 24hrs: Events of yesterday reviewed, ventilatory status better this AM after being on AVAPs for the last 20hrs or so pulmonary edema prevented adequate gas exchange  -repeat x-ray shows persistent infiltrates R > L  - labs not consistent with infection, BNP markedly elevated  -will check CT chest to further evaluate parenchyma  -restart AVAPS w/ increased EPAP, goal of s

## 2021-01-27 NOTE — PLAN OF CARE
Patient A&Ox2, denies pain. Continuous pulse ox and tele-monitor in place. AVAP continued throughout the day. Per MD Cancino okay for patient to take a break from [address], to place high flow NC and maintain oxygen saturation above 90%.  Blood sugars monitor replacements, including rhythm and repeat lab results as appropriate  - Fluid restriction as ordered  - Instruct patient on fluid and nutrition restrictions as appropriate  Outcome: Progressing  Goal: Glucose maintained within prescribed range  Description

## 2021-01-27 NOTE — PROGRESS NOTES
01/27/21 0321   BiPAP   $ RT Standby Charge (per 15 min) 1   Device [employer]Star - Omnilab   BiPAP / CPAP CE# [id number]   Mode AVAPS   Interface Full face mask   Mask Size Medium   Control Settings   O2 Flow Rate 10 lpm   AVAPS   Min IPAP 15   Max IPAP 30   EPAP L

## 2021-01-27 NOTE — PHYSICAL THERAPY NOTE
Attempted PT session, pt requiring BiPAP at this time due to severe C02 retention. Will continue to follow and see when medically appropriate.

## 2021-01-27 NOTE — PROGRESS NOTES
[facility]  Progress Note     Rohan Reshma Patient Status:  Inpatient    1945 MRN [id number]   [facility] 4SW-A Attending Aysha Grey MD   [facility] Day # 12 PCP Bimal Guzman MD     Chief Complaint: AMS    S: Patient s in this interval not displayed. Imaging: Imaging data reviewed in Epic.     Medications:   • furosemide  40 mg Intravenous TID   • aspirin  81 mg Oral Daily   • escitalopram  10 mg Oral Daily   • Levothyroxine Sodium  150 mcg Oral Before breakfast Babs Head is expected to be discharge to: [facility] of care discussed with patient and RN.       Nia Huerta,

## 2021-01-28 ENCOUNTER — APPOINTMENT (OUTPATIENT)
Dept: GENERAL RADIOLOGY | Facility: HOSPITAL | Age: 76
DRG: 314 | End: 2021-01-28
Attending: RADIOLOGY
Payer: MEDICARE

## 2021-01-28 ENCOUNTER — APPOINTMENT (OUTPATIENT)
Dept: ULTRASOUND IMAGING | Facility: HOSPITAL | Age: 76
DRG: 314 | End: 2021-01-28
Attending: INTERNAL MEDICINE
Payer: MEDICARE

## 2021-01-28 LAB
ANION GAP SERPL CALC-SCNC: 2 MMOL/L (ref 0–18)
BASOPHIL PLEURAL FLUID: 0 %
BUN BLD-MCNC: 48 MG/DL (ref 7–18)
BUN/CREAT SERPL: 28.1 (ref 10–20)
CALCIUM BLD-MCNC: 9.3 MG/DL (ref 8.5–10.1)
CHLORIDE SERPL-SCNC: 99 MMOL/L (ref 98–112)
CO2 SERPL-SCNC: 43 MMOL/L (ref 21–32)
CREAT BLD-MCNC: 1.71 MG/DL
EOSINOPHIL PLEURAL FLUID: 0 %
GLUCOSE BLD-MCNC: 109 MG/DL (ref 70–99)
GLUCOSE BLD-MCNC: 123 MG/DL (ref 70–99)
GLUCOSE BLD-MCNC: 130 MG/DL (ref 70–99)
GLUCOSE BLD-MCNC: 225 MG/DL (ref 70–99)
GLUCOSE BLD-MCNC: 267 MG/DL (ref 70–99)
GLUCOSE PLR-MCNC: 118 MG/DL
INR BLD: 1.02 (ref 0.89–1.11)
LDH FLD L TO P-CCNC: 120 U/L
LYMPHOCYTE PLEURAL FLUID: 63 %
MESOTHELIAL PLEURAL FLUID: 6 %
MONO/MAC/HISTIO PLEURAL FLUID: 22 %
NEUTROPHIL PLEURAL FLUID: 9 %
OSMOLALITY SERPL CALC.SUM OF ELEC: 311 MOSM/KG (ref 275–295)
POTASSIUM SERPL-SCNC: 3.8 MMOL/L (ref 3.5–5.1)
PROT PLR-MCNC: 2.8 G/DL
PSA SERPL DL<=0.01 NG/ML-MCNC: 13.7 SECONDS (ref 12.4–14.6)
RBC PLEURAL FLUID: NORMAL /MM3
SODIUM SERPL-SCNC: 144 MMOL/L (ref 136–145)
TOTAL CELLS COUNTED: 100
WBC # FLD: 486 /MM3

## 2021-01-28 PROCEDURE — 71045 X-RAY EXAM CHEST 1 VIEW: CPT | Performed by: RADIOLOGY

## 2021-01-28 PROCEDURE — 99233 SBSQ HOSP IP/OBS HIGH 50: CPT | Performed by: INTERNAL MEDICINE

## 2021-01-28 PROCEDURE — 99232 SBSQ HOSP IP/OBS MODERATE 35: CPT | Performed by: HOSPITALIST

## 2021-01-28 PROCEDURE — 0W993ZZ DRAINAGE OF RIGHT PLEURAL CAVITY, PERCUTANEOUS APPROACH: ICD-10-PCS | Performed by: RADIOLOGY

## 2021-01-28 PROCEDURE — 32555 ASPIRATE PLEURA W/ IMAGING: CPT | Performed by: INTERNAL MEDICINE

## 2021-01-28 RX ORDER — ACETAZOLAMIDE 250 MG/1
250 TABLET ORAL 2 TIMES DAILY
Status: DISCONTINUED | OUTPATIENT
Start: 2021-01-28 | End: 2021-01-29

## 2021-01-28 RX ORDER — POTASSIUM CHLORIDE 20 MEQ/1
40 TABLET, EXTENDED RELEASE ORAL ONCE
Status: COMPLETED | OUTPATIENT
Start: 2021-01-28 | End: 2021-01-28

## 2021-01-28 NOTE — OCCUPATIONAL THERAPY NOTE
Attempted to see patient in pm, however patient adamantly declining OT treatment session despite max encouragement. Will follow-up on a trial basis.

## 2021-01-28 NOTE — PROGRESS NOTES
[facility]  Progress Note     Jeaneth Montemayor Patient Status:  Inpatient    1945 MRN [id number]   [facility] 4SW-A Attending Fish Hendrix MD   [address] Day # 16 PCP Regina Tipton MD     Chief Complaint: AMS    S: Patient s --   --   --   --    TP 5.8*  --   --   --   --     < > = values in this interval not displayed. Imaging: Imaging data reviewed in Epic.     Medications:   • acetaZOLAMIDE  250 mg Oral BID   • furosemide  40 mg Intravenous TID   • aspirin  81 mg O discontinue isolation: yes     Will the patient be referred to [facility] on discharge?: tbd  Estimated date of discharge: tbd  Discharge is dependent on: progress  At this point Ms. Clau Valencia is expected to be discharge to: [place]    Plan of care discussed with светлана

## 2021-01-28 NOTE — PLAN OF CARE
Pt alert and orientedx4. Forgetful at times. Needy. Demanding. Bed alarm. 2 liter of oxygen when awake, AVAPs @ night. Pulse Ox. GAYLE. Tele- NSR. On lasix. Refuses SCDs. On heparin but held tonight and tomorrow morning due to procedure. NPO @ midnight.  Inco Administer electrolyte replacement as ordered  - Monitor response to electrolyte replacements, including rhythm and repeat lab results as appropriate  - Fluid restriction as ordered  - Instruct patient on fluid and nutrition restrictions as appropriate  Ou

## 2021-01-28 NOTE — PROCEDURES
[facility]  Procedure Note    Edwin Hoyos Patient Status:  Inpatient    1945 MRN [id number]   [facility] 3NW-A Attending Anna Mathew DO   Hosp Day # 16 PCP Annabel Vazquez MD     Procedure: US guided right thoracentesis

## 2021-01-28 NOTE — PROGRESS NOTES
** I AGREE WITH CHARTING OF CAROLYN AKINS RN.       0825: PATIENT TRANSPORTED TO INTERVENTIONAL RADIOLOGY FOR THORACENTESIS IN STABLE CONDITION.  PRESENT AT BEDSIDE. 1030: PATIENT RETURNED TO THE FLOOR FROM RADIOLOGY IN STABLE CONDITION.

## 2021-01-28 NOTE — OCCUPATIONAL THERAPY NOTE
Attempted to see patient this am, however patient off floor for thoracentesis. Will follow-up as time permits.

## 2021-01-28 NOTE — PROGRESS NOTES
[facility]  Nephrology Progress Note    Jeaneth Montemayor Attending:  Joe Trujillo,        Assessment and Plan:    1) AMINATA- due to volume depletion/prerenal azotemia- renal function stable     2) Acute on chronic hypercapnic resp failure- ABG's note 99 01/28/2021    CO2 43.0 01/28/2021     01/28/2021    CA 9.3 01/28/2021    INR 1.02 01/28/2021    PTP 13.7 01/28/2021    PGLU 130 01/28/2021       Imaging: All imaging studies reviewed.     Meds:     •  furosemide (LASIX) injection 40 mg, 40 mg, In

## 2021-01-28 NOTE — PHYSICAL THERAPY NOTE
Attempted PT session, despite maximal encouragement for participation, pt adamantly refusing mobility at this time secondary to fatigue, Will continue to follow.

## 2021-01-28 NOTE — PROGRESS NOTES
[address] [place] [address] Patient Status:  Inpatient    1945 MRN [id number]   [facility] 3NW-A Attending Krystal Chiang, [address] [facility] Day # 16 PCP Ghislaine Warner MD     Pulm / Critical Care Progress Note     S: had R thora wi 01/22/21  0701 01/26/21  1331 01/27/21  0550   WBC 4.0 5.3 4.6   HGB 8.2* 7.9* 7.3*   HCT 29.4* 27.0* 25.7*   .0* 105.0* 109.0*     Recent Labs   Lab 01/28/21  0809   INR 1.02         Recent Labs   Lab 01/26/21  0544 01/27/21  0550 01/28/21  0809

## 2021-01-28 NOTE — IMAGING NOTE
Patient down from inpatient unit for R thoracentesis. Pt alert and cooperative. Presents on high flow oxygen nasal cannula at 3 lpm. Pre-procedure vitals stable.  Pt tolerated the procedure well, performed by Dr Naima Floyd, IR. 750 ml dark red serosanguineous d

## 2021-01-28 NOTE — PROGRESS NOTES
[facility]  Progress Note     Paramjit Jose Patient Status:  Inpatient    1945 MRN [id number]   [facility] 4SW-A Attending Bobby Marques [facility] Day # 16 PCP Jefferson Vivar MD     Chief Complaint: AMS    S: Patient 4.6 3.8 3.8      < > 102 100 99   CO2 43.0*   < > 41.0* 41.0* 43.0*   ALKPHO 67  --   --   --   --    AST 14*  --   --   --   --    ALT 14  --   --   --   --    BILT 0.4  --   --   --   --    TP 5.8*  --   --   --   --     < > = values in this interv excessive O2 use w/ her     PLAN   Urbano son  Needs to work w/ pt therapy  Much better today     Quality:  · DVT Prophylaxis: heparin  · CODE status: Full  · Tinsley: no  · Central line: no  · If COVID testing is negative, may discontinue isolation: yes     W

## 2021-01-29 VITALS
RESPIRATION RATE: 18 BRPM | SYSTOLIC BLOOD PRESSURE: 118 MMHG | BODY MASS INDEX: 40.97 KG/M2 | HEART RATE: 68 BPM | WEIGHT: 261 LBS | OXYGEN SATURATION: 97 % | TEMPERATURE: 98 F | DIASTOLIC BLOOD PRESSURE: 73 MMHG | HEIGHT: 67 IN

## 2021-01-29 LAB
ANION GAP SERPL CALC-SCNC: 1 MMOL/L (ref 0–18)
ATRIAL RATE: 89 BPM
BASOPHILS # BLD AUTO: 0.02 X10(3) UL (ref 0–0.2)
BASOPHILS NFR BLD AUTO: 0.5 %
BUN BLD-MCNC: 47 MG/DL (ref 7–18)
BUN/CREAT SERPL: 25.8 (ref 10–20)
CALCIUM BLD-MCNC: 9 MG/DL (ref 8.5–10.1)
CHLORIDE SERPL-SCNC: 98 MMOL/L (ref 98–112)
CO2 SERPL-SCNC: 42 MMOL/L (ref 21–32)
CREAT BLD-MCNC: 1.82 MG/DL
DEPRECATED RDW RBC AUTO: 65 FL (ref 35.1–46.3)
EOSINOPHIL # BLD AUTO: 0.11 X10(3) UL (ref 0–0.7)
EOSINOPHIL NFR BLD AUTO: 2.7 %
ERYTHROCYTE [DISTWIDTH] IN BLOOD BY AUTOMATED COUNT: 17.2 % (ref 11–15)
GLUCOSE BLD-MCNC: 153 MG/DL (ref 70–99)
GLUCOSE BLD-MCNC: 174 MG/DL (ref 70–99)
GLUCOSE BLD-MCNC: 268 MG/DL (ref 70–99)
HCT VFR BLD AUTO: 25.8 %
HGB BLD-MCNC: 7.6 G/DL
IMM GRANULOCYTES # BLD AUTO: 0.11 X10(3) UL (ref 0–1)
IMM GRANULOCYTES NFR BLD: 2.7 %
LYMPHOCYTES # BLD AUTO: 0.8 X10(3) UL (ref 1–4)
LYMPHOCYTES NFR BLD AUTO: 19.9 %
MCH RBC QN AUTO: 30.8 PG (ref 26–34)
MCHC RBC AUTO-ENTMCNC: 29.5 G/DL (ref 31–37)
MCV RBC AUTO: 104.5 FL
MONOCYTES # BLD AUTO: 0.5 X10(3) UL (ref 0.1–1)
MONOCYTES NFR BLD AUTO: 12.4 %
NEUTROPHILS # BLD AUTO: 2.49 X10 (3) UL (ref 1.5–7.7)
NEUTROPHILS # BLD AUTO: 2.49 X10(3) UL (ref 1.5–7.7)
NEUTROPHILS NFR BLD AUTO: 61.8 %
OSMOLALITY SERPL CALC.SUM OF ELEC: 307 MOSM/KG (ref 275–295)
PLATELET # BLD AUTO: 115 10(3)UL (ref 150–450)
POTASSIUM SERPL-SCNC: 4 MMOL/L (ref 3.5–5.1)
Q-T INTERVAL: 360 MS
QRS DURATION: 86 MS
QTC CALCULATION (BEZET): 457 MS
R AXIS: 33 DEGREES
RBC # BLD AUTO: 2.47 X10(6)UL
SODIUM SERPL-SCNC: 141 MMOL/L (ref 136–145)
T AXIS: 9 DEGREES
VENTRICULAR RATE: 97 BPM
WBC # BLD AUTO: 4 X10(3) UL (ref 4–11)

## 2021-01-29 PROCEDURE — 99239 HOSP IP/OBS DSCHRG MGMT >30: CPT | Performed by: HOSPITALIST

## 2021-01-29 PROCEDURE — 99233 SBSQ HOSP IP/OBS HIGH 50: CPT | Performed by: INTERNAL MEDICINE

## 2021-01-29 PROCEDURE — 99232 SBSQ HOSP IP/OBS MODERATE 35: CPT | Performed by: CLINICAL NURSE SPECIALIST

## 2021-01-29 RX ORDER — DILTIAZEM HYDROCHLORIDE 120 MG/1
120 CAPSULE, EXTENDED RELEASE ORAL DAILY
Status: DISCONTINUED | OUTPATIENT
Start: 2021-01-29 | End: 2021-01-29

## 2021-01-29 RX ORDER — FUROSEMIDE 10 MG/ML
40 INJECTION INTRAMUSCULAR; INTRAVENOUS
Status: DISCONTINUED | OUTPATIENT
Start: 2021-01-29 | End: 2021-01-29

## 2021-01-29 RX ORDER — ASPIRIN 81 MG/1
81 TABLET, CHEWABLE ORAL DAILY
Refills: 0 | Status: SHIPPED | COMMUNITY
Start: 2021-01-30

## 2021-01-29 RX ORDER — BUMETANIDE 1 MG/1
1 TABLET ORAL DAILY
Status: DISCONTINUED | OUTPATIENT
Start: 2021-01-30 | End: 2021-01-29

## 2021-01-29 RX ORDER — DILTIAZEM HYDROCHLORIDE 5 MG/ML
10 INJECTION INTRAVENOUS
Status: DISCONTINUED | OUTPATIENT
Start: 2021-01-29 | End: 2021-01-29

## 2021-01-29 RX ORDER — POTASSIUM CHLORIDE 20 MEQ/1
40 TABLET, EXTENDED RELEASE ORAL ONCE
Status: COMPLETED | OUTPATIENT
Start: 2021-01-29 | End: 2021-01-29

## 2021-01-29 RX ORDER — DILTIAZEM HYDROCHLORIDE 120 MG/1
120 CAPSULE, COATED, EXTENDED RELEASE ORAL DAILY
Refills: 0 | Status: SHIPPED | COMMUNITY
Start: 2021-01-30 | End: 2022-01-20 | Stop reason: CLARIF

## 2021-01-29 RX ORDER — BUMETANIDE 1 MG/1
1 TABLET ORAL DAILY
Qty: 30 TABLET | Refills: 11 | Status: SHIPPED | OUTPATIENT
Start: 2021-01-30

## 2021-01-29 NOTE — PLAN OF CARE
Patient is alert and oriented x4. Lungs sounds diminished in all lobes. Patient is on 2L oxygen via nasal cannula. Telemetry with atrial fibrillation. HR between 105-130. Patient denies SOB, dizziness, chest pain. Cardiezem given.  HR decreased to the 80's Monitor labs and rhythm and assess patient for signs and symptoms of electrolyte imbalances  - Administer electrolyte replacement as ordered  - Monitor response to electrolyte replacements, including rhythm and repeat lab results as appropriate  - Fluid re

## 2021-01-29 NOTE — PROGRESS NOTES
Pulmonary Progress Note      NAME: Nanci Garcia - ROOM: [id number] - MRN: [id number] - Age: 76year old - : 1945    Assessment/Plan:  1. Acute on chronic hypercapnic and hypoxic respiratory failure  -secondary to mushtaq / ohv and pulm edema.    -ple no respiratory distress. HEENT: Normocephalic atraumatic. Lips, mucosa, and tongue normal.  No thrush noted. Neck: supple without mass   Lungs: clear   Chest wall: No tenderness or deformity. Heart: Regular rate and rhythm, normal S1S2, nomurmur.    Ab

## 2021-01-29 NOTE — CM/SW NOTE
Discussed pt during daily rounds. Per RN, only waiting on pulmonary for DC. Sent updates to the [facility] of [facility]rendraMay[facility] [address] via [address]. Per Paty Kimball with the [facility], they can accept today, just need to update him with time.      Requested BLS on will call with Eva Wolfe at E

## 2021-01-29 NOTE — PROGRESS NOTES
[facility]  Nephrology Progress Note    Katlin Pineda Attending:  Danilo Fuller DO       Assessment and Plan:    1) AMINATA- due to volume depletion/prerenal azotemia- renal function stable     2) Acute on chronic hypercapnic resp failure- ABG's note 01/29/2021    CREATSERUM 1.82 01/29/2021    BUN 47 01/29/2021     01/29/2021    K 4.0 01/29/2021    CL 98 01/29/2021    CO2 42.0 01/29/2021     01/29/2021    CA 9.0 01/29/2021    PGLU 174 01/29/2021       Imaging:   All imaging studies reviewed with patient and/or family by bedside.           Severiano Guerra  1/29/2021  1037 AM

## 2021-01-29 NOTE — PROGRESS NOTES
[facility]  Progress Note     Shiramoon Rincon Patient Status:  Inpatient    1945 MRN [id number]   [facility] 4SW-A Attending Cleveland Barnard MD   [facility] Day # 25 PCP Mai Cardoso MD     Chief Complaint: AMS    S: Patient s Sodium  150 mcg Oral Before breakfast   • Metoprolol Succinate ER  50 mg Oral BID   • Pravastatin Sodium  20 mg Oral Nightly   • Heparin Sodium (Porcine)  7,500 Units Subcutaneous Q8H [address]   • Insulin Aspart Pen  2-10 Units Subcutaneous TID CC and HS

## 2021-01-29 NOTE — PROGRESS NOTES
10:38: Paged Dr. Kristy Silva regarding reconsult to cardiology. Patient went into A-fib. 10:50: Dr. Kristy Silva wanted to re-consult cardiology. 11:00: CAITLIN Waggoner. New orders received for 12 lead EKG.

## 2021-01-29 NOTE — CM/SW NOTE
Call from [facility]. Pt is cleared for discharge. Requesting 530pm discharge time. Contacted Tello Hurst with the [facility], agrees with this time. Contacted Rin with [facility], they can  pt at 530pm with BLS transport.      RN to call report to [phone]

## 2021-01-29 NOTE — PROGRESS NOTES
[facility] Cardiology Progress Note    Called to revisit today for recurrent afib with RVR. She is asymptomatic. Subjective:  Overall doing much better after her thoracentesis. She is in the chair of low flow oxygen.   She understands the importance of he alone for now  · As outlined by Dr. Tristin Zhang, may be a watchman candidate in future. · If discharged, 30 day event recorder prior to follow up.         Olivia Moore, APRN  1/29/2021  1:43 PM

## 2021-01-29 NOTE — PROGRESS NOTES
[facility]  Progress Note     Laxmi Spence Patient Status:  Inpatient    1945 MRN [id number]   [facility] 4SW-A Attending Andra Alcazar MD   [facility] Day # 25 PCP Ayesha Caal MD     Chief Complaint: AMS    S: Patient u Levothyroxine Sodium  150 mcg Oral Before breakfast   • Metoprolol Succinate ER  50 mg Oral BID   • Pravastatin Sodium  20 mg Oral Nightly   • Heparin Sodium (Porcine)  7,500 Units Subcutaneous Q8H [facility]   • Insulin Aspart Pen  2-10 Units Subcutaneous TID CC her lack of using CPAP has led to this bigger problem.   Discharged today    Quality:  · DVT Prophylaxis: heparin  · CODE status: Full  · Tinsley: no  · Central line: no  · If COVID testing is negative, may discontinue isolation: yes     Will the patient be r contact guard

## 2021-01-29 NOTE — PLAN OF CARE
Pt alert and orientedx4. Forgetful at times. Bed alarm. AVAPs @ night. 2 L of high flow during the day. Pulse Ox. GAYLE. Lung sounds diminished. Tele- NSR. On lasix. SCDs- refuses. On heparin. 1800 ADA diet. Incontinent of bowel and bladder.  Purewiaddie in [facility] Monitor for signs/symptoms of CO2 retention  Outcome: Progressing     Problem: METABOLIC/FLUID AND ELECTROLYTES - ADULT  Goal: Electrolytes maintained within normal limits  Description: INTERVENTIONS:  - Monitor labs and rhythm and assess patient for signs in chair for all meals  - See additional Care Plan goals for specific interventions  Outcome: Progressing

## 2021-01-29 NOTE — PLAN OF CARE
Patient A&O x4, denies pain this shift. High flow NC continued at 2L, patient tolerating well oxygen saturation 97%. Tele monitor continued running NSR. Blood sugar monitored with insulin correction as ordered. Thoracentesis completed this shift.  Midline d electrolyte imbalances  - Administer electrolyte replacement as ordered  - Monitor response to electrolyte replacements, including rhythm and repeat lab results as appropriate  - Fluid restriction as ordered  - Instruct patient on fluid and nutrition restr

## 2021-01-29 NOTE — OCCUPATIONAL THERAPY NOTE
OCCUPATIONAL THERAPY TREATMENT NOTE - INPATIENT     Room Number: 824/893-B  Session: 4   Number of Visits to Meet Established Goals: 5    Presenting Problem: Resp Failure      History related to current admission: Pt is a 76year old female admit on 1/11/2 Performed by Deborah Carver MD at [facility] ENDOSCOPY   • COLOSTOMY TAKEDOWN N/A 2/5/2020    Performed by Catarino Lau MD at [facility]     • CYSTOURETHROSCOPY  3-1-11    cysto flow US, dr Edward Meza   • EYE SURGERY      eye   • [facility] to therapy. Min (A) to EOB with HOB elevated. C/o dizziness EOB; BP and O2 wfl. Sit to stand min of 2 from elevated EOB via RW. Static stand approximately 20 seconds. Lack of eccentric control with stand to sit transfer.  After seated rest, able to transfer will transfer to toilet:  with supervision     UE Exercise Program Goal  Patient will be supervision with bilateral AROM HEP (home exercise program).      Additional Goals:  Pt will verbalize at least 3 energy conservation techniques  Pt will stand at sink

## 2021-01-30 NOTE — PROGRESS NOTES
NURSING DISCHARGE NOTE    Discharged Nursing home via Ambulance. Accompanied by Support staff  Belongings Taken by patient/family. PATIENT DISCHARGED TO THE [facility] IN STABLE CONDITION.  PATIENT LEFT THE FLOOR PER STRETCHER AND WAS TO BE TRANSPORT

## 2021-01-30 NOTE — DISCHARGE SUMMARY
[facility] HOSPITALIST  DISCHARGE SUMMARY     Jeaneth Montemayor Patient Status:  Inpatient    1945 MRN [id number]   [facility] 3NW-A Attending No att. providers found   [facility] Day # 18 PCP Regina Tipton MD     Date of Admission:  has been sent to the [facility]. Patient was found to have increasing lethargy on the day of admission. Patient was followed by pulmonology, cardiology and nephrology. Patient has been found to have hypercapnia requiring use of AVAPS.   Also requiring diure majority of the lower lobes. There is patchy ground-glass opacity and nodular opacities within the upper lobes and right middle lobe. These findings are concerning for pneumonia.  3. Mild interlobular septal thickening likely represents mild interstitial OR ALTER INSULIN DOSE WITHOUT A PHYSICIAN ORDER  1 unit of Novolog/aspart insulin expected to decrease blood glucose by 20 mg/dL    Give 2 unit if blood glucose 141-180 mg/dL  Give 3 units if blood glucose 181-220 mg/dL  Give 6 units if blood glucose 221-2 as: APRESOLINE        Joanna Hoit FlexTouch 100 UNIT/ML Sopn  Generic drug: Insulin Degludec              Where to Get Your Medications      These medications were sent to [facility]/pharmacy #[id number], [address] [place] [address].  [phone], [phone]  1

## 2021-02-01 ENCOUNTER — INITIAL APN SNF VISIT (OUTPATIENT)
Dept: INTERNAL MEDICINE CLINIC | Age: 76
End: 2021-02-01

## 2021-02-01 VITALS — OXYGEN SATURATION: 93 % | HEART RATE: 74 BPM

## 2021-02-01 DIAGNOSIS — I10 ESSENTIAL HYPERTENSION: ICD-10-CM

## 2021-02-01 DIAGNOSIS — Z78.9 IMPAIRED MOBILITY AND ADLS: ICD-10-CM

## 2021-02-01 DIAGNOSIS — J96.91 RESPIRATORY FAILURE WITH HYPOXIA, UNSPECIFIED CHRONICITY (HCC): ICD-10-CM

## 2021-02-01 DIAGNOSIS — D50.9 IRON DEFICIENCY ANEMIA, UNSPECIFIED IRON DEFICIENCY ANEMIA TYPE: ICD-10-CM

## 2021-02-01 DIAGNOSIS — B37.0 ORAL CANDIDIASIS: ICD-10-CM

## 2021-02-01 DIAGNOSIS — Z79.4 UNCONTROLLED TYPE 2 DIABETES MELLITUS WITH INSULIN THERAPY (HCC): ICD-10-CM

## 2021-02-01 DIAGNOSIS — I48.0 AF (PAROXYSMAL ATRIAL FIBRILLATION) (HCC): ICD-10-CM

## 2021-02-01 DIAGNOSIS — E78.00 PURE HYPERCHOLESTEROLEMIA: ICD-10-CM

## 2021-02-01 DIAGNOSIS — R53.81 PHYSICAL DECONDITIONING: ICD-10-CM

## 2021-02-01 DIAGNOSIS — N17.9 AKI (ACUTE KIDNEY INJURY) (HCC): ICD-10-CM

## 2021-02-01 DIAGNOSIS — E87.5 HYPERKALEMIA: ICD-10-CM

## 2021-02-01 DIAGNOSIS — Z74.09 IMPAIRED MOBILITY AND ADLS: ICD-10-CM

## 2021-02-01 DIAGNOSIS — R53.83 LETHARGY: Primary | ICD-10-CM

## 2021-02-01 DIAGNOSIS — I27.20 PULMONARY HTN (HCC): ICD-10-CM

## 2021-02-01 DIAGNOSIS — E11.65 UNCONTROLLED TYPE 2 DIABETES MELLITUS WITH INSULIN THERAPY (HCC): ICD-10-CM

## 2021-02-01 DIAGNOSIS — N18.30 STAGE 3 CHRONIC KIDNEY DISEASE, UNSPECIFIED WHETHER STAGE 3A OR 3B CKD (HCC): ICD-10-CM

## 2021-02-01 DIAGNOSIS — M15.9 OSTEOARTHRITIS OF MULTIPLE JOINTS, UNSPECIFIED OSTEOARTHRITIS TYPE: ICD-10-CM

## 2021-02-01 PROCEDURE — 99310 SBSQ NF CARE HIGH MDM 45: CPT | Performed by: NURSE PRACTITIONER

## 2021-02-01 PROCEDURE — 1111F DSCHRG MED/CURRENT MED MERGE: CPT | Performed by: NURSE PRACTITIONER

## 2021-02-01 RX ORDER — INSULIN DEGLUDEC INJECTION 100 U/ML
15 INJECTION, SOLUTION SUBCUTANEOUS EVERY MORNING
COMMUNITY
End: 2021-02-05 | Stop reason: ALTCHOICE

## 2021-02-01 RX ORDER — ECHINACEA PURPUREA EXTRACT 125 MG
1 TABLET ORAL 3 TIMES DAILY
COMMUNITY
End: 2022-01-20 | Stop reason: CLARIF

## 2021-02-01 NOTE — CM/SW NOTE
01/29/21 1500   Discharge disposition   Expected discharge disposition Skilled Nurs   Name of Facillity/Home Care/Hospice   ([facility])   Discharge transportation [employer]

## 2021-02-01 NOTE — PROGRESS NOTES
Jack Young  : 1945  Age 76year old  female patient is admitted to Facility:  [facility] [address] for  [address] [place] [address] date:    21  Discharge date to [facility]:    21  ELOS:    14 days  Anticipated discharge date:    21 effusion. 750 ml removed. Developed A Fib w/ RVR and Cardiology added CCB for rate control and recommended only Baby ASA for anticoagulation. Feeling weak but motivated for therapy. Denies SOB/dyspnea.   Continues to have cough which is productive a 3-1-11    cysto flow US, dr Kahlil Mullins   • EYE SURGERY      eye   • FOOT SURGERY      foot   • HERNIA SURGERY     • HYSTERECTOMY      CAROL 2007   • OTHER SURGICAL HISTORY  5/3/17    cystoscopy Dr. Brock Olmos   • OTHER SURGICAL HISTORY  06/27/2017    Cysto w/ Dr. Brock Olmos   • blood glucose 141-180 mg/dL  Give 3 units if blood glucose 181-220 mg/dL  Give 6 units if blood glucose 221-260 mg/dL  Give 8 units if blood glucose 261-300 mg/dL  Give 10 units if blood glucose 301-350 mg/dL  Call Physician if blood glucose is greater jreilyn orthopnea  GI: denies nausea, vomiting, constipation, diarrhea; no rectal bleeding; no heartburn  :no dysuria, urgency or frequency; no vaginal discharge; no urinary incontinence; no hematuria  MUSCULOSKELETAL:no joint complaints upper or lower extremiti intact; no sensorimotor deficit, cranial nerves intact II-XII, follows commands  PSYCHIATRIC: alert and oriented x 3; affect appropriate      DIAGNOSTICS REVIEWED AT THIS VISIT:  Accu check readings reviewed:  Fasting range 192 to 297; PP range 168 to 336 [1]  Procedure during hospital stay (ICD 10 Coded)                   [1]  Index or Admission type Urgent or Emergent                       [1]  Number of hospital admissions during the previous year       0-1 labs  2. Hgb=7.6 (in hospital)-->8.2  3. Vitamin B 12 1,000 mcg daily    Hypothyroidism  1. LT4 150 mcg daily  2. TSH wnl January 2021    A fib/PulmHTN/HTN/HL  1. VS per protocol  2. Daily wts  3. Metoprolol succinate 50 mg BID  4.  Diltiazem  mg lawanad

## 2021-02-02 LAB — NON GYNE INTERPRETATION: NEGATIVE

## 2021-02-03 ENCOUNTER — EXTERNAL FACILITY (OUTPATIENT)
Dept: FAMILY MEDICINE CLINIC | Facility: CLINIC | Age: 76
End: 2021-02-03

## 2021-02-03 DIAGNOSIS — N28.9 ACUTE ON CHRONIC RENAL INSUFFICIENCY: ICD-10-CM

## 2021-02-03 DIAGNOSIS — J90 BILATERAL PLEURAL EFFUSION: ICD-10-CM

## 2021-02-03 DIAGNOSIS — E78.00 PURE HYPERCHOLESTEROLEMIA: ICD-10-CM

## 2021-02-03 DIAGNOSIS — J96.22 ACUTE ON CHRONIC RESPIRATORY FAILURE WITH HYPERCAPNIA (HCC): ICD-10-CM

## 2021-02-03 DIAGNOSIS — N18.9 ACUTE ON CHRONIC RENAL INSUFFICIENCY: ICD-10-CM

## 2021-02-03 DIAGNOSIS — Z93.3 S/P COLOSTOMY (HCC): ICD-10-CM

## 2021-02-03 DIAGNOSIS — Z79.899 HIGH RISK MEDICATIONS (NOT ANTICOAGULANTS) LONG-TERM USE: Chronic | ICD-10-CM

## 2021-02-03 DIAGNOSIS — R60.1 GENERALIZED EDEMA: ICD-10-CM

## 2021-02-03 DIAGNOSIS — I27.20 PULMONARY HTN (HCC): ICD-10-CM

## 2021-02-03 DIAGNOSIS — R53.81 PHYSICAL DECONDITIONING: ICD-10-CM

## 2021-02-03 DIAGNOSIS — M15.9 OSTEOARTHRITIS OF MULTIPLE JOINTS, UNSPECIFIED OSTEOARTHRITIS TYPE: ICD-10-CM

## 2021-02-03 DIAGNOSIS — N18.30 STAGE 3 CHRONIC KIDNEY DISEASE, UNSPECIFIED WHETHER STAGE 3A OR 3B CKD (HCC): ICD-10-CM

## 2021-02-03 DIAGNOSIS — D64.9 ANEMIA, UNSPECIFIED TYPE: ICD-10-CM

## 2021-02-03 DIAGNOSIS — J96.01 ACUTE RESPIRATORY FAILURE WITH HYPOXIA (HCC): Primary | ICD-10-CM

## 2021-02-03 DIAGNOSIS — R53.1 GENERALIZED WEAKNESS: ICD-10-CM

## 2021-02-03 DIAGNOSIS — Z79.4 UNCONTROLLED TYPE 2 DIABETES MELLITUS WITH INSULIN THERAPY (HCC): Chronic | ICD-10-CM

## 2021-02-03 DIAGNOSIS — E11.65 UNCONTROLLED TYPE 2 DIABETES MELLITUS WITH INSULIN THERAPY (HCC): Chronic | ICD-10-CM

## 2021-02-03 DIAGNOSIS — R79.89 AZOTEMIA: ICD-10-CM

## 2021-02-03 PROCEDURE — 99306 1ST NF CARE HIGH MDM 50: CPT | Performed by: FAMILY MEDICINE

## 2021-02-05 ENCOUNTER — SNF VISIT (OUTPATIENT)
Dept: INTERNAL MEDICINE CLINIC | Age: 76
End: 2021-02-05

## 2021-02-05 VITALS
SYSTOLIC BLOOD PRESSURE: 116 MMHG | RESPIRATION RATE: 20 BRPM | DIASTOLIC BLOOD PRESSURE: 65 MMHG | HEART RATE: 98 BPM | TEMPERATURE: 98 F | OXYGEN SATURATION: 90 %

## 2021-02-05 DIAGNOSIS — E11.65 UNCONTROLLED TYPE 2 DIABETES MELLITUS WITH INSULIN THERAPY (HCC): ICD-10-CM

## 2021-02-05 DIAGNOSIS — J96.91 RESPIRATORY FAILURE WITH HYPOXIA, UNSPECIFIED CHRONICITY (HCC): Primary | ICD-10-CM

## 2021-02-05 DIAGNOSIS — Z79.4 UNCONTROLLED TYPE 2 DIABETES MELLITUS WITH INSULIN THERAPY (HCC): ICD-10-CM

## 2021-02-05 DIAGNOSIS — R09.02 HYPOXIA: ICD-10-CM

## 2021-02-05 DIAGNOSIS — E87.5 HYPERKALEMIA: ICD-10-CM

## 2021-02-05 DIAGNOSIS — N18.30 STAGE 3 CHRONIC KIDNEY DISEASE, UNSPECIFIED WHETHER STAGE 3A OR 3B CKD (HCC): ICD-10-CM

## 2021-02-05 PROCEDURE — 99310 SBSQ NF CARE HIGH MDM 45: CPT | Performed by: NURSE PRACTITIONER

## 2021-02-05 RX ORDER — INSULIN GLARGINE 100 [IU]/ML
27 INJECTION, SOLUTION SUBCUTANEOUS EVERY MORNING
Status: ON HOLD | COMMUNITY
End: 2021-09-14

## 2021-02-05 NOTE — PROGRESS NOTES
Jeaneth Montemayor, 4/25/1945, 76year old, female    Chief Complaint:  Patient presents with:   Follow - Up: Encephalopathy/Acute hypercapnic respiratory failure/PNA/AMINATA/Hyperkalemia  Weakness  Lab Results       Subjective:    PMH significant for Hypothyroi no drainage from eyes  HENT: normocephalic; normal nose, no nasal drainage, mucous membranes pink, moist, pharynx no exudate, no visible cerumen.; +mild yellow coating on tongue c/w oral thrush  NECK: supple; FROM; no JVD, no TMG, no carotid bruits  BREAST Pulmonology  7. ADD IS  8. ADD Duonebs TID x 10 days  9. Nasal saline 1 spray each nare TID  10. F/U w/ Dr Perera/pulmonology after MO  11. Needs sleep study after MO     Right heel unstagable wound/High risk for skin breakdown  1. Air Mattress  2.  Heel 1.11.21  2. VS q 4 hrs x 14 days  3. Transmission based precautions x 14 days  4. Monitor for s/sxs of illness  5.  Surveillance testing per facility protocol     Hx of DISH and Uterine CA.     LABS  CBC/CMP weekly while in [place]     Follow ups:  PCP < 7 days

## 2021-02-08 ENCOUNTER — HOSPITAL ENCOUNTER (INPATIENT)
Facility: HOSPITAL | Age: 76
LOS: 4 days | Discharge: NURSING, REHAB OR LONG-TERM CARE | DRG: 291 | End: 2021-02-12
Attending: EMERGENCY MEDICINE | Admitting: HOSPITALIST
Payer: MEDICARE

## 2021-02-08 ENCOUNTER — APPOINTMENT (OUTPATIENT)
Dept: CT IMAGING | Facility: HOSPITAL | Age: 76
DRG: 291 | End: 2021-02-08
Attending: EMERGENCY MEDICINE
Payer: MEDICARE

## 2021-02-08 ENCOUNTER — APPOINTMENT (OUTPATIENT)
Dept: GENERAL RADIOLOGY | Facility: HOSPITAL | Age: 76
DRG: 291 | End: 2021-02-08
Attending: EMERGENCY MEDICINE
Payer: MEDICARE

## 2021-02-08 ENCOUNTER — SNF VISIT (OUTPATIENT)
Dept: INTERNAL MEDICINE CLINIC | Age: 76
End: 2021-02-08

## 2021-02-08 VITALS — HEART RATE: 86 BPM | OXYGEN SATURATION: 87 %

## 2021-02-08 DIAGNOSIS — I48.92 UNCONTROLLED ATRIAL FLUTTER (HCC): ICD-10-CM

## 2021-02-08 DIAGNOSIS — J90 BILATERAL PLEURAL EFFUSION: ICD-10-CM

## 2021-02-08 DIAGNOSIS — R41.82 ALTERED MENTAL STATUS, UNSPECIFIED ALTERED MENTAL STATUS TYPE: ICD-10-CM

## 2021-02-08 DIAGNOSIS — J96.91 RESPIRATORY FAILURE WITH HYPOXIA, UNSPECIFIED CHRONICITY (HCC): Primary | ICD-10-CM

## 2021-02-08 DIAGNOSIS — N39.0 URINARY TRACT INFECTION WITHOUT HEMATURIA, SITE UNSPECIFIED: ICD-10-CM

## 2021-02-08 DIAGNOSIS — J96.01 ACUTE RESPIRATORY FAILURE WITH HYPOXIA (HCC): Primary | ICD-10-CM

## 2021-02-08 DIAGNOSIS — R68.89 RIGORS: ICD-10-CM

## 2021-02-08 DIAGNOSIS — R06.02 SOB (SHORTNESS OF BREATH): ICD-10-CM

## 2021-02-08 PROBLEM — J96.02 ACUTE HYPERCAPNIC RESPIRATORY FAILURE (HCC): Status: ACTIVE | Noted: 2021-02-08

## 2021-02-08 PROBLEM — R73.9 HYPERGLYCEMIA: Status: ACTIVE | Noted: 2021-02-08

## 2021-02-08 PROBLEM — R79.89 AZOTEMIA: Status: ACTIVE | Noted: 2021-02-08

## 2021-02-08 LAB
ALBUMIN SERPL-MCNC: 2.9 G/DL (ref 3.4–5)
ALBUMIN/GLOB SERPL: 0.7 {RATIO} (ref 1–2)
ALLENS TEST: POSITIVE
ALP LIVER SERPL-CCNC: 72 U/L
ALT SERPL-CCNC: 11 U/L
ANION GAP SERPL CALC-SCNC: 4 MMOL/L (ref 0–18)
APTT PPP: 31.8 SECONDS (ref 25.4–36.1)
ARTERIAL BLD GAS O2 SATURATION: 95 % (ref 92–100)
ARTERIAL BLOOD GAS BASE EXCESS: 16.3 MMOL/L (ref ?–2)
ARTERIAL BLOOD GAS HCO3: 45.1 MEQ/L (ref 22–26)
ARTERIAL BLOOD GAS PCO2: 89 MM HG (ref 35–45)
ARTERIAL BLOOD GAS PH: 7.32 (ref 7.35–7.45)
ARTERIAL BLOOD GAS PO2: 150 MM HG (ref 80–105)
AST SERPL-CCNC: 8 U/L (ref 15–37)
ATRIAL RATE: 300 BPM
BASOPHILS # BLD AUTO: 0.03 X10(3) UL (ref 0–0.2)
BASOPHILS NFR BLD AUTO: 0.6 %
BILIRUB SERPL-MCNC: 0.7 MG/DL (ref 0.1–2)
BILIRUB UR QL STRIP.AUTO: NEGATIVE
BUN BLD-MCNC: 47 MG/DL (ref 7–18)
BUN/CREAT SERPL: 29.4 (ref 10–20)
CALCIUM BLD-MCNC: 9 MG/DL (ref 8.5–10.1)
CALCULATED O2 SATURATION: 99 % (ref 92–100)
CARBOXYHEMOGLOBIN: 2.7 % SAT (ref 0–3)
CHLORIDE SERPL-SCNC: 101 MMOL/L (ref 98–112)
CO2 SERPL-SCNC: 39 MMOL/L (ref 21–32)
COLOR UR AUTO: YELLOW
CREAT BLD-MCNC: 1.6 MG/DL
D-DIMER: 2.91 UG/ML FEU (ref ?–0.75)
DEPRECATED RDW RBC AUTO: 61.4 FL (ref 35.1–46.3)
EOSINOPHIL # BLD AUTO: 0.1 X10(3) UL (ref 0–0.7)
EOSINOPHIL NFR BLD AUTO: 1.9 %
ERYTHROCYTE [DISTWIDTH] IN BLOOD BY AUTOMATED COUNT: 16.2 % (ref 11–15)
GLOBULIN PLAS-MCNC: 4.1 G/DL (ref 2.8–4.4)
GLUCOSE BLD-MCNC: 103 MG/DL (ref 70–99)
GLUCOSE BLD-MCNC: 107 MG/DL (ref 70–99)
GLUCOSE BLD-MCNC: 89 MG/DL (ref 70–99)
GLUCOSE UR STRIP.AUTO-MCNC: NEGATIVE MG/DL
HCT VFR BLD AUTO: 33.6 %
HGB BLD-MCNC: 10 G/DL
HYALINE CASTS #/AREA URNS AUTO: PRESENT /LPF
IMM GRANULOCYTES # BLD AUTO: 0.05 X10(3) UL (ref 0–1)
IMM GRANULOCYTES NFR BLD: 1 %
INR BLD: 1.01 (ref 0.89–1.11)
IONIZED CALCIUM: 1.2 MMOL/L (ref 1.12–1.32)
L/M: 3 L/MIN
LACTIC ACID ARTERIAL: <1.6 MMOL/L (ref 0.5–2)
LYMPHOCYTES # BLD AUTO: 0.37 X10(3) UL (ref 1–4)
LYMPHOCYTES NFR BLD AUTO: 7.2 %
M PROTEIN MFR SERPL ELPH: 7 G/DL (ref 6.4–8.2)
MCH RBC QN AUTO: 30.9 PG (ref 26–34)
MCHC RBC AUTO-ENTMCNC: 29.8 G/DL (ref 31–37)
MCV RBC AUTO: 103.7 FL
METHEMOGLOBIN: 0.9 % SAT (ref 0.4–1.5)
MONOCYTES # BLD AUTO: 0.26 X10(3) UL (ref 0.1–1)
MONOCYTES NFR BLD AUTO: 5 %
NEUTROPHILS # BLD AUTO: 4.34 X10 (3) UL (ref 1.5–7.7)
NEUTROPHILS # BLD AUTO: 4.34 X10(3) UL (ref 1.5–7.7)
NEUTROPHILS NFR BLD AUTO: 84.3 %
NITRITE UR QL STRIP.AUTO: POSITIVE
NT-PROBNP SERPL-MCNC: ABNORMAL PG/ML (ref ?–450)
OSMOLALITY SERPL CALC.SUM OF ELEC: 311 MOSM/KG (ref 275–295)
P AXIS: 240 DEGREES
P/F RATIO: 458 MMHG
PATIENT TEMPERATURE: 99.7 F
PH UR STRIP.AUTO: 6 [PH] (ref 4.5–8)
PLATELET # BLD AUTO: 178 10(3)UL (ref 150–450)
POTASSIUM BLOOD GAS: 4.2 MMOL/L (ref 3.6–5.1)
POTASSIUM SERPL-SCNC: 4.3 MMOL/L (ref 3.5–5.1)
PROCALCITONIN SERPL-MCNC: 0.07 NG/ML (ref ?–0.16)
PROT UR STRIP.AUTO-MCNC: 30 MG/DL
PSA SERPL DL<=0.01 NG/ML-MCNC: 13.6 SECONDS (ref 12.4–14.6)
Q-T INTERVAL: 236 MS
QRS DURATION: 76 MS
QTC CALCULATION (BEZET): 372 MS
R AXIS: 40 DEGREES
RBC # BLD AUTO: 3.24 X10(6)UL
RBC UR QL AUTO: NEGATIVE
SARS-COV-2 RNA RESP QL NAA+PROBE: NOT DETECTED
SODIUM BLOOD GAS: 146 MMOL/L (ref 136–144)
SODIUM SERPL-SCNC: 144 MMOL/L (ref 136–145)
SP GR UR STRIP.AUTO: 1.02 (ref 1–1.03)
T AXIS: 45 DEGREES
TOTAL HEMOGLOBIN: 9.8 G/DL
TROPONIN I SERPL-MCNC: <0.045 NG/ML (ref ?–0.04)
UROBILINOGEN UR STRIP.AUTO-MCNC: <2 MG/DL
VENTRICULAR RATE: 150 BPM
WBC # BLD AUTO: 5.2 X10(3) UL (ref 4–11)

## 2021-02-08 PROCEDURE — 99291 CRITICAL CARE FIRST HOUR: CPT | Performed by: HOSPITALIST

## 2021-02-08 PROCEDURE — 99310 SBSQ NF CARE HIGH MDM 45: CPT | Performed by: NURSE PRACTITIONER

## 2021-02-08 PROCEDURE — 70450 CT HEAD/BRAIN W/O DYE: CPT | Performed by: EMERGENCY MEDICINE

## 2021-02-08 PROCEDURE — 71045 X-RAY EXAM CHEST 1 VIEW: CPT | Performed by: EMERGENCY MEDICINE

## 2021-02-08 PROCEDURE — 71275 CT ANGIOGRAPHY CHEST: CPT | Performed by: EMERGENCY MEDICINE

## 2021-02-08 PROCEDURE — 99221 1ST HOSP IP/OBS SF/LOW 40: CPT | Performed by: INTERNAL MEDICINE

## 2021-02-08 RX ORDER — DILTIAZEM HYDROCHLORIDE 5 MG/ML
10 INJECTION INTRAVENOUS ONCE
Status: COMPLETED | OUTPATIENT
Start: 2021-02-08 | End: 2021-02-08

## 2021-02-08 RX ORDER — HEPARIN SODIUM 5000 [USP'U]/ML
5000 INJECTION, SOLUTION INTRAVENOUS; SUBCUTANEOUS EVERY 8 HOURS SCHEDULED
Status: DISCONTINUED | OUTPATIENT
Start: 2021-02-08 | End: 2021-02-08

## 2021-02-08 RX ORDER — ACETAMINOPHEN 325 MG/1
650 TABLET ORAL EVERY 6 HOURS PRN
Status: DISCONTINUED | OUTPATIENT
Start: 2021-02-08 | End: 2021-02-12

## 2021-02-08 RX ORDER — FUROSEMIDE 10 MG/ML
40 INJECTION INTRAMUSCULAR; INTRAVENOUS ONCE
Status: COMPLETED | OUTPATIENT
Start: 2021-02-08 | End: 2021-02-08

## 2021-02-08 RX ORDER — DEXMEDETOMIDINE HYDROCHLORIDE 4 UG/ML
INJECTION, SOLUTION INTRAVENOUS CONTINUOUS
Status: DISCONTINUED | OUTPATIENT
Start: 2021-02-08 | End: 2021-02-10

## 2021-02-08 RX ORDER — DEXTROSE MONOHYDRATE 25 G/50ML
50 INJECTION, SOLUTION INTRAVENOUS
Status: DISCONTINUED | OUTPATIENT
Start: 2021-02-08 | End: 2021-02-12

## 2021-02-08 RX ORDER — FUROSEMIDE 10 MG/ML
40 INJECTION INTRAMUSCULAR; INTRAVENOUS
Status: DISCONTINUED | OUTPATIENT
Start: 2021-02-09 | End: 2021-02-11

## 2021-02-08 RX ORDER — ONDANSETRON 2 MG/ML
4 INJECTION INTRAMUSCULAR; INTRAVENOUS EVERY 6 HOURS PRN
Status: DISCONTINUED | OUTPATIENT
Start: 2021-02-08 | End: 2021-02-12

## 2021-02-08 RX ORDER — ENOXAPARIN SODIUM 100 MG/ML
40 INJECTION SUBCUTANEOUS NIGHTLY
Status: DISCONTINUED | OUTPATIENT
Start: 2021-02-08 | End: 2021-02-12

## 2021-02-08 NOTE — PROGRESS NOTES
Carol Ruggiero, 4/25/1945, 76year old, female    Chief Complaint:  Patient presents with:  Breathing Problem  Difficulty Breathing  Tremors: Rigors       Subjective:    PMH significant for Hypothyroidism, GAYLE (non-compliant w/ CPAP), A fib s/p cardiove drainage from eyes  HENT: normocephalic; normal nose, no nasal drainage, mucous membranes pink, moist, pharynx no exudate, no visible cerumen.; +mild yellow coating on tongue c/w oral thrush  NECK: supple; FROM; no JVD, no TMG, no carotid bruits  BREAST: - readmission     Lethargy/Encephalopathy  1. Monitor     Acute hypercapnic respiratory failure/PNA/GAYLE  1. O2 sat q shift  2. O2 @ 2 LPM NC; AVOID excessive O2 administration  3. STAT CXR 2/2 hypoxia   4. AVAPS with sleep and naps  5. Consult RT  6.  Consult abx  2. Prophylaxsis w/ oral vancomycin if abx are initiated     OA/RA  1. Tylenol 500 mg q 6 hrs prn     Depression  1. Monitor mood; consult psych prn  2. Escitalopram 10 mg daily     Vitamin D deficiency  1. Ergocalciferol 50,000u 1x/wk  2.  Monitor labs

## 2021-02-08 NOTE — CONSULTS
Cardiology Consult Note     PRIMARY CARDIOLOGIST: JAYSHREE      CONSULT FOR: Recurrent atrial flutter with rapid ventricular response associated with increased shortness of breath.     Patient was just in the hospital last month he has known chronic A. fib flutt

## 2021-02-08 NOTE — ED PROVIDER NOTES
Patient Seen in: [facility] Emergency Department      History   Patient presents with:  Difficulty Breathing    Stated Complaint: hypoxia    HPI/Subjective:   HPI    14-year-old presents to the emergency department from \"[facility]\" she is nonambul extremity    • Morbid obesity (HCC)    • GAYLE (obstructive sleep apnea) DMG DX 1-23-12 TX 2-9-12    AHI 39/ RDI 48/ REM AHI 71/ SaO2 75%/ CPAP 12/ [facility]   • Osteoarthritis    • Recurrent UTI    • Rheumatoid arthritis(714.0) 2012   • Small bowel obstruction Temp 99.6 °F (37.6 °C)   Temp src Temporal   SpO2 100 %   O2 Device Nasal cannula       Current:/82   Pulse 98   Temp 98 °F (36.7 °C) (Temporal)   Resp 22   Wt 118.4 kg   SpO2 92%   BMI 40.88 kg/m²         Physical Exam    35-year-old woman visibly (*)     ALT 11 (*)     Albumin 2.9 (*)     A/G Ratio 0.7 (*)     All other components within normal limits   D-DIMER - Abnormal; Notable for the following components:    D-Dimer 2.91 (*)     All other components within normal limits   PRO BETA NATRIURETIC EKG    Rate, intervals and axes as noted on EKG Report. Rate: Atrial fibrillation with a rapid ventricular response ventricular rate of 151 bpm, ST depression in the lateral leads consistent with ischemia.            ED Course as of Feb 08 1940  ------ acute intracranial hemorrhage. No     hydrocephalus. If an acute infarct is of high clinical concern, recommend MRI for further     evaluation.               Dictated by (CST): Milind Hand MD on 2/08/2021 at 3:29 PM         Finalized by (CST): GIOVANNY 3.4 cm in depth on the left. CHEST WALL:  No mass or axillary adenopathy. LIMITED ABDOMEN:  Limited images of the upper abdomen are unremarkable. BONES:  Stable with diffuse anterior flowing osteophytes suggesting DISH. No fracture. Presents with AMS, elevated HR- found to be nonverbal in atrial flutter with 2:1 conduction and oxygen saturations of 84%  Recent prolonged hospital stay- see below    Date of Admission: 1/11/2021  Date of Discharge: 1/29/2021  Discharge Disposition: SNF had significant bilateral pleural effusions she has a D-dimer that is elevated today almost 3 so CTA done there does not seem to be a clear clot but likely the bilateral pleural effusions are what is contributing to her shortness of breath and will likely

## 2021-02-08 NOTE — H&P
[facility]  History and Physical     Little Bebe Patient Status:  Inpatient    1945 MRN [id number]   [facility] 4SW-A Attending Shahriar Pak MD   Hosp Day # 0 PCP Ayesha Caal MD     Chief Complaint: confusion Rheumatoid arthritis(714.0)    • Small bowel obstruction ([facility] [address] 2017   • Uterine cancer ([facility] [address]    • Visual impairment         Past Surgical History:   Past Surgical History:   Procedure Laterality Date   •      • CHOLECYSTECTOMY  12 Aida Thorpe (four) times daily. , Disp: , Rfl:     •  Saline Nasal Spray 0.65 % Nasal Solution, 1 spray by Nasal route 3 (three) times daily. , Disp: , Rfl:     •  aspirin 81 MG Oral Chew Tab, Chew 1 tablet (81 mg total) by mouth daily. , Disp:  , Rfl: 0    •  Insulin As a week.  , Disp: , Rfl:     •  Pravastatin Sodium 20 MG Oral Tab, Take 20 mg by mouth nightly., Disp: , Rfl:     •  Cyanocobalamin ([employer] VITAMIN B-12 OR), Take 1 tablet by mouth daily.  1000mcg , Disp: , Rfl:     •  Levothyroxine Sodium 150 MCG Oral Tab, Sally Ramey reviewed in Epic. ASSESSMENT / PLAN:     1. Acute on chronic hypercapnic and hypoxic respiratory failure  1. Secondary to uncontrolled sleep apnea, pulmonary edema and pleural effusions as well as rapid a flutter  2.  CTA was negative for pulmonary emb

## 2021-02-08 NOTE — CONSULTS
[facility] PULMONARY/CRITICAL CARE CONSULTATION       HPI: Hieu Chang is a 76year old female with multiple medical problems including morbid obesity, GAYLE, CKD, atrial fibrillation, diabetes, Rheumatoid arthritis, uterine cancer who was recently admitted f • OTHER SURGICAL HISTORY  5/3/17    cystoscopy Dr. Yoder Factor   • OTHER SURGICAL HISTORY  06/27/2017    Cysto w/ Dr. Yoder Factor   • OTHER SURGICAL HISTORY  07/26/2017    cysto Dr. Yoder Factor   • OTHER SURGICAL HISTORY  09/07/2018    Cysto     • PART REMOVAL COLON W END C Si spray by Nasal route 3 (three) times daily. acetaminophen 500 MG Oral Tab   Yes No   Sig: Take 1 tablet (500 mg total) by mouth every 6 (six) hours as needed.    aspirin 81 MG Oral Chew Tab   Yes No   Sig: Chew 1 tablet (81 mg total) by mouth daily Years since quittin.7      Smokeless tobacco: Never Used    Substance and Sexual Activity      Alcohol use: Yes        Frequency: 2-4 times a month        Drinks per session: 1 or 2        Binge frequency: Never      Drug use: No      Sexual act Abdomen - soft, obese, nontender  Extremities -1+ edema      Labs:    Recent Labs   Lab 02/08/21  1248   WBC 5.2   HGB 10.0*   .0     Recent Labs   Lab 02/08/21  1248      K 4.3      CO2 39.0*   BUN 47*   CREATSERUM 1.60*   *   CA

## 2021-02-09 LAB
ALBUMIN SERPL-MCNC: 2.6 G/DL (ref 3.4–5)
ALBUMIN/GLOB SERPL: 0.7 {RATIO} (ref 1–2)
ALLENS TEST: POSITIVE
ALP LIVER SERPL-CCNC: 66 U/L
ALT SERPL-CCNC: 9 U/L
ANION GAP SERPL CALC-SCNC: 1 MMOL/L (ref 0–18)
ARTERIAL BLD GAS O2 SATURATION: 95 % (ref 92–100)
ARTERIAL BLOOD GAS BASE EXCESS: 14.7 MMOL/L (ref ?–2)
ARTERIAL BLOOD GAS HCO3: 41.7 MEQ/L (ref 22–26)
ARTERIAL BLOOD GAS PCO2: 65 MM HG (ref 35–45)
ARTERIAL BLOOD GAS PH: 7.42 (ref 7.35–7.45)
ARTERIAL BLOOD GAS PO2: 104 MM HG (ref 80–105)
AST SERPL-CCNC: 8 U/L (ref 15–37)
BASOPHILS # BLD AUTO: 0.02 X10(3) UL (ref 0–0.2)
BASOPHILS NFR BLD AUTO: 0.5 %
BILIRUB SERPL-MCNC: 0.4 MG/DL (ref 0.1–2)
BUN BLD-MCNC: 46 MG/DL (ref 7–18)
BUN/CREAT SERPL: 30.9 (ref 10–20)
CALCIUM BLD-MCNC: 8.9 MG/DL (ref 8.5–10.1)
CALCULATED O2 SATURATION: 98 % (ref 92–100)
CARBOXYHEMOGLOBIN: 2.2 % SAT (ref 0–3)
CHLORIDE SERPL-SCNC: 104 MMOL/L (ref 98–112)
CO2 SERPL-SCNC: 41 MMOL/L (ref 21–32)
CREAT BLD-MCNC: 1.49 MG/DL
DEPRECATED HBV CORE AB SER IA-ACNC: 154.8 NG/ML
DEPRECATED RDW RBC AUTO: 62.6 FL (ref 35.1–46.3)
EOSINOPHIL # BLD AUTO: 0.14 X10(3) UL (ref 0–0.7)
EOSINOPHIL NFR BLD AUTO: 3.5 %
ERYTHROCYTE [DISTWIDTH] IN BLOOD BY AUTOMATED COUNT: 16.5 % (ref 11–15)
EXPIRATORY PRESSURE: 12 CM H2O
FIO2: 40 %
GLOBULIN PLAS-MCNC: 3.9 G/DL (ref 2.8–4.4)
GLUCOSE BLD-MCNC: 107 MG/DL (ref 70–99)
GLUCOSE BLD-MCNC: 196 MG/DL (ref 70–99)
GLUCOSE BLD-MCNC: 69 MG/DL (ref 70–99)
GLUCOSE BLD-MCNC: 89 MG/DL (ref 70–99)
HCT VFR BLD AUTO: 31.5 %
HGB BLD-MCNC: 9.1 G/DL
IMM GRANULOCYTES # BLD AUTO: 0.06 X10(3) UL (ref 0–1)
IMM GRANULOCYTES NFR BLD: 1.5 %
IRON SATURATION: 15 %
IRON SERPL-MCNC: 45 UG/DL
LYMPHOCYTES # BLD AUTO: 0.85 X10(3) UL (ref 1–4)
LYMPHOCYTES NFR BLD AUTO: 21.4 %
M PROTEIN MFR SERPL ELPH: 6.5 G/DL (ref 6.4–8.2)
MCH RBC QN AUTO: 30.1 PG (ref 26–34)
MCHC RBC AUTO-ENTMCNC: 28.9 G/DL (ref 31–37)
MCV RBC AUTO: 104.3 FL
METHEMOGLOBIN: 0.6 % SAT (ref 0.4–1.5)
MONOCYTES # BLD AUTO: 0.42 X10(3) UL (ref 0.1–1)
MONOCYTES NFR BLD AUTO: 10.6 %
NEUTROPHILS # BLD AUTO: 2.49 X10 (3) UL (ref 1.5–7.7)
NEUTROPHILS # BLD AUTO: 2.49 X10(3) UL (ref 1.5–7.7)
NEUTROPHILS NFR BLD AUTO: 62.5 %
OSMOLALITY SERPL CALC.SUM OF ELEC: 312 MOSM/KG (ref 275–295)
P/F RATIO: 271.2 MMHG
PATIENT TEMPERATURE: 97.3 F
PLATELET # BLD AUTO: 141 10(3)UL (ref 150–450)
POTASSIUM SERPL-SCNC: 4.2 MMOL/L (ref 3.5–5.1)
RBC # BLD AUTO: 3.02 X10(6)UL
SODIUM SERPL-SCNC: 146 MMOL/L (ref 136–145)
TIDAL VOLUME: 500 ML
TOTAL HEMOGLOBIN: 9.8 G/DL
TOTAL IRON BINDING CAPACITY: 295 UG/DL (ref 240–450)
TRANSFERRIN SERPL-MCNC: 198 MG/DL (ref 200–360)
VENT RATE: 20 /MIN
WBC # BLD AUTO: 4 X10(3) UL (ref 4–11)

## 2021-02-09 PROCEDURE — 99233 SBSQ HOSP IP/OBS HIGH 50: CPT | Performed by: HOSPITALIST

## 2021-02-09 PROCEDURE — 99223 1ST HOSP IP/OBS HIGH 75: CPT | Performed by: INTERNAL MEDICINE

## 2021-02-09 PROCEDURE — 99232 SBSQ HOSP IP/OBS MODERATE 35: CPT | Performed by: INTERNAL MEDICINE

## 2021-02-09 RX ORDER — VANCOMYCIN HYDROCHLORIDE 125 MG/1
125 CAPSULE ORAL DAILY
Status: DISCONTINUED | OUTPATIENT
Start: 2021-02-09 | End: 2021-02-12

## 2021-02-09 NOTE — PLAN OF CARE
Assume care  in am. Vital signs monitored. HR stable. Precedex-off. Up in chair for 3 hours, Restraints off. Diet tolerated. Patient 's oxygen need change to 3 LNC. No s/s of Resp distress. Continue to monitor patient progress.

## 2021-02-09 NOTE — CONSULTS
[facility]  Report of Consultation    Jenny Aguilar Patient Status:  Inpatient    1945 MRN [id number]   [facility] 4SW-A Attending Sarai Pham MD   [address] Day # 1 PCP Myrna Park MD       Assessment / Plan:    1) CKD 3 REM AHI 71/ SaO2 75%/ CPAP 12/ [facility]   • Osteoarthritis    • Recurrent UTI    • Rheumatoid arthritis(714.0) 2012   • Small bowel obstruction ([facility] [address] 1/2017   • Uterine cancer ([facility] [address]    • Visual impairment      Past Surgical History:   Procedure Laterality Dru MBP/ADD-vantage, 2 g, Intravenous, Q24H  •  glucose (DEX4) oral liquid 15 g, 15 g, Oral, Q15 Min PRN **OR** Glucose-Vitamin C (DEX-4) chewable tab 4 tablet, 4 tablet, Oral, Q15 Min PRN **OR** dextrose 50 % injection 50 mL, 50 mL, Intravenous, Q15 Min PRN * rhythm, S1, S2 normal, no murmur, rub, or gallop  Lungs: Decreased breath sounds at the bases bilaterally.    Abdomen: Soft, non-tender. + bowel sounds, no palpable organomegaly  Extremities: Without clubbing, cyanosis; minimal edema  Neurologic: Cranial ne

## 2021-02-09 NOTE — CONSULTS
[facility]    PATIENT'S NAME: Latoya Braun   ATTENDING PHYSICIAN: Laquetta Severe, M.D. Hilliard Reddish: Regis Pitts M.D.    PATIENT ACCOUNT#:   [de-identified]    LOCATION:  [address] [place] [address]  MEDICAL RECORD #:   [id number]       DATE OF ANASTASIA FAMILY HISTORY:  Insignificant for early coronary disease. REVIEW OF SYSTEMS:  Now currently for shortness of breath, slightly obtunded. No chest pain. Rest of systems is difficult to ascertain secondary to her being somewhat confused.       PHYS to the obtunded nature of her presentation.     Dictated By Aime Harrison M.D.  d: 02/08/2021 16:14:20  t: 02/09/2021 00:27:47  [place] [id number]  PAULINO/

## 2021-02-09 NOTE — PROGRESS NOTES
[facility] PULMONARY/CRITICAL CARE    S: Pt is complaining that her tongue is dry. She denies pain or dyspnea. She's in restraints and on precedex presently.      Meds:  • cefTRIAXone  2 g Intravenous Q24H   • enoxaparin  40 mg Subcutaneous Nightly   • insulin det rapid aflutter.  CTA neg for PE but shows bilateral effusions and lower lobe consolidation/atelectasis  -wean O2  -avaps by day as needed and with all sleep  -diuresis per cardiology  -rate control of AFL per cardiology  -may need repeat thoracentesis if do

## 2021-02-09 NOTE — PROGRESS NOTES
02/09/21 0454   BiPAP   $ RT Standby Charge (per 15 min) 1   Device V60   BiPAP / CPAP CE# 6061   Mode AVAPS   Interface Full face mask   Mask Size Medium   Control Settings   Set Rate 20 breaths/min   Set EPAP 12   Oxygen Percent 40 %   Max P 30   Min

## 2021-02-09 NOTE — PLAN OF CARE
Patient from NH, confused and agitated. REstraints placed, precedex started. All consults aware. Cardizem gtt titrated to keep HR <120. Continue to monitor patient progress.

## 2021-02-09 NOTE — PROGRESS NOTES
[facility]  Progress Note     José Miguel Asher Patient Status:  Inpatient    1945 MRN [id number]   [facility] 4SW-A Attending Graeme Martinez MD   [facility] Day # 1 PCP Giovanni Kelley MD     Chief Complaint: Sadaf Cheng: Blaine Schwab 02/08/21  1247   DDIMER 2.91*       Imaging: Imaging data reviewed in Epic.     Medications:   • cefTRIAXone  2 g Intravenous Q24H   • enoxaparin  40 mg Subcutaneous Nightly   • insulin detemir  10 Units Subcutaneous Daily   • Insulin Aspart Pen  1-68 Units

## 2021-02-09 NOTE — CM/SW NOTE
02/09/21 0900   CM/SW Screening   Referral Source Social Work (self-referral)   [address] staff; Chart review   Patient's [address] Name   (lilly arndt)   Discharge Needs   Anticipated D/C needs Nu

## 2021-02-09 NOTE — DIETARY NOTE
[address] ASSESSMENT    Pt does not meet malnutrition criteria at this time. NUTRITION DIAGNOSIS/PROBLEM:    Increased nutrient needs related to wound healing as evidenced by DTI on left heel. NUTRITION INTERVENTION:  1.  Me Addresses: Yes    NUTRITION PRESCRIPTION: 61.4 kg (IBW)  Calories: 8610-6266 calories/day (27-32 calories per kg)  Protein:  grams protein/day (1.5-1.7 grams protein per kg)  Fluid: ~1 ml/kcal or per MD discretion    MONITOR AND EVALUATE/NUTRITION GO

## 2021-02-09 NOTE — PLAN OF CARE
Received patient after report. Patient resting in bed on AVAPs. Managed per RT. Received patient in 4 point restraints and a posey vest. Ankle restraints removed upon initial assessment. Lee vest removed later. Received patient on precedex and cardizem.

## 2021-02-09 NOTE — PROGRESS NOTES
[facility]  Progress Note    José Miguel Asher Patient Status:  Inpatient    1945 MRN [id number]   [facility] 4SW-A Attending Graeme Martinez MD   [facility] Day # 1 PCP Giovanni Kelley MD     Assessment:  1.  Atrial flutter- recurrent <0.045 02/08/2021    TROP <0.045 01/11/2021        Medications:    • vancomycin HCl  125 mg Oral Daily   • cefTRIAXone  2 g Intravenous Q24H   • enoxaparin  40 mg Subcutaneous Nightly   • insulin detemir  10 Units Subcutaneous Daily   • Insulin Aspart Pen

## 2021-02-09 NOTE — PROGRESS NOTES
[facility]  Progress Note     Jim Funes Patient Status:  Inpatient    1945 MRN [id number]   [facility] 4SW-A Attending Tammy Kc MD   [facility] Day # 2 PCP Emiliana Montanez MD     Chief Complaint: Hypoxia, AF    S: Pa 02/08/21  1248   TROP <0.045            Imaging: Imaging data reviewed in Epic.     Medications:   • Metoprolol Succinate ER  50 mg Oral BID   • dilTIAZem HCl ER Coated Beads  120 mg Oral Daily   • escitalopram  10 mg Oral Daily   • Levothyroxine Sodium  15 reversed   21.  History of Cdiff    Quality:  · DVT Prophylaxis: Lovenox  · CODE status: Full  · Tinsley: No  · Central line: No  · If COVID testing is negative, may discontinue isolation: No     Will the patient be referred to TCC on discharge?: No  Estimate

## 2021-02-10 LAB
ALBUMIN SERPL-MCNC: 2.7 G/DL (ref 3.4–5)
ALBUMIN/GLOB SERPL: 0.7 {RATIO} (ref 1–2)
ALP LIVER SERPL-CCNC: 66 U/L
ALT SERPL-CCNC: 9 U/L
ANION GAP SERPL CALC-SCNC: 0 MMOL/L (ref 0–18)
AST SERPL-CCNC: 12 U/L (ref 15–37)
BASOPHILS # BLD AUTO: 0.03 X10(3) UL (ref 0–0.2)
BASOPHILS NFR BLD AUTO: 0.7 %
BILIRUB SERPL-MCNC: 0.3 MG/DL (ref 0.1–2)
BUN BLD-MCNC: 44 MG/DL (ref 7–18)
BUN/CREAT SERPL: 26.5 (ref 10–20)
CALCIUM BLD-MCNC: 8.6 MG/DL (ref 8.5–10.1)
CHLORIDE SERPL-SCNC: 99 MMOL/L (ref 98–112)
CO2 SERPL-SCNC: 42 MMOL/L (ref 21–32)
CREAT BLD-MCNC: 1.66 MG/DL
DEPRECATED RDW RBC AUTO: 65.2 FL (ref 35.1–46.3)
EOSINOPHIL # BLD AUTO: 0.23 X10(3) UL (ref 0–0.7)
EOSINOPHIL NFR BLD AUTO: 5.6 %
ERYTHROCYTE [DISTWIDTH] IN BLOOD BY AUTOMATED COUNT: 16.6 % (ref 11–15)
GLOBULIN PLAS-MCNC: 3.9 G/DL (ref 2.8–4.4)
GLUCOSE BLD-MCNC: 112 MG/DL (ref 70–99)
GLUCOSE BLD-MCNC: 114 MG/DL (ref 70–99)
GLUCOSE BLD-MCNC: 120 MG/DL (ref 70–99)
GLUCOSE BLD-MCNC: 140 MG/DL (ref 70–99)
GLUCOSE BLD-MCNC: 170 MG/DL (ref 70–99)
GLUCOSE BLD-MCNC: 180 MG/DL (ref 70–99)
GLUCOSE BLD-MCNC: 220 MG/DL (ref 70–99)
GLUCOSE BLD-MCNC: 254 MG/DL (ref 70–99)
GLUCOSE BLD-MCNC: 43 MG/DL (ref 70–99)
GLUCOSE BLD-MCNC: 50 MG/DL (ref 70–99)
GLUCOSE BLD-MCNC: 567 MG/DL (ref 70–99)
GLUCOSE BLD-MCNC: 61 MG/DL (ref 70–99)
GLUCOSE BLD-MCNC: 85 MG/DL (ref 70–99)
GLUCOSE BLD-MCNC: 99 MG/DL (ref 70–99)
HAV IGM SER QL: 2 MG/DL (ref 1.6–2.6)
HCT VFR BLD AUTO: 31.6 %
HGB BLD-MCNC: 9.3 G/DL
IMM GRANULOCYTES # BLD AUTO: 0.05 X10(3) UL (ref 0–1)
IMM GRANULOCYTES NFR BLD: 1.2 %
LYMPHOCYTES # BLD AUTO: 0.72 X10(3) UL (ref 1–4)
LYMPHOCYTES NFR BLD AUTO: 17.5 %
M PROTEIN MFR SERPL ELPH: 6.6 G/DL (ref 6.4–8.2)
MCH RBC QN AUTO: 31 PG (ref 26–34)
MCHC RBC AUTO-ENTMCNC: 29.4 G/DL (ref 31–37)
MCV RBC AUTO: 105.3 FL
MONOCYTES # BLD AUTO: 0.35 X10(3) UL (ref 0.1–1)
MONOCYTES NFR BLD AUTO: 8.5 %
NEUTROPHILS # BLD AUTO: 2.73 X10 (3) UL (ref 1.5–7.7)
NEUTROPHILS # BLD AUTO: 2.73 X10(3) UL (ref 1.5–7.7)
NEUTROPHILS NFR BLD AUTO: 66.5 %
OSMOLALITY SERPL CALC.SUM OF ELEC: 304 MOSM/KG (ref 275–295)
PLATELET # BLD AUTO: 146 10(3)UL (ref 150–450)
PLATELET MORPHOLOGY: NORMAL
POTASSIUM SERPL-SCNC: 4.1 MMOL/L (ref 3.5–5.1)
RBC # BLD AUTO: 3 X10(6)UL
SODIUM SERPL-SCNC: 141 MMOL/L (ref 136–145)
WBC # BLD AUTO: 4.1 X10(3) UL (ref 4–11)

## 2021-02-10 PROCEDURE — 99233 SBSQ HOSP IP/OBS HIGH 50: CPT | Performed by: INTERNAL MEDICINE

## 2021-02-10 PROCEDURE — 99233 SBSQ HOSP IP/OBS HIGH 50: CPT | Performed by: HOSPITALIST

## 2021-02-10 PROCEDURE — 99232 SBSQ HOSP IP/OBS MODERATE 35: CPT | Performed by: INTERNAL MEDICINE

## 2021-02-10 RX ORDER — ASPIRIN 81 MG/1
81 TABLET ORAL DAILY
Status: DISCONTINUED | OUTPATIENT
Start: 2021-02-11 | End: 2021-02-12

## 2021-02-10 RX ORDER — LEVOTHYROXINE SODIUM 0.15 MG/1
150 TABLET ORAL
Status: DISCONTINUED | OUTPATIENT
Start: 2021-02-10 | End: 2021-02-12

## 2021-02-10 RX ORDER — ESCITALOPRAM OXALATE 10 MG/1
10 TABLET ORAL DAILY
Status: DISCONTINUED | OUTPATIENT
Start: 2021-02-10 | End: 2021-02-12

## 2021-02-10 RX ORDER — METOPROLOL SUCCINATE 50 MG/1
50 TABLET, EXTENDED RELEASE ORAL 2 TIMES DAILY
Status: DISCONTINUED | OUTPATIENT
Start: 2021-02-10 | End: 2021-02-12

## 2021-02-10 RX ORDER — DILTIAZEM HYDROCHLORIDE 120 MG/1
120 CAPSULE, EXTENDED RELEASE ORAL DAILY
Status: DISCONTINUED | OUTPATIENT
Start: 2021-02-10 | End: 2021-02-12

## 2021-02-10 RX ORDER — PRAVASTATIN SODIUM 20 MG
20 TABLET ORAL NIGHTLY
Status: DISCONTINUED | OUTPATIENT
Start: 2021-02-10 | End: 2021-02-12

## 2021-02-10 RX ORDER — ALPRAZOLAM 0.25 MG/1
0.25 TABLET ORAL DAILY PRN
Status: DISCONTINUED | OUTPATIENT
Start: 2021-02-10 | End: 2021-02-12

## 2021-02-10 NOTE — PROGRESS NOTES
[facility]  Nephrology Progress Note    Joann Blanton Attending:  Donavon Romero MD       Assessment and Plan:    1) CKD 3- multifactorial; in part due diuretic effect; at recent baseline. UA noted- suggestive of UA.  Accept azotemia with diuresis 02/10/2021    BUN 44 02/10/2021     02/10/2021    K 4.1 02/10/2021    CL 99 02/10/2021    CO2 42.0 02/10/2021     02/10/2021    CA 8.6 02/10/2021    ALB 2.7 02/10/2021    ALKPHO 66 02/10/2021    BILT 0.3 02/10/2021    TP 6.6 02/10/2021    AST Oral, Q6H PRN    •  Insulin Aspart Pen (NOVOLOG) 100 UNIT/ML flexpen 1-68 Units, 1-68 Units, Subcutaneous, TID CC    •  influenza vaccine (PF) (FLUZONE HD) high dose for 65 yrs & older inj 0.7ml, 0.7 mL, Intramuscular, Prior to discharge    •  furosemide (

## 2021-02-10 NOTE — PROGRESS NOTES
[facility]  Cardiology Progress Note    Subjective:  No chest pain or shortness of breath. Feeling a bit anxious.      Objective:  /39 (BP Location: Right arm)   Pulse 72   Temp 98.6 °F (37 °C) (Oral)   Resp 20   Wt 261 lb 0.4 oz (118.4 kg)   SpO anticoagulation due to anemia.      Ortiz Ramirez MD  [facility]  Cardiac Electrophysiolgy  [phone]

## 2021-02-10 NOTE — PLAN OF CARE
Patient A&O x2-3, confused at times. Patient reported hearing voices coming from the television and bedside table. 3L oxygen via nasal canula. NSR/Afib on tele. Bipap at night. Electrolyte protocol. Renal diet. Accucheck qid. IV Rocephin.  Safety precaution stability  Description: INTERVENTIONS:  - Monitor vital signs, rhythm, and trends  - Monitor for bleeding, hypotension and signs of decreased cardiac output  - Evaluate effectiveness of vasoactive medications to optimize hemodynamic stability  - Monitor ar pressure and fluid volume within ordered parameters to optimize cerebral perfusion and minimize risk of hemorrhage  - Monitor temperature, glucose, and sodium.  Initiate appropriate interventions as ordered  Outcome: Progressing

## 2021-02-10 NOTE — PROGRESS NOTES
[facility] PULMONARY/CRITICAL CARE    S: Pt is feeling ok, not too much dyspnea or cough.      Meds:  • Metoprolol Succinate ER  50 mg Oral BID   • dilTIAZem HCl ER Coated Beads  120 mg Oral Daily   • escitalopram  10 mg Oral Daily   • Levothyroxine Sodium  150 mc Recent Labs   Lab 02/08/21  1248   TROP <0.045       Recent Labs   Lab 02/08/21  1248   PCT 0.07       Recent Labs   Lab 02/08/21  1247 02/09/21  0419   WARREN  --  154.8   DDIMER 2.91*  --          Imaging reviewed    Assessment and Plan    1.  Acute o

## 2021-02-10 NOTE — PROGRESS NOTES
Brief Note:    1. Atrial flutter  2. Atrial fibrillation sp DCCV  1. PTA Aspirin on hold   2. Cardizem drip > resume PO    3. Stop Metoprolol 25 BID and resume PTA Toprol 50 BID  4. Monitor hemodynamics  5.  Telemetry    PTailor MD

## 2021-02-10 NOTE — PLAN OF CARE
Received pt from ICU at 2150. Pt transferred via bed. A/O x 3. Disoriented to situation. Forgetful. Denies any cp or gregory. VSS. Afebrile. Recurrent Afib on tele w/ HR up to 140's. Dr. Jeanette Steward notified. Metoprolol initiated as ordered.   Pt remains on BIPAP INTERVENTIONS:  - Assess for changes in respiratory status  - Assess for changes in mentation and behavior  - Position to facilitate oxygenation and minimize respiratory effort  - Oxygen supplementation based on oxygen saturation or ABGs  - Provide Smoking

## 2021-02-10 NOTE — CONSULTS
[facility]  Report of Inpatient Wound Care Consultation    Jack Young Patient Status:  Inpatient    1945 MRN [id number]   [facility] 5NW-A Attending Luciana Epley, MD   Hosp Day # 2 PCP Zahra Wagner MD     Reason for HISTORY  09/07/2018    Cysto     • PART REMOVAL COLON W END COLOSTOMY  2017   • REMOVAL GALLBLADDER     • SHOULDER SURG PROC UNLISTED      shoulder      reports that she quit smoking about 17 years ago. She has a 20.00 pack-year smoking history.  She

## 2021-02-11 LAB
ANION GAP SERPL CALC-SCNC: 4 MMOL/L (ref 0–18)
BASOPHILS # BLD AUTO: 0.02 X10(3) UL (ref 0–0.2)
BASOPHILS NFR BLD AUTO: 0.6 %
BUN BLD-MCNC: 40 MG/DL (ref 7–18)
BUN/CREAT SERPL: 25 (ref 10–20)
CALCIUM BLD-MCNC: 8.8 MG/DL (ref 8.5–10.1)
CHLORIDE SERPL-SCNC: 99 MMOL/L (ref 98–112)
CO2 SERPL-SCNC: 39 MMOL/L (ref 21–32)
CREAT BLD-MCNC: 1.6 MG/DL
DEPRECATED RDW RBC AUTO: 61.3 FL (ref 35.1–46.3)
EOSINOPHIL # BLD AUTO: 0.17 X10(3) UL (ref 0–0.7)
EOSINOPHIL NFR BLD AUTO: 5.2 %
ERYTHROCYTE [DISTWIDTH] IN BLOOD BY AUTOMATED COUNT: 16.1 % (ref 11–15)
GLUCOSE BLD-MCNC: 184 MG/DL (ref 70–99)
GLUCOSE BLD-MCNC: 195 MG/DL (ref 70–99)
GLUCOSE BLD-MCNC: 238 MG/DL (ref 70–99)
GLUCOSE BLD-MCNC: 83 MG/DL (ref 70–99)
GLUCOSE BLD-MCNC: 86 MG/DL (ref 70–99)
HCT VFR BLD AUTO: 30.7 %
HGB BLD-MCNC: 9.1 G/DL
IMM GRANULOCYTES # BLD AUTO: 0.03 X10(3) UL (ref 0–1)
IMM GRANULOCYTES NFR BLD: 0.9 %
LYMPHOCYTES # BLD AUTO: 0.74 X10(3) UL (ref 1–4)
LYMPHOCYTES NFR BLD AUTO: 22.6 %
MCH RBC QN AUTO: 30.7 PG (ref 26–34)
MCHC RBC AUTO-ENTMCNC: 29.6 G/DL (ref 31–37)
MCV RBC AUTO: 103.7 FL
MONOCYTES # BLD AUTO: 0.3 X10(3) UL (ref 0.1–1)
MONOCYTES NFR BLD AUTO: 9.1 %
NEUTROPHILS # BLD AUTO: 2.02 X10 (3) UL (ref 1.5–7.7)
NEUTROPHILS # BLD AUTO: 2.02 X10(3) UL (ref 1.5–7.7)
NEUTROPHILS NFR BLD AUTO: 61.6 %
OSMOLALITY SERPL CALC.SUM OF ELEC: 303 MOSM/KG (ref 275–295)
PLATELET # BLD AUTO: 127 10(3)UL (ref 150–450)
POTASSIUM SERPL-SCNC: 3.8 MMOL/L (ref 3.5–5.1)
RBC # BLD AUTO: 2.96 X10(6)UL
SODIUM SERPL-SCNC: 142 MMOL/L (ref 136–145)
WBC # BLD AUTO: 3.3 X10(3) UL (ref 4–11)

## 2021-02-11 PROCEDURE — 99232 SBSQ HOSP IP/OBS MODERATE 35: CPT | Performed by: NURSE PRACTITIONER

## 2021-02-11 PROCEDURE — 99233 SBSQ HOSP IP/OBS HIGH 50: CPT | Performed by: INTERNAL MEDICINE

## 2021-02-11 PROCEDURE — 99232 SBSQ HOSP IP/OBS MODERATE 35: CPT | Performed by: HOSPITALIST

## 2021-02-11 RX ORDER — BUMETANIDE 1 MG/1
1 TABLET ORAL DAILY
Status: DISCONTINUED | OUTPATIENT
Start: 2021-02-11 | End: 2021-02-12

## 2021-02-11 RX ORDER — POTASSIUM CHLORIDE 20 MEQ/1
40 TABLET, EXTENDED RELEASE ORAL ONCE
Status: COMPLETED | OUTPATIENT
Start: 2021-02-11 | End: 2021-02-11

## 2021-02-11 NOTE — PROGRESS NOTES
[facility]  Cardiology Progress Note    Subjective:  No chest pain or shortness of breath.     Objective:  /49 (BP Location: Right arm)   Pulse 69   Temp 98.5 °F (36.9 °C) (Oral)   Resp 18   Wt 261 lb 0.4 oz (118.4 kg)   SpO2 97%   BMI 40.88 kg/m will follow peripherally going forward. Please feel free to call with any questions or concerns.      CAITLIN Clark  2/11/2021  10:38 AM

## 2021-02-11 NOTE — PHYSICAL THERAPY NOTE
PHYSICAL THERAPY EVALUATION - INPATIENT     Room Number: 503/503-A  Evaluation Date: 2/11/2021  Type of Evaluation: Initial  Physician Order: PT Eval and Treat    Presenting Problem: A-flutter, Recurrent acute on chronic hypercapnic resp failure  Darius Mccall at [facility] MAIN OR   • COLONOSCOPY  11/07/2019   • COLONOSCOPY N/A 2/28/2017    Performed by Franny Jarquin MD at [facility] N/A 2/5/2020    Performed by Lola Vergara MD at [facility]     • [address] [facility] 3+/5  Right Knee flexion  3+/5  Left Knee flexion  3+/5  Right Dorsiflexion  3+/5  Left Dorsiflexion  3+/5    BALANCE                ADDITIONAL TESTS                                    NEUROLOGICAL FINDINGS                      ACTIVITY TOLERANCE addressed;SCDs in place; Alarm set; Family present    ASSESSMENT   Patient is a 76year old female admitted on 2/8/2021 for acute on chronic hypoxic respiratory failure, A-flutter, requiring ICU stay.   Pertinent comorbidities and personal factors impacting t

## 2021-02-11 NOTE — CM/SW NOTE
Patient discussed in rounds, will likely discharge tomorrow. Updates sent to [facility] who can accept when medically clear. Patient updated as well.      Brian Trevino RN,   Phone [phone]

## 2021-02-11 NOTE — PROGRESS NOTES
[facility]  Nephrology Progress Note    Elmarie Remedies Attending:  Jeremy Serrano MD       Assessment and Plan:    1) CKD 3- multifactorial; in part due diuretic effect; at recent baseline.  Tolerate modest azotemia with diuresis     2) Recurrent a 02/11/2021    CREATSERUM 1.60 02/11/2021    BUN 40 02/11/2021     02/11/2021    K 3.8 02/11/2021    CL 99 02/11/2021    CO2 39.0 02/11/2021    GLU 83 02/11/2021    CA 8.8 02/11/2021    PGLU 86 02/11/2021       Imaging: All imaging studies reviewed. discharge    •  furosemide (LASIX) injection 40 mg, 40 mg, Intravenous, BID (Diuretic)          Questions/concerns were discussed with patient and/or family by bedside.           Severiano Guerra  2/11/2021  833 PM

## 2021-02-11 NOTE — PROGRESS NOTES
[facility] PULMONARY/CRITICAL CARE    S: Pt is feeling better. She denies sob or cough.      Meds:  • Insulin Aspart Pen  1-5 Units Subcutaneous TID CC and HS   • bumetanide  1 mg Oral Daily   • Metoprolol Succinate ER  50 mg Oral BID   • dilTIAZem HCl ER Coated B Lab 02/08/21  1248   TROP <0.045       Recent Labs   Lab 02/08/21  1248   PCT 0.07       Recent Labs   Lab 02/08/21  1247 02/09/21  0419   WARREN  --  154.8   DDIMER 2.91*  --          Imaging reviewed    Assessment and Plan    1.  Acute on chronic hypercapn

## 2021-02-11 NOTE — OCCUPATIONAL THERAPY NOTE
OCCUPATIONAL THERAPY EVALUATION - INPATIENT     Room Number: 503/503-A  Evaluation Date: 2/11/2021  Type of Evaluation: Initial  Presenting Problem: A-flutter, Resp Failure    Physician Order: IP Consult to Occupational Therapy  Reason for Therapy: ADL/IAD COLONOSCOPY  11/07/2019   • COLONOSCOPY N/A 2/28/2017    Performed by Nabeel Khan MD at [facility] N/A 2/5/2020    Performed by Fabian Nazario MD at [facility]     • CYSTOURETHROSCOPY  3-1-11 limits    SENSATION  Light touch:  intact    Communication: Pt is able to communicate all basic needs and wants    Behavioral/Emotional/Social: Pt was participated in and was motivated for therapy today.     RANGE OF MOTION AND STRENGTH ASSESSMENT  Upper ex on 2/8/2021 for resp failure. Complete medical history and occupational profile noted above. Functional outcome measures completed include AMPAC, MMT, ROM.  In this OT evaluation patient presents with the following performance deficits: endurance, safety, p from supine to sit:  with supervision  Patient will transfer from sit to stand:  with supervision  Patient will transfer to toilet:  with supervision    UE Exercise Program Goal  Patient will be supervision with bilateral AROM HEP (home exercise program).

## 2021-02-11 NOTE — PLAN OF CARE
Assumed care at 0730  A&Ox3, forgetful of situation at times  Afib on tele HR controlled  On 2L nasal cannula, wearing BIPAP at noc  Denies pain, states breathing feels improved  Worked with PT/OT today, was able to stand up   Possible dc tomorrow?  CM/SW a

## 2021-02-11 NOTE — PROGRESS NOTES
[facility]  Progress Note     Tabitha Rosenberg Patient Status:  Inpatient    1945 MRN [id number]   [facility] 4SW-A Attending Jhon Weber MD   [facility] Day # 3 PCP Angi Lawson MD     Chief Complaint: Hypoxia, AF    S: Pa (A) (based on SCr of 1.6 mg/dL (H)). Recent Labs   Lab 02/08/21  1247   PTP 13.6   INR 1.01       Recent Labs   Lab 02/08/21  1248   TROP <0.045            Imaging: Imaging data reviewed in Epic.     Medications:   • Insulin Aspart Pen  1-5 Units Subcuta with pleural effusion sp thoracentesis (1/28/21)  20. Diverticulosis sp colostomy, reversed   21.  History of Cdiff    Quality:  · DVT Prophylaxis: Lovenox  · CODE status: Full  · Tinsley: No  · Central line: No  · If COVID testing is negative, may discontinu

## 2021-02-11 NOTE — PLAN OF CARE
NURSING NOTES:  2/10 2000: Pt received AOx2. 3L high flow NC. At-fib. RFA saline lock. Incontinent. Candace. BS-99. Denies pain. 2/11 0000: Tolerating BIPAP.  0500: BIPAP off. 2L high flow NC. No complain.     Problem: Diabetes/Glucose Control  Goal: Gluc minimize respiratory effort  - Oxygen supplementation based on oxygen saturation or ABGs  - Provide Smoking Cessation handout, if applicable  - Encourage broncho-pulmonary hygiene including cough, deep breathe, Incentive Spirometry  - Assess the need for s

## 2021-02-12 ENCOUNTER — APPOINTMENT (OUTPATIENT)
Dept: GENERAL RADIOLOGY | Facility: HOSPITAL | Age: 76
DRG: 291 | End: 2021-02-12
Attending: HOSPITALIST
Payer: MEDICARE

## 2021-02-12 VITALS
RESPIRATION RATE: 20 BRPM | WEIGHT: 261 LBS | TEMPERATURE: 98 F | BODY MASS INDEX: 41 KG/M2 | SYSTOLIC BLOOD PRESSURE: 127 MMHG | OXYGEN SATURATION: 96 % | HEART RATE: 65 BPM | DIASTOLIC BLOOD PRESSURE: 43 MMHG

## 2021-02-12 LAB
ALLENS TEST: POSITIVE
ANION GAP SERPL CALC-SCNC: 3 MMOL/L (ref 0–18)
ARTERIAL BLD GAS O2 SATURATION: 94 % (ref 92–100)
ARTERIAL BLOOD GAS BASE EXCESS: 15.8 MMOL/L (ref ?–2)
ARTERIAL BLOOD GAS HCO3: 44.6 MEQ/L (ref 22–26)
ARTERIAL BLOOD GAS PCO2: 88 MM HG (ref 35–45)
ARTERIAL BLOOD GAS PH: 7.32 (ref 7.35–7.45)
ARTERIAL BLOOD GAS PO2: 74 MM HG (ref 80–105)
BUN BLD-MCNC: 38 MG/DL (ref 7–18)
BUN/CREAT SERPL: 21 (ref 10–20)
CALCIUM BLD-MCNC: 8.7 MG/DL (ref 8.5–10.1)
CALCULATED O2 SATURATION: 94 % (ref 92–100)
CARBOXYHEMOGLOBIN: 2.4 % SAT (ref 0–3)
CHLORIDE SERPL-SCNC: 99 MMOL/L (ref 98–112)
CO2 SERPL-SCNC: 42 MMOL/L (ref 21–32)
CREAT BLD-MCNC: 1.81 MG/DL
GLUCOSE BLD-MCNC: 135 MG/DL (ref 70–99)
GLUCOSE BLD-MCNC: 164 MG/DL (ref 70–99)
GLUCOSE BLD-MCNC: 186 MG/DL (ref 70–99)
L/M: 2 L/MIN
METHEMOGLOBIN: 0.5 % SAT (ref 0.4–1.5)
OSMOLALITY SERPL CALC.SUM OF ELEC: 311 MOSM/KG (ref 275–295)
PATIENT TEMPERATURE: 98.1 F
POTASSIUM SERPL-SCNC: 4.1 MMOL/L (ref 3.5–5.1)
SODIUM SERPL-SCNC: 144 MMOL/L (ref 136–145)
TOTAL HEMOGLOBIN: 9.2 G/DL

## 2021-02-12 PROCEDURE — 99232 SBSQ HOSP IP/OBS MODERATE 35: CPT | Performed by: INTERNAL MEDICINE

## 2021-02-12 PROCEDURE — 71045 X-RAY EXAM CHEST 1 VIEW: CPT | Performed by: HOSPITALIST

## 2021-02-12 PROCEDURE — 99239 HOSP IP/OBS DSCHRG MGMT >30: CPT | Performed by: HOSPITALIST

## 2021-02-12 NOTE — DISCHARGE PLANNING
NURSING DISCHARGE NOTE    Discharged to the [facility] via Ambulance. Accompanied by Support staff  Belongings Taken by patient/family. Discharge navigator complete. Discharge instructions reviewed, report called to RN at the [facility].

## 2021-02-12 NOTE — DISCHARGE SUMMARY
[facility]PITAL[facility]  DISCHARGE SUMMARY     Shea Moctezuma Patient Status:  Inpatient    1945 MRN [id number]   [facility] 5NW-A Attending Trinh Hickey MD   [facility] Day # 4 PCP Estelle Huddleston MD     Date of Admission: 2021  Date patient was having increasing lethargy. She was giving a nebulizer treatment and remained hypoxic and developed significant tachycardia.   There was concern the patient may be septic and she was subsequent sent to the emergency room.     In the emergency r UNIT/ML Sopn  Commonly known as: NOVOLOG      Inject 2-10 Units into the skin TID CC and HS.  Continue to give correction insulin (Novolog/aspart) even if NPO; DO NOT HOLD OR ALTER INSULIN DOSE WITHOUT A PHYSICIAN ORDER  1 unit of Novolog/aspart insulin exp for Next 30 Days 2/12/2021 - 3/14/2021      Date Arrival Time Visit Type Length Department Provider     3/1/2021  1:00 PM  EXAM - ESTABLISHED [1903] 10 min [facility], Jim YOUNGBLOOD Charle Coco, [place]    Patient Instructions:

## 2021-02-12 NOTE — PROGRESS NOTES
[facility]  Progress Note     Veronica Rogel Patient Status:  Inpatient    1945 MRN [id number]   [facility] 4SW-A Attending Dara Cabrera MD   [address] Day # 4 PCP Garo Arzola MD     Chief Complaint: Hypoxia, AF    S: Pa 0.4 0.3  --   --    TP 7.0 6.5 6.6  --   --        Estimated Creatinine Clearance: 26.1 mL/min (A) (based on SCr of 1.81 mg/dL (H)).     Recent Labs   Lab 02/08/21  1247   PTP 13.6   INR 1.01       Recent Labs   Lab 02/08/21  1248   TROP <0.045            I respiratory failure with pleural effusion sp thoracentesis (1/28/21)  20. Diverticulosis sp colostomy, reversed   21.  History of Cdiff    Quality:  · DVT Prophylaxis: Lovenox  · CODE status: Full  · Tinsley: No  · Central line: No  · If COVID testing is nega

## 2021-02-12 NOTE — DIETARY NOTE
[facility]    NUTRITION ASSESSMENT    Pt does not meet malnutrition criteria at this time. NUTRITION DIAGNOSIS/PROBLEM:    Increased nutrient needs related to wound healing as evidenced by DTI on left heel. NUTRITION INTERVENTION:  1.  Meal and S No  Cultural/Ethnic/Restorationist Preferences Addresses: Yes    NUTRITION PRESCRIPTION: 61.4 kg (IBW)  Calories: 5184-2477 calories/day (27-32 calories per kg)  Protein:  grams protein/day (1.5-1.7 grams protein per kg)  Fluid: ~1 ml/kcal or per MD discr

## 2021-02-12 NOTE — PROGRESS NOTES
[facility]  Nephrology Progress Note    Aly Hammonds Attending:  Elizabeth Rg MD       Assessment and Plan:    1) CKD 3- multifactorial; in part due diuretic effect; at recent baseline.  Tolerate modest azotemia with diuresis     2) Recurrent a Insulin Aspart Pen (NOVOLOG) 100 UNIT/ML flexpen 1-5 Units, 1-5 Units, Subcutaneous, TID CC and HS    •  bumetanide (BUMEX) tab 1 mg, 1 mg, Oral, Daily    •  Metoprolol Succinate ER (Toprol XL) 24 hr tab 50 mg, 50 mg, Oral, BID    •  dilTIAZem (DILACOR XR

## 2021-02-12 NOTE — CM/SW NOTE
02/12/21 1400   Discharge disposition   Expected discharge disposition Skilled Nurs   Name of Facillity/Home Care/Hospice SNF Other  ([facility])   Discharge transportation [facility] to [place] [facility], [facility] admit today.  rn to call

## 2021-02-12 NOTE — PLAN OF CARE
Assumed care 1900  Patient alert and oriented 3-4  Wore BIPAP 11pm-6 am  Complaints of pain in ankles from boots  Turned and repositioned as needed  Bunny boots on  Unable to wean from 2 L

## 2021-02-14 PROBLEM — N17.9 ACUTE KIDNEY INJURY (HCC): Status: RESOLVED | Noted: 2021-01-11 | Resolved: 2021-02-14

## 2021-02-14 PROBLEM — N30.01 ACUTE CYSTITIS WITH HEMATURIA: Status: RESOLVED | Noted: 2021-01-11 | Resolved: 2021-02-14

## 2021-02-14 NOTE — PROGRESS NOTES
Skilled Nursing Facility   HPI:    Katlin Pineda is a 76year old female admitted to SNF for sub-acute rehabilitation. Chief Complaint: f/u left leg cellulitis.         HPI     77 yo presenting for follow up, with history of morbid obesity and  blood glucose by 20 mg/dL    Give 2 unit if blood glucose 141-180 mg/dL  Give 3 units if blood glucose 181-220 mg/dL  Give 6 units if blood glucose 221-260 mg/dL  Give 8 units if blood glucose 261-300 mg/dL  Give 10 units if blood glucose 301-350 mg/dL  Ca (2017), Uterine cancer ([facility] [address] and Visual impairment. She  has a past surgical history that includes hernia surgery; ;  Eye surgery; foot surgery; shoulder surg proc unlisted; cystourethroscopy (3-1-11); cholecystectomy ([address] [facility]); rem gallop. Pulmonary:      Effort: Pulmonary effort is normal. No respiratory distress. Breath sounds: Normal breath sounds. No stridor. No wheezing or rales. Comments: Normal 02 saturations at 3 liters 02.    Chest:      Chest wall: No tenderness accessory ossicle along the medial malleolus. Orthopedic screw along the great toe. Moderate plantar and dorsal calcaneal spurring. CONCLUSION:  No evidence of acute displaced fracture or dislocation in the left ankle.   Extensive soft tissue s Registry. PATIENT STATED HISTORY: (As transcribed by Technologist)  Patient had recent fall . FINDINGS:  Extensive artifacts related to permanent dental hardware distorting images of the cerebellum and temporal lobes bilaterally.  VENTRICLES/SULCI:  Arturo Tang THROMBI:  None visible. COMPRESSION:  Normal compressibility, phasicity, and augmentation. OTHER:  Negative.             CONCLUSION:  No evidence of deep venous thrombosis in the left lower extremity within the limitations of the exam as above   Dictated by Allens Test Positive     Sample Site Right Radial     L/M 2.0 L/min    ABG Device Nasal cannula    POCT GLUCOSE    Collection Time: 02/12/21 12:51 PM   Result Value Ref Range    POC Glucose 186 (H) 70 - 99 mg/dL           ASSESSMENT/ PLAN:   76year old

## 2021-02-15 ENCOUNTER — INITIAL APN SNF VISIT (OUTPATIENT)
Dept: INTERNAL MEDICINE CLINIC | Age: 76
End: 2021-02-15

## 2021-02-15 VITALS
DIASTOLIC BLOOD PRESSURE: 62 MMHG | TEMPERATURE: 99 F | RESPIRATION RATE: 18 BRPM | WEIGHT: 274.38 LBS | OXYGEN SATURATION: 93 % | BODY MASS INDEX: 43 KG/M2 | SYSTOLIC BLOOD PRESSURE: 129 MMHG | HEART RATE: 68 BPM

## 2021-02-15 DIAGNOSIS — E11.00 TYPE 2 DIABETES MELLITUS WITH HYPEROSMOLARITY WITHOUT COMA, UNSPECIFIED WHETHER LONG TERM INSULIN USE (HCC): ICD-10-CM

## 2021-02-15 DIAGNOSIS — S91.302A NON-HEALING WOUND OF LEFT HEEL: ICD-10-CM

## 2021-02-15 DIAGNOSIS — Z74.09 IMPAIRED MOBILITY AND ADLS: ICD-10-CM

## 2021-02-15 DIAGNOSIS — I27.20 PULMONARY HTN (HCC): ICD-10-CM

## 2021-02-15 DIAGNOSIS — E11.65 UNCONTROLLED TYPE 2 DIABETES MELLITUS WITH INSULIN THERAPY (HCC): ICD-10-CM

## 2021-02-15 DIAGNOSIS — R53.81 PHYSICAL DECONDITIONING: ICD-10-CM

## 2021-02-15 DIAGNOSIS — R53.1 WEAKNESS GENERALIZED: ICD-10-CM

## 2021-02-15 DIAGNOSIS — I48.92 ATRIAL FIBRILLATION AND FLUTTER (HCC): ICD-10-CM

## 2021-02-15 DIAGNOSIS — Z79.4 UNCONTROLLED TYPE 2 DIABETES MELLITUS WITH INSULIN THERAPY (HCC): ICD-10-CM

## 2021-02-15 DIAGNOSIS — I48.92 ATRIAL FLUTTER, UNSPECIFIED TYPE (HCC): ICD-10-CM

## 2021-02-15 DIAGNOSIS — H57.89 IRRITATION OF LEFT EYE: ICD-10-CM

## 2021-02-15 DIAGNOSIS — I48.91 ATRIAL FIBRILLATION AND FLUTTER (HCC): ICD-10-CM

## 2021-02-15 DIAGNOSIS — R09.02 HYPOXIA: ICD-10-CM

## 2021-02-15 DIAGNOSIS — N18.30 STAGE 3 CHRONIC KIDNEY DISEASE, UNSPECIFIED WHETHER STAGE 3A OR 3B CKD (HCC): ICD-10-CM

## 2021-02-15 DIAGNOSIS — E78.00 PURE HYPERCHOLESTEROLEMIA: ICD-10-CM

## 2021-02-15 DIAGNOSIS — F32.4 MAJOR DEPRESSIVE DISORDER IN PARTIAL REMISSION, UNSPECIFIED WHETHER RECURRENT (HCC): ICD-10-CM

## 2021-02-15 DIAGNOSIS — Z78.9 IMPAIRED MOBILITY AND ADLS: ICD-10-CM

## 2021-02-15 DIAGNOSIS — G93.40 ACUTE ENCEPHALOPATHY: ICD-10-CM

## 2021-02-15 DIAGNOSIS — J96.91 RESPIRATORY FAILURE WITH HYPOXIA, UNSPECIFIED CHRONICITY (HCC): Primary | ICD-10-CM

## 2021-02-15 DIAGNOSIS — I48.0 AF (PAROXYSMAL ATRIAL FIBRILLATION) (HCC): ICD-10-CM

## 2021-02-15 DIAGNOSIS — I10 ESSENTIAL HYPERTENSION: ICD-10-CM

## 2021-02-15 DIAGNOSIS — R06.02 SOB (SHORTNESS OF BREATH): ICD-10-CM

## 2021-02-15 DIAGNOSIS — D51.8 OTHER VITAMIN B12 DEFICIENCY ANEMIA: ICD-10-CM

## 2021-02-15 DIAGNOSIS — N17.9 AKI (ACUTE KIDNEY INJURY) (HCC): ICD-10-CM

## 2021-02-15 DIAGNOSIS — R53.83 LETHARGY: ICD-10-CM

## 2021-02-15 PROCEDURE — 99310 SBSQ NF CARE HIGH MDM 45: CPT | Performed by: NURSE PRACTITIONER

## 2021-02-15 RX ORDER — ENOXAPARIN SODIUM 100 MG/ML
40 INJECTION SUBCUTANEOUS DAILY
COMMUNITY
Start: 2021-02-15 | End: 2021-04-07 | Stop reason: ALTCHOICE

## 2021-02-15 RX ORDER — SOFT LENS ADJUNCTIVE SOLUTIONS
1 DROPS OPHTHALMIC (EYE) 4 TIMES DAILY PRN
COMMUNITY
Start: 2021-02-15 | End: 2021-12-09

## 2021-02-15 RX ORDER — DOCUSATE SODIUM 100 MG/1
100 CAPSULE, LIQUID FILLED ORAL 3 TIMES DAILY
COMMUNITY
End: 2021-12-09

## 2021-02-15 RX ORDER — POLYETHYLENE GLYCOL 3350 17 G/17G
17 POWDER, FOR SOLUTION ORAL DAILY PRN
COMMUNITY
End: 2021-12-09

## 2021-02-16 NOTE — PROGRESS NOTES
Jenny Aguilar  : 1945  Age 76year old  female patient is admitted to Facility: The [address] [place] [address] for rehabilitation and medical management.     [address] [place] [address] Drive date:  21  Discharge date to [facility]:  21  FRANCHESKA:  AMITA   Anticipated up for a Holter monitor to evaluate her A. fib/flutter burden.     At [address], the patient was having increasing lethargy.  She was givin a nebulizer treatment and remained hypoxic and developed significant tachycardia. Elena Tamayo was concern the patient may be sep COLOSTOMY TAKEDOWN N/A 2/5/2020    Performed by Marysol Ha MD at [facility]     • CYSTOURETHROSCOPY  3-1-11    cysto flow US, dr Jammie Rg   • EYE SURGERY      eye   • FOOT SURGERY      foot   • HERNIA SURGERY     • HYSTERECTO Nasal Solution 1 spray by Nasal route 3 (three) times daily. • aspirin 81 MG Oral Chew Tab Chew 1 tablet (81 mg total) by mouth daily.   0   • Insulin Aspart Pen 100 UNIT/ML Subcutaneous Solution Pen-injector Inject 2-10 Units into the skin TID CC and H BMI 42.98 kg/m²      REVIEW OF SYSTEMS:  GENERAL HEALTH:feels well otherwise  SKIN: denies any unusual skin lesions or rashes  WOUNDS: +Left Heel DTI   EYES:no visual complaints or deficits  HENT: denies nasal congestion, sinus pain or sore throat; and hea sensorimotor deficit, reflexes normal, cranial nerves intact II-XII, follows commands  PSYCHIATRIC: alert and oriented x 3; affect appropriate      DIAGNOSTICS REVIEWED AT THIS VISIT:  Lab Results   Component Value Date    WBC 3.3 (L) 02/11/2021    RBC 2.9 [2]  Low sodium level at discharge (<135mEq/L)                        [1]  Procedure during hospital stay (ICD 10 Coded)                   [1]  Index or Admission type Urgent or Emergent                       [1]  Number of hospital admissions during the p therefor insulin increased, BG improved  -A1c-7.9 last done on 12/21/20  -Novolog SS  -Lantus 18 units every morning    Hypothyroidism  -Levothyroxine 150 mcg daily    Depression  -Lexapro 10 mg daily    Anemia, chronic disease  -Hgb: 9.1 in hospital  -Mon

## 2021-02-17 ENCOUNTER — SNF VISIT (OUTPATIENT)
Dept: INTERNAL MEDICINE CLINIC | Age: 76
End: 2021-02-17

## 2021-02-17 DIAGNOSIS — Z74.09 IMPAIRED MOBILITY AND ADLS: ICD-10-CM

## 2021-02-17 DIAGNOSIS — R53.83 LETHARGY: ICD-10-CM

## 2021-02-17 DIAGNOSIS — G47.33 OSA ON CPAP: ICD-10-CM

## 2021-02-17 DIAGNOSIS — Z99.89 OSA ON CPAP: ICD-10-CM

## 2021-02-17 DIAGNOSIS — Z79.4 UNCONTROLLED TYPE 2 DIABETES MELLITUS WITH INSULIN THERAPY (HCC): ICD-10-CM

## 2021-02-17 DIAGNOSIS — N18.30 STAGE 3 CHRONIC KIDNEY DISEASE, UNSPECIFIED WHETHER STAGE 3A OR 3B CKD (HCC): ICD-10-CM

## 2021-02-17 DIAGNOSIS — R53.81 PHYSICAL DECONDITIONING: ICD-10-CM

## 2021-02-17 DIAGNOSIS — Z78.9 IMPAIRED MOBILITY AND ADLS: ICD-10-CM

## 2021-02-17 DIAGNOSIS — R06.02 SOB (SHORTNESS OF BREATH): ICD-10-CM

## 2021-02-17 DIAGNOSIS — R09.02 HYPOXIA: ICD-10-CM

## 2021-02-17 DIAGNOSIS — E11.65 UNCONTROLLED TYPE 2 DIABETES MELLITUS WITH INSULIN THERAPY (HCC): ICD-10-CM

## 2021-02-17 DIAGNOSIS — I27.20 PULMONARY HTN (HCC): ICD-10-CM

## 2021-02-17 DIAGNOSIS — J96.91 RESPIRATORY FAILURE WITH HYPOXIA, UNSPECIFIED CHRONICITY (HCC): Primary | ICD-10-CM

## 2021-02-17 PROCEDURE — 99307 SBSQ NF CARE SF MDM 10: CPT | Performed by: NURSE PRACTITIONER

## 2021-02-18 VITALS
SYSTOLIC BLOOD PRESSURE: 135 MMHG | TEMPERATURE: 98 F | OXYGEN SATURATION: 97 % | HEART RATE: 65 BPM | DIASTOLIC BLOOD PRESSURE: 74 MMHG | RESPIRATION RATE: 18 BRPM

## 2021-02-19 ENCOUNTER — EXTERNAL FACILITY (OUTPATIENT)
Dept: FAMILY MEDICINE CLINIC | Facility: CLINIC | Age: 76
End: 2021-02-19

## 2021-02-19 DIAGNOSIS — E11.65 UNCONTROLLED TYPE 2 DIABETES MELLITUS WITH INSULIN THERAPY (HCC): Chronic | ICD-10-CM

## 2021-02-19 DIAGNOSIS — M48.10 DISH (DIFFUSE IDIOPATHIC SKELETAL HYPEROSTOSIS): ICD-10-CM

## 2021-02-19 DIAGNOSIS — D64.9 ANEMIA, UNSPECIFIED TYPE: ICD-10-CM

## 2021-02-19 DIAGNOSIS — R53.1 GENERALIZED WEAKNESS: ICD-10-CM

## 2021-02-19 DIAGNOSIS — R60.1 GENERALIZED EDEMA: ICD-10-CM

## 2021-02-19 DIAGNOSIS — Z93.3 S/P COLOSTOMY (HCC): ICD-10-CM

## 2021-02-19 DIAGNOSIS — N18.30 STAGE 3 CHRONIC KIDNEY DISEASE, UNSPECIFIED WHETHER STAGE 3A OR 3B CKD (HCC): ICD-10-CM

## 2021-02-19 DIAGNOSIS — Z79.4 UNCONTROLLED TYPE 2 DIABETES MELLITUS WITH INSULIN THERAPY (HCC): Chronic | ICD-10-CM

## 2021-02-19 DIAGNOSIS — J96.01 ACUTE RESPIRATORY FAILURE WITH HYPOXIA (HCC): ICD-10-CM

## 2021-02-19 DIAGNOSIS — J96.22 ACUTE ON CHRONIC RESPIRATORY FAILURE WITH HYPERCAPNIA (HCC): Primary | ICD-10-CM

## 2021-02-19 DIAGNOSIS — J90 BILATERAL PLEURAL EFFUSION: ICD-10-CM

## 2021-02-19 DIAGNOSIS — I10 ESSENTIAL HYPERTENSION: Chronic | ICD-10-CM

## 2021-02-19 DIAGNOSIS — I48.0 AF (PAROXYSMAL ATRIAL FIBRILLATION) (HCC): ICD-10-CM

## 2021-02-19 PROCEDURE — 99306 1ST NF CARE HIGH MDM 50: CPT | Performed by: FAMILY MEDICINE

## 2021-02-19 NOTE — PROGRESS NOTES
Jorge Rincon, 4/25/1945, 76year old, female    Chief Complaint:  Patient presents with: Follow - Up: Acute on Chronic Hypoxi and Hypercapneic Resp.  Failure d/t worsening pleural effusions, pulm edema and pulm hypertension       Subjective:  75-year- A  Transfers--Mod to Max A  Ambulation--Unable   UE ADLs--Mod A  LE ADLs--Mod to Max A X 2 person   Toileting--Mod to Max A    Recommending CG.     Denies insomnia, fatigue, fever/chills, cough, SOB, dyspnea, angina, palpitations, n/v, diarrhea, constipatio for fever notify MD/NP  -DuoNeb 3 times a day  -Lovenox 40 mg daily for DVT prophylaxis  -Fall precaution  -ELOS: TBD  -Anticipated discharge: TBD–SW assist with discharge planning     GAYLE  -Able to tolerate Cpap for 3 hours  -Needs sleep study after MO MO Shoemaker, APRN  2/17/2021  9:16 PM

## 2021-02-21 PROBLEM — R41.82 ALTERED MENTAL STATUS, UNSPECIFIED ALTERED MENTAL STATUS TYPE: Status: RESOLVED | Noted: 2021-02-08 | Resolved: 2021-02-21

## 2021-02-22 ENCOUNTER — HOSPITAL ENCOUNTER (INPATIENT)
Facility: HOSPITAL | Age: 76
LOS: 3 days | Discharge: NURSING, REHAB OR LONG-TERM CARE | DRG: 189 | End: 2021-02-25
Attending: EMERGENCY MEDICINE | Admitting: HOSPITALIST
Payer: MEDICARE

## 2021-02-22 ENCOUNTER — APPOINTMENT (OUTPATIENT)
Dept: GENERAL RADIOLOGY | Facility: HOSPITAL | Age: 76
DRG: 189 | End: 2021-02-22
Attending: EMERGENCY MEDICINE
Payer: MEDICARE

## 2021-02-22 ENCOUNTER — APPOINTMENT (OUTPATIENT)
Dept: CARDIOLOGY | Age: 76
End: 2021-02-22

## 2021-02-22 DIAGNOSIS — R41.82 ALTERED MENTAL STATUS, UNSPECIFIED ALTERED MENTAL STATUS TYPE: ICD-10-CM

## 2021-02-22 DIAGNOSIS — J96.22 ACUTE ON CHRONIC RESPIRATORY FAILURE WITH HYPERCAPNIA (HCC): Primary | ICD-10-CM

## 2021-02-22 LAB
ALBUMIN SERPL-MCNC: 3 G/DL (ref 3.4–5)
ALBUMIN/GLOB SERPL: 0.7 {RATIO} (ref 1–2)
ALLENS TEST: POSITIVE
ALP LIVER SERPL-CCNC: 84 U/L
ALT SERPL-CCNC: 21 U/L
ANION GAP SERPL CALC-SCNC: 2 MMOL/L (ref 0–18)
ARTERIAL BLD GAS O2 SATURATION: 92 % (ref 92–100)
ARTERIAL BLD GAS O2 SATURATION: 94 % (ref 92–100)
ARTERIAL BLD GAS O2 SATURATION: 95 % (ref 92–100)
ARTERIAL BLOOD GAS BASE EXCESS: 15.3 MMOL/L (ref ?–2)
ARTERIAL BLOOD GAS BASE EXCESS: 16 MMOL/L (ref ?–2)
ARTERIAL BLOOD GAS BASE EXCESS: 17.2 MMOL/L (ref ?–2)
ARTERIAL BLOOD GAS HCO3: 44.2 MEQ/L (ref 22–26)
ARTERIAL BLOOD GAS HCO3: 46.8 MEQ/L (ref 22–26)
ARTERIAL BLOOD GAS HCO3: 47.6 MEQ/L (ref 22–26)
ARTERIAL BLOOD GAS PCO2: 110 MM HG (ref 35–45)
ARTERIAL BLOOD GAS PCO2: 134 MM HG (ref 35–45)
ARTERIAL BLOOD GAS PCO2: 68 MM HG (ref 35–45)
ARTERIAL BLOOD GAS PH: 7.17 (ref 7.35–7.45)
ARTERIAL BLOOD GAS PH: 7.25 (ref 7.35–7.45)
ARTERIAL BLOOD GAS PH: 7.43 (ref 7.35–7.45)
ARTERIAL BLOOD GAS PO2: 100 MM HG (ref 80–105)
ARTERIAL BLOOD GAS PO2: 63 MM HG (ref 80–105)
ARTERIAL BLOOD GAS PO2: 70 MM HG (ref 80–105)
AST SERPL-CCNC: 16 U/L (ref 15–37)
BASOPHILS # BLD AUTO: 0.03 X10(3) UL (ref 0–0.2)
BASOPHILS NFR BLD AUTO: 0.6 %
BILIRUB SERPL-MCNC: 0.5 MG/DL (ref 0.1–2)
BILIRUB UR QL STRIP.AUTO: NEGATIVE
BUN BLD-MCNC: 37 MG/DL (ref 7–18)
BUN/CREAT SERPL: 22.3 (ref 10–20)
C DIFF TOX B STL QL: NEGATIVE
CALCIUM BLD-MCNC: 9.5 MG/DL (ref 8.5–10.1)
CALCULATED O2 SATURATION: 91 % (ref 92–100)
CALCULATED O2 SATURATION: 94 % (ref 92–100)
CALCULATED O2 SATURATION: 96 % (ref 92–100)
CARBOXYHEMOGLOBIN: 2.7 % SAT (ref 0–3)
CHLORIDE SERPL-SCNC: 100 MMOL/L (ref 98–112)
CO2 SERPL-SCNC: 42 MMOL/L (ref 21–32)
COLOR UR AUTO: YELLOW
CREAT BLD-MCNC: 1.66 MG/DL
CRP SERPL-MCNC: 5.01 MG/DL (ref ?–0.3)
D-DIMER: 1.43 UG/ML FEU (ref ?–0.75)
DEPRECATED HBV CORE AB SER IA-ACNC: 265.1 NG/ML
DEPRECATED RDW RBC AUTO: 53 FL (ref 35.1–46.3)
EOSINOPHIL # BLD AUTO: 0.08 X10(3) UL (ref 0–0.7)
EOSINOPHIL NFR BLD AUTO: 1.6 %
ERYTHROCYTE [DISTWIDTH] IN BLOOD BY AUTOMATED COUNT: 13.6 % (ref 11–15)
EXPIRATORY PRESSURE: 6 CM H2O
EXPIRATORY PRESSURE: 6 CM H2O
FIO2: 35 %
FIO2: 35 %
FIO2: 36 %
GLOBULIN PLAS-MCNC: 4.2 G/DL (ref 2.8–4.4)
GLUCOSE BLD-MCNC: 116 MG/DL (ref 70–99)
GLUCOSE BLD-MCNC: 67 MG/DL (ref 70–99)
GLUCOSE BLD-MCNC: 82 MG/DL (ref 70–99)
GLUCOSE BLD-MCNC: 94 MG/DL (ref 70–99)
GLUCOSE BLD-MCNC: 94 MG/DL (ref 70–99)
GLUCOSE UR STRIP.AUTO-MCNC: NEGATIVE MG/DL
HCT VFR BLD AUTO: 34.4 %
HGB BLD-MCNC: 10 G/DL
IMM GRANULOCYTES # BLD AUTO: 0.03 X10(3) UL (ref 0–1)
IMM GRANULOCYTES NFR BLD: 0.6 %
IONIZED CALCIUM: 1.22 MMOL/L (ref 1.12–1.32)
IONIZED CALCIUM: 1.24 MMOL/L (ref 1.12–1.32)
IONIZED CALCIUM: 1.26 MMOL/L (ref 1.12–1.32)
KETONES UR STRIP.AUTO-MCNC: NEGATIVE MG/DL
L/M: 4 L/MIN
LACTIC ACID ARTERIAL: <1.6 MMOL/L (ref 0.5–2)
LACTIC ACID ARTERIAL: <1.6 MMOL/L (ref 0.5–2)
LDH SERPL L TO P-CCNC: 230 U/L
LYMPHOCYTES # BLD AUTO: 0.61 X10(3) UL (ref 1–4)
LYMPHOCYTES NFR BLD AUTO: 12.4 %
M PROTEIN MFR SERPL ELPH: 7.2 G/DL (ref 6.4–8.2)
MCH RBC QN AUTO: 30.7 PG (ref 26–34)
MCHC RBC AUTO-ENTMCNC: 29.1 G/DL (ref 31–37)
MCV RBC AUTO: 105.5 FL
METHEMOGLOBIN: 0.5 % SAT (ref 0.4–1.5)
METHEMOGLOBIN: 0.6 % SAT (ref 0.4–1.5)
METHEMOGLOBIN: 0.6 % SAT (ref 0.4–1.5)
MONOCYTES # BLD AUTO: 0.37 X10(3) UL (ref 0.1–1)
MONOCYTES NFR BLD AUTO: 7.5 %
NEUTROPHILS # BLD AUTO: 3.81 X10 (3) UL (ref 1.5–7.7)
NEUTROPHILS # BLD AUTO: 3.81 X10(3) UL (ref 1.5–7.7)
NEUTROPHILS NFR BLD AUTO: 77.3 %
NITRITE UR QL STRIP.AUTO: NEGATIVE
OSMOLALITY SERPL CALC.SUM OF ELEC: 306 MOSM/KG (ref 275–295)
P/F RATIO: 198.9 MMHG
P/F RATIO: 278.8 MMHG
PATIENT TEMPERATURE: 97.4 F
PATIENT TEMPERATURE: 98.6 F
PATIENT TEMPERATURE: 98.6 F
PH UR STRIP.AUTO: 5 [PH] (ref 4.5–8)
PLATELET # BLD AUTO: 112 10(3)UL (ref 150–450)
POTASSIUM BLOOD GAS: 4.7 MMOL/L (ref 3.6–5.1)
POTASSIUM BLOOD GAS: 5.1 MMOL/L (ref 3.6–5.1)
POTASSIUM BLOOD GAS: 5.1 MMOL/L (ref 3.6–5.1)
POTASSIUM SERPL-SCNC: 4.9 MMOL/L (ref 3.5–5.1)
PROT UR STRIP.AUTO-MCNC: 100 MG/DL
RBC # BLD AUTO: 3.26 X10(6)UL
SARS-COV-2 RNA RESP QL NAA+PROBE: NOT DETECTED
SODIUM BLOOD GAS: 144 MMOL/L (ref 136–144)
SODIUM BLOOD GAS: 144 MMOL/L (ref 136–144)
SODIUM BLOOD GAS: 145 MMOL/L (ref 136–144)
SODIUM SERPL-SCNC: 144 MMOL/L (ref 136–145)
SP GR UR STRIP.AUTO: 1.01 (ref 1–1.03)
TIDAL VOLUME: 500 ML
TIDAL VOLUME: 500 ML
TOTAL HEMOGLOBIN: 10 G/DL
TOTAL HEMOGLOBIN: 10 G/DL
TOTAL HEMOGLOBIN: 9 G/DL
TROPONIN I SERPL-MCNC: <0.045 NG/ML (ref ?–0.04)
UROBILINOGEN UR STRIP.AUTO-MCNC: <2 MG/DL
VENT RATE: 24 /MIN
VENT RATE: 24 /MIN
WBC # BLD AUTO: 4.9 X10(3) UL (ref 4–11)
WBC #/AREA URNS AUTO: >50 /HPF

## 2021-02-22 PROCEDURE — 5A09357 ASSISTANCE WITH RESPIRATORY VENTILATION, LESS THAN 24 CONSECUTIVE HOURS, CONTINUOUS POSITIVE AIRWAY PRESSURE: ICD-10-PCS | Performed by: NURSE PRACTITIONER

## 2021-02-22 PROCEDURE — 71045 X-RAY EXAM CHEST 1 VIEW: CPT | Performed by: EMERGENCY MEDICINE

## 2021-02-22 PROCEDURE — 99223 1ST HOSP IP/OBS HIGH 75: CPT | Performed by: HOSPITALIST

## 2021-02-22 RX ORDER — FUROSEMIDE 10 MG/ML
40 INJECTION INTRAMUSCULAR; INTRAVENOUS ONCE
Status: COMPLETED | OUTPATIENT
Start: 2021-02-22 | End: 2021-02-22

## 2021-02-22 RX ORDER — ONDANSETRON 2 MG/ML
4 INJECTION INTRAMUSCULAR; INTRAVENOUS EVERY 6 HOURS PRN
Status: DISCONTINUED | OUTPATIENT
Start: 2021-02-22 | End: 2021-02-25

## 2021-02-22 RX ORDER — DILTIAZEM HYDROCHLORIDE 120 MG/1
120 CAPSULE, EXTENDED RELEASE ORAL DAILY
Status: DISCONTINUED | OUTPATIENT
Start: 2021-02-22 | End: 2021-02-25

## 2021-02-22 RX ORDER — DEXTROSE MONOHYDRATE 25 G/50ML
50 INJECTION, SOLUTION INTRAVENOUS
Status: DISCONTINUED | OUTPATIENT
Start: 2021-02-22 | End: 2021-02-25

## 2021-02-22 RX ORDER — MULTIVIT-MIN/IRON/FOLIC ACID/K 18-600-40
2000 CAPSULE ORAL
COMMUNITY

## 2021-02-22 RX ORDER — ENOXAPARIN SODIUM 100 MG/ML
40 INJECTION SUBCUTANEOUS NIGHTLY
Status: DISCONTINUED | OUTPATIENT
Start: 2021-02-22 | End: 2021-02-25

## 2021-02-22 RX ORDER — DEXMEDETOMIDINE HYDROCHLORIDE 4 UG/ML
INJECTION, SOLUTION INTRAVENOUS CONTINUOUS PRN
Status: DISCONTINUED | OUTPATIENT
Start: 2021-02-22 | End: 2021-02-23 | Stop reason: ALTCHOICE

## 2021-02-22 RX ORDER — METOCLOPRAMIDE HYDROCHLORIDE 5 MG/ML
5 INJECTION INTRAMUSCULAR; INTRAVENOUS EVERY 8 HOURS PRN
Status: DISCONTINUED | OUTPATIENT
Start: 2021-02-22 | End: 2021-02-25

## 2021-02-22 RX ORDER — METOPROLOL TARTRATE 50 MG/1
25 TABLET, FILM COATED ORAL DAILY
Status: ON HOLD | COMMUNITY
End: 2022-01-24 | Stop reason: CLARIF

## 2021-02-22 RX ORDER — HEPARIN SODIUM 5000 [USP'U]/ML
5000 INJECTION, SOLUTION INTRAVENOUS; SUBCUTANEOUS EVERY 8 HOURS SCHEDULED
Status: DISCONTINUED | OUTPATIENT
Start: 2021-02-22 | End: 2021-02-22 | Stop reason: ALTCHOICE

## 2021-02-22 NOTE — PROGRESS NOTES
[facility]   [facility] recently readmitted 2/8/21 until 2/12/21. HPI:    Catarino Andino is a 76year old female admitted to SNF for sub-acute rehabilitation. Chief Complaint: f/u left leg cellulitis.         HPI     75 yo UNIT/ML Subcutaneous Solution Pen-injector, Inject 18 Units into the skin every morning. •  Saline Nasal Spray 0.65 % Nasal Solution, 1 spray by Nasal route 3 (three) times daily.     •  aspirin 81 MG Oral Chew Tab, Chew 1 tablet (81 mg total) by mouth d No current facility-administered medications on file prior to visit.          HISTORY:  She  has a past medical history of Atrial fibrillation ([facility] [address] C. difficile diarrhea, Cataract, CKD (chronic kidney disease), Diabetes ([facility] [address] DISH (diffuse idiopathic atraumatic. Nose: Nose normal.      Mouth/Throat:      Pharynx: No oropharyngeal exudate. Eyes:      General: No scleral icterus. Right eye: No discharge. Left eye: No discharge.       Pupils: Pupils are equal, round, and reactive to l normal saturation. Lower extremity edema with no pressure boots in place of heels.      Medication Reviewed: in Epic and Supponor and Imaging: Xr Ankle (min 3 Views), Left (cpt=73610)    Result Date: 12/21/2020  PROCEDURE:  XR ANKLE (MIN 3 fracture or dislocation in the left tibia/fibula. Diffuse soft tissue swelling.   Dictated by (CST): Reg Oneil MD on 12/21/2020 at 12:16 PM     Finalized by (CST): Reg Oneil MD on 12/21/2020 at 12:17 PM       Ct Brain Or Head ([address])    Result scale, and duplex ultrasound was used to evaluate the lower extremity venous system. B-mode two-dimensional images of the vascular structures, Doppler spectral analysis, and color flow. Doppler imaging were performed. The following veins were imaged:   Co for dvt prophylaxis  -stable sugars, watching weights/diuretic increase based on fluid overload with stable CKD.      Haris López needs then following services:  Occupational Therapy  Physical Therapy  Respiratory Therapy  Close follow up with RT, wor

## 2021-02-22 NOTE — ED NOTES
Pt placed on O2 4L/NC. Pt yelling \"Im bleeding. My sack hurts. \" Pt on cardiac monitor and pulse ox.

## 2021-02-22 NOTE — H&P
[facility]  History and Physical     Mark Nancy Patient Status:  Inpatient    1945 MRN [id number]   [facility] 4SW-A Attending Anita Alves MD   Hosp Day # 0 PCP Tee Granados MD     Chief Complaint: SOB    Hist Eboni Camarena MD at [facility] MAIN OR   • COLONOSCOPY  11/07/2019   • COLONOSCOPY N/A 2/28/2017    Performed by Kevin Ortega MD at [facility] ENDOSCOPY   • COLOSTOMY TAKEDOWN N/A 2/5/2020    Performed by Genet Osborne MD at [facility]   • [place] M , Rfl:     •  insulin glargine (SEMGLEE) 100 UNIT/ML Subcutaneous Solution Pen-injector, Inject 18 Units into the skin every morning., Disp: , Rfl:     •  Saline Nasal Spray 0.65 % Nasal Solution, 1 spray by Nasal route 3 (three) times daily. , Disp: , Rfl: Take 150 mcg by mouth before breakfast.  , Disp: , Rfl:         Review of Systems:   A comprehensive 14 point review of systems was completed. Pertinent positives and negatives noted in the HPI.     Physical Exam:    /65 (BP Location: Left arm)   P C.diff  1. vanco when able to take orals for PPx  8. CKD  1.  Monitor renal function    Quality:  · DVT Prophylaxis: lovenox  · CODE status: full  · Tinsley: no  · If COVID testing is negative, may discontinue isolation: yes     Plan of care discussed with pa

## 2021-02-22 NOTE — ED PROVIDER NOTES
Patient Seen in: [facility] Emergency Department      History   Patient presents with:  Altered Mental Status    Stated Complaint: AMS and hypoxia    HPI/Subjective:   HPI    27-year-old female with history of recent hospitalization for hypercapnic cysto flow , dr Stone Music   • EYE SURGERY      eye   • FOOT SURGERY      foot   • HERNIA SURGERY     • HYSTERECTOMY      CAROL 2007   • OTHER SURGICAL HISTORY  5/3/17    cyst[facility] Dr. Holland Guaman   [facility]  [facility]    Cysto w/ Dr. Steph Cristina bilaterally. Extraocular intact. No facial droop.   Skin: no rashes or nodules    ED Course     Labs Reviewed   ABG PANEL W ELECT AND LACTATE - Abnormal; Notable for the following components:       Result Value    ABG pH 7.17 (*)     ABG pCO2 134 (*) FINDINGS:  Patient rotated to the left. Low lung volumes. Magnified heart. Pulmonary vascular congestion and moderate perihilar interstitial/alveolar edema. Bilateral pleural effusions.             CONCLUSION:  Congestive heart failure with perihilar ed family, and clinical staff. [facility]      No pneumonia on x-ray. Chest x-ray shows evidence of edema and Lasix has been ordered. Pleural effusions are again present.   Respiratory therapy is familiar with this patient from previous presentations wi

## 2021-02-22 NOTE — RESPIRATORY THERAPY NOTE
Received patient on AVAPS 24/500/30-40/6/35%. ABG done. Placed patient on 2L nasal cannula per MD. Plan to place patient back on AVAPS overnight. Will continue to monitor.

## 2021-02-22 NOTE — CONSULTS
NAME: Carmina Galvan - ROOM: 865/260-P - MRN: [id number] - Age: 76year old - :  1945    Date of Admission: 2021  7:14 AM  Admission Diagnosis: Acute on chronic respiratory failure with hypercapnia (HCC) [J96.22]  Altered mental status, unsp Performed by Ciara Nunes MD at [facility] ENDOSCOPY   • COLOSTOMY TAKEDOWN N/A 2/5/2020    Performed by Jennifer Paz MD at [facility]     • CYSTOURETHROSCOPY  3-1-11    cysto flow US, dr Juan Luis Quinteros   • EYE SURGERY      eye   • [facility] Inject 2-10 Units into the skin TID CC and HS.  Continue to give correction insulin (Novolog/aspart) even if NPO; DO NOT HOLD OR ALTER INSULIN DOSE WITHOUT A PHYSICIAN ORDER  1 unit of Novolog/aspart insulin expected to decrease blood glucose by 20 mg/dL History:  Social History    Tobacco Use      Smoking status: Former Smoker        Packs/day: 0.50        Years: 40.00        Pack years: 21        Quit date: 2003        Years since quittin.8      Smokeless tobacco: Never Used    Alcohol use:  Yes 221-260 mg/dL  Give 8 units if blood glucose 261-300 mg/dL  Give 10 units if blood glucose 301-350 mg/dL  Call Physician if blood glucose is greater than 351 mg/dl, Disp:  , Rfl: 0    •  dilTIAZem HCl ER Coated Beads 120 MG Oral Capsule SR 24 Hr, Take 1 ca Output 200 ml   Net 0 ml       Exam:  Gen - Confused, unable to respond to questions appropriately   HEENT - PERRLA  Neck - Supple, no JVD  Lungs - Coarse breath sounds bilaterally  CV - Tachycardic, normal s1 and s2, No murmurs, rubs or gallops, No P2

## 2021-02-22 NOTE — SLP NOTE
Discussed with RN, patient not appropriate for SLP evaluation at this time as she is requiring BiPAP support. SLP will monitor patient progress and evaluate when medically appropriate.

## 2021-02-22 NOTE — ED NOTES
Pablito RT at bedside placing patient on Bipap. Rn placed purewick and suction turned on to low suction.

## 2021-02-22 NOTE — PROGRESS NOTES
[facility] Pharmacy Note:  Renal Dose Adjustment for Metoclopramide (REGLAN)    Nanci Garcia has been prescribed Metoclopramide (REGLAN) 10 mg every 8 hours as needed for nausea/vomiting. Estimated Creatinine Clearance: 27.4 mL/min (A) (based on SCr of 1.

## 2021-02-22 NOTE — PLAN OF CARE
1010-received patient from ED. Alert, awake, confused, not following commands, but moves all extremities. Moaning/yelling with care. Npo on avaps.

## 2021-02-23 PROBLEM — N30.00 ACUTE CYSTITIS WITHOUT HEMATURIA: Status: ACTIVE | Noted: 2021-01-11

## 2021-02-23 LAB
ALLENS TEST: POSITIVE
ANION GAP SERPL CALC-SCNC: 2 MMOL/L (ref 0–18)
ARTERIAL BLD GAS O2 SATURATION: 94 % (ref 92–100)
ARTERIAL BLOOD GAS BASE EXCESS: 17.5 MMOL/L (ref ?–2)
ARTERIAL BLOOD GAS HCO3: 44.3 MEQ/L (ref 22–26)
ARTERIAL BLOOD GAS PCO2: 70 MM HG (ref 35–45)
ARTERIAL BLOOD GAS PH: 7.42 (ref 7.35–7.45)
ARTERIAL BLOOD GAS PO2: 89 MM HG (ref 80–105)
ATRIAL RATE: 256 BPM
BASOPHILS # BLD AUTO: 0.02 X10(3) UL (ref 0–0.2)
BASOPHILS NFR BLD AUTO: 0.5 %
BUN BLD-MCNC: 39 MG/DL (ref 7–18)
BUN/CREAT SERPL: 22.8 (ref 10–20)
CALCIUM BLD-MCNC: 9.2 MG/DL (ref 8.5–10.1)
CALCULATED O2 SATURATION: 97 % (ref 92–100)
CARBOXYHEMOGLOBIN: 2.3 % SAT (ref 0–3)
CHLORIDE SERPL-SCNC: 101 MMOL/L (ref 98–112)
CO2 SERPL-SCNC: 42 MMOL/L (ref 21–32)
CREAT BLD-MCNC: 1.71 MG/DL
CRP SERPL-MCNC: 3.47 MG/DL (ref ?–0.3)
DEPRECATED HBV CORE AB SER IA-ACNC: 257.4 NG/ML
DEPRECATED RDW RBC AUTO: 52.4 FL (ref 35.1–46.3)
EOSINOPHIL # BLD AUTO: 0.08 X10(3) UL (ref 0–0.7)
EOSINOPHIL NFR BLD AUTO: 2.1 %
ERYTHROCYTE [DISTWIDTH] IN BLOOD BY AUTOMATED COUNT: 13.7 % (ref 11–15)
EXPIRATORY PRESSURE: 6 CM H2O
FIO2: 35 %
GLUCOSE BLD-MCNC: 137 MG/DL (ref 70–99)
GLUCOSE BLD-MCNC: 145 MG/DL (ref 70–99)
GLUCOSE BLD-MCNC: 171 MG/DL (ref 70–99)
GLUCOSE BLD-MCNC: 173 MG/DL (ref 70–99)
GLUCOSE BLD-MCNC: 180 MG/DL (ref 70–99)
GLUCOSE BLD-MCNC: 61 MG/DL (ref 70–99)
GLUCOSE BLD-MCNC: 66 MG/DL (ref 70–99)
GLUCOSE BLD-MCNC: 77 MG/DL (ref 70–99)
GLUCOSE BLD-MCNC: 84 MG/DL (ref 70–99)
HCT VFR BLD AUTO: 29.9 %
HGB BLD-MCNC: 8.9 G/DL
IMM GRANULOCYTES # BLD AUTO: 0.04 X10(3) UL (ref 0–1)
IMM GRANULOCYTES NFR BLD: 1.1 %
LDH SERPL L TO P-CCNC: 192 U/L
LYMPHOCYTES # BLD AUTO: 0.85 X10(3) UL (ref 1–4)
LYMPHOCYTES NFR BLD AUTO: 22.5 %
MCH RBC QN AUTO: 30.9 PG (ref 26–34)
MCHC RBC AUTO-ENTMCNC: 29.8 G/DL (ref 31–37)
MCV RBC AUTO: 103.8 FL
METHEMOGLOBIN: 0.5 % SAT (ref 0.4–1.5)
MONOCYTES # BLD AUTO: 0.46 X10(3) UL (ref 0.1–1)
MONOCYTES NFR BLD AUTO: 12.2 %
NEUTROPHILS # BLD AUTO: 2.33 X10 (3) UL (ref 1.5–7.7)
NEUTROPHILS # BLD AUTO: 2.33 X10(3) UL (ref 1.5–7.7)
NEUTROPHILS NFR BLD AUTO: 61.6 %
OSMOLALITY SERPL CALC.SUM OF ELEC: 307 MOSM/KG (ref 275–295)
P AXIS: -52 DEGREES
PATIENT TEMPERATURE: 98.6 F
PLATELET # BLD AUTO: 118 10(3)UL (ref 150–450)
POTASSIUM SERPL-SCNC: 4.4 MMOL/L (ref 3.5–5.1)
Q-T INTERVAL: 404 MS
QRS DURATION: 100 MS
QTC CALCULATION (BEZET): 469 MS
R AXIS: 59 DEGREES
RBC # BLD AUTO: 2.88 X10(6)UL
SARS-COV-2 RNA RESP QL NAA+PROBE: NOT DETECTED
SODIUM SERPL-SCNC: 145 MMOL/L (ref 136–145)
T AXIS: 39 DEGREES
TIDAL VOLUME: 500 ML
TOTAL HEMOGLOBIN: 8.9 G/DL
VENT RATE: 24 /MIN
VENTRICULAR RATE: 81 BPM
WBC # BLD AUTO: 3.8 X10(3) UL (ref 4–11)

## 2021-02-23 PROCEDURE — 05HY33Z INSERTION OF INFUSION DEVICE INTO UPPER VEIN, PERCUTANEOUS APPROACH: ICD-10-PCS | Performed by: HOSPITALIST

## 2021-02-23 PROCEDURE — 99232 SBSQ HOSP IP/OBS MODERATE 35: CPT | Performed by: HOSPITALIST

## 2021-02-23 RX ORDER — DEXTROSE AND SODIUM CHLORIDE 5; .9 G/100ML; G/100ML
INJECTION, SOLUTION INTRAVENOUS CONTINUOUS
Status: DISCONTINUED | OUTPATIENT
Start: 2021-02-23 | End: 2021-02-23

## 2021-02-23 RX ORDER — FUROSEMIDE 10 MG/ML
40 INJECTION INTRAMUSCULAR; INTRAVENOUS ONCE
Status: COMPLETED | OUTPATIENT
Start: 2021-02-23 | End: 2021-02-23

## 2021-02-23 RX ORDER — VANCOMYCIN HYDROCHLORIDE 125 MG/1
125 CAPSULE ORAL DAILY
Status: DISCONTINUED | OUTPATIENT
Start: 2021-02-23 | End: 2021-02-23

## 2021-02-23 RX ORDER — METOPROLOL TARTRATE 50 MG/1
50 TABLET, FILM COATED ORAL
Status: DISCONTINUED | OUTPATIENT
Start: 2021-02-23 | End: 2021-02-25

## 2021-02-23 RX ORDER — VANCOMYCIN HYDROCHLORIDE 125 MG/1
125 CAPSULE ORAL DAILY
Status: DISCONTINUED | OUTPATIENT
Start: 2021-02-23 | End: 2021-02-25

## 2021-02-23 RX ORDER — DEXTROSE MONOHYDRATE 100 MG/ML
INJECTION, SOLUTION INTRAVENOUS CONTINUOUS
Status: DISCONTINUED | OUTPATIENT
Start: 2021-02-23 | End: 2021-02-23

## 2021-02-23 NOTE — SLP NOTE
ADULT SWALLOWING EVALUATION    ASSESSMENT    ASSESSMENT/OVERALL IMPRESSION:  Patient is a 75 y/o female admitted with acute on chronic respiratory failure and PMHx significant for afib, DM-2, HTN, dyslipidemia, GAYLE, and morbid obesity.  Patient is a NH resi respiratory failure with hypercapnia (HCC)  Active Problems:    Altered mental status, unspecified altered mental status type      Past Medical History  Past Medical History:   Diagnosis Date   • Atrial fibrillation (HCC)    • C. difficile diarrhea    • Ca required to rule-out silent aspiration.)    Esophageal Phase of Swallow: No complaints consistent with possible esophageal involvement  Comments: discussed with RN              GOALS  Goal #1 The patient will tolerate regular consistency and thin liquids w

## 2021-02-23 NOTE — OCCUPATIONAL THERAPY NOTE
OCCUPATIONAL THERAPY EVALUATION - INPATIENT     Room Number: 510/510-A  Evaluation Date: 2/23/2021  Type of Evaluation: Initial  Presenting Problem: Acute on chronic resipratory failure w/ hypercapnia    Physician Order: IP Consult to Occupational Therapy Performed by Carlos Diego MD at [facility] ENDOSCOPY   • COLOSTOMY TAKEDOWN N/A 2/5/2020    Performed by Javi Tejada MD at [facility] MAIN OR   • CORRECT [address] [facility] [place]     • CYSTOURETHROSCOPY  3-1-11    cysto flow US, dr Bertha Bullard   • EYE SURGERY      eye   • tasks  Safety Judgement:  decreased awareness of need for safety  Awareness of Errors:  decreased awareness of errors   Awareness of Deficits:  decreased awareness of deficits    VISION  Current Vision: wears glasses all the time    PERCEPTION  Overall Per transfers repeatedly w/ patient adamantly declining to complete and unable to state why. Patient asked to scoot up towards [facility] and required max A of 2 to complete min movement.   EOB to supine w/ max A  Patient End of Session: In bed;Call light within reac impacting engagement in ADL/IADL MODERATE  3 - 5 performance deficits   Client Assessment/Performance Deficits MODERATE - Comorbidities and min to mod modifications of tasks    Clinical Decision Making MODERATE - Analysis of occupational profile, detailed

## 2021-02-23 NOTE — PLAN OF CARE
Assumed pt care this morning. On avaps, transitioned to nc per rt. Alert, awake, confused, cooperative. Passed swallow eval, poor appetite. Cleared to transfer out of ICU.  Rustam Woo was here earlier to visit and updated.

## 2021-02-23 NOTE — PHYSICAL THERAPY NOTE
PHYSICAL THERAPY EVALUATION - INPATIENT     Room Number: 471/471-A  Evaluation Date: 2/23/2021  Type of Evaluation: Initial  Physician Order: PT Eval and Treat    Presenting Problem: ARF  Reason for Therapy: Mobility Dysfunction and Discharge Plannin Performed by Ingris Minaya MD at [facility]     • CYSTOURETHROSCOPY  3-1-11    cysto flow US, dr Nando Nuñez   • EYE SURGERY      eye   • FOOT SURGERY      foot   • HERNIA SURGERY     • HYSTERECTOMY      CAROL 2007   • OTHER SURGICAL extremity strength is within functional limits except for the following:    Right Hip flexion  3+/5  Left Hip flexion  3+/5  Right Knee extension  3+/5  Left Knee extension  3+/5    BALANCE  Static Sitting: Fair  Dynamic Sitting: Fair -  Static Standing: P however, once set up to stand- Pt shouts \"no! I can't stand! \". Pt appears fearful and anxious with attempting to stand. Pt denies recent fall. Pt requests to return to supine. Pt encouraged to scoot laterally toward EOB.  Pt able to bear weight through LE function. Subacute rehab is recommended for 18-20 days. After this period of rehabilitation patient should achieve supervision level in transfers and short distance ambulation. Pt may benefit from more supportive living environment long term.    DISCHARGE R

## 2021-02-23 NOTE — PROGRESS NOTES
Critical Care Progress Note     Assessment / Plan:  1. Acute on chronic hypercapnic respiratory failure   - improved  - weaned to AVAPs at night   - Rapid COVID negative. PCR negative   2.  UTI  - Treated for E.coli UTI previously   - UA +LE and increased W

## 2021-02-23 NOTE — PLAN OF CARE
Assumed care of patient at 1500. Received patient on BiPAP, intermittently able to tell me her name. Disoriented to place time and situation. Repeat ABG drawn per RT improved BiPAP removed per RT, placed on nasal cannula. PM blood sugar 67.  Hypoglycemia pr

## 2021-02-23 NOTE — PLAN OF CARE
Pt received on 2 L nc. Agitated, yelling, pulling at ivs, refusing care and attempting to swing at staff. She is confused. Alert, oriented to self. Pt placed in restraints for safety   Samantha updated- precedex ordered if needed.    NPO- incontinent of bow

## 2021-02-23 NOTE — PROGRESS NOTES
Pt placed on AVAPS for the night, tolerating fairly well. Occasionally yelling through mask. Will continue to monitor.       02/23/21 0423   BiPAP   $ RT Standby Charge (per 15 min) 1  (bipap)   Device V60   BiPAP / CPAP CE# 6004   Mode AVAPS   Interface Fu

## 2021-02-24 LAB
ANION GAP SERPL CALC-SCNC: 3 MMOL/L (ref 0–18)
BASOPHILS # BLD AUTO: 0.02 X10(3) UL (ref 0–0.2)
BASOPHILS NFR BLD AUTO: 0.6 %
BUN BLD-MCNC: 35 MG/DL (ref 7–18)
BUN/CREAT SERPL: 18.6 (ref 10–20)
CALCIUM BLD-MCNC: 8.9 MG/DL (ref 8.5–10.1)
CHLORIDE SERPL-SCNC: 97 MMOL/L (ref 98–112)
CO2 SERPL-SCNC: 43 MMOL/L (ref 21–32)
CREAT BLD-MCNC: 1.88 MG/DL
DEPRECATED RDW RBC AUTO: 53.4 FL (ref 35.1–46.3)
EOSINOPHIL # BLD AUTO: 0.12 X10(3) UL (ref 0–0.7)
EOSINOPHIL NFR BLD AUTO: 3.7 %
ERYTHROCYTE [DISTWIDTH] IN BLOOD BY AUTOMATED COUNT: 14 % (ref 11–15)
GLUCOSE BLD-MCNC: 112 MG/DL (ref 70–99)
GLUCOSE BLD-MCNC: 119 MG/DL (ref 70–99)
GLUCOSE BLD-MCNC: 145 MG/DL (ref 70–99)
GLUCOSE BLD-MCNC: 150 MG/DL (ref 70–99)
GLUCOSE BLD-MCNC: 195 MG/DL (ref 70–99)
HCT VFR BLD AUTO: 29.5 %
HGB BLD-MCNC: 8.7 G/DL
IMM GRANULOCYTES # BLD AUTO: 0.04 X10(3) UL (ref 0–1)
IMM GRANULOCYTES NFR BLD: 1.2 %
LYMPHOCYTES # BLD AUTO: 0.77 X10(3) UL (ref 1–4)
LYMPHOCYTES NFR BLD AUTO: 23.5 %
MCH RBC QN AUTO: 30.5 PG (ref 26–34)
MCHC RBC AUTO-ENTMCNC: 29.5 G/DL (ref 31–37)
MCV RBC AUTO: 103.5 FL
MONOCYTES # BLD AUTO: 0.35 X10(3) UL (ref 0.1–1)
MONOCYTES NFR BLD AUTO: 10.7 %
NEUTROPHILS # BLD AUTO: 1.98 X10 (3) UL (ref 1.5–7.7)
NEUTROPHILS # BLD AUTO: 1.98 X10(3) UL (ref 1.5–7.7)
NEUTROPHILS NFR BLD AUTO: 60.3 %
OSMOLALITY SERPL CALC.SUM OF ELEC: 305 MOSM/KG (ref 275–295)
PLATELET # BLD AUTO: 103 10(3)UL (ref 150–450)
POTASSIUM SERPL-SCNC: 3.8 MMOL/L (ref 3.5–5.1)
RBC # BLD AUTO: 2.85 X10(6)UL
SODIUM SERPL-SCNC: 143 MMOL/L (ref 136–145)
WBC # BLD AUTO: 3.3 X10(3) UL (ref 4–11)

## 2021-02-24 PROCEDURE — 99232 SBSQ HOSP IP/OBS MODERATE 35: CPT | Performed by: INTERNAL MEDICINE

## 2021-02-24 RX ORDER — ACETAZOLAMIDE 250 MG/1
125 TABLET ORAL 3 TIMES DAILY
Status: DISCONTINUED | OUTPATIENT
Start: 2021-02-24 | End: 2021-02-25

## 2021-02-24 NOTE — SLP NOTE
SPEECH DAILY NOTE - INPATIENT    ASSESSMENT & PLAN   ASSESSMENT  Pt seen for dysphagia tx to assess tolerance with recommended diet, ensure proper utilization of aspiration precautions and provide pt/family education.   Observed patient with items from danica

## 2021-02-24 NOTE — DIETARY NOTE
[facility]  NUTRITION INITIAL ASSESSMENT    Pt meets moderate malnutrition criteria.     CRITERIA FOR MALNUTRITION DIAGNOSIS:  Criteria for non-severe malnutrition diagnosis: chronic illness related to wt loss 5% in 1 month and energy intake less than7 (250 lb)  02/15/21 : 124.5 kg (274 lb 6.4 oz)  02/08/21 : 118.4 kg (261 lb 0.4 oz)  01/29/21 : 118.4 kg (261 lb)  01/04/21 : 127.8 kg (281 lb 11.2 oz)  12/30/20 : 127.8 kg (281 lb 11.2 oz)    Patient Weight for the past 72 hrs:   Weight   02/22/21 0721 124

## 2021-02-24 NOTE — PLAN OF CARE
Assumed care of patient at [address] [place] [address]. Isolation maintained. Patient is alert and orientated. Monitor patient on telemetry atrial flutter, continuous pulse ox on 2L , placed on AVAPS at night. Monitor accuchecks. IV zosyn. Patient turn reposition.  Labs in am. Fa

## 2021-02-24 NOTE — PROGRESS NOTES
Critical Care Progress Note     Assessment / Plan:  1. Acute on chronic hypercapnic respiratory failure   - improved  - weaned to AVAPs at night   - Rapid COVID negative. PCR negative   2.  UTI  - Treated for E.coli UTI previously   - UCx with enterococcus

## 2021-02-24 NOTE — PROGRESS NOTES
[facility]  Progress Note     Dean Head Patient Status:  Inpatient    1945 MRN [id number]   [facility] 5NW-A Attending Daisha Larios MD   Hosp Day # 1 PCP Caitlin Urena MD     Chief Complaint: SOB    S: Patient aw • metoprolol Tartrate  50 mg Oral 2x Daily(Beta Blocker)   • Insulin Aspart Pen  1-10 Units Subcutaneous TID CC and HS   • piperacillin-tazobactam  3.375 g Intravenous Q8H [address]   • dilTIAZem HCl ER Coated Beads  120 mg Oral Daily   • enoxaparin  40 mg Sub

## 2021-02-24 NOTE — PLAN OF CARE
A/Ox3. 2LNC, , AVAPS at night. a-flutter on tele. Scd's. Pure wick in place. Denies pain. Carb 1800 diet and qid accu. Stage 2 sacral ulcer. Mepolex in place. Left heel mepolex. Will continue to monitor.    Problem: Diabetes/Glucose Control  Goal: Glucos suctioning and perform as needed  - Assess and instruct to report SOB or any respiratory difficulty  - Respiratory Therapy support as indicated  - Manage/alleviate anxiety  - Monitor for signs/symptoms of CO2 retention  Outcome: Progressing     Problem: Airam Lank

## 2021-02-24 NOTE — PLAN OF CARE
Pt transferred from ICU via bed, alert and oriented, denies any pain or shortness of breath. Able to feed herself for dinner, but does not have much of an appetite. Will continue to monitor.

## 2021-02-24 NOTE — CONSULTS
[facility] Pharmacy Note: Antimicrobial Weight Based Dose Adjustment for: piperacillin/tazobactam (Isaias Gottron)    Nanci Garcia is a 76year old patient who has been prescribed piperacillin/tazobactam (ZOSYN) 3.375 g every 8 hours.     Estimated Creatinine Clearanc

## 2021-02-24 NOTE — CM/SW NOTE
02/24/21 1300   CM/SW Referral Data   Referral Source Physician   Reason for Referral Discharge planning   Informant Spouse;Tobias Staff     Pt [facility] to [facility]	[place] Sw spoke to pt's  and plan is to return there at [place].   Elodia sent upda

## 2021-02-24 NOTE — PROGRESS NOTES
[facility]  Progress Note     Katlin Kinjann Patient Status:  Inpatient    1945 MRN [id number]   [facility] 5NW-A Attending Jeanine Almaguer MD   Hosp Day # 2 PCP Monika Gonzales MD     Chief Complaint: hypercapnia    S: Viv Goode results for input(s): PTP, INR in the last 168 hours. Recent Labs   Lab 02/22/21  0742   TROP <0.045            Imaging: Imaging data reviewed in Epic.     Medications:   • acetaZOLAMIDE  125 mg Oral TID   • metoprolol Tartrate  50 mg Oral 2x Daily(Beta

## 2021-02-25 VITALS
HEIGHT: 66 IN | WEIGHT: 255.5 LBS | BODY MASS INDEX: 41.06 KG/M2 | SYSTOLIC BLOOD PRESSURE: 121 MMHG | HEART RATE: 62 BPM | RESPIRATION RATE: 18 BRPM | TEMPERATURE: 98 F | DIASTOLIC BLOOD PRESSURE: 44 MMHG | OXYGEN SATURATION: 98 %

## 2021-02-25 LAB
ANION GAP SERPL CALC-SCNC: 4 MMOL/L (ref 0–18)
BASOPHILS # BLD AUTO: 0.03 X10(3) UL (ref 0–0.2)
BASOPHILS NFR BLD AUTO: 0.9 %
BUN BLD-MCNC: 32 MG/DL (ref 7–18)
BUN/CREAT SERPL: 17.3 (ref 10–20)
CALCIUM BLD-MCNC: 9 MG/DL (ref 8.5–10.1)
CHLORIDE SERPL-SCNC: 98 MMOL/L (ref 98–112)
CO2 SERPL-SCNC: 40 MMOL/L (ref 21–32)
CREAT BLD-MCNC: 1.85 MG/DL
DEPRECATED RDW RBC AUTO: 52.8 FL (ref 35.1–46.3)
EOSINOPHIL # BLD AUTO: 0.12 X10(3) UL (ref 0–0.7)
EOSINOPHIL NFR BLD AUTO: 3.6 %
ERYTHROCYTE [DISTWIDTH] IN BLOOD BY AUTOMATED COUNT: 13.8 % (ref 11–15)
GLUCOSE BLD-MCNC: 117 MG/DL (ref 70–99)
GLUCOSE BLD-MCNC: 134 MG/DL (ref 70–99)
GLUCOSE BLD-MCNC: 209 MG/DL (ref 70–99)
HCT VFR BLD AUTO: 31.8 %
HGB BLD-MCNC: 9.1 G/DL
IMM GRANULOCYTES # BLD AUTO: 0.02 X10(3) UL (ref 0–1)
IMM GRANULOCYTES NFR BLD: 0.6 %
LYMPHOCYTES # BLD AUTO: 0.82 X10(3) UL (ref 1–4)
LYMPHOCYTES NFR BLD AUTO: 24.6 %
MCH RBC QN AUTO: 29.7 PG (ref 26–34)
MCHC RBC AUTO-ENTMCNC: 28.6 G/DL (ref 31–37)
MCV RBC AUTO: 103.9 FL
MONOCYTES # BLD AUTO: 0.35 X10(3) UL (ref 0.1–1)
MONOCYTES NFR BLD AUTO: 10.5 %
NEUTROPHILS # BLD AUTO: 1.99 X10 (3) UL (ref 1.5–7.7)
NEUTROPHILS # BLD AUTO: 1.99 X10(3) UL (ref 1.5–7.7)
NEUTROPHILS NFR BLD AUTO: 59.8 %
OSMOLALITY SERPL CALC.SUM OF ELEC: 302 MOSM/KG (ref 275–295)
PLATELET # BLD AUTO: 97 10(3)UL (ref 150–450)
POTASSIUM SERPL-SCNC: 3.7 MMOL/L (ref 3.5–5.1)
RBC # BLD AUTO: 3.06 X10(6)UL
SODIUM SERPL-SCNC: 142 MMOL/L (ref 136–145)
WBC # BLD AUTO: 3.3 X10(3) UL (ref 4–11)

## 2021-02-25 PROCEDURE — 99239 HOSP IP/OBS DSCHRG MGMT >30: CPT | Performed by: INTERNAL MEDICINE

## 2021-02-25 RX ORDER — AMOXICILLIN AND CLAVULANATE POTASSIUM 875; 125 MG/1; MG/1
1 TABLET, FILM COATED ORAL 2 TIMES DAILY
Qty: 14 TABLET | Refills: 0 | Status: SHIPPED | OUTPATIENT
Start: 2021-02-25 | End: 2021-03-04

## 2021-02-25 RX ORDER — ACETAZOLAMIDE 250 MG/1
250 TABLET ORAL 2 TIMES DAILY
Status: DISCONTINUED | OUTPATIENT
Start: 2021-02-25 | End: 2021-02-25

## 2021-02-25 NOTE — PLAN OF CARE
Pt axo this am, but does get confused at times.  at bedside, pt tolerated breakfast, all questions and concerns addressed, support given, will monitor. Pt will return to the lilly at 1pm . Pt briefed and uses purwik.

## 2021-02-25 NOTE — PLAN OF CARE
Patient alert and able to answer questions, but will say some very random, nonsensical phrases to the PCTs. She had 2 BMs over night. Patient able to roll over to be cleaned in bed. Blood sugar stable.       Problem: Diabetes/Glucose Control  Goal: Glucos

## 2021-02-25 NOTE — DISCHARGE PLANNING
NURSING DISCHARGE NOTE    Discharged to the [facility] via Ambulance. Accompanied by Support staff  Belongings Taken by patient/family. Discharge navigator complete.    Discharge instructions reviewed, report called to Ronel Caballero RN at [facility]

## 2021-02-25 NOTE — DISCHARGE SUMMARY
[facility] HOSPITALIST  DISCHARGE SUMMARY     Kacey Norris Patient Status:  Inpatient    1945 MRN [id number]   [facility] 5NW-A Attending No att. providers found   Hosp Day # 3 PCP Carter Harris MD     Date of Admission: 2021 after discharge from the hospital.    Consultants:  • Pulmonary     Discharge Medication List:     Discharge Medications      START taking these medications      Instructions Prescription details   Amoxicillin-Pot Clavulanate 875-125 MG Tabs  Commonly know 141-180 mg/dL  Give 3 units if blood glucose 181-220 mg/dL  Give 6 units if blood glucose 221-260 mg/dL  Give 8 units if blood glucose 261-300 mg/dL  Give 10 units if blood glucose 301-350 mg/dL  Call Physician if blood glucose is greater than 351 mg/dl Catracho    Schedule an appointment as soon as possible for a visit in 1 week      Appointments for Next 30 Days 2/25/2021 - 3/27/2021      Date Arrival Time Visit Type Length Department Provider     3/1/2021  1:00 PM  EXAM -

## 2021-02-25 NOTE — PROGRESS NOTES
02/25/21 0030   BiPAP   $ RT Standby Charge (per 15 min) 1   $ BiPAP in use daily Yes   Device V60   BiPAP / CPAP CE# 6073   Mode AVAPS   Interface Full face mask   Mask Size Medium   Control Settings   Set Rate 24 breaths/min   Set EPAP 6   Oxygen Perc

## 2021-02-25 NOTE — CM/SW NOTE
02/25/21 1152   Discharge disposition   Expected discharge disposition Skilled Nurs   Name of Facillity/Home Care/Hospice SNF Other  ([facility])   Discharge transportation INNJOY Travel to [place] at [facility], patient can return this afternoon

## 2021-02-26 ENCOUNTER — EXTERNAL FACILITY (OUTPATIENT)
Dept: FAMILY MEDICINE CLINIC | Facility: CLINIC | Age: 76
End: 2021-02-26

## 2021-02-26 DIAGNOSIS — G93.40 ACUTE ENCEPHALOPATHY: Primary | ICD-10-CM

## 2021-02-26 DIAGNOSIS — G47.33 OSA ON CPAP: ICD-10-CM

## 2021-02-26 DIAGNOSIS — D50.9 IRON DEFICIENCY ANEMIA, UNSPECIFIED IRON DEFICIENCY ANEMIA TYPE: ICD-10-CM

## 2021-02-26 DIAGNOSIS — M15.9 OSTEOARTHRITIS OF MULTIPLE JOINTS, UNSPECIFIED OSTEOARTHRITIS TYPE: ICD-10-CM

## 2021-02-26 DIAGNOSIS — E11.65 UNCONTROLLED TYPE 2 DIABETES MELLITUS WITH INSULIN THERAPY (HCC): Chronic | ICD-10-CM

## 2021-02-26 DIAGNOSIS — J96.22 ACUTE ON CHRONIC RESPIRATORY FAILURE WITH HYPERCAPNIA (HCC): ICD-10-CM

## 2021-02-26 DIAGNOSIS — N18.9 ACUTE ON CHRONIC RENAL INSUFFICIENCY: ICD-10-CM

## 2021-02-26 DIAGNOSIS — R53.1 GENERALIZED WEAKNESS: ICD-10-CM

## 2021-02-26 DIAGNOSIS — E78.00 PURE HYPERCHOLESTEROLEMIA: ICD-10-CM

## 2021-02-26 DIAGNOSIS — N39.0 URINARY TRACT INFECTION WITHOUT HEMATURIA, SITE UNSPECIFIED: ICD-10-CM

## 2021-02-26 DIAGNOSIS — N28.9 ACUTE ON CHRONIC RENAL INSUFFICIENCY: ICD-10-CM

## 2021-02-26 DIAGNOSIS — I27.20 PULMONARY HTN (HCC): ICD-10-CM

## 2021-02-26 DIAGNOSIS — Z93.3 S/P COLOSTOMY (HCC): ICD-10-CM

## 2021-02-26 DIAGNOSIS — N18.30 STAGE 3 CHRONIC KIDNEY DISEASE, UNSPECIFIED WHETHER STAGE 3A OR 3B CKD (HCC): ICD-10-CM

## 2021-02-26 DIAGNOSIS — R60.1 GENERALIZED EDEMA: ICD-10-CM

## 2021-02-26 DIAGNOSIS — R41.82 ALTERED MENTAL STATUS, UNSPECIFIED ALTERED MENTAL STATUS TYPE: ICD-10-CM

## 2021-02-26 DIAGNOSIS — J90 BILATERAL PLEURAL EFFUSION: ICD-10-CM

## 2021-02-26 DIAGNOSIS — Z79.4 UNCONTROLLED TYPE 2 DIABETES MELLITUS WITH INSULIN THERAPY (HCC): Chronic | ICD-10-CM

## 2021-02-26 DIAGNOSIS — Z99.89 OSA ON CPAP: ICD-10-CM

## 2021-02-26 DIAGNOSIS — I10 ESSENTIAL HYPERTENSION: Chronic | ICD-10-CM

## 2021-02-26 DIAGNOSIS — R53.81 PHYSICAL DECONDITIONING: ICD-10-CM

## 2021-02-26 PROCEDURE — 99306 1ST NF CARE HIGH MDM 50: CPT | Performed by: FAMILY MEDICINE

## 2021-02-28 PROBLEM — N17.9 ACUTE RENAL FAILURE (ARF) (HCC): Status: RESOLVED | Noted: 2021-01-11 | Resolved: 2021-02-28

## 2021-02-28 PROBLEM — N30.00 ACUTE CYSTITIS WITHOUT HEMATURIA: Status: RESOLVED | Noted: 2021-01-11 | Resolved: 2021-02-28

## 2021-02-28 PROBLEM — J96.01 ACUTE RESPIRATORY FAILURE WITH HYPOXIA (HCC): Status: RESOLVED | Noted: 2021-02-08 | Resolved: 2021-02-28

## 2021-03-01 NOTE — PROGRESS NOTES
Skilled Nursing Facility   Readmitted to [facility]  HPI:    Jim Funes is a 76year old female admitted to SNF for sub-acute rehabilitation.       Chief Complaint: hypoxemia with AVAPs        HPI     75 yo presenting for follow up, with history of morbid (VITAMIN D) 50 MCG (2000 [facility]) Oral Cap, Take 2,000 Units by mouth daily. •  Metoprolol Tartrate 50 MG Oral Tab, Take 50 mg by mouth 2 (two) times daily.     •  Naphazoline-Pheniramine (OPCON-A) 0.027-0.315 % Ophthalmic Solution, Apply 1 drop to eye 4 (fou 650 mg by mouth every 6 (six) hours as needed. •  escitalopram 10 MG Oral Tab, Take 10 mg by mouth daily. •  Pravastatin Sodium 20 MG Oral Tab, Take 20 mg by mouth nightly. •  Cyanocobalamin ([facility]S VITAMIN B-12 OR), Take 1 tablet by mouth daily.  1 the following:    Height as of 2/22/21: 5' 6\" (1.676 m). Weight as of 2/25/21: 255 lb 8.2 oz (115.9 kg). Physical Exam  Vitals signs and nursing note reviewed. Constitutional:       Appearance: She is well-developed and overweight.  She is not feli deficit. Motor: No abnormal muscle tone. Coordination: Coordination normal.      Deep Tendon Reflexes: Reflexes are normal and symmetric. Reflexes normal.   Psychiatric:         Behavior: Behavior normal.         Thought Content:  Thought content FINDINGS:  No evidence of acute displaced fracture or dislocation. Normal mineralization. Extensive diffuse soft tissue swelling. Chronic fracture deformity of the distal left fibula. Orthopedic screw along the great toe.   Old avulsion injury or access John Dunbar MD on 12/21/2020 at 5:36 PM     Finalized by (CST): John Dunbar MD on 12/21/2020 at 5:39 PM       Us Venous Doppler Leg Left - Diag Img (cpt=93971)    Result Date: 12/21/2020  PROCEDURE:  US VENOUS DOPPLER LEG LEFT - DIAG IMG (CPT watching weights/diuretic increase based on fluid overload with stable CKD.      Lena Ortez needs then following services:  Occupational Therapy  Physical Therapy  Respiratory Therapy  Close follow up with RT, worsening lethargy and needs more monito

## 2021-03-03 ENCOUNTER — SNF VISIT (OUTPATIENT)
Dept: INTERNAL MEDICINE CLINIC | Age: 76
End: 2021-03-03

## 2021-03-03 VITALS
SYSTOLIC BLOOD PRESSURE: 173 MMHG | HEART RATE: 69 BPM | OXYGEN SATURATION: 98 % | TEMPERATURE: 98 F | RESPIRATION RATE: 18 BRPM | DIASTOLIC BLOOD PRESSURE: 94 MMHG

## 2021-03-03 DIAGNOSIS — R09.02 HYPOXIA: ICD-10-CM

## 2021-03-03 DIAGNOSIS — Z74.09 IMPAIRED MOBILITY AND ADLS: ICD-10-CM

## 2021-03-03 DIAGNOSIS — E11.65 UNCONTROLLED TYPE 2 DIABETES MELLITUS WITH INSULIN THERAPY (HCC): ICD-10-CM

## 2021-03-03 DIAGNOSIS — Z99.89 OSA ON CPAP: ICD-10-CM

## 2021-03-03 DIAGNOSIS — R53.81 PHYSICAL DECONDITIONING: ICD-10-CM

## 2021-03-03 DIAGNOSIS — F32.4 MAJOR DEPRESSIVE DISORDER IN PARTIAL REMISSION, UNSPECIFIED WHETHER RECURRENT (HCC): ICD-10-CM

## 2021-03-03 DIAGNOSIS — I27.20 PULMONARY HTN (HCC): ICD-10-CM

## 2021-03-03 DIAGNOSIS — I10 ESSENTIAL HYPERTENSION: ICD-10-CM

## 2021-03-03 DIAGNOSIS — J96.91 RESPIRATORY FAILURE WITH HYPOXIA, UNSPECIFIED CHRONICITY (HCC): Primary | ICD-10-CM

## 2021-03-03 DIAGNOSIS — L89.620 PRESSURE INJURY OF LEFT HEEL, UNSTAGEABLE (HCC): ICD-10-CM

## 2021-03-03 DIAGNOSIS — Z78.9 IMPAIRED MOBILITY AND ADLS: ICD-10-CM

## 2021-03-03 DIAGNOSIS — I48.0 AF (PAROXYSMAL ATRIAL FIBRILLATION) (HCC): ICD-10-CM

## 2021-03-03 DIAGNOSIS — Z79.4 UNCONTROLLED TYPE 2 DIABETES MELLITUS WITH INSULIN THERAPY (HCC): ICD-10-CM

## 2021-03-03 DIAGNOSIS — R53.1 WEAKNESS GENERALIZED: ICD-10-CM

## 2021-03-03 DIAGNOSIS — D50.9 IRON DEFICIENCY ANEMIA, UNSPECIFIED IRON DEFICIENCY ANEMIA TYPE: ICD-10-CM

## 2021-03-03 DIAGNOSIS — G47.33 OSA ON CPAP: ICD-10-CM

## 2021-03-03 PROCEDURE — 99309 SBSQ NF CARE MODERATE MDM 30: CPT | Performed by: NURSE PRACTITIONER

## 2021-03-03 NOTE — PROGRESS NOTES
Amy Perez, 4/25/1945, 76year old, female    Chief Complaint:  Patient presents with:   Follow - Up: Acute Hypoxia       Subjective:  76year old female with medical history of A. Fib., C. difficile colitis, DMT2, hypothyroidism, essential hypertens discussed with the patient motivational training to help facilitate the patient complying with using the AVAPs machine as well as attending PT/OT. Will continue to monitor closely.     Denies insomnia, fatigue, fever/chills, cough, SOB, dyspnea, angina 117 (H) 02/25/2021    BUN 32 (H) 02/25/2021    BUNCREA 17.3 02/25/2021    CREATSERUM 1.85 (H) 02/25/2021    ANIONGAP 4 02/25/2021    GFR 21 (L) 12/10/2017    GFRNAA 26 (L) 02/25/2021    GFRAA 30 (L) 02/25/2021    CA 9.0 02/25/2021    OSMOCALC 302 (H) 02/25 chronic  -Elevate detsiny le while lying in bed     Elevated dimer, CTA neg for PE     Acute encephalopathy d/t hypercapnea, -improving      Recent cellulitis with bacteremia then respiratory failure with pleural effusion sp thoracentesis (1/28/21)  -Monitor

## 2021-03-08 ENCOUNTER — SNF VISIT (OUTPATIENT)
Dept: INTERNAL MEDICINE CLINIC | Age: 76
End: 2021-03-08

## 2021-03-08 VITALS
OXYGEN SATURATION: 97 % | DIASTOLIC BLOOD PRESSURE: 91 MMHG | RESPIRATION RATE: 18 BRPM | WEIGHT: 247 LBS | SYSTOLIC BLOOD PRESSURE: 154 MMHG | HEART RATE: 68 BPM | BODY MASS INDEX: 40 KG/M2

## 2021-03-08 DIAGNOSIS — R39.9 LOWER URINARY TRACT SYMPTOMS: ICD-10-CM

## 2021-03-08 DIAGNOSIS — G47.33 OSA ON CPAP: ICD-10-CM

## 2021-03-08 DIAGNOSIS — Z78.9 IMPAIRED MOBILITY AND ADLS: ICD-10-CM

## 2021-03-08 DIAGNOSIS — R53.81 PHYSICAL DECONDITIONING: ICD-10-CM

## 2021-03-08 DIAGNOSIS — Z79.4 UNCONTROLLED TYPE 2 DIABETES MELLITUS WITH INSULIN THERAPY (HCC): ICD-10-CM

## 2021-03-08 DIAGNOSIS — Z74.09 IMPAIRED MOBILITY AND ADLS: ICD-10-CM

## 2021-03-08 DIAGNOSIS — Z99.89 OSA ON CPAP: ICD-10-CM

## 2021-03-08 DIAGNOSIS — J96.91 RESPIRATORY FAILURE WITH HYPOXIA, UNSPECIFIED CHRONICITY (HCC): Primary | ICD-10-CM

## 2021-03-08 DIAGNOSIS — H57.89 IRRITATION OF LEFT EYE: ICD-10-CM

## 2021-03-08 DIAGNOSIS — E11.65 UNCONTROLLED TYPE 2 DIABETES MELLITUS WITH INSULIN THERAPY (HCC): ICD-10-CM

## 2021-03-08 DIAGNOSIS — F32.4 MAJOR DEPRESSIVE DISORDER IN PARTIAL REMISSION, UNSPECIFIED WHETHER RECURRENT (HCC): ICD-10-CM

## 2021-03-08 PROCEDURE — 1111F DSCHRG MED/CURRENT MED MERGE: CPT | Performed by: NURSE PRACTITIONER

## 2021-03-08 PROCEDURE — 99309 SBSQ NF CARE MODERATE MDM 30: CPT | Performed by: NURSE PRACTITIONER

## 2021-03-08 NOTE — PROGRESS NOTES
Mukesh Reyes, 4/25/1945, 76year old, female    Chief Complaint:  Patient presents with:   Follow - Up: s/p respiratory failure, cognition issues       Subjective:  76year old female with medical history of A. Fib., C. difficile colitis, DMT2, hypothy 97%   BMI 39.87 kg/m²     PHYSICAL EXAM:  GENERAL HEALTH: Morbidly Obese  LINES, TUBES, DRAINS:  none  SKIN: pale, warm, dry  WOUND: +Left Heel DTI  EYES: PERRLA, EOMI, sclera anicteric, conjunctiva normal; there is no nystagmus, no drainage from eyes  HEN TBD  -Anticipated discharge: TBD–SW assist with discharge planning    Atrial flutter/Atrial fibrillation sp DCCV/Essential hypertension/Dyslipidemia  -Vitals every shift  -ASA 81 mg daily  -The weights, notify MD/NP for overnight weight gain of greater jerilyn 3/10/21     Follow-up appointments  -Dr. Bradford Pérez. NAGI Quick in 7 to 10 days after discharge to [address] [place] [address]  3/8/2021  10 a.m.

## 2021-03-10 ENCOUNTER — SNF VISIT (OUTPATIENT)
Dept: INTERNAL MEDICINE CLINIC | Age: 76
End: 2021-03-10

## 2021-03-10 DIAGNOSIS — Z71.89 ADVANCE CARE PLANNING: ICD-10-CM

## 2021-03-10 DIAGNOSIS — J96.91 RESPIRATORY FAILURE WITH HYPOXIA, UNSPECIFIED CHRONICITY (HCC): Primary | ICD-10-CM

## 2021-03-10 DIAGNOSIS — Z74.09 IMPAIRED MOBILITY AND ADLS: ICD-10-CM

## 2021-03-10 DIAGNOSIS — R39.9 LOWER URINARY TRACT SYMPTOMS: ICD-10-CM

## 2021-03-10 DIAGNOSIS — G47.33 OSA ON CPAP: ICD-10-CM

## 2021-03-10 DIAGNOSIS — Z79.4 UNCONTROLLED TYPE 2 DIABETES MELLITUS WITH INSULIN THERAPY (HCC): ICD-10-CM

## 2021-03-10 DIAGNOSIS — R53.81 PHYSICAL DECONDITIONING: ICD-10-CM

## 2021-03-10 DIAGNOSIS — Z99.89 OSA ON CPAP: ICD-10-CM

## 2021-03-10 DIAGNOSIS — E11.65 UNCONTROLLED TYPE 2 DIABETES MELLITUS WITH INSULIN THERAPY (HCC): ICD-10-CM

## 2021-03-10 DIAGNOSIS — Z78.9 IMPAIRED MOBILITY AND ADLS: ICD-10-CM

## 2021-03-10 PROCEDURE — 99309 SBSQ NF CARE MODERATE MDM 30: CPT | Performed by: NURSE PRACTITIONER

## 2021-03-11 VITALS
SYSTOLIC BLOOD PRESSURE: 123 MMHG | TEMPERATURE: 99 F | RESPIRATION RATE: 18 BRPM | DIASTOLIC BLOOD PRESSURE: 78 MMHG | OXYGEN SATURATION: 96 % | HEART RATE: 78 BPM

## 2021-03-11 RX ORDER — FLUTICASONE FUROATE AND VILANTEROL TRIFENATATE 100; 25 UG/1; UG/1
1 POWDER RESPIRATORY (INHALATION) DAILY
COMMUNITY
End: 2021-12-09

## 2021-03-11 NOTE — PROGRESS NOTES
Jeaneth Montemayor, 4/25/1945, 76year old, female    Chief Complaint:  Patient presents with:   Follow - Up: Acute on Chronic Hypoxia       Subjective:  76year old female with medical history of A. Fib., C. difficile colitis, DMT2, hypothyroidism, essentia forum therapy   UE ADLs--CGA  LE ADLs--MaxA  Toileting--MaxA    Goal:  The plan for the patient is to go home with .   Recommendations for 24H CG    Denies insomnia, fatigue, fever/chills, cough, SOB, dyspnea, angina, palpitations, n/v, diarrhea, con Failure-Hypercapnia/H/o GAYLE/Pulmonary HTN d/t noncompliance/COPD  -RT consult  -AVAPS machine at night  -Maintain 02 for 02 Saturation >90%  -Add Breo Ellipta 100-25 mcq every day for COPD  -Wean off 02 for 02 Sats between 88-90%  -PT/OT to eval and treat yet     Recent cellulitis with bacteremia then respiratory failure with pleural effusion sp thoracentesis (1/28/21)  -Monitor     Diverticulosis sp colostomy, reversed   -No treatment     History of Cdiff  -No current treatment     Left eye inflammation/it

## 2021-03-15 ENCOUNTER — SNF VISIT (OUTPATIENT)
Dept: INTERNAL MEDICINE CLINIC | Age: 76
End: 2021-03-15

## 2021-03-15 VITALS
OXYGEN SATURATION: 95 % | TEMPERATURE: 98 F | RESPIRATION RATE: 18 BRPM | HEART RATE: 61 BPM | SYSTOLIC BLOOD PRESSURE: 128 MMHG | DIASTOLIC BLOOD PRESSURE: 74 MMHG

## 2021-03-15 DIAGNOSIS — Z79.4 UNCONTROLLED TYPE 2 DIABETES MELLITUS WITH INSULIN THERAPY (HCC): ICD-10-CM

## 2021-03-15 DIAGNOSIS — R53.81 PHYSICAL DECONDITIONING: ICD-10-CM

## 2021-03-15 DIAGNOSIS — J96.91 RESPIRATORY FAILURE WITH HYPOXIA, UNSPECIFIED CHRONICITY (HCC): Primary | ICD-10-CM

## 2021-03-15 DIAGNOSIS — Z78.9 IMPAIRED MOBILITY AND ADLS: ICD-10-CM

## 2021-03-15 DIAGNOSIS — Z74.09 IMPAIRED MOBILITY AND ADLS: ICD-10-CM

## 2021-03-15 DIAGNOSIS — Z99.89 OSA ON CPAP: ICD-10-CM

## 2021-03-15 DIAGNOSIS — R39.9 LOWER URINARY TRACT SYMPTOMS: ICD-10-CM

## 2021-03-15 DIAGNOSIS — G47.33 OSA ON CPAP: ICD-10-CM

## 2021-03-15 DIAGNOSIS — E11.65 UNCONTROLLED TYPE 2 DIABETES MELLITUS WITH INSULIN THERAPY (HCC): ICD-10-CM

## 2021-03-15 PROCEDURE — 99309 SBSQ NF CARE MODERATE MDM 30: CPT | Performed by: NURSE PRACTITIONER

## 2021-03-15 RX ORDER — SULFAMETHOXAZOLE AND TRIMETHOPRIM 800; 160 MG/1; MG/1
1 TABLET ORAL 2 TIMES DAILY
COMMUNITY
Start: 2021-03-16 | End: 2021-03-19

## 2021-03-15 NOTE — PROGRESS NOTES
Kacey Norris, 4/25/1945, 76year old, female    Chief Complaint:  Patient presents with:   Follow - Up: Chronic on Acute Hypoxia       Subjective:  76year old female with medical history of A. Fib., C. difficile colitis, DMT2, hypothyroidism, essentia dry  WOUND: +Left Heel DTI  EYES: PERRLA, EOMI, sclera anicteric, conjunctiva normal; there is no nystagmus, no drainage from eyes  HENT: normocephalic; normal nose, no nasal drainage, mucous membranes pink, moist, pharynx no exudate, no visible cerumen. - 1.0    Final           LEUKOCYTES, SEMI-QT   LARGE    NEGATIVE - TRACE  H  Final     Test performed at [facility], [address] [facility],[address],   [place], [address] [facility], Jarrell Nieves M.D., Medical   Director, AJ [phone]   MICROSCOPIC    All lab resu patient for neuropsych evaluation on Monday-->not available yet     Anemia, chronic disease  -Hgb: 9.1 in hospital; 3/5: 10.1  -Monitor labs     Uterine cancer  -no treatment     Lymphedema, chronic  -Elevate destiny le while lying in bed     Elevated dimer, C

## 2021-03-17 ENCOUNTER — SNF VISIT (OUTPATIENT)
Dept: INTERNAL MEDICINE CLINIC | Age: 76
End: 2021-03-17

## 2021-03-17 VITALS
HEART RATE: 80 BPM | DIASTOLIC BLOOD PRESSURE: 78 MMHG | RESPIRATION RATE: 18 BRPM | SYSTOLIC BLOOD PRESSURE: 116 MMHG | OXYGEN SATURATION: 98 %

## 2021-03-17 DIAGNOSIS — R39.9 LOWER URINARY TRACT SYMPTOMS: ICD-10-CM

## 2021-03-17 DIAGNOSIS — J96.91 RESPIRATORY FAILURE WITH HYPOXIA, UNSPECIFIED CHRONICITY (HCC): Primary | ICD-10-CM

## 2021-03-17 DIAGNOSIS — Z71.89 ADVANCE CARE PLANNING: ICD-10-CM

## 2021-03-17 DIAGNOSIS — Z78.9 IMPAIRED MOBILITY AND ADLS: ICD-10-CM

## 2021-03-17 DIAGNOSIS — R53.81 PHYSICAL DECONDITIONING: ICD-10-CM

## 2021-03-17 DIAGNOSIS — Z99.89 OSA ON CPAP: ICD-10-CM

## 2021-03-17 DIAGNOSIS — E11.65 UNCONTROLLED TYPE 2 DIABETES MELLITUS WITH INSULIN THERAPY (HCC): ICD-10-CM

## 2021-03-17 DIAGNOSIS — G47.33 OSA ON CPAP: ICD-10-CM

## 2021-03-17 DIAGNOSIS — Z79.4 UNCONTROLLED TYPE 2 DIABETES MELLITUS WITH INSULIN THERAPY (HCC): ICD-10-CM

## 2021-03-17 DIAGNOSIS — Z74.09 IMPAIRED MOBILITY AND ADLS: ICD-10-CM

## 2021-03-17 PROCEDURE — 99309 SBSQ NF CARE MODERATE MDM 30: CPT | Performed by: NURSE PRACTITIONER

## 2021-03-17 NOTE — PROGRESS NOTES
Kacey Norris, 4/25/1945, 76year old, female    Chief Complaint:  Patient presents with:   Follow - Up: Acute on Chronic Hypoxia requiring AVAPs, UTI       Subjective:  76year old female with medical history of A. Fib., C. difficile colitis, DMT2, hyp need to do a sleep study. Geoffrey Pritchard, of [facility] Acute Transitional Care Team will follow up with [facility] and begin the process of providing an AVAPs machine for the patient prior to discharge.       Goal:  The plan for the patien URINE  Result FINAL H Final  [id number] CULTURE, URINE -  Enterobacter cloacae ,000 COLONIES/ML    All lab results from [facility]    Assessment and plan:  Acute Respiratory Failure-Hypercapnia/H/o GAYLE/Pulmonary HTN d/t noncompliance/COPD  -RT consult  -AVAPS evaluation on Monday-->not available yet     Anemia, chronic disease  -Hgb: 9.1 in hospital; 3/5: 10.1  -Monitor labs     Uterine cancer  -no treatment     Lymphedema, chronic  -Elevate destiny le while lying in bed     Elevated dimer, CTA neg for PE     Acute

## 2021-03-22 ENCOUNTER — SNF VISIT (OUTPATIENT)
Dept: INTERNAL MEDICINE CLINIC | Age: 76
End: 2021-03-22

## 2021-03-22 VITALS
SYSTOLIC BLOOD PRESSURE: 114 MMHG | WEIGHT: 246 LBS | RESPIRATION RATE: 19 BRPM | DIASTOLIC BLOOD PRESSURE: 51 MMHG | OXYGEN SATURATION: 98 % | TEMPERATURE: 98 F | HEART RATE: 51 BPM | BODY MASS INDEX: 40 KG/M2

## 2021-03-22 DIAGNOSIS — J96.22 ACUTE ON CHRONIC RESPIRATORY FAILURE WITH HYPERCAPNIA (HCC): Primary | ICD-10-CM

## 2021-03-22 DIAGNOSIS — Z79.4 UNCONTROLLED TYPE 2 DIABETES MELLITUS WITH INSULIN THERAPY (HCC): ICD-10-CM

## 2021-03-22 DIAGNOSIS — K59.01 SLOW TRANSIT CONSTIPATION: ICD-10-CM

## 2021-03-22 DIAGNOSIS — G93.40 ACUTE ENCEPHALOPATHY: ICD-10-CM

## 2021-03-22 DIAGNOSIS — R39.9 LOWER URINARY TRACT SYMPTOMS: ICD-10-CM

## 2021-03-22 DIAGNOSIS — E11.65 UNCONTROLLED TYPE 2 DIABETES MELLITUS WITH INSULIN THERAPY (HCC): ICD-10-CM

## 2021-03-22 PROCEDURE — 99309 SBSQ NF CARE MODERATE MDM 30: CPT | Performed by: NURSE PRACTITIONER

## 2021-03-22 NOTE — PROGRESS NOTES
Jack Young, 4/25/1945, 76year old, female    Chief Complaint:  Patient presents with:   Follow - Up: AMS 2/2 Acute on chronic hypercapnic respiratory failure/UTI  Constipation       Subjective:  76year old female with medical history of KYMBERLY Palm voiding.     Objective:  /51   Pulse 51   Temp 97.6 °F (36.4 °C)   Resp 19   Wt 246 lb (111.6 kg)   SpO2 98%   BMI 39.71 kg/m²     PHYSICAL EXAM:  GENERAL HEALTH: Morbidly Obese, sitting in wheelchair at bedside  LINES, TUBES, DRAINS:  none  SKIN: pal treat with modalities  -Tylenol 650 mg daily as needed for fever or pain if given for fever notify MD/NP  -Lovenox 40 mg daily for DVT prophylaxis  -Bleeding precautions  -Fall precaution  -ELOS: TBD  -Anticipated discharge: ~3.30.21–SW assist with dischar 50,000 units weekly-  -Magnesium 400 mg daily     Labs  -CBC and CMP weekly     Follow-up appointments  -Dr. Jessica Escamilla.  Jg Lara PCP in 7 to 10 days after discharge to [address] [place] [address], [facility]  3/22/2021  9:30 AM

## 2021-03-24 ENCOUNTER — SNF VISIT (OUTPATIENT)
Dept: INTERNAL MEDICINE CLINIC | Age: 76
End: 2021-03-24

## 2021-03-24 VITALS
RESPIRATION RATE: 18 BRPM | DIASTOLIC BLOOD PRESSURE: 66 MMHG | OXYGEN SATURATION: 97 % | TEMPERATURE: 98 F | HEART RATE: 65 BPM | SYSTOLIC BLOOD PRESSURE: 127 MMHG

## 2021-03-24 DIAGNOSIS — G93.40 ACUTE ENCEPHALOPATHY: ICD-10-CM

## 2021-03-24 DIAGNOSIS — Z78.9 IMPAIRED MOBILITY AND ADLS: ICD-10-CM

## 2021-03-24 DIAGNOSIS — J96.22 ACUTE ON CHRONIC RESPIRATORY FAILURE WITH HYPERCAPNIA (HCC): Primary | ICD-10-CM

## 2021-03-24 DIAGNOSIS — Z79.4 UNCONTROLLED TYPE 2 DIABETES MELLITUS WITH INSULIN THERAPY (HCC): ICD-10-CM

## 2021-03-24 DIAGNOSIS — Z74.09 IMPAIRED MOBILITY AND ADLS: ICD-10-CM

## 2021-03-24 DIAGNOSIS — K59.01 SLOW TRANSIT CONSTIPATION: ICD-10-CM

## 2021-03-24 DIAGNOSIS — R53.81 PHYSICAL DECONDITIONING: ICD-10-CM

## 2021-03-24 DIAGNOSIS — E11.65 UNCONTROLLED TYPE 2 DIABETES MELLITUS WITH INSULIN THERAPY (HCC): ICD-10-CM

## 2021-03-24 DIAGNOSIS — R39.9 LOWER URINARY TRACT SYMPTOMS: ICD-10-CM

## 2021-03-24 PROCEDURE — 99309 SBSQ NF CARE MODERATE MDM 30: CPT | Performed by: NURSE PRACTITIONER

## 2021-03-24 NOTE — PROGRESS NOTES
Katlin Pineda, 4/25/1945, 76year old, female    Chief Complaint:  Patient presents with:   Follow - Up: AMS 2/2 Acute on chronic hypercapnic respiratory failure/UTI  Constipation  Weakness  Hyperglycemia  Lab Results       Subjective:  76year old fema mobility--SBA  Transfers--mod to max assist  Ambulation--25 ft w/ RW but able to do 20 ft x 3 w/in the parallel bars  UE ADLs--modified independent  LE ADLs--min to mod assist  UB Bathing--CGA  LB Bathing--mod assist  Toileting--max assist  ST working pt o 33.8    Glucose  137  BUN  36  Cr  1.6  GFR  31  Na  142  K  3.7    Assessment and plan:  Acute Respiratory Failure-Hypercapnia/H/o GAYLE/Pulmonary HTN d/t noncompliance/COPD  -RT consult  -AVAPS machine at night  -Investigating Sleep Study options prior to yet     Recent cellulitis with bacteremia then respiratory failure with pleural effusion sp thoracentesis (1/28/21)  -Monitor     Diverticulosis sp colostomy, reversed   -No treatment     History of Cdiff  -No current treatment     Left eye inflammation/it

## 2021-03-31 ENCOUNTER — SNF VISIT (OUTPATIENT)
Dept: INTERNAL MEDICINE CLINIC | Age: 76
End: 2021-03-31

## 2021-03-31 VITALS — HEART RATE: 80 BPM | OXYGEN SATURATION: 97 %

## 2021-03-31 DIAGNOSIS — Z79.4 UNCONTROLLED TYPE 2 DIABETES MELLITUS WITH INSULIN THERAPY (HCC): ICD-10-CM

## 2021-03-31 DIAGNOSIS — R53.81 PHYSICAL DECONDITIONING: ICD-10-CM

## 2021-03-31 DIAGNOSIS — J96.22 ACUTE ON CHRONIC RESPIRATORY FAILURE WITH HYPERCAPNIA (HCC): Primary | ICD-10-CM

## 2021-03-31 DIAGNOSIS — Z74.09 IMPAIRED MOBILITY AND ADLS: ICD-10-CM

## 2021-03-31 DIAGNOSIS — K59.01 SLOW TRANSIT CONSTIPATION: ICD-10-CM

## 2021-03-31 DIAGNOSIS — R60.0 LOCALIZED EDEMA: ICD-10-CM

## 2021-03-31 DIAGNOSIS — E11.65 UNCONTROLLED TYPE 2 DIABETES MELLITUS WITH INSULIN THERAPY (HCC): ICD-10-CM

## 2021-03-31 DIAGNOSIS — G31.84 MILD NEUROCOGNITIVE DISORDER: ICD-10-CM

## 2021-03-31 DIAGNOSIS — Z78.9 IMPAIRED MOBILITY AND ADLS: ICD-10-CM

## 2021-03-31 PROCEDURE — 99309 SBSQ NF CARE MODERATE MDM 30: CPT | Performed by: NURSE PRACTITIONER

## 2021-03-31 NOTE — PROGRESS NOTES
Yeni Hernandez, 4/25/1945, 76year old, female    Chief Complaint:  Patient presents with:   Follow - Up: AMS 2/2 Acute on chronic hypercapnic respiratory failure/UTI  Edema  Constipation  Weakness  Hyperglycemia       Subjective:  76year old female wit cognition    Seen by Dr Rocha/psych earlier this morning. Dr Ara Ziegler informs that she has improved significantly. Now with mild cognitive deficits but still has poor concentration and poor insight.     Objective:  Pulse 80   SpO2 97%     PHYSICAL EXAM:  G consult  -AVAPS machine at night  -Investigating Sleep Study options prior to discharge; SW/DON to reach out to DIAN Devine clinical leader for assist w/ obtaining qualifying dx for AVAPS at [facility]  -[facility] 100-25 mcq every day for COPD  -PT/OT to judith colostomy, reversed   -No treatment     History of Cdiff  -No current treatment     Left eye inflammation/itching  -Opcon 1-2 gtt qid prn     Bowel regimen  -Colace 100 mg TID  -Change MiraLAX 17 g to daily  -MOM 30cc daily prn     Supplements  -B12 1000 m

## 2021-04-05 ENCOUNTER — SNF VISIT (OUTPATIENT)
Dept: INTERNAL MEDICINE CLINIC | Age: 76
End: 2021-04-05

## 2021-04-05 VITALS — OXYGEN SATURATION: 95 % | HEART RATE: 62 BPM

## 2021-04-05 DIAGNOSIS — G47.33 OSA ON CPAP: ICD-10-CM

## 2021-04-05 DIAGNOSIS — Z99.89 OSA ON CPAP: ICD-10-CM

## 2021-04-05 DIAGNOSIS — J96.22 ACUTE ON CHRONIC RESPIRATORY FAILURE WITH HYPERCAPNIA (HCC): Primary | ICD-10-CM

## 2021-04-05 DIAGNOSIS — S20.20XA BRUISE, TRUNK, INITIAL ENCOUNTER: ICD-10-CM

## 2021-04-05 DIAGNOSIS — Z79.4 UNCONTROLLED TYPE 2 DIABETES MELLITUS WITH INSULIN THERAPY (HCC): ICD-10-CM

## 2021-04-05 DIAGNOSIS — E11.65 UNCONTROLLED TYPE 2 DIABETES MELLITUS WITH INSULIN THERAPY (HCC): ICD-10-CM

## 2021-04-05 PROCEDURE — 99309 SBSQ NF CARE MODERATE MDM 30: CPT | Performed by: NURSE PRACTITIONER

## 2021-04-05 NOTE — PROGRESS NOTES
Maia Francisco, 4/25/1945, 76year old, female    Chief Complaint:  Patient presents with:   Follow - Up: AMS 2/2 Acute on chronic hypercapnic respiratory failure/UTI  Derm Problem  Hyperglycemia       Subjective:  76year old female with medical history normal; there is no nystagmus, no drainage from eyes  HENT: normocephalic; normal nose, no nasal drainage, mucous membranes pink, moist, pharynx no exudate, no visible cerumen.   NECK: supple; FROM; no JVD, no TMG, no carotid bruits  BREAST: ---Deferred  RE injection sites  -DC lovenox as above    Atrial flutter/Atrial fibrillation sp DCCV/Essential hypertension/Dyslipidemia  -Vitals every shift  -ASA 81 mg daily  -Daily weights, notify MD/NP for overnight weight gain of greater than 2 pounds or than 5 pounds

## 2021-04-07 ENCOUNTER — SNF VISIT (OUTPATIENT)
Dept: INTERNAL MEDICINE CLINIC | Age: 76
End: 2021-04-07

## 2021-04-07 VITALS
DIASTOLIC BLOOD PRESSURE: 64 MMHG | HEART RATE: 66 BPM | TEMPERATURE: 98 F | RESPIRATION RATE: 18 BRPM | SYSTOLIC BLOOD PRESSURE: 128 MMHG | OXYGEN SATURATION: 100 %

## 2021-04-07 DIAGNOSIS — Z99.89 OSA ON CPAP: ICD-10-CM

## 2021-04-07 DIAGNOSIS — Z74.09 IMPAIRED MOBILITY AND ADLS: ICD-10-CM

## 2021-04-07 DIAGNOSIS — I27.20 PULMONARY HTN (HCC): ICD-10-CM

## 2021-04-07 DIAGNOSIS — E66.2 OBESITY HYPOVENTILATION SYNDROME (HCC): ICD-10-CM

## 2021-04-07 DIAGNOSIS — E11.65 UNCONTROLLED TYPE 2 DIABETES MELLITUS WITH INSULIN THERAPY (HCC): ICD-10-CM

## 2021-04-07 DIAGNOSIS — R30.0 DYSURIA: ICD-10-CM

## 2021-04-07 DIAGNOSIS — R53.81 PHYSICAL DECONDITIONING: ICD-10-CM

## 2021-04-07 DIAGNOSIS — Z78.9 IMPAIRED MOBILITY AND ADLS: ICD-10-CM

## 2021-04-07 DIAGNOSIS — Z79.4 UNCONTROLLED TYPE 2 DIABETES MELLITUS WITH INSULIN THERAPY (HCC): ICD-10-CM

## 2021-04-07 DIAGNOSIS — G47.33 OSA ON CPAP: ICD-10-CM

## 2021-04-07 DIAGNOSIS — R35.0 URINARY FREQUENCY: ICD-10-CM

## 2021-04-07 DIAGNOSIS — J96.22 ACUTE ON CHRONIC RESPIRATORY FAILURE WITH HYPERCAPNIA (HCC): Primary | ICD-10-CM

## 2021-04-07 PROCEDURE — 99309 SBSQ NF CARE MODERATE MDM 30: CPT | Performed by: NURSE PRACTITIONER

## 2021-04-07 NOTE — PROGRESS NOTES
Maia Francisco, 4/25/1945, 76year old, female    Chief Complaint:  Patient presents with:   Follow - Up: AMS 2/2 Acute on chronic hypercapnic respiratory failure/UTI  Dysuria  Urinary Frequency  Hyperglycemia  Weakness  Lab Results       Subjective:  75 low abd pain, flank pain and hematuria. Hx of frequent UTIs. Has been afebrile. Goal:  The plan for the patient is to go home with . Recommendations for 24H CG and AVAPs machine d/t acute on chronic hypoxia.     Denies insomnia, fatigue, fev 147 to 233; most readings >225; Basal insulin not increased as ordered on 4.2.21    4.6.21  WBC  4.96  Hgb  10.0  Hematocrit  32.5    Glucose  120  BUN  26  Cr  1.75  GFR  28  Na  143  K  4.3      Assessment and plan:  Acute Respiratory Failure-Hypercapnia 3/19/21     Heel wound   -Resolved  -Heel protectors while lying in bed     Diabetes mellitus  -CHO  -A1c-7.9 last done on 12/21/20  -Novolog SS  -INcrease Lantus to 27 units every morning     Hypothyroidism  -Levothyroxine 150 mcg daily     Depression  -L

## 2021-05-10 ENCOUNTER — TELEPHONE (OUTPATIENT)
Dept: CARDIOLOGY | Age: 76
End: 2021-05-10

## 2021-05-24 ENCOUNTER — OFFICE VISIT (OUTPATIENT)
Dept: PODIATRY CLINIC | Facility: CLINIC | Age: 76
End: 2021-05-24
Payer: MEDICARE

## 2021-05-24 DIAGNOSIS — B35.1 ONYCHOMYCOSIS: ICD-10-CM

## 2021-05-24 DIAGNOSIS — I89.0 LYMPHEDEMA: ICD-10-CM

## 2021-05-24 DIAGNOSIS — Z79.4 UNCONTROLLED TYPE 2 DIABETES MELLITUS WITH INSULIN THERAPY (HCC): ICD-10-CM

## 2021-05-24 DIAGNOSIS — E11.42 DIABETIC POLYNEUROPATHY ASSOCIATED WITH TYPE 2 DIABETES MELLITUS (HCC): Primary | ICD-10-CM

## 2021-05-24 DIAGNOSIS — M20.5X2 HALLUX LIMITUS OF LEFT FOOT: ICD-10-CM

## 2021-05-24 DIAGNOSIS — E11.65 UNCONTROLLED TYPE 2 DIABETES MELLITUS WITH INSULIN THERAPY (HCC): ICD-10-CM

## 2021-05-24 PROCEDURE — 11721 DEBRIDE NAIL 6 OR MORE: CPT | Performed by: PODIATRIST

## 2021-05-24 RX ORDER — VANCOMYCIN HYDROCHLORIDE 125 MG/1
CAPSULE ORAL
COMMUNITY
Start: 2021-05-18 | End: 2021-09-14

## 2021-05-24 RX ORDER — CEPHALEXIN 500 MG/1
CAPSULE ORAL
COMMUNITY
Start: 2021-05-18 | End: 2021-09-14

## 2021-05-25 ENCOUNTER — TELEPHONE (OUTPATIENT)
Dept: CARDIOLOGY | Age: 76
End: 2021-05-25

## 2021-05-26 NOTE — PROGRESS NOTES
Sridhar Mc is a 68year old female. Patient presents with:  Diabetic Foot Care: Routine diabetic nail care. Pt states heel pain. Long, curved and hard toenails. Unable to trim own toenails. BS this morning was 151.          HPI:   Patient presents to • magnesium hydroxide (MILK OF MAGNESIA) 400 MG/5ML Oral Suspension Take 30 mL by mouth daily as needed for constipation.      • Fluticasone Furoate-Vilanterol (BREO ELLIPTA) 100-25 MCG/INH Inhalation Aerosol Powder, Breath Activated Inhale 1 puff into obstruction ([facility] [address] 2017   • Uterine cancer ([facility] [address]    • Visual impairment       Past Surgical History:   Procedure Laterality Date   •      • CHOLECYSTECTOMY  12 [address] EDW   • COLONOSCOPY N/A 2017    Procedure: COLONOSCOPY;  Surgeon: Cande Atkinson in no apparent distress  EXTREMITIES:   1. Integument: The skin on her feet is warm and dry turgor is decreased because she suffers from chronic peripheral lymphedema.   Her toenails 1-5 are thickened yellowed and dystrophic with subungual debris and distal

## 2021-06-09 RX ORDER — BUMETANIDE 1 MG/1
1 TABLET ORAL DAILY
COMMUNITY
Start: 2021-01-30

## 2021-06-10 ENCOUNTER — OFFICE VISIT (OUTPATIENT)
Dept: CARDIOLOGY | Age: 76
End: 2021-06-10

## 2021-06-10 VITALS
DIASTOLIC BLOOD PRESSURE: 80 MMHG | HEART RATE: 66 BPM | BODY MASS INDEX: 43.07 KG/M2 | HEIGHT: 66 IN | WEIGHT: 268 LBS | SYSTOLIC BLOOD PRESSURE: 144 MMHG

## 2021-06-10 DIAGNOSIS — I10 ESSENTIAL HYPERTENSION: ICD-10-CM

## 2021-06-10 DIAGNOSIS — I48.0 AF (PAROXYSMAL ATRIAL FIBRILLATION) (CMD): Primary | ICD-10-CM

## 2021-06-10 DIAGNOSIS — I27.20 PULMONARY HYPERTENSION (CMD): ICD-10-CM

## 2021-06-10 DIAGNOSIS — I50.32 CHRONIC HEART FAILURE WITH PRESERVED EJECTION FRACTION (CMD): ICD-10-CM

## 2021-06-10 DIAGNOSIS — I65.23 ASYMPTOMATIC CAROTID ARTERY STENOSIS, BILATERAL: ICD-10-CM

## 2021-06-10 PROBLEM — G47.33 OBSTRUCTIVE SLEEP APNEA: Status: ACTIVE | Noted: 2021-06-10

## 2021-06-10 PROBLEM — E66.01 MORBID OBESITY WITH BMI OF 40.0-44.9, ADULT (CMD): Status: ACTIVE | Noted: 2021-06-10

## 2021-06-10 PROCEDURE — 99214 OFFICE O/P EST MOD 30 MIN: CPT | Performed by: NURSE PRACTITIONER

## 2021-06-10 PROCEDURE — 93000 ELECTROCARDIOGRAM COMPLETE: CPT | Performed by: NURSE PRACTITIONER

## 2021-06-10 RX ORDER — LEVOTHYROXINE SODIUM 150 MCG
150 TABLET ORAL DAILY
COMMUNITY
Start: 2021-04-12

## 2021-06-10 RX ORDER — DILTIAZEM HYDROCHLORIDE 120 MG/1
120 TABLET, FILM COATED ORAL DAILY
COMMUNITY
End: 2021-06-10 | Stop reason: SDUPTHER

## 2021-06-10 RX ORDER — FUROSEMIDE 40 MG/1
40 TABLET ORAL DAILY
COMMUNITY

## 2021-06-10 RX ORDER — FEBUXOSTAT 40 MG/1
TABLET, FILM COATED ORAL
COMMUNITY

## 2021-06-10 RX ORDER — DILTIAZEM HYDROCHLORIDE 120 MG/1
120 TABLET, FILM COATED ORAL DAILY
Qty: 90 TABLET | Refills: 11 | Status: SHIPPED | OUTPATIENT
Start: 2021-06-10

## 2021-06-10 ASSESSMENT — PATIENT HEALTH QUESTIONNAIRE - PHQ9
2. FEELING DOWN, DEPRESSED OR HOPELESS: NOT AT ALL
SUM OF ALL RESPONSES TO PHQ9 QUESTIONS 1 AND 2: 0
1. LITTLE INTEREST OR PLEASURE IN DOING THINGS: NOT AT ALL
CLINICAL INTERPRETATION OF PHQ2 SCORE: NO FURTHER SCREENING NEEDED
SUM OF ALL RESPONSES TO PHQ9 QUESTIONS 1 AND 2: 0
CLINICAL INTERPRETATION OF PHQ9 SCORE: NO FURTHER SCREENING NEEDED

## 2021-06-10 ASSESSMENT — ENCOUNTER SYMPTOMS
GASTROINTESTINAL NEGATIVE: 1
WEIGHT LOSS: 0
SYNCOPE: 0
WEIGHT GAIN: 0
NEUROLOGICAL NEGATIVE: 1
DECREASED APPETITE: 0
DIAPHORESIS: 0
SHORTNESS OF BREATH: 0

## 2021-07-01 ENCOUNTER — OFFICE VISIT (OUTPATIENT)
Dept: SURGERY | Facility: CLINIC | Age: 76
End: 2021-07-01
Payer: MEDICARE

## 2021-07-01 VITALS — BODY MASS INDEX: 42.27 KG/M2 | TEMPERATURE: 97 F | WEIGHT: 263 LBS | HEIGHT: 66 IN

## 2021-07-01 DIAGNOSIS — Z99.89 OSA ON CPAP: ICD-10-CM

## 2021-07-01 DIAGNOSIS — J90 BILATERAL PLEURAL EFFUSION: ICD-10-CM

## 2021-07-01 DIAGNOSIS — G47.33 OSA ON CPAP: ICD-10-CM

## 2021-07-01 DIAGNOSIS — I27.20 PULMONARY HTN (HCC): ICD-10-CM

## 2021-07-01 DIAGNOSIS — K43.9 VENTRAL HERNIA WITHOUT OBSTRUCTION OR GANGRENE: Primary | ICD-10-CM

## 2021-07-01 DIAGNOSIS — J96.22 ACUTE ON CHRONIC RESPIRATORY FAILURE WITH HYPERCAPNIA (HCC): ICD-10-CM

## 2021-07-01 DIAGNOSIS — Z79.4 UNCONTROLLED TYPE 2 DIABETES MELLITUS WITH INSULIN THERAPY (HCC): Chronic | ICD-10-CM

## 2021-07-01 DIAGNOSIS — I48.0 AF (PAROXYSMAL ATRIAL FIBRILLATION) (HCC): ICD-10-CM

## 2021-07-01 DIAGNOSIS — Z93.3 S/P COLOSTOMY (HCC): ICD-10-CM

## 2021-07-01 DIAGNOSIS — N18.30 STAGE 3 CHRONIC KIDNEY DISEASE, UNSPECIFIED WHETHER STAGE 3A OR 3B CKD (HCC): ICD-10-CM

## 2021-07-01 DIAGNOSIS — E11.65 UNCONTROLLED TYPE 2 DIABETES MELLITUS WITH INSULIN THERAPY (HCC): Chronic | ICD-10-CM

## 2021-07-01 PROCEDURE — 99215 OFFICE O/P EST HI 40 MIN: CPT | Performed by: COLON & RECTAL SURGERY

## 2021-07-01 RX ORDER — INSULIN DEGLUDEC INJECTION 100 U/ML
INJECTION, SOLUTION SUBCUTANEOUS
COMMUNITY
Start: 2021-05-21 | End: 2021-09-15

## 2021-07-01 RX ORDER — FEBUXOSTAT 80 MG/1
80 TABLET, FILM COATED ORAL DAILY
COMMUNITY
Start: 2021-11-02

## 2021-07-01 RX ORDER — CIPROFLOXACIN 250 MG/1
TABLET, FILM COATED ORAL
COMMUNITY
Start: 2021-06-24 | End: 2021-09-14

## 2021-07-01 RX ORDER — FUROSEMIDE 40 MG/1
40 TABLET ORAL DAILY
COMMUNITY
End: 2021-09-15

## 2021-07-01 NOTE — PROGRESS NOTES
Follow Up Visit Note       Active Problems      1. Ventral hernia without obstruction or gangrene    2. Uncontrolled type 2 diabetes mellitus with insulin therapy ([address]    3. Pulmonary HTN ([address]    4. AF (paroxysmal atrial fibrillation) ([address]    5.  GAYLE on states that she has been having a work-up for the reason why she has been short of breath, however she states that they have not made any diagnosis yet.     The patient states that she is also been seeing her cardiologist Dr. Keo Conway about her heart as well, sleep apnea) DMG DX 1-23-12 [place] 2-9-12    AHI 39/ RDI 48/ REM AHI 71/ SaO2 75%/ CPAP 12/ [employer]   • Osteoarthritis    • Recurrent UTI    • Rheumatoid arthritis(714.0) 2012   • Small bowel obstruction ([facility] [address] 1/2017   • Uterine cancer ([facility] [address]    • Visual impairme Rfl:   •  furosemide 40 MG Oral Tab, Take 40 mg by mouth daily. , Disp: , Rfl:   •  TRESIBA FLEXTOUCH 100 UNIT/ML Subcutaneous Solution Pen-injector, INJECT 50 UNITS DAILY AS DIRECTED, Disp: , Rfl:   •  vancomycin HCl 125 MG Oral Cap, , Disp: , Rfl:   •  Fl Cyanocobalamin ([facility] VITAMIN B-12 OR), Take 1 tablet by mouth daily.  1000mcg , Disp: , Rfl:   •  Levothyroxine Sodium 150 MCG Oral Tab, Take 150 mcg by mouth before breakfast.  , Disp: , Rfl:   •  cephALEXin 500 MG Oral Cap, , Disp: , Rfl:   •  magnesium hy Gastrointestinal: Negative for abdominal distention, abdominal pain, anal bleeding, blood in stool, constipation, diarrhea, nausea and vomiting. Genitourinary: Negative for difficulty urinating, dysuria, frequency and urgency.    Musculoskeletal: Elmon Bunde required very tedious dissection, and 2 enterotomies at the time of the operation to takedown the colostomy.   However, since undergoing the takedown of the colostomy she has been having regular bowel movements, she states she is very happy since having her hernias at this time. She has significant cardiac and pulmonary issues that are currently being worked up, she is not a surgical candidate at this time. I will have the patient return in 1 year for continued care and evaluation of her hernias.   I discu

## 2021-07-01 NOTE — PATIENT INSTRUCTIONS
The patient presents today known to myself for previous issues in the past.    This patient underwent a takedown of her colostomy and February 2020.   The patient had dense adhesions at the time of his operation, she had a frozen pelvis, and required very t superior to the umbilicus, there is a small, reducible infraumbilical hernia. Body mass index is 42.45 kg/m².      I discussed with the patient that as her hernias have been stable in size, and they are asymptomatic we will just observe the hernias at this t

## 2021-07-17 ENCOUNTER — HOSPITAL ENCOUNTER (OUTPATIENT)
Dept: CV DIAGNOSTICS | Facility: HOSPITAL | Age: 76
Discharge: HOME | End: 2021-07-17
Attending: INTERNAL MEDICINE
Payer: MEDICARE

## 2021-07-17 DIAGNOSIS — I48.0 AF (PAROXYSMAL ATRIAL FIBRILLATION) (HCC): ICD-10-CM

## 2021-07-17 PROCEDURE — 93270 REMOTE 30 DAY ECG REV/REPORT: CPT | Performed by: INTERNAL MEDICINE

## 2021-07-17 PROCEDURE — 93272 ECG/REVIEW INTERPRET ONLY: CPT | Performed by: INTERNAL MEDICINE

## 2021-07-17 PROCEDURE — 93271 ECG/MONITORING AND ANALYSIS: CPT | Performed by: INTERNAL MEDICINE

## 2021-07-23 ENCOUNTER — TELEPHONE (OUTPATIENT)
Dept: CARDIOLOGY | Age: 76
End: 2021-07-23

## 2021-07-29 ENCOUNTER — APPOINTMENT (OUTPATIENT)
Dept: CARDIOLOGY | Age: 76
End: 2021-07-29

## 2021-08-13 ENCOUNTER — TELEPHONE (OUTPATIENT)
Dept: CARDIOLOGY | Age: 76
End: 2021-08-13

## 2021-08-17 ENCOUNTER — TELEPHONE (OUTPATIENT)
Dept: CARDIOLOGY | Age: 76
End: 2021-08-17

## 2021-09-02 ENCOUNTER — ORDER TRANSCRIPTION (OUTPATIENT)
Dept: PHYSICAL THERAPY | Facility: HOSPITAL | Age: 76
End: 2021-09-02

## 2021-09-02 DIAGNOSIS — I89.0 LYMPHEDEMA: Primary | ICD-10-CM

## 2021-09-02 DIAGNOSIS — E11.40 DIABETIC NEUROPATHY (HCC): ICD-10-CM

## 2021-09-02 DIAGNOSIS — E03.9 HYPOTHYROID OBESITY: ICD-10-CM

## 2021-09-02 DIAGNOSIS — E11.21 DIABETIC GLOMERULOPATHY (HCC): ICD-10-CM

## 2021-09-03 ENCOUNTER — TELEPHONE (OUTPATIENT)
Dept: PHYSICAL THERAPY | Facility: HOSPITAL | Age: 76
End: 2021-09-03

## 2021-09-08 ENCOUNTER — TELEPHONE (OUTPATIENT)
Dept: OCCUPATIONAL MEDICINE | Facility: HOSPITAL | Age: 76
End: 2021-09-08

## 2021-09-08 NOTE — TELEPHONE ENCOUNTER
Pt scheduled to begin lymphedema therapy on 10/5. Noted PCP order including the following: \"Add wound clinic evaluation for weeping lower extremities if indicated by lymphedema clinic. \"    Reached out to discuss status of pt's LEs.  Pt stating that her lo

## 2021-09-08 NOTE — TELEPHONE ENCOUNTER
Called and LVM on PCP line re: recommendation for pt to be seen in Wound Care clinic prior to having lymphedema therapy. Contact number for therapist left.

## 2021-09-10 ENCOUNTER — HOSPITAL ENCOUNTER (INPATIENT)
Facility: HOSPITAL | Age: 76
LOS: 4 days | Discharge: NURSING, REHAB OR LONG-TERM CARE | DRG: 603 | End: 2021-09-14
Attending: EMERGENCY MEDICINE | Admitting: INTERNAL MEDICINE
Payer: MEDICARE

## 2021-09-10 DIAGNOSIS — L03.116 CELLULITIS OF LEFT LOWER EXTREMITY: ICD-10-CM

## 2021-09-10 DIAGNOSIS — L03.115 CELLULITIS OF RIGHT LOWER EXTREMITY: Primary | ICD-10-CM

## 2021-09-10 PROBLEM — L03.90 CELLULITIS: Status: ACTIVE | Noted: 2021-09-10

## 2021-09-10 LAB
ALBUMIN SERPL-MCNC: 3.2 G/DL (ref 3.4–5)
ALBUMIN/GLOB SERPL: 0.6 {RATIO} (ref 1–2)
ALP LIVER SERPL-CCNC: 91 U/L
ALT SERPL-CCNC: 12 U/L
ANION GAP SERPL CALC-SCNC: 3 MMOL/L (ref 0–18)
AST SERPL-CCNC: 12 U/L (ref 15–37)
BASOPHILS # BLD AUTO: 0.03 X10(3) UL (ref 0–0.2)
BASOPHILS NFR BLD AUTO: 0.3 %
BILIRUB SERPL-MCNC: 0.4 MG/DL (ref 0.1–2)
BUN BLD-MCNC: 71 MG/DL (ref 7–18)
CALCIUM BLD-MCNC: 9.2 MG/DL (ref 8.5–10.1)
CHLORIDE SERPL-SCNC: 106 MMOL/L (ref 98–112)
CO2 SERPL-SCNC: 27 MMOL/L (ref 21–32)
CREAT BLD-MCNC: 2.01 MG/DL
CRP SERPL-MCNC: 5.72 MG/DL (ref ?–0.3)
D-DIMER: 1.03 UG/ML FEU (ref ?–0.76)
EOSINOPHIL # BLD AUTO: 0.17 X10(3) UL (ref 0–0.7)
EOSINOPHIL NFR BLD AUTO: 1.9 %
ERYTHROCYTE [DISTWIDTH] IN BLOOD BY AUTOMATED COUNT: 13.3 %
EST. AVERAGE GLUCOSE BLD GHB EST-MCNC: 134 MG/DL (ref 68–126)
GLOBULIN PLAS-MCNC: 5.1 G/DL (ref 2.8–4.4)
GLUCOSE BLD-MCNC: 134 MG/DL (ref 70–99)
GLUCOSE BLD-MCNC: 191 MG/DL (ref 70–99)
HBA1C MFR BLD HPLC: 6.3 % (ref ?–5.7)
HCT VFR BLD AUTO: 44.7 %
HGB BLD-MCNC: 14.3 G/DL
IMM GRANULOCYTES # BLD AUTO: 0.04 X10(3) UL (ref 0–1)
IMM GRANULOCYTES NFR BLD: 0.5 %
LYMPHOCYTES # BLD AUTO: 0.95 X10(3) UL (ref 1–4)
LYMPHOCYTES NFR BLD AUTO: 10.7 %
M PROTEIN MFR SERPL ELPH: 8.3 G/DL (ref 6.4–8.2)
MCH RBC QN AUTO: 29.7 PG (ref 26–34)
MCHC RBC AUTO-ENTMCNC: 32 G/DL (ref 31–37)
MCV RBC AUTO: 92.7 FL
MONOCYTES # BLD AUTO: 0.69 X10(3) UL (ref 0.1–1)
MONOCYTES NFR BLD AUTO: 7.8 %
NEUTROPHILS # BLD AUTO: 6.99 X10 (3) UL (ref 1.5–7.7)
NEUTROPHILS # BLD AUTO: 6.99 X10(3) UL (ref 1.5–7.7)
NEUTROPHILS NFR BLD AUTO: 78.8 %
OSMOLALITY SERPL CALC.SUM OF ELEC: 308 MOSM/KG (ref 275–295)
PLATELET # BLD AUTO: 194 10(3)UL (ref 150–450)
POTASSIUM SERPL-SCNC: 4.7 MMOL/L (ref 3.5–5.1)
RBC # BLD AUTO: 4.82 X10(6)UL
SARS-COV-2 RNA RESP QL NAA+PROBE: NOT DETECTED
SED RATE-ML: 45 MM/HR
SODIUM SERPL-SCNC: 136 MMOL/L (ref 136–145)
WBC # BLD AUTO: 8.9 X10(3) UL (ref 4–11)

## 2021-09-10 PROCEDURE — 99223 1ST HOSP IP/OBS HIGH 75: CPT | Performed by: INTERNAL MEDICINE

## 2021-09-10 RX ORDER — DILTIAZEM HYDROCHLORIDE 120 MG/1
120 CAPSULE, EXTENDED RELEASE ORAL DAILY
Status: DISCONTINUED | OUTPATIENT
Start: 2021-09-11 | End: 2021-09-14

## 2021-09-10 RX ORDER — HEPARIN SODIUM 5000 [USP'U]/ML
7500 INJECTION, SOLUTION INTRAVENOUS; SUBCUTANEOUS EVERY 8 HOURS SCHEDULED
Status: DISCONTINUED | OUTPATIENT
Start: 2021-09-10 | End: 2021-09-14

## 2021-09-10 RX ORDER — METOCLOPRAMIDE HYDROCHLORIDE 5 MG/ML
5 INJECTION INTRAMUSCULAR; INTRAVENOUS EVERY 8 HOURS PRN
Status: DISCONTINUED | OUTPATIENT
Start: 2021-09-10 | End: 2021-09-14

## 2021-09-10 RX ORDER — POLYETHYLENE GLYCOL 3350 17 G/17G
17 POWDER, FOR SOLUTION ORAL DAILY PRN
Status: DISCONTINUED | OUTPATIENT
Start: 2021-09-10 | End: 2021-09-14

## 2021-09-10 RX ORDER — MORPHINE SULFATE 4 MG/ML
4 INJECTION, SOLUTION INTRAMUSCULAR; INTRAVENOUS
Status: ACTIVE | OUTPATIENT
Start: 2021-09-10 | End: 2021-09-10

## 2021-09-10 RX ORDER — MORPHINE SULFATE 4 MG/ML
4 INJECTION, SOLUTION INTRAMUSCULAR; INTRAVENOUS ONCE
Status: COMPLETED | OUTPATIENT
Start: 2021-09-10 | End: 2021-09-10

## 2021-09-10 RX ORDER — VANCOMYCIN HYDROCHLORIDE 125 MG/1
125 CAPSULE ORAL DAILY
Status: DISCONTINUED | OUTPATIENT
Start: 2021-09-10 | End: 2021-09-14

## 2021-09-10 RX ORDER — FEBUXOSTAT 40 MG/1
40 TABLET, FILM COATED ORAL DAILY
Status: DISCONTINUED | OUTPATIENT
Start: 2021-09-11 | End: 2021-09-14

## 2021-09-10 RX ORDER — ONDANSETRON 2 MG/ML
4 INJECTION INTRAMUSCULAR; INTRAVENOUS EVERY 4 HOURS PRN
Status: DISCONTINUED | OUTPATIENT
Start: 2021-09-10 | End: 2021-09-10

## 2021-09-10 RX ORDER — ESCITALOPRAM OXALATE 10 MG/1
10 TABLET ORAL DAILY
Status: DISCONTINUED | OUTPATIENT
Start: 2021-09-11 | End: 2021-09-14

## 2021-09-10 RX ORDER — MELATONIN
3 NIGHTLY PRN
Status: DISCONTINUED | OUTPATIENT
Start: 2021-09-10 | End: 2021-09-14

## 2021-09-10 RX ORDER — LEVOTHYROXINE SODIUM 0.15 MG/1
150 TABLET ORAL
Status: DISCONTINUED | OUTPATIENT
Start: 2021-09-11 | End: 2021-09-14

## 2021-09-10 RX ORDER — CEFAZOLIN SODIUM/WATER 2 G/20 ML
2 SYRINGE (ML) INTRAVENOUS EVERY 12 HOURS
Status: DISCONTINUED | OUTPATIENT
Start: 2021-09-10 | End: 2021-09-14

## 2021-09-10 RX ORDER — DEXTROSE MONOHYDRATE 25 G/50ML
50 INJECTION, SOLUTION INTRAVENOUS
Status: DISCONTINUED | OUTPATIENT
Start: 2021-09-10 | End: 2021-09-14

## 2021-09-10 RX ORDER — ONDANSETRON 2 MG/ML
4 INJECTION INTRAMUSCULAR; INTRAVENOUS EVERY 6 HOURS PRN
Status: DISCONTINUED | OUTPATIENT
Start: 2021-09-10 | End: 2021-09-14

## 2021-09-10 RX ORDER — PRAVASTATIN SODIUM 20 MG
20 TABLET ORAL NIGHTLY
Status: DISCONTINUED | OUTPATIENT
Start: 2021-09-10 | End: 2021-09-14

## 2021-09-10 RX ORDER — CLINDAMYCIN PHOSPHATE 600 MG/50ML
600 INJECTION INTRAVENOUS ONCE
Status: COMPLETED | OUTPATIENT
Start: 2021-09-10 | End: 2021-09-10

## 2021-09-10 RX ORDER — ACETAMINOPHEN 325 MG/1
650 TABLET ORAL EVERY 6 HOURS PRN
Status: DISCONTINUED | OUTPATIENT
Start: 2021-09-10 | End: 2021-09-14

## 2021-09-10 RX ORDER — ASPIRIN 81 MG/1
81 TABLET, CHEWABLE ORAL DAILY
Status: DISCONTINUED | OUTPATIENT
Start: 2021-09-11 | End: 2021-09-14

## 2021-09-10 NOTE — ED PROVIDER NOTES
Patient Seen in: [facility] Emergency Department      History   Patient presents with:  Cellulitis    Stated Complaint: cellulitis    HPI/Subjective:   HPI    19-year-old with past medical history of diabetes, hypertension, lymphedema presents today HISTORY  09/07/2018    Cysto joan    • PART REMOVAL COLON W END COLOSTOMY  2017   • REMOVAL GALLBLADDER     • SHOULDER SURG PROC UNLISTED      shoulder                Social History    Tobacco Use      Smoking status: Former Smoker        Packs/day: 0.50 components:       Result Value    Glucose 191 (*)     BUN 71 (*)     Creatinine 2.01 (*)     Calculated Osmolality 308 (*)     GFR, Non- 24 (*)     GFR, -American 27 (*)     AST 12 (*)     ALT 12 (*)     Total Protein 8.3 (*)     [facility] for continued care. I spoke with the hospitalist in regards admission.     Admission disposition: 9/10/2021  5:22 PM                                Disposition and Plan     Clinical Impression:  Cellulitis of right lower extremity  (primary encoun

## 2021-09-10 NOTE — H&P
[facility] HOSPITALIST  History and Physical     Nanci Garcia Patient Status:  Emergency    1945 MRN [id number]   Location [address] [facility] Attending [facility] Day # 0 PCP Augusto Butler MD     Chief EDW   • COLONOSCOPY N/A 2/28/2017    Procedure: COLONOSCOPY;  Surgeon: Deborah Carver MD;  Location: [address] [place] [address] ENDOSCOPY   • COLONOSCOPY  11/07/2019   • CORRECT BUNION,OTHR METHODS     • CYSTOURETHROSCOPY  3-1-11    cysto flow US, dr Edward Meza   • EYE SURGERY , Rfl:   Cholecalciferol (VITAMIN D) 50 MCG (2000 [facility]) Oral Cap, Take 2,000 Units by mouth daily. , Disp: , Rfl:   Metoprolol Tartrate 50 MG Oral Tab, Take 50 mg by mouth 2 (two) times daily. , Disp: , Rfl:   Naphazoline-Pheniramine (OPCON-A) 0.027-0.315 % Op 5/24/2021 ), Disp: , Rfl:   triamcinolone acetonide 0.1 % External Ointment, Apply topically 2 (two) times daily.  Apply to BLE after cleansing BID (Patient not taking: Reported on 7/1/2021 ), Disp: , Rfl:   magnesium oxide 400 MG Oral Tab, Take 400 mg by m Labs:  Recent Labs   Lab 09/10/21  1602   WBC 8.9   HGB 14.3   MCV 92.7   .0       Recent Labs   Lab 09/10/21  1602   *   BUN 71*   CREATSERUM 2.01*   GFRAA 27*   GFRNAA 24*   CA 9.2   ALB 3.2*      K 4.7      CO2 27.0   ALKPH Plan of care discussed with patient    Loida Reddy DO  9/10/2021

## 2021-09-11 LAB
ANION GAP SERPL CALC-SCNC: 2 MMOL/L (ref 0–18)
BASOPHILS # BLD AUTO: 0.02 X10(3) UL (ref 0–0.2)
BASOPHILS NFR BLD AUTO: 0.3 %
BUN BLD-MCNC: 63 MG/DL (ref 7–18)
CALCIUM BLD-MCNC: 8.6 MG/DL (ref 8.5–10.1)
CHLORIDE SERPL-SCNC: 111 MMOL/L (ref 98–112)
CO2 SERPL-SCNC: 27 MMOL/L (ref 21–32)
CREAT BLD-MCNC: 1.69 MG/DL
EOSINOPHIL # BLD AUTO: 0.03 X10(3) UL (ref 0–0.7)
EOSINOPHIL NFR BLD AUTO: 0.4 %
ERYTHROCYTE [DISTWIDTH] IN BLOOD BY AUTOMATED COUNT: 13.3 %
GLUCOSE BLD-MCNC: 110 MG/DL (ref 70–99)
GLUCOSE BLD-MCNC: 115 MG/DL (ref 70–99)
GLUCOSE BLD-MCNC: 116 MG/DL (ref 70–99)
GLUCOSE BLD-MCNC: 197 MG/DL (ref 70–99)
HCT VFR BLD AUTO: 34.5 %
HGB BLD-MCNC: 11.4 G/DL
IMM GRANULOCYTES # BLD AUTO: 0.03 X10(3) UL (ref 0–1)
IMM GRANULOCYTES NFR BLD: 0.4 %
LYMPHOCYTES # BLD AUTO: 0.66 X10(3) UL (ref 1–4)
LYMPHOCYTES NFR BLD AUTO: 8.5 %
MCH RBC QN AUTO: 30.2 PG (ref 26–34)
MCHC RBC AUTO-ENTMCNC: 33 G/DL (ref 31–37)
MCV RBC AUTO: 91.3 FL
MONOCYTES # BLD AUTO: 0.78 X10(3) UL (ref 0.1–1)
MONOCYTES NFR BLD AUTO: 10.1 %
NEUTROPHILS # BLD AUTO: 6.23 X10 (3) UL (ref 1.5–7.7)
NEUTROPHILS # BLD AUTO: 6.23 X10(3) UL (ref 1.5–7.7)
NEUTROPHILS NFR BLD AUTO: 80.3 %
OSMOLALITY SERPL CALC.SUM OF ELEC: 309 MOSM/KG (ref 275–295)
PLATELET # BLD AUTO: 160 10(3)UL (ref 150–450)
POTASSIUM SERPL-SCNC: 4.5 MMOL/L (ref 3.5–5.1)
RBC # BLD AUTO: 3.78 X10(6)UL
SODIUM SERPL-SCNC: 140 MMOL/L (ref 136–145)
WBC # BLD AUTO: 7.8 X10(3) UL (ref 4–11)

## 2021-09-11 PROCEDURE — 99232 SBSQ HOSP IP/OBS MODERATE 35: CPT | Performed by: INTERNAL MEDICINE

## 2021-09-11 RX ORDER — HYDROCODONE BITARTRATE AND ACETAMINOPHEN 10; 325 MG/1; MG/1
1 TABLET ORAL EVERY 4 HOURS PRN
Status: DISCONTINUED | OUTPATIENT
Start: 2021-09-11 | End: 2021-09-14

## 2021-09-11 NOTE — PLAN OF CARE
ALert and oriented,V/S stable,tele,rhythm stable,refused VPAP last night,using O2 at 3L/NC.. Consult to ID,spoke with . antibiotic changed. Unable to use SCD,both  lower extremities edematous,red,and painful. Medicated with tylenol with relief. Plan

## 2021-09-11 NOTE — PROGRESS NOTES
[facility] Pharmacy Note: Antimicrobial Weight Based Dose Adjustment for: cefazolin (ANCEF)    Carol Ruggiero is a 68year old patient who has been prescribed cefazolin (ANCEF) 1 g every 8 hours.       Estimated Creatinine Clearance: 22.3 mL/min (A) (based on S

## 2021-09-11 NOTE — PROGRESS NOTES
[facility] Pharmacy Note:  Anticoagulation Weight Dose Adjustment for heparin    Duard Krabbe is a 68year old patient who has been prescribed heparin 5000 units every 8 hours.       Estimated Creatinine Clearance: 22.3 mL/min (A) (based on SCr of 2.01 mg/dL

## 2021-09-11 NOTE — PLAN OF CARE
ID to see  Pt states swelling to BLE improved  IV abx  Insulin/ glucose control  Up with assist  Needing O2 2L while awake

## 2021-09-11 NOTE — PROGRESS NOTES
[facility] Pharmacy Note: Antimicrobial Weight Based Dose Adjustment for: piperacillin/tazobactam (Santo Lee)    Aly Hammonds is a 68year old patient who has been prescribed piperacillin/tazobactam (ZOSYN) 3.375 g every 8 hours.     Estimated Creatinine Clearanc

## 2021-09-11 NOTE — PROGRESS NOTES
[facility]  Progress Note     Mark Cruz Patient Status:  Inpatient    1945 MRN [id number]   [facility] 3SW-A Attending Alex Nunze MD   Hosp Day # 1 PCP Tee Granados MD     Chief Complaint: leg cellulitis     S: Component Value Date    COVID19 Not Detected 09/10/2021    COVID19 Not Detected 02/22/2021    COVID19 Not Detected 02/22/2021       Pro-Calcitonin  No results for input(s): PCT in the last 168 hours.     Cardiac  No results for input(s): TROP, PBNP in the expected to be discharge to: home    Plan of care discussed with pt and RN    Delmi Ramirez MD          **Certification      PHYSICIAN Certification of Need for Inpatient Hospitalization - Initial Certification    Patient will require inpatient services th

## 2021-09-11 NOTE — CONSULTS
INFECTIOUS DISEASE CONSULT NOTE    Jim Funes Patient Status:  Inpatient    1945 MRN [id number]   [facility] 3SW-A Attending Colton Ahmadi MD   Hosp Day # 1 PCP Hafsa Lopez w/ Dr. Abelardo Ruiz   • OTHER SURGICAL HISTORY  07/26/2017    cysto Dr. Abelardo Ruiz   • OTHER SURGICAL HISTORY  09/07/2018    Cysto     • PART REMOVAL COLON W END COLOSTOMY  2017   • REMOVAL GALLBLADDER     • SHOULDER SURG PROC UNLISTED      shoulder     Family Histo injection 4 mg, 4 mg, Intravenous, Q6H PRN  •  metoclopramide (REGLAN) injection 5 mg, 5 mg, Intravenous, Q8H PRN  •  melatonin tab 3 mg, 3 mg, Oral, Nightly PRN  •  Insulin Aspart Pen (NOVOLOG) 100 UNIT/ML flexpen 2-10 Units, 2-10 Units, Subcutaneous, TID legs  Integument: + erythema to both distal legs and feet, wore on R side, skin dry and scaly today.  See pics from admission below    Pictures from admission        Laboratory Data:    Recent Labs   Lab 09/10/21  1602 09/11/21  0422   RBC 4.82 3.78*   HGB

## 2021-09-12 LAB
ANION GAP SERPL CALC-SCNC: 3 MMOL/L (ref 0–18)
BUN BLD-MCNC: 52 MG/DL (ref 7–18)
CALCIUM BLD-MCNC: 8.6 MG/DL (ref 8.5–10.1)
CHLORIDE SERPL-SCNC: 109 MMOL/L (ref 98–112)
CO2 SERPL-SCNC: 28 MMOL/L (ref 21–32)
CREAT BLD-MCNC: 1.66 MG/DL
GLUCOSE BLD-MCNC: 127 MG/DL (ref 70–99)
GLUCOSE BLD-MCNC: 144 MG/DL (ref 70–99)
GLUCOSE BLD-MCNC: 156 MG/DL (ref 70–99)
GLUCOSE BLD-MCNC: 174 MG/DL (ref 70–99)
GLUCOSE BLD-MCNC: 194 MG/DL (ref 70–99)
OSMOLALITY SERPL CALC.SUM OF ELEC: 306 MOSM/KG (ref 275–295)
POTASSIUM SERPL-SCNC: 4.9 MMOL/L (ref 3.5–5.1)
SODIUM SERPL-SCNC: 140 MMOL/L (ref 136–145)

## 2021-09-12 PROCEDURE — 99232 SBSQ HOSP IP/OBS MODERATE 35: CPT | Performed by: INTERNAL MEDICINE

## 2021-09-12 RX ORDER — FUROSEMIDE 10 MG/ML
20 INJECTION INTRAMUSCULAR; INTRAVENOUS ONCE
Status: COMPLETED | OUTPATIENT
Start: 2021-09-12 | End: 2021-09-12

## 2021-09-12 NOTE — PLAN OF CARE
Patient a/ox4. Reports pin to BLE when touched or moved. PO norco for pain control. O2 2-3L per NC. Attempting to wean to room air. Desats to 86-89% without O2. Reapplied 3L to keep sats >90%.    Lasix 20mg x1 given this am. Legs not particularly weeping th

## 2021-09-12 NOTE — PLAN OF CARE
Alert and oriented,tele,per GAYLE protocol. Refused VPAP,O2 as needed. BLE remains red,and swollen,with some weeping,but better than she was admitted. Norco for pain,REFUSED INSULIN tonight. Still on IV antibiotic.and per pt.feels improved . BLE elevated on doris

## 2021-09-12 NOTE — PROGRESS NOTES
Noted small puncture site to LLQ oozing blood. Suspect this to be earlier heparin injection site. Cleansed area and applied dressing. Pt also complaining of nose bleed with oxygen. Discussed with hospitalist. Continuing heparin as ordered.  No further inter

## 2021-09-12 NOTE — PROGRESS NOTES
[facility] HOSPITAL[facility]  Progress Note     Paramjit Jose Patient Status:  Inpatient    1945 MRN [id number]   [facility] 3SW-A Attending Justyn Andersen MD   Hosp Day # 2 PCP Jefferson Vivar MD     Chief Complaint: leg cellulitis     S: hours.         COVID-19 Lab Results    COVID-19  Lab Results   Component Value Date    COVID19 Not Detected 09/10/2021    COVID19 Not Detected 02/22/2021    COVID19 Not Detected 02/22/2021       Pro-Calcitonin  No results for input(s): PCT in the last 168 on discharge?: no  Estimated date of discharge: TBD  Discharge is dependent on: course  At this point Ms. Glenis Riuz is expected to be discharge to: home     Plan of care discussed with pt and RN     Alon Harrington MD

## 2021-09-12 NOTE — PROGRESS NOTES
Attempted to wean pt to room air this shift. Patient maintaining sat 86-89% on room air. Repositioned patient. Encouraged deep breathing exercises. Could not keep sats >90%. Reapplied O2 to 3L to keep sats >90%.    Discussed with Hospitalist.

## 2021-09-13 ENCOUNTER — APPOINTMENT (OUTPATIENT)
Dept: CARDIOLOGY | Age: 76
End: 2021-09-13

## 2021-09-13 LAB
GLUCOSE BLD-MCNC: 158 MG/DL (ref 70–99)
GLUCOSE BLD-MCNC: 169 MG/DL (ref 70–99)
GLUCOSE BLD-MCNC: 176 MG/DL (ref 70–99)
GLUCOSE BLD-MCNC: 215 MG/DL (ref 70–99)

## 2021-09-13 PROCEDURE — 99232 SBSQ HOSP IP/OBS MODERATE 35: CPT | Performed by: INTERNAL MEDICINE

## 2021-09-13 NOTE — CDS QUERY
Uncertain Diagnosis  CLINICAL DOCUMENTATION CLARIFICATION FORM  Dear Doctor Saira Lose information (provided below) indicates an unknown status of a documented diagnosis.  For accurate ICD-10-CM code assignment to reflect severity of illness and risk

## 2021-09-13 NOTE — PHYSICAL THERAPY NOTE
PHYSICAL THERAPY EVALUATION - INPATIENT     Room Number: 174/645-O  Evaluation Date: 9/13/2021  Type of Evaluation: Initial  Physician Order: PT Eval and Treat    Presenting Problem: cellulitis of BLE   Reason for Therapy: Mobility Dysfunction and Kevin Martino • CORRECT BUNION,OTHR METHODS     • CYSTOURETHROSCOPY  3-1-11    cysto flow US, dr Nelda Sanchez   • EYE SURGERY      eye   • FOOT SURGERY      foot   • HERNIA SURGERY     • HYSTERECTOMY      CAROL 2007   • OTHER SURGICAL HISTORY  5/3/17    cystoscopy Dr. Rober Dukes   • OT and mobility, pt able to stand with max assist of 1 and mod assist of another.      BALANCE  Static Sitting: Fair -  Dynamic Sitting: Poor  Static Standing: Poor  Dynamic Standing: Not tested    ADDITIONAL TESTS                                  NEUROLOGICAL supine to sit transfer. Pt performed supine to sit transfer with HOB elevated, max assist, increased time, and 2 attempts. Pt required mod assist of 2 to scoot toward EOB. Pt stood with max assist of 1 and mod assist of another.  Pt tolerated standing for 1 RECOMMENDATIONS  PT Discharge Recommendations: Sub-acute rehabilitation    PLAN  PT Treatment Plan: Bed mobility; Endurance; Energy conservation; Patient education;  Family education; Gait training; Strengthening; Stair training; Transfer training; Balance

## 2021-09-13 NOTE — PROGRESS NOTES
INFECTIOUS DISEASE PROGRESS NOTE    Jackey Barthel Patient Status:  Inpatient    1945 MRN [id number]   [facility] 3SW-A Attending Reynaldo Banuelos MD   Hosp Day # 3 PCP Chip Kinney PRN  •  metoclopramide (REGLAN) injection 5 mg, 5 mg, Intravenous, Q8H PRN  •  melatonin tab 3 mg, 3 mg, Oral, Nightly PRN  •  Insulin Aspart Pen (NOVOLOG) 100 UNIT/ML flexpen 2-10 Units, 2-10 Units, Subcutaneous, TID CC and HS  •  Insulin Aspart Pen (PHILIPPE Sed Rate (mm/Hr)   Date Value   09/10/2021 45 (H)      C-Reactive Protein (mg/dL)   Date Value   09/10/2021 5.72 (H)      No results found for: VANCT  No results for input(s): Nicolasa Hicks, [address] [facility], [address] [facility] [address], [address], [address], [address] [facility], [address] [facility], [facility]

## 2021-09-13 NOTE — PLAN OF CARE
Alert and oriented,V/S stable,afebrile. ,GAYLE but refuses VPAP,O2 at 3l/nc in used. Tele,rhythm stable. Refused sliding scale insulin coverage last night. Refused to do foot and ankle pump exercises,states it is painful. BLE elevated on 2 pillows,BLE with red

## 2021-09-13 NOTE — CM/SW NOTE
09/13/21 1600   CM/SW Referral Data   Referral Source Social Work (self-referral)   Reason for Referral Discharge planning   Informant Patient   Patient Info   Patient's Current Mental Status at Time of Assessment Alert; Oriented   Patient's Home Enviro

## 2021-09-13 NOTE — PROGRESS NOTES
[facility]  Progress Note     Carmina Galvan Patient Status:  Inpatient    1945 MRN [id number]   [facility] 3SW-A Attending Trent Krueger MD   Hosp Day # 3 PCP Bebe Li MD     Chief Complaint: hypoxia     S: Patien COVID-19 Lab Results    COVID-19  Lab Results   Component Value Date    COVID19 Not Detected 09/10/2021    COVID19 Not Detected 02/22/2021    COVID19 Not Detected 02/22/2021       Pro-Calcitonin  No results for input(s): PCT in the last 168 hours.     C referred to TCC on discharge?: no  Estimated date of discharge: tomorrow ? ? Discharge is dependent on: course/ ID eval   At this point Ms. Bautista Chatman is expected to be discharge to: home? , [place]?     Plan of care discussed with pt and RN     Mary Fuentes MD

## 2021-09-13 NOTE — PLAN OF CARE
Patient remains alert and oriented x4. VS are stable, remains on 3L of O2 unable to wean off. Need a walking O2 when able to get up. Bilateral lower extremities are elevated on pillows, per patient they look better. Skin is very dry.  Awaiting wound eval. D

## 2021-09-14 VITALS
WEIGHT: 263 LBS | OXYGEN SATURATION: 91 % | DIASTOLIC BLOOD PRESSURE: 56 MMHG | HEIGHT: 66 IN | SYSTOLIC BLOOD PRESSURE: 146 MMHG | HEART RATE: 78 BPM | BODY MASS INDEX: 42.27 KG/M2 | RESPIRATION RATE: 16 BRPM | TEMPERATURE: 98 F

## 2021-09-14 LAB
GLUCOSE BLD-MCNC: 155 MG/DL (ref 70–99)
GLUCOSE BLD-MCNC: 172 MG/DL (ref 70–99)

## 2021-09-14 PROCEDURE — 99239 HOSP IP/OBS DSCHRG MGMT >30: CPT | Performed by: INTERNAL MEDICINE

## 2021-09-14 RX ORDER — HYDROCODONE BITARTRATE AND ACETAMINOPHEN 10; 325 MG/1; MG/1
1 TABLET ORAL EVERY 4 HOURS PRN
Qty: 20 TABLET | Refills: 0 | Status: SHIPPED | OUTPATIENT
Start: 2021-09-14 | End: 2021-12-09 | Stop reason: ALTCHOICE

## 2021-09-14 RX ORDER — BUMETANIDE 1 MG/1
1 TABLET ORAL DAILY
Status: DISCONTINUED | OUTPATIENT
Start: 2021-09-14 | End: 2021-09-14

## 2021-09-14 RX ORDER — VANCOMYCIN HYDROCHLORIDE 125 MG/1
125 CAPSULE ORAL DAILY
Qty: 10 CAPSULE | Refills: 0 | Status: SHIPPED | OUTPATIENT
Start: 2021-09-15 | End: 2021-09-25

## 2021-09-14 RX ORDER — CEFADROXIL 500 MG/1
500 CAPSULE ORAL 2 TIMES DAILY
Qty: 20 CAPSULE | Refills: 0 | Status: SHIPPED | OUTPATIENT
Start: 2021-09-14 | End: 2021-09-24

## 2021-09-14 RX ORDER — INSULIN GLARGINE 100 [IU]/ML
32 INJECTION, SOLUTION SUBCUTANEOUS EVERY MORNING
Refills: 0 | Status: SHIPPED | COMMUNITY
Start: 2021-09-14 | End: 2022-01-20 | Stop reason: CLARIF

## 2021-09-14 NOTE — PROGRESS NOTES
[facility]  Progress Note     Katlin Kinjann Patient Status:  Inpatient    1945 MRN [id number]   [facility] 3SW-A Attending Jeanine Almaguer MD   [address] Day # 4 PCP Monika Gonzales MD     Chief Complaint: cellulitis     S: Viv Goode hours.         COVID-19 Lab Results    COVID-19  Lab Results   Component Value Date    COVID19 Not Detected 09/10/2021    COVID19 Not Detected 02/22/2021    COVID19 Not Detected 02/22/2021       Pro-Calcitonin  No results for input(s): PCT in the last 168 - Vanc PO   - DC planning       Quality:  · DVT Prophylaxis: Heparin   · CODE status: Full  · Tinsley: no  · If COVID testing is negative, may discontinue isolation: yes    Will the patient be referred to TCC on discharge?: no  Estimated date of discharge:

## 2021-09-14 NOTE — PROGRESS NOTES
INFECTIOUS DISEASE PROGRESS NOTE    Carmina Galvan Patient Status:  Inpatient    1945 MRN [id number]   [facility] 3SW-A Attending Trent Krueger MD   Hosp Day # 4 PCP Tia Hurst (TYLENOL) tab 650 mg, 650 mg, Oral, Q6H PRN  •  ondansetron (ZOFRAN) injection 4 mg, 4 mg, Intravenous, Q6H PRN  •  metoclopramide (REGLAN) injection 5 mg, 5 mg, Intravenous, Q8H PRN  •  melatonin tab 3 mg, 3 mg, Oral, Nightly PRN  •  Insulin Aspart Pen (N K 4.7 4.5 4.9    111 109   CO2 27.0 27.0 28.0   ALKPHO 91  --   --    AST 12*  --   --    ALT 12*  --   --    BILT 0.4  --   --    TP 8.3*  --   --      Sed Rate (mm/Hr)   Date Value   09/10/2021 45 (H)      C-Reactive Protein (mg/dL)   Date Value

## 2021-09-14 NOTE — CONSULTS
[facility]  Report of Inpatient Wound Care Consultation    Dean Hunterro Patient Status:  Inpatient    1945 MRN [id number]   [facility] 3SW-A Attending Cady Zimmerman MD   [facility] Day # 4 PCP Caitlin Urena MD     Reason for C shoulder      reports that she quit smoking about 18 years ago. She has a 20.00 pack-year smoking history. She has never used smokeless tobacco. She reports current alcohol use. She reports that she does not use drugs.     Allergies:  @ALLERGY    Laboratory

## 2021-09-14 NOTE — PLAN OF CARE
Pt resting comfortably in bed, encouraged to use IS, pt states she just wants to sleep at this time & will do later. Pt needing 3-4 L via NC when asleep, satting at 91-93%. Tolerating diet. Voiding via pure wick/external catheter.  Pt up with moderate ast

## 2021-09-14 NOTE — PLAN OF CARE
Patient remains alert and oriented x4. VS are stable, remains on 3L of O2 unable to wean off. IS encouraged. Last BM was on 9/10, agreed to take miralax today. Bilateral lower extremities are elevated on pillows, per patient they look better.  Skin is very

## 2021-09-14 NOTE — CM/SW NOTE
Spoke with pt's RN who stated pt can discharge to NH today. Met with pt and presented list of accepting facilities for [place]. Pt stated preference for [facility]. Discussed plan for DC today and pt agreeable. She requested SW contact her .     Spo

## 2021-09-14 NOTE — OCCUPATIONAL THERAPY NOTE
OCCUPATIONAL THERAPY EVALUATION - INPATIENT     Room Number: 575/670-F  Evaluation Date: 9/14/2021  Type of Evaluation: Initial  Presenting Problem: Cellulitis BLE    Physician Order: IP Consult to Occupational Therapy  Reason for Therapy: ADL/IADL Dysfunc arthritis(714.0)    • Small bowel obstruction ([address] 2017   • Uterine cancer ([facility] [address]    • Visual impairment        Past Surgical History  Past Surgical History:   Procedure Laterality Date   •      • CHOLECYSTECTOMY  [address]W   • COLON to obtain wound care services first d/t BLE drainage. SUBJECTIVE   \"I have an appointment scheduled in the lymphedema clinic for October 5.\"    Patient self-stated goal is to get stronger and regain independence again.      OBJECTIVE  Precautions: Be Little  -   Eating meals?: A Little    AM-PAC Score:  Score: 12  Approx Degree of Impairment: 66.57%  Standardized Score (AM-PAC Scale): 30.6  CMS Modifier (G-Code): CL    FUNCTIONAL TRANSFER ASSESSMENT  Supine to Sit : Maximum assistance  Sit to Stand: [facility] living. Research supports that patients with this level of impairment often benefit from MO. Kenya Lisa In this OT evaluation patient presents with the following performance deficits: decreased balance, endurance, strength, coordination, BLE lymphedema.  These de will transfer from supine to sit:  with mod assist  Patient will transfer from sit to stand:  with mod assist  Patient will transfer to bedside commode:  with mod assist    UE Exercise Program Goal  Patient will be supervision with bilateral AROM HEP (home

## 2021-09-15 ENCOUNTER — INITIAL APN SNF VISIT (OUTPATIENT)
Dept: INTERNAL MEDICINE CLINIC | Age: 76
End: 2021-09-15

## 2021-09-15 ENCOUNTER — NURSE ONLY (OUTPATIENT)
Dept: LAB | Age: 76
End: 2021-09-15
Attending: FAMILY MEDICINE
Payer: MEDICARE

## 2021-09-15 VITALS
TEMPERATURE: 98 F | DIASTOLIC BLOOD PRESSURE: 74 MMHG | SYSTOLIC BLOOD PRESSURE: 146 MMHG | OXYGEN SATURATION: 92 % | HEART RATE: 89 BPM | RESPIRATION RATE: 20 BRPM

## 2021-09-15 DIAGNOSIS — J96.22 ACUTE ON CHRONIC RESPIRATORY FAILURE WITH HYPERCAPNIA (HCC): Primary | ICD-10-CM

## 2021-09-15 DIAGNOSIS — Z79.4 UNCONTROLLED TYPE 2 DIABETES MELLITUS WITH INSULIN THERAPY (HCC): ICD-10-CM

## 2021-09-15 DIAGNOSIS — Z74.09 IMPAIRED MOBILITY AND ADLS: ICD-10-CM

## 2021-09-15 DIAGNOSIS — Z11.52 ENCOUNTER FOR SCREENING FOR COVID-19: ICD-10-CM

## 2021-09-15 DIAGNOSIS — E11.65 UNCONTROLLED TYPE 2 DIABETES MELLITUS WITH INSULIN THERAPY (HCC): ICD-10-CM

## 2021-09-15 DIAGNOSIS — G47.33 OSA ON CPAP: ICD-10-CM

## 2021-09-15 DIAGNOSIS — R53.1 WEAKNESS GENERALIZED: ICD-10-CM

## 2021-09-15 DIAGNOSIS — E66.2 OBESITY HYPOVENTILATION SYNDROME (HCC): ICD-10-CM

## 2021-09-15 DIAGNOSIS — Z78.9 IMPAIRED MOBILITY AND ADLS: ICD-10-CM

## 2021-09-15 DIAGNOSIS — A41.9 SYSTEMIC INFECTION (HCC): Primary | ICD-10-CM

## 2021-09-15 DIAGNOSIS — D50.9 IRON DEFICIENCY ANEMIA, UNSPECIFIED IRON DEFICIENCY ANEMIA TYPE: ICD-10-CM

## 2021-09-15 DIAGNOSIS — I10 ESSENTIAL HYPERTENSION: ICD-10-CM

## 2021-09-15 DIAGNOSIS — L03.119 CELLULITIS OF LOWER EXTREMITY, UNSPECIFIED LATERALITY: ICD-10-CM

## 2021-09-15 DIAGNOSIS — I48.0 AF (PAROXYSMAL ATRIAL FIBRILLATION) (HCC): ICD-10-CM

## 2021-09-15 DIAGNOSIS — R53.81 PHYSICAL DECONDITIONING: ICD-10-CM

## 2021-09-15 DIAGNOSIS — I27.20 PULMONARY HTN (HCC): ICD-10-CM

## 2021-09-15 DIAGNOSIS — Z99.89 OSA ON CPAP: ICD-10-CM

## 2021-09-15 LAB
ALBUMIN SERPL-MCNC: 2 G/DL (ref 3.4–5)
ALBUMIN/GLOB SERPL: 0.4 {RATIO} (ref 1–2)
ALP LIVER SERPL-CCNC: 74 U/L
ALT SERPL-CCNC: <6 U/L
ANION GAP SERPL CALC-SCNC: 5 MMOL/L (ref 0–18)
AST SERPL-CCNC: 6 U/L (ref 15–37)
BASOPHILS # BLD AUTO: 0.03 X10(3) UL (ref 0–0.2)
BASOPHILS NFR BLD AUTO: 0.5 %
BILIRUB SERPL-MCNC: 0.3 MG/DL (ref 0.1–2)
BUN BLD-MCNC: 44 MG/DL (ref 7–18)
CALCIUM BLD-MCNC: 9 MG/DL (ref 8.5–10.1)
CHLORIDE SERPL-SCNC: 109 MMOL/L (ref 98–112)
CO2 SERPL-SCNC: 27 MMOL/L (ref 21–32)
CREAT BLD-MCNC: 1.59 MG/DL
EOSINOPHIL # BLD AUTO: 0.17 X10(3) UL (ref 0–0.7)
EOSINOPHIL NFR BLD AUTO: 3 %
ERYTHROCYTE [DISTWIDTH] IN BLOOD BY AUTOMATED COUNT: 13.4 %
GLOBULIN PLAS-MCNC: 4.7 G/DL (ref 2.8–4.4)
GLUCOSE BLD-MCNC: 171 MG/DL (ref 70–99)
HCT VFR BLD AUTO: 33.5 %
HGB BLD-MCNC: 10.1 G/DL
IMM GRANULOCYTES # BLD AUTO: 0.05 X10(3) UL (ref 0–1)
IMM GRANULOCYTES NFR BLD: 0.9 %
LYMPHOCYTES # BLD AUTO: 0.45 X10(3) UL (ref 1–4)
LYMPHOCYTES NFR BLD AUTO: 8.1 %
MAGNESIUM SERPL-MCNC: 2.1 MG/DL (ref 1.6–2.6)
MCH RBC QN AUTO: 29.7 PG (ref 26–34)
MCHC RBC AUTO-ENTMCNC: 30.1 G/DL (ref 31–37)
MCV RBC AUTO: 98.5 FL
MONOCYTES # BLD AUTO: 0.56 X10(3) UL (ref 0.1–1)
MONOCYTES NFR BLD AUTO: 10 %
NEUTROPHILS # BLD AUTO: 4.33 X10 (3) UL (ref 1.5–7.7)
NEUTROPHILS # BLD AUTO: 4.33 X10(3) UL (ref 1.5–7.7)
NEUTROPHILS NFR BLD AUTO: 77.5 %
OSMOLALITY SERPL CALC.SUM OF ELEC: 307 MOSM/KG (ref 275–295)
PATIENT FASTING Y/N/NP: YES
PLATELET # BLD AUTO: 171 10(3)UL (ref 150–450)
POTASSIUM SERPL-SCNC: 4.5 MMOL/L (ref 3.5–5.1)
PROT SERPL-MCNC: 6.7 G/DL (ref 6.4–8.2)
RBC # BLD AUTO: 3.4 X10(6)UL
SODIUM SERPL-SCNC: 141 MMOL/L (ref 136–145)
VIT D+METAB SERPL-MCNC: 38.5 NG/ML (ref 30–100)
WBC # BLD AUTO: 5.6 X10(3) UL (ref 4–11)

## 2021-09-15 PROCEDURE — 83735 ASSAY OF MAGNESIUM: CPT

## 2021-09-15 PROCEDURE — 80053 COMPREHEN METABOLIC PANEL: CPT

## 2021-09-15 PROCEDURE — 85025 COMPLETE CBC W/AUTO DIFF WBC: CPT

## 2021-09-15 PROCEDURE — 1111F DSCHRG MED/CURRENT MED MERGE: CPT | Performed by: NURSE PRACTITIONER

## 2021-09-15 PROCEDURE — 1124F ACP DISCUSS-NO DSCNMKR DOCD: CPT | Performed by: NURSE PRACTITIONER

## 2021-09-15 PROCEDURE — 82306 VITAMIN D 25 HYDROXY: CPT

## 2021-09-15 PROCEDURE — 99310 SBSQ NF CARE HIGH MDM 45: CPT | Performed by: NURSE PRACTITIONER

## 2021-09-15 NOTE — DISCHARGE SUMMARY
[facility] [place] HOSPITALIST  DISCHARGE SUMMARY     Catarino Andino Patient Status:  Inpatient    1945 MRN [id number]   [facility] 3SW-A Attending Rafi Finch MD   [facility] Day # 4 PCP Zia Weeks MD     Date of Admission: 9/10/2021  Dru Prescription details   Cefadroxil 500 MG Caps  Commonly known as: DURICEF      Take 1 capsule (500 mg total) by mouth 2 (two) times daily for 10 days.    Stop taking on: September 24, 2021  Quantity: 20 capsule  Refills: 0     HYDROcodone-acetaminophen 10-3 mg by mouth daily. Refills: 0     febuxostat 40 MG Tabs  Commonly known as: ULORIC      Take by mouth. Refills: 0     Insulin Aspart Pen 100 UNIT/ML Sopn  Commonly known as: NOVOLOG      Inject 2-10 Units into the skin TID CC and HS.  Continue to give c BID   Refills: 0     Vitamin D 50 MCG (2000 UT) Caps      Take 2,000 Units by mouth daily.    Refills: 0        STOP taking these medications    cephalexin 500 MG Caps  Commonly known as: KEFLEX        ciprofloxacin 250 MG Tabs  Commonly known as: CIPRO Instructions:         Location Instructions: Your appointment is scheduled on the [address] [facility]. The address is [facility] [address], [address] [facility] [address]ona [address], [address] [facility]. Please arrive 10 minutes prior to your scheduled appointment time.

## 2021-09-15 NOTE — PROGRESS NOTES
Mark Simpson  : 1945  Age 68year old  female patient is admitted to Facility: [facility] for subacute rehab, wound care.      [address] [place] [address] Drive date:  9/10/21  Discharge date to [facility]:   21  ELOS:  14 days  Antic shortness of breath palpitations. Discussed with nursing.     Past Medical History:   Diagnosis Date   • Atrial fibrillation (HCC)    • C. difficile diarrhea    • Cataract    • CKD (chronic kidney disease)    • Diabetes ([facility] [address]    • DISH (diffuse idiopathic HIVES  Codeine                 NAUSEA ONLY    Comment:From Tylenol #3    POA self    CODE STATUS:  Full Code    ADVANCED CARE PLANNING TEAM: None      CURRENT MEDICATIONS   Current Outpatient Medications   Medication Sig Dispense Refill   • insulin glargin mg/dL  Give 6 units if blood glucose 221-260 mg/dL  Give 8 units if blood glucose 261-300 mg/dL  Give 10 units if blood glucose 301-350 mg/dL  Call Physician if blood glucose is greater than 351 mg/dl  0   • dilTIAZem HCl ER Coated Beads 120 MG Oral Capsul motor complaint  PSYCHE: no symptoms of depression or anxiety  HEMATOLOGY:denies hx anemia  ENDOCRINE: denies excessive thirst or urination; denies unexpected wt gain or wt loss  ALLERGY/IMM.: denies food or seasonal allergies      PHYSICAL EXAM:  GENERAL Zac.Imus  ]   Diabetes  [  ]  Respiratory failure with ventilator                   Zac.Imus  ]  Arrhythmia                               [  ]  Dialysis      Hospital score:     Attribute:  [ ]Points if positive:    Low hemoglobin at discharge ( 09/15/2021    BILT 0.3 09/15/2021    TP 6.7 09/15/2021    ALB 2.0 (L) 09/15/2021    GLOBULIN 4.7 (H) 09/15/2021     09/15/2021    K 4.5 09/15/2021     09/15/2021    CO2 27.0 09/15/2021     Lab Results   Component Value Date    MG 2.1 09/15/2021 changes  bumetanide 1mg daily  Breo Ellipta 1 puff daily    Anemia, chronic  Hemoglobin 10.1  No acute blood loss noted  Not on any blood thinners  Continue to monitor and repeat CBC    Morbid obesity  Diet and exercise therapy  Follow up as an outpatient [facility]   11/3/2021  8:00 AM Vince Baker OT [facility] AT [facility]   11/5/2021  8:30 AM Guillermina Last OT [facility]   11/8/2021  8:00 AM Vince Baker OT [facility]   11/10/2021  8:00 AM Tray Brumfield, OT  O

## 2021-09-16 LAB — SARS-COV-2 RNA RESP QL NAA+PROBE: NOT DETECTED

## 2021-09-17 ENCOUNTER — SNF VISIT (OUTPATIENT)
Dept: INTERNAL MEDICINE CLINIC | Age: 76
End: 2021-09-17

## 2021-09-17 VITALS
BODY MASS INDEX: 47 KG/M2 | HEART RATE: 85 BPM | RESPIRATION RATE: 20 BRPM | SYSTOLIC BLOOD PRESSURE: 152 MMHG | TEMPERATURE: 97 F | WEIGHT: 292.5 LBS | DIASTOLIC BLOOD PRESSURE: 78 MMHG | OXYGEN SATURATION: 93 %

## 2021-09-17 DIAGNOSIS — I89.0 LYMPHEDEMA: ICD-10-CM

## 2021-09-17 DIAGNOSIS — Z78.9 IMPAIRED MOBILITY AND ADLS: ICD-10-CM

## 2021-09-17 DIAGNOSIS — G47.33 OSA ON CPAP: ICD-10-CM

## 2021-09-17 DIAGNOSIS — E66.2 OBESITY HYPOVENTILATION SYNDROME (HCC): ICD-10-CM

## 2021-09-17 DIAGNOSIS — J96.22 ACUTE ON CHRONIC RESPIRATORY FAILURE WITH HYPERCAPNIA (HCC): ICD-10-CM

## 2021-09-17 DIAGNOSIS — Z74.09 IMPAIRED MOBILITY AND ADLS: ICD-10-CM

## 2021-09-17 DIAGNOSIS — E11.65 UNCONTROLLED TYPE 2 DIABETES MELLITUS WITH INSULIN THERAPY (HCC): ICD-10-CM

## 2021-09-17 DIAGNOSIS — I27.20 PULMONARY HTN (HCC): ICD-10-CM

## 2021-09-17 DIAGNOSIS — L03.119 CELLULITIS OF LOWER EXTREMITY, UNSPECIFIED LATERALITY: Primary | ICD-10-CM

## 2021-09-17 DIAGNOSIS — R53.81 PHYSICAL DECONDITIONING: ICD-10-CM

## 2021-09-17 DIAGNOSIS — I10 ESSENTIAL HYPERTENSION: ICD-10-CM

## 2021-09-17 DIAGNOSIS — Z99.89 OSA ON CPAP: ICD-10-CM

## 2021-09-17 DIAGNOSIS — Z79.4 UNCONTROLLED TYPE 2 DIABETES MELLITUS WITH INSULIN THERAPY (HCC): ICD-10-CM

## 2021-09-17 PROCEDURE — 99309 SBSQ NF CARE MODERATE MDM 30: CPT | Performed by: NURSE PRACTITIONER

## 2021-09-17 NOTE — PROGRESS NOTES
Jenny Aguilar, 4/25/1945, 68year old, female    Chief Complaint:  Patient presents with:   Follow - Up: BLE cellulitis  Pain  Weakness     HPI:  Nataly Sofia is a 44-year-old female with previous medical history including chronic lymphedema, CKD, diabetes, uter FROM  BREAST: deferred  RESPIRATORY:diminished, clear, no crackles, no wheezing, no dyspnea, no cough, 3L/NC  CARDIOVASCULAR: RRR, S1 and S2, no murmurs,  ABDOMEN:  Obese, normal active BS+, soft, nondistended; no organomegaly, no masses; nontender, no gua home.  Triamcinolone 0.1% ointment twice daily after cleansing bilateral lower extremity     AMINATA on  CKD stage III  Baseline creat 1.6-1.8, 1.59  today  Avoid nephrotoxic medications  Renal dose meds as needed  CMP weekly and prn     Type II DM  A1c 6.3 9/ managed  Psych eval as needed     Pain management  Monitor and assess pain  Norco 10/325 every 4 as needed or tylenol 650 q6 prn     Bowel management  Monitor for Bms  Colace 100 mg PO TID  Miralax prn  Milliliters file daily as needed     Supplements  Vit proofreading has been made to minimize errors. Errors may still exist.      High RISK  [facility],[address], APRN  9/17/2021

## 2021-09-20 ENCOUNTER — SNF VISIT (OUTPATIENT)
Dept: INTERNAL MEDICINE CLINIC | Age: 76
End: 2021-09-20

## 2021-09-20 VITALS
SYSTOLIC BLOOD PRESSURE: 151 MMHG | BODY MASS INDEX: 46 KG/M2 | HEART RATE: 81 BPM | OXYGEN SATURATION: 98 % | WEIGHT: 287 LBS | DIASTOLIC BLOOD PRESSURE: 76 MMHG | RESPIRATION RATE: 20 BRPM | TEMPERATURE: 97 F

## 2021-09-20 DIAGNOSIS — Z99.89 OSA ON CPAP: ICD-10-CM

## 2021-09-20 DIAGNOSIS — I27.20 PULMONARY HTN (HCC): ICD-10-CM

## 2021-09-20 DIAGNOSIS — R53.81 PHYSICAL DECONDITIONING: ICD-10-CM

## 2021-09-20 DIAGNOSIS — Z78.9 IMPAIRED MOBILITY AND ADLS: ICD-10-CM

## 2021-09-20 DIAGNOSIS — Z74.09 IMPAIRED MOBILITY AND ADLS: ICD-10-CM

## 2021-09-20 DIAGNOSIS — E11.65 UNCONTROLLED TYPE 2 DIABETES MELLITUS WITH INSULIN THERAPY (HCC): ICD-10-CM

## 2021-09-20 DIAGNOSIS — Z79.4 UNCONTROLLED TYPE 2 DIABETES MELLITUS WITH INSULIN THERAPY (HCC): ICD-10-CM

## 2021-09-20 DIAGNOSIS — J96.22 ACUTE ON CHRONIC RESPIRATORY FAILURE WITH HYPERCAPNIA (HCC): ICD-10-CM

## 2021-09-20 DIAGNOSIS — G47.33 OSA ON CPAP: ICD-10-CM

## 2021-09-20 DIAGNOSIS — I10 ESSENTIAL HYPERTENSION: ICD-10-CM

## 2021-09-20 DIAGNOSIS — L03.119 CELLULITIS OF LOWER EXTREMITY, UNSPECIFIED LATERALITY: Primary | ICD-10-CM

## 2021-09-20 DIAGNOSIS — I89.0 LYMPHEDEMA: ICD-10-CM

## 2021-09-20 PROCEDURE — 99309 SBSQ NF CARE MODERATE MDM 30: CPT | Performed by: NURSE PRACTITIONER

## 2021-09-20 NOTE — PROGRESS NOTES
Lena Ortez, 4/25/1945, 68year old, female    Chief Complaint:  Patient presents with:   Follow - Up: BLE cellulitis  Weakness     HPI:  Greg Jc is a 49-year-old female with previous medical history including chronic lymphedema, CKD, diabetes, uterine ca normocephalic; normal nose, no nasal drainage, mucous membranes pink, moist.  NECK: supple, non tender, FROM  BREAST: deferred  RESPIRATORY:diminished, clear, no crackles, no wheezing, no dyspnea, no cough, 3L/NC  CARDIOVASCULAR: RRR, S1 and S2, no murmurs BLE  Has velcro wraps at home  Will need to resume outpatient lymphedema therapies once DC home.   Triamcinolone 0.1% ointment twice daily after cleansing bilateral lower extremity     AMINATA on  CKD stage III  Baseline creat 1.6-1.8, 1.59  today  Avoid nephro to DC home next weekend     Gout  Uloric 40 mg daily  No acute flare noted     Depression/anxiety   Lexapro 10 mg p.o. daily  Mood appears managed  Psych eval as needed     Pain management  Monitor and assess pain  Norco 10/325 every 4 as needed or tylenol Hosp   11/19/2021  8:30 AM Myesha Bauer Sportslaura, OT [facility] Estrella Mulch        Please note, voice recognition software was used to create this document. An attempt at proofreading has been made to minimize errors. Errors may still exist.      High RISK FOR

## 2021-09-21 ENCOUNTER — EXTERNAL FACILITY (OUTPATIENT)
Dept: FAMILY MEDICINE CLINIC | Facility: CLINIC | Age: 76
End: 2021-09-21

## 2021-09-21 DIAGNOSIS — L03.116 LEFT LEG CELLULITIS: ICD-10-CM

## 2021-09-21 DIAGNOSIS — Z79.899 HIGH RISK MEDICATIONS (NOT ANTICOAGULANTS) LONG-TERM USE: Chronic | ICD-10-CM

## 2021-09-21 DIAGNOSIS — N18.30 STAGE 3 CHRONIC KIDNEY DISEASE, UNSPECIFIED WHETHER STAGE 3A OR 3B CKD (HCC): ICD-10-CM

## 2021-09-21 DIAGNOSIS — L03.115 CELLULITIS OF RIGHT LOWER EXTREMITY: ICD-10-CM

## 2021-09-21 DIAGNOSIS — J96.22 ACUTE ON CHRONIC RESPIRATORY FAILURE WITH HYPERCAPNIA (HCC): ICD-10-CM

## 2021-09-21 DIAGNOSIS — Z99.89 OSA ON CPAP: ICD-10-CM

## 2021-09-21 DIAGNOSIS — L03.116 CELLULITIS OF LEFT LOWER EXTREMITY: ICD-10-CM

## 2021-09-21 DIAGNOSIS — R53.1 GENERALIZED WEAKNESS: ICD-10-CM

## 2021-09-21 DIAGNOSIS — D69.6 THROMBOCYTOPENIA (HCC): ICD-10-CM

## 2021-09-21 DIAGNOSIS — D64.9 ANEMIA, UNSPECIFIED TYPE: ICD-10-CM

## 2021-09-21 DIAGNOSIS — N28.9 ACUTE ON CHRONIC RENAL INSUFFICIENCY: ICD-10-CM

## 2021-09-21 DIAGNOSIS — E11.65 UNCONTROLLED TYPE 2 DIABETES MELLITUS WITH INSULIN THERAPY (HCC): Primary | Chronic | ICD-10-CM

## 2021-09-21 DIAGNOSIS — N39.0 URINARY TRACT INFECTION WITHOUT HEMATURIA, SITE UNSPECIFIED: ICD-10-CM

## 2021-09-21 DIAGNOSIS — I65.23 ASYMPTOMATIC CAROTID ARTERY STENOSIS, BILATERAL: ICD-10-CM

## 2021-09-21 DIAGNOSIS — M15.9 OSTEOARTHRITIS OF MULTIPLE JOINTS, UNSPECIFIED OSTEOARTHRITIS TYPE: ICD-10-CM

## 2021-09-21 DIAGNOSIS — G47.33 OSA ON CPAP: ICD-10-CM

## 2021-09-21 DIAGNOSIS — J90 BILATERAL PLEURAL EFFUSION: ICD-10-CM

## 2021-09-21 DIAGNOSIS — I48.0 AF (PAROXYSMAL ATRIAL FIBRILLATION) (HCC): ICD-10-CM

## 2021-09-21 DIAGNOSIS — I89.0 LYMPHEDEMA: ICD-10-CM

## 2021-09-21 DIAGNOSIS — I10 PRIMARY HYPERTENSION: Chronic | ICD-10-CM

## 2021-09-21 DIAGNOSIS — Z79.4 UNCONTROLLED TYPE 2 DIABETES MELLITUS WITH INSULIN THERAPY (HCC): Primary | Chronic | ICD-10-CM

## 2021-09-21 DIAGNOSIS — I27.20 PULMONARY HTN (HCC): ICD-10-CM

## 2021-09-21 DIAGNOSIS — N18.9 ACUTE ON CHRONIC RENAL INSUFFICIENCY: ICD-10-CM

## 2021-09-21 DIAGNOSIS — R53.81 PHYSICAL DECONDITIONING: ICD-10-CM

## 2021-09-21 PROCEDURE — 99306 1ST NF CARE HIGH MDM 50: CPT | Performed by: FAMILY MEDICINE

## 2021-09-22 ENCOUNTER — NURSE ONLY (OUTPATIENT)
Dept: LAB | Age: 76
End: 2021-09-22
Attending: FAMILY MEDICINE
Payer: MEDICARE

## 2021-09-22 DIAGNOSIS — D50.9 IRON DEFICIENCY ANEMIA, UNSPECIFIED: Primary | ICD-10-CM

## 2021-09-22 DIAGNOSIS — E56.9 AVITAMINOSIS: ICD-10-CM

## 2021-09-22 PROBLEM — J18.9 NOSOCOMIAL PNEUMONIA: Status: RESOLVED | Noted: 2021-01-11 | Resolved: 2021-09-22
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
## 2021-09-22 PROBLEM — R41.82 ALTERED MENTAL STATUS, UNSPECIFIED ALTERED MENTAL STATUS TYPE: Status: RESOLVED | Noted: 2021-02-22 | Resolved: 2021-09-22

## 2021-09-22 PROBLEM — E87.5 HYPERKALEMIA: Status: RESOLVED | Noted: 2021-01-11 | Resolved: 2021-09-22

## 2021-09-22 PROBLEM — R60.1 GENERALIZED EDEMA: Status: RESOLVED | Noted: 2017-12-11 | Resolved: 2021-09-22

## 2021-09-22 PROBLEM — R79.89 AZOTEMIA: Status: RESOLVED | Noted: 2021-02-08 | Resolved: 2021-09-22

## 2021-09-22 PROBLEM — E87.3 METABOLIC ALKALOSIS: Status: RESOLVED | Noted: 2021-01-11 | Resolved: 2021-09-22

## 2021-09-22 PROBLEM — N30.10 INTERSTITIAL CYSTITIS: Chronic | Status: RESOLVED | Noted: 2018-09-29 | Resolved: 2021-09-22

## 2021-09-22 PROBLEM — Z79.899 ENCOUNTER FOR LONG-TERM (CURRENT) USE OF MEDICATIONS: Chronic | Status: RESOLVED | Noted: 2020-02-05 | Resolved: 2021-09-22

## 2021-09-22 PROBLEM — R73.9 HYPERGLYCEMIA: Status: RESOLVED | Noted: 2021-02-08 | Resolved: 2021-09-22

## 2021-09-22 PROBLEM — D64.9 ANEMIA: Status: RESOLVED | Noted: 2021-01-11 | Resolved: 2021-09-22

## 2021-09-22 PROBLEM — L03.90 CELLULITIS: Status: RESOLVED | Noted: 2021-09-10 | Resolved: 2021-09-22

## 2021-09-22 LAB
ALBUMIN SERPL-MCNC: 2.3 G/DL (ref 3.4–5)
ALBUMIN/GLOB SERPL: 0.5 {RATIO} (ref 1–2)
ALP LIVER SERPL-CCNC: 70 U/L
ALT SERPL-CCNC: 10 U/L
ANION GAP SERPL CALC-SCNC: 2 MMOL/L (ref 0–18)
AST SERPL-CCNC: 10 U/L (ref 15–37)
BASOPHILS # BLD AUTO: 0.04 X10(3) UL (ref 0–0.2)
BASOPHILS NFR BLD AUTO: 0.8 %
BILIRUB SERPL-MCNC: 0.3 MG/DL (ref 0.1–2)
BUN BLD-MCNC: 38 MG/DL (ref 7–18)
CALCIUM BLD-MCNC: 8.5 MG/DL (ref 8.5–10.1)
CHLORIDE SERPL-SCNC: 108 MMOL/L (ref 98–112)
CO2 SERPL-SCNC: 32 MMOL/L (ref 21–32)
CREAT BLD-MCNC: 1.3 MG/DL
EOSINOPHIL # BLD AUTO: 0.11 X10(3) UL (ref 0–0.7)
EOSINOPHIL NFR BLD AUTO: 2.1 %
ERYTHROCYTE [DISTWIDTH] IN BLOOD BY AUTOMATED COUNT: 13.3 %
GLOBULIN PLAS-MCNC: 4.2 G/DL (ref 2.8–4.4)
GLUCOSE BLD-MCNC: 144 MG/DL (ref 70–99)
HCT VFR BLD AUTO: 34 %
HGB BLD-MCNC: 9.9 G/DL
IMM GRANULOCYTES # BLD AUTO: 0.05 X10(3) UL (ref 0–1)
IMM GRANULOCYTES NFR BLD: 0.9 %
LYMPHOCYTES # BLD AUTO: 0.73 X10(3) UL (ref 1–4)
LYMPHOCYTES NFR BLD AUTO: 13.9 %
MCH RBC QN AUTO: 28.5 PG (ref 26–34)
MCHC RBC AUTO-ENTMCNC: 29.1 G/DL (ref 31–37)
MCV RBC AUTO: 98 FL
MONOCYTES # BLD AUTO: 0.44 X10(3) UL (ref 0.1–1)
MONOCYTES NFR BLD AUTO: 8.3 %
NEUTROPHILS # BLD AUTO: 3.9 X10 (3) UL (ref 1.5–7.7)
NEUTROPHILS # BLD AUTO: 3.9 X10(3) UL (ref 1.5–7.7)
NEUTROPHILS NFR BLD AUTO: 74 %
OSMOLALITY SERPL CALC.SUM OF ELEC: 306 MOSM/KG (ref 275–295)
PATIENT FASTING Y/N/NP: YES
PLATELET # BLD AUTO: 205 10(3)UL (ref 150–450)
POTASSIUM SERPL-SCNC: 4.5 MMOL/L (ref 3.5–5.1)
PROT SERPL-MCNC: 6.5 G/DL (ref 6.4–8.2)
RBC # BLD AUTO: 3.47 X10(6)UL
SODIUM SERPL-SCNC: 142 MMOL/L (ref 136–145)
WBC # BLD AUTO: 5.3 X10(3) UL (ref 4–11)

## 2021-09-22 PROCEDURE — 80053 COMPREHEN METABOLIC PANEL: CPT

## 2021-09-22 PROCEDURE — 85025 COMPLETE CBC W/AUTO DIFF WBC: CPT

## 2021-09-22 NOTE — PROGRESS NOTES
Skilled Nursing Facility     HPI:    Lena Ortez is a 68year old female admitted to SNF for sub-acute rehabilitation. Chief Complaint: hypoxemia with AVAPs with lower extremity cellulitis.         HPI     75 yo presenting for follow up, with his macrocrystals] and codeine. Current Meds:  insulin glargine (SEMGLEE) 100 UNIT/ML Subcutaneous Solution Pen-injector, Inject 27 Units into the skin every morning.  Hold if BS <120  HYDROcodone-acetaminophen  MG Oral Tab, Take 1 tablet by mouth ever if blood glucose is greater than 351 mg/dl  dilTIAZem HCl ER Coated Beads 120 MG Oral Capsule SR 24 Hr, Take 1 capsule (120 mg total) by mouth daily. bumetanide 1 MG Oral Tab, Take 1 tablet (1 mg total) by mouth daily.   Nystatin 238309 UNIT/GM External Po correct aparna,nico shen. She family history includes Cancer in her mother. She  reports that she quit smoking about 18 years ago. She has a 20.00 pack-year smoking history. She has never used smokeless tobacco. She reports current alcohol use.  She Normal range of motion and neck supple. Lymphadenopathy:      Cervical: No cervical adenopathy. Skin:     General: Skin is warm and dry. Coloration: Skin is not pale. Findings: No erythema or rash.       Comments: Lower extremity cellulitis, w on 12/21/2020 at 12:18 PM       Xr Tibia + Fibula (2 Views), Left (cpt=73590)    Result Date: 12/21/2020  PROCEDURE:  XR TIBIA + FIBULA (2 VIEWS), LEFT (CPT=73590)  TECHNIQUE:  AP and lateral views of the tibia and fibula were obtained.   COMPARISON:  GAURAV extraaxial fluid collections. There is no midline shift. There are no acute intraparenchymal brain abnormalities. There is nothing specific for acute infarct. There is no hemorrhage or mass lesion. SINUSES:           No sign of acute sinusitis.   MASTO hour(s))   COMP METABOLIC PANEL (14)    Collection Time: 09/22/21  6:41 AM   Result Value Ref Range    Glucose 144 (H) 70 - 99 mg/dL    Sodium 142 136 - 145 mmol/L    Potassium 4.5 3.5 - 5.1 mmol/L    Chloride 108 98 - 112 mmol/L    CO2 32.0 21.0 - 32.0 mm whether stage 3a or 3b CKD ([facility] [address]  Pulmonary HTN (HCC)  Physical deconditioning  Osteoarthritis of multiple joints, unspecified osteoarthritis type  GAYLE on CPAP  Lymphedema  Left leg cellulitis  Primary hypertension  Cellulitis of right lower extremity  Azucena

## 2021-09-23 ENCOUNTER — SNF VISIT (OUTPATIENT)
Dept: INTERNAL MEDICINE CLINIC | Age: 76
End: 2021-09-23

## 2021-09-23 VITALS
TEMPERATURE: 98 F | DIASTOLIC BLOOD PRESSURE: 74 MMHG | BODY MASS INDEX: 45 KG/M2 | OXYGEN SATURATION: 92 % | WEIGHT: 275.81 LBS | RESPIRATION RATE: 18 BRPM | HEART RATE: 78 BPM | SYSTOLIC BLOOD PRESSURE: 161 MMHG

## 2021-09-23 DIAGNOSIS — I89.0 LYMPHEDEMA: ICD-10-CM

## 2021-09-23 DIAGNOSIS — L03.119 CELLULITIS OF LOWER EXTREMITY, UNSPECIFIED LATERALITY: Primary | ICD-10-CM

## 2021-09-23 DIAGNOSIS — R53.81 PHYSICAL DECONDITIONING: ICD-10-CM

## 2021-09-23 DIAGNOSIS — Z74.09 IMPAIRED MOBILITY AND ADLS: ICD-10-CM

## 2021-09-23 DIAGNOSIS — Z78.9 IMPAIRED MOBILITY AND ADLS: ICD-10-CM

## 2021-09-23 DIAGNOSIS — Z99.89 OSA ON CPAP: ICD-10-CM

## 2021-09-23 DIAGNOSIS — E11.65 UNCONTROLLED TYPE 2 DIABETES MELLITUS WITH INSULIN THERAPY (HCC): ICD-10-CM

## 2021-09-23 DIAGNOSIS — J96.22 ACUTE ON CHRONIC RESPIRATORY FAILURE WITH HYPERCAPNIA (HCC): ICD-10-CM

## 2021-09-23 DIAGNOSIS — Z79.4 UNCONTROLLED TYPE 2 DIABETES MELLITUS WITH INSULIN THERAPY (HCC): ICD-10-CM

## 2021-09-23 DIAGNOSIS — G47.33 OSA ON CPAP: ICD-10-CM

## 2021-09-23 DIAGNOSIS — I48.0 AF (PAROXYSMAL ATRIAL FIBRILLATION) (HCC): ICD-10-CM

## 2021-09-23 DIAGNOSIS — I10 ESSENTIAL HYPERTENSION: ICD-10-CM

## 2021-09-23 DIAGNOSIS — I27.20 PULMONARY HTN (HCC): ICD-10-CM

## 2021-09-23 PROCEDURE — 99309 SBSQ NF CARE MODERATE MDM 30: CPT | Performed by: NURSE PRACTITIONER

## 2021-09-23 NOTE — PROGRESS NOTES
Laxmi Spence, 4/25/1945, 68year old, female    Chief Complaint:  Patient presents with:   Follow - Up: BLE cellulitis,      HPI:  Dorothy Tillman is a 17-year-old female with previous medical history including chronic lymphedema, CKD, diabetes, uterine cancer, re mucous membranes pink, moist.  NECK: supple, non tender, FROM  BREAST: deferred  RESPIRATORY:diminished, clear, no crackles, no wheezing, no dyspnea, no cough, 3L/NC  CARDIOVASCULAR: RRR, S1 and S2, no murmurs,  ABDOMEN:  Obese, normal active BS+, soft, no need to resume outpatient lymphedema therapies once DC home.   Triamcinolone 0.1% ointment twice daily after cleansing bilateral lower extremity     AMINATA on  CKD stage III  Baseline creat 1.6-1.8, 1.3 now  Avoid nephrotoxic medications  Renal dose meds as ne evals     Gout  Uloric 40 mg daily  No acute flare noted     Depression/anxiety   Lexapro 10 mg p.o. daily  Mood appears managed  Psych eval as needed     Pain management  Monitor and assess pain  Norco 10/325 every 4 as needed or tylenol 650 q6 prn     Bryson    Please note, voice recognition software was used to create this document. An attempt at proofreading has been made to minimize errors. Errors may still exist.      High RISK  [facility],[address], APRN  9/23/2021

## 2021-09-27 ENCOUNTER — SNF DISCHARGE (OUTPATIENT)
Dept: INTERNAL MEDICINE CLINIC | Age: 76
End: 2021-09-27

## 2021-09-27 VITALS
WEIGHT: 275.81 LBS | TEMPERATURE: 97 F | BODY MASS INDEX: 45 KG/M2 | OXYGEN SATURATION: 93 % | RESPIRATION RATE: 18 BRPM | SYSTOLIC BLOOD PRESSURE: 153 MMHG | DIASTOLIC BLOOD PRESSURE: 69 MMHG | HEART RATE: 85 BPM

## 2021-09-27 DIAGNOSIS — G47.33 OSA ON CPAP: ICD-10-CM

## 2021-09-27 DIAGNOSIS — I27.20 PULMONARY HTN (HCC): ICD-10-CM

## 2021-09-27 DIAGNOSIS — I10 ESSENTIAL HYPERTENSION: ICD-10-CM

## 2021-09-27 DIAGNOSIS — I73.9 PVD (PERIPHERAL VASCULAR DISEASE) (HCC): ICD-10-CM

## 2021-09-27 DIAGNOSIS — J96.22 ACUTE ON CHRONIC RESPIRATORY FAILURE WITH HYPERCAPNIA (HCC): ICD-10-CM

## 2021-09-27 DIAGNOSIS — Z79.4 UNCONTROLLED TYPE 2 DIABETES MELLITUS WITH INSULIN THERAPY (HCC): ICD-10-CM

## 2021-09-27 DIAGNOSIS — D50.9 IRON DEFICIENCY ANEMIA, UNSPECIFIED IRON DEFICIENCY ANEMIA TYPE: ICD-10-CM

## 2021-09-27 DIAGNOSIS — Z99.89 OSA ON CPAP: ICD-10-CM

## 2021-09-27 DIAGNOSIS — I89.0 LYMPHEDEMA: Primary | ICD-10-CM

## 2021-09-27 DIAGNOSIS — Z74.09 IMPAIRED MOBILITY AND ADLS: ICD-10-CM

## 2021-09-27 DIAGNOSIS — E11.65 UNCONTROLLED TYPE 2 DIABETES MELLITUS WITH INSULIN THERAPY (HCC): ICD-10-CM

## 2021-09-27 DIAGNOSIS — I48.0 AF (PAROXYSMAL ATRIAL FIBRILLATION) (HCC): ICD-10-CM

## 2021-09-27 DIAGNOSIS — Z78.9 IMPAIRED MOBILITY AND ADLS: ICD-10-CM

## 2021-09-27 PROCEDURE — 99316 NF DSCHRG MGMT 30 MIN+: CPT | Performed by: NURSE PRACTITIONER

## 2021-09-27 NOTE — PROGRESS NOTES
Nanci Garcia, 4/25/1945, 68year old, female is being discharged from [address] [place] [address] at [address]    Date of Admission: 9/14/21    Date of Discharge:9/28/21                                 Admitting Diagnoses:   blistering still present but no drainage, much improved.   EYES: sclera anicteric, conjunctiva normal; there is no nystagmus, no drainage from eyes; wears glasses  HENT: normocephalic; normal nose, no nasal drainage, mucous membranes pink, moist.  NECK: sup OSMOCALC 306 (H) 09/22/2021    ALKPHO 70 09/22/2021    AST 10 (L) 09/22/2021    ALT 10 (L) 09/22/2021    BILT 0.3 09/22/2021    TP 6.5 09/22/2021    ALB 2.3 (L) 09/22/2021    GLOBULIN 4.2 09/22/2021     09/22/2021    K 4.5 09/22/2021     09/ daily  Daily weights; Call if weight gain of 2# overnight or 4# in 5 days  Wt down from 287.1 lbs to 275.8 lbs   montoring     Anemia, chronic  Hemoglobin 10.1, 9.9  No acute blood loss noted  Not on any blood thinners  Continue to monitor and repeat CBC 10/28/2021  8:30 AM Naeem Correa OT [facility] ER AT [facility]   11/1/2021  8:00 AM Val Fitch OT [facility] AT [facility]   11/3/2021  8:00 AM Val Fitch OT [facility] AT [facility]   11/5/2021  8:30 AM Estle Crigler Chyrl Conrad, OT [facility]

## 2021-09-28 ENCOUNTER — EXTERNAL FACILITY (OUTPATIENT)
Dept: FAMILY MEDICINE CLINIC | Facility: CLINIC | Age: 76
End: 2021-09-28

## 2021-09-28 DIAGNOSIS — I89.0 LYMPHEDEMA: ICD-10-CM

## 2021-09-28 DIAGNOSIS — J96.22 ACUTE ON CHRONIC RESPIRATORY FAILURE WITH HYPERCAPNIA (HCC): Primary | ICD-10-CM

## 2021-09-28 DIAGNOSIS — D64.9 ANEMIA, UNSPECIFIED TYPE: ICD-10-CM

## 2021-09-28 DIAGNOSIS — E66.01 MORBID OBESITY DUE TO EXCESS CALORIES (HCC): ICD-10-CM

## 2021-09-28 DIAGNOSIS — D69.6 THROMBOCYTOPENIA (HCC): ICD-10-CM

## 2021-09-28 DIAGNOSIS — L03.115 CELLULITIS OF RIGHT LOWER EXTREMITY: ICD-10-CM

## 2021-09-28 DIAGNOSIS — R53.81 PHYSICAL DECONDITIONING: ICD-10-CM

## 2021-09-28 DIAGNOSIS — R53.1 GENERALIZED WEAKNESS: ICD-10-CM

## 2021-09-28 DIAGNOSIS — Z93.3 S/P COLOSTOMY (HCC): ICD-10-CM

## 2021-09-28 DIAGNOSIS — Z99.89 OSA ON CPAP: ICD-10-CM

## 2021-09-28 DIAGNOSIS — G47.33 OSA ON CPAP: ICD-10-CM

## 2021-09-28 DIAGNOSIS — I27.20 PULMONARY HTN (HCC): ICD-10-CM

## 2021-09-28 DIAGNOSIS — I48.0 AF (PAROXYSMAL ATRIAL FIBRILLATION) (HCC): ICD-10-CM

## 2021-09-28 DIAGNOSIS — I10 PRIMARY HYPERTENSION: Chronic | ICD-10-CM

## 2021-09-28 DIAGNOSIS — N18.30 STAGE 3 CHRONIC KIDNEY DISEASE, UNSPECIFIED WHETHER STAGE 3A OR 3B CKD (HCC): ICD-10-CM

## 2021-09-28 DIAGNOSIS — E11.65 TYPE 2 DIABETES MELLITUS WITH HYPERGLYCEMIA, UNSPECIFIED WHETHER LONG TERM INSULIN USE (HCC): ICD-10-CM

## 2021-09-28 PROCEDURE — 99315 NF DSCHRG MGMT 30 MIN/LESS: CPT | Performed by: FAMILY MEDICINE

## 2021-10-01 ENCOUNTER — TELEPHONE (OUTPATIENT)
Dept: PHYSICAL THERAPY | Facility: HOSPITAL | Age: 76
End: 2021-10-01

## 2021-10-04 PROBLEM — J90 BILATERAL PLEURAL EFFUSION: Status: RESOLVED | Noted: 2021-02-08 | Resolved: 2021-10-04

## 2021-10-04 PROBLEM — E66.01 MORBID OBESITY DUE TO EXCESS CALORIES (HCC): Status: ACTIVE | Noted: 2021-10-04

## 2021-10-04 PROBLEM — L03.116 LEFT LEG CELLULITIS: Status: RESOLVED | Noted: 2020-12-21 | Resolved: 2021-10-04

## 2021-10-04 PROBLEM — L03.116 CELLULITIS OF LEFT LOWER EXTREMITY: Status: RESOLVED | Noted: 2021-09-10 | Resolved: 2021-10-04

## 2021-10-04 NOTE — PROGRESS NOTES
Skilled Nursing Facility     HPI:    Marlee Bragg is a 68year old female admitted to SNF for sub-acute rehabilitation. Chief Complaint: hypoxemia with AVAPs with lower extremity cellulitis.         HPI     75 yo presenting for follow up, with his trauma. Allergies:  She is allergic to macrobid [nitrofurantoin monohydrate macrocrystals] and codeine. Current Meds:  insulin glargine (SEMGLEE) 100 UNIT/ML Subcutaneous Solution Pen-injector, Inject 32 Units into the skin every morning.  Hold if Take 1 capsule (120 mg total) by mouth daily. bumetanide 1 MG Oral Tab, Take 1 tablet (1 mg total) by mouth daily. Nystatin 922325 UNIT/GM External Powder, Apply topically 2 (two) times daily.   triamcinolone acetonide 0.1 % External Ointment, Apply topic quit smoking about 18 years ago. She has a 20.00 pack-year smoking history. She has never used smokeless tobacco. She reports current alcohol use. She reports that she does not use drugs. ROS:   A comprehensive 14 point review of systems was completed. Skin:     General: Skin is warm and dry. Coloration: Skin is not pale. Findings: No erythema or rash. Comments: Lower extremity cellulitis, worse along left leg, throughout shin-mid, doesn't extend to knee. No bilateral calf tenderness. 12/21/2020  PROCEDURE:  XR TIBIA + FIBULA (2 VIEWS), LEFT (CPT=73590)  TECHNIQUE:  AP and lateral views of the tibia and fibula were obtained.   COMPARISON:  [facility] , XR, XR ANKLE (MIN 3 VIEWS), LEFT (CPT=73610), 12/21/2020, 11:46 AM.  INDICATIONS:  lower l brain abnormalities. There is nothing specific for acute infarct. There is no hemorrhage or mass lesion. SINUSES:           No sign of acute sinusitis. MASTOIDS:          No sign of acute inflammation.  SKULL:             No evidence for fracture or oss on chronic respiratory failure with hypercapnia (HCC)  (primary encounter diagnosis)  AF (paroxysmal atrial fibrillation) (HCC)  Anemia, unspecified type  Cellulitis of right lower extremity  Generalized weakness  Primary hypertension  Lymphedema  GAYLE on C

## 2021-10-05 ENCOUNTER — APPOINTMENT (OUTPATIENT)
Dept: OCCUPATIONAL MEDICINE | Facility: HOSPITAL | Age: 76
End: 2021-10-05
Attending: FAMILY MEDICINE
Payer: MEDICARE

## 2021-10-11 ENCOUNTER — TELEPHONE (OUTPATIENT)
Dept: PHYSICAL THERAPY | Facility: HOSPITAL | Age: 76
End: 2021-10-11

## 2021-10-11 ENCOUNTER — APPOINTMENT (OUTPATIENT)
Dept: OCCUPATIONAL MEDICINE | Facility: HOSPITAL | Age: 76
End: 2021-10-11
Attending: FAMILY MEDICINE
Payer: MEDICARE

## 2021-10-12 ENCOUNTER — APPOINTMENT (OUTPATIENT)
Dept: OCCUPATIONAL MEDICINE | Facility: HOSPITAL | Age: 76
End: 2021-10-12
Attending: FAMILY MEDICINE
Payer: MEDICARE

## 2021-10-18 ENCOUNTER — APPOINTMENT (OUTPATIENT)
Dept: OCCUPATIONAL MEDICINE | Facility: HOSPITAL | Age: 76
End: 2021-10-18
Attending: FAMILY MEDICINE
Payer: MEDICARE

## 2021-10-20 ENCOUNTER — APPOINTMENT (OUTPATIENT)
Dept: OCCUPATIONAL MEDICINE | Facility: HOSPITAL | Age: 76
End: 2021-10-20
Attending: FAMILY MEDICINE
Payer: MEDICARE

## 2021-10-22 ENCOUNTER — APPOINTMENT (OUTPATIENT)
Dept: OCCUPATIONAL MEDICINE | Facility: HOSPITAL | Age: 76
End: 2021-10-22
Attending: FAMILY MEDICINE
Payer: MEDICARE

## 2021-10-22 ENCOUNTER — OFFICE VISIT (OUTPATIENT)
Dept: WOUND CARE | Facility: HOSPITAL | Age: 76
End: 2021-10-22
Attending: INTERNAL MEDICINE
Payer: MEDICARE

## 2021-10-22 VITALS
WEIGHT: 263 LBS | HEART RATE: 81 BPM | SYSTOLIC BLOOD PRESSURE: 127 MMHG | BODY MASS INDEX: 42.27 KG/M2 | RESPIRATION RATE: 14 BRPM | TEMPERATURE: 98 F | DIASTOLIC BLOOD PRESSURE: 76 MMHG | HEIGHT: 66 IN

## 2021-10-22 DIAGNOSIS — L97.929 IDIOPATHIC CHRONIC VENOUS HYPERTENSION OF BOTH LOWER EXTREMITIES WITH ULCER AND INFLAMMATION (HCC): ICD-10-CM

## 2021-10-22 DIAGNOSIS — L97.919 IDIOPATHIC CHRONIC VENOUS HYPERTENSION OF BOTH LOWER EXTREMITIES WITH ULCER AND INFLAMMATION (HCC): ICD-10-CM

## 2021-10-22 DIAGNOSIS — I89.0 LYMPHEDEMA: ICD-10-CM

## 2021-10-22 DIAGNOSIS — E11.622 DIABETES MELLITUS WITH SKIN ULCER (HCC): ICD-10-CM

## 2021-10-22 DIAGNOSIS — L97.511 RIGHT FOOT ULCER, LIMITED TO BREAKDOWN OF SKIN (HCC): ICD-10-CM

## 2021-10-22 DIAGNOSIS — R60.0 LOWER EXTREMITY EDEMA: ICD-10-CM

## 2021-10-22 DIAGNOSIS — I87.333 IDIOPATHIC CHRONIC VENOUS HYPERTENSION OF BOTH LOWER EXTREMITIES WITH ULCER AND INFLAMMATION (HCC): ICD-10-CM

## 2021-10-22 DIAGNOSIS — L97.212 NON-PRESSURE CHRONIC ULCER OF RIGHT CALF WITH FAT LAYER EXPOSED (HCC): ICD-10-CM

## 2021-10-22 DIAGNOSIS — L97.521 ULCER OF LEFT FOOT, LIMITED TO BREAKDOWN OF SKIN (HCC): ICD-10-CM

## 2021-10-22 DIAGNOSIS — L98.499 DIABETES MELLITUS WITH SKIN ULCER (HCC): ICD-10-CM

## 2021-10-22 DIAGNOSIS — L97.312 NON-PRESSURE CHRONIC ULCER OF RIGHT ANKLE WITH FAT LAYER EXPOSED (HCC): ICD-10-CM

## 2021-10-22 DIAGNOSIS — L97.812 NON-PRESSURE CHRONIC ULCER OF OTHER PART OF RIGHT LOWER LEG WITH FAT LAYER EXPOSED (HCC): Primary | ICD-10-CM

## 2021-10-22 PROCEDURE — 29581 APPL MULTLAYER CMPRN SYS LEG: CPT

## 2021-10-22 PROCEDURE — 82962 GLUCOSE BLOOD TEST: CPT | Performed by: INTERNAL MEDICINE

## 2021-10-22 PROCEDURE — 99215 OFFICE O/P EST HI 40 MIN: CPT

## 2021-10-22 NOTE — PROGRESS NOTES
[address] CONSULTATION NOTE  Cuco Dan MD  10/22/2021    Rowena Thompson is a 68year old female. No chief complaint on file. HPI    69 yo CF here for eval and management of open wound bilateral legs/ ankle / feet.  The HISTORY  06/27/2017    Cystrito w/ Dr. Crum Grade   • OTHER SURGICAL HISTORY  07/26/2017    sarah Crum Grade   • OTHER SURGICAL HISTORY  09/07/2018    Sarah Katejoan    • PART REMOVAL COLON W END COLOSTOMY  2017   • REMOVAL GALLBLADDER     • SHOULDER SURG [address] UNLISTED breast and bladder     Allergies:    Macrobid [Nitrofura*    HIVES  Codeine                 NAUSEA ONLY    Comment:From Tylenol #3  Current Meds:  Current Outpatient Medications   Medication Sig Dispense Refill   • insulin glargine (SEMGLEE) 100 UN constipation. (Patient not taking: Reported on 10/22/2021)     • Fluticasone Furoate-Vilanterol (BREO ELLIPTA) 100-25 MCG/INH Inhalation Aerosol Powder, Breath Activated Inhale 1 puff into the lungs daily.  (Patient not taking: Reported on 10/22/2021)     • weakness  NEUROLOGICAL:  Denies headache, seizures, dizziness, syncope      Objective   Objective  Physical Exam    Wound Assessment  Wound 10/22/21 #1 Right Lower Leg Venous Ulcer Leg Left (Active)   Wound Image     10/22/21 1017   Drainage Amount Copious Height as of this encounter: 66\". Weight as of this encounter: 263 lb (119.3 kg). Vital signs reviewed. Appears stated age, well groomed.   Physical Exam:  GEN:  Patient is alert, awake and oriented, well developed, well nourished, no apparent distres Anemia, unspecified type     Asymptomatic carotid artery stenosis, bilateral     Pulmonary HTN (HCC)     Pure hypercholesterolemia     Acute on chronic renal insufficiency     Generalized weakness     Physical deconditioning     Respiratory acidosis     Ac Instructions:  • Supplement with a daily multivitamin   • Low salt diet  • Intense blood sugar control - Goal Blood sugar below 180 at all times recommended.   • Increase protein intake / consider protein supplements - see below  • Elevate extremities at al complications as a result of the treatments today.       DOCUMENTATION OF TIME SPENT: Code selection for this visit was based on time spent : 55 min on date of service in preparing to see the patient, obtaining and/or reviewing separately obtained history,

## 2021-10-22 NOTE — PROGRESS NOTES
Weekly Wound Education Note    Teaching Provided To: Patient  Training Topics: Discharge instructions;Dressing;Cleasing and general instructions; Compression;Edema control  Training Method: Demonstration;Explain/Verbal  Training Response: Patient responds a

## 2021-10-22 NOTE — PATIENT INSTRUCTIONS
Patient Instructions and orders for Wound Care      Wound Cleaning and Dressings:    • Wash your hands with soap and water. Always wear gloves while changing dressings. Donot touch wound / kt-wound skin with un-gloved hands.  Remove old dressing, discard cereals    • Zinc: Fortified cereals, red meats, seafood    • Consider Speedy by [employer] (These are essential branch chain amino acids that help with tissue building and wound healing) and take 2 packets/day.  you can order online at [employer] or [facility]

## 2021-10-25 ENCOUNTER — APPOINTMENT (OUTPATIENT)
Dept: OCCUPATIONAL MEDICINE | Facility: HOSPITAL | Age: 76
End: 2021-10-25
Attending: FAMILY MEDICINE
Payer: MEDICARE

## 2021-10-27 ENCOUNTER — APPOINTMENT (OUTPATIENT)
Dept: OCCUPATIONAL MEDICINE | Facility: HOSPITAL | Age: 76
End: 2021-10-27
Attending: FAMILY MEDICINE
Payer: MEDICARE

## 2021-10-27 ENCOUNTER — OFFICE VISIT (OUTPATIENT)
Dept: WOUND CARE | Facility: HOSPITAL | Age: 76
End: 2021-10-27
Attending: INTERNAL MEDICINE
Payer: MEDICARE

## 2021-10-27 VITALS
SYSTOLIC BLOOD PRESSURE: 134 MMHG | HEART RATE: 70 BPM | DIASTOLIC BLOOD PRESSURE: 81 MMHG | RESPIRATION RATE: 18 BRPM | TEMPERATURE: 98 F

## 2021-10-27 DIAGNOSIS — L97.212 NON-PRESSURE CHRONIC ULCER OF RIGHT CALF WITH FAT LAYER EXPOSED (HCC): ICD-10-CM

## 2021-10-27 DIAGNOSIS — L97.812 NON-PRESSURE CHRONIC ULCER OF OTHER PART OF RIGHT LOWER LEG WITH FAT LAYER EXPOSED (HCC): ICD-10-CM

## 2021-10-27 DIAGNOSIS — I87.333 IDIOPATHIC CHRONIC VENOUS HYPERTENSION OF BOTH LOWER EXTREMITIES WITH ULCER AND INFLAMMATION (HCC): Primary | ICD-10-CM

## 2021-10-27 DIAGNOSIS — L98.499 DIABETES MELLITUS WITH SKIN ULCER (HCC): ICD-10-CM

## 2021-10-27 DIAGNOSIS — L97.929 IDIOPATHIC CHRONIC VENOUS HYPERTENSION OF BOTH LOWER EXTREMITIES WITH ULCER AND INFLAMMATION (HCC): Primary | ICD-10-CM

## 2021-10-27 DIAGNOSIS — L97.312 NON-PRESSURE CHRONIC ULCER OF RIGHT ANKLE WITH FAT LAYER EXPOSED (HCC): ICD-10-CM

## 2021-10-27 DIAGNOSIS — L97.919 IDIOPATHIC CHRONIC VENOUS HYPERTENSION OF BOTH LOWER EXTREMITIES WITH ULCER AND INFLAMMATION (HCC): Primary | ICD-10-CM

## 2021-10-27 DIAGNOSIS — E11.622 DIABETES MELLITUS WITH SKIN ULCER (HCC): ICD-10-CM

## 2021-10-27 PROCEDURE — 82962 GLUCOSE BLOOD TEST: CPT | Performed by: INTERNAL MEDICINE

## 2021-10-27 PROCEDURE — 99215 OFFICE O/P EST HI 40 MIN: CPT

## 2021-10-27 RX ORDER — FLUCONAZOLE 150 MG/1
150 TABLET ORAL ONCE
Qty: 1 TABLET | Refills: 0 | Status: SHIPPED | OUTPATIENT
Start: 2021-10-27 | End: 2021-10-27

## 2021-10-27 NOTE — PATIENT INSTRUCTIONS
Patient Instructions and orders for Wound Care        Wound Cleaning and Dressings:     · Wash your hands with soap and water. Always wear gloves while changing dressings. Donot touch wound / kt-wound skin with un-gloved hands.  Remove old dressing, disca fortified cereals     · Zinc: Fortified cereals, red meats, seafood     · Consider Speedy by [employer] (These are essential branch chain amino acids that help with tissue building and wound healing) and take 2 packets/day.  you can order online at [employer] o

## 2021-10-27 NOTE — PROGRESS NOTES
[place] [facility] NOTE  Dillon French MD  10/27/2021    Chief Complaint:   Patient presents with:  Wound Care: Pt here for follow up, she states no new concerns or pain.       HPI:   Subjective   Haris López is a 68year old female comin taking: Reported on 10/22/2021)     • PEG 3350 17 g Oral Powd Pack Take 17 g by mouth daily as needed. (Patient not taking: Reported on 10/22/2021)     • Saline Nasal Spray 0.65 % Nasal Solution 1 spray by Nasal route 3 (three) times daily.  (Patient not ta before breakfast.             EXAM:   Objective   Objective    Physical Exam    Vital Signs   10/27/21  1007   BP: 134/81   Pulse: 70   Resp: 18   Temp: 97.6 °F (36.4 °C)       Wound Assessment  Wound 10/22/21 #1 Right Lower Leg Venous Ulcer Leg Left (Acti Epithelium (%) 100 % 10/27/21 1020   Wound Bed Slough (%) 20 % 10/22/21 1015   Wound Odor None 10/27/21 1020       Compression Wrap 10/22/21 Leg Anterior; Left (Active)   Response to Treatment Well tolerated 10/22/21 1123   Compression Layers Multilayer 10/ ([facility] [address]     Pruritus ani     Status post Chapo procedure [facility])     Rectal/anal hemorrhage     Anemia, unspecified type     Asymptomatic carotid artery stenosis, bilateral     Pulmonary HTN ([facility] [address]     Pure hypercholesterolemia     Acute on chronic renal ins diuretics as directed by your provider. Do not skip doses or change doses      unless instructed to do so by your provider.     5. Do not get leg(s) with compression wrap wet.  If wraps are too tight as indicated        By pain, numbness/tingling or discol emergency room.           Patient/Caregiver Education: There are no barriers to learning. Medical education for above diagnosis given. Answered all questions. Outcome: Patient verbalizes understanding.  Patient is notified to call with any questions, c

## 2021-10-27 NOTE — PROGRESS NOTES
Weekly Wound Education Note    Teaching Provided To: Patient  Training Topics: Cleasing and general instructions; Compression;Dressing;Edema control; Discharge instructions  Training Method: Demonstration;Explain/Verbal  Training Response: Patient responds a

## 2021-10-28 ENCOUNTER — APPOINTMENT (OUTPATIENT)
Dept: OCCUPATIONAL MEDICINE | Facility: HOSPITAL | Age: 76
End: 2021-10-28
Attending: FAMILY MEDICINE
Payer: MEDICARE

## 2021-11-01 ENCOUNTER — APPOINTMENT (OUTPATIENT)
Dept: OCCUPATIONAL MEDICINE | Facility: HOSPITAL | Age: 76
End: 2021-11-01
Attending: FAMILY MEDICINE
Payer: MEDICARE

## 2021-11-03 ENCOUNTER — APPOINTMENT (OUTPATIENT)
Dept: OCCUPATIONAL MEDICINE | Facility: HOSPITAL | Age: 76
End: 2021-11-03
Attending: FAMILY MEDICINE
Payer: MEDICARE

## 2021-11-03 ENCOUNTER — OFFICE VISIT (OUTPATIENT)
Dept: WOUND CARE | Facility: HOSPITAL | Age: 76
End: 2021-11-03
Attending: INTERNAL MEDICINE
Payer: MEDICARE

## 2021-11-03 VITALS
RESPIRATION RATE: 14 BRPM | TEMPERATURE: 98 F | SYSTOLIC BLOOD PRESSURE: 129 MMHG | DIASTOLIC BLOOD PRESSURE: 70 MMHG | HEART RATE: 59 BPM

## 2021-11-03 DIAGNOSIS — B35.3 TINEA PEDIS OF BOTH FEET: ICD-10-CM

## 2021-11-03 DIAGNOSIS — L97.919 IDIOPATHIC CHRONIC VENOUS HYPERTENSION OF BOTH LOWER EXTREMITIES WITH ULCER AND INFLAMMATION (HCC): Primary | ICD-10-CM

## 2021-11-03 DIAGNOSIS — L97.212 NON-PRESSURE CHRONIC ULCER OF RIGHT CALF WITH FAT LAYER EXPOSED (HCC): ICD-10-CM

## 2021-11-03 DIAGNOSIS — L98.499 DIABETES MELLITUS WITH SKIN ULCER (HCC): ICD-10-CM

## 2021-11-03 DIAGNOSIS — I87.333 IDIOPATHIC CHRONIC VENOUS HYPERTENSION OF BOTH LOWER EXTREMITIES WITH ULCER AND INFLAMMATION (HCC): Primary | ICD-10-CM

## 2021-11-03 DIAGNOSIS — I89.0 LYMPHEDEMA: ICD-10-CM

## 2021-11-03 DIAGNOSIS — L97.929 IDIOPATHIC CHRONIC VENOUS HYPERTENSION OF BOTH LOWER EXTREMITIES WITH ULCER AND INFLAMMATION (HCC): Primary | ICD-10-CM

## 2021-11-03 DIAGNOSIS — L97.511 RIGHT FOOT ULCER, LIMITED TO BREAKDOWN OF SKIN (HCC): ICD-10-CM

## 2021-11-03 DIAGNOSIS — E11.622 DIABETES MELLITUS WITH SKIN ULCER (HCC): ICD-10-CM

## 2021-11-03 PROCEDURE — 99214 OFFICE O/P EST MOD 30 MIN: CPT

## 2021-11-03 PROCEDURE — 29581 APPL MULTLAYER CMPRN SYS LEG: CPT

## 2021-11-03 PROCEDURE — 82962 GLUCOSE BLOOD TEST: CPT | Performed by: INTERNAL MEDICINE

## 2021-11-03 NOTE — PATIENT INSTRUCTIONS
Patient Instructions and orders for Wound Care        Wound Cleaning and Dressings:     · Wash your hands with soap and water. Always wear gloves while changing dressings. Donot touch wound / kt-wound skin with un-gloved hands.  Remove old dressing, disca dairy products, liver, fortified cereals     · Zinc: Fortified cereals, red meats, seafood     · Consider Speedy by [employer] (These are essential branch chain amino acids that help with tissue building and wound healing) and take 2 packets/day.  you can o

## 2021-11-03 NOTE — PROGRESS NOTES
[place] [facility] NOTE  Korina Todd MD  11/3/2021    Chief Complaint:   Patient presents with:  Wound Care: Follow up for bilateral leg wounds. No complaints of pain or problems with wraps.        HPI:   Subjective   Hieu Chang is a 7 10/22/2021)     • Saline Nasal Spray 0.65 % Nasal Solution 1 spray by Nasal route 3 (three) times daily. (Patient not taking: No sig reported)     • aspirin 81 MG Oral Chew Tab Chew 1 tablet (81 mg total) by mouth daily.   0   • Insulin Aspart Pen 100 UNIT/ Resp: 14   Temp: 98.2 °F (36.8 °C)       Wound Assessment  Wound 10/22/21 #1 Right Lower Leg Venous Ulcer Leg Left (Active)   Wound Image   11/03/21 1115   Drainage Amount None 11/03/21 1115   Drainage Description Serosanguineous 10/27/21 1018   Treatmen Wrap 10/22/21 Leg Anterior; Left (Active)   Response to Treatment Well tolerated 10/27/21 1020   Compression Layers Multilayer 10/27/21 1020   Compression Product Type Stockinette 4in; Comprilan 10cm; Comprilan 12cm; Comprilan 8cm; Coban 10/27/21 1020   Dressin Pure hypercholesterolemia     Acute on chronic renal insufficiency     Generalized weakness     Physical deconditioning     Respiratory acidosis     Acute on chronic respiratory failure with hypercapnia (HCC)     Uncontrolled atrial flutter ([address]     Jorge Wolff Instructions:  · Supplement with a daily multivitamin   · Low salt diet  · Intense blood sugar control - Goal Blood sugar below 180 at all times recommended.   · Increase protein intake / consider protein supplements - see below  · Elevate extremities at al effects or complications as a result of the treatments today.       DOCUMENTATION OF TIME SPENT: Code selection for this visit was based on time spent : 30 min on date of service in preparing to see the patient, obtaining and/or reviewing separately obtaine

## 2021-11-05 ENCOUNTER — APPOINTMENT (OUTPATIENT)
Dept: OCCUPATIONAL MEDICINE | Facility: HOSPITAL | Age: 76
End: 2021-11-05
Attending: FAMILY MEDICINE
Payer: MEDICARE

## 2021-11-08 ENCOUNTER — APPOINTMENT (OUTPATIENT)
Dept: OCCUPATIONAL MEDICINE | Facility: HOSPITAL | Age: 76
End: 2021-11-08
Attending: FAMILY MEDICINE
Payer: MEDICARE

## 2021-11-10 ENCOUNTER — APPOINTMENT (OUTPATIENT)
Dept: OCCUPATIONAL MEDICINE | Facility: HOSPITAL | Age: 76
End: 2021-11-10
Attending: FAMILY MEDICINE
Payer: MEDICARE

## 2021-11-10 ENCOUNTER — OFFICE VISIT (OUTPATIENT)
Dept: WOUND CARE | Facility: HOSPITAL | Age: 76
End: 2021-11-10
Attending: INTERNAL MEDICINE
Payer: MEDICARE

## 2021-11-10 VITALS
HEART RATE: 64 BPM | DIASTOLIC BLOOD PRESSURE: 60 MMHG | RESPIRATION RATE: 14 BRPM | SYSTOLIC BLOOD PRESSURE: 107 MMHG | TEMPERATURE: 98 F

## 2021-11-10 DIAGNOSIS — L98.499 DIABETES MELLITUS WITH SKIN ULCER (HCC): ICD-10-CM

## 2021-11-10 DIAGNOSIS — L97.212 NON-PRESSURE CHRONIC ULCER OF RIGHT CALF WITH FAT LAYER EXPOSED (HCC): ICD-10-CM

## 2021-11-10 DIAGNOSIS — L97.511 RIGHT FOOT ULCER, LIMITED TO BREAKDOWN OF SKIN (HCC): ICD-10-CM

## 2021-11-10 DIAGNOSIS — I89.0 LYMPHEDEMA: ICD-10-CM

## 2021-11-10 DIAGNOSIS — L97.919 IDIOPATHIC CHRONIC VENOUS HYPERTENSION OF BOTH LOWER EXTREMITIES WITH ULCER AND INFLAMMATION (HCC): Primary | ICD-10-CM

## 2021-11-10 DIAGNOSIS — I87.333 IDIOPATHIC CHRONIC VENOUS HYPERTENSION OF BOTH LOWER EXTREMITIES WITH ULCER AND INFLAMMATION (HCC): Primary | ICD-10-CM

## 2021-11-10 DIAGNOSIS — E11.622 DIABETES MELLITUS WITH SKIN ULCER (HCC): ICD-10-CM

## 2021-11-10 DIAGNOSIS — L97.929 IDIOPATHIC CHRONIC VENOUS HYPERTENSION OF BOTH LOWER EXTREMITIES WITH ULCER AND INFLAMMATION (HCC): Primary | ICD-10-CM

## 2021-11-10 PROCEDURE — 87077 CULTURE AEROBIC IDENTIFY: CPT | Performed by: INTERNAL MEDICINE

## 2021-11-10 PROCEDURE — 29581 APPL MULTLAYER CMPRN SYS LEG: CPT

## 2021-11-10 PROCEDURE — 87070 CULTURE OTHR SPECIMN AEROBIC: CPT | Performed by: INTERNAL MEDICINE

## 2021-11-10 PROCEDURE — 87186 SC STD MICRODIL/AGAR DIL: CPT | Performed by: INTERNAL MEDICINE

## 2021-11-10 PROCEDURE — 87205 SMEAR GRAM STAIN: CPT | Performed by: INTERNAL MEDICINE

## 2021-11-10 PROCEDURE — 82962 GLUCOSE BLOOD TEST: CPT | Performed by: INTERNAL MEDICINE

## 2021-11-10 PROCEDURE — 99215 OFFICE O/P EST HI 40 MIN: CPT

## 2021-11-10 NOTE — PATIENT INSTRUCTIONS
Patient Instructions and orders for Wound Care     Wound culture  Weeping areas of toe wound.      Wound Cleaning and Dressings:     · Wash your hands with soap and water. Always wear gloves while changing dressings.  Donot touch wound / kt-wound skin wit cabbage     · Vitamin A: Dark green, leafy vegetables, orange or yellow vegetables, cantaloupe, fortified dairy products, liver, fortified cereals     · Zinc: Fortified cereals, red meats, seafood     · Consider Speedy by [employer] (These are essential br

## 2021-11-10 NOTE — PROGRESS NOTES
Weekly Wound Education Note    Teaching Provided To: Patient  Training Topics: Edema control;Cleasing and general instructions; Compression; Discharge instructions;Dressing  Training Method: Demonstration;Explain/Verbal  Training Response: Patient responds a

## 2021-11-10 NOTE — PROGRESS NOTES
[place] [facility] NOTE  Chris De La Torre MD  11/10/2021    Chief Complaint:   Patient presents with:  Wound Care: Patients is here for a follow up. Patients stated that the wrap sled down all the to the calf area.        HPI:   Subjective   Pamel • PEG 3350 17 g Oral Powd Pack Take 17 g by mouth daily as needed. (Patient not taking: Reported on 10/22/2021)     • Saline Nasal Spray 0.65 % Nasal Solution 1 spray by Nasal route 3 (three) times daily.  (Patient not taking: No sig reported)     • asp Objective   Objective    Physical Exam    Vital Signs   11/10/21  1030   BP: 107/60   Pulse: 64   Resp: 14   Temp: 98 °F (36.7 °C)       Wound Assessment  Wound 10/22/21 #1 Right Lower Leg Venous Ulcer Leg Left (Active)   Wound Image   11/10/21 1043    (%) 10 % 11/10/21 1039   Wound Odor Mild 11/10/21 1039       Compression Wrap 10/22/21 Leg Anterior; Left (Active)   Response to Treatment Well tolerated 11/03/21 1114   Compression Layers 2 11/03/21 1114   Compression Product Type Coflex 11/03/21 1114    weakness     Physical deconditioning     Respiratory acidosis     Acute on chronic respiratory failure with hypercapnia (HCC)     Uncontrolled atrial flutter (HCC)     Urinary tract infection without hematuria, site unspecified     Thrombocytopenia ([facility] [address] a daily multivitamin   · Low salt diet  · Intense blood sugar control - Goal Blood sugar below 180 at all times recommended.   · Increase protein intake / consider protein supplements - see below  · Elevate extremities at all times when sitting / laying yo symptoms. Patient is to call with any side effects or complications as a result of the treatments today.       DOCUMENTATION OF TIME SPENT: Code selection for this visit was based on time spent : 35 min on date of service in preparing to see the patient, o

## 2021-11-12 ENCOUNTER — APPOINTMENT (OUTPATIENT)
Dept: OCCUPATIONAL MEDICINE | Facility: HOSPITAL | Age: 76
End: 2021-11-12
Attending: FAMILY MEDICINE
Payer: MEDICARE

## 2021-11-12 ENCOUNTER — TELEPHONE (OUTPATIENT)
Dept: WOUND CARE | Facility: HOSPITAL | Age: 76
End: 2021-11-12

## 2021-11-12 NOTE — TELEPHONE ENCOUNTER
Multiple attempts made to call patient, unable to leave voice mail - not set up. LVM for  to have patient call the Wound Clinic regarding culture results. Waiting for call back.

## 2021-11-12 NOTE — PROGRESS NOTES
Attempted to call patient regarding cx results. No option to leave voicemail available. Will attempt again.

## 2021-11-15 ENCOUNTER — APPOINTMENT (OUTPATIENT)
Dept: OCCUPATIONAL MEDICINE | Facility: HOSPITAL | Age: 76
End: 2021-11-15
Attending: FAMILY MEDICINE
Payer: MEDICARE

## 2021-11-15 NOTE — PROGRESS NOTES
Attempted to call both the patient and her . Unable to LVM on patient's phone, VM left on husbands phone to call clinic.

## 2021-11-16 ENCOUNTER — TELEPHONE (OUTPATIENT)
Dept: PHYSICAL THERAPY | Facility: HOSPITAL | Age: 76
End: 2021-11-16

## 2021-11-17 ENCOUNTER — APPOINTMENT (OUTPATIENT)
Dept: OCCUPATIONAL MEDICINE | Facility: HOSPITAL | Age: 76
End: 2021-11-17
Attending: FAMILY MEDICINE
Payer: MEDICARE

## 2021-11-17 ENCOUNTER — OFFICE VISIT (OUTPATIENT)
Dept: WOUND CARE | Facility: HOSPITAL | Age: 76
End: 2021-11-17
Attending: INTERNAL MEDICINE
Payer: MEDICARE

## 2021-11-17 VITALS
TEMPERATURE: 98 F | DIASTOLIC BLOOD PRESSURE: 79 MMHG | SYSTOLIC BLOOD PRESSURE: 155 MMHG | HEART RATE: 62 BPM | RESPIRATION RATE: 16 BRPM

## 2021-11-17 DIAGNOSIS — A49.8 PSEUDOMONAS AERUGINOSA INFECTION: ICD-10-CM

## 2021-11-17 DIAGNOSIS — E11.622 DIABETES MELLITUS WITH SKIN ULCER (HCC): ICD-10-CM

## 2021-11-17 DIAGNOSIS — L97.929 IDIOPATHIC CHRONIC VENOUS HYPERTENSION OF BOTH LOWER EXTREMITIES WITH ULCER AND INFLAMMATION (HCC): ICD-10-CM

## 2021-11-17 DIAGNOSIS — L97.222 NON-PRESSURE CHRONIC ULCER OF LEFT CALF WITH FAT LAYER EXPOSED (HCC): ICD-10-CM

## 2021-11-17 DIAGNOSIS — Z22.322 MRSA (METHICILLIN RESISTANT STAPH AUREUS) CULTURE POSITIVE: Primary | ICD-10-CM

## 2021-11-17 DIAGNOSIS — T14.8XXA INFECTED WOUND: ICD-10-CM

## 2021-11-17 DIAGNOSIS — L98.499 DIABETES MELLITUS WITH SKIN ULCER (HCC): ICD-10-CM

## 2021-11-17 DIAGNOSIS — L08.9 INFECTED WOUND: ICD-10-CM

## 2021-11-17 DIAGNOSIS — I87.333 IDIOPATHIC CHRONIC VENOUS HYPERTENSION OF BOTH LOWER EXTREMITIES WITH ULCER AND INFLAMMATION (HCC): ICD-10-CM

## 2021-11-17 DIAGNOSIS — L97.919 IDIOPATHIC CHRONIC VENOUS HYPERTENSION OF BOTH LOWER EXTREMITIES WITH ULCER AND INFLAMMATION (HCC): ICD-10-CM

## 2021-11-17 PROCEDURE — 99214 OFFICE O/P EST MOD 30 MIN: CPT

## 2021-11-17 PROCEDURE — 82962 GLUCOSE BLOOD TEST: CPT | Performed by: INTERNAL MEDICINE

## 2021-11-17 NOTE — PATIENT INSTRUCTIONS
Patient Instructions and orders for Wound Care     Start topical gentamycin BID on all areas of skin opening.      Wound Cleaning and Dressings:     · Wash your hands with soap and water. Always wear gloves while changing dressings.  Donot touch wound / per cantaloupe, fortified dairy products, liver, fortified cereals     · Zinc: Fortified cereals, red meats, seafood     · Consider Speedy by [employer] (These are essential branch chain amino acids that help with tissue building and wound healing) and take 2

## 2021-11-17 NOTE — PROGRESS NOTES
[place] [facility] NOTE  Caitlin Nathan MD  11/17/2021    Chief Complaint:   Patient presents with:  Wound Care: Follow-up. Pateint denies pain or concerns.  Pt also made aware that clinic attempted to call multiple times in regards to new order Apply 1 drop to eye 4 (four) times daily as needed. (Patient not taking: Reported on 10/22/2021)     • docusate sodium 100 MG Oral Cap Take 100 mg by mouth 3 (three) times daily.  (Patient not taking: Reported on 10/22/2021)     • PEG 3350 17 g Oral Powd Pa MG Oral Tab Take 20 mg by mouth nightly. • Cyanocobalamin ([facility] VITAMIN B-12 OR) Take 1 tablet by mouth daily.  1000mcg      • Levothyroxine Sodium 150 MCG Oral Tab Take 150 mcg by mouth before breakfast.             EXAM:   Objective   Objective    Phys staining;Edema; Maceration;Moist;Red 11/17/21 1322   Wound Granulation Tissue Pink;Firm 11/17/21 1322   Wound Bed Granulation (%) 50 % 11/17/21 1322   Wound Bed Epithelium (%) 50 % 11/17/21 1322   Wound Bed Slough (%) 10 % 11/10/21 1039   Wound Odor Mild 11 endometrial cancer     Long term (current) use of anticoagulants     Colostomy stenosis (HCC)     Pruritus ani     Status post Chapo procedure [facility])     Rectal/anal hemorrhage     Anemia, unspecified type     Asymptomatic carotid artery stenosis, bilate level of the heart when sitting or as much as possible.     4.  Take your diuretics as directed by your provider.  Do not skip doses or change doses      unless instructed to do so by your provider.     5. Do not get leg(s) with compression wrap wet.  If wr emergencies, please call your primary physician or go to the nearest emergency room. Patient/Caregiver Education: There are no barriers to learning. Medical education for above diagnosis given. Answered all questions.     Outcome: Patient verbalizes

## 2021-11-17 NOTE — PROGRESS NOTES
Pt has not returned any of clinics phone calls, will speak with her during today's visit regarding the results.

## 2021-11-19 ENCOUNTER — APPOINTMENT (OUTPATIENT)
Dept: OCCUPATIONAL MEDICINE | Facility: HOSPITAL | Age: 76
End: 2021-11-19
Attending: FAMILY MEDICINE
Payer: MEDICARE

## 2021-11-24 ENCOUNTER — OFFICE VISIT (OUTPATIENT)
Dept: WOUND CARE | Facility: HOSPITAL | Age: 76
End: 2021-11-24
Attending: INTERNAL MEDICINE
Payer: MEDICARE

## 2021-11-24 VITALS
TEMPERATURE: 98 F | RESPIRATION RATE: 16 BRPM | DIASTOLIC BLOOD PRESSURE: 54 MMHG | SYSTOLIC BLOOD PRESSURE: 121 MMHG | HEART RATE: 72 BPM

## 2021-11-24 DIAGNOSIS — L97.222 NON-PRESSURE CHRONIC ULCER OF LEFT CALF WITH FAT LAYER EXPOSED (HCC): ICD-10-CM

## 2021-11-24 DIAGNOSIS — L08.9 INFECTED WOUND: ICD-10-CM

## 2021-11-24 DIAGNOSIS — L97.919 IDIOPATHIC CHRONIC VENOUS HYPERTENSION OF BOTH LOWER EXTREMITIES WITH ULCER AND INFLAMMATION (HCC): Primary | ICD-10-CM

## 2021-11-24 DIAGNOSIS — T14.8XXA INFECTED WOUND: ICD-10-CM

## 2021-11-24 DIAGNOSIS — L97.212 NON-PRESSURE CHRONIC ULCER OF RIGHT CALF WITH FAT LAYER EXPOSED (HCC): ICD-10-CM

## 2021-11-24 DIAGNOSIS — L97.929 IDIOPATHIC CHRONIC VENOUS HYPERTENSION OF BOTH LOWER EXTREMITIES WITH ULCER AND INFLAMMATION (HCC): Primary | ICD-10-CM

## 2021-11-24 DIAGNOSIS — I87.333 IDIOPATHIC CHRONIC VENOUS HYPERTENSION OF BOTH LOWER EXTREMITIES WITH ULCER AND INFLAMMATION (HCC): Primary | ICD-10-CM

## 2021-11-24 DIAGNOSIS — L97.511 RIGHT FOOT ULCER, LIMITED TO BREAKDOWN OF SKIN (HCC): ICD-10-CM

## 2021-11-24 DIAGNOSIS — Z22.322 MRSA (METHICILLIN RESISTANT STAPH AUREUS) CULTURE POSITIVE: ICD-10-CM

## 2021-11-24 DIAGNOSIS — B37.2 CANDIDAL DERMATITIS: ICD-10-CM

## 2021-11-24 DIAGNOSIS — A49.8 PSEUDOMONAS AERUGINOSA INFECTION: ICD-10-CM

## 2021-11-24 PROCEDURE — 82962 GLUCOSE BLOOD TEST: CPT | Performed by: INTERNAL MEDICINE

## 2021-11-24 PROCEDURE — 29581 APPL MULTLAYER CMPRN SYS LEG: CPT

## 2021-11-24 PROCEDURE — 99215 OFFICE O/P EST HI 40 MIN: CPT

## 2021-11-24 RX ORDER — SULFAMETHOXAZOLE AND TRIMETHOPRIM 800; 160 MG/1; MG/1
1 TABLET ORAL 2 TIMES DAILY
Qty: 14 TABLET | Refills: 0 | Status: SHIPPED | OUTPATIENT
Start: 2021-11-24 | End: 2021-12-01

## 2021-11-24 RX ORDER — VANCOMYCIN HYDROCHLORIDE 125 MG/1
125 CAPSULE ORAL 4 TIMES DAILY
Qty: 40 CAPSULE | Refills: 0 | Status: SHIPPED | OUTPATIENT
Start: 2021-11-24 | End: 2021-12-09 | Stop reason: ALTCHOICE

## 2021-11-24 RX ORDER — FLUCONAZOLE 100 MG/1
100 TABLET ORAL DAILY
Qty: 5 TABLET | Refills: 0 | Status: SHIPPED | OUTPATIENT
Start: 2021-11-24 | End: 2021-12-09 | Stop reason: ALTCHOICE

## 2021-11-24 NOTE — PROGRESS NOTES
[place] [facility] NOTE  Dwayne Marinelli MD  11/24/2021    Chief Complaint:   Patient presents with:  Wound Care: Follow up for bilateral leg wounds. Came in with no compression or dressings on.        HPI:   Rowena Norris is a 7 MCG/INH Inhalation Aerosol Powder, Breath Activated Inhale 1 puff into the lungs daily. (Patient not taking: Reported on 10/22/2021)     • Cholecalciferol (VITAMIN D) 50 MCG (2000 UT) Oral Cap Take 2,000 Units by mouth daily.      • Metoprolol Tartrate 50 M Apply to BLE after cleansing BID     • magnesium oxide 400 MG Oral Tab Take 400 mg by mouth daily. (Patient not taking: Reported on 10/22/2021)     • acetaminophen 325 MG Oral Tab Take 650 mg by mouth every 6 (six) hours as needed.  (Patient not taking: Rep roll;Tape 11/17/21 1322   Wound Length (cm) 21 cm 11/24/21 1000   Wound Width (cm) 12 cm 11/24/21 1000   Wound Surface Area (cm^2) 252 cm^2 11/24/21 1000   Wound Depth (cm) 0.1 cm 11/24/21 1000   Wound Volume (cm^3) 25.2 cm^3 11/24/21 1000   Margins Undefi resistant staph aureus) culture positive    Problem List  Patient Active Problem List:     GAYLE on CPAP     DISH (diffuse idiopathic skeletal hyperostosis)     Psoriasis     Osteoarthritis, multiple sites     Uncontrolled type 2 diabetes mellitus with insul Dispense:  5 tablet          Refill:  0     1. CONTINUE  topical gentamycin on all areas of skin opening. 2. Antibiotics as above  3. Increase oral fluid intake. 4. Vancomycin along with bactrim for c diff prophylaxis.    5. Diflucan short course for pr consider protein supplements - see below  · Elevate extremities at all times when sitting / laying down.     DIETARY MODIFICATIONS TO HELP WITH WOUND HEALING:     · Protein: Meats, beans, eggs, milk and yogurt particularly Greek yogurt), tofu, soy nuts, so to learning. Medical education for above diagnosis given. Answered all questions. Outcome: Patient verbalizes understanding. Patient is notified to call with any questions, complications, allergies, or worsening or changing symptoms.   Patient is to ca

## 2021-11-24 NOTE — PATIENT INSTRUCTIONS
Patient Instructions and orders for Wound Care    Orders Placed This Encounter      sulfamethoxazole-trimethoprim -160 MG Oral Tab per tablet          Sig: Take 1 tablet by mouth 2 (two) times daily for 7 days.           Dispense:  14 tablet diuretics as directed by your provider.  Do not skip doses or change doses      unless instructed to do so by your provider.     5. Do not get leg(s) with compression wrap wet.  If wraps are too tight as indicated        By pain, numbness/tingling or discol emergency room.  used

## 2021-11-26 ENCOUNTER — OFFICE VISIT (OUTPATIENT)
Dept: WOUND CARE | Facility: HOSPITAL | Age: 76
End: 2021-11-26
Attending: INTERNAL MEDICINE
Payer: MEDICARE

## 2021-11-26 VITALS
SYSTOLIC BLOOD PRESSURE: 124 MMHG | DIASTOLIC BLOOD PRESSURE: 69 MMHG | TEMPERATURE: 98 F | RESPIRATION RATE: 18 BRPM | HEART RATE: 52 BPM

## 2021-11-26 DIAGNOSIS — L97.212 NON-PRESSURE CHRONIC ULCER OF RIGHT CALF WITH FAT LAYER EXPOSED (HCC): ICD-10-CM

## 2021-11-26 DIAGNOSIS — L97.222 NON-PRESSURE CHRONIC ULCER OF LEFT CALF WITH FAT LAYER EXPOSED (HCC): Primary | ICD-10-CM

## 2021-11-26 PROCEDURE — 82962 GLUCOSE BLOOD TEST: CPT

## 2021-11-26 PROCEDURE — 29581 APPL MULTLAYER CMPRN SYS LEG: CPT

## 2021-11-26 NOTE — PROGRESS NOTES
Patient presents with:  Wound Care: Patient is here for a nurse visit for bilateral lower legs wounds.           Current Outpatient Medications:   •  sulfamethoxazole-trimethoprim -160 MG Oral Tab per tablet, Take 1 tablet by mouth 2 (two) times daily Pack, Take 17 g by mouth daily as needed. (Patient not taking: Reported on 10/22/2021), Disp: , Rfl:   •  Saline Nasal Spray 0.65 % Nasal Solution, 1 spray by Nasal route 3 (three) times daily.  (Patient not taking: No sig reported), Disp: , Rfl:   •  aspir tablet by mouth daily.  1000mcg , Disp: , Rfl:   •  Levothyroxine Sodium 150 MCG Oral Tab, Take 150 mcg by mouth before breakfast.  , Disp: , Rfl:       Macrobid [Nitrofura*    HIVES  Codeine                 NAUSEA ONLY    Comment:From Tylenol #3       HIST 30 % 10 %   Wound Bed Slough (%) 20 % —   Wound Odor None Mild   Shape — Bridged       No Linked orders to display       Wound 10/22/21 #2 Left Lower Leg Venous Ulcer Leg Right (Active)   Date First Assessed/Time First Assessed: 10/22/21 1007    Wound Numb Clean;Dry; Intact       No Linked orders to display       Compression Wrap 10/22/21 Leg Anterior;Right (Active)   Placement Date/Time: 10/22/21 1124   Location: Leg  Wound Location Orientation: Anterior;Right      Assessments 10/22/2021 11:24 AM 11/26/2021

## 2021-12-01 ENCOUNTER — APPOINTMENT (OUTPATIENT)
Dept: WOUND CARE | Facility: HOSPITAL | Age: 76
End: 2021-12-01
Attending: INTERNAL MEDICINE
Payer: MEDICARE

## 2021-12-01 ENCOUNTER — TELEPHONE (OUTPATIENT)
Dept: WOUND CARE | Facility: HOSPITAL | Age: 76
End: 2021-12-01

## 2021-12-01 NOTE — TELEPHONE ENCOUNTER
Received a call from the patient to cancel her appointment today. She doesn't have any oxygen, she has to call the company to bring her one. She will come on Friday.

## 2021-12-03 ENCOUNTER — OFFICE VISIT (OUTPATIENT)
Dept: WOUND CARE | Facility: HOSPITAL | Age: 76
End: 2021-12-03
Attending: INTERNAL MEDICINE
Payer: MEDICARE

## 2021-12-03 ENCOUNTER — HOSPITAL ENCOUNTER (OUTPATIENT)
Dept: LAB | Facility: HOSPITAL | Age: 76
Discharge: HOME | End: 2021-12-03
Attending: INTERNAL MEDICINE
Payer: MEDICARE

## 2021-12-03 VITALS
TEMPERATURE: 98 F | DIASTOLIC BLOOD PRESSURE: 69 MMHG | SYSTOLIC BLOOD PRESSURE: 108 MMHG | HEART RATE: 81 BPM | RESPIRATION RATE: 14 BRPM

## 2021-12-03 DIAGNOSIS — L97.222 NON-PRESSURE CHRONIC ULCER OF LEFT CALF WITH FAT LAYER EXPOSED (HCC): Primary | ICD-10-CM

## 2021-12-03 DIAGNOSIS — I87.333 IDIOPATHIC CHRONIC VENOUS HYPERTENSION OF BOTH LOWER EXTREMITIES WITH ULCER AND INFLAMMATION (HCC): ICD-10-CM

## 2021-12-03 DIAGNOSIS — L97.212 NON-PRESSURE CHRONIC ULCER OF RIGHT CALF WITH FAT LAYER EXPOSED (HCC): ICD-10-CM

## 2021-12-03 DIAGNOSIS — L97.222 NON-PRESSURE CHRONIC ULCER OF LEFT CALF WITH FAT LAYER EXPOSED (HCC): ICD-10-CM

## 2021-12-03 DIAGNOSIS — T14.8XXA INFECTED WOUND: ICD-10-CM

## 2021-12-03 DIAGNOSIS — L97.919 IDIOPATHIC CHRONIC VENOUS HYPERTENSION OF BOTH LOWER EXTREMITIES WITH ULCER AND INFLAMMATION (HCC): ICD-10-CM

## 2021-12-03 DIAGNOSIS — L97.929 IDIOPATHIC CHRONIC VENOUS HYPERTENSION OF BOTH LOWER EXTREMITIES WITH ULCER AND INFLAMMATION (HCC): ICD-10-CM

## 2021-12-03 DIAGNOSIS — L08.9 INFECTED WOUND: ICD-10-CM

## 2021-12-03 PROCEDURE — 82962 GLUCOSE BLOOD TEST: CPT | Performed by: INTERNAL MEDICINE

## 2021-12-03 PROCEDURE — 85025 COMPLETE CBC W/AUTO DIFF WBC: CPT

## 2021-12-03 PROCEDURE — 86140 C-REACTIVE PROTEIN: CPT

## 2021-12-03 PROCEDURE — 84134 ASSAY OF PREALBUMIN: CPT

## 2021-12-03 PROCEDURE — 99214 OFFICE O/P EST MOD 30 MIN: CPT

## 2021-12-03 PROCEDURE — 36415 COLL VENOUS BLD VENIPUNCTURE: CPT

## 2021-12-03 PROCEDURE — 80053 COMPREHEN METABOLIC PANEL: CPT

## 2021-12-03 PROCEDURE — 85652 RBC SED RATE AUTOMATED: CPT

## 2021-12-03 NOTE — PATIENT INSTRUCTIONS
Patient Instructions and orders for Wound Care  Orders Placed This Encounter      CBC With Differential With Platelet          Standing Status: Future          Standing Expiration Date: 12/3/2022      Prealbumin          Standing Status: Future          St        By pain, numbness/tingling or discoloration of toes remove wrap completely       and call the   wound center.      Miscellaneous Instructions:  · Supplement with a daily multivitamin   · Low salt diet  · Intense blood sugar control - Goal Blood suga

## 2021-12-03 NOTE — PROGRESS NOTES
[place] [facility] NOTE  Dwayne Marinelli MD  12/3/2021    Chief Complaint:   Patient presents with:  Wound Care: Patients is here for a follow up.  Paisixto denies any issues with the wrap       HPI:   Subjective   Kacey Norris is a 68 year o Naphazoline-Pheniramine (OPCON-A) 0.027-0.315 % Ophthalmic Solution Apply 1 drop to eye 4 (four) times daily as needed. (Patient not taking: Reported on 10/22/2021)     • docusate sodium 100 MG Oral Cap Take 100 mg by mouth 3 (three) times daily.  (Patient Oral Tab Take 10 mg by mouth daily. • Pravastatin Sodium 20 MG Oral Tab Take 20 mg by mouth nightly. • Cyanocobalamin ([facility] VITAMIN B-12 OR) Take 1 tablet by mouth daily.  1000mcg      • Levothyroxine Sodium 150 MCG Oral Tab Take 150 mcg by mouth b Wound Volume (cm^3) 2.025 cm^3 12/03/21 1429   Margins Undefined edges 12/03/21 1429   Non-staged Wound Description Full thickness 12/03/21 1429   Milagro-wound Assessment Hemosiderin staining;Edema; Maceration;Moist;Red 12/03/21 1429   Wound Granulation Tis long-term use     AF (paroxysmal atrial fibrillation) (HCC)     S/P colostomy ([facility] [address]     Stage 3 chronic kidney disease ([facility] [address]     History of endometrial cancer     Long term (current) use of anticoagulants     Colostomy stenosis (HCC)     Pruritus ani     S visit   6. See me back in 3 weeks     Wound Cleaning and Dressings:     · Wash your hands with soap and water. Always wear gloves while changing dressings. Donot touch wound / kt-wound skin with un-gloved hands.  Remove old dressing, discard and place int fortified cereals     · Zinc: Fortified cereals, red meats, seafood     · Consider Speedy by [employer] (These are essential branch chain amino acids that help with tissue building and wound healing) and take 2 packets/day.  you can order online at [employer] o communicating results to the patient/family/caregiver and care coordination with the patient's other providers. Followup: Return in about 3 weeks (around 12/24/2021), or nurse visits on tuesdays and thursdays for next 3 weeks, for Wound followup.     Com

## 2021-12-06 ENCOUNTER — HOSPITAL ENCOUNTER (OUTPATIENT)
Dept: LAB | Facility: HOSPITAL | Age: 76
Discharge: HOME | End: 2021-12-06
Attending: INTERNAL MEDICINE
Payer: MEDICARE

## 2021-12-06 DIAGNOSIS — N18.2 ACUTE RENAL FAILURE SUPERIMPOSED ON STAGE 2 CHRONIC KIDNEY DISEASE, UNSPECIFIED ACUTE RENAL FAILURE TYPE (HCC): ICD-10-CM

## 2021-12-06 DIAGNOSIS — N17.9 ACUTE RENAL FAILURE SUPERIMPOSED ON STAGE 2 CHRONIC KIDNEY DISEASE, UNSPECIFIED ACUTE RENAL FAILURE TYPE (HCC): ICD-10-CM

## 2021-12-06 PROCEDURE — 80048 BASIC METABOLIC PNL TOTAL CA: CPT

## 2021-12-06 PROCEDURE — 36415 COLL VENOUS BLD VENIPUNCTURE: CPT

## 2021-12-06 NOTE — PROGRESS NOTES
Spoke with patient regarding abnormal lab work. Instructed her to hold Bumex x2 days and to repeat her lab work today. Patient stated she understood. [facility].

## 2021-12-06 NOTE — PROGRESS NOTES
Instructed patient to continue holding Bumex. Patient stated she does not taken any Potassium but does eat a lot of bananas. Encouraged patient to cut back on Potassium enriched foods. Kayexelate x1 dose prescribed and patient will  today.   Instr

## 2021-12-07 ENCOUNTER — APPOINTMENT (OUTPATIENT)
Dept: WOUND CARE | Facility: HOSPITAL | Age: 76
End: 2021-12-07
Attending: INTERNAL MEDICINE
Payer: MEDICARE

## 2021-12-08 ENCOUNTER — APPOINTMENT (OUTPATIENT)
Dept: WOUND CARE | Facility: HOSPITAL | Age: 76
End: 2021-12-08
Attending: INTERNAL MEDICINE
Payer: MEDICARE

## 2021-12-08 ENCOUNTER — HOSPITAL ENCOUNTER (OUTPATIENT)
Dept: LAB | Facility: HOSPITAL | Age: 76
Discharge: HOME | End: 2021-12-08
Attending: INTERNAL MEDICINE
Payer: MEDICARE

## 2021-12-08 VITALS
RESPIRATION RATE: 16 BRPM | DIASTOLIC BLOOD PRESSURE: 85 MMHG | TEMPERATURE: 99 F | HEART RATE: 55 BPM | SYSTOLIC BLOOD PRESSURE: 131 MMHG

## 2021-12-08 DIAGNOSIS — L97.511 RIGHT FOOT ULCER, LIMITED TO BREAKDOWN OF SKIN (HCC): ICD-10-CM

## 2021-12-08 DIAGNOSIS — L97.929 IDIOPATHIC CHRONIC VENOUS HYPERTENSION OF BOTH LOWER EXTREMITIES WITH ULCER AND INFLAMMATION (HCC): ICD-10-CM

## 2021-12-08 DIAGNOSIS — N18.32 STAGE 3B CHRONIC KIDNEY DISEASE (HCC): ICD-10-CM

## 2021-12-08 DIAGNOSIS — L97.212 NON-PRESSURE CHRONIC ULCER OF RIGHT CALF WITH FAT LAYER EXPOSED (HCC): ICD-10-CM

## 2021-12-08 DIAGNOSIS — I87.333 IDIOPATHIC CHRONIC VENOUS HYPERTENSION OF BOTH LOWER EXTREMITIES WITH ULCER AND INFLAMMATION (HCC): ICD-10-CM

## 2021-12-08 DIAGNOSIS — N18.32 ACUTE RENAL FAILURE SUPERIMPOSED ON STAGE 3B CHRONIC KIDNEY DISEASE, UNSPECIFIED ACUTE RENAL FAILURE TYPE (HCC): ICD-10-CM

## 2021-12-08 DIAGNOSIS — L97.222 NON-PRESSURE CHRONIC ULCER OF LEFT CALF WITH FAT LAYER EXPOSED (HCC): Primary | ICD-10-CM

## 2021-12-08 DIAGNOSIS — E87.5 HYPERKALEMIA: ICD-10-CM

## 2021-12-08 DIAGNOSIS — N17.9 ACUTE RENAL FAILURE SUPERIMPOSED ON STAGE 3B CHRONIC KIDNEY DISEASE, UNSPECIFIED ACUTE RENAL FAILURE TYPE (HCC): ICD-10-CM

## 2021-12-08 DIAGNOSIS — L97.919 IDIOPATHIC CHRONIC VENOUS HYPERTENSION OF BOTH LOWER EXTREMITIES WITH ULCER AND INFLAMMATION (HCC): ICD-10-CM

## 2021-12-08 LAB
ANION GAP SERPL CALC-SCNC: 0 MMOL/L (ref 0–18)
BUN BLD-MCNC: 74 MG/DL (ref 7–18)
CALCIUM BLD-MCNC: 9.1 MG/DL (ref 8.5–10.1)
CHLORIDE SERPL-SCNC: 111 MMOL/L (ref 98–112)
CO2 SERPL-SCNC: 30 MMOL/L (ref 21–32)
CREAT BLD-MCNC: 2.27 MG/DL
FASTING STATUS PATIENT QL REPORTED: NO
GLUCOSE BLD-MCNC: 118 MG/DL (ref 70–99)
OSMOLALITY SERPL CALC.SUM OF ELEC: 315 MOSM/KG (ref 275–295)
POTASSIUM SERPL-SCNC: 5.5 MMOL/L (ref 3.5–5.1)
SODIUM SERPL-SCNC: 141 MMOL/L (ref 136–145)

## 2021-12-08 PROCEDURE — 99214 OFFICE O/P EST MOD 30 MIN: CPT

## 2021-12-08 PROCEDURE — 36415 COLL VENOUS BLD VENIPUNCTURE: CPT

## 2021-12-08 PROCEDURE — 80048 BASIC METABOLIC PNL TOTAL CA: CPT

## 2021-12-08 PROCEDURE — 82962 GLUCOSE BLOOD TEST: CPT | Performed by: INTERNAL MEDICINE

## 2021-12-08 NOTE — PATIENT INSTRUCTIONS
Patient Instructions and orders for Wound Care     1. Dressings as below. 2. continue comprilan multilayer wraps un til [place] starts. 3. Then switch to coflex-2 layer wraps - to be changed 3 times a week. 4.  kayexalate one dose today.    5. Hold recommended.   · Increase protein intake / consider protein supplements - see below  · Elevate extremities at all times when sitting / laying down.     DIETARY MODIFICATIONS TO HELP WITH WOUND HEALING:     · Protein: Meats, beans, eggs, milk and yogurt part

## 2021-12-08 NOTE — PROGRESS NOTES
[place] [facility] NOTE  Vanna Danielson MD  12/8/2021    Chief Complaint:   Patient presents with:  Wound Care: Follow up for bilateral leg wounds. No complaints at this time.        HPI:   Subjective   Yeni Nicely is a 68year old female • Fluticasone Furoate-Vilanterol (BREO ELLIPTA) 100-25 MCG/INH Inhalation Aerosol Powder, Breath Activated Inhale 1 puff into the lungs daily.  (Patient not taking: Reported on 10/22/2021)     • Cholecalciferol (VITAMIN D) 50 MCG (2000 UT) Oral Cap Take % External Ointment Apply topically 2 (two) times daily. Apply to BLE after cleansing BID     • magnesium oxide 400 MG Oral Tab Take 400 mg by mouth daily.  (Patient not taking: Reported on 10/22/2021)     • acetaminophen 325 MG Oral Tab Take 650 mg by mout Description Serous; Yellow 12/08/21 1043   Treatments Compression; Topical (Barrier/Moisturizer/Ointment) 12/08/21 1043   Dressing ABD Pad;Other;Kerlix roll;Tape;Coban;[place]inette 12/08/21 1043   Wound Length (cm) 0.1 cm 12/08/21 1043   Wound Width (cm) 0.1 kidney disease, unspecified acute renal failure type (HCC)  Non-pressure chronic ulcer of right calf with fat layer exposed ([facility] [address]  Idiopathic chronic venous hypertension of both lower extremities with ulcer and inflammation (HCC)  Stage 3b chronic kidney d - check BP Friday - will be drawn by [facility] .    6. Recommend consultation with Bariatric Surgery team / medical weight loss program.   7. Consultation with lymphedema clinic after wounds heal.   8. Return to see me in one week       Wound Cleaning and Dressing potatoes, spinach, broccoli, cauliflower, [place] sprouts, cabbage     · Vitamin A: Dark green, leafy vegetables, orange or yellow vegetables, cantaloupe, fortified dairy products, liver, fortified cereals     · Zinc: Fortified cereals, red meats, seafood information in the E HR, independently interpreting results and communicating results to the patient/family/caregiver and care coordination with the patient's other providers. Followup: Return in about 1 week (around 12/15/2021) for Wound followup.     C

## 2021-12-10 ENCOUNTER — APPOINTMENT (OUTPATIENT)
Dept: WOUND CARE | Facility: HOSPITAL | Age: 76
End: 2021-12-10
Attending: INTERNAL MEDICINE
Payer: MEDICARE

## 2021-12-13 ENCOUNTER — APPOINTMENT (OUTPATIENT)
Dept: CV DIAGNOSTICS | Facility: HOSPITAL | Age: 76
End: 2021-12-13
Attending: INTERNAL MEDICINE
Payer: MEDICARE

## 2021-12-13 ENCOUNTER — HOSPITAL ENCOUNTER (OUTPATIENT)
Dept: INTERVENTIONAL RADIOLOGY/VASCULAR | Facility: HOSPITAL | Age: 76
Discharge: HOME | End: 2021-12-13
Attending: INTERNAL MEDICINE | Admitting: INTERNAL MEDICINE
Payer: MEDICARE

## 2021-12-13 VITALS
HEIGHT: 66 IN | BODY MASS INDEX: 42.27 KG/M2 | HEART RATE: 52 BPM | OXYGEN SATURATION: 100 % | SYSTOLIC BLOOD PRESSURE: 117 MMHG | WEIGHT: 263 LBS | DIASTOLIC BLOOD PRESSURE: 46 MMHG | RESPIRATION RATE: 21 BRPM

## 2021-12-13 DIAGNOSIS — I51.7 RIGHT VENTRICULAR DILATION: Primary | ICD-10-CM

## 2021-12-13 PROCEDURE — 3E073KZ INTRODUCTION OF OTHER DIAGNOSTIC SUBSTANCE INTO CORONARY ARTERY, PERCUTANEOUS APPROACH: ICD-10-PCS | Performed by: INTERNAL MEDICINE

## 2021-12-13 PROCEDURE — 4A023N6 MEASUREMENT OF CARDIAC SAMPLING AND PRESSURE, RIGHT HEART, PERCUTANEOUS APPROACH: ICD-10-PCS | Performed by: INTERNAL MEDICINE

## 2021-12-13 PROCEDURE — 93306 TTE W/DOPPLER COMPLETE: CPT | Performed by: INTERNAL MEDICINE

## 2021-12-13 PROCEDURE — 36600 WITHDRAWAL OF ARTERIAL BLOOD: CPT | Performed by: INTERNAL MEDICINE

## 2021-12-13 PROCEDURE — 93451 RIGHT HEART CATH: CPT

## 2021-12-13 PROCEDURE — 82803 BLOOD GASES ANY COMBINATION: CPT | Performed by: INTERNAL MEDICINE

## 2021-12-13 PROCEDURE — 82962 GLUCOSE BLOOD TEST: CPT

## 2021-12-13 PROCEDURE — 82375 ASSAY CARBOXYHB QUANT: CPT | Performed by: INTERNAL MEDICINE

## 2021-12-13 PROCEDURE — 85018 HEMOGLOBIN: CPT | Performed by: INTERNAL MEDICINE

## 2021-12-13 PROCEDURE — 93463 DRUG ADMIN & HEMODYNMIC MEAS: CPT

## 2021-12-13 PROCEDURE — 83050 HGB METHEMOGLOBIN QUAN: CPT | Performed by: INTERNAL MEDICINE

## 2021-12-13 RX ORDER — MIDAZOLAM HYDROCHLORIDE 1 MG/ML
INJECTION INTRAMUSCULAR; INTRAVENOUS
Status: DISCONTINUED
Start: 2021-12-13 | End: 2021-12-13 | Stop reason: WASHOUT

## 2021-12-13 RX ORDER — LIDOCAINE HYDROCHLORIDE 10 MG/ML
INJECTION, SOLUTION EPIDURAL; INFILTRATION; INTRACAUDAL; PERINEURAL
Status: COMPLETED
Start: 2021-12-13 | End: 2021-12-13

## 2021-12-13 RX ORDER — HEPARIN SODIUM 5000 [USP'U]/ML
INJECTION, SOLUTION INTRAVENOUS; SUBCUTANEOUS
Status: COMPLETED
Start: 2021-12-13 | End: 2021-12-13

## 2021-12-13 RX ORDER — SODIUM NITROPRUSSIDE 25 MG/ML
INJECTION INTRAVENOUS
Status: COMPLETED
Start: 2021-12-13 | End: 2021-12-13

## 2021-12-13 RX ORDER — SODIUM CHLORIDE 9 MG/ML
INJECTION, SOLUTION INTRAVENOUS
Status: DISCONTINUED | OUTPATIENT
Start: 2021-12-14 | End: 2021-12-13 | Stop reason: HOSPADM

## 2021-12-13 NOTE — PROGRESS NOTES
S/P RHC, R jug approach, lorena well, VSS, no sedation, Dr. Bre Hand talked with pt and , Echo before discharge, all discharge instructions given, verb understanding. [phone] Discharged to [facility] via wheel chair,  is driving home.

## 2021-12-13 NOTE — OPERATIVE REPORT
Right Heart Catheterization with Vasodilator Challenge    Procedures:    Right Heart Catheterization  Vasodilator Challenge    Indication:  PAH by echo; massive lymphedema, uncertain hemodynamics  Access Site: Right Internal Jugular Vein    Sheath Size (Fr

## 2021-12-14 ENCOUNTER — APPOINTMENT (OUTPATIENT)
Dept: WOUND CARE | Facility: HOSPITAL | Age: 76
End: 2021-12-14
Attending: INTERNAL MEDICINE
Payer: MEDICARE

## 2021-12-15 ENCOUNTER — TELEPHONE (OUTPATIENT)
Dept: WOUND CARE | Facility: HOSPITAL | Age: 76
End: 2021-12-15

## 2021-12-15 ENCOUNTER — APPOINTMENT (OUTPATIENT)
Dept: WOUND CARE | Facility: HOSPITAL | Age: 76
End: 2021-12-15
Attending: INTERNAL MEDICINE
Payer: MEDICARE

## 2021-12-15 NOTE — TELEPHONE ENCOUNTER
PSR from cardiac clinic informed us that patient is \"up stairs and don't know when she will be done. \" Writer spoke to provider - ok to be seen as RN visit later today or home health to see.  Writer called [facility], nurse Julio Sharma to see patient later t

## 2021-12-17 ENCOUNTER — APPOINTMENT (OUTPATIENT)
Dept: WOUND CARE | Facility: HOSPITAL | Age: 76
End: 2021-12-17
Attending: INTERNAL MEDICINE
Payer: MEDICARE

## 2021-12-22 ENCOUNTER — APPOINTMENT (OUTPATIENT)
Dept: WOUND CARE | Facility: HOSPITAL | Age: 76
End: 2021-12-22
Attending: INTERNAL MEDICINE
Payer: MEDICARE

## 2021-12-29 ENCOUNTER — APPOINTMENT (OUTPATIENT)
Dept: WOUND CARE | Facility: HOSPITAL | Age: 76
End: 2021-12-29
Attending: INTERNAL MEDICINE
Payer: MEDICARE

## 2022-01-05 ENCOUNTER — APPOINTMENT (OUTPATIENT)
Dept: WOUND CARE | Facility: HOSPITAL | Age: 77
End: 2022-01-05
Attending: INTERNAL MEDICINE
Payer: MEDICARE

## 2022-01-10 ENCOUNTER — OFFICE VISIT (OUTPATIENT)
Dept: WOUND CARE | Facility: HOSPITAL | Age: 77
End: 2022-01-10
Attending: INTERNAL MEDICINE
Payer: MEDICARE

## 2022-01-10 VITALS
RESPIRATION RATE: 18 BRPM | HEART RATE: 52 BPM | TEMPERATURE: 98 F | SYSTOLIC BLOOD PRESSURE: 115 MMHG | DIASTOLIC BLOOD PRESSURE: 69 MMHG

## 2022-01-10 DIAGNOSIS — N18.32 STAGE 3B CHRONIC KIDNEY DISEASE (HCC): ICD-10-CM

## 2022-01-10 DIAGNOSIS — L97.212 NON-PRESSURE CHRONIC ULCER OF RIGHT CALF WITH FAT LAYER EXPOSED (HCC): ICD-10-CM

## 2022-01-10 DIAGNOSIS — L97.929 IDIOPATHIC CHRONIC VENOUS HYPERTENSION OF BOTH LOWER EXTREMITIES WITH ULCER AND INFLAMMATION (HCC): ICD-10-CM

## 2022-01-10 DIAGNOSIS — I10 ESSENTIAL HYPERTENSION: ICD-10-CM

## 2022-01-10 DIAGNOSIS — I87.333 IDIOPATHIC CHRONIC VENOUS HYPERTENSION OF BOTH LOWER EXTREMITIES WITH ULCER AND INFLAMMATION (HCC): ICD-10-CM

## 2022-01-10 DIAGNOSIS — E11.628 TYPE 2 DIABETES MELLITUS WITH OTHER SKIN COMPLICATIONS (HCC): ICD-10-CM

## 2022-01-10 DIAGNOSIS — L97.222 NON-PRESSURE CHRONIC ULCER OF LEFT CALF WITH FAT LAYER EXPOSED (HCC): ICD-10-CM

## 2022-01-10 DIAGNOSIS — L97.511 RIGHT FOOT ULCER, LIMITED TO BREAKDOWN OF SKIN (HCC): Primary | ICD-10-CM

## 2022-01-10 DIAGNOSIS — L97.919 IDIOPATHIC CHRONIC VENOUS HYPERTENSION OF BOTH LOWER EXTREMITIES WITH ULCER AND INFLAMMATION (HCC): ICD-10-CM

## 2022-01-10 LAB
GLUCOSE BLD-MCNC: 63 MG/DL (ref 70–99)
GLUCOSE BLD-MCNC: 74 MG/DL (ref 70–99)

## 2022-01-10 PROCEDURE — 99214 OFFICE O/P EST MOD 30 MIN: CPT

## 2022-01-10 PROCEDURE — 29581 APPL MULTLAYER CMPRN SYS LEG: CPT

## 2022-01-10 PROCEDURE — 82962 GLUCOSE BLOOD TEST: CPT | Performed by: INTERNAL MEDICINE

## 2022-01-10 RX ORDER — DILTIAZEM HYDROCHLORIDE 120 MG/1
120 TABLET, FILM COATED ORAL DAILY
COMMUNITY
Start: 2021-11-04 | End: 2022-01-25

## 2022-01-10 RX ORDER — INSULIN DEGLUDEC INJECTION 100 U/ML
30 INJECTION, SOLUTION SUBCUTANEOUS EVERY MORNING
Status: ON HOLD | COMMUNITY
Start: 2021-11-28 | End: 2022-01-24

## 2022-01-10 RX ORDER — EPLERENONE 25 MG/1
25 TABLET, FILM COATED ORAL DAILY
COMMUNITY
Start: 2021-12-15 | End: 2022-01-25

## 2022-01-10 RX ORDER — EMPAGLIFLOZIN 10 MG/1
10 TABLET, FILM COATED ORAL DAILY
COMMUNITY
Start: 2021-12-15 | End: 2022-01-25

## 2022-01-10 RX ORDER — BLOOD-GLUCOSE METER
KIT MISCELLANEOUS
COMMUNITY
Start: 2021-12-29 | End: 2022-01-20 | Stop reason: CLARIF

## 2022-01-10 NOTE — PROGRESS NOTES
.Weekly Wound Education Note    Teaching Provided To: Patient  Training Topics: Compression;Edema control; Discharge instructions;Dressing  Training Method: Explain/Verbal;Written  Training Response: Patient responds and understands           Antifungal pow

## 2022-01-10 NOTE — PROGRESS NOTES
[place] [facility] NOTE  Boyd Matos MD  1/10/2022    Chief Complaint:   Patient presents with:  Wound Care: Patient is here for a wound care follow up. She complains of pain to her toes.  She is on 3 new medications, but does not know the na Novolog/aspart insulin expected to decrease blood glucose by 20 mg/dL    Give 2 unit if blood glucose 141-180 mg/dL  Give 3 units if blood glucose 181-220 mg/dL  Give 6 units if blood glucose 221-260 mg/dL  Give 8 units if blood glucose 261-300 mg/dL  Give thickness 01/10/22 1126   [place]-wound Assessment Hemosiderin staining;Edema; Maceration;Moist;Red 01/10/22 1126   Wound Granulation Tissue Firm;Pink 01/10/22 1126   Wound Bed Granulation (%) 80 % 01/10/22 1126   Wound Bed Epithelium (%) 20 % 01/10/22 1126 8cm;Comprilan 10cm; Comprilan 12cm; Coban 12/08/21 1043   Dressing Applied Yes 12/08/21 1043   Compression Wrap Location Toes to Knee 12/08/21 1043   Compression Wrap Status Clean;Dry; Intact 12/08/21 1043       Venous Sheath 7 Fr.  Right Other (Comment) (Acti due to excess calories (HCC)        MANAGEMENT    PROCEDURES:    coflex-2 layer wrap applied after dressing change done. Patient Instructions   Patient Instructions and orders for Wound Care     1.  Dressings as below.   2. continue coflex-2 layer  multi extremities at all times when sitting / laying down.     DIETARY MODIFICATIONS TO HELP WITH WOUND HEALING:     · Protein: Meats, beans, eggs, milk and yogurt particularly [place] yogurt), tofu, soy nuts, soy protein products     · Vitamin C: Citrus fruits an separately obtained history, performing a medically appropriate examination, counseling and educating the patient/family/caregiver, ordering medications or testing, referring and communicating with other healthcare providers, documenting clinical informati

## 2022-01-10 NOTE — PATIENT INSTRUCTIONS
Patient Instructions and orders for Wound Care     1. Dressings as below.   2. continue coflex-2 layer  multilayer wraps- to be changed 3 times a week. 3. [address]  4.  Recommend consultation with Bariatric Surgery team / [facility] yogurt particularly [place] yogurt), tofu, soy nuts, soy protein products     · Vitamin C: Citrus fruits and juices, strawberries, tomatoes, tomato juice, peppers, baked potatoes, spinach, broccoli, cauliflower, [place] sprouts, cabbage     · Vitamin A: Dash

## 2022-01-20 ENCOUNTER — HOSPITAL ENCOUNTER (INPATIENT)
Facility: HOSPITAL | Age: 77
LOS: 5 days | Discharge: HOME HEALTH | DRG: 682 | End: 2022-01-25
Attending: EMERGENCY MEDICINE | Admitting: HOSPITALIST
Payer: MEDICARE

## 2022-01-20 DIAGNOSIS — E87.5 HYPERKALEMIA: ICD-10-CM

## 2022-01-20 DIAGNOSIS — E11.628 TYPE 2 DIABETES MELLITUS WITH OTHER SKIN COMPLICATIONS (HCC): ICD-10-CM

## 2022-01-20 DIAGNOSIS — I95.9 HYPOTENSION, UNSPECIFIED HYPOTENSION TYPE: ICD-10-CM

## 2022-01-20 DIAGNOSIS — N17.9 AKI (ACUTE KIDNEY INJURY) (HCC): ICD-10-CM

## 2022-01-20 DIAGNOSIS — I49.8 JUNCTIONAL RHYTHM: Primary | ICD-10-CM

## 2022-01-20 LAB
ALBUMIN SERPL-MCNC: 2.8 G/DL (ref 3.4–5)
ALBUMIN/GLOB SERPL: 0.6 {RATIO} (ref 1–2)
ALP LIVER SERPL-CCNC: 72 U/L
ALT SERPL-CCNC: 12 U/L
ANION GAP SERPL CALC-SCNC: 8 MMOL/L (ref 0–18)
AST SERPL-CCNC: 11 U/L (ref 15–37)
BASE EXCESS BLDV CALC-SCNC: -10.4 MMOL/L
BASOPHILS # BLD AUTO: 0.03 X10(3) UL (ref 0–0.2)
BASOPHILS NFR BLD AUTO: 0.5 %
BILIRUB SERPL-MCNC: 0.3 MG/DL (ref 0.1–2)
BUN BLD-MCNC: 97 MG/DL (ref 7–18)
CA-I BLD-SCNC: 1.22 MMOL/L (ref 1.12–1.32)
CALCIUM BLD-MCNC: 9.1 MG/DL (ref 8.5–10.1)
CHLORIDE SERPL-SCNC: 114 MMOL/L (ref 98–112)
CO2 SERPL-SCNC: 17 MMOL/L (ref 21–32)
CREAT BLD-MCNC: 2.87 MG/DL
EOSINOPHIL # BLD AUTO: 0.2 X10(3) UL (ref 0–0.7)
EOSINOPHIL NFR BLD AUTO: 3.1 %
ERYTHROCYTE [DISTWIDTH] IN BLOOD BY AUTOMATED COUNT: 13.6 %
GLOBULIN PLAS-MCNC: 4.6 G/DL (ref 2.8–4.4)
GLUCOSE BLD-MCNC: 109 MG/DL (ref 70–99)
GLUCOSE BLD-MCNC: 111 MG/DL (ref 70–99)
HCO3 BLDV-SCNC: 18.7 MEQ/L (ref 23–27)
HGB BLD-MCNC: 11.1 G/DL
IMM GRANULOCYTES # BLD AUTO: 0.03 X10(3) UL (ref 0–1)
IMM GRANULOCYTES NFR BLD: 0.5 %
LYMPHOCYTES # BLD AUTO: 0.98 X10(3) UL (ref 1–4)
LYMPHOCYTES NFR BLD AUTO: 15.1 %
MCH RBC QN AUTO: 30 PG (ref 26–34)
MCHC RBC AUTO-ENTMCNC: 30.6 G/DL (ref 31–37)
MCV RBC AUTO: 98.1 FL
MONOCYTES # BLD AUTO: 0.75 X10(3) UL (ref 0.1–1)
MONOCYTES NFR BLD AUTO: 11.5 %
NEUTROPHILS # BLD AUTO: 4.51 X10 (3) UL (ref 1.5–7.7)
NEUTROPHILS # BLD AUTO: 4.51 X10(3) UL (ref 1.5–7.7)
NEUTROPHILS NFR BLD AUTO: 69.3 %
OSMOLALITY SERPL CALC.SUM OF ELEC: 319 MOSM/KG (ref 275–295)
PCO2 BLDV: 57 MM HG (ref 38–50)
PH BLDV: 7.13 [PH] (ref 7.33–7.43)
PLATELET # BLD AUTO: 175 10(3)UL (ref 150–450)
PO2 BLDV: 42 MM HG (ref 30–50)
POTASSIUM BLD-SCNC: 5.9 MMOL/L (ref 3.6–5.1)
POTASSIUM SERPL-SCNC: 6.3 MMOL/L (ref 3.5–5.1)
PROT SERPL-MCNC: 7.4 G/DL (ref 6.4–8.2)
RBC # BLD AUTO: 3.7 X10(6)UL
SAO2 % BLDV: 58 % (ref 72–78)
SAO2 % BLDV: 67 % (ref 72–78)
SARS-COV-2 RNA RESP QL NAA+PROBE: NOT DETECTED
SODIUM BLD-SCNC: 141 MMOL/L (ref 136–144)
SODIUM SERPL-SCNC: 139 MMOL/L (ref 136–145)
TROPONIN I HIGH SENSITIVITY: 9 NG/L
VENOUS LITERS PER MINUTE: 3 L/MIN
WBC # BLD AUTO: 6.5 X10(3) UL (ref 4–11)

## 2022-01-20 PROCEDURE — 99291 CRITICAL CARE FIRST HOUR: CPT | Performed by: HOSPITALIST

## 2022-01-20 PROCEDURE — 99223 1ST HOSP IP/OBS HIGH 75: CPT | Performed by: INTERNAL MEDICINE

## 2022-01-20 RX ORDER — POLYETHYLENE GLYCOL 3350 17 G/17G
17 POWDER, FOR SOLUTION ORAL DAILY PRN
Status: DISCONTINUED | OUTPATIENT
Start: 2022-01-20 | End: 2022-01-25

## 2022-01-20 RX ORDER — ESCITALOPRAM OXALATE 10 MG/1
10 TABLET ORAL DAILY
Status: DISCONTINUED | OUTPATIENT
Start: 2022-01-21 | End: 2022-01-25

## 2022-01-20 RX ORDER — DEXTROSE MONOHYDRATE 25 G/50ML
50 INJECTION, SOLUTION INTRAVENOUS ONCE
Status: COMPLETED | OUTPATIENT
Start: 2022-01-20 | End: 2022-01-20

## 2022-01-20 RX ORDER — FEBUXOSTAT 40 MG/1
80 TABLET, FILM COATED ORAL DAILY
Status: DISCONTINUED | OUTPATIENT
Start: 2022-01-21 | End: 2022-01-25

## 2022-01-20 RX ORDER — BISACODYL 10 MG
10 SUPPOSITORY, RECTAL RECTAL
Status: DISCONTINUED | OUTPATIENT
Start: 2022-01-20 | End: 2022-01-25

## 2022-01-20 RX ORDER — ENOXAPARIN SODIUM 100 MG/ML
40 INJECTION SUBCUTANEOUS DAILY
Status: DISCONTINUED | OUTPATIENT
Start: 2022-01-21 | End: 2022-01-20

## 2022-01-20 RX ORDER — FUROSEMIDE 10 MG/ML
40 INJECTION INTRAMUSCULAR; INTRAVENOUS ONCE
Status: COMPLETED | OUTPATIENT
Start: 2022-01-20 | End: 2022-01-20

## 2022-01-20 RX ORDER — LEVOTHYROXINE SODIUM 0.15 MG/1
150 TABLET ORAL
Status: DISCONTINUED | OUTPATIENT
Start: 2022-01-21 | End: 2022-01-25

## 2022-01-20 RX ORDER — SENNOSIDES 8.6 MG
17.2 TABLET ORAL NIGHTLY PRN
Status: DISCONTINUED | OUTPATIENT
Start: 2022-01-20 | End: 2022-01-25

## 2022-01-20 RX ORDER — ONDANSETRON 2 MG/ML
4 INJECTION INTRAMUSCULAR; INTRAVENOUS EVERY 6 HOURS PRN
Status: DISCONTINUED | OUTPATIENT
Start: 2022-01-20 | End: 2022-01-25

## 2022-01-20 RX ORDER — DOPAMINE HYDROCHLORIDE 320 MG/100ML
INJECTION, SOLUTION INTRAVENOUS CONTINUOUS
Status: DISCONTINUED | OUTPATIENT
Start: 2022-01-20 | End: 2022-01-22

## 2022-01-20 RX ORDER — METOCLOPRAMIDE HYDROCHLORIDE 5 MG/ML
5 INJECTION INTRAMUSCULAR; INTRAVENOUS EVERY 8 HOURS PRN
Status: DISCONTINUED | OUTPATIENT
Start: 2022-01-20 | End: 2022-01-25

## 2022-01-20 RX ORDER — ASPIRIN 81 MG/1
81 TABLET, CHEWABLE ORAL DAILY
Status: DISCONTINUED | OUTPATIENT
Start: 2022-01-20 | End: 2022-01-25

## 2022-01-20 RX ORDER — PRAVASTATIN SODIUM 20 MG
20 TABLET ORAL NIGHTLY
Status: DISCONTINUED | OUTPATIENT
Start: 2022-01-20 | End: 2022-01-25

## 2022-01-20 RX ORDER — ACETAMINOPHEN 325 MG/1
650 TABLET ORAL EVERY 6 HOURS PRN
Status: DISCONTINUED | OUTPATIENT
Start: 2022-01-20 | End: 2022-01-25

## 2022-01-20 RX ORDER — HEPARIN SODIUM 5000 [USP'U]/ML
5000 INJECTION, SOLUTION INTRAVENOUS; SUBCUTANEOUS EVERY 8 HOURS SCHEDULED
Status: DISCONTINUED | OUTPATIENT
Start: 2022-01-20 | End: 2022-01-25

## 2022-01-20 RX ORDER — DEXTROSE MONOHYDRATE 25 G/50ML
50 INJECTION, SOLUTION INTRAVENOUS
Status: DISCONTINUED | OUTPATIENT
Start: 2022-01-20 | End: 2022-01-25

## 2022-01-20 RX ORDER — ALBUTEROL SULFATE 90 UG/1
2 AEROSOL, METERED RESPIRATORY (INHALATION) EVERY 6 HOURS PRN
Status: DISCONTINUED | OUTPATIENT
Start: 2022-01-20 | End: 2022-01-25

## 2022-01-20 RX ORDER — MELATONIN
3 NIGHTLY PRN
Status: DISCONTINUED | OUTPATIENT
Start: 2022-01-20 | End: 2022-01-25

## 2022-01-20 NOTE — ED INITIAL ASSESSMENT (HPI)
Patient had routine blood test today . Advised to go to ER  Because of elevated potassium. Patient is on oxygen 3 liter by nasal cannula. . leg edema  Bilaterally.

## 2022-01-21 PROBLEM — I50.9 CONGESTIVE HEART FAILURE (HCC): Status: ACTIVE | Noted: 2022-01-21

## 2022-01-21 LAB
ANION GAP SERPL CALC-SCNC: 6 MMOL/L (ref 0–18)
ATRIAL RATE: 28 BPM
ATRIAL RATE: 69 BPM
BASOPHILS # BLD AUTO: 0.02 X10(3) UL (ref 0–0.2)
BASOPHILS NFR BLD AUTO: 0.4 %
BUN BLD-MCNC: 89 MG/DL (ref 7–18)
CALCIUM BLD-MCNC: 8.4 MG/DL (ref 8.5–10.1)
CHLORIDE SERPL-SCNC: 116 MMOL/L (ref 98–112)
CLARITY UR REFRACT.AUTO: CLEAR
CO2 SERPL-SCNC: 24 MMOL/L (ref 21–32)
COLOR UR AUTO: YELLOW
CREAT BLD-MCNC: 2.39 MG/DL
CREAT UR-SCNC: <13 MG/DL
EOSINOPHIL # BLD AUTO: 0.19 X10(3) UL (ref 0–0.7)
EOSINOPHIL NFR BLD AUTO: 4 %
ERYTHROCYTE [DISTWIDTH] IN BLOOD BY AUTOMATED COUNT: 13.7 %
EST. AVERAGE GLUCOSE BLD GHB EST-MCNC: 120 MG/DL (ref 68–126)
GLUCOSE BLD-MCNC: 107 MG/DL (ref 70–99)
GLUCOSE BLD-MCNC: 110 MG/DL (ref 70–99)
GLUCOSE BLD-MCNC: 120 MG/DL (ref 70–99)
GLUCOSE BLD-MCNC: 158 MG/DL (ref 70–99)
GLUCOSE BLD-MCNC: 180 MG/DL (ref 70–99)
GLUCOSE BLD-MCNC: 215 MG/DL (ref 70–99)
GLUCOSE UR STRIP.AUTO-MCNC: >=500 MG/DL
HBA1C MFR BLD: 5.8 % (ref ?–5.7)
HCT VFR BLD AUTO: 32.2 %
HGB BLD-MCNC: 9.7 G/DL
IMM GRANULOCYTES # BLD AUTO: 0.01 X10(3) UL (ref 0–1)
IMM GRANULOCYTES NFR BLD: 0.2 %
KETONES UR STRIP.AUTO-MCNC: NEGATIVE MG/DL
LYMPHOCYTES # BLD AUTO: 0.85 X10(3) UL (ref 1–4)
LYMPHOCYTES NFR BLD AUTO: 18.1 %
MAGNESIUM SERPL-MCNC: 1.7 MG/DL (ref 1.6–2.6)
MCH RBC QN AUTO: 29.4 PG (ref 26–34)
MCHC RBC AUTO-ENTMCNC: 30.1 G/DL (ref 31–37)
MCV RBC AUTO: 97.6 FL
MONOCYTES # BLD AUTO: 0.69 X10(3) UL (ref 0.1–1)
MONOCYTES NFR BLD AUTO: 14.7 %
NEUTROPHILS # BLD AUTO: 2.94 X10 (3) UL (ref 1.5–7.7)
NEUTROPHILS # BLD AUTO: 2.94 X10(3) UL (ref 1.5–7.7)
NEUTROPHILS NFR BLD AUTO: 62.6 %
NITRITE UR QL STRIP.AUTO: NEGATIVE
OSMOLALITY SERPL CALC.SUM OF ELEC: 330 MOSM/KG (ref 275–295)
P AXIS: 106 DEGREES
P-R INTERVAL: 148 MS
PH UR STRIP.AUTO: 5 [PH] (ref 5–8)
PHOSPHATE SERPL-MCNC: 3.9 MG/DL (ref 2.5–4.9)
PLATELET # BLD AUTO: 150 10(3)UL (ref 150–450)
POTASSIUM SERPL-SCNC: 5.5 MMOL/L (ref 3.5–5.1)
POTASSIUM SERPL-SCNC: 5.7 MMOL/L (ref 3.5–5.1)
POTASSIUM SERPL-SCNC: 5.7 MMOL/L (ref 3.5–5.1)
PROT UR STRIP.AUTO-MCNC: NEGATIVE MG/DL
PROT UR-MCNC: 11.5 MG/DL
Q-T INTERVAL: 398 MS
Q-T INTERVAL: 476 MS
QRS DURATION: 72 MS
QRS DURATION: 80 MS
QTC CALCULATION (BEZET): 347 MS
QTC CALCULATION (BEZET): 426 MS
R AXIS: 49 DEGREES
RBC # BLD AUTO: 3.3 X10(6)UL
SODIUM SERPL-SCNC: 128 MMOL/L
SODIUM SERPL-SCNC: 146 MMOL/L (ref 136–145)
SP GR UR STRIP.AUTO: 1 (ref 1–1.03)
T AXIS: 38 DEGREES
T AXIS: 7 DEGREES
UROBILINOGEN UR STRIP.AUTO-MCNC: <2 MG/DL
VENTRICULAR RATE: 32 BPM
VENTRICULAR RATE: 69 BPM
WBC # BLD AUTO: 4.7 X10(3) UL (ref 4–11)

## 2022-01-21 PROCEDURE — 99233 SBSQ HOSP IP/OBS HIGH 50: CPT | Performed by: HOSPITALIST

## 2022-01-21 PROCEDURE — 99233 SBSQ HOSP IP/OBS HIGH 50: CPT | Performed by: INTERNAL MEDICINE

## 2022-01-21 RX ORDER — FUROSEMIDE 10 MG/ML
40 INJECTION INTRAMUSCULAR; INTRAVENOUS ONCE
Status: COMPLETED | OUTPATIENT
Start: 2022-01-21 | End: 2022-01-21

## 2022-01-21 NOTE — ED QUICK NOTES
After completion of calcium gluconate p waves again noted, now sinus dina with HR in 50s. BP improved. Pt denies complaints.

## 2022-01-21 NOTE — PLAN OF CARE
Assumed care of pt at 2200 from ED. Alert and oriented x4. On 3L O2, sats 94%. Baseline for pt. Denies any SOB or chest pain. HR sitting 50-60's overnight. Denies pain. Up with 1 x walker. Call light within reach. Updated pt on POC.

## 2022-01-21 NOTE — PLAN OF CARE
Assumed care of pt at 0730  A&Ox4, up with standby assist and a walker, no complaints of pain  3L NC, , will wean as able  NSR, no chest pain, no shortness of breath  Skin is clean, dry, and intact, lymphedema to bilateral lower extremities, seen by wound care today, dressings to be changed again next Wednesday (1/26)  Continent of bowel and bladder, last BM 1/20  Fall precautions in place, bed and chair alarm on, call light within reach, pt updated on plan of care, will continue to monitor                  Problem: CARDIOVASCULAR - ADULT  Goal: Maintains optimal cardiac output and hemodynamic stability  Description: INTERVENTIONS:  - Monitor vital signs, rhythm, and trends  - Monitor for bleeding, hypotension and signs of decreased cardiac output  - Evaluate effectiveness of vasoactive medications to optimize hemodynamic stability  - Monitor arterial and/or venous puncture sites for bleeding and/or hematoma  - Assess quality of pulses, skin color and temperature  - Assess for signs of decreased coronary artery perfusion - ex.  Angina  - Evaluate fluid balance, assess for edema, trend weights  Outcome: Progressing  Goal: Absence of cardiac arrhythmias or at baseline  Description: INTERVENTIONS:  - Continuous cardiac monitoring, monitor vital signs, obtain 12 lead EKG if indicated  - Evaluate effectiveness of antiarrhythmic and heart rate control medications as ordered  - Initiate emergency measures for life threatening arrhythmias  - Monitor electrolytes and administer replacement therapy as ordered  Outcome: Progressing     Problem: RESPIRATORY - ADULT  Goal: Achieves optimal ventilation and oxygenation  Description: INTERVENTIONS:  - Assess for changes in respiratory status  - Assess for changes in mentation and behavior  - Position to facilitate oxygenation and minimize respiratory effort  - Oxygen supplementation based on oxygen saturation or ABGs  - Provide Smoking Cessation handout, if applicable  - Encourage broncho-pulmonary hygiene including cough, deep breathe, Incentive Spirometry  - Assess the need for suctioning and perform as needed  - Assess and instruct to report SOB or any respiratory difficulty  - Respiratory Therapy support as indicated  - Manage/alleviate anxiety  - Monitor for signs/symptoms of CO2 retention  Outcome: Progressing     Problem: METABOLIC/FLUID AND ELECTROLYTES - ADULT  Goal: Electrolytes maintained within normal limits  Description: INTERVENTIONS:  - Monitor labs and rhythm and assess patient for signs and symptoms of electrolyte imbalances  - Administer electrolyte replacement as ordered  - Monitor response to electrolyte replacements, including rhythm and repeat lab results as appropriate  - Fluid restriction as ordered  - Instruct patient on fluid and nutrition restrictions as appropriate  Outcome: Progressing     Problem: SKIN/TISSUE INTEGRITY - ADULT  Goal: Skin integrity remains intact  Description: INTERVENTIONS  - Assess and document risk factors for pressure ulcer development  - Assess and document skin integrity  - Monitor for areas of redness and/or skin breakdown  - Initiate interventions, skin care algorithm/standards of care as needed  Outcome: Progressing  Goal: Incision(s), wounds(s) or drain site(s) healing without S/S of infection  Description: INTERVENTIONS:  - Assess and document risk factors for pressure ulcer development  - Assess and document skin integrity  - Assess and document dressing/incision, wound bed, drain sites and surrounding tissue  - Implement wound care per orders  - Initiate isolation precautions as appropriate  - Initiate Pressure Ulcer prevention bundle as indicated  Outcome: Progressing

## 2022-01-21 NOTE — CM/SW NOTE
Received call from [facility], pt is current w/ RN for wound care. Order entered/updates sent in [address]. AVS updated.

## 2022-01-21 NOTE — ED QUICK NOTES
Orders for admission, patient is aware of plan and ready to go upstairs. Any questions, please call ED LOPEZ Roach  at extension [phone]. Vaccinated?  Yes     Type of COVID test sent: Rapid    COVID Suspicion level: Low      Titratable drug(s) infusing: n/a    LOC at time of transport: A & O x 3    Other pertinent information:    CIWA score=n/a  NIH score=n/a

## 2022-01-22 LAB
ANION GAP SERPL CALC-SCNC: 2 MMOL/L (ref 0–18)
BUN BLD-MCNC: 74 MG/DL (ref 7–18)
CALCIUM BLD-MCNC: 8.5 MG/DL (ref 8.5–10.1)
CHLORIDE SERPL-SCNC: 113 MMOL/L (ref 98–112)
CO2 SERPL-SCNC: 27 MMOL/L (ref 21–32)
CREAT BLD-MCNC: 1.77 MG/DL
GLUCOSE BLD-MCNC: 106 MG/DL (ref 70–99)
GLUCOSE BLD-MCNC: 109 MG/DL (ref 70–99)
GLUCOSE BLD-MCNC: 128 MG/DL (ref 70–99)
GLUCOSE BLD-MCNC: 139 MG/DL (ref 70–99)
GLUCOSE BLD-MCNC: 171 MG/DL (ref 70–99)
GLUCOSE BLD-MCNC: 73 MG/DL (ref 70–99)
OSMOLALITY SERPL CALC.SUM OF ELEC: 316 MOSM/KG (ref 275–295)
POTASSIUM SERPL-SCNC: 5 MMOL/L (ref 3.5–5.1)
SODIUM SERPL-SCNC: 142 MMOL/L (ref 136–145)

## 2022-01-22 PROCEDURE — 99233 SBSQ HOSP IP/OBS HIGH 50: CPT | Performed by: HOSPITALIST

## 2022-01-22 PROCEDURE — 99233 SBSQ HOSP IP/OBS HIGH 50: CPT | Performed by: INTERNAL MEDICINE

## 2022-01-22 RX ORDER — BUMETANIDE 1 MG/1
1 TABLET ORAL DAILY
Status: DISCONTINUED | OUTPATIENT
Start: 2022-01-22 | End: 2022-01-25

## 2022-01-22 NOTE — PLAN OF CARE
Assumed care of pt at 0730  A&Ox4, up with 1-2 assist and a walker, no complaints of pain  2L NC, , uses 2-3L NC @ baseline, NSR, no chest pain, no shortness of breath  Skin is clean, dry, and intact, no wounds present  Continent of bowel and bladder, last BM 1/21  Fall precautions in place, bed and chair alarm on, call light within reach, pt updated on plane of care, will continue to monitor                    Problem: CARDIOVASCULAR - ADULT  Goal: Maintains optimal cardiac output and hemodynamic stability  Description: INTERVENTIONS:  - Monitor vital signs, rhythm, and trends  - Monitor for bleeding, hypotension and signs of decreased cardiac output  - Evaluate effectiveness of vasoactive medications to optimize hemodynamic stability  - Monitor arterial and/or venous puncture sites for bleeding and/or hematoma  - Assess quality of pulses, skin color and temperature  - Assess for signs of decreased coronary artery perfusion - ex.  Angina  - Evaluate fluid balance, assess for edema, trend weights  Outcome: Progressing  Goal: Absence of cardiac arrhythmias or at baseline  Description: INTERVENTIONS:  - Continuous cardiac monitoring, monitor vital signs, obtain 12 lead EKG if indicated  - Evaluate effectiveness of antiarrhythmic and heart rate control medications as ordered  - Initiate emergency measures for life threatening arrhythmias  - Monitor electrolytes and administer replacement therapy as ordered  Outcome: Progressing     Problem: RESPIRATORY - ADULT  Goal: Achieves optimal ventilation and oxygenation  Description: INTERVENTIONS:  - Assess for changes in respiratory status  - Assess for changes in mentation and behavior  - Position to facilitate oxygenation and minimize respiratory effort  - Oxygen supplementation based on oxygen saturation or ABGs  - Provide Smoking Cessation handout, if applicable  - Encourage broncho-pulmonary hygiene including cough, deep breathe, Incentive Spirometry  - Assess the need for suctioning and perform as needed  - Assess and instruct to report SOB or any respiratory difficulty  - Respiratory Therapy support as indicated  - Manage/alleviate anxiety  - Monitor for signs/symptoms of CO2 retention  Outcome: Progressing     Problem: METABOLIC/FLUID AND ELECTROLYTES - ADULT  Goal: Electrolytes maintained within normal limits  Description: INTERVENTIONS:  - Monitor labs and rhythm and assess patient for signs and symptoms of electrolyte imbalances  - Administer electrolyte replacement as ordered  - Monitor response to electrolyte replacements, including rhythm and repeat lab results as appropriate  - Fluid restriction as ordered  - Instruct patient on fluid and nutrition restrictions as appropriate  Outcome: Progressing     Problem: SKIN/TISSUE INTEGRITY - ADULT  Goal: Skin integrity remains intact  Description: INTERVENTIONS  - Assess and document risk factors for pressure ulcer development  - Assess and document skin integrity  - Monitor for areas of redness and/or skin breakdown  - Initiate interventions, skin care algorithm/standards of care as needed  Outcome: Progressing  Goal: Incision(s), wounds(s) or drain site(s) healing without S/S of infection  Description: INTERVENTIONS:  - Assess and document risk factors for pressure ulcer development  - Assess and document skin integrity  - Assess and document dressing/incision, wound bed, drain sites and surrounding tissue  - Implement wound care per orders  - Initiate isolation precautions as appropriate  - Initiate Pressure Ulcer prevention bundle as indicated  Outcome: Progressing

## 2022-01-22 NOTE — PLAN OF CARE
Assumed care at 1. Alert and oriented x4. Telemetry monitor reading SR/SB. O2 3L NC infusing. Purwick in place. No pain. Bilateral lymphedema wraps c/d/I. Refusing C-pap. Fall precautions maintained per protocol. Plan for labs. Call light in reach at bedside. Will continue to monitor. Problem: CARDIOVASCULAR - ADULT  Goal: Maintains optimal cardiac output and hemodynamic stability  Description: INTERVENTIONS:  - Monitor vital signs, rhythm, and trends  - Monitor for bleeding, hypotension and signs of decreased cardiac output  - Evaluate effectiveness of vasoactive medications to optimize hemodynamic stability  - Monitor arterial and/or venous puncture sites for bleeding and/or hematoma  - Assess quality of pulses, skin color and temperature  - Assess for signs of decreased coronary artery perfusion - ex.  Angina  - Evaluate fluid balance, assess for edema, trend weights  Outcome: Progressing  Goal: Absence of cardiac arrhythmias or at baseline  Description: INTERVENTIONS:  - Continuous cardiac monitoring, monitor vital signs, obtain 12 lead EKG if indicated  - Evaluate effectiveness of antiarrhythmic and heart rate control medications as ordered  - Initiate emergency measures for life threatening arrhythmias  - Monitor electrolytes and administer replacement therapy as ordered  Outcome: Progressing     Problem: RESPIRATORY - ADULT  Goal: Achieves optimal ventilation and oxygenation  Description: INTERVENTIONS:  - Assess for changes in respiratory status  - Assess for changes in mentation and behavior  - Position to facilitate oxygenation and minimize respiratory effort  - Oxygen supplementation based on oxygen saturation or ABGs  - Provide Smoking Cessation handout, if applicable  - Encourage broncho-pulmonary hygiene including cough, deep breathe, Incentive Spirometry  - Assess the need for suctioning and perform as needed  - Assess and instruct to report SOB or any respiratory difficulty  - Respiratory Therapy support as indicated  - Manage/alleviate anxiety  - Monitor for signs/symptoms of CO2 retention  Outcome: Progressing     Problem: METABOLIC/FLUID AND ELECTROLYTES - ADULT  Goal: Electrolytes maintained within normal limits  Description: INTERVENTIONS:  - Monitor labs and rhythm and assess patient for signs and symptoms of electrolyte imbalances  - Administer electrolyte replacement as ordered  - Monitor response to electrolyte replacements, including rhythm and repeat lab results as appropriate  - Fluid restriction as ordered  - Instruct patient on fluid and nutrition restrictions as appropriate  Outcome: Progressing     Problem: SKIN/TISSUE INTEGRITY - ADULT  Goal: Skin integrity remains intact  Description: INTERVENTIONS  - Assess and document risk factors for pressure ulcer development  - Assess and document skin integrity  - Monitor for areas of redness and/or skin breakdown  - Initiate interventions, skin care algorithm/standards of care as needed  Outcome: Progressing  Goal: Incision(s), wounds(s) or drain site(s) healing without S/S of infection  Description: INTERVENTIONS:  - Assess and document risk factors for pressure ulcer development  - Assess and document skin integrity  - Assess and document dressing/incision, wound bed, drain sites and surrounding tissue  - Implement wound care per orders  - Initiate isolation precautions as appropriate  - Initiate Pressure Ulcer prevention bundle as indicated  Outcome: Progressing

## 2022-01-23 LAB
ANION GAP SERPL CALC-SCNC: 6 MMOL/L (ref 0–18)
BUN BLD-MCNC: 55 MG/DL (ref 7–18)
CALCIUM BLD-MCNC: 8.6 MG/DL (ref 8.5–10.1)
CHLORIDE SERPL-SCNC: 111 MMOL/L (ref 98–112)
CO2 SERPL-SCNC: 27 MMOL/L (ref 21–32)
CREAT BLD-MCNC: 1.59 MG/DL
ERYTHROCYTE [DISTWIDTH] IN BLOOD BY AUTOMATED COUNT: 13.2 %
GLUCOSE BLD-MCNC: 110 MG/DL (ref 70–99)
GLUCOSE BLD-MCNC: 113 MG/DL (ref 70–99)
GLUCOSE BLD-MCNC: 116 MG/DL (ref 70–99)
GLUCOSE BLD-MCNC: 189 MG/DL (ref 70–99)
GLUCOSE BLD-MCNC: 211 MG/DL (ref 70–99)
GLUCOSE BLD-MCNC: 215 MG/DL (ref 70–99)
HCT VFR BLD AUTO: 32 %
HGB BLD-MCNC: 10.1 G/DL
MCH RBC QN AUTO: 29.9 PG (ref 26–34)
MCHC RBC AUTO-ENTMCNC: 31.6 G/DL (ref 31–37)
MCV RBC AUTO: 94.7 FL
OSMOLALITY SERPL CALC.SUM OF ELEC: 314 MOSM/KG (ref 275–295)
PLATELET # BLD AUTO: 145 10(3)UL (ref 150–450)
POTASSIUM SERPL-SCNC: 5.2 MMOL/L (ref 3.5–5.1)
RBC # BLD AUTO: 3.38 X10(6)UL
SODIUM SERPL-SCNC: 144 MMOL/L (ref 136–145)
WBC # BLD AUTO: 3.8 X10(3) UL (ref 4–11)

## 2022-01-23 PROCEDURE — 99233 SBSQ HOSP IP/OBS HIGH 50: CPT | Performed by: HOSPITALIST

## 2022-01-23 PROCEDURE — 99233 SBSQ HOSP IP/OBS HIGH 50: CPT | Performed by: INTERNAL MEDICINE

## 2022-01-23 NOTE — PLAN OF CARE
Assumed care of pt at 0730  A&Ox4, up with 1-2 assist and a walker, no complaints of pain  2L NC, , NSR, no chest pain, no shortness of breath  Skin is clean, dry, and intact, no wounds present  Continent of bowel, incontinent of bladder, last BM 1/22, voiding per periwick  Fall precautions in place, bed and chair alarm on, call light within reach, pt updated on plan of care, will continue to monitor                Problem: CARDIOVASCULAR - ADULT  Goal: Maintains optimal cardiac output and hemodynamic stability  Description: INTERVENTIONS:  - Monitor vital signs, rhythm, and trends  - Monitor for bleeding, hypotension and signs of decreased cardiac output  - Evaluate effectiveness of vasoactive medications to optimize hemodynamic stability  - Monitor arterial and/or venous puncture sites for bleeding and/or hematoma  - Assess quality of pulses, skin color and temperature  - Assess for signs of decreased coronary artery perfusion - ex.  Angina  - Evaluate fluid balance, assess for edema, trend weights  Outcome: Progressing  Goal: Absence of cardiac arrhythmias or at baseline  Description: INTERVENTIONS:  - Continuous cardiac monitoring, monitor vital signs, obtain 12 lead EKG if indicated  - Evaluate effectiveness of antiarrhythmic and heart rate control medications as ordered  - Initiate emergency measures for life threatening arrhythmias  - Monitor electrolytes and administer replacement therapy as ordered  Outcome: Progressing     Problem: RESPIRATORY - ADULT  Goal: Achieves optimal ventilation and oxygenation  Description: INTERVENTIONS:  - Assess for changes in respiratory status  - Assess for changes in mentation and behavior  - Position to facilitate oxygenation and minimize respiratory effort  - Oxygen supplementation based on oxygen saturation or ABGs  - Provide Smoking Cessation handout, if applicable  - Encourage broncho-pulmonary hygiene including cough, deep breathe, Incentive Spirometry  - Assess the need for suctioning and perform as needed  - Assess and instruct to report SOB or any respiratory difficulty  - Respiratory Therapy support as indicated  - Manage/alleviate anxiety  - Monitor for signs/symptoms of CO2 retention  Outcome: Progressing     Problem: METABOLIC/FLUID AND ELECTROLYTES - ADULT  Goal: Electrolytes maintained within normal limits  Description: INTERVENTIONS:  - Monitor labs and rhythm and assess patient for signs and symptoms of electrolyte imbalances  - Administer electrolyte replacement as ordered  - Monitor response to electrolyte replacements, including rhythm and repeat lab results as appropriate  - Fluid restriction as ordered  - Instruct patient on fluid and nutrition restrictions as appropriate  Outcome: Progressing     Problem: SKIN/TISSUE INTEGRITY - ADULT  Goal: Skin integrity remains intact  Description: INTERVENTIONS  - Assess and document risk factors for pressure ulcer development  - Assess and document skin integrity  - Monitor for areas of redness and/or skin breakdown  - Initiate interventions, skin care algorithm/standards of care as needed  Outcome: Progressing  Goal: Incision(s), wounds(s) or drain site(s) healing without S/S of infection  Description: INTERVENTIONS:  - Assess and document risk factors for pressure ulcer development  - Assess and document skin integrity  - Assess and document dressing/incision, wound bed, drain sites and surrounding tissue  - Implement wound care per orders  - Initiate isolation precautions as appropriate  - Initiate Pressure Ulcer prevention bundle as indicated  Outcome: Progressing

## 2022-01-23 NOTE — PLAN OF CARE
Assumed care of pt at 1930 a/o x4 watching TV in no apparent distress. Lymphedema wraps to BLE intact and pt denies pain. Monitor shows sinus dina with PAC's asymptomatic. BP stable. Berry Epp in place and functioning adequately with clear yellow urine. She is on PO Bumex. DW, labs, QID glucose, encourage self care, hep subcutaneous, 3L o2 w/. Safety precautions in place and needs attended to. Problem: CARDIOVASCULAR - ADULT  Goal: Maintains optimal cardiac output and hemodynamic stability  Description: INTERVENTIONS:  - Monitor vital signs, rhythm, and trends  - Monitor for bleeding, hypotension and signs of decreased cardiac output  - Evaluate effectiveness of vasoactive medications to optimize hemodynamic stability  - Monitor arterial and/or venous puncture sites for bleeding and/or hematoma  - Assess quality of pulses, skin color and temperature  - Assess for signs of decreased coronary artery perfusion - ex.  Angina  - Evaluate fluid balance, assess for edema, trend weights  Outcome: Progressing  Goal: Absence of cardiac arrhythmias or at baseline  Description: INTERVENTIONS:  - Continuous cardiac monitoring, monitor vital signs, obtain 12 lead EKG if indicated  - Evaluate effectiveness of antiarrhythmic and heart rate control medications as ordered  - Initiate emergency measures for life threatening arrhythmias  - Monitor electrolytes and administer replacement therapy as ordered  Outcome: Progressing     Problem: RESPIRATORY - ADULT  Goal: Achieves optimal ventilation and oxygenation  Description: INTERVENTIONS:  - Assess for changes in respiratory status  - Assess for changes in mentation and behavior  - Position to facilitate oxygenation and minimize respiratory effort  - Oxygen supplementation based on oxygen saturation or ABGs  - Provide Smoking Cessation handout, if applicable  - Encourage broncho-pulmonary hygiene including cough, deep breathe, Incentive Spirometry  - Assess the need for suctioning and perform as needed  - Assess and instruct to report SOB or any respiratory difficulty  - Respiratory Therapy support as indicated  - Manage/alleviate anxiety  - Monitor for signs/symptoms of CO2 retention  Outcome: Progressing     Problem: METABOLIC/FLUID AND ELECTROLYTES - ADULT  Goal: Electrolytes maintained within normal limits  Description: INTERVENTIONS:  - Monitor labs and rhythm and assess patient for signs and symptoms of electrolyte imbalances  - Administer electrolyte replacement as ordered  - Monitor response to electrolyte replacements, including rhythm and repeat lab results as appropriate  - Fluid restriction as ordered  - Instruct patient on fluid and nutrition restrictions as appropriate  Outcome: Progressing     Problem: SKIN/TISSUE INTEGRITY - ADULT  Goal: Skin integrity remains intact  Description: INTERVENTIONS  - Assess and document risk factors for pressure ulcer development  - Assess and document skin integrity  - Monitor for areas of redness and/or skin breakdown  - Initiate interventions, skin care algorithm/standards of care as needed  Outcome: Progressing  Goal: Incision(s), wounds(s) or drain site(s) healing without S/S of infection  Description: INTERVENTIONS:  - Assess and document risk factors for pressure ulcer development  - Assess and document skin integrity  - Assess and document dressing/incision, wound bed, drain sites and surrounding tissue  - Implement wound care per orders  - Initiate isolation precautions as appropriate  - Initiate Pressure Ulcer prevention bundle as indicated  Outcome: Progressing Self

## 2022-01-24 LAB
ANION GAP SERPL CALC-SCNC: 6 MMOL/L (ref 0–18)
BUN BLD-MCNC: 51 MG/DL (ref 7–18)
CALCIUM BLD-MCNC: 8.5 MG/DL (ref 8.5–10.1)
CHLORIDE SERPL-SCNC: 109 MMOL/L (ref 98–112)
CO2 SERPL-SCNC: 28 MMOL/L (ref 21–32)
CREAT BLD-MCNC: 1.63 MG/DL
GLUCOSE BLD-MCNC: 130 MG/DL (ref 70–99)
GLUCOSE BLD-MCNC: 135 MG/DL (ref 70–99)
GLUCOSE BLD-MCNC: 152 MG/DL (ref 70–99)
GLUCOSE BLD-MCNC: 207 MG/DL (ref 70–99)
GLUCOSE BLD-MCNC: 233 MG/DL (ref 70–99)
OSMOLALITY SERPL CALC.SUM OF ELEC: 312 MOSM/KG (ref 275–295)
POTASSIUM SERPL-SCNC: 4.4 MMOL/L (ref 3.5–5.1)
SODIUM SERPL-SCNC: 143 MMOL/L (ref 136–145)

## 2022-01-24 PROCEDURE — 99232 SBSQ HOSP IP/OBS MODERATE 35: CPT | Performed by: INTERNAL MEDICINE

## 2022-01-24 PROCEDURE — 99233 SBSQ HOSP IP/OBS HIGH 50: CPT | Performed by: HOSPITALIST

## 2022-01-24 RX ORDER — INSULIN DEGLUDEC INJECTION 100 U/ML
15 INJECTION, SOLUTION SUBCUTANEOUS EVERY MORNING
Qty: 3 EACH | Refills: 0 | Status: SHIPPED
Start: 2022-01-24

## 2022-01-24 RX ORDER — METOPROLOL SUCCINATE 50 MG/1
25 TABLET, EXTENDED RELEASE ORAL DAILY
COMMUNITY

## 2022-01-24 NOTE — PLAN OF CARE
Patient alert and orientedx 4 , up with 2 assist , 2L o2 supporting , sinus dina on cardiac monitor , insomnia noted  ,melatonin administered and little  helping , plan of care updated  And following good,  ,safety  /fall precaution maintained , bed alrm on , call light within reach , patient resting comfortable , will continue to monitor closely    Problem: CARDIOVASCULAR - ADULT  Goal: Maintains optimal cardiac output and hemodynamic stability  Description: INTERVENTIONS:  - Monitor vital signs, rhythm, and trends  - Monitor for bleeding, hypotension and signs of decreased cardiac output  - Evaluate effectiveness of vasoactive medications to optimize hemodynamic stability  - Monitor arterial and/or venous puncture sites for bleeding and/or hematoma  - Assess quality of pulses, skin color and temperature  - Assess for signs of decreased coronary artery perfusion - ex.  Angina  - Evaluate fluid balance, assess for edema, trend weights  Outcome: Progressing  Goal: Absence of cardiac arrhythmias or at baseline  Description: INTERVENTIONS:  - Continuous cardiac monitoring, monitor vital signs, obtain 12 lead EKG if indicated  - Evaluate effectiveness of antiarrhythmic and heart rate control medications as ordered  - Initiate emergency measures for life threatening arrhythmias  - Monitor electrolytes and administer replacement therapy as ordered  Outcome: Progressing     Problem: RESPIRATORY - ADULT  Goal: Achieves optimal ventilation and oxygenation  Description: INTERVENTIONS:  - Assess for changes in respiratory status  - Assess for changes in mentation and behavior  - Position to facilitate oxygenation and minimize respiratory effort  - Oxygen supplementation based on oxygen saturation or ABGs  - Provide Smoking Cessation handout, if applicable  - Encourage broncho-pulmonary hygiene including cough, deep breathe, Incentive Spirometry  - Assess the need for suctioning and perform as needed  - Assess and instruct to report SOB or any respiratory difficulty  - Respiratory Therapy support as indicated  - Manage/alleviate anxiety  - Monitor for signs/symptoms of CO2 retention  Outcome: Progressing     Problem: METABOLIC/FLUID AND ELECTROLYTES - ADULT  Goal: Electrolytes maintained within normal limits  Description: INTERVENTIONS:  - Monitor labs and rhythm and assess patient for signs and symptoms of electrolyte imbalances  - Administer electrolyte replacement as ordered  - Monitor response to electrolyte replacements, including rhythm and repeat lab results as appropriate  - Fluid restriction as ordered  - Instruct patient on fluid and nutrition restrictions as appropriate  Outcome: Progressing     Problem: SKIN/TISSUE INTEGRITY - ADULT  Goal: Skin integrity remains intact  Description: INTERVENTIONS  - Assess and document risk factors for pressure ulcer development  - Assess and document skin integrity  - Monitor for areas of redness and/or skin breakdown  - Initiate interventions, skin care algorithm/standards of care as needed  Outcome: Progressing  Goal: Incision(s), wounds(s) or drain site(s) healing without S/S of infection  Description: INTERVENTIONS:  - Assess and document risk factors for pressure ulcer development  - Assess and document skin integrity  - Assess and document dressing/incision, wound bed, drain sites and surrounding tissue  - Implement wound care per orders  - Initiate isolation precautions as appropriate  - Initiate Pressure Ulcer prevention bundle as indicated  Outcome: Progressing     Problem: SKIN/TISSUE INTEGRITY - ADULT  Goal: Skin integrity remains intact  Description: INTERVENTIONS  - Assess and document risk factors for pressure ulcer development  - Assess and document skin integrity  - Monitor for areas of redness and/or skin breakdown  - Initiate interventions, skin care algorithm/standards of care as needed  Outcome: Progressing  Goal: Incision(s), wounds(s) or drain site(s) healing without S/S of infection  Description: INTERVENTIONS:  - Assess and document risk factors for pressure ulcer development  - Assess and document skin integrity  - Assess and document dressing/incision, wound bed, drain sites and surrounding tissue  - Implement wound care per orders  - Initiate isolation precautions as appropriate  - Initiate Pressure Ulcer prevention bundle as indicated  Outcome: Progressing

## 2022-01-24 NOTE — PLAN OF CARE
Assumed care for pt at 19:30 on 1/23    A&Ox4, denies pain or HELENA. VSS on RA. HR as low as 42, sinus arrhythmia/sinus dina with pacs&pvcs. Purewick in place. Up with 1-2 and walker. Heparin sub q for VTE prophylaxis. 2 units novolog for bg 211. Bed alarm on, call light in reach, able to make needs known.     Report given to oncoming RN at 23:00

## 2022-01-24 NOTE — PLAN OF CARE
Assumed care at 0730. Pt is A&Ox4, forgetful at times. Pt is on 3L O2 baseline with , sats maintaining >90%, lungs clear. NSR/SB on tele, VSS. No complaints of cardiac symptoms. Incontinent of bladder. Denies pain at this time. Up x2 w/ walker, refused to get up today. . Plan of care reviewed with patient, verbalizes understanding, all needs addressed at this time, pt seems to be resting comfortably. Problem: CARDIOVASCULAR - ADULT  Goal: Maintains optimal cardiac output and hemodynamic stability  Description: INTERVENTIONS:  - Monitor vital signs, rhythm, and trends  - Monitor for bleeding, hypotension and signs of decreased cardiac output  - Evaluate effectiveness of vasoactive medications to optimize hemodynamic stability  - Monitor arterial and/or venous puncture sites for bleeding and/or hematoma  - Assess quality of pulses, skin color and temperature  - Assess for signs of decreased coronary artery perfusion - ex.  Angina  - Evaluate fluid balance, assess for edema, trend weights  Outcome: Progressing  Goal: Absence of cardiac arrhythmias or at baseline  Description: INTERVENTIONS:  - Continuous cardiac monitoring, monitor vital signs, obtain 12 lead EKG if indicated  - Evaluate effectiveness of antiarrhythmic and heart rate control medications as ordered  - Initiate emergency measures for life threatening arrhythmias  - Monitor electrolytes and administer replacement therapy as ordered  Outcome: Progressing     Problem: RESPIRATORY - ADULT  Goal: Achieves optimal ventilation and oxygenation  Description: INTERVENTIONS:  - Assess for changes in respiratory status  - Assess for changes in mentation and behavior  - Position to facilitate oxygenation and minimize respiratory effort  - Oxygen supplementation based on oxygen saturation or ABGs  - Provide Smoking Cessation handout, if applicable  - Encourage broncho-pulmonary hygiene including cough, deep breathe, Incentive Spirometry  - Assess the need for suctioning and perform as needed  - Assess and instruct to report SOB or any respiratory difficulty  - Respiratory Therapy support as indicated  - Manage/alleviate anxiety  - Monitor for signs/symptoms of CO2 retention  Outcome: Progressing     Problem: METABOLIC/FLUID AND ELECTROLYTES - ADULT  Goal: Electrolytes maintained within normal limits  Description: INTERVENTIONS:  - Monitor labs and rhythm and assess patient for signs and symptoms of electrolyte imbalances  - Administer electrolyte replacement as ordered  - Monitor response to electrolyte replacements, including rhythm and repeat lab results as appropriate  - Fluid restriction as ordered  - Instruct patient on fluid and nutrition restrictions as appropriate  Outcome: Progressing     Problem: SKIN/TISSUE INTEGRITY - ADULT  Goal: Skin integrity remains intact  Description: INTERVENTIONS  - Assess and document risk factors for pressure ulcer development  - Assess and document skin integrity  - Monitor for areas of redness and/or skin breakdown  - Initiate interventions, skin care algorithm/standards of care as needed  Outcome: Progressing  Goal: Incision(s), wounds(s) or drain site(s) healing without S/S of infection  Description: INTERVENTIONS:  - Assess and document risk factors for pressure ulcer development  - Assess and document skin integrity  - Assess and document dressing/incision, wound bed, drain sites and surrounding tissue  - Implement wound care per orders  - Initiate isolation precautions as appropriate  - Initiate Pressure Ulcer prevention bundle as indicated  Outcome: Progressing

## 2022-01-25 VITALS
DIASTOLIC BLOOD PRESSURE: 63 MMHG | WEIGHT: 257.94 LBS | HEART RATE: 55 BPM | RESPIRATION RATE: 20 BRPM | BODY MASS INDEX: 41.45 KG/M2 | HEIGHT: 66 IN | TEMPERATURE: 98 F | SYSTOLIC BLOOD PRESSURE: 122 MMHG | OXYGEN SATURATION: 99 %

## 2022-01-25 LAB
ANION GAP SERPL CALC-SCNC: 3 MMOL/L (ref 0–18)
BUN BLD-MCNC: 50 MG/DL (ref 7–18)
CALCIUM BLD-MCNC: 8.4 MG/DL (ref 8.5–10.1)
CHLORIDE SERPL-SCNC: 108 MMOL/L (ref 98–112)
CO2 SERPL-SCNC: 30 MMOL/L (ref 21–32)
CREAT BLD-MCNC: 1.92 MG/DL
GLUCOSE BLD-MCNC: 154 MG/DL (ref 70–99)
GLUCOSE BLD-MCNC: 154 MG/DL (ref 70–99)
GLUCOSE BLD-MCNC: 168 MG/DL (ref 70–99)
MAGNESIUM SERPL-MCNC: 1.3 MG/DL (ref 1.6–2.6)
OSMOLALITY SERPL CALC.SUM OF ELEC: 308 MOSM/KG (ref 275–295)
POTASSIUM SERPL-SCNC: 4.7 MMOL/L (ref 3.5–5.1)
SODIUM SERPL-SCNC: 141 MMOL/L (ref 136–145)

## 2022-01-25 PROCEDURE — 99233 SBSQ HOSP IP/OBS HIGH 50: CPT | Performed by: HOSPITALIST

## 2022-01-25 RX ORDER — MAGNESIUM SULFATE HEPTAHYDRATE 40 MG/ML
2 INJECTION, SOLUTION INTRAVENOUS ONCE
Status: COMPLETED | OUTPATIENT
Start: 2022-01-25 | End: 2022-01-25

## 2022-01-25 NOTE — PLAN OF CARE
Patient alert x4 ,  Oxygen 2L helping  During night ,sinus on moniitor, denies any pain/ chest discomfort , blood sugar monitoring , fall precaution and isolation precaution maintained , patient comfortable, will continue to monitor closely    Problem: CARDIOVASCULAR - ADULT  Goal: Maintains optimal cardiac output and hemodynamic stability  Description: INTERVENTIONS:  - Monitor vital signs, rhythm, and trends  - Monitor for bleeding, hypotension and signs of decreased cardiac output  - Evaluate effectiveness of vasoactive medications to optimize hemodynamic stability  - Monitor arterial and/or venous puncture sites for bleeding and/or hematoma  - Assess quality of pulses, skin color and temperature  - Assess for signs of decreased coronary artery perfusion - ex.  Angina  - Evaluate fluid balance, assess for edema, trend weights  Outcome: Progressing  Goal: Absence of cardiac arrhythmias or at baseline  Description: INTERVENTIONS:  - Continuous cardiac monitoring, monitor vital signs, obtain 12 lead EKG if indicated  - Evaluate effectiveness of antiarrhythmic and heart rate control medications as ordered  - Initiate emergency measures for life threatening arrhythmias  - Monitor electrolytes and administer replacement therapy as ordered  Outcome: Progressing     Problem: RESPIRATORY - ADULT  Goal: Achieves optimal ventilation and oxygenation  Description: INTERVENTIONS:  - Assess for changes in respiratory status  - Assess for changes in mentation and behavior  - Position to facilitate oxygenation and minimize respiratory effort  - Oxygen supplementation based on oxygen saturation or ABGs  - Provide Smoking Cessation handout, if applicable  - Encourage broncho-pulmonary hygiene including cough, deep breathe, Incentive Spirometry  - Assess the need for suctioning and perform as needed  - Assess and instruct to report SOB or any respiratory difficulty  - Respiratory Therapy support as indicated  - Manage/alleviate anxiety  - Monitor for signs/symptoms of CO2 retention  Outcome: Progressing     Problem: METABOLIC/FLUID AND ELECTROLYTES - ADULT  Goal: Electrolytes maintained within normal limits  Description: INTERVENTIONS:  - Monitor labs and rhythm and assess patient for signs and symptoms of electrolyte imbalances  - Administer electrolyte replacement as ordered  - Monitor response to electrolyte replacements, including rhythm and repeat lab results as appropriate  - Fluid restriction as ordered  - Instruct patient on fluid and nutrition restrictions as appropriate  Outcome: Progressing     Problem: SKIN/TISSUE INTEGRITY - ADULT  Goal: Skin integrity remains intact  Description: INTERVENTIONS  - Assess and document risk factors for pressure ulcer development  - Assess and document skin integrity  - Monitor for areas of redness and/or skin breakdown  - Initiate interventions, skin care algorithm/standards of care as needed  Outcome: Progressing  Goal: Incision(s), wounds(s) or drain site(s) healing without S/S of infection  Description: INTERVENTIONS:  - Assess and document risk factors for pressure ulcer development  - Assess and document skin integrity  - Assess and document dressing/incision, wound bed, drain sites and surrounding tissue  - Implement wound care per orders  - Initiate isolation precautions as appropriate  - Initiate Pressure Ulcer prevention bundle as indicated  Outcome: Progressing     Problem: SKIN/TISSUE INTEGRITY - ADULT  Goal: Skin integrity remains intact  Description: INTERVENTIONS  - Assess and document risk factors for pressure ulcer development  - Assess and document skin integrity  - Monitor for areas of redness and/or skin breakdown  - Initiate interventions, skin care algorithm/standards of care as needed  Outcome: Progressing  Goal: Incision(s), wounds(s) or drain site(s) healing without S/S of infection  Description: INTERVENTIONS:  - Assess and document risk factors for pressure ulcer development  - Assess and document skin integrity  - Assess and document dressing/incision, wound bed, drain sites and surrounding tissue  - Implement wound care per orders  - Initiate isolation precautions as appropriate  - Initiate Pressure Ulcer prevention bundle as indicated  Outcome: Progressing

## 2022-01-25 NOTE — PLAN OF CARE
Discharge planning today. Cpm.  Problem: CARDIOVASCULAR - ADULT  Goal: Maintains optimal cardiac output and hemodynamic stability  Description: INTERVENTIONS:  - Monitor vital signs, rhythm, and trends  - Monitor for bleeding, hypotension and signs of decreased cardiac output  - Evaluate effectiveness of vasoactive medications to optimize hemodynamic stability  - Monitor arterial and/or venous puncture sites for bleeding and/or hematoma  - Assess quality of pulses, skin color and temperature  - Assess for signs of decreased coronary artery perfusion - ex.  Angina  - Evaluate fluid balance, assess for edema, trend weights  Outcome: Progressing  Goal: Absence of cardiac arrhythmias or at baseline  Description: INTERVENTIONS:  - Continuous cardiac monitoring, monitor vital signs, obtain 12 lead EKG if indicated  - Evaluate effectiveness of antiarrhythmic and heart rate control medications as ordered  - Initiate emergency measures for life threatening arrhythmias  - Monitor electrolytes and administer replacement therapy as ordered  Outcome: Progressing     Problem: RESPIRATORY - ADULT  Goal: Achieves optimal ventilation and oxygenation  Description: INTERVENTIONS:  - Assess for changes in respiratory status  - Assess for changes in mentation and behavior  - Position to facilitate oxygenation and minimize respiratory effort  - Oxygen supplementation based on oxygen saturation or ABGs  - Provide Smoking Cessation handout, if applicable  - Encourage broncho-pulmonary hygiene including cough, deep breathe, Incentive Spirometry  - Assess the need for suctioning and perform as needed  - Assess and instruct to report SOB or any respiratory difficulty  - Respiratory Therapy support as indicated  - Manage/alleviate anxiety  - Monitor for signs/symptoms of CO2 retention  Outcome: Progressing     Problem: METABOLIC/FLUID AND ELECTROLYTES - ADULT  Goal: Electrolytes maintained within normal limits  Description: INTERVENTIONS:  - Monitor labs and rhythm and assess patient for signs and symptoms of electrolyte imbalances  - Administer electrolyte replacement as ordered  - Monitor response to electrolyte replacements, including rhythm and repeat lab results as appropriate  - Fluid restriction as ordered  - Instruct patient on fluid and nutrition restrictions as appropriate  Outcome: Progressing     Problem: SKIN/TISSUE INTEGRITY - ADULT  Goal: Skin integrity remains intact  Description: INTERVENTIONS  - Assess and document risk factors for pressure ulcer development  - Assess and document skin integrity  - Monitor for areas of redness and/or skin breakdown  - Initiate interventions, skin care algorithm/standards of care as needed  Outcome: Progressing  Goal: Incision(s), wounds(s) or drain site(s) healing without S/S of infection  Description: INTERVENTIONS:  - Assess and document risk factors for pressure ulcer development  - Assess and document skin integrity  - Assess and document dressing/incision, wound bed, drain sites and surrounding tissue  - Implement wound care per orders  - Initiate isolation precautions as appropriate  - Initiate Pressure Ulcer prevention bundle as indicated  Outcome: Progressing

## 2022-01-31 ENCOUNTER — OFFICE VISIT (OUTPATIENT)
Dept: WOUND CARE | Facility: HOSPITAL | Age: 77
End: 2022-01-31
Attending: INTERNAL MEDICINE
Payer: MEDICARE

## 2022-01-31 VITALS
SYSTOLIC BLOOD PRESSURE: 124 MMHG | RESPIRATION RATE: 14 BRPM | DIASTOLIC BLOOD PRESSURE: 80 MMHG | TEMPERATURE: 98 F | HEART RATE: 98 BPM

## 2022-01-31 DIAGNOSIS — L97.919 IDIOPATHIC CHRONIC VENOUS HYPERTENSION OF BOTH LOWER EXTREMITIES WITH ULCER AND INFLAMMATION (HCC): ICD-10-CM

## 2022-01-31 DIAGNOSIS — L97.511 RIGHT FOOT ULCER, LIMITED TO BREAKDOWN OF SKIN (HCC): ICD-10-CM

## 2022-01-31 DIAGNOSIS — L97.212 NON-PRESSURE CHRONIC ULCER OF RIGHT CALF WITH FAT LAYER EXPOSED (HCC): ICD-10-CM

## 2022-01-31 DIAGNOSIS — I87.333 IDIOPATHIC CHRONIC VENOUS HYPERTENSION OF BOTH LOWER EXTREMITIES WITH ULCER AND INFLAMMATION (HCC): ICD-10-CM

## 2022-01-31 DIAGNOSIS — L97.929 IDIOPATHIC CHRONIC VENOUS HYPERTENSION OF BOTH LOWER EXTREMITIES WITH ULCER AND INFLAMMATION (HCC): ICD-10-CM

## 2022-01-31 DIAGNOSIS — N18.32 STAGE 3B CHRONIC KIDNEY DISEASE (HCC): ICD-10-CM

## 2022-01-31 DIAGNOSIS — L97.222 NON-PRESSURE CHRONIC ULCER OF LEFT CALF WITH FAT LAYER EXPOSED (HCC): Primary | ICD-10-CM

## 2022-01-31 DIAGNOSIS — E11.628 TYPE 2 DIABETES MELLITUS WITH OTHER SKIN COMPLICATIONS (HCC): ICD-10-CM

## 2022-01-31 LAB — GLUCOSE BLD-MCNC: 203 MG/DL (ref 70–99)

## 2022-01-31 PROCEDURE — 29581 APPL MULTLAYER CMPRN SYS LEG: CPT

## 2022-01-31 PROCEDURE — 99214 OFFICE O/P EST MOD 30 MIN: CPT

## 2022-01-31 PROCEDURE — 82962 GLUCOSE BLOOD TEST: CPT | Performed by: INTERNAL MEDICINE

## 2022-01-31 NOTE — PATIENT INSTRUCTIONS
Patient Instructions and orders for Wound Care     1. Dressings as below.   2. continue coflex-2 layer  multilayer wraps- to be changed 3 times a week. 3. [address]  4.  Recommend consultation with Bariatric Surgery team / [facility] products     · Vitamin C: Citrus fruits and juices, strawberries, tomatoes, tomato juice, peppers, baked potatoes, spinach, broccoli, cauliflower, [place] sprouts, cabbage     · Vitamin A: Dark green, leafy vegetables, orange or yellow vegetables, cantalo

## 2022-01-31 NOTE — PROGRESS NOTES
[place] [facility] NOTE  Lesley James MD  1/31/2022    Chief Complaint:   Patient presents with:  Wound Care: Patients is here for a follow up.  Patients complain pain on the bilateral toes       HPI:   Subjective   Reina Veronique is a 68 y 141-180 mg/dL  Give 3 units if blood glucose 181-220 mg/dL  Give 6 units if blood glucose 221-260 mg/dL  Give 8 units if blood glucose 261-300 mg/dL  Give 10 units if blood glucose 301-350 mg/dL  Call Physician if blood glucose is greater than 351 mg/dl  0 Treatments Compression 01/10/22 1128   Dressing ABD Pad;Other;Kerlix roll;Tape;Coban;[place] 12/08/21 1043   Wound Length (cm) 24 cm 01/31/22 1040   Wound Width (cm) 16 cm 01/31/22 1040   Wound Surface Area (cm^2) 384 cm^2 01/31/22 1040   Wound Depth List  Patient Active Problem List:     GAYLE on CPAP     DISH (diffuse idiopathic skeletal hyperostosis)     Psoriasis     Osteoarthritis, multiple sites     Uncontrolled type 2 diabetes mellitus with insulin therapy ([facility] [address]     Hypertension     High risk medi and orders for Wound Care     1. Dressings as below.   2. continue coflex-2 layer  multilayer wraps- to be changed 3 times a week. 3. [address]  4.  Recommend consultation with Bariatric Surgery team / medical weight loss progr red meats, seafood     · Consider Speedy [employer] (These are essential branch chain amino acids that help with tissue building and wound healing) and take 2 packets/day. you can order online at [employer] or [employer] REMINDERS:     1.  The lucian speech recognition software and may contain errors related to that system including errors in grammar, punctuation, and spelling, as well as words and phrases that may be inappropriate.  If there are any questions or concerns please feel free to contact the

## 2022-01-31 NOTE — PROGRESS NOTES
.Weekly Wound Education Note    Teaching Provided To: Patient  Training Topics: Edema control; Compression;Cleasing and general instructions;Dressing; Discharge instructions  Training Method: Explain/Verbal;Written  Training Response: Patient responds and un

## 2022-03-16 ENCOUNTER — APPOINTMENT (OUTPATIENT)
Dept: WOUND CARE | Facility: HOSPITAL | Age: 77
End: 2022-03-16
Attending: INTERNAL MEDICINE
Payer: MEDICARE

## 2022-03-21 ENCOUNTER — OFFICE VISIT (OUTPATIENT)
Dept: WOUND CARE | Facility: HOSPITAL | Age: 77
End: 2022-03-21
Attending: INTERNAL MEDICINE
Payer: MEDICARE

## 2022-03-21 VITALS
HEART RATE: 76 BPM | TEMPERATURE: 98 F | DIASTOLIC BLOOD PRESSURE: 78 MMHG | SYSTOLIC BLOOD PRESSURE: 135 MMHG | RESPIRATION RATE: 16 BRPM

## 2022-03-21 DIAGNOSIS — L97.212 NON-PRESSURE CHRONIC ULCER OF RIGHT CALF WITH FAT LAYER EXPOSED (HCC): ICD-10-CM

## 2022-03-21 DIAGNOSIS — I87.333 IDIOPATHIC CHRONIC VENOUS HYPERTENSION OF BOTH LOWER EXTREMITIES WITH ULCER AND INFLAMMATION (HCC): ICD-10-CM

## 2022-03-21 DIAGNOSIS — L97.912 ULCER OF RIGHT LOWER LEG, WITH FAT LAYER EXPOSED (HCC): ICD-10-CM

## 2022-03-21 DIAGNOSIS — L97.929 IDIOPATHIC CHRONIC VENOUS HYPERTENSION OF BOTH LOWER EXTREMITIES WITH ULCER AND INFLAMMATION (HCC): ICD-10-CM

## 2022-03-21 DIAGNOSIS — N18.32 STAGE 3B CHRONIC KIDNEY DISEASE (HCC): ICD-10-CM

## 2022-03-21 DIAGNOSIS — E11.628 TYPE 2 DIABETES MELLITUS WITH OTHER SKIN COMPLICATIONS (HCC): ICD-10-CM

## 2022-03-21 DIAGNOSIS — L97.222 NON-PRESSURE CHRONIC ULCER OF LEFT CALF WITH FAT LAYER EXPOSED (HCC): Primary | ICD-10-CM

## 2022-03-21 DIAGNOSIS — L97.919 IDIOPATHIC CHRONIC VENOUS HYPERTENSION OF BOTH LOWER EXTREMITIES WITH ULCER AND INFLAMMATION (HCC): ICD-10-CM

## 2022-03-21 LAB — GLUCOSE BLD-MCNC: 117 MG/DL (ref 70–99)

## 2022-03-21 PROCEDURE — 29581 APPL MULTLAYER CMPRN SYS LEG: CPT

## 2022-03-21 PROCEDURE — 82962 GLUCOSE BLOOD TEST: CPT | Performed by: INTERNAL MEDICINE

## 2022-03-21 NOTE — PROGRESS NOTES
.Weekly Wound Education Note    Teaching Provided To: Patient  Training Topics: Edema control;Dressing; Discharge instructions; Compression;Cleasing and general instructions  Training Method: Explain/Verbal;Written  Training Response: Patient responds and understands; Reinforcement needed           Zinc oxide to toes, gauze in between each toe, pt encouraged to change these dressings daily. Baby diapers to legs and feet, coflex 2 layer wraps.

## 2022-05-02 ENCOUNTER — OFFICE VISIT (OUTPATIENT)
Dept: WOUND CARE | Facility: HOSPITAL | Age: 77
End: 2022-05-02
Attending: INTERNAL MEDICINE
Payer: MEDICARE

## 2022-05-02 VITALS
RESPIRATION RATE: 16 BRPM | TEMPERATURE: 98 F | DIASTOLIC BLOOD PRESSURE: 80 MMHG | HEART RATE: 82 BPM | SYSTOLIC BLOOD PRESSURE: 132 MMHG

## 2022-05-02 DIAGNOSIS — L97.912 ULCER OF RIGHT LOWER LEG, WITH FAT LAYER EXPOSED (HCC): ICD-10-CM

## 2022-05-02 DIAGNOSIS — L97.929 IDIOPATHIC CHRONIC VENOUS HYPERTENSION OF BOTH LOWER EXTREMITIES WITH ULCER AND INFLAMMATION (HCC): ICD-10-CM

## 2022-05-02 DIAGNOSIS — E11.628 TYPE 2 DIABETES MELLITUS WITH OTHER SKIN COMPLICATIONS (HCC): ICD-10-CM

## 2022-05-02 DIAGNOSIS — N18.32 STAGE 3B CHRONIC KIDNEY DISEASE (HCC): ICD-10-CM

## 2022-05-02 DIAGNOSIS — L97.222 NON-PRESSURE CHRONIC ULCER OF LEFT CALF WITH FAT LAYER EXPOSED (HCC): Primary | ICD-10-CM

## 2022-05-02 DIAGNOSIS — I87.333 IDIOPATHIC CHRONIC VENOUS HYPERTENSION OF BOTH LOWER EXTREMITIES WITH ULCER AND INFLAMMATION (HCC): ICD-10-CM

## 2022-05-02 DIAGNOSIS — L97.919 IDIOPATHIC CHRONIC VENOUS HYPERTENSION OF BOTH LOWER EXTREMITIES WITH ULCER AND INFLAMMATION (HCC): ICD-10-CM

## 2022-05-02 DIAGNOSIS — L97.212 NON-PRESSURE CHRONIC ULCER OF RIGHT CALF WITH FAT LAYER EXPOSED (HCC): ICD-10-CM

## 2022-05-02 LAB — GLUCOSE BLD-MCNC: 160 MG/DL (ref 70–99)

## 2022-05-02 PROCEDURE — 29581 APPL MULTLAYER CMPRN SYS LEG: CPT

## 2022-05-02 PROCEDURE — 82962 GLUCOSE BLOOD TEST: CPT | Performed by: INTERNAL MEDICINE

## 2022-05-02 NOTE — PROGRESS NOTES
.Weekly Wound Education Note    Teaching Provided To: Patient  Training Topics: Discharge instructions;Dressing; Compression;Edema control;Cleasing and general instructions  Training Method: Explain/Verbal;Written  Training Response: Patient responds and understands; Reinforcement needed           Legs continue to weep, zinc oxide applied. Zinc gauze between the toes. Legs and feet covered with baby diapers. Patient encouraged to elevate legs and limit salt intake.

## 2022-05-04 NOTE — PLAN OF CARE
Addended by: Seferino Callejas on: 5/4/2022 07:01 AM     Modules accepted: Orders Patient is alert and oriented x3  Up with SBA. Voids  +BM +scant amount of blood with BM. Incision to ABD is CDI  Tolerating low res diet. Patient assessed and ready for DC   DC instructions provided to patient.  GRANT drain teaching provided to brigida bundle as indicated  Outcome: Adequate for Discharge     Problem: HEMATOLOGIC - ADULT  Goal: Maintains hematologic stability  Description  INTERVENTIONS  - Assess for signs and symptoms of bleeding or hemorrhage  - Monitor labs and vital signs for trends

## 2022-05-06 NOTE — PLAN OF CARE
Problem: GASTROINTESTINAL - ADULT  Goal: Minimal or absence of nausea and vomiting  Description  INTERVENTIONS:  - Maintain adequate hydration with IV or PO as ordered and tolerated  - Nasogastric tube to low intermittent suction as ordered  - Evaluate e Problem: HEMATOLOGIC - ADULT  Goal: Maintains hematologic stability  Description  INTERVENTIONS  - Assess for signs and symptoms of bleeding or hemorrhage  - Monitor labs and vital signs for trends  - Administer supportive blood products/factors, fluids Elidel Counseling: Patient may experience a mild burning sensation during topical application. Elidel is not approved in children less than 2 years of age. There have been case reports of hematologic and skin malignancies in patients using topical calcineurin inhibitors although causality is questionable.

## 2022-06-03 ENCOUNTER — TELEPHONE (OUTPATIENT)
Dept: WOUND CARE | Facility: HOSPITAL | Age: 77
End: 2022-06-03

## 2022-06-03 NOTE — TELEPHONE ENCOUNTER
Spoke with the nurse at Dr. Marya Jenkins office regarding VM about getting patient in sooner due to a large wound to her sarcum. She states the patient's home health nurse also quit and there is no one taking care of her leg wounds. Informed her the patient would be contacted about a possible sooner appointment date, she states she understands.

## 2022-06-06 ENCOUNTER — APPOINTMENT (OUTPATIENT)
Dept: CT IMAGING | Facility: HOSPITAL | Age: 77
End: 2022-06-06
Attending: EMERGENCY MEDICINE
Payer: MEDICARE

## 2022-06-06 ENCOUNTER — HOSPITAL ENCOUNTER (EMERGENCY)
Facility: HOSPITAL | Age: 77
Discharge: HOME | End: 2022-06-06
Attending: EMERGENCY MEDICINE
Payer: MEDICARE

## 2022-06-06 ENCOUNTER — OFFICE VISIT (OUTPATIENT)
Dept: WOUND CARE | Facility: HOSPITAL | Age: 77
End: 2022-06-06
Attending: INTERNAL MEDICINE

## 2022-06-06 VITALS
DIASTOLIC BLOOD PRESSURE: 69 MMHG | HEART RATE: 112 BPM | RESPIRATION RATE: 15 BRPM | SYSTOLIC BLOOD PRESSURE: 106 MMHG | TEMPERATURE: 98 F

## 2022-06-06 VITALS
SYSTOLIC BLOOD PRESSURE: 133 MMHG | DIASTOLIC BLOOD PRESSURE: 90 MMHG | HEART RATE: 72 BPM | TEMPERATURE: 97 F | OXYGEN SATURATION: 96 % | RESPIRATION RATE: 18 BRPM

## 2022-06-06 DIAGNOSIS — L03.115 CELLULITIS OF FOOT, RIGHT: ICD-10-CM

## 2022-06-06 DIAGNOSIS — L97.922 SKIN ULCER OF LEFT LOWER LEG WITH FAT LAYER EXPOSED (HCC): ICD-10-CM

## 2022-06-06 DIAGNOSIS — L97.912 ULCER OF RIGHT LOWER LEG, WITH FAT LAYER EXPOSED (HCC): ICD-10-CM

## 2022-06-06 DIAGNOSIS — L03.116 CELLULITIS OF FOOT, LEFT: ICD-10-CM

## 2022-06-06 DIAGNOSIS — L89.310: Primary | ICD-10-CM

## 2022-06-06 DIAGNOSIS — L03.119 CELLULITIS OF LOWER EXTREMITY, UNSPECIFIED LATERALITY: ICD-10-CM

## 2022-06-06 DIAGNOSIS — L03.317 CELLULITIS OF BUTTOCK: Primary | ICD-10-CM

## 2022-06-06 PROBLEM — R79.89 AZOTEMIA: Status: ACTIVE | Noted: 2022-01-01

## 2022-06-06 PROBLEM — N17.9 ACUTE KIDNEY INJURY (HCC): Status: ACTIVE | Noted: 2022-06-06

## 2022-06-06 PROBLEM — D64.9 ANEMIA: Status: ACTIVE | Noted: 2022-01-01

## 2022-06-06 PROBLEM — D64.9 ANEMIA: Status: ACTIVE | Noted: 2022-06-06

## 2022-06-06 PROBLEM — N17.9 ACUTE KIDNEY INJURY (HCC): Status: ACTIVE | Noted: 2022-01-01

## 2022-06-06 PROBLEM — R79.89 AZOTEMIA: Status: ACTIVE | Noted: 2022-06-06

## 2022-06-06 LAB
ALBUMIN SERPL-MCNC: 2.7 G/DL (ref 3.4–5)
ALBUMIN/GLOB SERPL: 0.5 {RATIO} (ref 1–2)
ALP LIVER SERPL-CCNC: 86 U/L
ALT SERPL-CCNC: 11 U/L
ANION GAP SERPL CALC-SCNC: 6 MMOL/L (ref 0–18)
AST SERPL-CCNC: 15 U/L (ref 15–37)
BASOPHILS # BLD AUTO: 0.04 X10(3) UL (ref 0–0.2)
BASOPHILS NFR BLD AUTO: 0.5 %
BILIRUB SERPL-MCNC: 0.3 MG/DL (ref 0.1–2)
BUN BLD-MCNC: 77 MG/DL (ref 7–18)
CALCIUM BLD-MCNC: 9.3 MG/DL (ref 8.5–10.1)
CHLORIDE SERPL-SCNC: 108 MMOL/L (ref 98–112)
CO2 SERPL-SCNC: 24 MMOL/L (ref 21–32)
CREAT BLD-MCNC: 2.18 MG/DL
EOSINOPHIL # BLD AUTO: 0.22 X10(3) UL (ref 0–0.7)
EOSINOPHIL NFR BLD AUTO: 3 %
ERYTHROCYTE [DISTWIDTH] IN BLOOD BY AUTOMATED COUNT: 13.8 %
GLOBULIN PLAS-MCNC: 5.3 G/DL (ref 2.8–4.4)
GLUCOSE BLD-MCNC: 152 MG/DL (ref 70–99)
HCT VFR BLD AUTO: 38.1 %
HGB BLD-MCNC: 11.8 G/DL
IMM GRANULOCYTES # BLD AUTO: 0.03 X10(3) UL (ref 0–1)
IMM GRANULOCYTES NFR BLD: 0.4 %
LYMPHOCYTES # BLD AUTO: 0.89 X10(3) UL (ref 1–4)
LYMPHOCYTES NFR BLD AUTO: 12.1 %
MCH RBC QN AUTO: 29.2 PG (ref 26–34)
MCHC RBC AUTO-ENTMCNC: 31 G/DL (ref 31–37)
MCV RBC AUTO: 94.3 FL
MONOCYTES # BLD AUTO: 0.65 X10(3) UL (ref 0.1–1)
MONOCYTES NFR BLD AUTO: 8.9 %
NEUTROPHILS # BLD AUTO: 5.5 X10 (3) UL (ref 1.5–7.7)
NEUTROPHILS # BLD AUTO: 5.5 X10(3) UL (ref 1.5–7.7)
NEUTROPHILS NFR BLD AUTO: 75.1 %
OSMOLALITY SERPL CALC.SUM OF ELEC: 312 MOSM/KG (ref 275–295)
PLATELET # BLD AUTO: 241 10(3)UL (ref 150–450)
POTASSIUM SERPL-SCNC: 4.6 MMOL/L (ref 3.5–5.1)
PROT SERPL-MCNC: 8 G/DL (ref 6.4–8.2)
RBC # BLD AUTO: 4.04 X10(6)UL
SARS-COV-2 RNA RESP QL NAA+PROBE: NOT DETECTED
SODIUM SERPL-SCNC: 138 MMOL/L (ref 136–145)
WBC # BLD AUTO: 7.3 X10(3) UL (ref 4–11)

## 2022-06-06 PROCEDURE — 96374 THER/PROPH/DIAG INJ IV PUSH: CPT

## 2022-06-06 PROCEDURE — 36415 COLL VENOUS BLD VENIPUNCTURE: CPT

## 2022-06-06 PROCEDURE — 87070 CULTURE OTHR SPECIMN AEROBIC: CPT | Performed by: INTERNAL MEDICINE

## 2022-06-06 PROCEDURE — 99284 EMERGENCY DEPT VISIT MOD MDM: CPT

## 2022-06-06 PROCEDURE — 87205 SMEAR GRAM STAIN: CPT | Performed by: INTERNAL MEDICINE

## 2022-06-06 PROCEDURE — 87040 BLOOD CULTURE FOR BACTERIA: CPT | Performed by: EMERGENCY MEDICINE

## 2022-06-06 PROCEDURE — 85025 COMPLETE CBC W/AUTO DIFF WBC: CPT | Performed by: EMERGENCY MEDICINE

## 2022-06-06 PROCEDURE — 74176 CT ABD & PELVIS W/O CONTRAST: CPT | Performed by: EMERGENCY MEDICINE

## 2022-06-06 PROCEDURE — 99214 OFFICE O/P EST MOD 30 MIN: CPT

## 2022-06-06 PROCEDURE — 96365 THER/PROPH/DIAG IV INF INIT: CPT

## 2022-06-06 PROCEDURE — 80053 COMPREHEN METABOLIC PANEL: CPT | Performed by: EMERGENCY MEDICINE

## 2022-06-06 RX ORDER — CEPHALEXIN 500 MG/1
500 CAPSULE ORAL 4 TIMES DAILY
Qty: 40 CAPSULE | Refills: 0 | Status: SHIPPED | OUTPATIENT
Start: 2022-06-06 | End: 2022-06-16

## 2022-06-06 RX ORDER — CEFAZOLIN SODIUM/WATER 2 G/20 ML
2 SYRINGE (ML) INTRAVENOUS ONCE
Status: COMPLETED | OUTPATIENT
Start: 2022-06-06 | End: 2022-06-06

## 2022-06-06 NOTE — ED INITIAL ASSESSMENT (HPI)
Pt to the ER via walk in from the wound care clinic for an abscess to her mid-left buttock. Per patient PCP sent her to the wound clinic and the wound clinic sent her into the  ER for further workup. Pt states it has been bothering her for almost a month now. Pt wound covered with a meplex and is dry    Pt a/ox4. Resp even and nonlabored. Skin warm and dry.

## 2022-06-06 NOTE — PROGRESS NOTES
.Weekly Wound Education Note    Teaching Provided To: Patient  Training Topics: Discharge instructions;Dressing; Compression;Edema control;Off-loading;Test/procedures  Training Method: Explain/Verbal  Training Response: Patient responds and understands; Reinforcement needed           BLE continue to weep, new areas noted to be open. Feet and toes continue to be macerated and weeping. New right buttock wound assessed, concern by provider for possible abscess. Patient agreeable to going to ED for assessment. Agreeable to discharging to rehab facility if admitted. x1 staff transported to ED via wheelchair with  at her side. Report given to ED charge nurse.

## 2022-06-07 NOTE — CM/SW NOTE
Patient to be discharge to [facility] for MO(pt accepted). Patient agreeable to go to [facility] Patient w/ benefit for gait/strenghtening and as well as monitor wound care.  made aware. Left message for patient's  that patient will be going to [facility]. Awaiting for return call.

## 2022-06-07 NOTE — ED QUICK NOTES
Multiple attempts to call report to [facility], placed on hold immediately and then call disconnected.

## 2022-06-07 NOTE — CM/SW NOTE
[facility] ambulance arranged. ETA 20-25mins. Patient nurse was made aware to give nurse to nurse report to May Lopez at  [phone].

## 2022-06-13 ENCOUNTER — APPOINTMENT (OUTPATIENT)
Dept: WOUND CARE | Facility: HOSPITAL | Age: 77
End: 2022-06-13
Attending: INTERNAL MEDICINE
Payer: MEDICARE

## 2022-06-20 ENCOUNTER — APPOINTMENT (OUTPATIENT)
Dept: WOUND CARE | Facility: HOSPITAL | Age: 77
End: 2022-06-20
Attending: INTERNAL MEDICINE
Payer: MEDICARE

## 2022-07-01 ENCOUNTER — APPOINTMENT (OUTPATIENT)
Dept: GENERAL RADIOLOGY | Facility: HOSPITAL | Age: 77
End: 2022-07-01
Attending: HOSPITALIST
Payer: MEDICARE

## 2022-07-01 ENCOUNTER — APPOINTMENT (OUTPATIENT)
Dept: CT IMAGING | Facility: HOSPITAL | Age: 77
End: 2022-07-01
Attending: EMERGENCY MEDICINE
Payer: MEDICARE

## 2022-07-01 ENCOUNTER — HOSPITAL ENCOUNTER (INPATIENT)
Facility: HOSPITAL | Age: 77
LOS: 2 days | Discharge: HOME HEALTH | End: 2022-07-03
Attending: EMERGENCY MEDICINE | Admitting: HOSPITALIST
Payer: MEDICARE

## 2022-07-01 DIAGNOSIS — L03.317 CELLULITIS OF BUTTOCK: Primary | ICD-10-CM

## 2022-07-01 LAB
ALBUMIN SERPL-MCNC: 3 G/DL (ref 3.4–5)
ALBUMIN/GLOB SERPL: 0.6 {RATIO} (ref 1–2)
ALP LIVER SERPL-CCNC: 84 U/L
ALT SERPL-CCNC: 10 U/L
ANION GAP SERPL CALC-SCNC: 6 MMOL/L (ref 0–18)
AST SERPL-CCNC: 24 U/L (ref 15–37)
BASOPHILS # BLD AUTO: 0.02 X10(3) UL (ref 0–0.2)
BASOPHILS NFR BLD AUTO: 0.3 %
BILIRUB SERPL-MCNC: 0.4 MG/DL (ref 0.1–2)
BILIRUB UR QL STRIP.AUTO: NEGATIVE
BUN BLD-MCNC: 50 MG/DL (ref 7–18)
C DIFF TOX B STL QL: NEGATIVE
CALCIUM BLD-MCNC: 8.9 MG/DL (ref 8.5–10.1)
CHLORIDE SERPL-SCNC: 110 MMOL/L (ref 98–112)
CO2 SERPL-SCNC: 24 MMOL/L (ref 21–32)
COLOR UR AUTO: YELLOW
CREAT BLD-MCNC: 1.8 MG/DL
EOSINOPHIL # BLD AUTO: 0.26 X10(3) UL (ref 0–0.7)
EOSINOPHIL NFR BLD AUTO: 4 %
ERYTHROCYTE [DISTWIDTH] IN BLOOD BY AUTOMATED COUNT: 13.7 %
EST. AVERAGE GLUCOSE BLD GHB EST-MCNC: 128 MG/DL (ref 68–126)
GLOBULIN PLAS-MCNC: 4.8 G/DL (ref 2.8–4.4)
GLUCOSE BLD-MCNC: 102 MG/DL (ref 70–99)
GLUCOSE BLD-MCNC: 144 MG/DL (ref 70–99)
GLUCOSE BLD-MCNC: 173 MG/DL (ref 70–99)
GLUCOSE BLD-MCNC: 50 MG/DL (ref 70–99)
GLUCOSE BLD-MCNC: 53 MG/DL (ref 70–99)
GLUCOSE BLD-MCNC: 61 MG/DL (ref 70–99)
GLUCOSE BLD-MCNC: 71 MG/DL (ref 70–99)
GLUCOSE UR STRIP.AUTO-MCNC: NEGATIVE MG/DL
HBA1C MFR BLD: 6.1 % (ref ?–5.7)
HCT VFR BLD AUTO: 37.7 %
HGB BLD-MCNC: 11.2 G/DL
IMM GRANULOCYTES # BLD AUTO: 0.03 X10(3) UL (ref 0–1)
IMM GRANULOCYTES NFR BLD: 0.5 %
KETONES UR STRIP.AUTO-MCNC: NEGATIVE MG/DL
LYMPHOCYTES # BLD AUTO: 1.11 X10(3) UL (ref 1–4)
LYMPHOCYTES NFR BLD AUTO: 17.1 %
MCH RBC QN AUTO: 28.8 PG (ref 26–34)
MCHC RBC AUTO-ENTMCNC: 29.7 G/DL (ref 31–37)
MCV RBC AUTO: 96.9 FL
MONOCYTES # BLD AUTO: 0.7 X10(3) UL (ref 0.1–1)
MONOCYTES NFR BLD AUTO: 10.8 %
NEUTROPHILS # BLD AUTO: 4.38 X10 (3) UL (ref 1.5–7.7)
NEUTROPHILS # BLD AUTO: 4.38 X10(3) UL (ref 1.5–7.7)
NEUTROPHILS NFR BLD AUTO: 67.3 %
NITRITE UR QL STRIP.AUTO: NEGATIVE
OSMOLALITY SERPL CALC.SUM OF ELEC: 302 MOSM/KG (ref 275–295)
PH UR STRIP.AUTO: 5 [PH] (ref 5–8)
PLATELET # BLD AUTO: 199 10(3)UL (ref 150–450)
POTASSIUM SERPL-SCNC: 4.3 MMOL/L (ref 3.5–5.1)
PROT SERPL-MCNC: 7.8 G/DL (ref 6.4–8.2)
PROT UR STRIP.AUTO-MCNC: NEGATIVE MG/DL
RBC # BLD AUTO: 3.89 X10(6)UL
SARS-COV-2 RNA RESP QL NAA+PROBE: NOT DETECTED
SODIUM SERPL-SCNC: 140 MMOL/L (ref 136–145)
SP GR UR STRIP.AUTO: 1.01 (ref 1–1.03)
UROBILINOGEN UR STRIP.AUTO-MCNC: <2 MG/DL
WBC # BLD AUTO: 6.5 X10(3) UL (ref 4–11)
WBC #/AREA URNS AUTO: >50 /HPF
WBC CLUMPS UR QL AUTO: PRESENT /HPF

## 2022-07-01 PROCEDURE — 74177 CT ABD & PELVIS W/CONTRAST: CPT | Performed by: EMERGENCY MEDICINE

## 2022-07-01 PROCEDURE — 73660 X-RAY EXAM OF TOE(S): CPT | Performed by: HOSPITALIST

## 2022-07-01 PROCEDURE — 99223 1ST HOSP IP/OBS HIGH 75: CPT | Performed by: HOSPITALIST

## 2022-07-01 RX ORDER — BISACODYL 10 MG
10 SUPPOSITORY, RECTAL RECTAL
Status: DISCONTINUED | OUTPATIENT
Start: 2022-07-01 | End: 2022-07-03

## 2022-07-01 RX ORDER — FEBUXOSTAT 40 MG/1
80 TABLET, FILM COATED ORAL DAILY
Status: DISCONTINUED | OUTPATIENT
Start: 2022-07-02 | End: 2022-07-03

## 2022-07-01 RX ORDER — PRAVASTATIN SODIUM 20 MG
20 TABLET ORAL NIGHTLY
Status: DISCONTINUED | OUTPATIENT
Start: 2022-07-01 | End: 2022-07-03

## 2022-07-01 RX ORDER — BUMETANIDE 1 MG/1
1 TABLET ORAL DAILY
Status: DISCONTINUED | OUTPATIENT
Start: 2022-07-02 | End: 2022-07-01

## 2022-07-01 RX ORDER — ALBUTEROL SULFATE 90 UG/1
2 AEROSOL, METERED RESPIRATORY (INHALATION) EVERY 6 HOURS PRN
Status: DISCONTINUED | OUTPATIENT
Start: 2022-07-01 | End: 2022-07-03

## 2022-07-01 RX ORDER — HYDROCODONE BITARTRATE AND ACETAMINOPHEN 5; 325 MG/1; MG/1
1 TABLET ORAL EVERY 4 HOURS PRN
Status: DISCONTINUED | OUTPATIENT
Start: 2022-07-01 | End: 2022-07-03

## 2022-07-01 RX ORDER — TRIAMCINOLONE ACETONIDE 1 MG/G
CREAM TOPICAL 2 TIMES DAILY
COMMUNITY

## 2022-07-01 RX ORDER — METOPROLOL SUCCINATE 25 MG/1
25 TABLET, EXTENDED RELEASE ORAL DAILY
Status: DISCONTINUED | OUTPATIENT
Start: 2022-07-02 | End: 2022-07-03

## 2022-07-01 RX ORDER — ESCITALOPRAM OXALATE 10 MG/1
10 TABLET ORAL DAILY
Status: DISCONTINUED | OUTPATIENT
Start: 2022-07-02 | End: 2022-07-03

## 2022-07-01 RX ORDER — NICOTINE POLACRILEX 4 MG
30 LOZENGE BUCCAL
Status: DISCONTINUED | OUTPATIENT
Start: 2022-07-01 | End: 2022-07-03

## 2022-07-01 RX ORDER — IOHEXOL 350 MG/ML
100 INJECTION, SOLUTION INTRAVENOUS
Status: COMPLETED | OUTPATIENT
Start: 2022-07-01 | End: 2022-07-01

## 2022-07-01 RX ORDER — DEXTROSE MONOHYDRATE 25 G/50ML
50 INJECTION, SOLUTION INTRAVENOUS
Status: DISCONTINUED | OUTPATIENT
Start: 2022-07-01 | End: 2022-07-03

## 2022-07-01 RX ORDER — CEFAZOLIN SODIUM/WATER 2 G/20 ML
2 SYRINGE (ML) INTRAVENOUS EVERY 8 HOURS
Status: DISCONTINUED | OUTPATIENT
Start: 2022-07-02 | End: 2022-07-01

## 2022-07-01 RX ORDER — VANCOMYCIN HYDROCHLORIDE
15
Status: DISCONTINUED | OUTPATIENT
Start: 2022-07-03 | End: 2022-07-03

## 2022-07-01 RX ORDER — VANCOMYCIN 2 GRAM/500 ML IN 0.9 % SODIUM CHLORIDE INTRAVENOUS
25 ONCE
Status: COMPLETED | OUTPATIENT
Start: 2022-07-01 | End: 2022-07-01

## 2022-07-01 RX ORDER — LEVOTHYROXINE SODIUM 0.15 MG/1
150 TABLET ORAL
Status: DISCONTINUED | OUTPATIENT
Start: 2022-07-02 | End: 2022-07-03

## 2022-07-01 RX ORDER — ACETAMINOPHEN 325 MG/1
650 TABLET ORAL EVERY 4 HOURS PRN
Status: DISCONTINUED | OUTPATIENT
Start: 2022-07-01 | End: 2022-07-03

## 2022-07-01 RX ORDER — GABAPENTIN 300 MG/1
300 CAPSULE ORAL 2 TIMES DAILY
COMMUNITY
Start: 2022-04-28

## 2022-07-01 RX ORDER — MELATONIN
3 NIGHTLY PRN
Status: DISCONTINUED | OUTPATIENT
Start: 2022-07-01 | End: 2022-07-03

## 2022-07-01 RX ORDER — GABAPENTIN 300 MG/1
300 CAPSULE ORAL 2 TIMES DAILY
Status: DISCONTINUED | OUTPATIENT
Start: 2022-07-01 | End: 2022-07-03

## 2022-07-01 RX ORDER — METOCLOPRAMIDE HYDROCHLORIDE 5 MG/ML
5 INJECTION INTRAMUSCULAR; INTRAVENOUS EVERY 8 HOURS PRN
Status: DISCONTINUED | OUTPATIENT
Start: 2022-07-01 | End: 2022-07-03

## 2022-07-01 RX ORDER — HYDROCODONE BITARTRATE AND ACETAMINOPHEN 5; 325 MG/1; MG/1
2 TABLET ORAL EVERY 4 HOURS PRN
Status: DISCONTINUED | OUTPATIENT
Start: 2022-07-01 | End: 2022-07-03

## 2022-07-01 RX ORDER — SENNOSIDES 8.6 MG
17.2 TABLET ORAL NIGHTLY PRN
Status: DISCONTINUED | OUTPATIENT
Start: 2022-07-01 | End: 2022-07-03

## 2022-07-01 RX ORDER — POLYETHYLENE GLYCOL 3350 17 G/17G
17 POWDER, FOR SOLUTION ORAL DAILY PRN
Status: DISCONTINUED | OUTPATIENT
Start: 2022-07-01 | End: 2022-07-03

## 2022-07-01 RX ORDER — ONDANSETRON 2 MG/ML
4 INJECTION INTRAMUSCULAR; INTRAVENOUS EVERY 6 HOURS PRN
Status: DISCONTINUED | OUTPATIENT
Start: 2022-07-01 | End: 2022-07-03

## 2022-07-01 RX ORDER — ENOXAPARIN SODIUM 100 MG/ML
30 INJECTION SUBCUTANEOUS DAILY
Status: DISCONTINUED | OUTPATIENT
Start: 2022-07-01 | End: 2022-07-03

## 2022-07-01 RX ORDER — CEFAZOLIN SODIUM/WATER 2 G/20 ML
2 SYRINGE (ML) INTRAVENOUS ONCE
Status: COMPLETED | OUTPATIENT
Start: 2022-07-01 | End: 2022-07-01

## 2022-07-01 RX ORDER — NICOTINE POLACRILEX 4 MG
15 LOZENGE BUCCAL
Status: DISCONTINUED | OUTPATIENT
Start: 2022-07-01 | End: 2022-07-03

## 2022-07-01 RX ORDER — FUROSEMIDE 10 MG/ML
40 INJECTION INTRAMUSCULAR; INTRAVENOUS DAILY
Status: DISCONTINUED | OUTPATIENT
Start: 2022-07-01 | End: 2022-07-03

## 2022-07-01 RX ORDER — CHOLECALCIFEROL (VITAMIN D3) 125 MCG
2000 CAPSULE ORAL
Status: DISCONTINUED | OUTPATIENT
Start: 2022-07-01 | End: 2022-07-03

## 2022-07-01 NOTE — PROGRESS NOTES
[facility] Pharmacy Note: Antimicrobial Weight Based Dose Adjustment for: cefazolin (ANCEF)    Calin Lewis is a 68year old patient who has been prescribed cefazolin (ANCEF) 1 g.    Estimated Creatinine Clearance: 24.5 mL/min (A) (based on SCr of 1.8 mg/dL (H)). Wt Readings from Last 6 Encounters:  01/24/22 : 117 kg (257 lb 15 oz)  12/09/21 : 119.3 kg (263 lb)  10/22/21 : 119.3 kg (263 lb)  09/27/21 : 125.1 kg (275 lb 12.8 oz)  09/23/21 : 125.1 kg (275 lb 12.8 oz)  09/20/21 : 130.2 kg (287 lb)    There is no height or weight on file to calculate BMI. Based on this criteria and renal function, dose will be adjusted to cefazolin (ANCEF) 2 g.     Thank you,    Josef Castellanos, PharmD  7/1/2022  3:39 PM

## 2022-07-01 NOTE — ED QUICK NOTES
Orders for admission, patient is aware of plan and ready to go upstairs. Any questions, please call ED RN Ovidio Xavier at extension [phone]. Patient Covid vaccination status: Unvaccinated     COVID Test Ordered in ED: Rapid SARS-CoV-2 by PCR    COVID Suspicion at Admission: Low clinical suspicion for COVID    Running Infusions:  None, ancef given    Mental Status/LOC at time of transport: alert and oriented x4. Can ambulate to bathroom, but urinates on herself occasionally.     Other pertinent information:   CIWA score: N/A   NIH score:  N/A

## 2022-07-01 NOTE — ED QUICK NOTES
Dr Sulema Cochran states it is ok to start antibiotics are 1 set of blood cx are done. Unable to get second set. 3 people have tried. Phlebotomy aware that pt needs to be drawn.

## 2022-07-02 PROBLEM — S31.000A SACRAL WOUND: Status: ACTIVE | Noted: 2022-01-01

## 2022-07-02 PROBLEM — S31.000A SACRAL WOUND: Status: ACTIVE | Noted: 2022-07-02

## 2022-07-02 LAB
ALBUMIN SERPL-MCNC: 2.5 G/DL (ref 3.4–5)
ALBUMIN/GLOB SERPL: 0.7 {RATIO} (ref 1–2)
ALP LIVER SERPL-CCNC: 67 U/L
ALT SERPL-CCNC: 7 U/L
ANION GAP SERPL CALC-SCNC: 4 MMOL/L (ref 0–18)
AST SERPL-CCNC: 12 U/L (ref 15–37)
BASOPHILS # BLD AUTO: 0.02 X10(3) UL (ref 0–0.2)
BASOPHILS NFR BLD AUTO: 0.4 %
BILIRUB SERPL-MCNC: 0.2 MG/DL (ref 0.1–2)
BUN BLD-MCNC: 46 MG/DL (ref 7–18)
CALCIUM BLD-MCNC: 8.6 MG/DL (ref 8.5–10.1)
CHLORIDE SERPL-SCNC: 111 MMOL/L (ref 98–112)
CO2 SERPL-SCNC: 29 MMOL/L (ref 21–32)
CREAT BLD-MCNC: 1.53 MG/DL
CREAT BLD-MCNC: 1.53 MG/DL
EOSINOPHIL # BLD AUTO: 0.22 X10(3) UL (ref 0–0.7)
EOSINOPHIL NFR BLD AUTO: 4.6 %
ERYTHROCYTE [DISTWIDTH] IN BLOOD BY AUTOMATED COUNT: 13.9 %
GLOBULIN PLAS-MCNC: 3.8 G/DL (ref 2.8–4.4)
GLUCOSE BLD-MCNC: 136 MG/DL (ref 70–99)
GLUCOSE BLD-MCNC: 154 MG/DL (ref 70–99)
GLUCOSE BLD-MCNC: 192 MG/DL (ref 70–99)
GLUCOSE BLD-MCNC: 72 MG/DL (ref 70–99)
GLUCOSE BLD-MCNC: 78 MG/DL (ref 70–99)
HCT VFR BLD AUTO: 32.9 %
HGB BLD-MCNC: 10 G/DL
IMM GRANULOCYTES # BLD AUTO: 0.02 X10(3) UL (ref 0–1)
IMM GRANULOCYTES NFR BLD: 0.4 %
LYMPHOCYTES # BLD AUTO: 0.7 X10(3) UL (ref 1–4)
LYMPHOCYTES NFR BLD AUTO: 14.6 %
MCH RBC QN AUTO: 29.3 PG (ref 26–34)
MCHC RBC AUTO-ENTMCNC: 30.4 G/DL (ref 31–37)
MCV RBC AUTO: 96.5 FL
MONOCYTES # BLD AUTO: 0.58 X10(3) UL (ref 0.1–1)
MONOCYTES NFR BLD AUTO: 12.1 %
NEUTROPHILS # BLD AUTO: 3.27 X10 (3) UL (ref 1.5–7.7)
NEUTROPHILS # BLD AUTO: 3.27 X10(3) UL (ref 1.5–7.7)
NEUTROPHILS NFR BLD AUTO: 67.9 %
OSMOLALITY SERPL CALC.SUM OF ELEC: 309 MOSM/KG (ref 275–295)
PLATELET # BLD AUTO: 167 10(3)UL (ref 150–450)
POTASSIUM SERPL-SCNC: 3.7 MMOL/L (ref 3.5–5.1)
PROT SERPL-MCNC: 6.3 G/DL (ref 6.4–8.2)
RBC # BLD AUTO: 3.41 X10(6)UL
SODIUM SERPL-SCNC: 144 MMOL/L (ref 136–145)
WBC # BLD AUTO: 4.8 X10(3) UL (ref 4–11)

## 2022-07-02 PROCEDURE — 99233 SBSQ HOSP IP/OBS HIGH 50: CPT | Performed by: HOSPITALIST

## 2022-07-02 PROCEDURE — 99222 1ST HOSP IP/OBS MODERATE 55: CPT | Performed by: SURGERY

## 2022-07-02 RX ORDER — SODIUM HYPOCHLORITE 1.25 MG/ML
SOLUTION TOPICAL AS NEEDED
Status: DISCONTINUED | OUTPATIENT
Start: 2022-07-02 | End: 2022-07-03

## 2022-07-02 NOTE — PROGRESS NOTES
[facility] Pharmacy Note:  Renal Dose Adjustment for Metoclopramide (REGLAN)    Mary Ann Minor has been prescribed Metoclopramide (REGLAN) 10 mg every 8 hours as needed for nausea/vomiting,. Estimated Creatinine Clearance: 24.5 mL/min (A) (based on SCr of 1.8 mg/dL (H)). Calculated creatinine clearance is < 40 ml/min, therefore, the dose of Metoclopramide (REGLAN) has been changed to 5 mg every 8 hours as needed for nausea/vomiting per P&T approved protocol. Pharmacy will continue to follow, and if renal function improves, will resume the original order.        Thank you,  Yokasta Mcneill, PharmD  7/1/2022 7:09 PM

## 2022-07-02 NOTE — PROGRESS NOTES
NURSING ADMISSION NOTE      Patient admitted via Wheelchair  Oriented to room. Safety precautions initiated. Bed in low position. Call light in reach. Received patient from ER , alert and orient x 4 , redness and swelling on b/l lower extremities . Redness and sore on the rt buttock , open to air . Consulted surgery , podiatry, infection  , notified MD . See new order . Blood sugar was 50 , hypoglycemic protocol  Done . md aware . incontinent of urine and bowel . Plan of care discussed , answered all questions . Verbalized understanding .  Will continue to monitor

## 2022-07-02 NOTE — PLAN OF CARE
Patient is A/O times 4, breathing on room air evenly and unlabored. Podiatry saw patient today and will clip nails tomorrow 7/3/2022. Wound care completed to buttock area as ordered. Feet and toes cleansed. Patient tolerated well. Problem: PAIN - ADULT  Goal: Verbalizes/displays adequate comfort level or patient's stated pain goal  Description: INTERVENTIONS:  - Encourage pt to monitor pain and request assistance  - Assess pain using appropriate pain scale  - Administer analgesics based on type and severity of pain and evaluate response  - Implement non-pharmacological measures as appropriate and evaluate response  - Consider cultural and social influences on pain and pain management  - Manage/alleviate anxiety  - Utilize distraction and/or relaxation techniques  - Monitor for opioid side effects  - Notify MD/LIP if interventions unsuccessful or patient reports new pain  - Anticipate increased pain with activity and pre-medicate as appropriate  Outcome: Progressing     Problem: RISK FOR INFECTION - ADULT  Goal: Absence of fever/infection during anticipated neutropenic period  Description: INTERVENTIONS  - Monitor WBC  - Administer growth factors as ordered  - Implement neutropenic guidelines  Outcome: Progressing     Problem: SAFETY ADULT - FALL  Goal: Free from fall injury  Description: INTERVENTIONS:  - Assess pt frequently for physical needs  - Identify cognitive and physical deficits and behaviors that affect risk of falls.   - [place] fall precautions as indicated by assessment.  - Educate pt/family on patient safety including physical limitations  - Instruct pt to call for assistance with activity based on assessment  - Modify environment to reduce risk of injury  - Provide assistive devices as appropriate  - Consider OT/PT consult to assist with strengthening/mobility  - Encourage toileting schedule  Outcome: Progressing     Problem: DISCHARGE PLANNING  Goal: Discharge to home or other facility with appropriate resources  Description: INTERVENTIONS:  - Identify barriers to discharge w/pt and caregiver  - Include patient/family/discharge partner in discharge planning  - Arrange for needed discharge resources and transportation as appropriate  - Identify discharge learning needs (meds, wound care, etc)  - Arrange for interpreters to assist at discharge as needed  - Consider post-discharge preferences of patient/family/discharge partner  - Complete POLST form as appropriate  - Assess patient's ability to be responsible for managing their own health  - Refer to Case Management Department for coordinating discharge planning if the patient needs post-hospital services based on physician/LIP order or complex needs related to functional status, cognitive ability or social support system  Outcome: Progressing     Problem: METABOLIC/FLUID AND ELECTROLYTES - ADULT  Goal: Glucose maintained within prescribed range  Description: INTERVENTIONS:  - Monitor Blood Glucose as ordered  - Assess for signs and symptoms of hyperglycemia and hypoglycemia  - Administer ordered medications to maintain glucose within target range  - Assess barriers to adequate nutritional intake and initiate nutrition consult as needed  - Instruct patient on self management of diabetes  Outcome: Progressing     Problem: SKIN/TISSUE INTEGRITY - ADULT  Goal: Incision(s), wounds(s) or drain site(s) healing without S/S of infection  Description: INTERVENTIONS:  - Assess and document risk factors for pressure ulcer development  - Assess and document skin integrity  - Assess and document dressing/incision, wound bed, drain sites and surrounding tissue  - Implement wound care per orders  - Initiate isolation precautions as appropriate  - Initiate Pressure Ulcer prevention bundle as indicated  Outcome: Progressing

## 2022-07-02 NOTE — PLAN OF CARE
Pt a&ox4. On 2L NC. NSR on telemetry  Incontinent. Voiding well with purewick. Pain managed with prn meds. Wound to right buttock draining small amount of purulent fluid. Cleansed and dressed with dry gauze. Wound care to eval. Bilateral lower legs swollen and red/scaly and peeling. Legs elevated on pillows. Podiatry to eval.  Receiving antibiotic per mar. Safety precautions in place. Plan of care discussed with pt, pt verbalizes understanding.       Problem: Patient/Family Goals  Goal: Patient/Family Long Term Goal  Description: Patient's Long Term Goal: Discharge home    Interventions:  - manage pain, antibiotics, await consults to eval  - See additional Care Plan goals for specific interventions  Outcome: Progressing  Goal: Patient/Family Short Term Goal  Description: Patient's Short Term Goal: manage pain    Interventions:   - monitor pain, administer pain meds as needed  - See additional Care Plan goals for specific interventions  Outcome: Progressing     Problem: PAIN - ADULT  Goal: Verbalizes/displays adequate comfort level or patient's stated pain goal  Description: INTERVENTIONS:  - Encourage pt to monitor pain and request assistance  - Assess pain using appropriate pain scale  - Administer analgesics based on type and severity of pain and evaluate response  - Implement non-pharmacological measures as appropriate and evaluate response  - Consider cultural and social influences on pain and pain management  - Manage/alleviate anxiety  - Utilize distraction and/or relaxation techniques  - Monitor for opioid side effects  - Notify MD/LIP if interventions unsuccessful or patient reports new pain  - Anticipate increased pain with activity and pre-medicate as appropriate  Outcome: Progressing     Problem: RISK FOR INFECTION - ADULT  Goal: Absence of fever/infection during anticipated neutropenic period  Description: INTERVENTIONS  - Monitor WBC  - Administer growth factors as ordered  - Implement neutropenic guidelines  Outcome: Progressing     Problem: SAFETY ADULT - FALL  Goal: Free from fall injury  Description: INTERVENTIONS:  - Assess pt frequently for physical needs  - Identify cognitive and physical deficits and behaviors that affect risk of falls.   - [place] fall precautions as indicated by assessment.  - Educate pt/family on patient safety including physical limitations  - Instruct pt to call for assistance with activity based on assessment  - Modify environment to reduce risk of injury  - Provide assistive devices as appropriate  - Consider OT/PT consult to assist with strengthening/mobility  - Encourage toileting schedule  Outcome: Progressing     Problem: DISCHARGE PLANNING  Goal: Discharge to home or other facility with appropriate resources  Description: INTERVENTIONS:  - Identify barriers to discharge w/pt and caregiver  - Include patient/family/discharge partner in discharge planning  - Arrange for needed discharge resources and transportation as appropriate  - Identify discharge learning needs (meds, wound care, etc)  - Arrange for interpreters to assist at discharge as needed  - Consider post-discharge preferences of patient/family/discharge partner  - Complete POLST form as appropriate  - Assess patient's ability to be responsible for managing their own health  - Refer to Case Management Department for coordinating discharge planning if the patient needs post-hospital services based on physician/LIP order or complex needs related to functional status, cognitive ability or social support system  Outcome: Progressing     Problem: METABOLIC/FLUID AND ELECTROLYTES - ADULT  Goal: Glucose maintained within prescribed range  Description: INTERVENTIONS:  - Monitor Blood Glucose as ordered  - Assess for signs and symptoms of hyperglycemia and hypoglycemia  - Administer ordered medications to maintain glucose within target range  - Assess barriers to adequate nutritional intake and initiate nutrition consult as needed  - Instruct patient on self management of diabetes  Outcome: Progressing     Problem: SKIN/TISSUE INTEGRITY - ADULT  Goal: Incision(s), wounds(s) or drain site(s) healing without S/S of infection  Description: INTERVENTIONS:  - Assess and document risk factors for pressure ulcer development  - Assess and document skin integrity  - Assess and document dressing/incision, wound bed, drain sites and surrounding tissue  - Implement wound care per orders  - Initiate isolation precautions as appropriate  - Initiate Pressure Ulcer prevention bundle as indicated  Outcome: Progressing

## 2022-07-02 NOTE — PROGRESS NOTES
[address] [place] [address]    Initial Pharmacokinetic Consult for Vancomycin AUC Dosing    Amrit Ambriz is a 68year old patient who is being treated for cellulitis. Pharmacy has been asked to dose vancomycin by Dr. Harvey Shah. Weights:  Ideal body weight: 59.3 kg (130 lb 11.7 oz)  Adjusted ideal body weight: 80.9 kg (178 lb 7 oz)  Actual weight:  113.4 kg (250 lb)    Labs:  Lab Results   Component Value Date    CREATSERUM 1.80 07/01/2022      CrCl:  Estimated Creatinine Clearance: 24.5 mL/min (A) (based on SCr of 1.8 mg/dL (H)). Based on the above:    1. This patient will receive a loading dose of Vancomycin  2000 mg IVPB (25mg/kg, capped at 2000 mg) x 1 dose. This will be followed by 1250 mg every 36 hours based upon adjusted body weight of 80.9 kg and renal function. 2. Vancomycin peak and trough will be obtained at steady state in order to calculate AUC24. Goal AUC24 is 400-600 mg-h/L.    3. Pharmacy will order SCr as clinically indicated while on vancomycin to assess renal function. 4. Pharmacy will follow and monitor renal function, toxicity and efficacy. We appreciate the opportunity to assist in the care of this patient.     Liliam Gutiérrez, PharmD  7/1/2022  7:23 PM  [address] [place] [address] Extension: [phone]

## 2022-07-02 NOTE — PROGRESS NOTES
[facility] Pharmacy Note:  Renal Dose Adjustment for enoxaparin (LOVENOX)    Amrit Ambriz has been prescribed enoxaparin 40 mg subcutaneously every 24 hours. Estimated Creatinine Clearance: 24.5 mL/min (A) (based on SCr of 1.8 mg/dL (H)). Calculated CrCl 20 to 30 mL/min so the dose of Enoxaparin (LOVENOX) has been changed to enoxaparin 30 mg every 24 hours per P&T approved protocol. Pharmacy will continue to follow, and make additional adjustments if needed.       Thank you,  Liliam Gutiérrez, PharmD  7/1/2022 7:10 PM

## 2022-07-03 VITALS
HEART RATE: 77 BPM | RESPIRATION RATE: 17 BRPM | OXYGEN SATURATION: 96 % | BODY MASS INDEX: 40 KG/M2 | TEMPERATURE: 99 F | DIASTOLIC BLOOD PRESSURE: 48 MMHG | SYSTOLIC BLOOD PRESSURE: 129 MMHG | WEIGHT: 250 LBS

## 2022-07-03 LAB
ANION GAP SERPL CALC-SCNC: 3 MMOL/L (ref 0–18)
BUN BLD-MCNC: 42 MG/DL (ref 7–18)
CALCIUM BLD-MCNC: 8.6 MG/DL (ref 8.5–10.1)
CHLORIDE SERPL-SCNC: 114 MMOL/L (ref 98–112)
CO2 SERPL-SCNC: 22 MMOL/L (ref 21–32)
CREAT BLD-MCNC: 1.57 MG/DL
CREAT BLD-MCNC: 1.57 MG/DL
GLUCOSE BLD-MCNC: 110 MG/DL (ref 70–99)
GLUCOSE BLD-MCNC: 200 MG/DL (ref 70–99)
GLUCOSE BLD-MCNC: 210 MG/DL (ref 70–99)
GLUCOSE BLD-MCNC: 88 MG/DL (ref 70–99)
OSMOLALITY SERPL CALC.SUM OF ELEC: 298 MOSM/KG (ref 275–295)
POTASSIUM SERPL-SCNC: 4.3 MMOL/L (ref 3.5–5.1)
SODIUM SERPL-SCNC: 139 MMOL/L (ref 136–145)

## 2022-07-03 PROCEDURE — 99232 SBSQ HOSP IP/OBS MODERATE 35: CPT | Performed by: SURGERY

## 2022-07-03 PROCEDURE — 99239 HOSP IP/OBS DSCHRG MGMT >30: CPT | Performed by: HOSPITALIST

## 2022-07-03 RX ORDER — PREDNISONE 20 MG/1
20 TABLET ORAL
Status: DISCONTINUED | OUTPATIENT
Start: 2022-07-04 | End: 2022-07-03

## 2022-07-03 RX ORDER — ALBUTEROL SULFATE 90 UG/1
2 AEROSOL, METERED RESPIRATORY (INHALATION) EVERY 6 HOURS PRN
Qty: 1 EACH | Refills: 0 | Status: SHIPPED | OUTPATIENT
Start: 2022-07-03

## 2022-07-03 RX ORDER — PREDNISONE 20 MG/1
20 TABLET ORAL
Qty: 5 TABLET | Refills: 0 | Status: SHIPPED | OUTPATIENT
Start: 2022-07-04 | End: 2022-07-09

## 2022-07-03 RX ORDER — SODIUM HYPOCHLORITE 1.25 MG/ML
SOLUTION TOPICAL
Qty: 473 ML | Refills: 0 | Status: SHIPPED | OUTPATIENT
Start: 2022-07-03

## 2022-07-03 NOTE — PLAN OF CARE
Pt axox4, resting in bed, abdomen rounded soft non-tender, pt denies any pain, bilateral lower legs red with dry skin, in between toes are gauze with betadine, lungs diminished, pt refusing insulin coverage, NSR on tele, purewick draining clear yellow urine, pt care plan updated with patient will continue to monitor patient. Problem: PAIN - ADULT  Goal: Verbalizes/displays adequate comfort level or patient's stated pain goal  Description: INTERVENTIONS:  - Encourage pt to monitor pain and request assistance  - Assess pain using appropriate pain scale  - Administer analgesics based on type and severity of pain and evaluate response  - Implement non-pharmacological measures as appropriate and evaluate response  - Consider cultural and social influences on pain and pain management  - Manage/alleviate anxiety  - Utilize distraction and/or relaxation techniques  - Monitor for opioid side effects  - Notify MD/LIP if interventions unsuccessful or patient reports new pain  - Anticipate increased pain with activity and pre-medicate as appropriate  Outcome: Progressing     Problem: RISK FOR INFECTION - ADULT  Goal: Absence of fever/infection during anticipated neutropenic period  Description: INTERVENTIONS  - Monitor WBC  - Administer growth factors as ordered  - Implement neutropenic guidelines  Outcome: Progressing     Problem: SAFETY ADULT - FALL  Goal: Free from fall injury  Description: INTERVENTIONS:  - Assess pt frequently for physical needs  - Identify cognitive and physical deficits and behaviors that affect risk of falls.   - [place] fall precautions as indicated by assessment.  - Educate pt/family on patient safety including physical limitations  - Instruct pt to call for assistance with activity based on assessment  - Modify environment to reduce risk of injury  - Provide assistive devices as appropriate  - Consider OT/PT consult to assist with strengthening/mobility  - Encourage toileting schedule  Outcome: Progressing     Problem: DISCHARGE PLANNING  Goal: Discharge to home or other facility with appropriate resources  Description: INTERVENTIONS:  - Identify barriers to discharge w/pt and caregiver  - Include patient/family/discharge partner in discharge planning  - Arrange for needed discharge resources and transportation as appropriate  - Identify discharge learning needs (meds, wound care, etc)  - Arrange for interpreters to assist at discharge as needed  - Consider post-discharge preferences of patient/family/discharge partner  - Complete POLST form as appropriate  - Assess patient's ability to be responsible for managing their own health  - Refer to Case Management Department for coordinating discharge planning if the patient needs post-hospital services based on physician/LIP order or complex needs related to functional status, cognitive ability or social support system  Outcome: Progressing     Problem: METABOLIC/FLUID AND ELECTROLYTES - ADULT  Goal: Glucose maintained within prescribed range  Description: INTERVENTIONS:  - Monitor Blood Glucose as ordered  - Assess for signs and symptoms of hyperglycemia and hypoglycemia  - Administer ordered medications to maintain glucose within target range  - Assess barriers to adequate nutritional intake and initiate nutrition consult as needed  - Instruct patient on self management of diabetes  Outcome: Progressing     Problem: SKIN/TISSUE INTEGRITY - ADULT  Goal: Incision(s), wounds(s) or drain site(s) healing without S/S of infection  Description: INTERVENTIONS:  - Assess and document risk factors for pressure ulcer development  - Assess and document skin integrity  - Assess and document dressing/incision, wound bed, drain sites and surrounding tissue  - Implement wound care per orders  - Initiate isolation precautions as appropriate  - Initiate Pressure Ulcer prevention bundle as indicated  Outcome: Progressing

## 2022-07-03 NOTE — CM/SW NOTE
Spoke with Michael De Los Santos from [place] [address] ([phone]) who confirmed pt can be accepted for resumption of care. Pt seen by OT today and did well, they are recommending home at DC. Pt confirmed with them her preference for DC to home with [place] [id number]. / to remain available for support and/or discharge planning.      Luis Alberto Lainez, [facility]  Discharge Planner  [phone]

## 2022-07-03 NOTE — PHYSICAL THERAPY NOTE
Attempted to see pt for physical therapy treatment at this time. Pt politely declines, stating that she just walked and got settled back in bed. Pt reports she is doing fine, had no trouble ambulating or getting into the bathroom. Pt states she is planning to return home with therapy and nursing staff for wound care. Will hold therapy today at pt's request.  Plan to re-attempt tomorrow as schedule allows.

## 2022-07-03 NOTE — PROGRESS NOTES
Patient discharged to home in stable condition. IV, tele box and purwick removed. Patient is A/O times 4, breathing on room air evenly and unlabored. Denies any pain or distress. Discussed discharge instructions with patient and . Patient and  verbalized understanding of all instructions provided. Educated patient and  on the importance of hygiene in detail of bilateral lower extremities. NURSING DISCHARGE NOTE    Discharged Home via Wheelchair. Accompanied by Spouse  Belongings Taken by patient/family.

## 2022-07-22 ENCOUNTER — APPOINTMENT (OUTPATIENT)
Dept: GENERAL RADIOLOGY | Facility: HOSPITAL | Age: 77
End: 2022-07-22
Payer: MEDICARE

## 2022-07-22 ENCOUNTER — HOSPITAL ENCOUNTER (EMERGENCY)
Facility: HOSPITAL | Age: 77
Discharge: HOME | End: 2022-07-22
Attending: EMERGENCY MEDICINE
Payer: MEDICARE

## 2022-07-22 VITALS
TEMPERATURE: 98 F | WEIGHT: 250 LBS | SYSTOLIC BLOOD PRESSURE: 139 MMHG | HEART RATE: 58 BPM | DIASTOLIC BLOOD PRESSURE: 63 MMHG | BODY MASS INDEX: 40.18 KG/M2 | OXYGEN SATURATION: 99 % | HEIGHT: 66 IN | RESPIRATION RATE: 23 BRPM

## 2022-07-22 DIAGNOSIS — I48.92 NEW ONSET ATRIAL FLUTTER (HCC): Primary | ICD-10-CM

## 2022-07-22 LAB
ALBUMIN SERPL-MCNC: 2.7 G/DL (ref 3.4–5)
ALBUMIN/GLOB SERPL: 0.6 {RATIO} (ref 1–2)
ALP LIVER SERPL-CCNC: 80 U/L
ALT SERPL-CCNC: 10 U/L
ANION GAP SERPL CALC-SCNC: 6 MMOL/L (ref 0–18)
AST SERPL-CCNC: 14 U/L (ref 15–37)
BASOPHILS # BLD AUTO: 0.02 X10(3) UL (ref 0–0.2)
BASOPHILS NFR BLD AUTO: 0.3 %
BILIRUB SERPL-MCNC: 0.5 MG/DL (ref 0.1–2)
BUN BLD-MCNC: 57 MG/DL (ref 7–18)
CALCIUM BLD-MCNC: 8.6 MG/DL (ref 8.5–10.1)
CHLORIDE SERPL-SCNC: 108 MMOL/L (ref 98–112)
CO2 SERPL-SCNC: 25 MMOL/L (ref 21–32)
CREAT BLD-MCNC: 2.01 MG/DL
EOSINOPHIL # BLD AUTO: 0.19 X10(3) UL (ref 0–0.7)
EOSINOPHIL NFR BLD AUTO: 2.9 %
ERYTHROCYTE [DISTWIDTH] IN BLOOD BY AUTOMATED COUNT: 14.6 %
GLOBULIN PLAS-MCNC: 4.3 G/DL (ref 2.8–4.4)
GLUCOSE BLD-MCNC: 208 MG/DL (ref 70–99)
HCT VFR BLD AUTO: 33.2 %
HGB BLD-MCNC: 10.1 G/DL
IMM GRANULOCYTES # BLD AUTO: 0.02 X10(3) UL (ref 0–1)
IMM GRANULOCYTES NFR BLD: 0.3 %
LYMPHOCYTES # BLD AUTO: 0.82 X10(3) UL (ref 1–4)
LYMPHOCYTES NFR BLD AUTO: 12.5 %
MCH RBC QN AUTO: 28.8 PG (ref 26–34)
MCHC RBC AUTO-ENTMCNC: 30.4 G/DL (ref 31–37)
MCV RBC AUTO: 94.6 FL
MONOCYTES # BLD AUTO: 0.61 X10(3) UL (ref 0.1–1)
MONOCYTES NFR BLD AUTO: 9.3 %
NEUTROPHILS # BLD AUTO: 4.9 X10 (3) UL (ref 1.5–7.7)
NEUTROPHILS # BLD AUTO: 4.9 X10(3) UL (ref 1.5–7.7)
NEUTROPHILS NFR BLD AUTO: 74.7 %
OSMOLALITY SERPL CALC.SUM OF ELEC: 310 MOSM/KG (ref 275–295)
PLATELET # BLD AUTO: 180 10(3)UL (ref 150–450)
POTASSIUM SERPL-SCNC: 4.3 MMOL/L (ref 3.5–5.1)
PROT SERPL-MCNC: 7 G/DL (ref 6.4–8.2)
RBC # BLD AUTO: 3.51 X10(6)UL
SODIUM SERPL-SCNC: 139 MMOL/L (ref 136–145)
TROPONIN I HIGH SENSITIVITY: 9 NG/L
WBC # BLD AUTO: 6.6 X10(3) UL (ref 4–11)

## 2022-07-22 PROCEDURE — 80053 COMPREHEN METABOLIC PANEL: CPT

## 2022-07-22 PROCEDURE — 84484 ASSAY OF TROPONIN QUANT: CPT | Performed by: EMERGENCY MEDICINE

## 2022-07-22 PROCEDURE — 84484 ASSAY OF TROPONIN QUANT: CPT

## 2022-07-22 PROCEDURE — 71045 X-RAY EXAM CHEST 1 VIEW: CPT

## 2022-07-22 PROCEDURE — 99284 EMERGENCY DEPT VISIT MOD MDM: CPT

## 2022-07-22 PROCEDURE — 80053 COMPREHEN METABOLIC PANEL: CPT | Performed by: EMERGENCY MEDICINE

## 2022-07-22 PROCEDURE — 36415 COLL VENOUS BLD VENIPUNCTURE: CPT

## 2022-07-22 PROCEDURE — 99285 EMERGENCY DEPT VISIT HI MDM: CPT

## 2022-07-22 PROCEDURE — 93010 ELECTROCARDIOGRAM REPORT: CPT

## 2022-07-22 PROCEDURE — 85025 COMPLETE CBC W/AUTO DIFF WBC: CPT | Performed by: EMERGENCY MEDICINE

## 2022-07-22 PROCEDURE — 85025 COMPLETE CBC W/AUTO DIFF WBC: CPT

## 2022-07-22 PROCEDURE — 93005 ELECTROCARDIOGRAM TRACING: CPT

## 2022-07-23 ENCOUNTER — LAB REQUISITION (OUTPATIENT)
Dept: LAB | Facility: HOSPITAL | Age: 77
End: 2022-07-23
Payer: MEDICARE

## 2022-07-23 DIAGNOSIS — R30.0 DYSURIA: ICD-10-CM

## 2022-07-23 LAB
ATRIAL RATE: 250 BPM
BILIRUB UR QL STRIP.AUTO: NEGATIVE
COLOR UR AUTO: YELLOW
GLUCOSE UR STRIP.AUTO-MCNC: NEGATIVE MG/DL
KETONES UR STRIP.AUTO-MCNC: NEGATIVE MG/DL
NITRITE UR QL STRIP.AUTO: NEGATIVE
PH UR STRIP.AUTO: 6 [PH] (ref 5–8)
Q-T INTERVAL: 374 MS
QRS DURATION: 82 MS
QTC CALCULATION (BEZET): 452 MS
R AXIS: 40 DEGREES
RBC #/AREA URNS AUTO: >10 /HPF
SP GR UR STRIP.AUTO: 1.02 (ref 1–1.03)
T AXIS: 11 DEGREES
UROBILINOGEN UR STRIP.AUTO-MCNC: 0.2 MG/DL
VENTRICULAR RATE: 88 BPM
WBC #/AREA URNS AUTO: >50 /HPF
WBC CLUMPS UR QL AUTO: PRESENT /HPF

## 2022-07-23 PROCEDURE — 87086 URINE CULTURE/COLONY COUNT: CPT | Performed by: FAMILY MEDICINE

## 2022-07-23 PROCEDURE — 81001 URINALYSIS AUTO W/SCOPE: CPT | Performed by: FAMILY MEDICINE

## 2022-07-27 ENCOUNTER — LAB REQUISITION (OUTPATIENT)
Dept: LAB | Facility: HOSPITAL | Age: 77
End: 2022-07-27
Payer: MEDICARE

## 2022-07-27 DIAGNOSIS — I48.92 UNSPECIFIED ATRIAL FLUTTER (HCC): ICD-10-CM

## 2022-07-27 DIAGNOSIS — K62.5 HEMORRHAGE OF ANUS AND RECTUM: ICD-10-CM

## 2022-07-27 DIAGNOSIS — E11.40 TYPE 2 DIABETES MELLITUS WITH DIABETIC NEUROPATHY, UNSPECIFIED (HCC): ICD-10-CM

## 2022-07-27 DIAGNOSIS — D63.8 ANEMIA IN OTHER CHRONIC DISEASES CLASSIFIED ELSEWHERE: ICD-10-CM

## 2022-07-27 DIAGNOSIS — I50.32 CHRONIC DIASTOLIC (CONGESTIVE) HEART FAILURE (HCC): ICD-10-CM

## 2022-07-27 LAB
ALBUMIN SERPL-MCNC: 2.9 G/DL (ref 3.4–5)
ALBUMIN/GLOB SERPL: 0.6 {RATIO} (ref 1–2)
ALP LIVER SERPL-CCNC: 91 U/L
ALT SERPL-CCNC: 11 U/L
ANION GAP SERPL CALC-SCNC: 5 MMOL/L (ref 0–18)
AST SERPL-CCNC: 4 U/L (ref 15–37)
BASOPHILS # BLD AUTO: 0.02 X10(3) UL (ref 0–0.2)
BASOPHILS NFR BLD AUTO: 0.4 %
BILIRUB SERPL-MCNC: 0.5 MG/DL (ref 0.1–2)
BILIRUB UR QL STRIP.AUTO: NEGATIVE
BUN BLD-MCNC: 60 MG/DL (ref 7–18)
CALCIUM BLD-MCNC: 9 MG/DL (ref 8.5–10.1)
CHLORIDE SERPL-SCNC: 108 MMOL/L (ref 98–112)
CO2 SERPL-SCNC: 24 MMOL/L (ref 21–32)
COLOR UR AUTO: YELLOW
CREAT BLD-MCNC: 2.03 MG/DL
DEPRECATED HBV CORE AB SER IA-ACNC: 67 NG/ML
EOSINOPHIL # BLD AUTO: 0.19 X10(3) UL (ref 0–0.7)
EOSINOPHIL NFR BLD AUTO: 3.4 %
ERYTHROCYTE [DISTWIDTH] IN BLOOD BY AUTOMATED COUNT: 14.9 %
EST. AVERAGE GLUCOSE BLD GHB EST-MCNC: 140 MG/DL (ref 68–126)
GLOBULIN PLAS-MCNC: 4.5 G/DL (ref 2.8–4.4)
GLUCOSE BLD-MCNC: 159 MG/DL (ref 70–99)
GLUCOSE UR STRIP.AUTO-MCNC: NEGATIVE MG/DL
HBA1C MFR BLD: 6.5 % (ref ?–5.7)
HCT VFR BLD AUTO: 34.7 %
HGB BLD-MCNC: 10.3 G/DL
IMM GRANULOCYTES # BLD AUTO: 0.02 X10(3) UL (ref 0–1)
IMM GRANULOCYTES NFR BLD: 0.4 %
IRON SATN MFR SERPL: 12 %
IRON SERPL-MCNC: 40 UG/DL
KETONES UR STRIP.AUTO-MCNC: NEGATIVE MG/DL
LYMPHOCYTES # BLD AUTO: 0.71 X10(3) UL (ref 1–4)
LYMPHOCYTES NFR BLD AUTO: 12.5 %
MCH RBC QN AUTO: 28.6 PG (ref 26–34)
MCHC RBC AUTO-ENTMCNC: 29.7 G/DL (ref 31–37)
MCV RBC AUTO: 96.4 FL
MONOCYTES # BLD AUTO: 0.49 X10(3) UL (ref 0.1–1)
MONOCYTES NFR BLD AUTO: 8.7 %
NEUTROPHILS # BLD AUTO: 4.23 X10 (3) UL (ref 1.5–7.7)
NEUTROPHILS # BLD AUTO: 4.23 X10(3) UL (ref 1.5–7.7)
NEUTROPHILS NFR BLD AUTO: 74.6 %
NITRITE UR QL STRIP.AUTO: NEGATIVE
OSMOLALITY SERPL CALC.SUM OF ELEC: 304 MOSM/KG (ref 275–295)
PH UR STRIP.AUTO: 6 [PH] (ref 5–8)
PLATELET # BLD AUTO: 225 10(3)UL (ref 150–450)
POTASSIUM SERPL-SCNC: 5.5 MMOL/L (ref 3.5–5.1)
PROT SERPL-MCNC: 7.4 G/DL (ref 6.4–8.2)
RBC # BLD AUTO: 3.6 X10(6)UL
SODIUM SERPL-SCNC: 137 MMOL/L (ref 136–145)
SP GR UR STRIP.AUTO: 1.02 (ref 1–1.03)
TIBC SERPL-MCNC: 325 UG/DL (ref 240–450)
TRANSFERRIN SERPL-MCNC: 218 MG/DL (ref 200–360)
TSI SER-ACNC: 0.16 MIU/ML (ref 0.36–3.74)
UROBILINOGEN UR STRIP.AUTO-MCNC: 0.2 MG/DL
WBC # BLD AUTO: 5.7 X10(3) UL (ref 4–11)
WBC #/AREA URNS AUTO: >50 /HPF
WBC CLUMPS UR QL AUTO: PRESENT /HPF

## 2022-07-27 PROCEDURE — 87186 SC STD MICRODIL/AGAR DIL: CPT | Performed by: FAMILY MEDICINE

## 2022-07-27 PROCEDURE — 87086 URINE CULTURE/COLONY COUNT: CPT | Performed by: FAMILY MEDICINE

## 2022-07-27 PROCEDURE — 82728 ASSAY OF FERRITIN: CPT | Performed by: FAMILY MEDICINE

## 2022-07-27 PROCEDURE — 81001 URINALYSIS AUTO W/SCOPE: CPT | Performed by: FAMILY MEDICINE

## 2022-07-27 PROCEDURE — 87077 CULTURE AEROBIC IDENTIFY: CPT | Performed by: FAMILY MEDICINE

## 2022-07-27 PROCEDURE — 83036 HEMOGLOBIN GLYCOSYLATED A1C: CPT | Performed by: FAMILY MEDICINE

## 2022-07-27 PROCEDURE — 84443 ASSAY THYROID STIM HORMONE: CPT | Performed by: FAMILY MEDICINE

## 2022-07-27 PROCEDURE — 85025 COMPLETE CBC W/AUTO DIFF WBC: CPT | Performed by: FAMILY MEDICINE

## 2022-07-27 PROCEDURE — 83550 IRON BINDING TEST: CPT | Performed by: FAMILY MEDICINE

## 2022-07-27 PROCEDURE — 83540 ASSAY OF IRON: CPT | Performed by: FAMILY MEDICINE

## 2022-07-27 PROCEDURE — 80053 COMPREHEN METABOLIC PANEL: CPT | Performed by: FAMILY MEDICINE

## 2022-09-14 ENCOUNTER — TELEPHONE (OUTPATIENT)
Dept: INTERVENTIONAL RADIOLOGY/VASCULAR | Facility: HOSPITAL | Age: 77
End: 2022-09-14

## 2022-09-14 NOTE — TELEPHONE ENCOUNTER
Talked to [facility].  She is interested in moving forward w/ [facility]. She is having a lot of bruising and occasional rectal bleeding. She is also concerned about the cost of her Eliquis. 12/16 for implant date. Emailed [facility] for JOHN date. Will not do CT due to h/o CKD. Will touch base with pt closer to  Implant date. Encouraged her to call me w/ any questions/concerns.   Pt v/u

## 2022-09-19 ENCOUNTER — ORDER TRANSCRIPTION (OUTPATIENT)
Dept: ADMINISTRATIVE | Facility: HOSPITAL | Age: 77
End: 2022-09-19

## 2022-09-19 DIAGNOSIS — Z01.818 PRE-OP TESTING: Primary | ICD-10-CM

## 2022-10-08 ENCOUNTER — LAB ENCOUNTER (OUTPATIENT)
Dept: LAB | Facility: HOSPITAL | Age: 77
End: 2022-10-08
Attending: INTERNAL MEDICINE
Payer: MEDICARE

## 2022-10-08 DIAGNOSIS — D63.8 ANEMIA OF CHRONIC DISEASE: ICD-10-CM

## 2022-10-08 DIAGNOSIS — Z79.01 LONG TERM (CURRENT) USE OF ANTICOAGULANTS: ICD-10-CM

## 2022-10-08 DIAGNOSIS — Z01.818 PRE-OP TESTING: ICD-10-CM

## 2022-10-08 DIAGNOSIS — E11.21: ICD-10-CM

## 2022-10-08 DIAGNOSIS — E03.9 ACQUIRED HYPOTHYROIDISM: Primary | ICD-10-CM

## 2022-10-08 LAB
ALBUMIN SERPL-MCNC: 2.7 G/DL (ref 3.4–5)
ALBUMIN/GLOB SERPL: 0.6 {RATIO} (ref 1–2)
ALP LIVER SERPL-CCNC: 80 U/L
ALT SERPL-CCNC: 12 U/L
ANION GAP SERPL CALC-SCNC: 5 MMOL/L (ref 0–18)
AST SERPL-CCNC: 7 U/L (ref 15–37)
BASOPHILS # BLD AUTO: 0.03 X10(3) UL (ref 0–0.2)
BASOPHILS NFR BLD AUTO: 0.5 %
BILIRUB SERPL-MCNC: 0.4 MG/DL (ref 0.1–2)
BUN BLD-MCNC: 59 MG/DL (ref 7–18)
CALCIUM BLD-MCNC: 8.5 MG/DL (ref 8.5–10.1)
CHLORIDE SERPL-SCNC: 106 MMOL/L (ref 98–112)
CO2 SERPL-SCNC: 26 MMOL/L (ref 21–32)
CREAT BLD-MCNC: 1.72 MG/DL
EOSINOPHIL # BLD AUTO: 0.14 X10(3) UL (ref 0–0.7)
EOSINOPHIL NFR BLD AUTO: 2.4 %
ERYTHROCYTE [DISTWIDTH] IN BLOOD BY AUTOMATED COUNT: 14.6 %
EST. AVERAGE GLUCOSE BLD GHB EST-MCNC: 128 MG/DL (ref 68–126)
FASTING STATUS PATIENT QL REPORTED: YES
GFR SERPLBLD BASED ON 1.73 SQ M-ARVRAT: 30 ML/MIN/1.73M2 (ref 60–?)
GLOBULIN PLAS-MCNC: 4.5 G/DL (ref 2.8–4.4)
GLUCOSE BLD-MCNC: 143 MG/DL (ref 70–99)
HBA1C MFR BLD: 6.1 % (ref ?–5.7)
HCT VFR BLD AUTO: 37.4 %
HGB BLD-MCNC: 11.4 G/DL
IMM GRANULOCYTES # BLD AUTO: 0.02 X10(3) UL (ref 0–1)
IMM GRANULOCYTES NFR BLD: 0.3 %
LYMPHOCYTES # BLD AUTO: 0.81 X10(3) UL (ref 1–4)
LYMPHOCYTES NFR BLD AUTO: 13.7 %
MCH RBC QN AUTO: 29.2 PG (ref 26–34)
MCHC RBC AUTO-ENTMCNC: 30.5 G/DL (ref 31–37)
MCV RBC AUTO: 95.9 FL
MONOCYTES # BLD AUTO: 0.54 X10(3) UL (ref 0.1–1)
MONOCYTES NFR BLD AUTO: 9.1 %
NEUTROPHILS # BLD AUTO: 4.39 X10 (3) UL (ref 1.5–7.7)
NEUTROPHILS # BLD AUTO: 4.39 X10(3) UL (ref 1.5–7.7)
NEUTROPHILS NFR BLD AUTO: 74 %
OSMOLALITY SERPL CALC.SUM OF ELEC: 303 MOSM/KG (ref 275–295)
PLATELET # BLD AUTO: 219 10(3)UL (ref 150–450)
POTASSIUM SERPL-SCNC: 4.8 MMOL/L (ref 3.5–5.1)
PROT SERPL-MCNC: 7.2 G/DL (ref 6.4–8.2)
RBC # BLD AUTO: 3.9 X10(6)UL
SODIUM SERPL-SCNC: 137 MMOL/L (ref 136–145)
T4 FREE SERPL-MCNC: 1.4 NG/DL (ref 0.8–1.7)
TSI SER-ACNC: 0.19 MIU/ML (ref 0.36–3.74)
WBC # BLD AUTO: 5.9 X10(3) UL (ref 4–11)

## 2022-10-08 PROCEDURE — 84443 ASSAY THYROID STIM HORMONE: CPT

## 2022-10-08 PROCEDURE — 83036 HEMOGLOBIN GLYCOSYLATED A1C: CPT

## 2022-10-08 PROCEDURE — 36415 COLL VENOUS BLD VENIPUNCTURE: CPT

## 2022-10-08 PROCEDURE — 85025 COMPLETE CBC W/AUTO DIFF WBC: CPT

## 2022-10-08 PROCEDURE — 84439 ASSAY OF FREE THYROXINE: CPT

## 2022-10-08 PROCEDURE — 80053 COMPREHEN METABOLIC PANEL: CPT

## 2022-10-09 LAB — SARS-COV-2 RNA RESP QL NAA+PROBE: NOT DETECTED

## 2022-10-11 ENCOUNTER — HOSPITAL ENCOUNTER (OUTPATIENT)
Dept: CV DIAGNOSTICS | Facility: HOSPITAL | Age: 77
Discharge: HOME | End: 2022-10-11
Attending: INTERNAL MEDICINE
Payer: MEDICARE

## 2022-10-11 ENCOUNTER — HOSPITAL ENCOUNTER (OUTPATIENT)
Dept: INTERVENTIONAL RADIOLOGY/VASCULAR | Facility: HOSPITAL | Age: 77
Discharge: HOME | End: 2022-10-11
Attending: INTERNAL MEDICINE | Admitting: INTERNAL MEDICINE
Payer: MEDICARE

## 2022-10-11 VITALS
SYSTOLIC BLOOD PRESSURE: 133 MMHG | BODY MASS INDEX: 40.98 KG/M2 | WEIGHT: 255 LBS | OXYGEN SATURATION: 92 % | HEIGHT: 66 IN | TEMPERATURE: 97 F | DIASTOLIC BLOOD PRESSURE: 71 MMHG | HEART RATE: 80 BPM | RESPIRATION RATE: 24 BRPM

## 2022-10-11 DIAGNOSIS — I48.91 ATRIAL FIBRILLATION (HCC): ICD-10-CM

## 2022-10-11 LAB — GLUCOSE BLD-MCNC: 71 MG/DL (ref 70–99)

## 2022-10-11 PROCEDURE — 99152 MOD SED SAME PHYS/QHP 5/>YRS: CPT | Performed by: INTERNAL MEDICINE

## 2022-10-11 PROCEDURE — 82962 GLUCOSE BLOOD TEST: CPT

## 2022-10-11 PROCEDURE — 93312 ECHO TRANSESOPHAGEAL: CPT | Performed by: INTERNAL MEDICINE

## 2022-10-11 PROCEDURE — 93320 DOPPLER ECHO COMPLETE: CPT | Performed by: INTERNAL MEDICINE

## 2022-10-11 PROCEDURE — B24BZZ4 ULTRASONOGRAPHY OF HEART WITH AORTA, TRANSESOPHAGEAL: ICD-10-PCS | Performed by: INTERNAL MEDICINE

## 2022-10-11 PROCEDURE — 93325 DOPPLER ECHO COLOR FLOW MAPG: CPT | Performed by: INTERNAL MEDICINE

## 2022-10-11 RX ORDER — SODIUM CHLORIDE 9 MG/ML
INJECTION, SOLUTION INTRAVENOUS
Status: COMPLETED | OUTPATIENT
Start: 2022-10-12 | End: 2022-10-11

## 2022-10-11 RX ORDER — MIDAZOLAM HYDROCHLORIDE 1 MG/ML
INJECTION INTRAMUSCULAR; INTRAVENOUS
Status: COMPLETED
Start: 2022-10-11 | End: 2022-10-11

## 2022-10-11 RX ADMIN — MIDAZOLAM HYDROCHLORIDE 4 MG: 1 INJECTION INTRAMUSCULAR; INTRAVENOUS at 11:19:00

## 2022-10-11 RX ADMIN — SODIUM CHLORIDE: 9 INJECTION, SOLUTION INTRAVENOUS at 09:30:00

## 2022-10-11 NOTE — PROGRESS NOTES
[facility] CARDIOLOGY  JOHN Note    Ig Anger Location:      MRN [id number]   Admission Date 10/11/2022 Operation Date 10/11/2022   Attending Physician Abdullahi Soto MD Operating Physician Broderick March MD     Pre-Operative Diagnosis: PAF, pre-op LAAO device. Post-Operative Diagnosis: Same as above    Procedure Performed: JOHN  Conscious sedation: Versed 4 mg,  Fentanyl 50 [facility], [id number]. Summary of Case: LAE. Rapid a fib. Windsock CHRISTY, no clots. Anatomy appears amenable to LAAO device placement. Full report to follow. D/W pt's .     Broderick March MD  10/11/2022  11:47 AM

## 2022-10-11 NOTE — PROGRESS NOTES
Pt s/p JOHN. Post procedure VSS, denied pain, tolerated PO intake. IV d/c'd. Discharge instructions given-pt verbalized understanding. Pt to lobby via [facility] and  to drive home.

## 2022-10-12 ENCOUNTER — LAB REQUISITION (OUTPATIENT)
Dept: LAB | Facility: HOSPITAL | Age: 77
End: 2022-10-12
Payer: MEDICARE

## 2022-10-12 DIAGNOSIS — L89.90 PRESSURE ULCER OF UNSPECIFIED SITE, UNSPECIFIED STAGE: ICD-10-CM

## 2022-10-12 LAB
IGA SERPL-MCNC: 282 MG/DL (ref 70–312)
IGM SERPL-MCNC: 104 MG/DL (ref 43–279)
IMMUNOGLOBULIN PNL SER-MCNC: 1290 MG/DL (ref 791–1643)

## 2022-10-12 PROCEDURE — 87205 SMEAR GRAM STAIN: CPT | Performed by: FAMILY MEDICINE

## 2022-10-12 PROCEDURE — 87070 CULTURE OTHR SPECIMN AEROBIC: CPT | Performed by: FAMILY MEDICINE

## 2022-10-12 PROCEDURE — 86334 IMMUNOFIX E-PHORESIS SERUM: CPT | Performed by: FAMILY MEDICINE

## 2022-10-12 PROCEDURE — 84165 PROTEIN E-PHORESIS SERUM: CPT | Performed by: FAMILY MEDICINE

## 2022-10-12 PROCEDURE — 83521 IG LIGHT CHAINS FREE EACH: CPT | Performed by: FAMILY MEDICINE

## 2022-10-12 PROCEDURE — 82784 ASSAY IGA/IGD/IGG/IGM EACH: CPT | Performed by: FAMILY MEDICINE

## 2022-10-13 LAB
ALBUMIN SERPL ELPH-MCNC: 3.28 G/DL (ref 3.75–5.21)
ALBUMIN/GLOB SERPL: 0.88 {RATIO} (ref 1–2)
ALPHA1 GLOB SERPL ELPH-MCNC: 0.5 G/DL (ref 0.19–0.46)
ALPHA2 GLOB SERPL ELPH-MCNC: 0.97 G/DL (ref 0.48–1.05)
B-GLOBULIN SERPL ELPH-MCNC: 0.9 G/DL (ref 0.68–1.23)
GAMMA GLOB SERPL ELPH-MCNC: 1.36 G/DL (ref 0.62–1.7)
KAPPA LC FREE SER-MCNC: 12.25 MG/DL (ref 0.33–1.94)
KAPPA LC FREE/LAMBDA FREE SER NEPH: 1.71 {RATIO} (ref 0.26–1.65)
LAMBDA LC FREE SERPL-MCNC: 7.17 MG/DL (ref 0.57–2.63)
PROT SERPL-MCNC: 7 G/DL (ref 6.4–8.2)

## 2022-10-25 ENCOUNTER — ORDER TRANSCRIPTION (OUTPATIENT)
Dept: PHYSICAL THERAPY | Facility: HOSPITAL | Age: 77
End: 2022-10-25

## 2022-10-25 DIAGNOSIS — I89.0 ACQUIRED LYMPHEDEMA: Primary | ICD-10-CM

## 2022-10-26 ENCOUNTER — OFFICE VISIT (OUTPATIENT)
Dept: WOUND CARE | Facility: HOSPITAL | Age: 77
End: 2022-10-26
Attending: INTERNAL MEDICINE
Payer: MEDICARE

## 2022-10-26 VITALS
RESPIRATION RATE: 16 BRPM | DIASTOLIC BLOOD PRESSURE: 66 MMHG | SYSTOLIC BLOOD PRESSURE: 140 MMHG | HEART RATE: 78 BPM | TEMPERATURE: 98 F

## 2022-10-26 DIAGNOSIS — E11.622 DIABETES MELLITUS WITH SKIN ULCER (HCC): ICD-10-CM

## 2022-10-26 DIAGNOSIS — L97.912 ULCER OF RIGHT LOWER LEG, WITH FAT LAYER EXPOSED (HCC): ICD-10-CM

## 2022-10-26 DIAGNOSIS — L97.929 IDIOPATHIC CHRONIC VENOUS HYPERTENSION OF BOTH LOWER EXTREMITIES WITH ULCER AND INFLAMMATION (HCC): ICD-10-CM

## 2022-10-26 DIAGNOSIS — L89.153 PRESSURE INJURY OF SACRAL REGION, STAGE 3 (HCC): Primary | ICD-10-CM

## 2022-10-26 DIAGNOSIS — L97.919 IDIOPATHIC CHRONIC VENOUS HYPERTENSION OF BOTH LOWER EXTREMITIES WITH ULCER AND INFLAMMATION (HCC): ICD-10-CM

## 2022-10-26 DIAGNOSIS — N18.32 STAGE 3B CHRONIC KIDNEY DISEASE (HCC): ICD-10-CM

## 2022-10-26 DIAGNOSIS — L97.922 SKIN ULCER OF LEFT LOWER LEG WITH FAT LAYER EXPOSED (HCC): ICD-10-CM

## 2022-10-26 DIAGNOSIS — L98.499 DIABETES MELLITUS WITH SKIN ULCER (HCC): ICD-10-CM

## 2022-10-26 DIAGNOSIS — I87.333 IDIOPATHIC CHRONIC VENOUS HYPERTENSION OF BOTH LOWER EXTREMITIES WITH ULCER AND INFLAMMATION (HCC): ICD-10-CM

## 2022-10-26 LAB — GLUCOSE BLD-MCNC: 120 MG/DL (ref 70–99)

## 2022-10-26 PROCEDURE — 87077 CULTURE AEROBIC IDENTIFY: CPT | Performed by: INTERNAL MEDICINE

## 2022-10-26 PROCEDURE — 87070 CULTURE OTHR SPECIMN AEROBIC: CPT | Performed by: INTERNAL MEDICINE

## 2022-10-26 PROCEDURE — 87205 SMEAR GRAM STAIN: CPT | Performed by: INTERNAL MEDICINE

## 2022-10-26 PROCEDURE — 29581 APPL MULTLAYER CMPRN SYS LEG: CPT

## 2022-10-26 PROCEDURE — 99214 OFFICE O/P EST MOD 30 MIN: CPT

## 2022-10-26 PROCEDURE — 82962 GLUCOSE BLOOD TEST: CPT | Performed by: INTERNAL MEDICINE

## 2022-10-26 RX ORDER — GENTAMICIN SULFATE 1 MG/G
1 OINTMENT TOPICAL 3 TIMES DAILY
Qty: 30 G | Refills: 3 | Status: SHIPPED | OUTPATIENT
Start: 2022-10-26

## 2022-10-26 NOTE — PROGRESS NOTES
Weekly Wound Education Note    Teaching Provided To: Patient  Training Topics: Cleasing and general instructions; Compression; Discharge instructions;Dressing;Edema control;Off-loading  Training Method: Explain/Verbal  Training Response: Reinforcement needed        Notes: Initial visit: wounds to BLE and sacrum. BLE: miconzole powder, ABD pads, gauze between the toes, coflex 2. sacrum: vashe, plain packing strips, bordered gauze. Sacral wound cultured.

## 2022-10-26 NOTE — PATIENT INSTRUCTIONS
Wound Cleaning and Dressings:    Wash your hands with soap and water. Always wear gloves while changing dressings. Donot touch wound / kt-wound skin with un-gloved hands. Remove old dressing, discard and place into trash. VASHE soak. X 15 min   DRESSINGS: sacral wound - topical gentamycin and packing strips on sacral Pressure ulcer. Apply zinc periwound. Change dressing daily. Foot wounds - Miconazole powder / abd pad / gauze between toes / coflex-2 wraps - change dressing and wrap 3 times a week    OFFLOADING:   EHOB CUSHION   Avoid pressure on wounded area. Compression Therapy : coflex-2   Compression Therapy Instructions:  1.   Okay to wear overnight      2. Avoid prolonged standing in one place. It is better to have your calf muscles moving       to pump fluid out of the legs. 3.  Elevate leg(s) above the level of the heart when sitting or as much as possible. 4.  Take your diuretics as directed by your provider. Do not skip doses or change doses      unless instructed to do so by your provider. 5. Do not get leg(s) with compression wrap wet. If wraps are too tight as indicated        By pain, numbness/tingling or discoloration of toes remove wrap completely       and call the   wound center. Miscellaneous Instructions:  Supplement with a daily multivitamin   Low salt diet  Intense blood sugar control - Goal Blood sugar below 180 at all times recommended. Increase protein intake / consider protein supplements - see below  Elevate extremities at all times when sitting / laying down.     DIETARY MODIFICATIONS TO HELP WITH WOUND HEALING:    Protein: Meats, beans, eggs, milk and yogurt particularly [place] yogurt), tofu, soy nuts, soy protein products    Vitamin C: Citrus fruits and juices, strawberries, tomatoes, tomato juice, peppers, baked potatoes, spinach, broccoli, cauliflower, [place] sprouts, cabbage    Vitamin A: Dark green, leafy vegetables, orange or yellow vegetables, cantaloupe, fortified dairy products, liver, fortified cereals    Zinc: Fortified cereals, red meats, seafood    Consider Speedy by [employer] (These are essential branch chain amino acids that help with tissue building and wound healing) and take 2 packets/day. you can order online at [employer] or [facility] [address] REMINDERS:    The treatment plan has been discussed at length with you and your provider. Follow all instructions carefully, it is very important. If you do not follow all instructions, you are at  risk of your wound not healing, infection, possible loss of limb and even end of life. Please call the clinic during regular business hours ( 7:30 AM - 5:30 PM) if you notice increased bleeding, redness, warmth, pain or pus like drainage or start running a fever greater than 100.3. For after hour emergencies, please call your primary physician or go to the nearest emergency room.

## 2022-10-28 ENCOUNTER — TELEPHONE (OUTPATIENT)
Dept: WOUND CARE | Facility: HOSPITAL | Age: 77
End: 2022-10-28

## 2022-10-28 NOTE — TELEPHONE ENCOUNTER
Called and spoke with [facility] nurse Regan Carias questioning clarification on orders. RN from [facility] notified nurse that they do not have Coflex 2 wraps at this time and RN is questioning what wraps/dressings to use. RN informed nurse to use ABD pads and pt own compression to BLE at this time. RN refaxed over orders to [facility] about dressing change directions. H.H RN verbalized understanding. Reeducated to call clinic with further questions or concerns.

## 2022-10-28 NOTE — TELEPHONE ENCOUNTER
Attempted to call patient regarding lab results. Received VM, left message to call clinic back at earliest convenience.

## 2022-10-28 NOTE — TELEPHONE ENCOUNTER
Nurse at Dr. Philip Almanza office called needing clarification on a few things. Discussed wound care POC and that [facility] will would be educating spouse of dressing changes for sacral wound. PCP is concerns about infection to wound due to depth - informed him provider wanted to order a CT scan with contrast but due to kidney numbers not being the greatest we are holding off. Will discuss with patient at next visit - possibly order a MRI. She verbalized understanding.

## 2022-10-28 NOTE — TELEPHONE ENCOUNTER
Pt called clinic back requesting return call. RN called and notified pt of positive culture results. Dr. Neri Portillo oral ABT ampicillin 500mg cap, 1 cap daily three time a day. Also dr. Neri Portillo pt to apply coloplast to periwound and use topical gentmycin to sacral wound area three times a day. RN explained to pt how to complete dressing change. Also reeducated pt to call clinic with any further concerns or questions. Pt verbalized understanding.

## 2022-11-07 ENCOUNTER — TELEPHONE (OUTPATIENT)
Dept: CARDIOLOGY CLINIC | Facility: HOSPITAL | Age: 77
End: 2022-11-07

## 2022-11-07 NOTE — TELEPHONE ENCOUNTER
Watchman procedure date moved to 1/20/23, will call closer to date with PAT instructions. Verbalized understanding.

## 2022-11-09 ENCOUNTER — OFFICE VISIT (OUTPATIENT)
Dept: WOUND CARE | Facility: HOSPITAL | Age: 77
End: 2022-11-09
Attending: NURSE PRACTITIONER
Payer: MEDICARE

## 2022-11-09 VITALS
TEMPERATURE: 98 F | RESPIRATION RATE: 16 BRPM | DIASTOLIC BLOOD PRESSURE: 78 MMHG | HEART RATE: 58 BPM | SYSTOLIC BLOOD PRESSURE: 138 MMHG

## 2022-11-09 DIAGNOSIS — L97.919 IDIOPATHIC CHRONIC VENOUS HYPERTENSION OF BOTH LOWER EXTREMITIES WITH ULCER AND INFLAMMATION (HCC): ICD-10-CM

## 2022-11-09 DIAGNOSIS — E11.622 DIABETES MELLITUS WITH SKIN ULCER (HCC): ICD-10-CM

## 2022-11-09 DIAGNOSIS — N18.32 STAGE 3B CHRONIC KIDNEY DISEASE (HCC): ICD-10-CM

## 2022-11-09 DIAGNOSIS — L89.153 PRESSURE INJURY OF SACRAL REGION, STAGE 3 (HCC): Primary | ICD-10-CM

## 2022-11-09 DIAGNOSIS — L97.922 SKIN ULCER OF LEFT LOWER LEG WITH FAT LAYER EXPOSED (HCC): ICD-10-CM

## 2022-11-09 DIAGNOSIS — I87.333 IDIOPATHIC CHRONIC VENOUS HYPERTENSION OF BOTH LOWER EXTREMITIES WITH ULCER AND INFLAMMATION (HCC): ICD-10-CM

## 2022-11-09 DIAGNOSIS — L98.499 DIABETES MELLITUS WITH SKIN ULCER (HCC): ICD-10-CM

## 2022-11-09 DIAGNOSIS — L97.929 IDIOPATHIC CHRONIC VENOUS HYPERTENSION OF BOTH LOWER EXTREMITIES WITH ULCER AND INFLAMMATION (HCC): ICD-10-CM

## 2022-11-09 DIAGNOSIS — L97.912 ULCER OF RIGHT LOWER LEG, WITH FAT LAYER EXPOSED (HCC): ICD-10-CM

## 2022-11-09 LAB — GLUCOSE BLD-MCNC: 165 MG/DL (ref 70–99)

## 2022-11-09 PROCEDURE — 87186 SC STD MICRODIL/AGAR DIL: CPT | Performed by: INTERNAL MEDICINE

## 2022-11-09 PROCEDURE — 87077 CULTURE AEROBIC IDENTIFY: CPT | Performed by: INTERNAL MEDICINE

## 2022-11-09 PROCEDURE — 87205 SMEAR GRAM STAIN: CPT | Performed by: INTERNAL MEDICINE

## 2022-11-09 PROCEDURE — 29581 APPL MULTLAYER CMPRN SYS LEG: CPT

## 2022-11-09 PROCEDURE — 87070 CULTURE OTHR SPECIMN AEROBIC: CPT | Performed by: INTERNAL MEDICINE

## 2022-11-09 PROCEDURE — 82962 GLUCOSE BLOOD TEST: CPT | Performed by: INTERNAL MEDICINE

## 2022-11-09 NOTE — PATIENT INSTRUCTIONS
Wound Cleaning and Dressings:    Wash your hands with soap and water. Always wear gloves while changing dressings. Donot touch wound / kt-wound skin with un-gloved hands. Remove old dressing, discard and place into trash. VASHE soak. X 15 min   DRESSINGS: sacral wound - topical gentamycin and packing strips on sacral Pressure ulcer. Apply zinc periwound. Change dressing daily. Foot wounds - Miconazole powder / silver alginate  / gauze between toes / coflex-2 wraps - change dressing and wrap 3 times a week    OFFLOADING:   EHOB CUSHION   Avoid pressure on wounded area. Compression Therapy : coflex-2   Compression Therapy Instructions:  1.   Okay to wear overnight      2. Avoid prolonged standing in one place. It is better to have your calf muscles moving       to pump fluid out of the legs. 3.  Elevate leg(s) above the level of the heart when sitting or as much as possible. 4.  Take your diuretics as directed by your provider. Do not skip doses or change doses      unless instructed to do so by your provider. 5. Do not get leg(s) with compression wrap wet. If wraps are too tight as indicated        By pain, numbness/tingling or discoloration of toes remove wrap completely       and call the   wound center. Miscellaneous Instructions:  Supplement with a daily multivitamin   Low salt diet  Intense blood sugar control - Goal Blood sugar below 180 at all times recommended. Increase protein intake / consider protein supplements - see below  Elevate extremities at all times when sitting / laying down.     DIETARY MODIFICATIONS TO HELP WITH WOUND HEALING:    Protein: Meats, beans, eggs, milk and yogurt particularly [place] yogurt), tofu, soy nuts, soy protein products    Vitamin C: Citrus fruits and juices, strawberries, tomatoes, tomato juice, peppers, baked potatoes, spinach, broccoli, cauliflower, [place] sprouts, cabbage    Vitamin A: Dark green, leafy vegetables, orange or yellow vegetables, cantaloupe, fortified dairy products, liver, fortified cereals    Zinc: Fortified cereals, red meats, seafood    Consider Speedy by Occipital (These are essential branch chain amino acids that help with tissue building and wound healing) and take 2 packets/day. you can order online at [employer] or [employer] [facility] [[email or web address]] Drive REMINDERS:    The treatment plan has been discussed at length with you and your provider. Follow all instructions carefully, it is very important. If you do not follow all instructions, you are at  risk of your wound not healing, infection, possible loss of limb and even end of life. Please call the clinic during regular business hours ( 7:30 AM - 5:30 PM) if you notice increased bleeding, redness, warmth, pain or pus like drainage or start running a fever greater than 100.3. For after hour emergencies, please call your primary physician or go to the nearest emergency room.

## 2022-11-14 NOTE — PROGRESS NOTES
Spoke with patient regarding cx results and ordered abxs. Discussed how to use the topical abx. Also informed patient to get her BMP drawn in one week. She states she understands.

## 2022-11-16 ENCOUNTER — TELEPHONE (OUTPATIENT)
Dept: WOUND CARE | Facility: HOSPITAL | Age: 77
End: 2022-11-16

## 2022-11-16 NOTE — TELEPHONE ENCOUNTER
Fernando Goodpasture RN from [facility] called needing clarification on orders. Went over orders, he verbalized understanding. He was with patient during call - pt asked if there was anything else that needs to be done. Reminded her that provider ordered ct scan - pt to call and make appointment. She verbalizes understanding.

## 2022-11-21 ENCOUNTER — OFFICE VISIT (OUTPATIENT)
Facility: LOCATION | Age: 77
End: 2022-11-21
Payer: MEDICARE

## 2022-11-21 VITALS — TEMPERATURE: 98 F | HEART RATE: 67 BPM

## 2022-11-21 DIAGNOSIS — L98.8 FISTULA: Primary | ICD-10-CM

## 2022-11-23 ENCOUNTER — HOSPITAL ENCOUNTER (OUTPATIENT)
Dept: CT IMAGING | Facility: HOSPITAL | Age: 77
Discharge: HOME | End: 2022-11-23
Attending: INTERNAL MEDICINE
Payer: MEDICARE

## 2022-11-23 DIAGNOSIS — L89.153 PRESSURE INJURY OF SACRAL REGION, STAGE 3 (HCC): ICD-10-CM

## 2022-11-23 PROCEDURE — 72192 CT PELVIS W/O DYE: CPT | Performed by: INTERNAL MEDICINE

## 2022-11-29 ENCOUNTER — TELEPHONE (OUTPATIENT)
Facility: LOCATION | Age: 77
End: 2022-11-29

## 2022-11-29 NOTE — TELEPHONE ENCOUNTER
Per Medicare Guidelines -no authorization is required for Anal Exam under Anesthesia, possible incision & drainage, fistulotomy possible seton, anal fistulectomy CPT [id number]    Status: Authorization is not required however may be subject to review once claim is submitted-Covered Benefit

## 2022-12-02 ENCOUNTER — OFFICE VISIT (OUTPATIENT)
Dept: WOUND CARE | Facility: HOSPITAL | Age: 77
End: 2022-12-02
Attending: INTERNAL MEDICINE
Payer: MEDICARE

## 2022-12-02 VITALS
DIASTOLIC BLOOD PRESSURE: 68 MMHG | TEMPERATURE: 99 F | HEART RATE: 81 BPM | SYSTOLIC BLOOD PRESSURE: 102 MMHG | RESPIRATION RATE: 18 BRPM

## 2022-12-02 DIAGNOSIS — L98.499 DIABETES MELLITUS WITH SKIN ULCER (HCC): ICD-10-CM

## 2022-12-02 DIAGNOSIS — L97.922 SKIN ULCER OF LEFT LOWER LEG WITH FAT LAYER EXPOSED (HCC): ICD-10-CM

## 2022-12-02 DIAGNOSIS — L97.912 ULCER OF RIGHT LOWER LEG, WITH FAT LAYER EXPOSED (HCC): ICD-10-CM

## 2022-12-02 DIAGNOSIS — L97.929 IDIOPATHIC CHRONIC VENOUS HYPERTENSION OF BOTH LOWER EXTREMITIES WITH ULCER AND INFLAMMATION (HCC): ICD-10-CM

## 2022-12-02 DIAGNOSIS — L89.153 PRESSURE INJURY OF SACRAL REGION, STAGE 3 (HCC): Primary | ICD-10-CM

## 2022-12-02 DIAGNOSIS — I87.333 IDIOPATHIC CHRONIC VENOUS HYPERTENSION OF BOTH LOWER EXTREMITIES WITH ULCER AND INFLAMMATION (HCC): ICD-10-CM

## 2022-12-02 DIAGNOSIS — E11.622 DIABETES MELLITUS WITH SKIN ULCER (HCC): ICD-10-CM

## 2022-12-02 DIAGNOSIS — L89.310: ICD-10-CM

## 2022-12-02 DIAGNOSIS — N18.32 STAGE 3B CHRONIC KIDNEY DISEASE (HCC): ICD-10-CM

## 2022-12-02 DIAGNOSIS — L97.919 IDIOPATHIC CHRONIC VENOUS HYPERTENSION OF BOTH LOWER EXTREMITIES WITH ULCER AND INFLAMMATION (HCC): ICD-10-CM

## 2022-12-02 LAB — GLUCOSE BLD-MCNC: 94 MG/DL (ref 70–99)

## 2022-12-02 PROCEDURE — 99214 OFFICE O/P EST MOD 30 MIN: CPT

## 2022-12-02 PROCEDURE — 82962 GLUCOSE BLOOD TEST: CPT | Performed by: INTERNAL MEDICINE

## 2022-12-02 NOTE — PROGRESS NOTES
.Weekly Wound Education Note    Teaching Provided To: Patient  Training Topics: Discharge instructions;Dressing;Edema control;Cleasing and general instructions; Compression  Training Method: Explain/Verbal;Written  Training Response: Patient responds and understands        Notes: Pt refused assessment of sacral wound. Bilateral leg wounds stable.  Continue gentamicin, silver alginate, ABD pads, kerlix and spandagrip F.

## 2022-12-02 NOTE — PATIENT INSTRUCTIONS
Wound Cleaning and Dressings:    Wash your hands with soap and water. Always wear gloves while changing dressings. Donot touch wound / kt-wound skin with un-gloved hands. Remove old dressing, discard and place into trash. VASHE soak. X 15 min   DRESSINGS: sacral wound - topical gentamycin and packing strips on sacral Pressure ulcer. Apply zinc periwound. Change dressing daily. Foot wounds - Miconazole powder / silver alginate  / gauze between toes  COMPRESSION: ace wrap and double tubigrips     OFFLOADING:   EHOB CUSHION - Avoid pressure on wounded area. Compression Therapy : ace wrap / double spandagrips. Compression Therapy Instructions:  1.   Okay to wear overnight      2. Avoid prolonged standing in one place. It is better to have your calf muscles moving       to pump fluid out of the legs. 3.  Elevate leg(s) above the level of the heart when sitting or as much as possible. 4.  Take your diuretics as directed by your provider. Do not skip doses or change doses      unless instructed to do so by your provider. 5. Do not get leg(s) with compression wrap wet. If wraps are too tight as indicated        By pain, numbness/tingling or discoloration of toes remove wrap completely       and call the   wound center. Miscellaneous Instructions:  Supplement with a daily multivitamin   Low salt diet  Intense blood sugar control - Goal Blood sugar below 180 at all times recommended. Increase protein intake / consider protein supplements - see below  Elevate extremities at all times when sitting / laying down.     DIETARY MODIFICATIONS TO HELP WITH WOUND HEALING:    Protein: Meats, beans, eggs, milk and yogurt particularly [place] yogurt), tofu, soy nuts, soy protein products    Vitamin C: Citrus fruits and juices, strawberries, tomatoes, tomato juice, peppers, baked potatoes, spinach, broccoli, cauliflower, [place] sprouts, cabbage    Vitamin A: Dark green, leafy vegetables, orange or yellow vegetables, cantaloupe, fortified dairy products, liver, fortified cereals    Zinc: Fortified cereals, red meats, seafood    Consider Speedy by [employer] (These are essential branch chain amino acids that help with tissue building and wound healing) and take 2 packets/day. you can order online at [employer] or [employer] [facility] "[employer] REMINDERS:    The treatment plan has been discussed at length with you and your provider. Follow all instructions carefully, it is very important. If you do not follow all instructions, you are at  risk of your wound not healing, infection, possible loss of limb and even end of life. Please call the clinic during regular business hours ( 7:30 AM - 5:30 PM) if you notice increased bleeding, redness, warmth, pain or pus like drainage or start running a fever greater than 100.3. For after hour emergencies, please call your primary physician or go to the nearest emergency room.

## 2022-12-07 ENCOUNTER — LAB ENCOUNTER (OUTPATIENT)
Dept: LAB | Facility: HOSPITAL | Age: 77
End: 2022-12-07
Attending: STUDENT IN AN ORGANIZED HEALTH CARE EDUCATION/TRAINING PROGRAM
Payer: MEDICARE

## 2022-12-07 ENCOUNTER — ANESTHESIA EVENT (OUTPATIENT)
Dept: SURGERY | Facility: HOSPITAL | Age: 77
End: 2022-12-07

## 2022-12-07 ENCOUNTER — TELEPHONE (OUTPATIENT)
Facility: LOCATION | Age: 77
End: 2022-12-07

## 2022-12-07 DIAGNOSIS — Z20.822 ENCOUNTER FOR PREOPERATIVE SCREENING LABORATORY TESTING FOR COVID-19 VIRUS: ICD-10-CM

## 2022-12-07 DIAGNOSIS — Z01.812 ENCOUNTER FOR PREOPERATIVE SCREENING LABORATORY TESTING FOR COVID-19 VIRUS: ICD-10-CM

## 2022-12-07 PROBLEM — C55 UTERINE CANCER (HCC): Status: ACTIVE | Noted: 2022-12-07

## 2022-12-07 PROBLEM — C55 UTERINE CANCER (HCC): Status: ACTIVE | Noted: 2022-01-01

## 2022-12-07 RX ORDER — ERGOCALCIFEROL 1.25 MG/1
50000 CAPSULE ORAL WEEKLY
Status: ON HOLD | COMMUNITY

## 2022-12-07 NOTE — TELEPHONE ENCOUNTER
Pt said that she doesn't know what she will happen during her procedure. She would like to go over what will happen with someone. She also wants to make sure that for the pain medication after the procedure she doesn't get something with codine because she is allergic.    Please advise  Best callback number is [phone]

## 2022-12-07 NOTE — TELEPHONE ENCOUNTER
Went through pt questions about surgery and all questions answered. Asked pt to remind staff of codiene allergy on the day of surgery.

## 2022-12-08 LAB — SARS-COV-2 RNA RESP QL NAA+PROBE: NOT DETECTED

## 2022-12-09 ENCOUNTER — ANESTHESIA (OUTPATIENT)
Dept: SURGERY | Facility: HOSPITAL | Age: 77
End: 2022-12-09

## 2022-12-09 ENCOUNTER — HOSPITAL ENCOUNTER (OUTPATIENT)
Facility: HOSPITAL | Age: 77
Setting detail: HOSPITAL OUTPATIENT SURGERY
Discharge: HOME | DRG: 673 | End: 2022-12-09
Attending: STUDENT IN AN ORGANIZED HEALTH CARE EDUCATION/TRAINING PROGRAM | Admitting: STUDENT IN AN ORGANIZED HEALTH CARE EDUCATION/TRAINING PROGRAM
Payer: MEDICARE

## 2022-12-09 ENCOUNTER — HOSPITAL ENCOUNTER (OUTPATIENT)
Facility: HOSPITAL | Age: 77
Setting detail: HOSPITAL OUTPATIENT SURGERY
Discharge: HOME | End: 2022-12-09
Attending: STUDENT IN AN ORGANIZED HEALTH CARE EDUCATION/TRAINING PROGRAM | Admitting: STUDENT IN AN ORGANIZED HEALTH CARE EDUCATION/TRAINING PROGRAM
Payer: MEDICARE

## 2022-12-09 VITALS
DIASTOLIC BLOOD PRESSURE: 46 MMHG | BODY MASS INDEX: 40.18 KG/M2 | TEMPERATURE: 98 F | HEART RATE: 80 BPM | RESPIRATION RATE: 12 BRPM | OXYGEN SATURATION: 93 % | HEIGHT: 66 IN | WEIGHT: 250 LBS | SYSTOLIC BLOOD PRESSURE: 97 MMHG

## 2022-12-09 DIAGNOSIS — Z20.822 ENCOUNTER FOR PREOPERATIVE SCREENING LABORATORY TESTING FOR COVID-19 VIRUS: Primary | ICD-10-CM

## 2022-12-09 DIAGNOSIS — Z01.812 ENCOUNTER FOR PREOPERATIVE SCREENING LABORATORY TESTING FOR COVID-19 VIRUS: Primary | ICD-10-CM

## 2022-12-09 DIAGNOSIS — L98.8 FISTULA: ICD-10-CM

## 2022-12-09 LAB
ERYTHROCYTE [DISTWIDTH] IN BLOOD BY AUTOMATED COUNT: 14.6 %
GLUCOSE BLD-MCNC: 131 MG/DL (ref 70–99)
GLUCOSE BLD-MCNC: 152 MG/DL (ref 70–99)
HCT VFR BLD AUTO: 38.3 %
HGB BLD-MCNC: 11.6 G/DL
MCH RBC QN AUTO: 28 PG (ref 26–34)
MCHC RBC AUTO-ENTMCNC: 30.3 G/DL (ref 31–37)
MCV RBC AUTO: 92.5 FL
PLATELET # BLD AUTO: 211 10(3)UL (ref 150–450)
RBC # BLD AUTO: 4.14 X10(6)UL
WBC # BLD AUTO: 6.2 X10(3) UL (ref 4–11)

## 2022-12-09 PROCEDURE — 0JBB0ZZ EXCISION OF PERINEUM SUBCUTANEOUS TISSUE AND FASCIA, OPEN APPROACH: ICD-10-PCS | Performed by: STUDENT IN AN ORGANIZED HEALTH CARE EDUCATION/TRAINING PROGRAM

## 2022-12-09 PROCEDURE — 46280 REMOVE ANAL FIST COMPLEX: CPT | Performed by: STUDENT IN AN ORGANIZED HEALTH CARE EDUCATION/TRAINING PROGRAM

## 2022-12-09 RX ORDER — HYDROMORPHONE HYDROCHLORIDE 1 MG/ML
0.6 INJECTION, SOLUTION INTRAMUSCULAR; INTRAVENOUS; SUBCUTANEOUS EVERY 5 MIN PRN
Status: DISCONTINUED | OUTPATIENT
Start: 2022-12-09 | End: 2022-12-09

## 2022-12-09 RX ORDER — ONDANSETRON 2 MG/ML
INJECTION INTRAMUSCULAR; INTRAVENOUS AS NEEDED
Status: DISCONTINUED | OUTPATIENT
Start: 2022-12-09 | End: 2022-12-09 | Stop reason: SURG

## 2022-12-09 RX ORDER — BUPIVACAINE HYDROCHLORIDE AND EPINEPHRINE 2.5; 5 MG/ML; UG/ML
INJECTION, SOLUTION EPIDURAL; INFILTRATION; INTRACAUDAL; PERINEURAL AS NEEDED
Status: DISCONTINUED | OUTPATIENT
Start: 2022-12-09 | End: 2022-12-09

## 2022-12-09 RX ORDER — ONDANSETRON 2 MG/ML
4 INJECTION INTRAMUSCULAR; INTRAVENOUS EVERY 6 HOURS PRN
Status: DISCONTINUED | OUTPATIENT
Start: 2022-12-09 | End: 2022-12-09

## 2022-12-09 RX ORDER — NICOTINE POLACRILEX 4 MG
15 LOZENGE BUCCAL
Status: DISCONTINUED | OUTPATIENT
Start: 2022-12-09 | End: 2022-12-09 | Stop reason: HOSPADM

## 2022-12-09 RX ORDER — HYDROMORPHONE HYDROCHLORIDE 1 MG/ML
0.2 INJECTION, SOLUTION INTRAMUSCULAR; INTRAVENOUS; SUBCUTANEOUS EVERY 5 MIN PRN
Status: DISCONTINUED | OUTPATIENT
Start: 2022-12-09 | End: 2022-12-09

## 2022-12-09 RX ORDER — HYDROMORPHONE HYDROCHLORIDE 1 MG/ML
0.4 INJECTION, SOLUTION INTRAMUSCULAR; INTRAVENOUS; SUBCUTANEOUS EVERY 5 MIN PRN
Status: DISCONTINUED | OUTPATIENT
Start: 2022-12-09 | End: 2022-12-09

## 2022-12-09 RX ORDER — OXYCODONE HYDROCHLORIDE 5 MG/1
5 TABLET ORAL EVERY 6 HOURS PRN
Qty: 25 TABLET | Refills: 0 | Status: SHIPPED | OUTPATIENT
Start: 2022-12-09 | End: 2023-01-04

## 2022-12-09 RX ORDER — HYDROCODONE BITARTRATE AND ACETAMINOPHEN 5; 325 MG/1; MG/1
2 TABLET ORAL ONCE AS NEEDED
Status: COMPLETED | OUTPATIENT
Start: 2022-12-09 | End: 2022-12-09

## 2022-12-09 RX ORDER — DEXTROSE MONOHYDRATE 25 G/50ML
50 INJECTION, SOLUTION INTRAVENOUS
Status: DISCONTINUED | OUTPATIENT
Start: 2022-12-09 | End: 2022-12-09 | Stop reason: HOSPADM

## 2022-12-09 RX ORDER — NALOXONE HYDROCHLORIDE 0.4 MG/ML
80 INJECTION, SOLUTION INTRAMUSCULAR; INTRAVENOUS; SUBCUTANEOUS AS NEEDED
Status: DISCONTINUED | OUTPATIENT
Start: 2022-12-09 | End: 2022-12-09

## 2022-12-09 RX ORDER — HYDROMORPHONE HYDROCHLORIDE 1 MG/ML
INJECTION, SOLUTION INTRAMUSCULAR; INTRAVENOUS; SUBCUTANEOUS
Status: COMPLETED
Start: 2022-12-09 | End: 2022-12-09

## 2022-12-09 RX ORDER — ESMOLOL HYDROCHLORIDE 10 MG/ML
INJECTION INTRAVENOUS AS NEEDED
Status: DISCONTINUED | OUTPATIENT
Start: 2022-12-09 | End: 2022-12-09 | Stop reason: SURG

## 2022-12-09 RX ORDER — HEPARIN SODIUM 5000 [USP'U]/ML
5000 INJECTION, SOLUTION INTRAVENOUS; SUBCUTANEOUS ONCE
Status: COMPLETED | OUTPATIENT
Start: 2022-12-09 | End: 2022-12-09

## 2022-12-09 RX ORDER — CEFOXITIN 2 G/1
INJECTION, POWDER, FOR SOLUTION INTRAVENOUS
Status: DISCONTINUED
Start: 2022-12-09 | End: 2022-12-09

## 2022-12-09 RX ORDER — NICOTINE POLACRILEX 4 MG
30 LOZENGE BUCCAL
Status: DISCONTINUED | OUTPATIENT
Start: 2022-12-09 | End: 2022-12-09 | Stop reason: HOSPADM

## 2022-12-09 RX ORDER — PHENYLEPHRINE HCL 10 MG/ML
VIAL (ML) INJECTION AS NEEDED
Status: DISCONTINUED | OUTPATIENT
Start: 2022-12-09 | End: 2022-12-09 | Stop reason: SURG

## 2022-12-09 RX ORDER — SODIUM CHLORIDE, SODIUM LACTATE, POTASSIUM CHLORIDE, CALCIUM CHLORIDE 600; 310; 30; 20 MG/100ML; MG/100ML; MG/100ML; MG/100ML
INJECTION, SOLUTION INTRAVENOUS CONTINUOUS
Status: DISCONTINUED | OUTPATIENT
Start: 2022-12-09 | End: 2022-12-09

## 2022-12-09 RX ORDER — LIDOCAINE HYDROCHLORIDE 10 MG/ML
INJECTION, SOLUTION EPIDURAL; INFILTRATION; INTRACAUDAL; PERINEURAL AS NEEDED
Status: DISCONTINUED | OUTPATIENT
Start: 2022-12-09 | End: 2022-12-09 | Stop reason: SURG

## 2022-12-09 RX ORDER — HYDROCODONE BITARTRATE AND ACETAMINOPHEN 5; 325 MG/1; MG/1
1 TABLET ORAL ONCE AS NEEDED
Status: COMPLETED | OUTPATIENT
Start: 2022-12-09 | End: 2022-12-09

## 2022-12-09 RX ORDER — 0.9 % SODIUM CHLORIDE 0.9 %
INTRAVENOUS SOLUTION INTRAVENOUS
Status: DISCONTINUED
Start: 2022-12-09 | End: 2022-12-09

## 2022-12-09 RX ORDER — LABETALOL HYDROCHLORIDE 5 MG/ML
5 INJECTION, SOLUTION INTRAVENOUS EVERY 5 MIN PRN
Status: DISCONTINUED | OUTPATIENT
Start: 2022-12-09 | End: 2022-12-09

## 2022-12-09 RX ORDER — ACETAMINOPHEN 500 MG
1000 TABLET ORAL ONCE
Status: DISCONTINUED | OUTPATIENT
Start: 2022-12-09 | End: 2022-12-09 | Stop reason: HOSPADM

## 2022-12-09 RX ORDER — ROCURONIUM BROMIDE 10 MG/ML
INJECTION, SOLUTION INTRAVENOUS AS NEEDED
Status: DISCONTINUED | OUTPATIENT
Start: 2022-12-09 | End: 2022-12-09 | Stop reason: SURG

## 2022-12-09 RX ORDER — ACETAMINOPHEN 500 MG
1000 TABLET ORAL ONCE AS NEEDED
Status: COMPLETED | OUTPATIENT
Start: 2022-12-09 | End: 2022-12-09

## 2022-12-09 RX ORDER — METOCLOPRAMIDE HYDROCHLORIDE 5 MG/ML
10 INJECTION INTRAMUSCULAR; INTRAVENOUS EVERY 8 HOURS PRN
Status: DISCONTINUED | OUTPATIENT
Start: 2022-12-09 | End: 2022-12-09

## 2022-12-09 RX ADMIN — LIDOCAINE HYDROCHLORIDE 50 MG: 10 INJECTION, SOLUTION EPIDURAL; INFILTRATION; INTRACAUDAL; PERINEURAL at 07:43:00

## 2022-12-09 RX ADMIN — ONDANSETRON 4 MG: 2 INJECTION INTRAMUSCULAR; INTRAVENOUS at 08:38:00

## 2022-12-09 RX ADMIN — PHENYLEPHRINE HCL 100 MCG: 10 MG/ML VIAL (ML) INJECTION at 08:00:00

## 2022-12-09 RX ADMIN — SODIUM CHLORIDE, SODIUM LACTATE, POTASSIUM CHLORIDE, CALCIUM CHLORIDE: 600; 310; 30; 20 INJECTION, SOLUTION INTRAVENOUS at 09:06:00

## 2022-12-09 RX ADMIN — PHENYLEPHRINE HCL 100 MCG: 10 MG/ML VIAL (ML) INJECTION at 08:29:00

## 2022-12-09 RX ADMIN — PHENYLEPHRINE HCL 100 MCG: 10 MG/ML VIAL (ML) INJECTION at 08:12:00

## 2022-12-09 RX ADMIN — SODIUM CHLORIDE, SODIUM LACTATE, POTASSIUM CHLORIDE, CALCIUM CHLORIDE: 600; 310; 30; 20 INJECTION, SOLUTION INTRAVENOUS at 07:38:00

## 2022-12-09 RX ADMIN — PHENYLEPHRINE HCL 100 MCG: 10 MG/ML VIAL (ML) INJECTION at 08:41:00

## 2022-12-09 RX ADMIN — ESMOLOL HYDROCHLORIDE 20 MG: 10 INJECTION INTRAVENOUS at 08:08:00

## 2022-12-09 RX ADMIN — ROCURONIUM BROMIDE 40 MG: 10 INJECTION, SOLUTION INTRAVENOUS at 07:43:00

## 2022-12-09 NOTE — DISCHARGE INSTRUCTIONS
You have a seton that is through your fistula. That is to remain in place. If it is no longer present please call the office at [phone]. Wound care includes daily showering with soap and water and placing a piece of gauze over the wound. You do not need to pack the wound. A hemostatic agent called surgicel was left in the wound (3 strips). This can be removed tomorrow. Make sure you shower while removing the packing to prevent bleeding. You will be sent Oxycodone for pain management. You may supplement with tylenol for best pain control. Your next dose of oxycodone and tylenol can be taken at 5:00pm.  You may resume your apixaban tomorrow (12/10/2022)    Dr. Davin Toure would like you to follow-up with his  Dr. Shoshana George for further management of your fistula.

## 2022-12-09 NOTE — OPERATIVE REPORT
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 Anal exam under anesthesia  2. Seton placement    Implants: Seton    Specimen: None    Drains: None    Estimated Blood Loss: 25 mL    Complications: none    Condition: Stable    Indications for Surgery:   Javier Oneill is a 68year old who has a history of perforated diverticulitis requiring a Bravo's procedure. She eventually had a Bravo's takedown for colostomy stricture and this was done by Dr. Bert Downing in . She is referred to me from wound care for evaluation of perirectal abscess versus fistula. On exam in the office I could not discern if there is true fistula connection but there was a wound that is leaking purulent material in the posterior midline. I discussed with her treatment options including exam under anesthesia with possible seton placement or fistulotomy depending on involvement of the sphincter muscle. I explained the risks including bleeding, infection, damage surrounding structures or organs, risk for incontinence and stool leakage. At this time she denies any incontinence however on exam she has had some leakage of stool around the anus multiple times. Surgical Findings:   The patient had a Transphincteric fistula. Description of Procedure: The patient was transported to the operating room and general endotracheal anesthesia was administered while still on the hospital bed.  The patient was then flipped over onto the operating table in the prone position. After adequate padding she was flexed into the jackknife position. The buttocks were taped apart. Preoperative antibiotics were given. The perineum and perianal skin and buttocks were prepped and draped in sterile fashion. A timeout was performed. A digital rectal exam was performed with a lubricated finger. The anus was serially dilated with Hill-Petersen retractors. There are 2 separate wounds in the posterior midline that connect together and only  by a small skin bridge. The wound more proximal to the anal verge was probed gently with a fistula probe and a fistula tract was identified with an internal opening within the anal canal.  This appeared to involve a portion of the sphincter muscle. It was found at the time of surgery that a complete fistulotomy may cause too much sphincter damage. We therefore elected to proceed with a two-stage procedure. A #5 Ethibond was placed through the fistula tract and secured with multiple knots to keep the suture from dislodging. A partial fistulotomy was performed at the external opening of the fistula tract to allow for drainage. 2 separate wounds were connected by breaking the skin bridge between them for better and easier wound care. Hemostasis was achieved with electrocautery. A piece of hemostatic Surgicel was placed to the wound bed to ensure good hemostasis. Marcaine 0.25% with epinephrine was injected into all the wound margins. A kt-anal sphincter block was also performed. The operative site was cleansed and dried, sterile dressings were placed, and the patient was returned to the supine position, and taken to recovery room in stable postoperative condition. The patient tolerated the procedure without apparent intra-operative complication. All sponge, instrument, and needle counts were correct at the end of the case.     Rio Weston MD   [facility] [place] [facility]  12/9/2022  8:52 AM

## 2022-12-09 NOTE — INTERVAL H&P NOTE
Pre-op Diagnosis: Fistula [L98.8]    The above referenced H&P was reviewed by Osei Yoon MD on 12/9/2022, the patient was examined and no significant changes have occurred in the patient's condition since the H&P was performed. I discussed with the patient and/or legal representative the potential benefits, risks and side effects of this procedure; the likelihood of the patient achieving goals; and potential problems that might occur during recuperation. I discussed reasonable alternatives to the procedure, including risks, benefits and side effects related to the alternatives and risks related to not receiving this procedure. We will proceed with procedure as planned. The risks of anorectal surgery were discussed with the patient and include but are not limited to severe or chronic post-operative pain, hemorrhage, infection or sepsis, incision dehiscence,  incontinence, anal stricture, recurrent disease, need for additional surgical intervention. The patient voiced understanding and agreed to proceed.

## 2022-12-09 NOTE — ANESTHESIA PROCEDURE NOTES
Airway  Date/Time: 12/9/2022 7:45 AM  Urgency: elective    Airway not difficult    General Information and Staff    Patient location during procedure: OR  Anesthesiologist: Yunier Olguin MD  Resident/CRNA: Trinity Donohue CRNA  Performed: CRNA     Indications and Patient Condition  Indications for airway management: anesthesia  Sedation level: deep  Preoxygenated: yes  Patient position: sniffing  Mask difficulty assessment: 1 - vent by mask    Final Airway Details  Final airway type: endotracheal airway      Successful airway: ETT  Cuffed: yes   Successful intubation technique: Video laryngoscopy  Facilitating devices/methods: intubating stylet  Endotracheal tube insertion site: oral  Blade: GlideScope  Blade size: #3  ETT size (mm): 7.0    Cormack-Lehane Classification: grade I - full view of glottis  Placement verified by: chest auscultation and capnometry   Measured from: lips  ETT to lips (cm): 21  Number of attempts at approach: 1    Additional Comments  One Scope #3 used for intubation

## 2022-12-12 ENCOUNTER — APPOINTMENT (OUTPATIENT)
Dept: GENERAL RADIOLOGY | Facility: HOSPITAL | Age: 77
DRG: 673 | End: 2022-12-12
Attending: EMERGENCY MEDICINE
Payer: MEDICARE

## 2022-12-12 ENCOUNTER — APPOINTMENT (OUTPATIENT)
Dept: CT IMAGING | Facility: HOSPITAL | Age: 77
DRG: 673 | End: 2022-12-12
Attending: EMERGENCY MEDICINE
Payer: MEDICARE

## 2022-12-12 ENCOUNTER — HOSPITAL ENCOUNTER (INPATIENT)
Facility: HOSPITAL | Age: 77
LOS: 23 days | Discharge: NURSING, REHAB OR LONG-TERM CARE | DRG: 673 | End: 2023-01-04
Attending: EMERGENCY MEDICINE | Admitting: HOSPITALIST
Payer: MEDICARE

## 2022-12-12 ENCOUNTER — TELEPHONE (OUTPATIENT)
Dept: WOUND CARE | Facility: HOSPITAL | Age: 77
End: 2022-12-12

## 2022-12-12 DIAGNOSIS — E11.628 TYPE 2 DIABETES MELLITUS WITH OTHER SKIN COMPLICATIONS (HCC): ICD-10-CM

## 2022-12-12 DIAGNOSIS — N17.9 AKI (ACUTE KIDNEY INJURY) (HCC): Primary | ICD-10-CM

## 2022-12-12 DIAGNOSIS — L03.317 CELLULITIS OF BUTTOCK: ICD-10-CM

## 2022-12-12 DIAGNOSIS — R44.3 HALLUCINATIONS: ICD-10-CM

## 2022-12-12 LAB
ALBUMIN SERPL-MCNC: 2.7 G/DL (ref 3.4–5)
ALBUMIN/GLOB SERPL: 0.6 {RATIO} (ref 1–2)
ALP LIVER SERPL-CCNC: 83 U/L
ALT SERPL-CCNC: 31 U/L
ANION GAP SERPL CALC-SCNC: 8 MMOL/L (ref 0–18)
APTT PPP: 41.2 SECONDS (ref 23.3–35.6)
AST SERPL-CCNC: 49 U/L (ref 15–37)
BASOPHILS # BLD AUTO: 0.02 X10(3) UL (ref 0–0.2)
BASOPHILS NFR BLD AUTO: 0.3 %
BILIRUB SERPL-MCNC: 0.4 MG/DL (ref 0.1–2)
BUN BLD-MCNC: 131 MG/DL (ref 7–18)
CALCIUM BLD-MCNC: 9.2 MG/DL (ref 8.5–10.1)
CHLORIDE SERPL-SCNC: 107 MMOL/L (ref 98–112)
CO2 SERPL-SCNC: 19 MMOL/L (ref 21–32)
CREAT BLD-MCNC: 3.82 MG/DL
EOSINOPHIL # BLD AUTO: 0.05 X10(3) UL (ref 0–0.7)
EOSINOPHIL NFR BLD AUTO: 0.7 %
ERYTHROCYTE [DISTWIDTH] IN BLOOD BY AUTOMATED COUNT: 14.9 %
GFR SERPLBLD BASED ON 1.73 SQ M-ARVRAT: 12 ML/MIN/1.73M2 (ref 60–?)
GLOBULIN PLAS-MCNC: 4.8 G/DL (ref 2.8–4.4)
GLUCOSE BLD-MCNC: 160 MG/DL (ref 70–99)
HCT VFR BLD AUTO: 35.4 %
HGB BLD-MCNC: 10.9 G/DL
IMM GRANULOCYTES # BLD AUTO: 0.03 X10(3) UL (ref 0–1)
IMM GRANULOCYTES NFR BLD: 0.4 %
INR BLD: 1.59 (ref 0.85–1.16)
LYMPHOCYTES # BLD AUTO: 0.62 X10(3) UL (ref 1–4)
LYMPHOCYTES NFR BLD AUTO: 9.1 %
MCH RBC QN AUTO: 28.5 PG (ref 26–34)
MCHC RBC AUTO-ENTMCNC: 30.8 G/DL (ref 31–37)
MCV RBC AUTO: 92.4 FL
MONOCYTES # BLD AUTO: 0.79 X10(3) UL (ref 0.1–1)
MONOCYTES NFR BLD AUTO: 11.6 %
NEUTROPHILS # BLD AUTO: 5.29 X10 (3) UL (ref 1.5–7.7)
NEUTROPHILS # BLD AUTO: 5.29 X10(3) UL (ref 1.5–7.7)
NEUTROPHILS NFR BLD AUTO: 77.9 %
OSMOLALITY SERPL CALC.SUM OF ELEC: 324 MOSM/KG (ref 275–295)
PLATELET # BLD AUTO: 192 10(3)UL (ref 150–450)
POTASSIUM SERPL-SCNC: 5.4 MMOL/L (ref 3.5–5.1)
PROT SERPL-MCNC: 7.5 G/DL (ref 6.4–8.2)
PROTHROMBIN TIME: 18.8 SECONDS (ref 11.6–14.8)
RBC # BLD AUTO: 3.83 X10(6)UL
SARS-COV-2 RNA RESP QL NAA+PROBE: NOT DETECTED
SODIUM SERPL-SCNC: 134 MMOL/L (ref 136–145)
TROPONIN I HIGH SENSITIVITY: 20 NG/L
WBC # BLD AUTO: 6.8 X10(3) UL (ref 4–11)

## 2022-12-12 PROCEDURE — 99223 1ST HOSP IP/OBS HIGH 75: CPT | Performed by: STUDENT IN AN ORGANIZED HEALTH CARE EDUCATION/TRAINING PROGRAM

## 2022-12-12 PROCEDURE — 71045 X-RAY EXAM CHEST 1 VIEW: CPT | Performed by: EMERGENCY MEDICINE

## 2022-12-12 PROCEDURE — 99223 1ST HOSP IP/OBS HIGH 75: CPT | Performed by: INTERNAL MEDICINE

## 2022-12-12 PROCEDURE — 70450 CT HEAD/BRAIN W/O DYE: CPT | Performed by: EMERGENCY MEDICINE

## 2022-12-12 PROCEDURE — 74177 CT ABD & PELVIS W/CONTRAST: CPT | Performed by: EMERGENCY MEDICINE

## 2022-12-12 RX ORDER — SODIUM CHLORIDE 9 MG/ML
1000 INJECTION, SOLUTION INTRAVENOUS ONCE
Status: COMPLETED | OUTPATIENT
Start: 2022-12-12 | End: 2022-12-12

## 2022-12-12 NOTE — ED INITIAL ASSESSMENT (HPI)
Patient to the ER c/o pain in bottom. Patient had surgery Friday to help her with fistula, pain medications prescribed but she states they are not working. Patient is also reporting she has been hallucinating. Patient is alert and oriented x4.

## 2022-12-12 NOTE — TELEPHONE ENCOUNTER
Kimo Ribera from Dr. Leo Sevilla office - concerned with patient's orders and pt being confused and low blood pressure. Provider made aware - advised to go to ER. RN spoke to Kimo Ribera from PCP office - informed her general surgeon did OR debridement - she verbalized understanding and states she will call his office to give an updated on pt, but highly advised pt to go to ER if there AMS and hypotension - she states pt is refusing to go to ER. RN called pt - spoke to pt and spouse and highly advised her to go to ER to be assessed. They verbalized understanding and will be going now.

## 2022-12-13 PROBLEM — N17.9 ACUTE RENAL FAILURE (ARF) (HCC): Status: ACTIVE | Noted: 2022-12-13

## 2022-12-13 PROBLEM — R44.3 HALLUCINATIONS: Status: ACTIVE | Noted: 2022-01-01

## 2022-12-13 PROBLEM — J96.02 ACUTE HYPERCAPNIC RESPIRATORY FAILURE (HCC): Status: ACTIVE | Noted: 2021-01-11

## 2022-12-13 PROBLEM — R44.3 HALLUCINATIONS: Status: ACTIVE | Noted: 2022-12-13

## 2022-12-13 PROBLEM — N17.9 ACUTE RENAL FAILURE (ARF) (HCC): Status: ACTIVE | Noted: 2022-01-01

## 2022-12-13 LAB
ANION GAP SERPL CALC-SCNC: 9 MMOL/L (ref 0–18)
ANION GAP SERPL CALC-SCNC: 9 MMOL/L (ref 0–18)
ARTERIAL PATENCY WRIST A: POSITIVE
ARTERIAL PATENCY WRIST A: POSITIVE
ATRIAL RATE: 212 BPM
BASE EXCESS BLDA CALC-SCNC: -6.9 MMOL/L (ref ?–2)
BASE EXCESS BLDA CALC-SCNC: -7.4 MMOL/L (ref ?–2)
BASE EXCESS BLDV CALC-SCNC: -8.3 MMOL/L
BASE EXCESS BLDV CALC-SCNC: -9.8 MMOL/L
BASOPHILS # BLD AUTO: 0.01 X10(3) UL (ref 0–0.2)
BASOPHILS NFR BLD AUTO: 0.2 %
BILIRUB UR QL STRIP.AUTO: NEGATIVE
BODY TEMPERATURE: 98.6 F
BODY TEMPERATURE: 98.6 F
BUN BLD-MCNC: 122 MG/DL (ref 7–18)
BUN BLD-MCNC: 126 MG/DL (ref 7–18)
CA-I BLD-SCNC: 1.23 MMOL/L (ref 0.95–1.32)
CALCIUM BLD-MCNC: 9 MG/DL (ref 8.5–10.1)
CALCIUM BLD-MCNC: 9.1 MG/DL (ref 8.5–10.1)
CHLORIDE SERPL-SCNC: 108 MMOL/L (ref 98–112)
CHLORIDE SERPL-SCNC: 108 MMOL/L (ref 98–112)
CO2 SERPL-SCNC: 20 MMOL/L (ref 21–32)
CO2 SERPL-SCNC: 21 MMOL/L (ref 21–32)
COHGB MFR BLD: 0.6 % SAT (ref 0–3)
COHGB MFR BLD: 1.7 % SAT (ref 0–3)
COLOR UR AUTO: YELLOW
CREAT BLD-MCNC: 3.7 MG/DL
CREAT BLD-MCNC: 3.81 MG/DL
CREAT UR-SCNC: 73.1 MG/DL
EOSINOPHIL # BLD AUTO: 0.05 X10(3) UL (ref 0–0.7)
EOSINOPHIL NFR BLD AUTO: 0.8 %
ERYTHROCYTE [DISTWIDTH] IN BLOOD BY AUTOMATED COUNT: 14.9 %
EXPIRATORY PRESSURE: 5 CM H2O
EXPIRATORY PRESSURE: 5 CM H2O
FIO2: 30 %
FIO2: 30 %
GFR SERPLBLD BASED ON 1.73 SQ M-ARVRAT: 12 ML/MIN/1.73M2 (ref 60–?)
GFR SERPLBLD BASED ON 1.73 SQ M-ARVRAT: 12 ML/MIN/1.73M2 (ref 60–?)
GLUCOSE BLD-MCNC: 152 MG/DL (ref 70–99)
GLUCOSE BLD-MCNC: 153 MG/DL (ref 70–99)
GLUCOSE BLD-MCNC: 159 MG/DL (ref 70–99)
GLUCOSE BLD-MCNC: 164 MG/DL (ref 70–99)
GLUCOSE BLD-MCNC: 168 MG/DL (ref 70–99)
GLUCOSE BLD-MCNC: 180 MG/DL (ref 70–99)
GLUCOSE BLD-MCNC: 195 MG/DL (ref 70–99)
GLUCOSE BLD-MCNC: 240 MG/DL (ref 70–99)
GLUCOSE UR STRIP.AUTO-MCNC: NEGATIVE MG/DL
HCO3 BLDA-SCNC: 19.1 MEQ/L (ref 21–27)
HCO3 BLDA-SCNC: 19.6 MEQ/L (ref 21–27)
HCO3 BLDV-SCNC: 17.2 MEQ/L (ref 22–26)
HCO3 BLDV-SCNC: 18.4 MEQ/L (ref 22–26)
HCT VFR BLD AUTO: 34.5 %
HGB BLD-MCNC: 10 G/DL
HGB BLD-MCNC: 10.2 G/DL
HGB BLD-MCNC: 9.5 G/DL
HYALINE CASTS #/AREA URNS AUTO: PRESENT /LPF
IMM GRANULOCYTES # BLD AUTO: 0.03 X10(3) UL (ref 0–1)
IMM GRANULOCYTES NFR BLD: 0.5 %
INSP PRESSURE: 12 CM H2O
INSP PRESSURE: 14 CM H2O
KETONES UR STRIP.AUTO-MCNC: NEGATIVE MG/DL
LACTATE BLD-SCNC: 0.5 MMOL/L (ref 0.5–2)
LYMPHOCYTES # BLD AUTO: 0.59 X10(3) UL (ref 1–4)
LYMPHOCYTES NFR BLD AUTO: 9.7 %
MCH RBC QN AUTO: 28.6 PG (ref 26–34)
MCHC RBC AUTO-ENTMCNC: 29.6 G/DL (ref 31–37)
MCV RBC AUTO: 96.6 FL
METHGB MFR BLD: 0.1 % SAT (ref 0.4–1.5)
METHGB MFR BLD: 0.9 % SAT (ref 0.4–1.5)
MONOCYTES # BLD AUTO: 0.72 X10(3) UL (ref 0.1–1)
MONOCYTES NFR BLD AUTO: 11.9 %
NEUTROPHILS # BLD AUTO: 4.66 X10 (3) UL (ref 1.5–7.7)
NEUTROPHILS # BLD AUTO: 4.66 X10(3) UL (ref 1.5–7.7)
NEUTROPHILS NFR BLD AUTO: 76.9 %
NITRITE UR QL STRIP.AUTO: NEGATIVE
OSMOLALITY SERPL CALC.SUM OF ELEC: 328 MOSM/KG (ref 275–295)
OSMOLALITY SERPL CALC.SUM OF ELEC: 330 MOSM/KG (ref 275–295)
OSMOLALITY UR: 411 MOSM/KG (ref 300–1300)
OXYHGB MFR BLDA: 95.1 % (ref 92–100)
OXYHGB MFR BLDA: 97 % (ref 92–100)
OXYHGB MFR BLDV: 93 % (ref 72–78)
OXYHGB MFR BLDV: 93.8 % (ref 72–78)
P AXIS: 95 DEGREES
P/F RATIO: 383 MMHG
PCO2 BLDA: 49 MM HG (ref 35–45)
PCO2 BLDA: 49 MM HG (ref 35–45)
PCO2 BLDV: 52 MM HG (ref 38–50)
PCO2 BLDV: 57 MM HG (ref 38–50)
PH BLDA: 7.22 [PH] (ref 7.35–7.45)
PH BLDA: 7.23 [PH] (ref 7.35–7.45)
PH BLDV: 7.17 [PH] (ref 7.33–7.43)
PH BLDV: 7.17 [PH] (ref 7.33–7.43)
PH UR STRIP.AUTO: 5 [PH] (ref 5–8)
PLATELET # BLD AUTO: 186 10(3)UL (ref 150–450)
PO2 BLDA: 115 MM HG (ref 80–100)
PO2 BLDA: 134 MM HG (ref 80–100)
PO2 BLDV: 169 MM HG (ref 30–50)
PO2 BLDV: 95 MM HG (ref 30–50)
POTASSIUM BLD-SCNC: 5.4 MMOL/L (ref 3.6–5.1)
POTASSIUM SERPL-SCNC: 5.5 MMOL/L (ref 3.5–5.1)
POTASSIUM SERPL-SCNC: 5.7 MMOL/L (ref 3.5–5.1)
PROT UR STRIP.AUTO-MCNC: 30 MG/DL
Q-T INTERVAL: 344 MS
QRS DURATION: 80 MS
QTC CALCULATION (BEZET): 456 MS
R AXIS: 49 DEGREES
RBC # BLD AUTO: 3.57 X10(6)UL
RBC #/AREA URNS AUTO: >10 /HPF
SODIUM BLD-SCNC: 136 MMOL/L (ref 135–145)
SODIUM SERPL-SCNC: 13 MMOL/L
SODIUM SERPL-SCNC: 137 MMOL/L (ref 136–145)
SODIUM SERPL-SCNC: 138 MMOL/L (ref 136–145)
SODIUM SERPL-SCNC: 9 MMOL/L
SP GR UR STRIP.AUTO: 1.03 (ref 1–1.03)
T AXIS: -37 DEGREES
UROBILINOGEN UR STRIP.AUTO-MCNC: <2 MG/DL
VENOUS LITERS PER MINUTE: 2 L/MIN
VENOUS LITERS PER MINUTE: 2 L/MIN
VENT RATE: 18 /MIN
VENTRICULAR RATE: 106 BPM
WBC # BLD AUTO: 6.1 X10(3) UL (ref 4–11)
WBC #/AREA URNS AUTO: >50 /HPF
WBC CLUMPS UR QL AUTO: PRESENT /HPF

## 2022-12-13 PROCEDURE — 99291 CRITICAL CARE FIRST HOUR: CPT | Performed by: NURSE PRACTITIONER

## 2022-12-13 PROCEDURE — 5A09457 ASSISTANCE WITH RESPIRATORY VENTILATION, 24-96 CONSECUTIVE HOURS, CONTINUOUS POSITIVE AIRWAY PRESSURE: ICD-10-PCS | Performed by: INTERNAL MEDICINE

## 2022-12-13 PROCEDURE — 99232 SBSQ HOSP IP/OBS MODERATE 35: CPT | Performed by: STUDENT IN AN ORGANIZED HEALTH CARE EDUCATION/TRAINING PROGRAM

## 2022-12-13 RX ORDER — METOPROLOL SUCCINATE 25 MG/1
25 TABLET, EXTENDED RELEASE ORAL
Status: DISCONTINUED | OUTPATIENT
Start: 2022-12-13 | End: 2022-12-23

## 2022-12-13 RX ORDER — HEPARIN SODIUM 5000 [USP'U]/ML
5000 INJECTION, SOLUTION INTRAVENOUS; SUBCUTANEOUS EVERY 8 HOURS SCHEDULED
Status: DISCONTINUED | OUTPATIENT
Start: 2022-12-13 | End: 2022-12-13 | Stop reason: ALTCHOICE

## 2022-12-13 RX ORDER — GABAPENTIN 300 MG/1
300 CAPSULE ORAL 2 TIMES DAILY
Status: DISCONTINUED | OUTPATIENT
Start: 2022-12-13 | End: 2022-12-19 | Stop reason: SDUPTHER

## 2022-12-13 RX ORDER — POLYETHYLENE GLYCOL 3350 17 G/17G
17 POWDER, FOR SOLUTION ORAL DAILY PRN
Status: DISCONTINUED | OUTPATIENT
Start: 2022-12-13 | End: 2023-01-04

## 2022-12-13 RX ORDER — SODIUM CHLORIDE, SODIUM LACTATE, POTASSIUM CHLORIDE, CALCIUM CHLORIDE 600; 310; 30; 20 MG/100ML; MG/100ML; MG/100ML; MG/100ML
INJECTION, SOLUTION INTRAVENOUS CONTINUOUS
Status: DISCONTINUED | OUTPATIENT
Start: 2022-12-13 | End: 2022-12-13

## 2022-12-13 RX ORDER — LEVOTHYROXINE SODIUM 0.15 MG/1
150 TABLET ORAL
Status: DISCONTINUED | OUTPATIENT
Start: 2022-12-13 | End: 2023-01-04

## 2022-12-13 RX ORDER — ONDANSETRON 2 MG/ML
4 INJECTION INTRAMUSCULAR; INTRAVENOUS EVERY 6 HOURS PRN
Status: DISCONTINUED | OUTPATIENT
Start: 2022-12-13 | End: 2023-01-04

## 2022-12-13 RX ORDER — FEBUXOSTAT 40 MG/1
80 TABLET, FILM COATED ORAL DAILY
Status: DISCONTINUED | OUTPATIENT
Start: 2022-12-13 | End: 2022-12-20

## 2022-12-13 RX ORDER — HYDROCODONE BITARTRATE AND ACETAMINOPHEN 5; 325 MG/1; MG/1
1 TABLET ORAL EVERY 4 HOURS PRN
Status: DISCONTINUED | OUTPATIENT
Start: 2022-12-13 | End: 2023-01-04

## 2022-12-13 RX ORDER — SODIUM CHLORIDE 9 MG/ML
INJECTION, SOLUTION INTRAVENOUS CONTINUOUS
Status: ACTIVE | OUTPATIENT
Start: 2022-12-13 | End: 2022-12-13

## 2022-12-13 RX ORDER — ACETAMINOPHEN 325 MG/1
650 TABLET ORAL EVERY 4 HOURS PRN
Status: DISCONTINUED | OUTPATIENT
Start: 2022-12-13 | End: 2023-01-04

## 2022-12-13 RX ORDER — SENNOSIDES 8.6 MG
17.2 TABLET ORAL NIGHTLY PRN
Status: DISCONTINUED | OUTPATIENT
Start: 2022-12-13 | End: 2023-01-04

## 2022-12-13 RX ORDER — SODIUM BICARBONATE 150 MEQ/1,000 ML IN DEXTROSE 5 % INTRAVENOUS
42 SOLUTION CONTINUOUS
Status: DISCONTINUED | OUTPATIENT
Start: 2022-12-13 | End: 2022-12-14

## 2022-12-13 RX ORDER — HYDROCODONE BITARTRATE AND ACETAMINOPHEN 5; 325 MG/1; MG/1
2 TABLET ORAL EVERY 4 HOURS PRN
Status: DISCONTINUED | OUTPATIENT
Start: 2022-12-13 | End: 2023-01-04

## 2022-12-13 RX ORDER — ESCITALOPRAM OXALATE 10 MG/1
10 TABLET ORAL DAILY
Status: DISCONTINUED | OUTPATIENT
Start: 2022-12-13 | End: 2023-01-04

## 2022-12-13 RX ORDER — ACETAMINOPHEN 500 MG
500 TABLET ORAL EVERY 4 HOURS PRN
Status: DISCONTINUED | OUTPATIENT
Start: 2022-12-13 | End: 2023-01-04

## 2022-12-13 RX ORDER — NICOTINE POLACRILEX 4 MG
30 LOZENGE BUCCAL
Status: DISCONTINUED | OUTPATIENT
Start: 2022-12-13 | End: 2023-01-04

## 2022-12-13 RX ORDER — NICOTINE POLACRILEX 4 MG
15 LOZENGE BUCCAL
Status: DISCONTINUED | OUTPATIENT
Start: 2022-12-13 | End: 2023-01-04

## 2022-12-13 RX ORDER — DEXTROSE MONOHYDRATE 25 G/50ML
50 INJECTION, SOLUTION INTRAVENOUS
Status: DISCONTINUED | OUTPATIENT
Start: 2022-12-13 | End: 2023-01-04

## 2022-12-13 RX ORDER — BISACODYL 10 MG
10 SUPPOSITORY, RECTAL RECTAL
Status: DISCONTINUED | OUTPATIENT
Start: 2022-12-13 | End: 2022-12-21

## 2022-12-13 RX ORDER — MELATONIN
3 NIGHTLY PRN
Status: DISCONTINUED | OUTPATIENT
Start: 2022-12-13 | End: 2023-01-04

## 2022-12-13 NOTE — CM/SW NOTE
12/13/22 1600   CM/SW Referral Data   Referral Source Physician   Reason for Referral Discharge planning   Informant Spouse/Significant Other   Patient [address]   Number of Levels in Home 2   Number of Stair in Home Stays on first floor   Patient lives with Spouse/Significant other   Patient Status Prior to Admission   Services in place prior to admission DME/Supplies at [facility] Provider Info [address] [facility] [phone]   Type of DME/Supplies Wheelchair; [address]   Discharge Needs   Anticipated D/C needs Home health care;Subacute rehab       Sw spoke to pt's  on the phone this afternoon. Discussed therapy recs for MO. Pt is current with [facility] for RN for 3x/week dressing changes [phone]. Pt and  live in a 2 story home. Pt sleeps in a recliner on the first floor. Pt is s/p Anal fistulotomy 12/9. Readmitted due to rectal pain and confusion.  states pt is normally ambulatory with a walker. He states she has hx of having difficulty after anesthesia and he admits she seems out of it. Discussed option of MO.  states she has been to SARs in the past.  He would like SW to discuss with pt when she is more alert. MO referrals have been sent in A[facility]. Resume of care orders also sent in [facility]idin for Yuliya Scanlon. PASSR completed.     Miguel Deleon LCSW  /Discharge Planner

## 2022-12-13 NOTE — PLAN OF CARE
NURSING ADMISSION NOTE  Patient admitted via bed  Oriented to room. Safety precautions initiated. Bed in low position. Call light in reach. Patient A & O x4, forgetful/confused at times. C/o severe pin, norco given. Incision to rectum covered with gaze, serosanguinous drainage noted. IV unasyn. Purewick in place, voiding freely. IVF infusing per order. Safety measures in place. Instructed to use call light.

## 2022-12-13 NOTE — ED QUICK NOTES
Orders for admission, patient is aware of plan and ready to go upstairs.  Any questions, please call ED RN Raven Him at extension [phone]    Patient Covid vaccination status: Unvaccinated     COVID Test Ordered in ED: Rapid SARS-CoV-2 by PCR    COVID Suspicion at Admission: Low clinical suspicion for COVID    Running Infusions:  None    Mental Status/LOC at time of transport: oriented x4now , confused in times    Other pertinent information: na  CIWA score: N/A   NIH score:  N/A

## 2022-12-13 NOTE — PROGRESS NOTES
[facility] Pharmacy Note:  Renal Adjustment for ampicillin/sulbactam (Kirill Yang)    Eugenia Lopez is a 68year old patient who has been prescribed ampicillin/sulbactam (UNASYN) 1500 mg every 6 hrs. The estimated creatinine clearance is 11.5 mL/min (A) (based on SCr of 3.82 mg/dL (H)). The dose has been adjusted to ampicillin/sulbactam (UNASYN) 3000 mg every 12 hrs per hospital renal dose adjustment protocol for treatment of intra-abdominal infection. Pharmacy will follow and adjust dose as warranted for additional renal function changes.     Thank you,    India Maldonado, [facility]  12/13/2022  12:18 AM

## 2022-12-14 ENCOUNTER — APPOINTMENT (OUTPATIENT)
Dept: GENERAL RADIOLOGY | Facility: HOSPITAL | Age: 77
DRG: 673 | End: 2022-12-14
Attending: EMERGENCY MEDICINE
Payer: MEDICARE

## 2022-12-14 LAB
ANION GAP SERPL CALC-SCNC: 3 MMOL/L (ref 0–18)
ANION GAP SERPL CALC-SCNC: 4 MMOL/L (ref 0–18)
ARTERIAL PATENCY WRIST A: POSITIVE
BASE EXCESS BLDA CALC-SCNC: -0.4 MMOL/L (ref ?–2)
BASE EXCESS BLDA CALC-SCNC: -5.6 MMOL/L (ref ?–2)
BASE EXCESS BLDA CALC-SCNC: 0.6 MMOL/L (ref ?–2)
BASE EXCESS BLDA CALC-SCNC: 1.8 MMOL/L (ref ?–2)
BASOPHILS # BLD AUTO: 0.01 X10(3) UL (ref 0–0.2)
BASOPHILS NFR BLD AUTO: 0.2 %
BODY TEMPERATURE: 98.6 F
BUN BLD-MCNC: 113 MG/DL (ref 7–18)
BUN BLD-MCNC: 119 MG/DL (ref 7–18)
CA-I BLD-SCNC: 1.16 MMOL/L (ref 0.95–1.32)
CA-I BLD-SCNC: 1.18 MMOL/L (ref 0.95–1.32)
CA-I BLD-SCNC: 1.23 MMOL/L (ref 0.95–1.32)
CALCIUM BLD-MCNC: 8.2 MG/DL (ref 8.5–10.1)
CALCIUM BLD-MCNC: 8.9 MG/DL (ref 8.5–10.1)
CHLORIDE SERPL-SCNC: 109 MMOL/L (ref 98–112)
CHLORIDE SERPL-SCNC: 111 MMOL/L (ref 98–112)
CO2 SERPL-SCNC: 26 MMOL/L (ref 21–32)
CO2 SERPL-SCNC: 28 MMOL/L (ref 21–32)
COHGB MFR BLD: 0.2 % SAT (ref 0–3)
COHGB MFR BLD: 0.5 % SAT (ref 0–3)
COHGB MFR BLD: 0.7 % SAT (ref 0–3)
COHGB MFR BLD: 1.4 % SAT (ref 0–3)
CREAT BLD-MCNC: 3.28 MG/DL
CREAT BLD-MCNC: 3.29 MG/DL
EOSINOPHIL # BLD AUTO: 0.06 X10(3) UL (ref 0–0.7)
EOSINOPHIL NFR BLD AUTO: 1.3 %
ERYTHROCYTE [DISTWIDTH] IN BLOOD BY AUTOMATED COUNT: 15.1 %
EXPIRATORY PRESSURE: 10 CM H2O
EXPIRATORY PRESSURE: 5 CM H2O
FIO2: 30 %
FIO2: 40 %
GFR SERPLBLD BASED ON 1.73 SQ M-ARVRAT: 14 ML/MIN/1.73M2 (ref 60–?)
GFR SERPLBLD BASED ON 1.73 SQ M-ARVRAT: 14 ML/MIN/1.73M2 (ref 60–?)
GLUCOSE BLD-MCNC: 101 MG/DL (ref 70–99)
GLUCOSE BLD-MCNC: 169 MG/DL (ref 70–99)
GLUCOSE BLD-MCNC: 233 MG/DL (ref 70–99)
GLUCOSE BLD-MCNC: 240 MG/DL (ref 70–99)
GLUCOSE BLD-MCNC: 265 MG/DL (ref 70–99)
HCO3 BLDA-SCNC: 20.5 MEQ/L (ref 21–27)
HCO3 BLDA-SCNC: 24.6 MEQ/L (ref 21–27)
HCO3 BLDA-SCNC: 25.4 MEQ/L (ref 21–27)
HCO3 BLDA-SCNC: 26.3 MEQ/L (ref 21–27)
HCT VFR BLD AUTO: 30.5 %
HGB BLD-MCNC: 8.8 G/DL
HGB BLD-MCNC: 8.9 G/DL
HGB BLD-MCNC: 9 G/DL
HGB BLD-MCNC: 9 G/DL
HGB BLD-MCNC: 9.2 G/DL
IMM GRANULOCYTES # BLD AUTO: 0.01 X10(3) UL (ref 0–1)
IMM GRANULOCYTES NFR BLD: 0.2 %
IPAP MAX: 30 CM H2O
IPAP MIN: 15 CM H2O
LACTATE BLD-SCNC: 0.5 MMOL/L (ref 0.5–2)
LACTATE BLD-SCNC: 0.6 MMOL/L (ref 0.5–2)
LACTATE BLD-SCNC: 0.6 MMOL/L (ref 0.5–2)
LYMPHOCYTES # BLD AUTO: 0.45 X10(3) UL (ref 1–4)
LYMPHOCYTES NFR BLD AUTO: 10 %
MAGNESIUM SERPL-MCNC: 2.1 MG/DL (ref 1.6–2.6)
MCH RBC QN AUTO: 28.3 PG (ref 26–34)
MCHC RBC AUTO-ENTMCNC: 29.5 G/DL (ref 31–37)
MCV RBC AUTO: 95.9 FL
METHGB MFR BLD: 0 % SAT (ref 0.4–1.5)
METHGB MFR BLD: 0.2 % SAT (ref 0.4–1.5)
METHGB MFR BLD: 0.7 % SAT (ref 0.4–1.5)
METHGB MFR BLD: 0.8 % SAT (ref 0.4–1.5)
MONOCYTES # BLD AUTO: 0.55 X10(3) UL (ref 0.1–1)
MONOCYTES NFR BLD AUTO: 12.3 %
NEUTROPHILS # BLD AUTO: 3.4 X10 (3) UL (ref 1.5–7.7)
NEUTROPHILS # BLD AUTO: 3.4 X10(3) UL (ref 1.5–7.7)
NEUTROPHILS NFR BLD AUTO: 76 %
OSMOLALITY SERPL CALC.SUM OF ELEC: 330 MOSM/KG (ref 275–295)
OSMOLALITY SERPL CALC.SUM OF ELEC: 338 MOSM/KG (ref 275–295)
OXYHGB MFR BLDA: 94.5 % (ref 92–100)
OXYHGB MFR BLDA: 95.9 % (ref 92–100)
OXYHGB MFR BLDA: 96 % (ref 92–100)
OXYHGB MFR BLDA: 97.1 % (ref 92–100)
P/F RATIO: 267 MMHG
P/F RATIO: 415 MMHG
PCO2 BLDA: 46 MM HG (ref 35–45)
PCO2 BLDA: 48 MM HG (ref 35–45)
PCO2 BLDA: 49 MM HG (ref 35–45)
PCO2 BLDA: 49 MM HG (ref 35–45)
PH BLDA: 7.27 [PH] (ref 7.35–7.45)
PH BLDA: 7.33 [PH] (ref 7.35–7.45)
PH BLDA: 7.35 [PH] (ref 7.35–7.45)
PH BLDA: 7.36 [PH] (ref 7.35–7.45)
PHOSPHATE SERPL-MCNC: 5.8 MG/DL (ref 2.5–4.9)
PLATELET # BLD AUTO: 169 10(3)UL (ref 150–450)
PO2 BLDA: 120 MM HG (ref 80–100)
PO2 BLDA: 121 MM HG (ref 80–100)
PO2 BLDA: 166 MM HG (ref 80–100)
PO2 BLDA: 80 MM HG (ref 80–100)
POTASSIUM BLD-SCNC: 4.7 MMOL/L (ref 3.6–5.1)
POTASSIUM BLD-SCNC: 5.2 MMOL/L (ref 3.6–5.1)
POTASSIUM BLD-SCNC: 5.5 MMOL/L (ref 3.6–5.1)
POTASSIUM SERPL-SCNC: 4.7 MMOL/L (ref 3.5–5.1)
POTASSIUM SERPL-SCNC: 5.2 MMOL/L (ref 3.5–5.1)
RBC # BLD AUTO: 3.18 X10(6)UL
SODIUM BLD-SCNC: 137 MMOL/L (ref 135–145)
SODIUM BLD-SCNC: 137 MMOL/L (ref 135–145)
SODIUM BLD-SCNC: 139 MMOL/L (ref 135–145)
SODIUM SERPL-SCNC: 140 MMOL/L (ref 136–145)
SODIUM SERPL-SCNC: 141 MMOL/L (ref 136–145)
TIDAL VOLUME: 600 ML
URATE SERPL-MCNC: 2 MG/DL
VENT RATE: 22 /MIN
VENT RATE: 26 /MIN
WBC # BLD AUTO: 4.5 X10(3) UL (ref 4–11)

## 2022-12-14 PROCEDURE — 71045 X-RAY EXAM CHEST 1 VIEW: CPT | Performed by: NURSE PRACTITIONER

## 2022-12-14 PROCEDURE — 99232 SBSQ HOSP IP/OBS MODERATE 35: CPT | Performed by: INTERNAL MEDICINE

## 2022-12-14 PROCEDURE — 99223 1ST HOSP IP/OBS HIGH 75: CPT | Performed by: INTERNAL MEDICINE

## 2022-12-14 RX ORDER — ALBUMIN (HUMAN) 12.5 G/50ML
25 SOLUTION INTRAVENOUS ONCE
Status: COMPLETED | OUTPATIENT
Start: 2022-12-14 | End: 2022-12-14

## 2022-12-14 RX ORDER — MORPHINE SULFATE 2 MG/ML
1 INJECTION, SOLUTION INTRAMUSCULAR; INTRAVENOUS EVERY 2 HOUR PRN
Status: DISCONTINUED | OUTPATIENT
Start: 2022-12-14 | End: 2022-12-22

## 2022-12-14 RX ORDER — MORPHINE SULFATE 2 MG/ML
2 INJECTION, SOLUTION INTRAMUSCULAR; INTRAVENOUS EVERY 2 HOUR PRN
Status: DISCONTINUED | OUTPATIENT
Start: 2022-12-14 | End: 2022-12-22

## 2022-12-14 RX ORDER — SODIUM CHLORIDE 9 MG/ML
INJECTION, SOLUTION INTRAVENOUS CONTINUOUS
Status: DISCONTINUED | OUTPATIENT
Start: 2022-12-14 | End: 2022-12-15

## 2022-12-14 RX ORDER — SODIUM BICARBONATE 150 MEQ/1,000 ML IN DEXTROSE 5 % INTRAVENOUS
115 SOLUTION CONTINUOUS
Status: DISCONTINUED | OUTPATIENT
Start: 2022-12-14 | End: 2022-12-14

## 2022-12-14 RX ORDER — ROCURONIUM BROMIDE 10 MG/ML
INJECTION, SOLUTION INTRAVENOUS
Status: DISCONTINUED
Start: 2022-12-14 | End: 2022-12-14 | Stop reason: WASHOUT

## 2022-12-14 RX ORDER — DEXMEDETOMIDINE HYDROCHLORIDE 4 UG/ML
INJECTION, SOLUTION INTRAVENOUS
Status: DISPENSED
Start: 2022-12-14 | End: 2022-12-14

## 2022-12-14 RX ORDER — MORPHINE SULFATE 2 MG/ML
0.5 INJECTION, SOLUTION INTRAMUSCULAR; INTRAVENOUS EVERY 2 HOUR PRN
Status: DISCONTINUED | OUTPATIENT
Start: 2022-12-14 | End: 2022-12-22

## 2022-12-14 RX ORDER — SODIUM HYPOCHLORITE 1.25 MG/ML
SOLUTION TOPICAL AS NEEDED
Status: DISCONTINUED | OUTPATIENT
Start: 2022-12-14 | End: 2023-01-04

## 2022-12-14 NOTE — PHYSICAL THERAPY NOTE
Attempted to see Pt this Pm - RN Lorenzo aware of attempt. Pt remains on BiPap with hypotension. Will continue to follow.

## 2022-12-14 NOTE — PLAN OF CARE
Rec'd report from previous RN, pt transferred to ICU, care assumed at 2000, pt assessed per flow sheets. Pt drowsy, oriented to person only, anxious and agitated at times, though reorients easily. Pt remains on bipap, tolerating settings, sats greater than 92%, lungs diminished throughout. Pt afebrile, afib on tele, borderline hypotensive. Pt with lymphedema to bilateral lower extremities, wrapped per OT. Pt tolerating diet, active bowel sounds noted. Pt voiding minimal amount per arnold. Pt with PIV x1, dressing clean dry and intact. Pt with wound to sacrum, 4x4 dressing in place, minimal drainage noted. Reviewed plan of care with pt, questions answered, no evidence of learning noted. Pt's  updated regarding transfer to ICU per floor RN. 0200: Pt placed on avaps per APN due to ABG results. Pt appears to be obstructing and not moving air at this time, though continues to obtain volumes per bipap and sats greater than 92%. Decision made to intubate pt, anesthesia notified and at bedside. Anesthesia reluctant to intubate pt due to pt being oriented to person place and time once pt is awake. Anesthesia recommending placing nasal trumpet to assist with obstructing. Trumpet placed per APN. ABG obtained after 2 hours, minimal improvement shown. APN aware.

## 2022-12-14 NOTE — PROGRESS NOTES
Changes made per Md order. 12/14/22 0343   BiPAP   $ RT Standby Charge (per 15 min) 1   Device V60   BiPAP / CPAP CE# rental   BiPAP bacteria filter Yes   BiPAP Pre-use check ok? Yes   BIPAP plugged into main power?  Yes   Mode AVAPS   Interface Full face mask   Mask Size Medium   Control Settings   Oxygen Percent 30 %   Inspiratory time 1   Insp rise time 3   BiPAP/CPAP Alarm Settings   Hi Rate 40   Low Rate 10   Hi VT 1300   Low    Hi Pressure 25   Low Pressure 5   Low Pressure Delay 20   Low MV 2   BiPAP/CPAP Monitored Parameters   PIP 20   Total Rate 25 breaths/min   Minute Volume 13   Tidal Volume 520   Total Leak 13   Trigger % 15   Ti/Ttot % 34   Toleration Well

## 2022-12-14 NOTE — PLAN OF CARE
at bedside. Remains on AVAPS rate 26, volume 600 & 30% FiO2. Episodes of hallucination, intermittently lethargic & agitated. NPO, okay for PO meds. Per nephro, 500 mL sodium bicarb bolus.

## 2022-12-14 NOTE — PLAN OF CARE
Pt A&Ox2, confused and lethargic. Pt put on BiPAP at 1800, tele; Afib. Pt reporting moderate pain to BLE. Spandagrip applied to BLE. Pt tolerating diet, arnold intact, LBM 12/13. Dressing to rectum changed BID; C/D/I. Plan to transfer patient to ICU. Safety measures in place. Discussed plan of care with patient    1940: Report given to ICU nurse. Patient ready for transfer    2014: Attempted to call spouse to give update.  Voicemail left

## 2022-12-15 LAB
ALBUMIN SERPL-MCNC: 2.4 G/DL (ref 3.4–5)
ALBUMIN/GLOB SERPL: 0.6 {RATIO} (ref 1–2)
ALP LIVER SERPL-CCNC: 68 U/L
ALT SERPL-CCNC: 18 U/L
ANION GAP SERPL CALC-SCNC: 8 MMOL/L (ref 0–18)
ARTERIAL PATENCY WRIST A: POSITIVE
AST SERPL-CCNC: 17 U/L (ref 15–37)
BASE EXCESS BLDA CALC-SCNC: -1.6 MMOL/L (ref ?–2)
BASOPHILS # BLD AUTO: 0.02 X10(3) UL (ref 0–0.2)
BASOPHILS NFR BLD AUTO: 0.4 %
BILIRUB SERPL-MCNC: 0.4 MG/DL (ref 0.1–2)
BODY TEMPERATURE: 98.6 F
BUN BLD-MCNC: 100 MG/DL (ref 7–18)
CA-I BLD-SCNC: 1.16 MMOL/L (ref 0.95–1.32)
CALCIUM BLD-MCNC: 8.4 MG/DL (ref 8.5–10.1)
CHLORIDE SERPL-SCNC: 111 MMOL/L (ref 98–112)
CO2 SERPL-SCNC: 25 MMOL/L (ref 21–32)
COHGB MFR BLD: 0.9 % SAT (ref 0–3)
CREAT BLD-MCNC: 3.3 MG/DL
EOSINOPHIL # BLD AUTO: 0.09 X10(3) UL (ref 0–0.7)
EOSINOPHIL NFR BLD AUTO: 2 %
ERYTHROCYTE [DISTWIDTH] IN BLOOD BY AUTOMATED COUNT: 15.8 %
EXPIRATORY PRESSURE: 10 CM H2O
FIO2: 40 %
GFR SERPLBLD BASED ON 1.73 SQ M-ARVRAT: 14 ML/MIN/1.73M2 (ref 60–?)
GLOBULIN PLAS-MCNC: 3.7 G/DL (ref 2.8–4.4)
GLUCOSE BLD-MCNC: 102 MG/DL (ref 70–99)
GLUCOSE BLD-MCNC: 117 MG/DL (ref 70–99)
GLUCOSE BLD-MCNC: 133 MG/DL (ref 70–99)
GLUCOSE BLD-MCNC: 138 MG/DL (ref 70–99)
GLUCOSE BLD-MCNC: 92 MG/DL (ref 70–99)
GLUCOSE BLD-MCNC: 92 MG/DL (ref 70–99)
GLUCOSE BLD-MCNC: 93 MG/DL (ref 70–99)
HCO3 BLDA-SCNC: 23.7 MEQ/L (ref 21–27)
HCT VFR BLD AUTO: 29.2 %
HGB BLD-MCNC: 8.8 G/DL
HGB BLD-MCNC: 9.7 G/DL
IMM GRANULOCYTES # BLD AUTO: 0.02 X10(3) UL (ref 0–1)
IMM GRANULOCYTES NFR BLD: 0.4 %
IPAP MAX: 30 CM H2O
IPAP MIN: 15 CM H2O
LACTATE BLD-SCNC: 0.5 MMOL/L (ref 0.5–2)
LYMPHOCYTES # BLD AUTO: 0.69 X10(3) UL (ref 1–4)
LYMPHOCYTES NFR BLD AUTO: 15.4 %
MAGNESIUM SERPL-MCNC: 2.2 MG/DL (ref 1.6–2.6)
MCH RBC QN AUTO: 28.6 PG (ref 26–34)
MCHC RBC AUTO-ENTMCNC: 30.1 G/DL (ref 31–37)
MCV RBC AUTO: 94.8 FL
METHGB MFR BLD: 0.4 % SAT (ref 0.4–1.5)
MONOCYTES # BLD AUTO: 0.73 X10(3) UL (ref 0.1–1)
MONOCYTES NFR BLD AUTO: 16.3 %
NEUTROPHILS # BLD AUTO: 2.94 X10 (3) UL (ref 1.5–7.7)
NEUTROPHILS # BLD AUTO: 2.94 X10(3) UL (ref 1.5–7.7)
NEUTROPHILS NFR BLD AUTO: 65.5 %
OSMOLALITY SERPL CALC.SUM OF ELEC: 329 MOSM/KG (ref 275–295)
OXYHGB MFR BLDA: 97.4 % (ref 92–100)
PCO2 BLDA: 45 MM HG (ref 35–45)
PH BLDA: 7.34 [PH] (ref 7.35–7.45)
PHOSPHATE SERPL-MCNC: 4.5 MG/DL (ref 2.5–4.9)
PLATELET # BLD AUTO: 168 10(3)UL (ref 150–450)
PO2 BLDA: 187 MM HG (ref 80–100)
POTASSIUM BLD-SCNC: 4.9 MMOL/L (ref 3.6–5.1)
POTASSIUM SERPL-SCNC: 4.3 MMOL/L (ref 3.5–5.1)
PROT SERPL-MCNC: 6.1 G/DL (ref 6.4–8.2)
RBC # BLD AUTO: 3.08 X10(6)UL
SODIUM BLD-SCNC: 140 MMOL/L (ref 135–145)
SODIUM SERPL-SCNC: 144 MMOL/L (ref 136–145)
TIDAL VOLUME: 600 ML
VENT RATE: 26 /MIN
WBC # BLD AUTO: 4.5 X10(3) UL (ref 4–11)

## 2022-12-15 PROCEDURE — 99233 SBSQ HOSP IP/OBS HIGH 50: CPT | Performed by: INTERNAL MEDICINE

## 2022-12-15 PROCEDURE — 99291 CRITICAL CARE FIRST HOUR: CPT | Performed by: INTERNAL MEDICINE

## 2022-12-15 RX ORDER — METOPROLOL TARTRATE 5 MG/5ML
5 INJECTION INTRAVENOUS ONCE
Status: COMPLETED | OUTPATIENT
Start: 2022-12-15 | End: 2022-12-15

## 2022-12-15 RX ORDER — METOPROLOL TARTRATE 5 MG/5ML
2.5 INJECTION INTRAVENOUS ONCE
Status: COMPLETED | OUTPATIENT
Start: 2022-12-15 | End: 2022-12-15

## 2022-12-15 RX ORDER — METOPROLOL TARTRATE 5 MG/5ML
INJECTION INTRAVENOUS
Status: COMPLETED
Start: 2022-12-15 | End: 2022-12-15

## 2022-12-15 NOTE — PLAN OF CARE
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 mepilex in place. Pt with arnold with cloudy yellow urine noted. On AVAPS sat 96%, pt lethargic but arousable, confused. Pt npo except meds when given hs meds pt stated \"I can't swallow because my butt hurts\". Pt able to take meds without problems. Lungs diminished, bilat lower ext with dressings in place, feet red and painful to touch. Pt c/o pain to sacral wound and given iv pain med with relief due to Pt refusing  po pain meds stating, \"I don't want the same crap I was taking at home\"  Pt on levo, med titrated to keep SBP greater than or equal to 90.  Care endorsed to oncoming Rn.

## 2022-12-15 NOTE — PHYSICAL THERAPY NOTE
Chart reviewed, pt is off bipap now, tolerating nasal canula. Activity orders received. However, pt declined to participate in therapy session. Education provided, but pt not in agreement due to fatigue.

## 2022-12-15 NOTE — PLAN OF CARE
Pt received on bipap. A/ox4. Able to be taken off for PO med and water breaks. ABG in morning. Transitioned to NC. Regular diet ordered. Pt had one meal. Rapid aflutter confirmed by EKG. Card consult. Lopressor x2 given. Evening metoprolol given. Cards consult. Urine culture resulted. ID consult. Blood cultures obtained. Low sugars. Hosp notified. Levemir adjusted and ok to give. Transfer orders in. Pt updated and agreeable to plan of care. Report endorsed to incoming RN.

## 2022-12-16 LAB
ALBUMIN SERPL-MCNC: 2.1 G/DL (ref 3.4–5)
ALBUMIN/GLOB SERPL: 0.6 {RATIO} (ref 1–2)
ALP LIVER SERPL-CCNC: 58 U/L
ALT SERPL-CCNC: 15 U/L
ANION GAP SERPL CALC-SCNC: 4 MMOL/L (ref 0–18)
AST SERPL-CCNC: 15 U/L (ref 15–37)
ATRIAL RATE: 267 BPM
BASOPHILS # BLD AUTO: 0.02 X10(3) UL (ref 0–0.2)
BASOPHILS NFR BLD AUTO: 0.5 %
BILIRUB SERPL-MCNC: 0.3 MG/DL (ref 0.1–2)
BUN BLD-MCNC: 109 MG/DL (ref 7–18)
CALCIUM BLD-MCNC: 8.3 MG/DL (ref 8.5–10.1)
CHLORIDE SERPL-SCNC: 111 MMOL/L (ref 98–112)
CO2 SERPL-SCNC: 25 MMOL/L (ref 21–32)
CREAT BLD-MCNC: 4.33 MG/DL
EOSINOPHIL # BLD AUTO: 0.08 X10(3) UL (ref 0–0.7)
EOSINOPHIL NFR BLD AUTO: 2 %
ERYTHROCYTE [DISTWIDTH] IN BLOOD BY AUTOMATED COUNT: 15.9 %
GFR SERPLBLD BASED ON 1.73 SQ M-ARVRAT: 10 ML/MIN/1.73M2 (ref 60–?)
GLOBULIN PLAS-MCNC: 3.6 G/DL (ref 2.8–4.4)
GLUCOSE BLD-MCNC: 104 MG/DL (ref 70–99)
GLUCOSE BLD-MCNC: 127 MG/DL (ref 70–99)
GLUCOSE BLD-MCNC: 163 MG/DL (ref 70–99)
GLUCOSE BLD-MCNC: 93 MG/DL (ref 70–99)
GLUCOSE BLD-MCNC: 98 MG/DL (ref 70–99)
HCT VFR BLD AUTO: 28.4 %
HGB BLD-MCNC: 8.2 G/DL
IMM GRANULOCYTES # BLD AUTO: 0.02 X10(3) UL (ref 0–1)
IMM GRANULOCYTES NFR BLD: 0.5 %
LYMPHOCYTES # BLD AUTO: 0.67 X10(3) UL (ref 1–4)
LYMPHOCYTES NFR BLD AUTO: 17.1 %
MAGNESIUM SERPL-MCNC: 2.3 MG/DL (ref 1.6–2.6)
MCH RBC QN AUTO: 28.7 PG (ref 26–34)
MCHC RBC AUTO-ENTMCNC: 28.9 G/DL (ref 31–37)
MCV RBC AUTO: 99.3 FL
MONOCYTES # BLD AUTO: 0.65 X10(3) UL (ref 0.1–1)
MONOCYTES NFR BLD AUTO: 16.6 %
NEUTROPHILS # BLD AUTO: 2.47 X10 (3) UL (ref 1.5–7.7)
NEUTROPHILS # BLD AUTO: 2.47 X10(3) UL (ref 1.5–7.7)
NEUTROPHILS NFR BLD AUTO: 63.3 %
OSMOLALITY SERPL CALC.SUM OF ELEC: 324 MOSM/KG (ref 275–295)
PHOSPHATE SERPL-MCNC: 6.3 MG/DL (ref 2.5–4.9)
PLATELET # BLD AUTO: 148 10(3)UL (ref 150–450)
POTASSIUM SERPL-SCNC: 4.8 MMOL/L (ref 3.5–5.1)
PROT SERPL-MCNC: 5.7 G/DL (ref 6.4–8.2)
Q-T INTERVAL: 360 MS
QRS DURATION: 82 MS
QTC CALCULATION (BEZET): 430 MS
R AXIS: 57 DEGREES
RBC # BLD AUTO: 2.86 X10(6)UL
SODIUM SERPL-SCNC: 140 MMOL/L (ref 136–145)
T AXIS: 10 DEGREES
VENTRICULAR RATE: 86 BPM
WBC # BLD AUTO: 3.9 X10(3) UL (ref 4–11)

## 2022-12-16 PROCEDURE — 99232 SBSQ HOSP IP/OBS MODERATE 35: CPT | Performed by: INTERNAL MEDICINE

## 2022-12-16 PROCEDURE — 99291 CRITICAL CARE FIRST HOUR: CPT | Performed by: INTERNAL MEDICINE

## 2022-12-16 RX ORDER — SODIUM CHLORIDE 9 MG/ML
INJECTION, SOLUTION INTRAVENOUS CONTINUOUS
Status: DISCONTINUED | OUTPATIENT
Start: 2022-12-16 | End: 2022-12-20

## 2022-12-16 NOTE — PLAN OF CARE
Drowsy, alert when stimulated. Flat affect. Maintains wakefulness for several minutes after interaction. Confused as to the date, states is December 2022, in [facility] because \"I got sick\". With further prompting ws able to state was having anal pain and does retain the date. Lungs diminished, denies dyspnea, 3L NC - plan to go back on AVAPS 26 600 15-30/10 with sleep. Refuses CHG bath, linen and gown change as well as oral care. Active bowel sounds, brief bout nausea - Zofran was given. Milagro/arnold care rendered and wound repacked to Kerlix roll with mepilex after BM. Arnold intact with low urine output - endorsed when I relinquished care at 2230. Kerlix over xeroform gauze to lower extremities. Clinitron bed. See assessment and flowsheet for further data. Awaiting bed on floor.

## 2022-12-16 NOTE — PROGRESS NOTES
Assumed care of pt at 0700. Pt lorena diet, poor intake. Accucheck. Sacral wound dressing changed. 2BM's. Tinsley in place but very low output- bladder scan <30, IIVF initiated. VS stable. 3L NC. BLE dressing changes done. No acute events on shift.

## 2022-12-16 NOTE — PLAN OF CARE
AOx4, awake and alert. Mainatined on contact isolation for hx MRSA. Tinsley for retention. Low urine output tonight, 50ml only, APN informed. Will pass on in am to renal. On nasal cannula. Aflutter on tele. No BM tonight.      Problem: Diabetes/Glucose Control  Goal: Glucose maintained within prescribed range  Description: INTERVENTIONS:  - Monitor Blood Glucose as ordered  - Assess for signs and symptoms of hyperglycemia and hypoglycemia  - Administer ordered medications to maintain glucose within target range  - Assess barriers to adequate nutritional intake and initiate nutrition consult as needed  - Instruct patient on self management of diabetes  Outcome: Progressing     Problem: Patient/Family Goals  Goal: Patient/Family Long Term Goal  Description: Patient's Long Term Goal: To be healed and back to normal activity    Interventions:  -Pain management  -PT/OT  -Follow up with PCP as recommended   Outcome: Progressing  Goal: Patient/Family Short Term Goal  Description: Patients Short Term goal: to be able to eat, drink, go to the bathroom, change positions, sit in chair and work with therapy with pain controlled    Interventions:  -Pain management  -Activity as tolerated  -PT/OT  -Follow up with PCP as recommended    Outcome: Progressing     Problem: PAIN - ADULT  Goal: Verbalizes/displays adequate comfort level or patient's stated pain goal  Description: INTERVENTIONS:  - Encourage pt to monitor pain and request assistance  - Assess pain using appropriate pain scale  - Administer analgesics based on type and severity of pain and evaluate response  - Implement non-pharmacological measures as appropriate and evaluate response  - Consider cultural and social influences on pain and pain management  - Manage/alleviate anxiety  - Utilize distraction and/or relaxation techniques  - Monitor for opioid side effects  - Notify MD/LIP if interventions unsuccessful or patient reports new pain  - Anticipate increased pain with activity and pre-medicate as appropriate  Outcome: Progressing     Problem: SAFETY ADULT - FALL  Goal: Free from fall injury  Description: INTERVENTIONS:  - Assess pt frequently for physical needs  - Identify cognitive and physical deficits and behaviors that affect risk of falls.   - [place] fall precautions as indicated by assessment.  - Educate pt/family on patient safety including physical limitations  - Instruct pt to call for assistance with activity based on assessment  - Modify environment to reduce risk of injury  - Provide assistive devices as appropriate  - Consider OT/PT consult to assist with strengthening/mobility  - Encourage toileting schedule  Outcome: Progressing     Problem: DISCHARGE PLANNING  Goal: Discharge to home or other facility with appropriate resources  Description: INTERVENTIONS:  - Identify barriers to discharge w/pt and caregiver  - Include patient/family/discharge partner in discharge planning  - Arrange for needed discharge resources and transportation as appropriate  - Identify discharge learning needs (meds, wound care, etc)  - Arrange for interpreters to assist at discharge as needed  - Consider post-discharge preferences of patient/family/discharge partner  - Complete POLST form as appropriate  - Assess patient's ability to be responsible for managing their own health  - Refer to Case Management Department for coordinating discharge planning if the patient needs post-hospital services based on physician/LIP order or complex needs related to functional status, cognitive ability or social support system  Outcome: Progressing

## 2022-12-17 LAB
ALBUMIN SERPL-MCNC: 2.2 G/DL (ref 3.4–5)
ALBUMIN/GLOB SERPL: 0.7 {RATIO} (ref 1–2)
ALP LIVER SERPL-CCNC: 61 U/L
ALT SERPL-CCNC: 15 U/L
ANION GAP SERPL CALC-SCNC: 5 MMOL/L (ref 0–18)
AST SERPL-CCNC: 18 U/L (ref 15–37)
BASOPHILS # BLD AUTO: 0.01 X10(3) UL (ref 0–0.2)
BASOPHILS NFR BLD AUTO: 0.2 %
BILIRUB SERPL-MCNC: 0.3 MG/DL (ref 0.1–2)
BUN BLD-MCNC: 114 MG/DL (ref 7–18)
CALCIUM BLD-MCNC: 7.9 MG/DL (ref 8.5–10.1)
CHLORIDE SERPL-SCNC: 111 MMOL/L (ref 98–112)
CO2 SERPL-SCNC: 25 MMOL/L (ref 21–32)
CREAT BLD-MCNC: 5.21 MG/DL
EOSINOPHIL # BLD AUTO: 0.09 X10(3) UL (ref 0–0.7)
EOSINOPHIL NFR BLD AUTO: 2 %
ERYTHROCYTE [DISTWIDTH] IN BLOOD BY AUTOMATED COUNT: 15.6 %
GFR SERPLBLD BASED ON 1.73 SQ M-ARVRAT: 8 ML/MIN/1.73M2 (ref 60–?)
GLOBULIN PLAS-MCNC: 3.1 G/DL (ref 2.8–4.4)
GLUCOSE BLD-MCNC: 115 MG/DL (ref 70–99)
GLUCOSE BLD-MCNC: 122 MG/DL (ref 70–99)
GLUCOSE BLD-MCNC: 125 MG/DL (ref 70–99)
GLUCOSE BLD-MCNC: 127 MG/DL (ref 70–99)
GLUCOSE BLD-MCNC: 137 MG/DL (ref 70–99)
HCT VFR BLD AUTO: 29.2 %
HGB BLD-MCNC: 8.3 G/DL
IMM GRANULOCYTES # BLD AUTO: 0.03 X10(3) UL (ref 0–1)
IMM GRANULOCYTES NFR BLD: 0.7 %
LYMPHOCYTES # BLD AUTO: 0.64 X10(3) UL (ref 1–4)
LYMPHOCYTES NFR BLD AUTO: 14 %
MAGNESIUM SERPL-MCNC: 2 MG/DL (ref 1.6–2.6)
MCH RBC QN AUTO: 28.3 PG (ref 26–34)
MCHC RBC AUTO-ENTMCNC: 28.4 G/DL (ref 31–37)
MCV RBC AUTO: 99.7 FL
MONOCYTES # BLD AUTO: 0.54 X10(3) UL (ref 0.1–1)
MONOCYTES NFR BLD AUTO: 11.8 %
NEUTROPHILS # BLD AUTO: 3.25 X10 (3) UL (ref 1.5–7.7)
NEUTROPHILS # BLD AUTO: 3.25 X10(3) UL (ref 1.5–7.7)
NEUTROPHILS NFR BLD AUTO: 71.3 %
OSMOLALITY SERPL CALC.SUM OF ELEC: 330 MOSM/KG (ref 275–295)
PHOSPHATE SERPL-MCNC: 7.4 MG/DL (ref 2.5–4.9)
PLATELET # BLD AUTO: 137 10(3)UL (ref 150–450)
POTASSIUM SERPL-SCNC: 5.4 MMOL/L (ref 3.5–5.1)
PROT SERPL-MCNC: 5.3 G/DL (ref 6.4–8.2)
RBC # BLD AUTO: 2.93 X10(6)UL
SODIUM SERPL-SCNC: 141 MMOL/L (ref 136–145)
WBC # BLD AUTO: 4.6 X10(3) UL (ref 4–11)

## 2022-12-17 PROCEDURE — 99232 SBSQ HOSP IP/OBS MODERATE 35: CPT | Performed by: INTERNAL MEDICINE

## 2022-12-17 PROCEDURE — 99291 CRITICAL CARE FIRST HOUR: CPT | Performed by: INTERNAL MEDICINE

## 2022-12-17 NOTE — TREATMENT PLAN
Inpatient to Inpatient Transfer    Reason for Transfer:      SBAR Reviewed and Verbal Report:  Received from [facility] ICU RN     Patient's Family Notified of Transfer and Given Unit Phone Number/Visiting Hours:  Yes    Patient Detoxing? No    Comments:    Patient transferred to unit at 1500, VSS. On 4L, . Patient alert and oriented x 4. Oriented to room, call light demonstration performed. Fall precautions in place. Patient denies any pain, chest pain, or shortness of breath. Call light within reach. IVF 0.9 running continous at 100 ml/hr.

## 2022-12-17 NOTE — PLAN OF CARE
Assumed care of pt after shift report. Pt intermittently napping throughout day, easily arousable to voice. Afebrile. Irregular rate per monitor, type 2 second degree heart block. SBP 90s, pt denies feeling lightheaded/dizzy. Received on [facility], weaned to [facility]. Tinsley intact with diminished urine output despite continuous IVFs. K 5.4, oral veltassa given. Poor appetite, oral intake encouraged. No BM. Dressings intact to BLE and sacral wounds. Denies pain. Transfer orders in place. [facility] Pt updated and agreeable to plan of care.  Jay Valadez updated via phone. See MAR and flowsheets for additional information.

## 2022-12-17 NOTE — PLAN OF CARE
Assumed care of patient following shift report. Patient is alert and oriented. Encouraged to wear AVAPS overnight, patient attempted to wear it for about 30 minutes and then refused following. Has complaints of pain, only with turns and wound care, subsides fairly rapidly. Tinsley in place, very low urine output, despite IVF. Clinitron bed in place. Patient resting comfortably between cares, call light in reach. Will continue to monitor.

## 2022-12-17 NOTE — PHYSICAL THERAPY NOTE
Attempted to see Pt this AM - RN aware of attempt. Pt requesting to rest at this time, but agreeable to writer stopping back later today if possible. Will f/u later today if time permits, after all other patients are attempted per tentative schedule.

## 2022-12-18 LAB
ALBUMIN SERPL-MCNC: 2.1 G/DL (ref 3.4–5)
ALBUMIN/GLOB SERPL: 0.5 {RATIO} (ref 1–2)
ALP LIVER SERPL-CCNC: 61 U/L
ALT SERPL-CCNC: 14 U/L
ANION GAP SERPL CALC-SCNC: 6 MMOL/L (ref 0–18)
AST SERPL-CCNC: 17 U/L (ref 15–37)
BASOPHILS # BLD AUTO: 0.01 X10(3) UL (ref 0–0.2)
BASOPHILS NFR BLD AUTO: 0.2 %
BILIRUB SERPL-MCNC: 0.3 MG/DL (ref 0.1–2)
BUN BLD-MCNC: 103 MG/DL (ref 7–18)
CALCIUM BLD-MCNC: 8.4 MG/DL (ref 8.5–10.1)
CHLORIDE SERPL-SCNC: 112 MMOL/L (ref 98–112)
CO2 SERPL-SCNC: 20 MMOL/L (ref 21–32)
CREAT BLD-MCNC: 5.19 MG/DL
EOSINOPHIL # BLD AUTO: 0.1 X10(3) UL (ref 0–0.7)
EOSINOPHIL NFR BLD AUTO: 2.2 %
ERYTHROCYTE [DISTWIDTH] IN BLOOD BY AUTOMATED COUNT: 15.1 %
GFR SERPLBLD BASED ON 1.73 SQ M-ARVRAT: 8 ML/MIN/1.73M2 (ref 60–?)
GLOBULIN PLAS-MCNC: 4.3 G/DL (ref 2.8–4.4)
GLUCOSE BLD-MCNC: 110 MG/DL (ref 70–99)
GLUCOSE BLD-MCNC: 140 MG/DL (ref 70–99)
GLUCOSE BLD-MCNC: 71 MG/DL (ref 70–99)
GLUCOSE BLD-MCNC: 96 MG/DL (ref 70–99)
HCT VFR BLD AUTO: 29.1 %
HGB BLD-MCNC: 8.2 G/DL
IMM GRANULOCYTES # BLD AUTO: 0.04 X10(3) UL (ref 0–1)
IMM GRANULOCYTES NFR BLD: 0.9 %
LYMPHOCYTES # BLD AUTO: 0.64 X10(3) UL (ref 1–4)
LYMPHOCYTES NFR BLD AUTO: 14 %
MCH RBC QN AUTO: 28.3 PG (ref 26–34)
MCHC RBC AUTO-ENTMCNC: 28.2 G/DL (ref 31–37)
MCV RBC AUTO: 100.3 FL
MONOCYTES # BLD AUTO: 0.49 X10(3) UL (ref 0.1–1)
MONOCYTES NFR BLD AUTO: 10.7 %
NEUTROPHILS # BLD AUTO: 3.28 X10 (3) UL (ref 1.5–7.7)
NEUTROPHILS # BLD AUTO: 3.28 X10(3) UL (ref 1.5–7.7)
NEUTROPHILS NFR BLD AUTO: 72 %
OSMOLALITY SERPL CALC.SUM OF ELEC: 317 MOSM/KG (ref 275–295)
PLATELET # BLD AUTO: 126 10(3)UL (ref 150–450)
POTASSIUM SERPL-SCNC: 5.7 MMOL/L (ref 3.5–5.1)
PROT SERPL-MCNC: 6.4 G/DL (ref 6.4–8.2)
RBC # BLD AUTO: 2.9 X10(6)UL
SODIUM SERPL-SCNC: 138 MMOL/L (ref 136–145)
WBC # BLD AUTO: 4.6 X10(3) UL (ref 4–11)

## 2022-12-18 PROCEDURE — 99232 SBSQ HOSP IP/OBS MODERATE 35: CPT | Performed by: INTERNAL MEDICINE

## 2022-12-18 PROCEDURE — 99233 SBSQ HOSP IP/OBS HIGH 50: CPT | Performed by: INTERNAL MEDICINE

## 2022-12-19 ENCOUNTER — ANESTHESIA EVENT (OUTPATIENT)
Dept: MEDSURG UNIT | Facility: HOSPITAL | Age: 77
End: 2022-12-19
Payer: MEDICARE

## 2022-12-19 ENCOUNTER — ANESTHESIA (OUTPATIENT)
Dept: MEDSURG UNIT | Facility: HOSPITAL | Age: 77
End: 2022-12-19
Payer: MEDICARE

## 2022-12-19 ENCOUNTER — APPOINTMENT (OUTPATIENT)
Dept: GENERAL RADIOLOGY | Facility: HOSPITAL | Age: 77
DRG: 673 | End: 2022-12-19
Attending: NURSE PRACTITIONER
Payer: MEDICARE

## 2022-12-19 LAB
ALBUMIN SERPL-MCNC: 2.2 G/DL (ref 3.4–5)
ALBUMIN/GLOB SERPL: 0.5 {RATIO} (ref 1–2)
ALP LIVER SERPL-CCNC: 68 U/L
ALT SERPL-CCNC: 13 U/L
ANION GAP SERPL CALC-SCNC: 4 MMOL/L (ref 0–18)
ARTERIAL PATENCY WRIST A: POSITIVE
AST SERPL-CCNC: 14 U/L (ref 15–37)
BASE EXCESS BLDA CALC-SCNC: -3.4 MMOL/L (ref ?–2)
BASE EXCESS BLDA CALC-SCNC: -5 MMOL/L (ref ?–2)
BASE EXCESS BLDA CALC-SCNC: -6.8 MMOL/L (ref ?–2)
BASOPHILS # BLD AUTO: 0.02 X10(3) UL (ref 0–0.2)
BASOPHILS NFR BLD AUTO: 0.5 %
BILIRUB SERPL-MCNC: 0.3 MG/DL (ref 0.1–2)
BODY TEMPERATURE: 98.6 F
BUN BLD-MCNC: 102 MG/DL (ref 7–18)
CA-I BLD-SCNC: 1.24 MMOL/L (ref 0.95–1.32)
CALCIUM BLD-MCNC: 8.8 MG/DL (ref 8.5–10.1)
CHLORIDE SERPL-SCNC: 113 MMOL/L (ref 98–112)
CO2 SERPL-SCNC: 24 MMOL/L (ref 21–32)
COHGB MFR BLD: 0.6 % SAT (ref 0–3)
COHGB MFR BLD: 1.6 % SAT (ref 0–3)
COHGB MFR BLD: 1.7 % SAT (ref 0–3)
CREAT BLD-MCNC: 4.86 MG/DL
EOSINOPHIL # BLD AUTO: 0.07 X10(3) UL (ref 0–0.7)
EOSINOPHIL NFR BLD AUTO: 1.7 %
ERYTHROCYTE [DISTWIDTH] IN BLOOD BY AUTOMATED COUNT: 15.2 %
EXPIRATORY PRESSURE: 10 CM H2O
FIO2: 100 %
FIO2: 35 %
GFR SERPLBLD BASED ON 1.73 SQ M-ARVRAT: 9 ML/MIN/1.73M2 (ref 60–?)
GLOBULIN PLAS-MCNC: 4.5 G/DL (ref 2.8–4.4)
GLUCOSE BLD-MCNC: 105 MG/DL (ref 70–99)
GLUCOSE BLD-MCNC: 115 MG/DL (ref 70–99)
GLUCOSE BLD-MCNC: 123 MG/DL (ref 70–99)
GLUCOSE BLD-MCNC: 136 MG/DL (ref 70–99)
GLUCOSE BLD-MCNC: 143 MG/DL (ref 70–99)
HCO3 BLDA-SCNC: 19.6 MEQ/L (ref 21–27)
HCO3 BLDA-SCNC: 21 MEQ/L (ref 21–27)
HCO3 BLDA-SCNC: 22.3 MEQ/L (ref 21–27)
HCT VFR BLD AUTO: 31.4 %
HGB BLD-MCNC: 8.2 G/DL
HGB BLD-MCNC: 8.9 G/DL
HGB BLD-MCNC: 9 G/DL
HGB BLD-MCNC: 9.1 G/DL
IMM GRANULOCYTES # BLD AUTO: 0.03 X10(3) UL (ref 0–1)
IMM GRANULOCYTES NFR BLD: 0.7 %
IPAP MAX: 30 CM H2O
IPAP MIN: 15 CM H2O
L/M: 4 L/MIN
LACTATE BLD-SCNC: 0.6 MMOL/L (ref 0.5–2)
LACTATE SERPL-SCNC: 0.6 MMOL/L (ref 0.4–2)
LYMPHOCYTES # BLD AUTO: 0.48 X10(3) UL (ref 1–4)
LYMPHOCYTES NFR BLD AUTO: 11.4 %
MCH RBC QN AUTO: 28.1 PG (ref 26–34)
MCHC RBC AUTO-ENTMCNC: 28.3 G/DL (ref 31–37)
MCV RBC AUTO: 99.1 FL
METHGB MFR BLD: 0.2 % SAT (ref 0.4–1.5)
METHGB MFR BLD: 0.2 % SAT (ref 0.4–1.5)
METHGB MFR BLD: 0.9 % SAT (ref 0.4–1.5)
MONOCYTES # BLD AUTO: 0.53 X10(3) UL (ref 0.1–1)
MONOCYTES NFR BLD AUTO: 12.6 %
NEUTROPHILS # BLD AUTO: 3.08 X10 (3) UL (ref 1.5–7.7)
NEUTROPHILS # BLD AUTO: 3.08 X10(3) UL (ref 1.5–7.7)
NEUTROPHILS NFR BLD AUTO: 73.1 %
OSMOLALITY SERPL CALC.SUM OF ELEC: 325 MOSM/KG (ref 275–295)
OXYHGB MFR BLDA: 96.1 % (ref 92–100)
OXYHGB MFR BLDA: 96.6 % (ref 92–100)
OXYHGB MFR BLDA: 96.7 % (ref 92–100)
PCO2 BLDA: 45 MM HG (ref 35–45)
PCO2 BLDA: 54 MM HG (ref 35–45)
PCO2 BLDA: 58 MM HG (ref 35–45)
PEEP: 5 CM H2O
PH BLDA: 7.18 [PH] (ref 7.35–7.45)
PH BLDA: 7.23 [PH] (ref 7.35–7.45)
PH BLDA: 7.31 [PH] (ref 7.35–7.45)
PLATELET # BLD AUTO: 127 10(3)UL (ref 150–450)
PO2 BLDA: 100 MM HG (ref 80–100)
PO2 BLDA: 105 MM HG (ref 80–100)
PO2 BLDA: 411 MM HG (ref 80–100)
POTASSIUM BLD-SCNC: 5 MMOL/L (ref 3.6–5.1)
POTASSIUM SERPL-SCNC: 4.9 MMOL/L (ref 3.5–5.1)
PROCALCITONIN SERPL-MCNC: 0.05 NG/ML (ref ?–0.16)
PROT SERPL-MCNC: 6.7 G/DL (ref 6.4–8.2)
RBC # BLD AUTO: 3.17 X10(6)UL
SODIUM BLD-SCNC: 138 MMOL/L (ref 135–145)
SODIUM SERPL-SCNC: 141 MMOL/L (ref 136–145)
TIDAL VOLUME: 450 ML
TIDAL VOLUME: 600 ML
TRIGL SERPL-MCNC: 92 MG/DL (ref 30–149)
VENT RATE: 16 /MIN
VENT RATE: 26 /MIN
WBC # BLD AUTO: 4.2 X10(3) UL (ref 4–11)

## 2022-12-19 PROCEDURE — 5A1945Z RESPIRATORY VENTILATION, 24-96 CONSECUTIVE HOURS: ICD-10-PCS | Performed by: INTERNAL MEDICINE

## 2022-12-19 PROCEDURE — 99233 SBSQ HOSP IP/OBS HIGH 50: CPT | Performed by: INTERNAL MEDICINE

## 2022-12-19 PROCEDURE — 0BH17EZ INSERTION OF ENDOTRACHEAL AIRWAY INTO TRACHEA, VIA NATURAL OR ARTIFICIAL OPENING: ICD-10-PCS | Performed by: ANESTHESIOLOGY

## 2022-12-19 PROCEDURE — 99232 SBSQ HOSP IP/OBS MODERATE 35: CPT | Performed by: INTERNAL MEDICINE

## 2022-12-19 PROCEDURE — 71045 X-RAY EXAM CHEST 1 VIEW: CPT | Performed by: NURSE PRACTITIONER

## 2022-12-19 RX ORDER — DEXMEDETOMIDINE HYDROCHLORIDE 4 UG/ML
INJECTION, SOLUTION INTRAVENOUS CONTINUOUS
Status: DISCONTINUED | OUTPATIENT
Start: 2022-12-19 | End: 2022-12-22

## 2022-12-19 RX ORDER — MIDAZOLAM HYDROCHLORIDE 1 MG/ML
2 INJECTION INTRAMUSCULAR; INTRAVENOUS EVERY 5 MIN PRN
Status: DISCONTINUED | OUTPATIENT
Start: 2022-12-19 | End: 2022-12-22

## 2022-12-19 RX ORDER — SODIUM BICARBONATE 150 MEQ/1,000 ML IN DEXTROSE 5 % INTRAVENOUS
150 SOLUTION CONTINUOUS
Status: DISCONTINUED | OUTPATIENT
Start: 2022-12-19 | End: 2022-12-20

## 2022-12-19 RX ORDER — CHLORHEXIDINE GLUCONATE 0.12 MG/ML
15 RINSE ORAL
Status: DISCONTINUED | OUTPATIENT
Start: 2022-12-19 | End: 2022-12-22

## 2022-12-19 RX ORDER — ROCURONIUM BROMIDE 10 MG/ML
INJECTION, SOLUTION INTRAVENOUS
Status: COMPLETED
Start: 2022-12-19 | End: 2022-12-19

## 2022-12-19 RX ORDER — GABAPENTIN 250 MG/5ML
300 SOLUTION ORAL 2 TIMES DAILY
Status: COMPLETED | OUTPATIENT
Start: 2022-12-19 | End: 2022-12-23

## 2022-12-19 RX ORDER — SENNOSIDES 8.8 MG/5ML
10 LIQUID ORAL NIGHTLY PRN
Status: DISCONTINUED | OUTPATIENT
Start: 2022-12-19 | End: 2022-12-23

## 2022-12-19 RX ORDER — ETOMIDATE 2 MG/ML
INJECTION INTRAVENOUS
Status: DISCONTINUED
Start: 2022-12-19 | End: 2022-12-19 | Stop reason: WASHOUT

## 2022-12-19 RX ORDER — BISACODYL 10 MG
10 SUPPOSITORY, RECTAL RECTAL
Status: DISCONTINUED | OUTPATIENT
Start: 2022-12-19 | End: 2023-01-04

## 2022-12-19 NOTE — PLAN OF CARE
Patient alert and oriented x3-4, forgetful/confused at times. Bedrest/clinitron bed. Afib on tele. Maintaining o2 sats >90% on 3L NC. Hx GAYLE, noncompliant with AVAPS. Patient only tolerated AVAPS for 30 minutes overnight. Tinsley in place d/t urinary retention. Tinsley care performed, stat lock changed. Large bm x2 overnight. Wound care performed, dressings changed. Dressings c/d/i. Denies pain. IVF. Updated patient on POC, no questions at this time. Safety precautions put in place, bed alarm on.      Problem: Diabetes/Glucose Control  Goal: Glucose maintained within prescribed range  Description: INTERVENTIONS:  - Monitor Blood Glucose as ordered  - Assess for signs and symptoms of hyperglycemia and hypoglycemia  - Administer ordered medications to maintain glucose within target range  - Assess barriers to adequate nutritional intake and initiate nutrition consult as needed  - Instruct patient on self management of diabetes  Outcome: Progressing     Problem: Patient/Family Goals  Goal: Patient/Family Long Term Goal  Description: Patient's Long Term Goal: To be healed and back to normal activity    Interventions:  -Pain management  -PT/OT  -Follow up with PCP as recommended   Outcome: Progressing  Goal: Patient/Family Short Term Goal  Description: Patients Short Term goal: to be able to eat, drink, go to the bathroom, change positions, sit in chair and work with therapy with pain controlled    Interventions:  -Pain management  -Activity as tolerated  -PT/OT  -Follow up with PCP as recommended    Outcome: Progressing     Problem: PAIN - ADULT  Goal: Verbalizes/displays adequate comfort level or patient's stated pain goal  Description: INTERVENTIONS:  - Encourage pt to monitor pain and request assistance  - Assess pain using appropriate pain scale  - Administer analgesics based on type and severity of pain and evaluate response  - Implement non-pharmacological measures as appropriate and evaluate response  - Consider cultural and social influences on pain and pain management  - Manage/alleviate anxiety  - Utilize distraction and/or relaxation techniques  - Monitor for opioid side effects  - Notify MD/LIP if interventions unsuccessful or patient reports new pain  - Anticipate increased pain with activity and pre-medicate as appropriate  Outcome: Progressing     Problem: SAFETY ADULT - FALL  Goal: Free from fall injury  Description: INTERVENTIONS:  - Assess pt frequently for physical needs  - Identify cognitive and physical deficits and behaviors that affect risk of falls.   - [place] fall precautions as indicated by assessment.  - Educate pt/family on patient safety including physical limitations  - Instruct pt to call for assistance with activity based on assessment  - Modify environment to reduce risk of injury  - Provide assistive devices as appropriate  - Consider OT/PT consult to assist with strengthening/mobility  - Encourage toileting schedule  Outcome: Progressing     Problem: DISCHARGE PLANNING  Goal: Discharge to home or other facility with appropriate resources  Description: INTERVENTIONS:  - Identify barriers to discharge w/pt and caregiver  - Include patient/family/discharge partner in discharge planning  - Arrange for needed discharge resources and transportation as appropriate  - Identify discharge learning needs (meds, wound care, etc)  - Arrange for interpreters to assist at discharge as needed  - Consider post-discharge preferences of patient/family/discharge partner  - Complete POLST form as appropriate  - Assess patient's ability to be responsible for managing their own health  - Refer to Case Management Department for coordinating discharge planning if the patient needs post-hospital services based on physician/LIP order or complex needs related to functional status, cognitive ability or social support system  Outcome: Progressing     Problem: Delirium  Goal: Minimize duration of delirium  Description: Interventions:  - Encourage use of hearing aids, eye glasses  - Promote highest level of mobility daily  - Provide frequent reorientation  - Promote wakefulness i.e. lights on, blinds open  - Promote sleep, encourage patient's normal rest cycle i.e. lights off, TV off, minimize noise and interruptions  - Encourage family to assist in orientation and promotion of home routines  Outcome: Progressing

## 2022-12-19 NOTE — PLAN OF CARE
Shift Note:  Assumed care of patient. Patient alert and oriented x3, patient increasingly confused compared to yesterday. Pt has started stating unusual thoughts unrelated to questions asked, has removed both Ivs, and begun to reach for things in the air like she is reaching to grab something. MD aware and ordered ABGs. Patient on 4L O2 NC, denies difficulty breathing, lung sounds diminished. Denies any cardiac symptoms, Afib with HR in 100s, on tele. Denies pain at this time. Incontinent of bowel and bladder, last BM 12/19, pt has arnold catheter in place. Patient has BLE wounds -- dressings changed this morning. Wund in sacrum changed early this morning and afternoon, however pt refused to let nursing staff complete dressing change due to pain induced. Total care/assist on a special bed to reduce pressure, call light within reach, tolerating care well. 1200:   Patient refused to let nursing staff place IV. Pt was still agitated after washing up and packing sacral wound. Pt did not want to be put in pain again and wanted to rest. MD aware pt pulled out both IVs. Vascular access consult in place. 1500:   Respiratory on floor placing AVAPS on pt for abnormal ABG levels. Vascular access at bedside and inserted midline to left arm. Pt tolerated well. Pulm saw pt and requested pt be transferred to ICU for closer monitoring, waiting for bed. 1810:  Patient transferred to ICU. Report given to [facility]. Patient  updated on transfer and left voicemail.        POC:  - ABGs repeat pending       Problem: Diabetes/Glucose Control  Goal: Glucose maintained within prescribed range  Description: INTERVENTIONS:  - Monitor Blood Glucose as ordered  - Assess for signs and symptoms of hyperglycemia and hypoglycemia  - Administer ordered medications to maintain glucose within target range  - Assess barriers to adequate nutritional intake and initiate nutrition consult as needed  - Instruct patient on self management of diabetes  Outcome: Progressing     Problem: Patient/Family Goals  Goal: Patient/Family Long Term Goal  Description: Patient's Long Term Goal: To be healed and back to normal activity    Interventions:  -Pain management  -PT/OT  -Follow up with PCP as recommended   Outcome: Progressing  Goal: Patient/Family Short Term Goal  Description: Patients Short Term goal: to be able to eat, drink, go to the bathroom, change positions, sit in chair and work with therapy with pain controlled    Interventions:  -Pain management  -Activity as tolerated  -PT/OT  -Follow up with PCP as recommended    Outcome: Progressing     Problem: PAIN - ADULT  Goal: Verbalizes/displays adequate comfort level or patient's stated pain goal  Description: INTERVENTIONS:  - Encourage pt to monitor pain and request assistance  - Assess pain using appropriate pain scale  - Administer analgesics based on type and severity of pain and evaluate response  - Implement non-pharmacological measures as appropriate and evaluate response  - Consider cultural and social influences on pain and pain management  - Manage/alleviate anxiety  - Utilize distraction and/or relaxation techniques  - Monitor for opioid side effects  - Notify MD/LIP if interventions unsuccessful or patient reports new pain  - Anticipate increased pain with activity and pre-medicate as appropriate  Outcome: Progressing     Problem: SAFETY ADULT - FALL  Goal: Free from fall injury  Description: INTERVENTIONS:  - Assess pt frequently for physical needs  - Identify cognitive and physical deficits and behaviors that affect risk of falls.   - [place] fall precautions as indicated by assessment.  - Educate pt/family on patient safety including physical limitations  - Instruct pt to call for assistance with activity based on assessment  - Modify environment to reduce risk of injury  - Provide assistive devices as appropriate  - Consider OT/PT consult to assist with strengthening/mobility  - Encourage toileting schedule  Outcome: Progressing     Problem: DISCHARGE PLANNING  Goal: Discharge to home or other facility with appropriate resources  Description: INTERVENTIONS:  - Identify barriers to discharge w/pt and caregiver  - Include patient/family/discharge partner in discharge planning  - Arrange for needed discharge resources and transportation as appropriate  - Identify discharge learning needs (meds, wound care, etc)  - Arrange for interpreters to assist at discharge as needed  - Consider post-discharge preferences of patient/family/discharge partner  - Complete POLST form as appropriate  - Assess patient's ability to be responsible for managing their own health  - Refer to Case Management Department for coordinating discharge planning if the patient needs post-hospital services based on physician/LIP order or complex needs related to functional status, cognitive ability or social support system  Outcome: Progressing     Problem: Delirium  Goal: Minimize duration of delirium  Description: Interventions:  - Encourage use of hearing aids, eye glasses  - Promote highest level of mobility daily  - Provide frequent reorientation  - Promote wakefulness i.e. lights on, blinds open  - Promote sleep, encourage patient's normal rest cycle i.e. lights off, TV off, minimize noise and interruptions  - Encourage family to assist in orientation and promotion of home routines  Outcome: Progressing

## 2022-12-19 NOTE — PHYSICAL THERAPY NOTE
PT treatment att'd -  pt refused, encouragement provided, educated on benefits of continued mobility, educated on dc recommendations for rehab and need for participation in order to qualify as pt has refused x3 and not worked with therapy since 12/13. Per RN inc confusion.   Pt reports amicable to re-attempt tomorrow at 11AM.

## 2022-12-20 LAB
ALBUMIN SERPL-MCNC: 1.8 G/DL (ref 3.4–5)
ALBUMIN/GLOB SERPL: 0.6 {RATIO} (ref 1–2)
ALP LIVER SERPL-CCNC: 58 U/L
ALT SERPL-CCNC: 11 U/L
ANION GAP SERPL CALC-SCNC: 8 MMOL/L (ref 0–18)
ARTERIAL PATENCY WRIST A: POSITIVE
AST SERPL-CCNC: 11 U/L (ref 15–37)
ATRIAL RATE: 267 BPM
BASE EXCESS BLDA CALC-SCNC: 2.5 MMOL/L (ref ?–2)
BASOPHILS # BLD AUTO: 0 X10(3) UL (ref 0–0.2)
BASOPHILS NFR BLD AUTO: 0 %
BILIRUB SERPL-MCNC: 0.3 MG/DL (ref 0.1–2)
BILIRUB UR QL STRIP.AUTO: NEGATIVE
BODY TEMPERATURE: 98.6 F
BUN BLD-MCNC: 95 MG/DL (ref 7–18)
CALCIUM BLD-MCNC: 7.9 MG/DL (ref 8.5–10.1)
CHLORIDE SERPL-SCNC: 113 MMOL/L (ref 98–112)
CO2 SERPL-SCNC: 25 MMOL/L (ref 21–32)
COHGB MFR BLD: 0.2 % SAT (ref 0–3)
COLOR UR AUTO: YELLOW
CREAT BLD-MCNC: 4.01 MG/DL
EOSINOPHIL # BLD AUTO: 0.09 X10(3) UL (ref 0–0.7)
EOSINOPHIL NFR BLD AUTO: 3.1 %
ERYTHROCYTE [DISTWIDTH] IN BLOOD BY AUTOMATED COUNT: 15.1 %
FIO2: 40 %
GFR SERPLBLD BASED ON 1.73 SQ M-ARVRAT: 11 ML/MIN/1.73M2 (ref 60–?)
GLOBULIN PLAS-MCNC: 3 G/DL (ref 2.8–4.4)
GLUCOSE BLD-MCNC: 116 MG/DL (ref 70–99)
GLUCOSE BLD-MCNC: 132 MG/DL (ref 70–99)
GLUCOSE BLD-MCNC: 136 MG/DL (ref 70–99)
GLUCOSE BLD-MCNC: 158 MG/DL (ref 70–99)
GLUCOSE BLD-MCNC: 178 MG/DL (ref 70–99)
GLUCOSE UR STRIP.AUTO-MCNC: NEGATIVE MG/DL
HCO3 BLDA-SCNC: 26.9 MEQ/L (ref 21–27)
HCT VFR BLD AUTO: 23.4 %
HCT VFR BLD AUTO: 23.6 %
HGB BLD-MCNC: 7 G/DL
HGB BLD-MCNC: 7 G/DL
HGB BLD-MCNC: 7.1 G/DL
HGB BLD-MCNC: 7.2 G/DL
HGB BLD-MCNC: 7.4 G/DL
IMM GRANULOCYTES # BLD AUTO: 0.03 X10(3) UL (ref 0–1)
IMM GRANULOCYTES NFR BLD: 1 %
KETONES UR STRIP.AUTO-MCNC: NEGATIVE MG/DL
LYMPHOCYTES # BLD AUTO: 0.58 X10(3) UL (ref 1–4)
LYMPHOCYTES NFR BLD AUTO: 19.9 %
MAGNESIUM SERPL-MCNC: 1.7 MG/DL (ref 1.6–2.6)
MCH RBC QN AUTO: 27.9 PG (ref 26–34)
MCHC RBC AUTO-ENTMCNC: 29.9 G/DL (ref 31–37)
MCV RBC AUTO: 93.2 FL
METHGB MFR BLD: 1.3 % SAT (ref 0.4–1.5)
MONOCYTES # BLD AUTO: 0.35 X10(3) UL (ref 0.1–1)
MONOCYTES NFR BLD AUTO: 12 %
NEUTROPHILS # BLD AUTO: 1.86 X10 (3) UL (ref 1.5–7.7)
NEUTROPHILS # BLD AUTO: 1.86 X10(3) UL (ref 1.5–7.7)
NEUTROPHILS NFR BLD AUTO: 64 %
NITRITE UR QL STRIP.AUTO: NEGATIVE
OSMOLALITY SERPL CALC.SUM OF ELEC: 332 MOSM/KG (ref 275–295)
OXYHGB MFR BLDA: 95.4 % (ref 92–100)
P AXIS: 55 DEGREES
P/F RATIO: 340 MMHG
PCO2 BLDA: 35 MM HG (ref 35–45)
PEEP: 5 CM H2O
PH BLDA: 7.48 [PH] (ref 7.35–7.45)
PH UR STRIP.AUTO: 5.5 [PH] (ref 5–8)
PHOSPHATE SERPL-MCNC: 4 MG/DL (ref 2.5–4.9)
PLATELET # BLD AUTO: 119 10(3)UL (ref 150–450)
PO2 BLDA: 136 MM HG (ref 80–100)
POTASSIUM SERPL-SCNC: 4.1 MMOL/L (ref 3.5–5.1)
PROT SERPL-MCNC: 4.8 G/DL (ref 6.4–8.2)
Q-T INTERVAL: 426 MS
QRS DURATION: 86 MS
QTC CALCULATION (BEZET): 450 MS
R AXIS: 50 DEGREES
RBC # BLD AUTO: 2.51 X10(6)UL
RBC #/AREA URNS AUTO: >10 /HPF
RBC #/AREA URNS AUTO: >10 /HPF
SODIUM SERPL-SCNC: 146 MMOL/L (ref 136–145)
SP GR UR STRIP.AUTO: 1.01 (ref 1–1.03)
T AXIS: 20 DEGREES
TIDAL VOLUME: 450 ML
UROBILINOGEN UR STRIP.AUTO-MCNC: 0.2 MG/DL
VENT RATE: 16 /MIN
VENTRICULAR RATE: 67 BPM
WBC # BLD AUTO: 2.9 X10(3) UL (ref 4–11)
WBC #/AREA URNS AUTO: >50 /HPF
WBC #/AREA URNS AUTO: >50 /HPF
WBC CLUMPS UR QL AUTO: PRESENT /HPF
WBC CLUMPS UR QL AUTO: PRESENT /HPF

## 2022-12-20 PROCEDURE — 99291 CRITICAL CARE FIRST HOUR: CPT | Performed by: INTERNAL MEDICINE

## 2022-12-20 PROCEDURE — 99223 1ST HOSP IP/OBS HIGH 75: CPT | Performed by: NURSE PRACTITIONER

## 2022-12-20 NOTE — ANESTHESIA PROCEDURE NOTES
Airway  Date/Time: 12/19/2022 7:15 PM  Urgency: emergent      General Information and Staff    Patient location during procedure: ICU  Anesthesiologist: Carol Morel MD  Performed: anesthesiologist     Consent for Airway (if performed for an anesthetic, see related documentation for consents)  Consent: The procedure was performed in an emergent situation. Verbal consent obtained. Consent given by: patient      Indications and Patient Condition  Indications for airway management: airway protection, hypercarbia, hypoxemia and respiratory distress  Spontaneous Ventilation: absent  Sedation level: deep  Preoxygenated: yes  Patient position: sniffing  Mask difficulty assessment: 0 - not attempted    Final Airway Details  Final airway type: endotracheal airway      Successful airway: ETT  Cuffed: yes   Successful intubation technique: Video laryngoscopy  Endotracheal tube insertion site: oral  Blade: GlideScope  Blade size: #3  ETT size (mm): 7.5    Cormack-Lehane Classification: grade I - full view of glottis  Placement verified by: chest auscultation and capnometry   Cuff volume (mL): 8  Measured from: lips  ETT to lips (cm): 22  Number of attempts at approach: 1    Additional Comments  Responded to emergency intubation request from ICU RN per Dr. Chadwick Birch. On arrival, pt acidotic, high PAP settings, tachypneic, waxing/waning MS. Preox, suction available. Prop 100mg, Karan 50mg. Smooth RSI. VC clean, passed smoothly, atraumatically. Team endorsing comfort with care prior to my departure.

## 2022-12-20 NOTE — PHYSICAL THERAPY NOTE
Pt not appropriate for tx at this time d/t being intubated now and sedated.  Will re-attempt when medically appropriate and able to participate with PT.

## 2022-12-20 NOTE — PLAN OF CARE
Received pt s/p intubation, Post cxr, abg done, updated APN ALer on pt, Pt on Propofol @ 20mcgs pt increased coughing, peaks mids 30's Aler APN aware. Plateau pressures in 20's  Fentanyl  drip started. Tolerating current vent settings, lungs diminished, coarse, secretions improved, Aflutter with marginal bp, had called pharmacy at beginning of shift to transition all oral meds to ngt. Toprol was not able to, discussed with apn. Pt HR in 60's and marginal bp, held this am. Pedal pulses via doppler, NGT to LIS green output that transitioned to brown, 1 soft brown bm, abd large, bs present, noted small hernia, arnold with adequate urine output. Remains on bicarb drip. Isolation maintained.

## 2022-12-20 NOTE — PLAN OF CARE
Received from floor around 1800. Agitated, moving all extremities. On AVAPS, physician decided to intubated. NGT inserted. Restraints applied.  updated.

## 2022-12-21 LAB
ALBUMIN SERPL-MCNC: 1.7 G/DL (ref 3.4–5)
ALBUMIN/GLOB SERPL: 0.5 {RATIO} (ref 1–2)
ALP LIVER SERPL-CCNC: 59 U/L
ALT SERPL-CCNC: 8 U/L
ANION GAP SERPL CALC-SCNC: 6 MMOL/L (ref 0–18)
ARTERIAL PATENCY WRIST A: POSITIVE
AST SERPL-CCNC: 10 U/L (ref 15–37)
BASE EXCESS BLDA CALC-SCNC: 6 MMOL/L (ref ?–2)
BASOPHILS # BLD AUTO: 0.01 X10(3) UL (ref 0–0.2)
BASOPHILS NFR BLD AUTO: 0.2 %
BILIRUB SERPL-MCNC: 0.3 MG/DL (ref 0.1–2)
BODY TEMPERATURE: 98.6 F
BUN BLD-MCNC: 81 MG/DL (ref 7–18)
CALCIUM BLD-MCNC: 7.6 MG/DL (ref 8.5–10.1)
CHLORIDE SERPL-SCNC: 108 MMOL/L (ref 98–112)
CO2 SERPL-SCNC: 28 MMOL/L (ref 21–32)
COHGB MFR BLD: 0.9 % SAT (ref 0–3)
CREAT BLD-MCNC: 3.46 MG/DL
EOSINOPHIL # BLD AUTO: 0.1 X10(3) UL (ref 0–0.7)
EOSINOPHIL NFR BLD AUTO: 2.3 %
ERYTHROCYTE [DISTWIDTH] IN BLOOD BY AUTOMATED COUNT: 15.7 %
FIO2: 40 %
GFR SERPLBLD BASED ON 1.73 SQ M-ARVRAT: 13 ML/MIN/1.73M2 (ref 60–?)
GLOBULIN PLAS-MCNC: 3.6 G/DL (ref 2.8–4.4)
GLUCOSE BLD-MCNC: 139 MG/DL (ref 70–99)
GLUCOSE BLD-MCNC: 153 MG/DL (ref 70–99)
GLUCOSE BLD-MCNC: 162 MG/DL (ref 70–99)
GLUCOSE BLD-MCNC: 184 MG/DL (ref 70–99)
GLUCOSE BLD-MCNC: 185 MG/DL (ref 70–99)
HCO3 BLDA-SCNC: 29.6 MEQ/L (ref 21–27)
HCT VFR BLD AUTO: 23.7 %
HGB BLD-MCNC: 7.3 G/DL
HGB BLD-MCNC: 8 G/DL
IMM GRANULOCYTES # BLD AUTO: 0.03 X10(3) UL (ref 0–1)
IMM GRANULOCYTES NFR BLD: 0.7 %
LYMPHOCYTES # BLD AUTO: 0.73 X10(3) UL (ref 1–4)
LYMPHOCYTES NFR BLD AUTO: 17.1 %
MAGNESIUM SERPL-MCNC: 1.5 MG/DL (ref 1.6–2.6)
MCH RBC QN AUTO: 28.4 PG (ref 26–34)
MCHC RBC AUTO-ENTMCNC: 30.8 G/DL (ref 31–37)
MCV RBC AUTO: 92.2 FL
METHGB MFR BLD: 0.3 % SAT (ref 0.4–1.5)
MONOCYTES # BLD AUTO: 0.56 X10(3) UL (ref 0.1–1)
MONOCYTES NFR BLD AUTO: 13.1 %
NEUTROPHILS # BLD AUTO: 2.83 X10 (3) UL (ref 1.5–7.7)
NEUTROPHILS # BLD AUTO: 2.83 X10(3) UL (ref 1.5–7.7)
NEUTROPHILS NFR BLD AUTO: 66.6 %
OSMOLALITY SERPL CALC.SUM OF ELEC: 321 MOSM/KG (ref 275–295)
OXYHGB MFR BLDA: 97.2 % (ref 92–100)
P/F RATIO: 325 MMHG
PCO2 BLDA: 37 MM HG (ref 35–45)
PEEP: 5 CM H2O
PH BLDA: 7.51 [PH] (ref 7.35–7.45)
PHOSPHATE SERPL-MCNC: 3.1 MG/DL (ref 2.5–4.9)
PLATELET # BLD AUTO: 128 10(3)UL (ref 150–450)
PO2 BLDA: 130 MM HG (ref 80–100)
POTASSIUM SERPL-SCNC: 3.8 MMOL/L (ref 3.5–5.1)
PROT SERPL-MCNC: 5.3 G/DL (ref 6.4–8.2)
RBC # BLD AUTO: 2.57 X10(6)UL
SODIUM SERPL-SCNC: 142 MMOL/L (ref 136–145)
TIDAL VOLUME: 450 ML
VENT RATE: 16 /MIN
WBC # BLD AUTO: 4.3 X10(3) UL (ref 4–11)

## 2022-12-21 PROCEDURE — 3E033XZ INTRODUCTION OF VASOPRESSOR INTO PERIPHERAL VEIN, PERCUTANEOUS APPROACH: ICD-10-PCS | Performed by: INTERNAL MEDICINE

## 2022-12-21 PROCEDURE — 99231 SBSQ HOSP IP/OBS SF/LOW 25: CPT | Performed by: NURSE PRACTITIONER

## 2022-12-21 PROCEDURE — 99233 SBSQ HOSP IP/OBS HIGH 50: CPT | Performed by: HOSPITALIST

## 2022-12-21 PROCEDURE — 99291 CRITICAL CARE FIRST HOUR: CPT | Performed by: INTERNAL MEDICINE

## 2022-12-21 RX ORDER — SODIUM BICARBONATE 150 MEQ/1,000 ML IN DEXTROSE 5 % INTRAVENOUS
150 SOLUTION CONTINUOUS
Status: DISCONTINUED | OUTPATIENT
Start: 2022-12-21 | End: 2022-12-21

## 2022-12-21 RX ORDER — ALBUMIN (HUMAN) 12.5 G/50ML
25 SOLUTION INTRAVENOUS ONCE
Status: COMPLETED | OUTPATIENT
Start: 2022-12-21 | End: 2022-12-21

## 2022-12-21 RX ORDER — SODIUM BICARBONATE 325 MG/1
325 TABLET ORAL AS NEEDED
Status: DISCONTINUED | OUTPATIENT
Start: 2022-12-21 | End: 2023-01-04

## 2022-12-21 RX ORDER — SODIUM CHLORIDE 450 MG/100ML
INJECTION, SOLUTION INTRAVENOUS CONTINUOUS
Status: DISCONTINUED | OUTPATIENT
Start: 2022-12-21 | End: 2022-12-21

## 2022-12-21 RX ORDER — DEXTROSE AND SODIUM CHLORIDE 5; .45 G/100ML; G/100ML
INJECTION, SOLUTION INTRAVENOUS CONTINUOUS
Status: DISCONTINUED | OUTPATIENT
Start: 2022-12-21 | End: 2022-12-22

## 2022-12-21 NOTE — PLAN OF CARE
Received pt sedated on Propofol and Fentanyl, withdraws to pain, +cough and gag, SR, had episode of bradycardia HR 40's bp stable at that time, Propofol decreased. Remains NPO, NGT to LIS, green to brown output. Hgb sent last night hgb 7.1, now ordered q8h. Abd soft, no bm noted, arnold with adequate urine output. Bilateral LE dressings changed, site cleansed and xerofoam placed and wrapped in kerlex. HS care provided for , remains on isolation.

## 2022-12-21 NOTE — PROGRESS NOTES
Received pt around 0730 this morning intubated and sedated on propofol and fentanyl. Pt does not follow commands but responds to painful stimulus. Sedation holiday attempted, pt able to minimally follow commands while off sedation but became increasingly agitated and required increased propofol to calm down. SBT not attempted due to agitation. Hypothermic, placed back on ramon hugger. Tinsley draining cloudy yellow urine with sediment. NG to LIS, noted to have coffee ground output this morning that transitioned to bright red. GI consulted. Output now dark brown/green. Clinitron bed. See MAR and flowsheets for further data.

## 2022-12-21 NOTE — PROGRESS NOTES
Received pt this morning around 0730 intubated and sedated on propofol and fentanyl. Fentanyl gtt turned off this morning. Pt unable to tolerate Precedex due to bradycardia. Follows commands, withdraws from pain. SBT attempted today, pt failed due to hypoxia and tachycardia after deflating cuff. Tinsley draining cloudy yellow urine with sediment. Tube feeds started this afternoon, tolerating well. Bicarb gtt discontinued and fluids switched to D5.45 per orders. Hypotensive this evening, CCAPN notified. Albumin and Levophed ordered. See MAR and flowsheets for further data.

## 2022-12-22 LAB
ALBUMIN SERPL-MCNC: 2.1 G/DL (ref 3.4–5)
ALBUMIN/GLOB SERPL: 0.7 {RATIO} (ref 1–2)
ALP LIVER SERPL-CCNC: 72 U/L
ALT SERPL-CCNC: 8 U/L
ANION GAP SERPL CALC-SCNC: 4 MMOL/L (ref 0–18)
ARTERIAL PATENCY WRIST A: POSITIVE
AST SERPL-CCNC: 12 U/L (ref 15–37)
BASE EXCESS BLDA CALC-SCNC: 4.2 MMOL/L (ref ?–2)
BASE EXCESS BLDA CALC-SCNC: 4.8 MMOL/L (ref ?–2)
BASE EXCESS BLDA CALC-SCNC: 6.1 MMOL/L (ref ?–2)
BASOPHILS # BLD AUTO: 0.02 X10(3) UL (ref 0–0.2)
BASOPHILS NFR BLD AUTO: 0.4 %
BILIRUB SERPL-MCNC: 0.3 MG/DL (ref 0.1–2)
BODY TEMPERATURE: 98.6 F
BUN BLD-MCNC: 80 MG/DL (ref 7–18)
CA-I BLD-SCNC: 1.08 MMOL/L (ref 0.95–1.32)
CA-I BLD-SCNC: 1.09 MMOL/L (ref 0.95–1.32)
CA-I BLD-SCNC: 1.12 MMOL/L (ref 0.95–1.32)
CALCIUM BLD-MCNC: 7.3 MG/DL (ref 8.5–10.1)
CHLORIDE SERPL-SCNC: 107 MMOL/L (ref 98–112)
CO2 SERPL-SCNC: 31 MMOL/L (ref 21–32)
COHGB MFR BLD: 1.2 % SAT (ref 0–3)
COHGB MFR BLD: 1.2 % SAT (ref 0–3)
COHGB MFR BLD: 1.5 % SAT (ref 0–3)
CREAT BLD-MCNC: 3.13 MG/DL
EOSINOPHIL # BLD AUTO: 0.08 X10(3) UL (ref 0–0.7)
EOSINOPHIL NFR BLD AUTO: 1.5 %
ERYTHROCYTE [DISTWIDTH] IN BLOOD BY AUTOMATED COUNT: 15.9 %
EXPIRATORY PRESSURE: 5 CM H2O
EXPIRATORY PRESSURE: 5 CM H2O
FIO2: 40 %
FIO2: 45 %
FIO2: 50 %
GFR SERPLBLD BASED ON 1.73 SQ M-ARVRAT: 15 ML/MIN/1.73M2 (ref 60–?)
GLOBULIN PLAS-MCNC: 3.1 G/DL (ref 2.8–4.4)
GLUCOSE BLD-MCNC: 163 MG/DL (ref 70–99)
GLUCOSE BLD-MCNC: 163 MG/DL (ref 70–99)
GLUCOSE BLD-MCNC: 173 MG/DL (ref 70–99)
GLUCOSE BLD-MCNC: 175 MG/DL (ref 70–99)
GLUCOSE BLD-MCNC: 199 MG/DL (ref 70–99)
HCO3 BLDA-SCNC: 28.2 MEQ/L (ref 21–27)
HCO3 BLDA-SCNC: 28.7 MEQ/L (ref 21–27)
HCO3 BLDA-SCNC: 29.7 MEQ/L (ref 21–27)
HCT VFR BLD AUTO: 25 %
HGB BLD-MCNC: 7.5 G/DL
HGB BLD-MCNC: 7.5 G/DL
HGB BLD-MCNC: 7.7 G/DL
HGB BLD-MCNC: 7.8 G/DL
IMM GRANULOCYTES # BLD AUTO: 0.04 X10(3) UL (ref 0–1)
IMM GRANULOCYTES NFR BLD: 0.8 %
IPAP MAX: 30 CM H2O
IPAP MAX: 30 CM H2O
IPAP MIN: 10 CM H2O
IPAP MIN: 15 CM H2O
LACTATE BLD-SCNC: 0.5 MMOL/L (ref 0.5–2)
LACTATE BLD-SCNC: 0.5 MMOL/L (ref 0.5–2)
LACTATE BLD-SCNC: 0.8 MMOL/L (ref 0.5–2)
LYMPHOCYTES # BLD AUTO: 0.68 X10(3) UL (ref 1–4)
LYMPHOCYTES NFR BLD AUTO: 13.1 %
MAGNESIUM SERPL-MCNC: 1.4 MG/DL (ref 1.6–2.6)
MCH RBC QN AUTO: 28 PG (ref 26–34)
MCHC RBC AUTO-ENTMCNC: 30 G/DL (ref 31–37)
MCV RBC AUTO: 93.3 FL
METHGB MFR BLD: 0.8 % SAT (ref 0.4–1.5)
METHGB MFR BLD: 1.3 % SAT (ref 0.4–1.5)
METHGB MFR BLD: 1.8 % SAT (ref 0.4–1.5)
MONOCYTES # BLD AUTO: 0.71 X10(3) UL (ref 0.1–1)
MONOCYTES NFR BLD AUTO: 13.6 %
NEUTROPHILS # BLD AUTO: 3.68 X10 (3) UL (ref 1.5–7.7)
NEUTROPHILS # BLD AUTO: 3.68 X10(3) UL (ref 1.5–7.7)
NEUTROPHILS NFR BLD AUTO: 70.6 %
OSMOLALITY SERPL CALC.SUM OF ELEC: 322 MOSM/KG (ref 275–295)
OXYHGB MFR BLDA: 94.8 % (ref 92–100)
OXYHGB MFR BLDA: 95.4 % (ref 92–100)
OXYHGB MFR BLDA: 95.7 % (ref 92–100)
P/F RATIO: 213 MMHG
P/F RATIO: 272 MMHG
P/F RATIO: 283 MMHG
PCO2 BLDA: 51 MM HG (ref 35–45)
PCO2 BLDA: 55 MM HG (ref 35–45)
PCO2 BLDA: 58 MM HG (ref 35–45)
PEEP: 5 CM H2O
PH BLDA: 7.34 [PH] (ref 7.35–7.45)
PH BLDA: 7.35 [PH] (ref 7.35–7.45)
PH BLDA: 7.4 [PH] (ref 7.35–7.45)
PHOSPHATE SERPL-MCNC: 3.5 MG/DL (ref 2.5–4.9)
PLATELET # BLD AUTO: 146 10(3)UL (ref 150–450)
PO2 BLDA: 113 MM HG (ref 80–100)
PO2 BLDA: 136 MM HG (ref 80–100)
PO2 BLDA: 96 MM HG (ref 80–100)
POTASSIUM BLD-SCNC: 3.5 MMOL/L (ref 3.6–5.1)
POTASSIUM BLD-SCNC: 3.6 MMOL/L (ref 3.6–5.1)
POTASSIUM BLD-SCNC: 3.6 MMOL/L (ref 3.6–5.1)
POTASSIUM SERPL-SCNC: 3.7 MMOL/L (ref 3.5–5.1)
PRESSURE SUPPORT: 5 CM H2O
PROT SERPL-MCNC: 5.2 G/DL (ref 6.4–8.2)
RBC # BLD AUTO: 2.68 X10(6)UL
SODIUM BLD-SCNC: 136 MMOL/L (ref 135–145)
SODIUM BLD-SCNC: 137 MMOL/L (ref 135–145)
SODIUM BLD-SCNC: 138 MMOL/L (ref 135–145)
SODIUM SERPL-SCNC: 142 MMOL/L (ref 136–145)
TIDAL VOLUME: 450 ML
TIDAL VOLUME: 500 ML
VENT RATE: 16 /MIN
VENT RATE: 20 /MIN
WBC # BLD AUTO: 5.2 X10(3) UL (ref 4–11)

## 2022-12-22 PROCEDURE — 99233 SBSQ HOSP IP/OBS HIGH 50: CPT | Performed by: HOSPITALIST

## 2022-12-22 PROCEDURE — 99291 CRITICAL CARE FIRST HOUR: CPT | Performed by: INTERNAL MEDICINE

## 2022-12-22 PROCEDURE — 5A09357 ASSISTANCE WITH RESPIRATORY VENTILATION, LESS THAN 24 CONSECUTIVE HOURS, CONTINUOUS POSITIVE AIRWAY PRESSURE: ICD-10-PCS | Performed by: INTERNAL MEDICINE

## 2022-12-22 RX ORDER — HEPARIN SODIUM 5000 [USP'U]/ML
5000 INJECTION, SOLUTION INTRAVENOUS; SUBCUTANEOUS EVERY 8 HOURS SCHEDULED
Status: DISCONTINUED | OUTPATIENT
Start: 2022-12-22 | End: 2022-12-23 | Stop reason: ALTCHOICE

## 2022-12-22 RX ORDER — BUMETANIDE 0.25 MG/ML
2 INJECTION, SOLUTION INTRAMUSCULAR; INTRAVENOUS
Status: COMPLETED | OUTPATIENT
Start: 2022-12-22 | End: 2022-12-23

## 2022-12-22 NOTE — PLAN OF CARE
Received pt vented and sedated on propofol and on fentanyl pushes PRN. PT pressure very labile, difficult to maintain sbp greater than 90 or map greater than 60. Levophed titrated according to blood pressure requirements. Pt with  BLE wound and sacrum. Wound care was done, bilateral legs where clean with NS and covered with Vaseline gauzed and Kerlix. Sacrum wound was clean with NS and packed with Kerlix saturated with Dakins. Pt tolerated wound care fairly well. Tinsley catheter remains patent with moderate urine output. Pt on tube feed tolerating them well. Will continue to monitor and assess.      Problem: Diabetes/Glucose Control  Goal: Glucose maintained within prescribed range  Description: INTERVENTIONS:  - Monitor Blood Glucose as ordered  - Assess for signs and symptoms of hyperglycemia and hypoglycemia  - Administer ordered medications to maintain glucose within target range  - Assess barriers to adequate nutritional intake and initiate nutrition consult as needed  - Instruct patient on self management of diabetes  Outcome: Not Progressing     Problem: Patient/Family Goals  Goal: Patient/Family Long Term Goal  Description: Patient's Long Term Goal: To be healed and back to normal activity    Interventions:  -Pain management  -PT/OT  -Follow up with PCP as recommended   Outcome: Not Progressing  Goal: Patient/Family Short Term Goal  Description: Patients Short Term goal: to be able to eat, drink, go to the bathroom, change positions, sit in chair and work with therapy with pain controlled    Interventions:  -Pain management  -Activity as tolerated  -PT/OT  -Follow up with PCP as recommended    Outcome: Not Progressing     Problem: PAIN - ADULT  Goal: Verbalizes/displays adequate comfort level or patient's stated pain goal  Description: INTERVENTIONS:  - Encourage pt to monitor pain and request assistance  - Assess pain using appropriate pain scale  - Administer analgesics based on type and severity of pain and evaluate response  - Implement non-pharmacological measures as appropriate and evaluate response  - Consider cultural and social influences on pain and pain management  - Manage/alleviate anxiety  - Utilize distraction and/or relaxation techniques  - Monitor for opioid side effects  - Notify MD/LIP if interventions unsuccessful or patient reports new pain  - Anticipate increased pain with activity and pre-medicate as appropriate  Outcome: Progressing     Problem: SAFETY ADULT - FALL  Goal: Free from fall injury  Description: INTERVENTIONS:  - Assess pt frequently for physical needs  - Identify cognitive and physical deficits and behaviors that affect risk of falls.   - [place] fall precautions as indicated by assessment.  - Educate pt/family on patient safety including physical limitations  - Instruct pt to call for assistance with activity based on assessment  - Modify environment to reduce risk of injury  - Provide assistive devices as appropriate  - Consider OT/PT consult to assist with strengthening/mobility  - Encourage toileting schedule  Outcome: Not Progressing     Problem: DISCHARGE PLANNING  Goal: Discharge to home or other facility with appropriate resources  Description: INTERVENTIONS:  - Identify barriers to discharge w/pt and caregiver  - Include patient/family/discharge partner in discharge planning  - Arrange for needed discharge resources and transportation as appropriate  - Identify discharge learning needs (meds, wound care, etc)  - Arrange for interpreters to assist at discharge as needed  - Consider post-discharge preferences of patient/family/discharge partner  - Complete POLST form as appropriate  - Assess patient's ability to be responsible for managing their own health  - Refer to Case Management Department for coordinating discharge planning if the patient needs post-hospital services based on physician/LIP order or complex needs related to functional status, cognitive ability or social support system  Outcome: Not Progressing     Problem: Delirium  Goal: Minimize duration of delirium  Description: Interventions:  - Encourage use of hearing aids, eye glasses  - Promote highest level of mobility daily  - Provide frequent reorientation  - Promote wakefulness i.e. lights on, blinds open  - Promote sleep, encourage patient's normal rest cycle i.e. lights off, TV off, minimize noise and interruptions  - Encourage family to assist in orientation and promotion of home routines  Outcome: Not Progressing     Problem: Risk for Violence-Violent Restraints/Seclusion  Goal: Patient will not express any violent or self-destructive behaviors  Description: Interventions:  - Apply the least restrictive restraint to prevent harm if patient strikes out and is not responding to redirection  - Notify patient and family of reasons restraints applied  - Assist the patient to identify the precipitating event  - Assist the patient to identify alternatives to physically acting out  - Assess for any contributing factors to violent behaviors (substances,  medications, history of trauma)  - Assess the patient's physical comfort, circulation, skin condition, hydration, nutrition and elimination needs   - Assess for the need to continue restraints  - Evaluate the need for an emergency medication  Outcome: Not Progressing     Problem: Safety Risk - Non-Violent Restraints  Goal: Patient will remain free from self-harm  Description: INTERVENTIONS:  - Apply the least restrictive restraint to prevent harm  - Notify patient and family of reasons restraints applied  - Assess for any contributing factors to confusion (electrolyte disturbances, delirium, medications)  - Discontinue any unnecessary medical devices as soon as possible  - Assess the patient's physical comfort, circulation, skin condition, hydration, nutrition and elimination needs   - Reorient and redirection as needed  - Assess for the need to continue restraints  Outcome: Not Progressing     Problem: RESPIRATORY - ADULT  Goal: Achieves optimal ventilation and oxygenation  Description: INTERVENTIONS:  - Assess for changes in respiratory status  - Assess for changes in mentation and behavior  - Position to facilitate oxygenation and minimize respiratory effort  - Oxygen supplementation based on oxygen saturation or ABGs  - Provide Smoking Cessation handout, if applicable  - Encourage broncho-pulmonary hygiene including cough, deep breathe, Incentive Spirometry  - Assess the need for suctioning and perform as needed  - Assess and instruct to report SOB or any respiratory difficulty  - Respiratory Therapy support as indicated  - Manage/alleviate anxiety  - Monitor for signs/symptoms of CO2 retention  Outcome: Not Progressing     Problem: METABOLIC/FLUID AND ELECTROLYTES - ADULT  Goal: Glucose maintained within prescribed range  Description: INTERVENTIONS:  - Monitor Blood Glucose as ordered  - Assess for signs and symptoms of hyperglycemia and hypoglycemia  - Administer ordered medications to maintain glucose within target range  - Assess barriers to adequate nutritional intake and initiate nutrition consult as needed  - Instruct patient on self management of diabetes  Outcome: Not Progressing     Problem: SKIN/TISSUE INTEGRITY - ADULT  Goal: Skin integrity remains intact  Description: INTERVENTIONS  - Assess and document risk factors for pressure ulcer development  - Assess and document skin integrity  - Monitor for areas of redness and/or skin breakdown  - Initiate interventions, skin care algorithm/standards of care as needed  Outcome: Not Progressing

## 2022-12-23 LAB
ALBUMIN SERPL-MCNC: 2 G/DL (ref 3.4–5)
ALBUMIN/GLOB SERPL: 0.7 {RATIO} (ref 1–2)
ALP LIVER SERPL-CCNC: 62 U/L
ALT SERPL-CCNC: 10 U/L
ANION GAP SERPL CALC-SCNC: 3 MMOL/L (ref 0–18)
AST SERPL-CCNC: 9 U/L (ref 15–37)
BASOPHILS # BLD AUTO: 0.02 X10(3) UL (ref 0–0.2)
BASOPHILS NFR BLD AUTO: 0.4 %
BILIRUB SERPL-MCNC: 0.4 MG/DL (ref 0.1–2)
BUN BLD-MCNC: 71 MG/DL (ref 7–18)
C DIFF TOX B STL QL: POSITIVE
CALCIUM BLD-MCNC: 7.4 MG/DL (ref 8.5–10.1)
CHLORIDE SERPL-SCNC: 108 MMOL/L (ref 98–112)
CO2 SERPL-SCNC: 31 MMOL/L (ref 21–32)
CREAT BLD-MCNC: 2.83 MG/DL
EOSINOPHIL # BLD AUTO: 0.08 X10(3) UL (ref 0–0.7)
EOSINOPHIL NFR BLD AUTO: 1.5 %
ERYTHROCYTE [DISTWIDTH] IN BLOOD BY AUTOMATED COUNT: 15.9 %
GFR SERPLBLD BASED ON 1.73 SQ M-ARVRAT: 17 ML/MIN/1.73M2 (ref 60–?)
GLOBULIN PLAS-MCNC: 3 G/DL (ref 2.8–4.4)
GLUCOSE BLD-MCNC: 129 MG/DL (ref 70–99)
GLUCOSE BLD-MCNC: 132 MG/DL (ref 70–99)
GLUCOSE BLD-MCNC: 141 MG/DL (ref 70–99)
GLUCOSE BLD-MCNC: 143 MG/DL (ref 70–99)
GLUCOSE BLD-MCNC: 176 MG/DL (ref 70–99)
HCT VFR BLD AUTO: 24.6 %
HGB BLD-MCNC: 7.2 G/DL
IMM GRANULOCYTES # BLD AUTO: 0.04 X10(3) UL (ref 0–1)
IMM GRANULOCYTES NFR BLD: 0.8 %
LYMPHOCYTES # BLD AUTO: 0.62 X10(3) UL (ref 1–4)
LYMPHOCYTES NFR BLD AUTO: 11.9 %
MAGNESIUM SERPL-MCNC: 1.3 MG/DL (ref 1.6–2.6)
MCH RBC QN AUTO: 28.5 PG (ref 26–34)
MCHC RBC AUTO-ENTMCNC: 29.3 G/DL (ref 31–37)
MCV RBC AUTO: 97.2 FL
MONOCYTES # BLD AUTO: 0.61 X10(3) UL (ref 0.1–1)
MONOCYTES NFR BLD AUTO: 11.7 %
NEUTROPHILS # BLD AUTO: 3.84 X10 (3) UL (ref 1.5–7.7)
NEUTROPHILS # BLD AUTO: 3.84 X10(3) UL (ref 1.5–7.7)
NEUTROPHILS NFR BLD AUTO: 73.7 %
OSMOLALITY SERPL CALC.SUM OF ELEC: 317 MOSM/KG (ref 275–295)
PHOSPHATE SERPL-MCNC: 4.3 MG/DL (ref 2.5–4.9)
PLATELET # BLD AUTO: 137 10(3)UL (ref 150–450)
POTASSIUM SERPL-SCNC: 3.3 MMOL/L (ref 3.5–5.1)
PROT SERPL-MCNC: 5 G/DL (ref 6.4–8.2)
RBC # BLD AUTO: 2.53 X10(6)UL
SODIUM SERPL-SCNC: 142 MMOL/L (ref 136–145)
WBC # BLD AUTO: 5.2 X10(3) UL (ref 4–11)

## 2022-12-23 PROCEDURE — 99231 SBSQ HOSP IP/OBS SF/LOW 25: CPT | Performed by: STUDENT IN AN ORGANIZED HEALTH CARE EDUCATION/TRAINING PROGRAM

## 2022-12-23 PROCEDURE — 99291 CRITICAL CARE FIRST HOUR: CPT | Performed by: INTERNAL MEDICINE

## 2022-12-23 PROCEDURE — 99233 SBSQ HOSP IP/OBS HIGH 50: CPT | Performed by: HOSPITALIST

## 2022-12-23 RX ORDER — ALBUTEROL SULFATE 2.5 MG/3ML
2.5 SOLUTION RESPIRATORY (INHALATION) 3 TIMES DAILY
Status: DISCONTINUED | OUTPATIENT
Start: 2022-12-23 | End: 2022-12-31

## 2022-12-23 RX ORDER — GABAPENTIN 300 MG/1
300 CAPSULE ORAL 2 TIMES DAILY
Status: DISCONTINUED | OUTPATIENT
Start: 2022-12-24 | End: 2023-01-04

## 2022-12-23 RX ORDER — BUMETANIDE 0.25 MG/ML
2 INJECTION, SOLUTION INTRAMUSCULAR; INTRAVENOUS
Status: DISPENSED | OUTPATIENT
Start: 2022-12-23 | End: 2022-12-25

## 2022-12-23 RX ORDER — SODIUM CHLORIDE FOR INHALATION 3 %
3 VIAL, NEBULIZER (ML) INHALATION
Status: DISCONTINUED | OUTPATIENT
Start: 2022-12-23 | End: 2022-12-30

## 2022-12-23 RX ORDER — MAGNESIUM OXIDE 400 MG/1
800 TABLET ORAL ONCE
Status: COMPLETED | OUTPATIENT
Start: 2022-12-23 | End: 2022-12-23

## 2022-12-23 RX ORDER — POTASSIUM CHLORIDE 1.5 G/1.77G
40 POWDER, FOR SOLUTION ORAL ONCE
Status: COMPLETED | OUTPATIENT
Start: 2022-12-23 | End: 2022-12-23

## 2022-12-23 NOTE — PHYSICAL THERAPY NOTE
Physical Therapy    Attempted to see pt for skilled PT services this date. Pt is currently on AVAPS. Will re-attempt as schedule and medical stability allow.

## 2022-12-23 NOTE — PROGRESS NOTES
Received patient post report from AM RN at approx 1330. Patient presents with BM; liquid, yellow. Sent c-diff sample; +C-Diff (now contact plus isolation)    prosource given x1   Dietary increased TF now that propofol is off.    Vital HP at 55 ml/hr   ml q4  discontinued the prosource    Replace mag and k

## 2022-12-23 NOTE — PLAN OF CARE
Received pt AOX4 on AVAPS rate of 20, 40% later transition to rate of 22 and 35% during the night. Pt on tube feed tolerating it well, Tinsley catheter remains patent with excellent urine output. Pt with multiply loose stools making it very difficult to keep sacral wound clear;. Wound required to be clean and redresses several times during the night. Levo was required to turn back on in order to maintain a SBP > 90.      Problem: Diabetes/Glucose Control  Goal: Glucose maintained within prescribed range  Description: INTERVENTIONS:  - Monitor Blood Glucose as ordered  - Assess for signs and symptoms of hyperglycemia and hypoglycemia  - Administer ordered medications to maintain glucose within target range  - Assess barriers to adequate nutritional intake and initiate nutrition consult as needed  - Instruct patient on self management of diabetes  Outcome: Progressing     Problem: Patient/Family Goals  Goal: Patient/Family Long Term Goal  Description: Patient's Long Term Goal: To be healed and back to normal activity    Interventions:  -Pain management  -PT/OT  -Follow up with PCP as recommended   Outcome: Progressing  Goal: Patient/Family Short Term Goal  Description: Patients Short Term goal: to be able to eat, drink, go to the bathroom, change positions, sit in chair and work with therapy with pain controlled    Interventions:  -Pain management  -Activity as tolerated  -PT/OT  -Follow up with PCP as recommended    Outcome: Progressing     Problem: PAIN - ADULT  Goal: Verbalizes/displays adequate comfort level or patient's stated pain goal  Description: INTERVENTIONS:  - Encourage pt to monitor pain and request assistance  - Assess pain using appropriate pain scale  - Administer analgesics based on type and severity of pain and evaluate response  - Implement non-pharmacological measures as appropriate and evaluate response  - Consider cultural and social influences on pain and pain management  - Manage/alleviate anxiety  - Utilize distraction and/or relaxation techniques  - Monitor for opioid side effects  - Notify MD/LIP if interventions unsuccessful or patient reports new pain  - Anticipate increased pain with activity and pre-medicate as appropriate  Outcome: Progressing     Problem: SAFETY ADULT - FALL  Goal: Free from fall injury  Description: INTERVENTIONS:  - Assess pt frequently for physical needs  - Identify cognitive and physical deficits and behaviors that affect risk of falls.   - [place] fall precautions as indicated by assessment.  - Educate pt/family on patient safety including physical limitations  - Instruct pt to call for assistance with activity based on assessment  - Modify environment to reduce risk of injury  - Provide assistive devices as appropriate  - Consider OT/PT consult to assist with strengthening/mobility  - Encourage toileting schedule  Outcome: Progressing     Problem: DISCHARGE PLANNING  Goal: Discharge to home or other facility with appropriate resources  Description: INTERVENTIONS:  - Identify barriers to discharge w/pt and caregiver  - Include patient/family/discharge partner in discharge planning  - Arrange for needed discharge resources and transportation as appropriate  - Identify discharge learning needs (meds, wound care, etc)  - Arrange for interpreters to assist at discharge as needed  - Consider post-discharge preferences of patient/family/discharge partner  - Complete POLST form as appropriate  - Assess patient's ability to be responsible for managing their own health  - Refer to Case Management Department for coordinating discharge planning if the patient needs post-hospital services based on physician/LIP order or complex needs related to functional status, cognitive ability or social support system  Outcome: Not Progressing     Problem: Delirium  Goal: Minimize duration of delirium  Description: Interventions:  - Encourage use of hearing aids, eye glasses  - Promote highest level of mobility daily  - Provide frequent reorientation  - Promote wakefulness i.e. lights on, blinds open  - Promote sleep, encourage patient's normal rest cycle i.e. lights off, TV off, minimize noise and interruptions  - Encourage family to assist in orientation and promotion of home routines  Outcome: Not Progressing     Problem: Risk for Violence-Violent Restraints/Seclusion  Goal: Patient will not express any violent or self-destructive behaviors  Description: Interventions:  - Apply the least restrictive restraint to prevent harm if patient strikes out and is not responding to redirection  - Notify patient and family of reasons restraints applied  - Assist the patient to identify the precipitating event  - Assist the patient to identify alternatives to physically acting out  - Assess for any contributing factors to violent behaviors (substances,  medications, history of trauma)  - Assess the patient's physical comfort, circulation, skin condition, hydration, nutrition and elimination needs   - Assess for the need to continue restraints  - Evaluate the need for an emergency medication  Outcome: Progressing     Problem: Safety Risk - Non-Violent Restraints  Goal: Patient will remain free from self-harm  Description: INTERVENTIONS:  - Apply the least restrictive restraint to prevent harm  - Notify patient and family of reasons restraints applied  - Assess for any contributing factors to confusion (electrolyte disturbances, delirium, medications)  - Discontinue any unnecessary medical devices as soon as possible  - Assess the patient's physical comfort, circulation, skin condition, hydration, nutrition and elimination needs   - Reorient and redirection as needed  - Assess for the need to continue restraints  Outcome: Progressing     Problem: RESPIRATORY - ADULT  Goal: Achieves optimal ventilation and oxygenation  Description: INTERVENTIONS:  - Assess for changes in respiratory status  - Assess for changes in mentation and behavior  - Position to facilitate oxygenation and minimize respiratory effort  - Oxygen supplementation based on oxygen saturation or ABGs  - Provide Smoking Cessation handout, if applicable  - Encourage broncho-pulmonary hygiene including cough, deep breathe, Incentive Spirometry  - Assess the need for suctioning and perform as needed  - Assess and instruct to report SOB or any respiratory difficulty  - Respiratory Therapy support as indicated  - Manage/alleviate anxiety  - Monitor for signs/symptoms of CO2 retention  Outcome: Progressing     Problem: METABOLIC/FLUID AND ELECTROLYTES - ADULT  Goal: Glucose maintained within prescribed range  Description: INTERVENTIONS:  - Monitor Blood Glucose as ordered  - Assess for signs and symptoms of hyperglycemia and hypoglycemia  - Administer ordered medications to maintain glucose within target range  - Assess barriers to adequate nutritional intake and initiate nutrition consult as needed  - Instruct patient on self management of diabetes  Outcome: Progressing     Problem: SKIN/TISSUE INTEGRITY - ADULT  Goal: Skin integrity remains intact  Description: INTERVENTIONS  - Assess and document risk factors for pressure ulcer development  - Assess and document skin integrity  - Monitor for areas of redness and/or skin breakdown  - Initiate interventions, skin care algorithm/standards of care as needed  Outcome: Not Progressing

## 2022-12-23 NOTE — OCCUPATIONAL THERAPY NOTE
Attempted to see pt for skilled OT services this date. Pt is currently on AVAPS. Will re-attempt this afternoon per RN request and as schedule allows. Elnor Siemens, 12/23/22, 8:40 AM    Addendum: Second attempt, pt refusing therapy services this date. Will re-attempt tomorrow as pt is agreeable.

## 2022-12-24 LAB
ALBUMIN SERPL-MCNC: 2 G/DL (ref 3.4–5)
ALBUMIN/GLOB SERPL: 0.6 {RATIO} (ref 1–2)
ALP LIVER SERPL-CCNC: 68 U/L
ALT SERPL-CCNC: 9 U/L
ANION GAP SERPL CALC-SCNC: 3 MMOL/L (ref 0–18)
ANTIBODY SCREEN: NEGATIVE
AST SERPL-CCNC: 9 U/L (ref 15–37)
BASOPHILS # BLD AUTO: 0 X10(3) UL (ref 0–0.2)
BASOPHILS NFR BLD AUTO: 0 %
BILIRUB SERPL-MCNC: 0.4 MG/DL (ref 0.1–2)
BUN BLD-MCNC: 74 MG/DL (ref 7–18)
CALCIUM BLD-MCNC: 7.7 MG/DL (ref 8.5–10.1)
CHLORIDE SERPL-SCNC: 110 MMOL/L (ref 98–112)
CO2 SERPL-SCNC: 32 MMOL/L (ref 21–32)
CREAT BLD-MCNC: 2.58 MG/DL
EOSINOPHIL # BLD AUTO: 0.06 X10(3) UL (ref 0–0.7)
EOSINOPHIL NFR BLD AUTO: 1.3 %
ERYTHROCYTE [DISTWIDTH] IN BLOOD BY AUTOMATED COUNT: 16 %
GFR SERPLBLD BASED ON 1.73 SQ M-ARVRAT: 19 ML/MIN/1.73M2 (ref 60–?)
GLOBULIN PLAS-MCNC: 3.4 G/DL (ref 2.8–4.4)
GLUCOSE BLD-MCNC: 155 MG/DL (ref 70–99)
GLUCOSE BLD-MCNC: 183 MG/DL (ref 70–99)
GLUCOSE BLD-MCNC: 185 MG/DL (ref 70–99)
GLUCOSE BLD-MCNC: 215 MG/DL (ref 70–99)
HCT VFR BLD AUTO: 24.1 %
HGB BLD-MCNC: 6.9 G/DL
HGB BLD-MCNC: 7.8 G/DL
IMM GRANULOCYTES # BLD AUTO: 0.03 X10(3) UL (ref 0–1)
IMM GRANULOCYTES NFR BLD: 0.7 %
LYMPHOCYTES # BLD AUTO: 0.39 X10(3) UL (ref 1–4)
LYMPHOCYTES NFR BLD AUTO: 8.6 %
MAGNESIUM SERPL-MCNC: 1.3 MG/DL (ref 1.6–2.6)
MCH RBC QN AUTO: 28.4 PG (ref 26–34)
MCHC RBC AUTO-ENTMCNC: 28.6 G/DL (ref 31–37)
MCV RBC AUTO: 99.2 FL
MONOCYTES # BLD AUTO: 0.48 X10(3) UL (ref 0.1–1)
MONOCYTES NFR BLD AUTO: 10.6 %
NEUTROPHILS # BLD AUTO: 3.57 X10 (3) UL (ref 1.5–7.7)
NEUTROPHILS # BLD AUTO: 3.57 X10(3) UL (ref 1.5–7.7)
NEUTROPHILS NFR BLD AUTO: 78.8 %
OSMOLALITY SERPL CALC.SUM OF ELEC: 325 MOSM/KG (ref 275–295)
PLATELET # BLD AUTO: 148 10(3)UL (ref 150–450)
POTASSIUM SERPL-SCNC: 4.1 MMOL/L (ref 3.5–5.1)
POTASSIUM SERPL-SCNC: 4.1 MMOL/L (ref 3.5–5.1)
PROT SERPL-MCNC: 5.4 G/DL (ref 6.4–8.2)
RBC # BLD AUTO: 2.43 X10(6)UL
RH BLOOD TYPE: NEGATIVE
SODIUM SERPL-SCNC: 145 MMOL/L (ref 136–145)
WBC # BLD AUTO: 4.5 X10(3) UL (ref 4–11)

## 2022-12-24 PROCEDURE — 99291 CRITICAL CARE FIRST HOUR: CPT | Performed by: INTERNAL MEDICINE

## 2022-12-24 PROCEDURE — 30233N1 TRANSFUSION OF NONAUTOLOGOUS RED BLOOD CELLS INTO PERIPHERAL VEIN, PERCUTANEOUS APPROACH: ICD-10-PCS | Performed by: INTERNAL MEDICINE

## 2022-12-24 PROCEDURE — 99233 SBSQ HOSP IP/OBS HIGH 50: CPT | Performed by: HOSPITALIST

## 2022-12-24 RX ORDER — SODIUM CHLORIDE 9 MG/ML
INJECTION, SOLUTION INTRAVENOUS ONCE
Status: DISCONTINUED | OUTPATIENT
Start: 2022-12-24 | End: 2023-01-04

## 2022-12-24 NOTE — PROGRESS NOTES
Assumed care of patient at approx 0730 post report from night shift RN. Patient awake in bed upon initial assessment. Breathing unlabored on 5L HFNC . Patient to receive 1 unit of PRBC this AM d/t 6.9 HgB    Intensivist bedside; RN ordered bedside swallow-patient passed. Starting on clear liquids, to wean TF and advance diet as tolerated.      Transfer to floor

## 2022-12-24 NOTE — PLAN OF CARE
Patient transferred from ICU; patient alert and oriented; vital signs stable; cardiac monitor showing atrial flutter with rates  beats per minute; lung sounds diminished, weaned to 3 liters of oxygen via nasal cannula, productive cough with thick/tan secretions; arnold in placed; rectal tube in place; tube feeds at goal of 55 ml an hour with a water flush of 100 ml every 4 hours, patient given 1 ice chip and tolerated well; patient on a clinitron bed; dressings to BLE clean/dry/intact-dressings done this morning in ICU, not able to obtain dopplers of pedal pulses as patient's dressings are covering her feet up to the toes; dressing to sacrum clean/dry/intact-dressings changed this morning in ICU; patient did not want to fully turn for skin assessment but no further breakdown noted on skin myself and Isabel DE LEON (RN) were able to view, patient does have generalized bruising

## 2022-12-24 NOTE — PHYSICAL THERAPY NOTE
Attempted to see Pt this AM - RN aware of attempt. Pt reported diarrhea, and having to turn/reposition with nursing staff assist on Clintron bed. Writer offered AAROM BLE or BUE, or attempt EOB sitting with PT Aide assist - Pt continues to refuse to participate in session. Duplicate orders acknowledged. Pt now on high flow nasal cannula, and cleared for activity, but continues to refuse. Will continue to follow.

## 2022-12-24 NOTE — PROGRESS NOTES
Hgb 6.9  Type and screen with one unit PRBC to transfuse ordered.   RN to enter post transfusion order for CBC when needed    Amie Terry 3 NP  Marcella [phone]: [phone]  Pgr: [phone]

## 2022-12-24 NOTE — PLAN OF CARE
Problem: Diabetes/Glucose Control  Goal: Glucose maintained within prescribed range  Description: INTERVENTIONS:  - Monitor Blood Glucose as ordered  - Assess for signs and symptoms of hyperglycemia and hypoglycemia  - Administer ordered medications to maintain glucose within target range  - Assess barriers to adequate nutritional intake and initiate nutrition consult as needed  - Instruct patient on self management of diabetes  Outcome: Not Progressing     Problem: Patient/Family Goals  Goal: Patient/Family Long Term Goal  Description: Patient's Long Term Goal: To be healed and back to normal activity    Interventions:  -Pain management  -PT/OT  -Follow up with PCP as recommended   Outcome: Progressing  Goal: Patient/Family Short Term Goal  Description: Patients Short Term goal: to be able to eat, drink, go to the bathroom, change positions, sit in chair and work with therapy with pain controlled    Interventions:  -Pain management  -Activity as tolerated  -PT/OT  -Follow up with PCP as recommended    Outcome: Progressing     Problem: PAIN - ADULT  Goal: Verbalizes/displays adequate comfort level or patient's stated pain goal  Description: INTERVENTIONS:  - Encourage pt to monitor pain and request assistance  - Assess pain using appropriate pain scale  - Administer analgesics based on type and severity of pain and evaluate response  - Implement non-pharmacological measures as appropriate and evaluate response  - Consider cultural and social influences on pain and pain management  - Manage/alleviate anxiety  - Utilize distraction and/or relaxation techniques  - Monitor for opioid side effects  - Notify MD/LIP if interventions unsuccessful or patient reports new pain  - Anticipate increased pain with activity and pre-medicate as appropriate  Outcome: Progressing     Problem: SAFETY ADULT - FALL  Goal: Free from fall injury  Description: INTERVENTIONS:  - Assess pt frequently for physical needs  - Identify cognitive and physical deficits and behaviors that affect risk of falls.   - [place] fall precautions as indicated by assessment.  - Educate pt/family on patient safety including physical limitations  - Instruct pt to call for assistance with activity based on assessment  - Modify environment to reduce risk of injury  - Provide assistive devices as appropriate  - Consider OT/PT consult to assist with strengthening/mobility  - Encourage toileting schedule  Outcome: Progressing     Problem: DISCHARGE PLANNING  Goal: Discharge to home or other facility with appropriate resources  Description: INTERVENTIONS:  - Identify barriers to discharge w/pt and caregiver  - Include patient/family/discharge partner in discharge planning  - Arrange for needed discharge resources and transportation as appropriate  - Identify discharge learning needs (meds, wound care, etc)  - Arrange for interpreters to assist at discharge as needed  - Consider post-discharge preferences of patient/family/discharge partner  - Complete POLST form as appropriate  - Assess patient's ability to be responsible for managing their own health  - Refer to Case Management Department for coordinating discharge planning if the patient needs post-hospital services based on physician/LIP order or complex needs related to functional status, cognitive ability or social support system  Outcome: Progressing     Problem: Delirium  Goal: Minimize duration of delirium  Description: Interventions:  - Encourage use of hearing aids, eye glasses  - Promote highest level of mobility daily  - Provide frequent reorientation  - Promote wakefulness i.e. lights on, blinds open  - Promote sleep, encourage patient's normal rest cycle i.e. lights off, TV off, minimize noise and interruptions  - Encourage family to assist in orientation and promotion of home routines  Outcome: Not Progressing     Problem: Risk for Violence-Violent Restraints/Seclusion  Goal: Patient will not express any violent or self-destructive behaviors  Description: Interventions:  - Apply the least restrictive restraint to prevent harm if patient strikes out and is not responding to redirection  - Notify patient and family of reasons restraints applied  - Assist the patient to identify the precipitating event  - Assist the patient to identify alternatives to physically acting out  - Assess for any contributing factors to violent behaviors (substances,  medications, history of trauma)  - Assess the patient's physical comfort, circulation, skin condition, hydration, nutrition and elimination needs   - Assess for the need to continue restraints  - Evaluate the need for an emergency medication  Outcome: Progressing     Problem: Safety Risk - Non-Violent Restraints  Goal: Patient will remain free from self-harm  Description: INTERVENTIONS:  - Apply the least restrictive restraint to prevent harm  - Notify patient and family of reasons restraints applied  - Assess for any contributing factors to confusion (electrolyte disturbances, delirium, medications)  - Discontinue any unnecessary medical devices as soon as possible  - Assess the patient's physical comfort, circulation, skin condition, hydration, nutrition and elimination needs   - Reorient and redirection as needed  - Assess for the need to continue restraints  Outcome: Progressing     Problem: RESPIRATORY - ADULT  Goal: Achieves optimal ventilation and oxygenation  Description: INTERVENTIONS:  - Assess for changes in respiratory status  - Assess for changes in mentation and behavior  - Position to facilitate oxygenation and minimize respiratory effort  - Oxygen supplementation based on oxygen saturation or ABGs  - Provide Smoking Cessation handout, if applicable  - Encourage broncho-pulmonary hygiene including cough, deep breathe, Incentive Spirometry  - Assess the need for suctioning and perform as needed  - Assess and instruct to report SOB or any respiratory difficulty  - Respiratory Therapy support as indicated  - Manage/alleviate anxiety  - Monitor for signs/symptoms of CO2 retention  Outcome: Progressing     Problem: METABOLIC/FLUID AND ELECTROLYTES - ADULT  Goal: Glucose maintained within prescribed range  Description: INTERVENTIONS:  - Monitor Blood Glucose as ordered  - Assess for signs and symptoms of hyperglycemia and hypoglycemia  - Administer ordered medications to maintain glucose within target range  - Assess barriers to adequate nutritional intake and initiate nutrition consult as needed  - Instruct patient on self management of diabetes  Outcome: Not Progressing     Problem: SKIN/TISSUE INTEGRITY - ADULT  Goal: Skin integrity remains intact  Description: INTERVENTIONS  - Assess and document risk factors for pressure ulcer development  - Assess and document skin integrity  - Monitor for areas of redness and/or skin breakdown  - Initiate interventions, skin care algorithm/standards of care as needed  Outcome: Not Progressing

## 2022-12-25 LAB
ALBUMIN SERPL-MCNC: 1.9 G/DL (ref 3.4–5)
ALBUMIN/GLOB SERPL: 0.4 {RATIO} (ref 1–2)
ALP LIVER SERPL-CCNC: 75 U/L
ALT SERPL-CCNC: 10 U/L
ANION GAP SERPL CALC-SCNC: 6 MMOL/L (ref 0–18)
AST SERPL-CCNC: 13 U/L (ref 15–37)
BASOPHILS # BLD AUTO: 0.01 X10(3) UL (ref 0–0.2)
BASOPHILS NFR BLD AUTO: 0.2 %
BILIRUB SERPL-MCNC: 0.4 MG/DL (ref 0.1–2)
BLOOD TYPE BARCODE: 600
BUN BLD-MCNC: 65 MG/DL (ref 7–18)
CALCIUM BLD-MCNC: 8.4 MG/DL (ref 8.5–10.1)
CHLORIDE SERPL-SCNC: 107 MMOL/L (ref 98–112)
CO2 SERPL-SCNC: 31 MMOL/L (ref 21–32)
CREAT BLD-MCNC: 2.15 MG/DL
EOSINOPHIL # BLD AUTO: 0.06 X10(3) UL (ref 0–0.7)
EOSINOPHIL NFR BLD AUTO: 1.3 %
ERYTHROCYTE [DISTWIDTH] IN BLOOD BY AUTOMATED COUNT: 16.5 %
GFR SERPLBLD BASED ON 1.73 SQ M-ARVRAT: 23 ML/MIN/1.73M2 (ref 60–?)
GLOBULIN PLAS-MCNC: 4.3 G/DL (ref 2.8–4.4)
GLUCOSE BLD-MCNC: 172 MG/DL (ref 70–99)
GLUCOSE BLD-MCNC: 174 MG/DL (ref 70–99)
GLUCOSE BLD-MCNC: 175 MG/DL (ref 70–99)
GLUCOSE BLD-MCNC: 180 MG/DL (ref 70–99)
GLUCOSE BLD-MCNC: 184 MG/DL (ref 70–99)
GLUCOSE BLD-MCNC: 205 MG/DL (ref 70–99)
HCT VFR BLD AUTO: 25.1 %
HGB BLD-MCNC: 7.3 G/DL
IMM GRANULOCYTES # BLD AUTO: 0.03 X10(3) UL (ref 0–1)
IMM GRANULOCYTES NFR BLD: 0.7 %
LYMPHOCYTES # BLD AUTO: 0.42 X10(3) UL (ref 1–4)
LYMPHOCYTES NFR BLD AUTO: 9.3 %
MCH RBC QN AUTO: 28.4 PG (ref 26–34)
MCHC RBC AUTO-ENTMCNC: 29.1 G/DL (ref 31–37)
MCV RBC AUTO: 97.7 FL
MONOCYTES # BLD AUTO: 0.51 X10(3) UL (ref 0.1–1)
MONOCYTES NFR BLD AUTO: 11.3 %
NEUTROPHILS # BLD AUTO: 3.47 X10 (3) UL (ref 1.5–7.7)
NEUTROPHILS # BLD AUTO: 3.47 X10(3) UL (ref 1.5–7.7)
NEUTROPHILS NFR BLD AUTO: 77.2 %
OSMOLALITY SERPL CALC.SUM OF ELEC: 321 MOSM/KG (ref 275–295)
PLATELET # BLD AUTO: 153 10(3)UL (ref 150–450)
POTASSIUM SERPL-SCNC: 3.4 MMOL/L (ref 3.5–5.1)
PROT SERPL-MCNC: 6.2 G/DL (ref 6.4–8.2)
RBC # BLD AUTO: 2.57 X10(6)UL
SODIUM SERPL-SCNC: 144 MMOL/L (ref 136–145)
WBC # BLD AUTO: 4.5 X10(3) UL (ref 4–11)

## 2022-12-25 PROCEDURE — 99233 SBSQ HOSP IP/OBS HIGH 50: CPT | Performed by: INTERNAL MEDICINE

## 2022-12-25 PROCEDURE — 99232 SBSQ HOSP IP/OBS MODERATE 35: CPT | Performed by: HOSPITALIST

## 2022-12-25 RX ORDER — DILTIAZEM HYDROCHLORIDE 5 MG/ML
10 INJECTION INTRAVENOUS ONCE
Status: COMPLETED | OUTPATIENT
Start: 2022-12-25 | End: 2022-12-25

## 2022-12-25 RX ORDER — BUMETANIDE 0.25 MG/ML
2 INJECTION, SOLUTION INTRAMUSCULAR; INTRAVENOUS
Status: COMPLETED | OUTPATIENT
Start: 2022-12-25 | End: 2022-12-26

## 2022-12-25 RX ORDER — DILTIAZEM HYDROCHLORIDE 5 MG/ML
5 INJECTION INTRAVENOUS ONCE
Status: COMPLETED | OUTPATIENT
Start: 2022-12-25 | End: 2022-12-25

## 2022-12-25 NOTE — PLAN OF CARE
Pt A&Ox4. Denies pain at this time Isolation maintained for cdiff. Oral vanco given. A flutter on tele.  1x dose of IV Cardizem given. Lungs dim on 4 L NC. NG in place to L Nare. Pt passed swallow eval. Advanced diet. Pt did have some nausea. PRN Zofran given. Tinsley in place. Rectal tube in place. Cellulitis to bilat legs. Dressings changed today. PT with white phlegm with sputum. Call light within reach.      Problem: Diabetes/Glucose Control  Goal: Glucose maintained within prescribed range  Description: INTERVENTIONS:  - Monitor Blood Glucose as ordered  - Assess for signs and symptoms of hyperglycemia and hypoglycemia  - Administer ordered medications to maintain glucose within target range  - Assess barriers to adequate nutritional intake and initiate nutrition consult as needed  - Instruct patient on self management of diabetes  Outcome: Progressing     Problem: Patient/Family Goals  Goal: Patient/Family Long Term Goal  Description: Patient's Long Term Goal: To be healed and back to normal activity    Interventions:  -Pain management  -PT/OT  -Follow up with PCP as recommended   Outcome: Progressing  Goal: Patient/Family Short Term Goal  Description: Patients Short Term goal: to be able to eat, drink, go to the bathroom, change positions, sit in chair and work with therapy with pain controlled    Interventions:  -Pain management  -Activity as tolerated  -PT/OT  -Follow up with PCP as recommended    Outcome: Progressing     Problem: PAIN - ADULT  Goal: Verbalizes/displays adequate comfort level or patient's stated pain goal  Description: INTERVENTIONS:  - Encourage pt to monitor pain and request assistance  - Assess pain using appropriate pain scale  - Administer analgesics based on type and severity of pain and evaluate response  - Implement non-pharmacological measures as appropriate and evaluate response  - Consider cultural and social influences on pain and pain management  - Manage/alleviate anxiety  - Utilize distraction and/or relaxation techniques  - Monitor for opioid side effects  - Notify MD/LIP if interventions unsuccessful or patient reports new pain  - Anticipate increased pain with activity and pre-medicate as appropriate  Outcome: Progressing     Problem: SAFETY ADULT - FALL  Goal: Free from fall injury  Description: INTERVENTIONS:  - Assess pt frequently for physical needs  - Identify cognitive and physical deficits and behaviors that affect risk of falls.   - [place] fall precautions as indicated by assessment.  - Educate pt/family on patient safety including physical limitations  - Instruct pt to call for assistance with activity based on assessment  - Modify environment to reduce risk of injury  - Provide assistive devices as appropriate  - Consider OT/PT consult to assist with strengthening/mobility  - Encourage toileting schedule  Outcome: Progressing     Problem: DISCHARGE PLANNING  Goal: Discharge to home or other facility with appropriate resources  Description: INTERVENTIONS:  - Identify barriers to discharge w/pt and caregiver  - Include patient/family/discharge partner in discharge planning  - Arrange for needed discharge resources and transportation as appropriate  - Identify discharge learning needs (meds, wound care, etc)  - Arrange for interpreters to assist at discharge as needed  - Consider post-discharge preferences of patient/family/discharge partner  - Complete POLST form as appropriate  - Assess patient's ability to be responsible for managing their own health  - Refer to Case Management Department for coordinating discharge planning if the patient needs post-hospital services based on physician/LIP order or complex needs related to functional status, cognitive ability or social support system  Outcome: Progressing     Problem: Delirium  Goal: Minimize duration of delirium  Description: Interventions:  - Encourage use of hearing aids, eye glasses  - Promote highest level of mobility daily  - Provide frequent reorientation  - Promote wakefulness i.e. lights on, blinds open  - Promote sleep, encourage patient's normal rest cycle i.e. lights off, TV off, minimize noise and interruptions  - Encourage family to assist in orientation and promotion of home routines  Outcome: Progressing     Problem: Risk for Violence-Violent Restraints/Seclusion  Goal: Patient will not express any violent or self-destructive behaviors  Description: Interventions:  - Apply the least restrictive restraint to prevent harm if patient strikes out and is not responding to redirection  - Notify patient and family of reasons restraints applied  - Assist the patient to identify the precipitating event  - Assist the patient to identify alternatives to physically acting out  - Assess for any contributing factors to violent behaviors (substances,  medications, history of trauma)  - Assess the patient's physical comfort, circulation, skin condition, hydration, nutrition and elimination needs   - Assess for the need to continue restraints  - Evaluate the need for an emergency medication  Outcome: Progressing     Problem: RESPIRATORY - ADULT  Goal: Achieves optimal ventilation and oxygenation  Description: INTERVENTIONS:  - Assess for changes in respiratory status  - Assess for changes in mentation and behavior  - Position to facilitate oxygenation and minimize respiratory effort  - Oxygen supplementation based on oxygen saturation or ABGs  - Provide Smoking Cessation handout, if applicable  - Encourage broncho-pulmonary hygiene including cough, deep breathe, Incentive Spirometry  - Assess the need for suctioning and perform as needed  - Assess and instruct to report SOB or any respiratory difficulty  - Respiratory Therapy support as indicated  - Manage/alleviate anxiety  - Monitor for signs/symptoms of CO2 retention  Outcome: Progressing     Problem: METABOLIC/FLUID AND ELECTROLYTES - ADULT  Goal: Glucose maintained within prescribed range  Description: INTERVENTIONS:  - Monitor Blood Glucose as ordered  - Assess for signs and symptoms of hyperglycemia and hypoglycemia  - Administer ordered medications to maintain glucose within target range  - Assess barriers to adequate nutritional intake and initiate nutrition consult as needed  - Instruct patient on self management of diabetes  Outcome: Progressing     Problem: SKIN/TISSUE INTEGRITY - ADULT  Goal: Skin integrity remains intact  Description: INTERVENTIONS  - Assess and document risk factors for pressure ulcer development  - Assess and document skin integrity  - Monitor for areas of redness and/or skin breakdown  - Initiate interventions, skin care algorithm/standards of care as needed  Outcome: Progressing

## 2022-12-25 NOTE — PLAN OF CARE
Received patient at [address]. Patient A/O x 4. Aflutter on tele, HR as high as In the 120s. O2 saturation 98% on 4L NC. No C/O chest pain or SOB. Tinsley in place, new statlock applied. Tinsley care provided. Rectal tube in place a well. Draining liquid stool. NG to left nare, continious tube feeds running at goal rate of 55cc/hr. Tolerating well. meds crushed and given through NG tube. Minimal residual.  Bed is locked and in low position. Call light and personal items within reach.   Wound in sacrum changed this AM.

## 2022-12-26 ENCOUNTER — APPOINTMENT (OUTPATIENT)
Dept: GENERAL RADIOLOGY | Facility: HOSPITAL | Age: 77
DRG: 673 | End: 2022-12-26
Attending: HOSPITALIST
Payer: MEDICARE

## 2022-12-26 ENCOUNTER — APPOINTMENT (OUTPATIENT)
Dept: GENERAL RADIOLOGY | Facility: HOSPITAL | Age: 77
DRG: 673 | End: 2022-12-26
Attending: INTERNAL MEDICINE
Payer: MEDICARE

## 2022-12-26 LAB
ALBUMIN SERPL-MCNC: 2.1 G/DL (ref 3.4–5)
ALBUMIN/GLOB SERPL: 0.4 {RATIO} (ref 1–2)
ALP LIVER SERPL-CCNC: 136 U/L
ALT SERPL-CCNC: 18 U/L
ANION GAP SERPL CALC-SCNC: 4 MMOL/L (ref 0–18)
ANION GAP SERPL CALC-SCNC: 7 MMOL/L (ref 0–18)
ARTERIAL PATENCY WRIST A: POSITIVE
AST SERPL-CCNC: 32 U/L (ref 15–37)
BASE EXCESS BLDA CALC-SCNC: 5.9 MMOL/L (ref ?–2)
BASOPHILS # BLD AUTO: 0.02 X10(3) UL (ref 0–0.2)
BASOPHILS NFR BLD AUTO: 0.3 %
BILIRUB SERPL-MCNC: 1 MG/DL (ref 0.1–2)
BODY TEMPERATURE: 98.6 F
BUN BLD-MCNC: 61 MG/DL (ref 7–18)
BUN BLD-MCNC: 62 MG/DL (ref 7–18)
CA-I BLD-SCNC: 1.1 MMOL/L (ref 0.95–1.32)
CALCIUM BLD-MCNC: 8 MG/DL (ref 8.5–10.1)
CALCIUM BLD-MCNC: 8.7 MG/DL (ref 8.5–10.1)
CHLORIDE SERPL-SCNC: 103 MMOL/L (ref 98–112)
CHLORIDE SERPL-SCNC: 108 MMOL/L (ref 98–112)
CO2 SERPL-SCNC: 32 MMOL/L (ref 21–32)
CO2 SERPL-SCNC: 33 MMOL/L (ref 21–32)
COHGB MFR BLD: 2.2 % SAT (ref 0–3)
CREAT BLD-MCNC: 2.2 MG/DL
CREAT BLD-MCNC: 2.58 MG/DL
EOSINOPHIL # BLD AUTO: 0.02 X10(3) UL (ref 0–0.7)
EOSINOPHIL NFR BLD AUTO: 0.3 %
ERYTHROCYTE [DISTWIDTH] IN BLOOD BY AUTOMATED COUNT: 16.5 %
ERYTHROCYTE [DISTWIDTH] IN BLOOD BY AUTOMATED COUNT: 16.5 %
EXPIRATORY PRESSURE: 5 CM H2O
FIO2: 35 %
GFR SERPLBLD BASED ON 1.73 SQ M-ARVRAT: 19 ML/MIN/1.73M2 (ref 60–?)
GFR SERPLBLD BASED ON 1.73 SQ M-ARVRAT: 23 ML/MIN/1.73M2 (ref 60–?)
GLOBULIN PLAS-MCNC: 5.3 G/DL (ref 2.8–4.4)
GLUCOSE BLD-MCNC: 122 MG/DL (ref 70–99)
GLUCOSE BLD-MCNC: 132 MG/DL (ref 70–99)
GLUCOSE BLD-MCNC: 139 MG/DL (ref 70–99)
GLUCOSE BLD-MCNC: 160 MG/DL (ref 70–99)
GLUCOSE BLD-MCNC: 169 MG/DL (ref 70–99)
GLUCOSE BLD-MCNC: 175 MG/DL (ref 70–99)
GLUCOSE BLD-MCNC: 208 MG/DL (ref 70–99)
HCO3 BLDA-SCNC: 29.4 MEQ/L (ref 21–27)
HCT VFR BLD AUTO: 25.8 %
HCT VFR BLD AUTO: 28 %
HGB BLD-MCNC: 10.7 G/DL
HGB BLD-MCNC: 7.5 G/DL
HGB BLD-MCNC: 8.1 G/DL
IMM GRANULOCYTES # BLD AUTO: 0.04 X10(3) UL (ref 0–1)
IMM GRANULOCYTES NFR BLD: 0.6 %
IPAP MAX: 30 CM H2O
IPAP MIN: 15 CM H2O
LACTATE BLD-SCNC: 0.8 MMOL/L (ref 0.5–2)
LACTATE SERPL-SCNC: 1.2 MMOL/L (ref 0.4–2)
LYMPHOCYTES # BLD AUTO: 0.33 X10(3) UL (ref 1–4)
LYMPHOCYTES NFR BLD AUTO: 5.2 %
MCH RBC QN AUTO: 28.1 PG (ref 26–34)
MCH RBC QN AUTO: 28.4 PG (ref 26–34)
MCHC RBC AUTO-ENTMCNC: 28.9 G/DL (ref 31–37)
MCHC RBC AUTO-ENTMCNC: 29.1 G/DL (ref 31–37)
MCV RBC AUTO: 97.2 FL
MCV RBC AUTO: 97.7 FL
METHGB MFR BLD: 0.9 % SAT (ref 0.4–1.5)
MONOCYTES # BLD AUTO: 0.57 X10(3) UL (ref 0.1–1)
MONOCYTES NFR BLD AUTO: 9 %
NEUTROPHILS # BLD AUTO: 5.32 X10 (3) UL (ref 1.5–7.7)
NEUTROPHILS # BLD AUTO: 5.32 X10(3) UL (ref 1.5–7.7)
NEUTROPHILS NFR BLD AUTO: 84.6 %
OSMOLALITY SERPL CALC.SUM OF ELEC: 315 MOSM/KG (ref 275–295)
OSMOLALITY SERPL CALC.SUM OF ELEC: 319 MOSM/KG (ref 275–295)
OXYHGB MFR BLDA: 88.8 % (ref 92–100)
PCO2 BLDA: 56 MM HG (ref 35–45)
PEEP: 5 CM H2O
PH BLDA: 7.37 [PH] (ref 7.35–7.45)
PLATELET # BLD AUTO: 165 10(3)UL (ref 150–450)
PLATELET # BLD AUTO: 190 10(3)UL (ref 150–450)
PO2 BLDA: 62 MM HG (ref 80–100)
POTASSIUM BLD-SCNC: 4.1 MMOL/L (ref 3.6–5.1)
POTASSIUM SERPL-SCNC: 3.2 MMOL/L (ref 3.5–5.1)
POTASSIUM SERPL-SCNC: 3.8 MMOL/L (ref 3.5–5.1)
POTASSIUM SERPL-SCNC: 4 MMOL/L (ref 3.5–5.1)
PROT SERPL-MCNC: 7.4 G/DL (ref 6.4–8.2)
RBC # BLD AUTO: 2.64 X10(6)UL
RBC # BLD AUTO: 2.88 X10(6)UL
SODIUM BLD-SCNC: 138 MMOL/L (ref 135–145)
SODIUM SERPL-SCNC: 142 MMOL/L (ref 136–145)
SODIUM SERPL-SCNC: 145 MMOL/L (ref 136–145)
TIDAL VOLUME: 500 ML
VENT RATE: 22 /MIN
WBC # BLD AUTO: 4.1 X10(3) UL (ref 4–11)
WBC # BLD AUTO: 6.3 X10(3) UL (ref 4–11)

## 2022-12-26 PROCEDURE — 71045 X-RAY EXAM CHEST 1 VIEW: CPT | Performed by: INTERNAL MEDICINE

## 2022-12-26 PROCEDURE — 99233 SBSQ HOSP IP/OBS HIGH 50: CPT | Performed by: INTERNAL MEDICINE

## 2022-12-26 PROCEDURE — 71045 X-RAY EXAM CHEST 1 VIEW: CPT | Performed by: HOSPITALIST

## 2022-12-26 PROCEDURE — 5A09357 ASSISTANCE WITH RESPIRATORY VENTILATION, LESS THAN 24 CONSECUTIVE HOURS, CONTINUOUS POSITIVE AIRWAY PRESSURE: ICD-10-PCS | Performed by: INTERNAL MEDICINE

## 2022-12-26 PROCEDURE — 99232 SBSQ HOSP IP/OBS MODERATE 35: CPT | Performed by: HOSPITALIST

## 2022-12-26 PROCEDURE — 99291 CRITICAL CARE FIRST HOUR: CPT | Performed by: NURSE PRACTITIONER

## 2022-12-26 RX ORDER — POTASSIUM CHLORIDE 20 MEQ/1
40 TABLET, EXTENDED RELEASE ORAL ONCE
Status: COMPLETED | OUTPATIENT
Start: 2022-12-26 | End: 2022-12-26

## 2022-12-26 NOTE — PLAN OF CARE
Received pt at 0730. Alert and oriented. 5L O2 w stats maintaining >90%. Denies pain/SOB. Vitals stable. Afib/Aflutter on tele, converted to NSR this am. Cardizem gtt turned off this am. NG tube clamped, tolerating soft diet but poor appetite, encouraged feeds. Tinsley and flexiseal draining appropriately. Q2 turns. Please refer to flowsheets for further assessments. Plan for consults to see, IV bumex, PO vanco, monitor labs, wound care and dressing changes today, BIPAP at night. Plan of care updated with pt, questions answered, verbalized understanding. Safety and isolation precautions in place. Instructed to call staff for help. Will continue to monitor.     Problem: Diabetes/Glucose Control  Goal: Glucose maintained within prescribed range  Description: INTERVENTIONS:  - Monitor Blood Glucose as ordered  - Assess for signs and symptoms of hyperglycemia and hypoglycemia  - Administer ordered medications to maintain glucose within target range  - Assess barriers to adequate nutritional intake and initiate nutrition consult as needed  - Instruct patient on self management of diabetes  Outcome: Progressing     Problem: Patient/Family Goals  Goal: Patient/Family Long Term Goal  Description: Patient's Long Term Goal: To be healed and back to normal activity    Interventions:  -Pain management  -PT/OT  -Follow up with PCP as recommended   Outcome: Progressing  Goal: Patient/Family Short Term Goal  Description: Patients Short Term goal: to be able to eat, drink, go to the bathroom, change positions, sit in chair and work with therapy with pain controlled    Interventions:  -Pain management  -Activity as tolerated  -PT/OT  -Follow up with PCP as recommended    Outcome: Progressing     Problem: PAIN - ADULT  Goal: Verbalizes/displays adequate comfort level or patient's stated pain goal  Description: INTERVENTIONS:  - Encourage pt to monitor pain and request assistance  - Assess pain using appropriate pain scale  - Administer analgesics based on type and severity of pain and evaluate response  - Implement non-pharmacological measures as appropriate and evaluate response  - Consider cultural and social influences on pain and pain management  - Manage/alleviate anxiety  - Utilize distraction and/or relaxation techniques  - Monitor for opioid side effects  - Notify MD/LIP if interventions unsuccessful or patient reports new pain  - Anticipate increased pain with activity and pre-medicate as appropriate  Outcome: Progressing     Problem: SAFETY ADULT - FALL  Goal: Free from fall injury  Description: INTERVENTIONS:  - Assess pt frequently for physical needs  - Identify cognitive and physical deficits and behaviors that affect risk of falls.   - [place] fall precautions as indicated by assessment.  - Educate pt/family on patient safety including physical limitations  - Instruct pt to call for assistance with activity based on assessment  - Modify environment to reduce risk of injury  - Provide assistive devices as appropriate  - Consider OT/PT consult to assist with strengthening/mobility  - Encourage toileting schedule  Outcome: Progressing     Problem: DISCHARGE PLANNING  Goal: Discharge to home or other facility with appropriate resources  Description: INTERVENTIONS:  - Identify barriers to discharge w/pt and caregiver  - Include patient/family/discharge partner in discharge planning  - Arrange for needed discharge resources and transportation as appropriate  - Identify discharge learning needs (meds, wound care, etc)  - Arrange for interpreters to assist at discharge as needed  - Consider post-discharge preferences of patient/family/discharge partner  - Complete POLST form as appropriate  - Assess patient's ability to be responsible for managing their own health  - Refer to Case Management Department for coordinating discharge planning if the patient needs post-hospital services based on physician/LIP order or complex needs related to functional status, cognitive ability or social support system  Outcome: Progressing     Problem: Delirium  Goal: Minimize duration of delirium  Description: Interventions:  - Encourage use of hearing aids, eye glasses  - Promote highest level of mobility daily  - Provide frequent reorientation  - Promote wakefulness i.e. lights on, blinds open  - Promote sleep, encourage patient's normal rest cycle i.e. lights off, TV off, minimize noise and interruptions  - Encourage family to assist in orientation and promotion of home routines  Outcome: Progressing     Problem: Risk for Violence-Violent Restraints/Seclusion  Goal: Patient will not express any violent or self-destructive behaviors  Description: Interventions:  - Apply the least restrictive restraint to prevent harm if patient strikes out and is not responding to redirection  - Notify patient and family of reasons restraints applied  - Assist the patient to identify the precipitating event  - Assist the patient to identify alternatives to physically acting out  - Assess for any contributing factors to violent behaviors (substances,  medications, history of trauma)  - Assess the patient's physical comfort, circulation, skin condition, hydration, nutrition and elimination needs   - Assess for the need to continue restraints  - Evaluate the need for an emergency medication  Outcome: Progressing     Problem: Safety Risk - Non-Violent Restraints  Goal: Patient will remain free from self-harm  Description: INTERVENTIONS:  - Apply the least restrictive restraint to prevent harm  - Notify patient and family of reasons restraints applied  - Assess for any contributing factors to confusion (electrolyte disturbances, delirium, medications)  - Discontinue any unnecessary medical devices as soon as possible  - Assess the patient's physical comfort, circulation, skin condition, hydration, nutrition and elimination needs   - Reorient and redirection as needed  - Assess for the need to continue restraints  Outcome: Progressing     Problem: RESPIRATORY - ADULT  Goal: Achieves optimal ventilation and oxygenation  Description: INTERVENTIONS:  - Assess for changes in respiratory status  - Assess for changes in mentation and behavior  - Position to facilitate oxygenation and minimize respiratory effort  - Oxygen supplementation based on oxygen saturation or ABGs  - Provide Smoking Cessation handout, if applicable  - Encourage broncho-pulmonary hygiene including cough, deep breathe, Incentive Spirometry  - Assess the need for suctioning and perform as needed  - Assess and instruct to report SOB or any respiratory difficulty  - Respiratory Therapy support as indicated  - Manage/alleviate anxiety  - Monitor for signs/symptoms of CO2 retention  Outcome: Progressing     Problem: METABOLIC/FLUID AND ELECTROLYTES - ADULT  Goal: Glucose maintained within prescribed range  Description: INTERVENTIONS:  - Monitor Blood Glucose as ordered  - Assess for signs and symptoms of hyperglycemia and hypoglycemia  - Administer ordered medications to maintain glucose within target range  - Assess barriers to adequate nutritional intake and initiate nutrition consult as needed  - Instruct patient on self management of diabetes  Outcome: Progressing     Problem: SKIN/TISSUE INTEGRITY - ADULT  Goal: Skin integrity remains intact  Description: INTERVENTIONS  - Assess and document risk factors for pressure ulcer development  - Assess and document skin integrity  - Monitor for areas of redness and/or skin breakdown  - Initiate interventions, skin care algorithm/standards of care as needed  Outcome: Progressing

## 2022-12-26 NOTE — PLAN OF CARE
Pt is A&OX4. NGT secured L Nare clamped. 4L NC, bipap at night. Afib on tele, cardizem gtt turned down to 10mg/hr, HR 70-80s. Tinsley draining yellow urine. Flexiseal CDI. BLE dressings CDI. Total lift on clinitron bed. Tolerated few bites of dinner per day shift. Tolerated medications well whole in apple sauce. POC updated with patient, she verbalized understanding.         Problem: Diabetes/Glucose Control  Goal: Glucose maintained within prescribed range  Description: INTERVENTIONS:  - Monitor Blood Glucose as ordered  - Assess for signs and symptoms of hyperglycemia and hypoglycemia  - Administer ordered medications to maintain glucose within target range  - Assess barriers to adequate nutritional intake and initiate nutrition consult as needed  - Instruct patient on self management of diabetes  Outcome: Progressing     Problem: Patient/Family Goals  Goal: Patient/Family Long Term Goal  Description: Patient's Long Term Goal: To be healed and back to normal activity    Interventions:  -Pain management  -PT/OT  -Follow up with PCP as recommended   Outcome: Progressing  Goal: Patient/Family Short Term Goal  Description: Patients Short Term goal: to be able to eat, drink, go to the bathroom, change positions, sit in chair and work with therapy with pain controlled    Interventions:  -Pain management  -Activity as tolerated  -PT/OT  -Follow up with PCP as recommended    Outcome: Progressing     Problem: PAIN - ADULT  Goal: Verbalizes/displays adequate comfort level or patient's stated pain goal  Description: INTERVENTIONS:  - Encourage pt to monitor pain and request assistance  - Assess pain using appropriate pain scale  - Administer analgesics based on type and severity of pain and evaluate response  - Implement non-pharmacological measures as appropriate and evaluate response  - Consider cultural and social influences on pain and pain management  - Manage/alleviate anxiety  - Utilize distraction and/or relaxation techniques  - Monitor for opioid side effects  - Notify MD/LIP if interventions unsuccessful or patient reports new pain  - Anticipate increased pain with activity and pre-medicate as appropriate  Outcome: Progressing     Problem: SAFETY ADULT - FALL  Goal: Free from fall injury  Description: INTERVENTIONS:  - Assess pt frequently for physical needs  - Identify cognitive and physical deficits and behaviors that affect risk of falls.   - [place] fall precautions as indicated by assessment.  - Educate pt/family on patient safety including physical limitations  - Instruct pt to call for assistance with activity based on assessment  - Modify environment to reduce risk of injury  - Provide assistive devices as appropriate  - Consider OT/PT consult to assist with strengthening/mobility  - Encourage toileting schedule  Outcome: Progressing     Problem: DISCHARGE PLANNING  Goal: Discharge to home or other facility with appropriate resources  Description: INTERVENTIONS:  - Identify barriers to discharge w/pt and caregiver  - Include patient/family/discharge partner in discharge planning  - Arrange for needed discharge resources and transportation as appropriate  - Identify discharge learning needs (meds, wound care, etc)  - Arrange for interpreters to assist at discharge as needed  - Consider post-discharge preferences of patient/family/discharge partner  - Complete POLST form as appropriate  - Assess patient's ability to be responsible for managing their own health  - Refer to Case Management Department for coordinating discharge planning if the patient needs post-hospital services based on physician/LIP order or complex needs related to functional status, cognitive ability or social support system  Outcome: Progressing     Problem: Delirium  Goal: Minimize duration of delirium  Description: Interventions:  - Encourage use of hearing aids, eye glasses  - Promote highest level of mobility daily  - Provide frequent reorientation  - Promote wakefulness i.e. lights on, blinds open  - Promote sleep, encourage patient's normal rest cycle i.e. lights off, TV off, minimize noise and interruptions  - Encourage family to assist in orientation and promotion of home routines  Outcome: Progressing     Problem: Risk for Violence-Violent Restraints/Seclusion  Goal: Patient will not express any violent or self-destructive behaviors  Description: Interventions:  - Apply the least restrictive restraint to prevent harm if patient strikes out and is not responding to redirection  - Notify patient and family of reasons restraints applied  - Assist the patient to identify the precipitating event  - Assist the patient to identify alternatives to physically acting out  - Assess for any contributing factors to violent behaviors (substances,  medications, history of trauma)  - Assess the patient's physical comfort, circulation, skin condition, hydration, nutrition and elimination needs   - Assess for the need to continue restraints  - Evaluate the need for an emergency medication  Outcome: Progressing     Problem: Safety Risk - Non-Violent Restraints  Goal: Patient will remain free from self-harm  Description: INTERVENTIONS:  - Apply the least restrictive restraint to prevent harm  - Notify patient and family of reasons restraints applied  - Assess for any contributing factors to confusion (electrolyte disturbances, delirium, medications)  - Discontinue any unnecessary medical devices as soon as possible  - Assess the patient's physical comfort, circulation, skin condition, hydration, nutrition and elimination needs   - Reorient and redirection as needed  - Assess for the need to continue restraints  Outcome: Progressing     Problem: RESPIRATORY - ADULT  Goal: Achieves optimal ventilation and oxygenation  Description: INTERVENTIONS:  - Assess for changes in respiratory status  - Assess for changes in mentation and behavior  - Position to facilitate oxygenation and minimize respiratory effort  - Oxygen supplementation based on oxygen saturation or ABGs  - Provide Smoking Cessation handout, if applicable  - Encourage broncho-pulmonary hygiene including cough, deep breathe, Incentive Spirometry  - Assess the need for suctioning and perform as needed  - Assess and instruct to report SOB or any respiratory difficulty  - Respiratory Therapy support as indicated  - Manage/alleviate anxiety  - Monitor for signs/symptoms of CO2 retention  Outcome: Progressing     Problem: METABOLIC/FLUID AND ELECTROLYTES - ADULT  Goal: Glucose maintained within prescribed range  Description: INTERVENTIONS:  - Monitor Blood Glucose as ordered  - Assess for signs and symptoms of hyperglycemia and hypoglycemia  - Administer ordered medications to maintain glucose within target range  - Assess barriers to adequate nutritional intake and initiate nutrition consult as needed  - Instruct patient on self management of diabetes  Outcome: Progressing     Problem: SKIN/TISSUE INTEGRITY - ADULT  Goal: Skin integrity remains intact  Description: INTERVENTIONS  - Assess and document risk factors for pressure ulcer development  - Assess and document skin integrity  - Monitor for areas of redness and/or skin breakdown  - Initiate interventions, skin care algorithm/standards of care as needed  Outcome: Progressing

## 2022-12-27 ENCOUNTER — APPOINTMENT (OUTPATIENT)
Dept: CT IMAGING | Facility: HOSPITAL | Age: 77
DRG: 673 | End: 2022-12-27
Attending: INTERNAL MEDICINE
Payer: MEDICARE

## 2022-12-27 LAB
ALBUMIN SERPL-MCNC: 1.7 G/DL (ref 3.4–5)
ALBUMIN/GLOB SERPL: 0.4 {RATIO} (ref 1–2)
ALP LIVER SERPL-CCNC: 113 U/L
ALT SERPL-CCNC: 15 U/L
ANION GAP SERPL CALC-SCNC: 3 MMOL/L (ref 0–18)
AST SERPL-CCNC: 25 U/L (ref 15–37)
ATRIAL RATE: 137 BPM
ATRIAL RATE: 137 BPM
ATRIAL RATE: 139 BPM
BASOPHILS # BLD AUTO: 0.02 X10(3) UL (ref 0–0.2)
BASOPHILS NFR BLD AUTO: 0.6 %
BILIRUB SERPL-MCNC: 0.8 MG/DL (ref 0.1–2)
BILIRUB UR QL STRIP.AUTO: NEGATIVE
BUN BLD-MCNC: 65 MG/DL (ref 7–18)
CALCIUM BLD-MCNC: 7.9 MG/DL (ref 8.5–10.1)
CHLORIDE SERPL-SCNC: 107 MMOL/L (ref 98–112)
CO2 SERPL-SCNC: 32 MMOL/L (ref 21–32)
COLOR UR AUTO: YELLOW
CREAT BLD-MCNC: 2.49 MG/DL
EOSINOPHIL # BLD AUTO: 0.01 X10(3) UL (ref 0–0.7)
EOSINOPHIL NFR BLD AUTO: 0.3 %
ERYTHROCYTE [DISTWIDTH] IN BLOOD BY AUTOMATED COUNT: 16.5 %
GFR SERPLBLD BASED ON 1.73 SQ M-ARVRAT: 19 ML/MIN/1.73M2 (ref 60–?)
GLOBULIN PLAS-MCNC: 4.2 G/DL (ref 2.8–4.4)
GLUCOSE BLD-MCNC: 108 MG/DL (ref 70–99)
GLUCOSE BLD-MCNC: 131 MG/DL (ref 70–99)
GLUCOSE BLD-MCNC: 131 MG/DL (ref 70–99)
GLUCOSE BLD-MCNC: 146 MG/DL (ref 70–99)
GLUCOSE BLD-MCNC: 164 MG/DL (ref 70–99)
GLUCOSE UR STRIP.AUTO-MCNC: NEGATIVE MG/DL
HCT VFR BLD AUTO: 22.3 %
HGB BLD-MCNC: 6.5 G/DL
HGB BLD-MCNC: 8.6 G/DL
IMM GRANULOCYTES # BLD AUTO: 0.03 X10(3) UL (ref 0–1)
IMM GRANULOCYTES NFR BLD: 0.9 %
KETONES UR STRIP.AUTO-MCNC: NEGATIVE MG/DL
LYMPHOCYTES # BLD AUTO: 0.52 X10(3) UL (ref 1–4)
LYMPHOCYTES NFR BLD AUTO: 14.9 %
MCH RBC QN AUTO: 27.7 PG (ref 26–34)
MCHC RBC AUTO-ENTMCNC: 29.1 G/DL (ref 31–37)
MCV RBC AUTO: 94.9 FL
MONOCYTES # BLD AUTO: 0.55 X10(3) UL (ref 0.1–1)
MONOCYTES NFR BLD AUTO: 15.8 %
NEUTROPHILS # BLD AUTO: 2.35 X10 (3) UL (ref 1.5–7.7)
NEUTROPHILS # BLD AUTO: 2.35 X10(3) UL (ref 1.5–7.7)
NEUTROPHILS NFR BLD AUTO: 67.5 %
NITRITE UR QL STRIP.AUTO: NEGATIVE
OSMOLALITY SERPL CALC.SUM OF ELEC: 314 MOSM/KG (ref 275–295)
P-R INTERVAL: 140 MS
P-R INTERVAL: 144 MS
P-R INTERVAL: 152 MS
PH UR STRIP.AUTO: 5 [PH] (ref 5–8)
PLATELET # BLD AUTO: 162 10(3)UL (ref 150–450)
POTASSIUM SERPL-SCNC: 3.7 MMOL/L (ref 3.5–5.1)
PROT SERPL-MCNC: 5.9 G/DL (ref 6.4–8.2)
PROT UR STRIP.AUTO-MCNC: 100 MG/DL
Q-T INTERVAL: 232 MS
Q-T INTERVAL: 308 MS
Q-T INTERVAL: 316 MS
QRS DURATION: 76 MS
QRS DURATION: 78 MS
QRS DURATION: 80 MS
QTC CALCULATION (BEZET): 350 MS
QTC CALCULATION (BEZET): 468 MS
QTC CALCULATION (BEZET): 477 MS
R AXIS: 62 DEGREES
R AXIS: 68 DEGREES
R AXIS: 99 DEGREES
RBC # BLD AUTO: 2.35 X10(6)UL
SODIUM SERPL-SCNC: 142 MMOL/L (ref 136–145)
SP GR UR STRIP.AUTO: 1.01 (ref 1–1.03)
T AXIS: 205 DEGREES
T AXIS: 213 DEGREES
T AXIS: 250 DEGREES
UROBILINOGEN UR STRIP.AUTO-MCNC: <2 MG/DL
VENTRICULAR RATE: 137 BPM
VENTRICULAR RATE: 137 BPM
VENTRICULAR RATE: 139 BPM
WBC # BLD AUTO: 3.5 X10(3) UL (ref 4–11)
WBC #/AREA URNS AUTO: >50 /HPF
YEAST UR QL: PRESENT /HPF

## 2022-12-27 PROCEDURE — 99291 CRITICAL CARE FIRST HOUR: CPT | Performed by: INTERNAL MEDICINE

## 2022-12-27 PROCEDURE — 74176 CT ABD & PELVIS W/O CONTRAST: CPT | Performed by: INTERNAL MEDICINE

## 2022-12-27 PROCEDURE — 5A0935Z ASSISTANCE WITH RESPIRATORY VENTILATION, LESS THAN 24 CONSECUTIVE HOURS: ICD-10-PCS | Performed by: INTERNAL MEDICINE

## 2022-12-27 PROCEDURE — 99233 SBSQ HOSP IP/OBS HIGH 50: CPT | Performed by: HOSPITALIST

## 2022-12-27 RX ORDER — SODIUM CHLORIDE 9 MG/ML
INJECTION, SOLUTION INTRAVENOUS ONCE
Status: COMPLETED | OUTPATIENT
Start: 2022-12-27 | End: 2022-12-27

## 2022-12-27 RX ORDER — BUMETANIDE 0.25 MG/ML
2 INJECTION, SOLUTION INTRAMUSCULAR; INTRAVENOUS
Status: COMPLETED | OUTPATIENT
Start: 2022-12-27 | End: 2022-12-28

## 2022-12-27 RX ORDER — DILTIAZEM HYDROCHLORIDE 60 MG/1
60 TABLET, FILM COATED ORAL EVERY 6 HOURS SCHEDULED
Status: DISCONTINUED | OUTPATIENT
Start: 2022-12-27 | End: 2022-12-28

## 2022-12-27 RX ORDER — DILTIAZEM HYDROCHLORIDE 5 MG/ML
10 INJECTION INTRAVENOUS EVERY 2 HOUR PRN
Status: DISCONTINUED | OUTPATIENT
Start: 2022-12-27 | End: 2023-01-04

## 2022-12-27 NOTE — PLAN OF CARE
Assumed care of the pt at 2045 from [facility]. Pt on AVAPS 35%. Pt alert and oriented x4. Cardizem gtt turned off this AM. Cardiology notified. Pt HR in the 70s this AM. Wound dressings changed per protocol. HGB 6.5 this AM. Pt receiving 1u PRBCs. No signs of active bleeding. VSS/afebrile. Will continue to monitor. See MAR/flowsheets for additional information.

## 2022-12-27 NOTE — PROGRESS NOTES
Hospitalist Progress Note    Notified by RN regarding acutely worsening hypoxia, fever, and tachycardia. AVAPS placed by respiratory. Obtain ABG, CXR, CBC, CMP, lactic acid, blood cultures, UA, and EKG. Start empiric zosyn for now. Transfer to ICU.      Derek Schreiber,

## 2022-12-27 NOTE — PROGRESS NOTES
Notified by RN, pt's afib now rate controlled with rate in the 60s. Cardizem gtt discontinued at this time.

## 2022-12-27 NOTE — OCCUPATIONAL THERAPY NOTE
Pt remains on 40L Vapotherm at this time. Will re-attempt tomorrow as schedule allows.      Thank you for allowing me to care for this patient,   Karolyn Hamilton, OTR/L   Occupational Therapist   [facility]

## 2022-12-27 NOTE — OCCUPATIONAL THERAPY NOTE
Attempted to see pt for skilled OT services this date. Pt is currently on AVAPS but RN hopes to wean this date. Will re-attempt this afternoon as schedule allows.  Cordell Joseph, 12/27/22, 8:17 AM

## 2022-12-27 NOTE — PROGRESS NOTES
[facility] Pharmacy Note:  Renal Adjustment for piperacillin/tazobactam ([place])    Anabela Lizarraga is a 68year old patient who has been prescribed piperacillin/tazobactam (ZOSYN) 4500 mg every 8 hrs. The estimated creatinine clearance is 17.7 mL/min (A) (based on SCr of 2.49 mg/dL (H)). The dose has been adjusted to piperacillin/tazobactam (ZOSYN) 4500 mg every 12 hrs per hospital renal dose adjustment protocol for treatment of bacteremia and pneumonia. Pharmacy will follow and adjust dose as warranted for additional renal function changes.     Thank you,    Jose Srinivasan, PharmD  12/27/2022  11:45 AM

## 2022-12-27 NOTE — PROGRESS NOTES
Received pt from floor nurse. Pt was on the bipap tolerating well. Pt A&ox4 follows all commands. HR 130s-140s APRN notified and EKG done (sinus tachy). Cardizem gtt started. flexi and arnold remained in place. Care endorsed to [facility].

## 2022-12-28 LAB
ALBUMIN SERPL-MCNC: 1.7 G/DL (ref 3.4–5)
ALBUMIN/GLOB SERPL: 0.4 {RATIO} (ref 1–2)
ALP LIVER SERPL-CCNC: 213 U/L
ALT SERPL-CCNC: 46 U/L
ANION GAP SERPL CALC-SCNC: 6 MMOL/L (ref 0–18)
AST SERPL-CCNC: 89 U/L (ref 15–37)
BASOPHILS # BLD AUTO: 0.01 X10(3) UL (ref 0–0.2)
BASOPHILS NFR BLD AUTO: 0.2 %
BILIRUB SERPL-MCNC: 1 MG/DL (ref 0.1–2)
BLOOD TYPE BARCODE: 600
BUN BLD-MCNC: 72 MG/DL (ref 7–18)
CALCIUM BLD-MCNC: 8.2 MG/DL (ref 8.5–10.1)
CHLORIDE SERPL-SCNC: 104 MMOL/L (ref 98–112)
CO2 SERPL-SCNC: 30 MMOL/L (ref 21–32)
CREAT BLD-MCNC: 2.89 MG/DL
E CLOAC COMP DNA BLD POS NAA+NON-PROBE: DETECTED
EOSINOPHIL # BLD AUTO: 0.05 X10(3) UL (ref 0–0.7)
EOSINOPHIL NFR BLD AUTO: 0.8 %
ERYTHROCYTE [DISTWIDTH] IN BLOOD BY AUTOMATED COUNT: 16.9 %
GFR SERPLBLD BASED ON 1.73 SQ M-ARVRAT: 16 ML/MIN/1.73M2 (ref 60–?)
GLOBULIN PLAS-MCNC: 4.2 G/DL (ref 2.8–4.4)
GLUCOSE BLD-MCNC: 100 MG/DL (ref 70–99)
GLUCOSE BLD-MCNC: 101 MG/DL (ref 70–99)
GLUCOSE BLD-MCNC: 111 MG/DL (ref 70–99)
GLUCOSE BLD-MCNC: 119 MG/DL (ref 70–99)
GLUCOSE BLD-MCNC: 129 MG/DL (ref 70–99)
GLUCOSE BLD-MCNC: 138 MG/DL (ref 70–99)
GLUCOSE BLD-MCNC: 90 MG/DL (ref 70–99)
HCT VFR BLD AUTO: 24.2 %
HGB BLD-MCNC: 7.3 G/DL
IMM GRANULOCYTES # BLD AUTO: 0.04 X10(3) UL (ref 0–1)
IMM GRANULOCYTES NFR BLD: 0.7 %
LYMPHOCYTES # BLD AUTO: 0.8 X10(3) UL (ref 1–4)
LYMPHOCYTES NFR BLD AUTO: 13.5 %
MAGNESIUM SERPL-MCNC: 1.4 MG/DL (ref 1.6–2.6)
MCH RBC QN AUTO: 28.4 PG (ref 26–34)
MCHC RBC AUTO-ENTMCNC: 30.2 G/DL (ref 31–37)
MCV RBC AUTO: 94.2 FL
MONOCYTES # BLD AUTO: 0.68 X10(3) UL (ref 0.1–1)
MONOCYTES NFR BLD AUTO: 11.4 %
NEUTROPHILS # BLD AUTO: 4.36 X10 (3) UL (ref 1.5–7.7)
NEUTROPHILS # BLD AUTO: 4.36 X10(3) UL (ref 1.5–7.7)
NEUTROPHILS NFR BLD AUTO: 73.4 %
OSMOLALITY SERPL CALC.SUM OF ELEC: 311 MOSM/KG (ref 275–295)
PHOSPHATE SERPL-MCNC: 3.2 MG/DL (ref 2.5–4.9)
PLATELET # BLD AUTO: 149 10(3)UL (ref 150–450)
POTASSIUM SERPL-SCNC: 3.8 MMOL/L (ref 3.5–5.1)
PROT SERPL-MCNC: 5.9 G/DL (ref 6.4–8.2)
RBC # BLD AUTO: 2.57 X10(6)UL
SODIUM SERPL-SCNC: 140 MMOL/L (ref 136–145)
WBC # BLD AUTO: 5.9 X10(3) UL (ref 4–11)

## 2022-12-28 PROCEDURE — 99232 SBSQ HOSP IP/OBS MODERATE 35: CPT | Performed by: NURSE PRACTITIONER

## 2022-12-28 PROCEDURE — 99233 SBSQ HOSP IP/OBS HIGH 50: CPT | Performed by: INTERNAL MEDICINE

## 2022-12-28 PROCEDURE — 99233 SBSQ HOSP IP/OBS HIGH 50: CPT | Performed by: HOSPITALIST

## 2022-12-28 RX ORDER — ALBUMIN (HUMAN) 12.5 G/50ML
12.5 SOLUTION INTRAVENOUS EVERY 12 HOURS
Status: COMPLETED | OUTPATIENT
Start: 2022-12-28 | End: 2022-12-29

## 2022-12-28 RX ORDER — MAGNESIUM OXIDE 400 MG/1
800 TABLET ORAL ONCE
Status: COMPLETED | OUTPATIENT
Start: 2022-12-28 | End: 2022-12-28

## 2022-12-28 RX ORDER — BUMETANIDE 0.25 MG/ML
2 INJECTION, SOLUTION INTRAMUSCULAR; INTRAVENOUS
Status: COMPLETED | OUTPATIENT
Start: 2022-12-28 | End: 2022-12-29

## 2022-12-28 NOTE — PLAN OF CARE
Assumed care of patient this AM.  Patient resting in bed on AVAPS. Patient AO x 3, follows commands. Patient appears to be in A-Flutter on the monitor, HR in the 80's. 's, MAP > 65. Patient agreeable to coming off AVAPS Mask. Removed Mask and placed on baseline O2 requirements of 8L. Patient tolerating well, SPO2 > 93%. Patient able to intake water and dietary supplement. After awhile on NC, patient complained of increased SOB so placed back on AVAPS mask. This afternoon patient placed on Vapotherm, 40L/60%. Tolerating well. Patient able to eat lunch and dietary supplement. Patient did convert to A-fib with RVR, HR in 140's. Cardiology notified. Patient placed back on Cardizem gtt. Patient converted back to controlled A-Fib. Patient gived PO Cardizem this evening and Cardizem gtt held 1 hr post PO administration. Will continue to monitor patient.

## 2022-12-28 NOTE — PLAN OF CARE
Pt able to follow commands. Voices pain when moving but none when laying in bed. Bipap/avaps on at night. Taking sips of water. Afebrile. Tinsley in place. Flexa-seal removed in am. NG clamped. Replacing Mg.

## 2022-12-29 LAB
ALBUMIN SERPL-MCNC: 2 G/DL (ref 3.4–5)
ALBUMIN/GLOB SERPL: 0.5 {RATIO} (ref 1–2)
ALP LIVER SERPL-CCNC: 207 U/L
ALT SERPL-CCNC: 36 U/L
ANION GAP SERPL CALC-SCNC: 5 MMOL/L (ref 0–18)
AST SERPL-CCNC: 38 U/L (ref 15–37)
BASOPHILS # BLD AUTO: 0.02 X10(3) UL (ref 0–0.2)
BASOPHILS NFR BLD AUTO: 0.5 %
BILIRUB SERPL-MCNC: 0.9 MG/DL (ref 0.1–2)
BUN BLD-MCNC: 73 MG/DL (ref 7–18)
CALCIUM BLD-MCNC: 8.2 MG/DL (ref 8.5–10.1)
CHLORIDE SERPL-SCNC: 104 MMOL/L (ref 98–112)
CO2 SERPL-SCNC: 30 MMOL/L (ref 21–32)
CREAT BLD-MCNC: 3.32 MG/DL
EOSINOPHIL # BLD AUTO: 0.11 X10(3) UL (ref 0–0.7)
EOSINOPHIL NFR BLD AUTO: 2.5 %
ERYTHROCYTE [DISTWIDTH] IN BLOOD BY AUTOMATED COUNT: 16.4 %
GFR SERPLBLD BASED ON 1.73 SQ M-ARVRAT: 14 ML/MIN/1.73M2 (ref 60–?)
GLOBULIN PLAS-MCNC: 4.1 G/DL (ref 2.8–4.4)
GLUCOSE BLD-MCNC: 101 MG/DL (ref 70–99)
GLUCOSE BLD-MCNC: 120 MG/DL (ref 70–99)
GLUCOSE BLD-MCNC: 126 MG/DL (ref 70–99)
GLUCOSE BLD-MCNC: 135 MG/DL (ref 70–99)
GLUCOSE BLD-MCNC: 91 MG/DL (ref 70–99)
GLUCOSE BLD-MCNC: 97 MG/DL (ref 70–99)
HCT VFR BLD AUTO: 24 %
HGB BLD-MCNC: 7.1 G/DL
IMM GRANULOCYTES # BLD AUTO: 0.06 X10(3) UL (ref 0–1)
IMM GRANULOCYTES NFR BLD: 1.4 %
LYMPHOCYTES # BLD AUTO: 0.42 X10(3) UL (ref 1–4)
LYMPHOCYTES NFR BLD AUTO: 9.6 %
MAGNESIUM SERPL-MCNC: 1.2 MG/DL (ref 1.6–2.6)
MCH RBC QN AUTO: 28.1 PG (ref 26–34)
MCHC RBC AUTO-ENTMCNC: 29.6 G/DL (ref 31–37)
MCV RBC AUTO: 94.9 FL
MONOCYTES # BLD AUTO: 0.4 X10(3) UL (ref 0.1–1)
MONOCYTES NFR BLD AUTO: 9.1 %
NEUTROPHILS # BLD AUTO: 3.38 X10 (3) UL (ref 1.5–7.7)
NEUTROPHILS # BLD AUTO: 3.38 X10(3) UL (ref 1.5–7.7)
NEUTROPHILS NFR BLD AUTO: 76.9 %
OSMOLALITY SERPL CALC.SUM OF ELEC: 309 MOSM/KG (ref 275–295)
PHOSPHATE SERPL-MCNC: 3.6 MG/DL (ref 2.5–4.9)
PLATELET # BLD AUTO: 158 10(3)UL (ref 150–450)
POTASSIUM SERPL-SCNC: 3.8 MMOL/L (ref 3.5–5.1)
PROT SERPL-MCNC: 6.1 G/DL (ref 6.4–8.2)
RBC # BLD AUTO: 2.53 X10(6)UL
SODIUM SERPL-SCNC: 139 MMOL/L (ref 136–145)
WBC # BLD AUTO: 4.4 X10(3) UL (ref 4–11)

## 2022-12-29 PROCEDURE — 99232 SBSQ HOSP IP/OBS MODERATE 35: CPT | Performed by: NURSE PRACTITIONER

## 2022-12-29 PROCEDURE — 99233 SBSQ HOSP IP/OBS HIGH 50: CPT | Performed by: INTERNAL MEDICINE

## 2022-12-29 PROCEDURE — 99232 SBSQ HOSP IP/OBS MODERATE 35: CPT | Performed by: HOSPITALIST

## 2022-12-29 RX ORDER — LEVOFLOXACIN 5 MG/ML
500 INJECTION, SOLUTION INTRAVENOUS
Status: DISCONTINUED | OUTPATIENT
Start: 2022-12-31 | End: 2023-01-04

## 2022-12-29 RX ORDER — ALBUMIN (HUMAN) 12.5 G/50ML
12.5 SOLUTION INTRAVENOUS EVERY 12 HOURS
Status: COMPLETED | OUTPATIENT
Start: 2022-12-29 | End: 2022-12-29

## 2022-12-29 RX ORDER — LEVOFLOXACIN 5 MG/ML
750 INJECTION, SOLUTION INTRAVENOUS ONCE
Status: COMPLETED | OUTPATIENT
Start: 2022-12-29 | End: 2022-12-29

## 2022-12-29 RX ORDER — MAGNESIUM SULFATE HEPTAHYDRATE 40 MG/ML
2 INJECTION, SOLUTION INTRAVENOUS ONCE
Status: COMPLETED | OUTPATIENT
Start: 2022-12-29 | End: 2022-12-29

## 2022-12-29 RX ORDER — BUMETANIDE 1 MG/1
1 TABLET ORAL
Status: DISCONTINUED | OUTPATIENT
Start: 2022-12-29 | End: 2023-01-04

## 2022-12-29 NOTE — PROGRESS NOTES
12/29/22 0510   BiPAP   Device V60   BiPAP / CPAP CE# V26   BiPAP bacteria filter Yes   BiPAP Pre-use check ok? Yes   BIPAP plugged into main power? Yes   Mode AVAPS   Interface Full face mask   Mask Size Medium   Control Settings   Oxygen Percent 40 %   Inspiratory time 0.9   Insp rise time 3   AVAPS   Min IPAP 15   Max IPAP 30   EPAP Level 5   Set rate 22   Tidal volume 500   SIPAP   Resp 24   FiO2 (%) 40 %   BiPAP/CPAP Alarm Settings   Hi Rate 45   Low Rate 10   Hi VT 1200   Low    Hi Pressure 40   Low Pressure 5   Low Pressure Delay 20   Low MV 2   BiPAP/CPAP Monitored Parameters   Pulse 64   SpO2 99 %   PIP 17   Total Rate 24 breaths/min   Minute Volume 12   Tidal Volume 509   Total Leak 31   Trigger % 30   Ti/Ttot % 34   GAYLE Protocol Yes   Toleration Well   Received patient on AVAPS with the above settings resting comfortably. Patient to transition back to nasal cannula this AM. Vapotherm weaned off yesterday and is on standby at patient bedside. Nebs schedule TID with both Albuterol and 3% NaCl. No events overnight.

## 2022-12-29 NOTE — PLAN OF CARE
Assumed pt care at 0730. Pt in bed resting quietly on Bipap (avaps) at 60% FiO2. No s/s of acute distress noted at this time. VSS. Pt reports pain to low and mid back, medicated per MAR. A&OX2-3 today, but [place], and able to Fcs. Transitioned to HFNC today, 2L. POC continues. NGT in place, clamped. Call light in reach. Will continue to monitor. No

## 2022-12-29 NOTE — PLAN OF CARE
Pt alert and oriented times 3/4. Follows commands. Tinsley in place. NG clamped. Avaps at night 40%. Complains of pain but denies pain medicine. HEIDY bonilla.

## 2022-12-30 ENCOUNTER — APPOINTMENT (OUTPATIENT)
Dept: WOUND CARE | Facility: HOSPITAL | Age: 77
End: 2022-12-30
Attending: INTERNAL MEDICINE
Payer: MEDICARE

## 2022-12-30 LAB
ALBUMIN SERPL-MCNC: 2.1 G/DL (ref 3.4–5)
ALBUMIN/GLOB SERPL: 0.5 {RATIO} (ref 1–2)
ALP LIVER SERPL-CCNC: 277 U/L
ALT SERPL-CCNC: 27 U/L
ANION GAP SERPL CALC-SCNC: 6 MMOL/L (ref 0–18)
AST SERPL-CCNC: 26 U/L (ref 15–37)
BASOPHILS # BLD AUTO: 0.04 X10(3) UL (ref 0–0.2)
BASOPHILS NFR BLD AUTO: 0.5 %
BILIRUB SERPL-MCNC: 1.4 MG/DL (ref 0.1–2)
BUN BLD-MCNC: 72 MG/DL (ref 7–18)
CALCIUM BLD-MCNC: 8.6 MG/DL (ref 8.5–10.1)
CHLORIDE SERPL-SCNC: 103 MMOL/L (ref 98–112)
CO2 SERPL-SCNC: 29 MMOL/L (ref 21–32)
CREAT BLD-MCNC: 3.21 MG/DL
EOSINOPHIL # BLD AUTO: 0.03 X10(3) UL (ref 0–0.7)
EOSINOPHIL NFR BLD AUTO: 0.4 %
ERYTHROCYTE [DISTWIDTH] IN BLOOD BY AUTOMATED COUNT: 16.2 %
GFR SERPLBLD BASED ON 1.73 SQ M-ARVRAT: 14 ML/MIN/1.73M2 (ref 60–?)
GLOBULIN PLAS-MCNC: 4.1 G/DL (ref 2.8–4.4)
GLUCOSE BLD-MCNC: 116 MG/DL (ref 70–99)
GLUCOSE BLD-MCNC: 122 MG/DL (ref 70–99)
GLUCOSE BLD-MCNC: 164 MG/DL (ref 70–99)
GLUCOSE BLD-MCNC: 167 MG/DL (ref 70–99)
GLUCOSE BLD-MCNC: 93 MG/DL (ref 70–99)
HCT VFR BLD AUTO: 25.3 %
HGB BLD-MCNC: 7.7 G/DL
IMM GRANULOCYTES # BLD AUTO: 0.12 X10(3) UL (ref 0–1)
IMM GRANULOCYTES NFR BLD: 1.4 %
LYMPHOCYTES # BLD AUTO: 0.12 X10(3) UL (ref 1–4)
LYMPHOCYTES NFR BLD AUTO: 1.4 %
MAGNESIUM SERPL-MCNC: 1.6 MG/DL (ref 1.6–2.6)
MCH RBC QN AUTO: 28.4 PG (ref 26–34)
MCHC RBC AUTO-ENTMCNC: 30.4 G/DL (ref 31–37)
MCV RBC AUTO: 93.4 FL
MONOCYTES # BLD AUTO: 0.07 X10(3) UL (ref 0.1–1)
MONOCYTES NFR BLD AUTO: 0.8 %
NEUTROPHILS # BLD AUTO: 7.97 X10 (3) UL (ref 1.5–7.7)
NEUTROPHILS # BLD AUTO: 7.97 X10(3) UL (ref 1.5–7.7)
NEUTROPHILS NFR BLD AUTO: 95.5 %
OSMOLALITY SERPL CALC.SUM OF ELEC: 307 MOSM/KG (ref 275–295)
PHOSPHATE SERPL-MCNC: 2.8 MG/DL (ref 2.5–4.9)
PLATELET # BLD AUTO: 172 10(3)UL (ref 150–450)
POTASSIUM SERPL-SCNC: 3.6 MMOL/L (ref 3.5–5.1)
PROT SERPL-MCNC: 6.2 G/DL (ref 6.4–8.2)
RBC # BLD AUTO: 2.71 X10(6)UL
SODIUM SERPL-SCNC: 138 MMOL/L (ref 136–145)
WBC # BLD AUTO: 8.4 X10(3) UL (ref 4–11)

## 2022-12-30 PROCEDURE — 99233 SBSQ HOSP IP/OBS HIGH 50: CPT | Performed by: INTERNAL MEDICINE

## 2022-12-30 PROCEDURE — 99232 SBSQ HOSP IP/OBS MODERATE 35: CPT | Performed by: HOSPITALIST

## 2022-12-30 RX ORDER — VANCOMYCIN HYDROCHLORIDE 125 MG/1
125 CAPSULE ORAL EVERY 6 HOURS
Status: DISCONTINUED | OUTPATIENT
Start: 2022-12-30 | End: 2023-01-04

## 2022-12-30 RX ORDER — MORPHINE SULFATE 2 MG/ML
2 INJECTION, SOLUTION INTRAMUSCULAR; INTRAVENOUS ONCE
Status: COMPLETED | OUTPATIENT
Start: 2022-12-30 | End: 2022-12-30

## 2022-12-30 RX ORDER — FAMOTIDINE 10 MG/ML
20 INJECTION, SOLUTION INTRAVENOUS DAILY
Status: DISCONTINUED | OUTPATIENT
Start: 2022-12-30 | End: 2023-01-02

## 2022-12-30 RX ORDER — FAMOTIDINE 20 MG/1
20 TABLET, FILM COATED ORAL DAILY
Status: DISCONTINUED | OUTPATIENT
Start: 2022-12-30 | End: 2023-01-04

## 2022-12-30 RX ORDER — POTASSIUM CHLORIDE 20 MEQ/1
40 TABLET, EXTENDED RELEASE ORAL ONCE
Status: COMPLETED | OUTPATIENT
Start: 2022-12-30 | End: 2022-12-30

## 2022-12-30 RX ORDER — DILTIAZEM HYDROCHLORIDE 120 MG/1
120 CAPSULE, EXTENDED RELEASE ORAL DAILY
Status: DISCONTINUED | OUTPATIENT
Start: 2022-12-31 | End: 2023-01-04

## 2022-12-30 RX ORDER — DILTIAZEM HYDROCHLORIDE 5 MG/ML
10 INJECTION INTRAVENOUS ONCE
Status: COMPLETED | OUTPATIENT
Start: 2022-12-30 | End: 2022-12-30

## 2022-12-30 RX ORDER — MAGNESIUM SULFATE HEPTAHYDRATE 40 MG/ML
2 INJECTION, SOLUTION INTRAVENOUS ONCE
Status: COMPLETED | OUTPATIENT
Start: 2022-12-30 | End: 2022-12-30

## 2022-12-30 NOTE — PROGRESS NOTES
Patient remained on the bipap overnight. No RT changes made. Weaned FiO2 as tolerated.       12/30/22 0350   BiPAP   $ RT Standby Charge (per 15 min) 1   Device V60   BiPAP / CPAP CE# v26   Mode AVAPS   Interface Full face mask   Mask Size Medium   Control Settings   Oxygen Percent 40 %   Inspiratory time 0.9   Insp rise time 3   AVAPS   Min IPAP 15   Max IPAP 30   EPAP Level 5   Set rate 22   Tidal volume 500   BiPAP/CPAP Alarm Settings   Hi Rate 45   Low Rate 10   Hi VT 1200   Low    Hi Pressure 40   Low Pressure 5   Low Pressure Delay 20   Low MV 2   BiPAP/CPAP Monitored Parameters   PIP 17   Total Rate 24 breaths/min   Minute Volume 13   Tidal Volume 493   Total Leak 17   Trigger % 97   Ti/Ttot % 24   Toleration Well

## 2022-12-30 NOTE — PLAN OF CARE
Received pt from night shift nurse at 0700. Pt awake and alert, following commands, moving all extremities. Pt taken off biPAP and put on 4lpm NC satting well in the high 90s. She remains in controlled afib with no episodes of RVR or flutter. Lung sounds diminished bilaterally with no complaints of chest pain or shortness of breath. She has been afebrile, blood pressure controlled in the 120s/80s. Both BLE are wrapped so SCDs are not placed. She is on eliquis for VTE prophylaxis. Her urine output has been low, only 200mL during the shift. Savi Vivar was changed out this am at 0800. Pt had a BM overnight but not during my shift yet. She was up to the edge of the bed with OT but had a lot of trouble holding herself up. Pt is a total assist and cannot turn on her own. Blood sugars have been stable all day. Pt has a decreased appetite and only ate a small amount of food with her supplement drink. Pt has transfer orders to the tele floor. Pt updated on POC and verbalized understanding.

## 2022-12-30 NOTE — PLAN OF CARE
Pt alert and oriented times 4. Follows commands. Occurrence of Afib RVR into the 130's early this morning. Per APN gave IV Cardizem. Pt denied any chest pain, dizziness, or pain. Converted back to NSR. Pt looked anxious, stating she wanted the mask off. Gave pt a break with HFNC 5 L. Sating in high 80's told pt we had to switch her back to mask. Gave Prn morphine. Pt refused bath twice.

## 2022-12-30 NOTE — PROGRESS NOTES
[facility] Pharmacy Note:  Renal Dose Adjustment    Lexy Mcdowell has been prescribed famotidine (PEPCID) 20 mg  intravenously/orally every 12 hours. Estimated Creatinine Clearance: 13.7 mL/min (A) (based on SCr of 3.21 mg/dL (H)). Calculated creatinine clearance is < 50 ml/min, therefore the dose of famotidine (Pepcid) has been changed to 20 mg intravenously/orally daily per P&T approved protocol. Pharmacy will continue to follow, and if renal function improves, will resume the original order.     Thank you,  Gopal Huerta, PharmD  12/30/2022 6:44 AM

## 2022-12-30 NOTE — PLAN OF CARE
Assumed pt care at 0730. Pt in bed resting quietly on 2L/HFNC. No s/ s of acute distress noted at this time. VSS. Pt denies any pain at this time. Removed arnold and NGT today. Placed purewick for voiding, adequate uop after arnold removal. Pt had 2 large Bms today, soft and brown. Transfer orders placed, waiting for bed to open. POC continues. Call light in reach. Will monitor.

## 2022-12-31 LAB
ANION GAP SERPL CALC-SCNC: 6 MMOL/L (ref 0–18)
BASOPHILS # BLD AUTO: 0.03 X10(3) UL (ref 0–0.2)
BASOPHILS NFR BLD AUTO: 0.6 %
BUN BLD-MCNC: 79 MG/DL (ref 7–18)
CALCIUM BLD-MCNC: 9.1 MG/DL (ref 8.5–10.1)
CHLORIDE SERPL-SCNC: 103 MMOL/L (ref 98–112)
CO2 SERPL-SCNC: 30 MMOL/L (ref 21–32)
CREAT BLD-MCNC: 3.48 MG/DL
EOSINOPHIL # BLD AUTO: 0.19 X10(3) UL (ref 0–0.7)
EOSINOPHIL NFR BLD AUTO: 3.5 %
ERYTHROCYTE [DISTWIDTH] IN BLOOD BY AUTOMATED COUNT: 16 %
GFR SERPLBLD BASED ON 1.73 SQ M-ARVRAT: 13 ML/MIN/1.73M2 (ref 60–?)
GLUCOSE BLD-MCNC: 109 MG/DL (ref 70–99)
GLUCOSE BLD-MCNC: 130 MG/DL (ref 70–99)
GLUCOSE BLD-MCNC: 131 MG/DL (ref 70–99)
GLUCOSE BLD-MCNC: 159 MG/DL (ref 70–99)
GLUCOSE BLD-MCNC: 162 MG/DL (ref 70–99)
HCT VFR BLD AUTO: 25.2 %
HGB BLD-MCNC: 7.4 G/DL
IMM GRANULOCYTES # BLD AUTO: 0.2 X10(3) UL (ref 0–1)
IMM GRANULOCYTES NFR BLD: 3.7 %
LYMPHOCYTES # BLD AUTO: 0.85 X10(3) UL (ref 1–4)
LYMPHOCYTES NFR BLD AUTO: 15.7 %
MAGNESIUM SERPL-MCNC: 2.1 MG/DL (ref 1.6–2.6)
MCH RBC QN AUTO: 28.4 PG (ref 26–34)
MCHC RBC AUTO-ENTMCNC: 29.4 G/DL (ref 31–37)
MCV RBC AUTO: 96.6 FL
MONOCYTES # BLD AUTO: 0.37 X10(3) UL (ref 0.1–1)
MONOCYTES NFR BLD AUTO: 6.9 %
NEUTROPHILS # BLD AUTO: 3.76 X10 (3) UL (ref 1.5–7.7)
NEUTROPHILS # BLD AUTO: 3.76 X10(3) UL (ref 1.5–7.7)
NEUTROPHILS NFR BLD AUTO: 69.6 %
OSMOLALITY SERPL CALC.SUM OF ELEC: 312 MOSM/KG (ref 275–295)
PLATELET # BLD AUTO: 170 10(3)UL (ref 150–450)
POTASSIUM SERPL-SCNC: 4 MMOL/L (ref 3.5–5.1)
RBC # BLD AUTO: 2.61 X10(6)UL
SODIUM SERPL-SCNC: 139 MMOL/L (ref 136–145)
WBC # BLD AUTO: 5.4 X10(3) UL (ref 4–11)

## 2022-12-31 PROCEDURE — 99233 SBSQ HOSP IP/OBS HIGH 50: CPT | Performed by: INTERNAL MEDICINE

## 2022-12-31 PROCEDURE — 99232 SBSQ HOSP IP/OBS MODERATE 35: CPT | Performed by: HOSPITALIST

## 2022-12-31 RX ORDER — ALBUTEROL SULFATE 2.5 MG/3ML
2.5 SOLUTION RESPIRATORY (INHALATION) EVERY 4 HOURS PRN
Status: DISCONTINUED | OUTPATIENT
Start: 2022-12-31 | End: 2023-01-04

## 2022-12-31 NOTE — PROGRESS NOTES
A+Ox4  Bipap in place  Dressing to sacrum changed  BM x 2, loose, +cdiff  Purewick in place  No c/o pain or s/s of distress  Will continue to monitor.

## 2023-01-01 LAB
ANION GAP SERPL CALC-SCNC: 5 MMOL/L (ref 0–18)
BASOPHILS # BLD AUTO: 0.03 X10(3) UL (ref 0–0.2)
BASOPHILS NFR BLD AUTO: 0.5 %
BUN BLD-MCNC: 74 MG/DL (ref 7–18)
CALCIUM BLD-MCNC: 9.1 MG/DL (ref 8.5–10.1)
CHLORIDE SERPL-SCNC: 105 MMOL/L (ref 98–112)
CO2 SERPL-SCNC: 30 MMOL/L (ref 21–32)
CREAT BLD-MCNC: 3.22 MG/DL
EOSINOPHIL # BLD AUTO: 0.16 X10(3) UL (ref 0–0.7)
EOSINOPHIL NFR BLD AUTO: 2.6 %
ERYTHROCYTE [DISTWIDTH] IN BLOOD BY AUTOMATED COUNT: 15.7 %
GFR SERPLBLD BASED ON 1.73 SQ M-ARVRAT: 14 ML/MIN/1.73M2 (ref 60–?)
GLUCOSE BLD-MCNC: 121 MG/DL (ref 70–99)
GLUCOSE BLD-MCNC: 126 MG/DL (ref 70–99)
GLUCOSE BLD-MCNC: 137 MG/DL (ref 70–99)
GLUCOSE BLD-MCNC: 167 MG/DL (ref 70–99)
GLUCOSE BLD-MCNC: 180 MG/DL (ref 70–99)
HCT VFR BLD AUTO: 26.8 %
HGB BLD-MCNC: 8.2 G/DL
IMM GRANULOCYTES # BLD AUTO: 0.3 X10(3) UL (ref 0–1)
IMM GRANULOCYTES NFR BLD: 4.8 %
LYMPHOCYTES # BLD AUTO: 0.52 X10(3) UL (ref 1–4)
LYMPHOCYTES NFR BLD AUTO: 8.4 %
MAGNESIUM SERPL-MCNC: 1.8 MG/DL (ref 1.6–2.6)
MCH RBC QN AUTO: 28.7 PG (ref 26–34)
MCHC RBC AUTO-ENTMCNC: 30.6 G/DL (ref 31–37)
MCV RBC AUTO: 93.7 FL
MONOCYTES # BLD AUTO: 0.38 X10(3) UL (ref 0.1–1)
MONOCYTES NFR BLD AUTO: 6.1 %
NEUTROPHILS # BLD AUTO: 4.8 X10 (3) UL (ref 1.5–7.7)
NEUTROPHILS # BLD AUTO: 4.8 X10(3) UL (ref 1.5–7.7)
NEUTROPHILS NFR BLD AUTO: 77.6 %
OSMOLALITY SERPL CALC.SUM OF ELEC: 313 MOSM/KG (ref 275–295)
PLATELET # BLD AUTO: 199 10(3)UL (ref 150–450)
POTASSIUM SERPL-SCNC: 4.4 MMOL/L (ref 3.5–5.1)
RBC # BLD AUTO: 2.86 X10(6)UL
SODIUM SERPL-SCNC: 140 MMOL/L (ref 136–145)
WBC # BLD AUTO: 6.2 X10(3) UL (ref 4–11)

## 2023-01-01 PROCEDURE — 99232 SBSQ HOSP IP/OBS MODERATE 35: CPT | Performed by: INTERNAL MEDICINE

## 2023-01-01 PROCEDURE — 99233 SBSQ HOSP IP/OBS HIGH 50: CPT | Performed by: INTERNAL MEDICINE

## 2023-01-01 NOTE — PROGRESS NOTES
A+Ox4  Utilizing Delona Pih in place  Dressings intact  No c/o pain or s/s of distress  Will continue to monitor.

## 2023-01-01 NOTE — PLAN OF CARE
Assumed patient care at 7:30. A/Ox4. 4 L nc, Bipap @ night. Contact plus Isolation for cdiff and mrsa. Carb 1800 diet. QID accucheck. Purewick in place. All safety precautions are in place and will continue with plan of care.       Problem: Diabetes/Glucose Control  Goal: Glucose maintained within prescribed range  Description: INTERVENTIONS:  - Monitor Blood Glucose as ordered  - Assess for signs and symptoms of hyperglycemia and hypoglycemia  - Administer ordered medications to maintain glucose within target range  - Assess barriers to adequate nutritional intake and initiate nutrition consult as needed  - Instruct patient on self management of diabetes  Outcome: Progressing     Problem: Patient/Family Goals  Goal: Patient/Family Long Term Goal  Description: Patient's Long Term Goal: To be healed and back to normal activity    Interventions:  -Pain management  -PT/OT  -Follow up with PCP as recommended   Outcome: Progressing  Goal: Patient/Family Short Term Goal  Description: Patients Short Term goal: to be able to eat, drink, go to the bathroom, change positions, sit in chair and work with therapy with pain controlled    Interventions:  -Pain management  -Activity as tolerated  -PT/OT  -Follow up with PCP as recommended    Outcome: Progressing     Problem: PAIN - ADULT  Goal: Verbalizes/displays adequate comfort level or patient's stated pain goal  Description: INTERVENTIONS:  - Encourage pt to monitor pain and request assistance  - Assess pain using appropriate pain scale  - Administer analgesics based on type and severity of pain and evaluate response  - Implement non-pharmacological measures as appropriate and evaluate response  - Consider cultural and social influences on pain and pain management  - Manage/alleviate anxiety  - Utilize distraction and/or relaxation techniques  - Monitor for opioid side effects  - Notify MD/LIP if interventions unsuccessful or patient reports new pain  - Anticipate increased pain with activity and pre-medicate as appropriate  Outcome: Progressing

## 2023-01-01 NOTE — PLAN OF CARE
Assumed patient care at 7:30am.  Alert and oriented x4. Able to tell staff when she needs to be changed. Unable to walk or turn herself in bed. Most ADLs require assistance. Currently on 3L, wheened down from 4L per Pulm. On Tele showing NSR. All safety precautions in place. Will continue to monitor patient.          Problem: Diabetes/Glucose Control  Goal: Glucose maintained within prescribed range  Description: INTERVENTIONS:  - Monitor Blood Glucose as ordered  - Assess for signs and symptoms of hyperglycemia and hypoglycemia  - Administer ordered medications to maintain glucose within target range  - Assess barriers to adequate nutritional intake and initiate nutrition consult as needed  - Instruct patient on self management of diabetes  Outcome: Progressing     Problem: RESPIRATORY - ADULT  Goal: Achieves optimal ventilation and oxygenation  Description: INTERVENTIONS:  - Assess for changes in respiratory status  - Assess for changes in mentation and behavior  - Position to facilitate oxygenation and minimize respiratory effort  - Oxygen supplementation based on oxygen saturation or ABGs  - Provide Smoking Cessation handout, if applicable  - Encourage broncho-pulmonary hygiene including cough, deep breathe, Incentive Spirometry  - Assess the need for suctioning and perform as needed  - Assess and instruct to report SOB or any respiratory difficulty  - Respiratory Therapy support as indicated  - Manage/alleviate anxiety  - Monitor for signs/symptoms of CO2 retention  Outcome: Progressing     Problem: SKIN/TISSUE INTEGRITY - ADULT  Goal: Skin integrity remains intact  Description: INTERVENTIONS  - Assess and document risk factors for pressure ulcer development  - Assess and document skin integrity  - Monitor for areas of redness and/or skin breakdown  - Initiate interventions, skin care algorithm/standards of care as needed  Outcome: Progressing

## 2023-01-02 LAB
ANION GAP SERPL CALC-SCNC: 0 MMOL/L (ref 0–18)
BASOPHILS # BLD AUTO: 0.04 X10(3) UL (ref 0–0.2)
BASOPHILS NFR BLD AUTO: 0.9 %
BUN BLD-MCNC: 68 MG/DL (ref 7–18)
CALCIUM BLD-MCNC: 8.8 MG/DL (ref 8.5–10.1)
CHLORIDE SERPL-SCNC: 107 MMOL/L (ref 98–112)
CO2 SERPL-SCNC: 33 MMOL/L (ref 21–32)
CREAT BLD-MCNC: 2.87 MG/DL
EOSINOPHIL # BLD AUTO: 0.12 X10(3) UL (ref 0–0.7)
EOSINOPHIL NFR BLD AUTO: 2.6 %
ERYTHROCYTE [DISTWIDTH] IN BLOOD BY AUTOMATED COUNT: 15.7 %
GFR SERPLBLD BASED ON 1.73 SQ M-ARVRAT: 16 ML/MIN/1.73M2 (ref 60–?)
GLUCOSE BLD-MCNC: 100 MG/DL (ref 70–99)
GLUCOSE BLD-MCNC: 124 MG/DL (ref 70–99)
GLUCOSE BLD-MCNC: 138 MG/DL (ref 70–99)
GLUCOSE BLD-MCNC: 171 MG/DL (ref 70–99)
GLUCOSE BLD-MCNC: 173 MG/DL (ref 70–99)
HCT VFR BLD AUTO: 28.3 %
HGB BLD-MCNC: 8.3 G/DL
IMM GRANULOCYTES # BLD AUTO: 0.19 X10(3) UL (ref 0–1)
IMM GRANULOCYTES NFR BLD: 4.2 %
LYMPHOCYTES # BLD AUTO: 0.75 X10(3) UL (ref 1–4)
LYMPHOCYTES NFR BLD AUTO: 16.5 %
MAGNESIUM SERPL-MCNC: 1.8 MG/DL (ref 1.6–2.6)
MCH RBC QN AUTO: 27.9 PG (ref 26–34)
MCHC RBC AUTO-ENTMCNC: 29.3 G/DL (ref 31–37)
MCV RBC AUTO: 95.3 FL
MONOCYTES # BLD AUTO: 0.35 X10(3) UL (ref 0.1–1)
MONOCYTES NFR BLD AUTO: 7.7 %
NEUTROPHILS # BLD AUTO: 3.09 X10 (3) UL (ref 1.5–7.7)
NEUTROPHILS # BLD AUTO: 3.09 X10(3) UL (ref 1.5–7.7)
NEUTROPHILS NFR BLD AUTO: 68.1 %
OSMOLALITY SERPL CALC.SUM OF ELEC: 311 MOSM/KG (ref 275–295)
PLATELET # BLD AUTO: 189 10(3)UL (ref 150–450)
POTASSIUM SERPL-SCNC: 4.2 MMOL/L (ref 3.5–5.1)
RBC # BLD AUTO: 2.97 X10(6)UL
SODIUM SERPL-SCNC: 140 MMOL/L (ref 136–145)
WBC # BLD AUTO: 4.5 X10(3) UL (ref 4–11)

## 2023-01-02 PROCEDURE — 99232 SBSQ HOSP IP/OBS MODERATE 35: CPT | Performed by: INTERNAL MEDICINE

## 2023-01-02 PROCEDURE — 99233 SBSQ HOSP IP/OBS HIGH 50: CPT | Performed by: INTERNAL MEDICINE

## 2023-01-02 NOTE — PLAN OF CARE
Assumed care of patient at 0730  A&Ox4  3L NC  Denies pain or discomfort  Contact Plus Isolation for C.  Diff  Cardiac diet  Afib on tele    Clinitron bed    BLE dressing changes and wound care completed  Dressing changed to sacrum - mepilex in place    Safety precautions in place  All needs met at this time      Problem: Diabetes/Glucose Control  Goal: Glucose maintained within prescribed range  Description: INTERVENTIONS:  - Monitor Blood Glucose as ordered  - Assess for signs and symptoms of hyperglycemia and hypoglycemia  - Administer ordered medications to maintain glucose within target range  - Assess barriers to adequate nutritional intake and initiate nutrition consult as needed  - Instruct patient on self management of diabetes  Outcome: Progressing     Problem: Patient/Family Goals  Goal: Patient/Family Long Term Goal  Description: Patient's Long Term Goal: To be healed and back to normal activity    Interventions:  -Pain management  -PT/OT  -Follow up with PCP as recommended   Outcome: Progressing  Goal: Patient/Family Short Term Goal  Description: Patients Short Term goal: to be able to eat, drink, go to the bathroom, change positions, sit in chair and work with therapy with pain controlled    Interventions:  -Pain management  -Activity as tolerated  -PT/OT  -Follow up with PCP as recommended    Outcome: Progressing     Problem: PAIN - ADULT  Goal: Verbalizes/displays adequate comfort level or patient's stated pain goal  Description: INTERVENTIONS:  - Encourage pt to monitor pain and request assistance  - Assess pain using appropriate pain scale  - Administer analgesics based on type and severity of pain and evaluate response  - Implement non-pharmacological measures as appropriate and evaluate response  - Consider cultural and social influences on pain and pain management  - Manage/alleviate anxiety  - Utilize distraction and/or relaxation techniques  - Monitor for opioid side effects  - Notify MD/LIP if interventions unsuccessful or patient reports new pain  - Anticipate increased pain with activity and pre-medicate as appropriate  Outcome: Progressing     Problem: SAFETY ADULT - FALL  Goal: Free from fall injury  Description: INTERVENTIONS:  - Assess pt frequently for physical needs  - Identify cognitive and physical deficits and behaviors that affect risk of falls.   - [place] fall precautions as indicated by assessment.  - Educate pt/family on patient safety including physical limitations  - Instruct pt to call for assistance with activity based on assessment  - Modify environment to reduce risk of injury  - Provide assistive devices as appropriate  - Consider OT/PT consult to assist with strengthening/mobility  - Encourage toileting schedule  Outcome: Progressing     Problem: DISCHARGE PLANNING  Goal: Discharge to home or other facility with appropriate resources  Description: INTERVENTIONS:  - Identify barriers to discharge w/pt and caregiver  - Include patient/family/discharge partner in discharge planning  - Arrange for needed discharge resources and transportation as appropriate  - Identify discharge learning needs (meds, wound care, etc)  - Arrange for interpreters to assist at discharge as needed  - Consider post-discharge preferences of patient/family/discharge partner  - Complete POLST form as appropriate  - Assess patient's ability to be responsible for managing their own health  - Refer to Case Management Department for coordinating discharge planning if the patient needs post-hospital services based on physician/LIP order or complex needs related to functional status, cognitive ability or social support system  Outcome: Progressing     Problem: Delirium  Goal: Minimize duration of delirium  Description: Interventions:  - Encourage use of hearing aids, eye glasses  - Promote highest level of mobility daily  - Provide frequent reorientation  - Promote wakefulness i.e. lights on, blinds open  - Promote sleep, encourage patient's normal rest cycle i.e. lights off, TV off, minimize noise and interruptions  - Encourage family to assist in orientation and promotion of home routines  Outcome: Progressing     Problem: RESPIRATORY - ADULT  Goal: Achieves optimal ventilation and oxygenation  Description: INTERVENTIONS:  - Assess for changes in respiratory status  - Assess for changes in mentation and behavior  - Position to facilitate oxygenation and minimize respiratory effort  - Oxygen supplementation based on oxygen saturation or ABGs  - Provide Smoking Cessation handout, if applicable  - Encourage broncho-pulmonary hygiene including cough, deep breathe, Incentive Spirometry  - Assess the need for suctioning and perform as needed  - Assess and instruct to report SOB or any respiratory difficulty  - Respiratory Therapy support as indicated  - Manage/alleviate anxiety  - Monitor for signs/symptoms of CO2 retention  Outcome: Progressing     Problem: METABOLIC/FLUID AND ELECTROLYTES - ADULT  Goal: Glucose maintained within prescribed range  Description: INTERVENTIONS:  - Monitor Blood Glucose as ordered  - Assess for signs and symptoms of hyperglycemia and hypoglycemia  - Administer ordered medications to maintain glucose within target range  - Assess barriers to adequate nutritional intake and initiate nutrition consult as needed  - Instruct patient on self management of diabetes  Outcome: Progressing     Problem: SKIN/TISSUE INTEGRITY - ADULT  Goal: Skin integrity remains intact  Description: INTERVENTIONS  - Assess and document risk factors for pressure ulcer development  - Assess and document skin integrity  - Monitor for areas of redness and/or skin breakdown  - Initiate interventions, skin care algorithm/standards of care as needed  Outcome: Progressing

## 2023-01-02 NOTE — CM/SW NOTE
2:20pm  MSW called pt's spouse and left message to discuss roger choice. No family at bedside    4:40pm  MSW called pt's spouse and left message to discuss roger choice.

## 2023-01-02 NOTE — PHYSICAL THERAPY NOTE
Duplicate PT order received, chart reviewed. Pt evaluated on 12/30 with recs for [facility]. Will f/u for PT at a later date.

## 2023-01-02 NOTE — PLAN OF CARE
69 y/o F. A&Ox4. VSS. 3L via NC NSR, on tele. Patient denies pain/n/v at this time. BiPap applied. Mepilex applied to sacrum. Purewick in place  Safety precautions in place. Isolation maintained. Plan of care to continue.       Problem: Diabetes/Glucose Control  Goal: Glucose maintained within prescribed range  Description: INTERVENTIONS:  - Monitor Blood Glucose as ordered  - Assess for signs and symptoms of hyperglycemia and hypoglycemia  - Administer ordered medications to maintain glucose within target range  - Assess barriers to adequate nutritional intake and initiate nutrition consult as needed  - Instruct patient on self management of diabetes  Outcome: Progressing     Problem: PAIN - ADULT  Goal: Verbalizes/displays adequate comfort level or patient's stated pain goal  Description: INTERVENTIONS:  - Encourage pt to monitor pain and request assistance  - Assess pain using appropriate pain scale  - Administer analgesics based on type and severity of pain and evaluate response  - Implement non-pharmacological measures as appropriate and evaluate response  - Consider cultural and social influences on pain and pain management  - Manage/alleviate anxiety  - Utilize distraction and/or relaxation techniques  - Monitor for opioid side effects  - Notify MD/LIP if interventions unsuccessful or patient reports new pain  - Anticipate increased pain with activity and pre-medicate as appropriate  Outcome: Progressing     Problem: SKIN/TISSUE INTEGRITY - ADULT  Goal: Skin integrity remains intact  Description: INTERVENTIONS  - Assess and document risk factors for pressure ulcer development  - Assess and document skin integrity  - Monitor for areas of redness and/or skin breakdown  - Initiate interventions, skin care algorithm/standards of care as needed  Outcome: Progressing

## 2023-01-02 NOTE — CM/SW NOTE
Met with patient, who was alert and oriented, and  at bedside to discuss discharge planning. They will be  54 years in February. They are agreeable to [facility], provided accepting [place] list. Patient has been to the [facility] before and are considering returning however they are going to review list before making choice. KALIE/CALVIN to remain available.      NIGEL Salinas  Discharge Planner  [phone]

## 2023-01-03 LAB
ANION GAP SERPL CALC-SCNC: 2 MMOL/L (ref 0–18)
BASOPHILS # BLD: 0 X10(3) UL (ref 0–0.2)
BASOPHILS NFR BLD: 0 %
BUN BLD-MCNC: 60 MG/DL (ref 7–18)
CALCIUM BLD-MCNC: 8.6 MG/DL (ref 8.5–10.1)
CHLORIDE SERPL-SCNC: 107 MMOL/L (ref 98–112)
CO2 SERPL-SCNC: 34 MMOL/L (ref 21–32)
CREAT BLD-MCNC: 2.58 MG/DL
DEPRECATED HBV CORE AB SER IA-ACNC: 175.2 NG/ML
EOSINOPHIL # BLD: 0.15 X10(3) UL (ref 0–0.7)
EOSINOPHIL NFR BLD: 3 %
ERYTHROCYTE [DISTWIDTH] IN BLOOD BY AUTOMATED COUNT: 15.6 %
GFR SERPLBLD BASED ON 1.73 SQ M-ARVRAT: 19 ML/MIN/1.73M2 (ref 60–?)
GLUCOSE BLD-MCNC: 105 MG/DL (ref 70–99)
GLUCOSE BLD-MCNC: 105 MG/DL (ref 70–99)
GLUCOSE BLD-MCNC: 115 MG/DL (ref 70–99)
GLUCOSE BLD-MCNC: 137 MG/DL (ref 70–99)
GLUCOSE BLD-MCNC: 158 MG/DL (ref 70–99)
HCT VFR BLD AUTO: 28.8 %
HGB BLD-MCNC: 8.4 G/DL
IRON SATN MFR SERPL: 12 %
IRON SERPL-MCNC: 38 UG/DL
LYMPHOCYTES NFR BLD: 0.45 X10(3) UL (ref 1–4)
LYMPHOCYTES NFR BLD: 9 %
MCH RBC QN AUTO: 28 PG (ref 26–34)
MCHC RBC AUTO-ENTMCNC: 29.2 G/DL (ref 31–37)
MCV RBC AUTO: 96 FL
MONOCYTES # BLD: 0.25 X10(3) UL (ref 0.1–1)
MONOCYTES NFR BLD: 5 %
MORPHOLOGY: NORMAL
MYELOCYTES # BLD: 0.1 X10(3) UL
MYELOCYTES NFR BLD: 2 %
NEUTROPHILS # BLD AUTO: 3.39 X10 (3) UL (ref 1.5–7.7)
NEUTROPHILS NFR BLD: 75 %
NEUTS BAND NFR BLD: 6 %
NEUTS HYPERSEG # BLD: 4.05 X10(3) UL (ref 1.5–7.7)
OSMOLALITY SERPL CALC.SUM OF ELEC: 313 MOSM/KG (ref 275–295)
PLATELET # BLD AUTO: 208 10(3)UL (ref 150–450)
PLATELET MORPHOLOGY: NORMAL
POTASSIUM SERPL-SCNC: 4.1 MMOL/L (ref 3.5–5.1)
RBC # BLD AUTO: 3 X10(6)UL
SODIUM SERPL-SCNC: 143 MMOL/L (ref 136–145)
TIBC SERPL-MCNC: 314 UG/DL (ref 240–450)
TOTAL CELLS COUNTED BLD: 100
TRANSFERRIN SERPL-MCNC: 211 MG/DL (ref 200–360)
WBC # BLD AUTO: 5 X10(3) UL (ref 4–11)

## 2023-01-03 PROCEDURE — 99233 SBSQ HOSP IP/OBS HIGH 50: CPT | Performed by: INTERNAL MEDICINE

## 2023-01-03 PROCEDURE — 99239 HOSP IP/OBS DSCHRG MGMT >30: CPT | Performed by: INTERNAL MEDICINE

## 2023-01-03 RX ORDER — VANCOMYCIN HYDROCHLORIDE 125 MG/1
125 CAPSULE ORAL EVERY 6 HOURS
Qty: 28 CAPSULE | Refills: 0 | Status: ON HOLD | OUTPATIENT
Start: 2023-01-04 | End: 2023-01-11

## 2023-01-03 RX ORDER — LEVOFLOXACIN 500 MG/1
500 TABLET, FILM COATED ORAL
Qty: 4 TABLET | Refills: 0 | Status: ON HOLD | OUTPATIENT
Start: 2023-01-04 | End: 2023-01-11

## 2023-01-03 RX ORDER — FAMOTIDINE 20 MG/1
20 TABLET, FILM COATED ORAL DAILY
Qty: 30 TABLET | Refills: 0 | Status: ON HOLD | OUTPATIENT
Start: 2023-01-03 | End: 2023-02-02

## 2023-01-03 RX ORDER — SODIUM HYPOCHLORITE 1.25 MG/ML
SOLUTION TOPICAL
Qty: 1000 ML | Refills: 0 | Status: ON HOLD | OUTPATIENT
Start: 2023-01-03

## 2023-01-03 RX ORDER — BUMETANIDE 1 MG/1
1 TABLET ORAL
Qty: 60 TABLET | Refills: 3 | Status: ON HOLD | OUTPATIENT
Start: 2023-01-03

## 2023-01-03 RX ORDER — DILTIAZEM HYDROCHLORIDE 120 MG/1
120 CAPSULE, COATED, EXTENDED RELEASE ORAL DAILY
Qty: 30 CAPSULE | Refills: 3 | Status: ON HOLD | OUTPATIENT
Start: 2023-01-04

## 2023-01-03 NOTE — CM/SW NOTE
1:45pm  MSW spoke to pt who wants [facility]. BLS on will call as MSW waits to get confirmation of time from [facility].    4:20pm   MSW called [facility], admissions will print referral for DON and call MSW back if they can accept today. 5pm  Case handed off to CM CB.

## 2023-01-03 NOTE — PLAN OF CARE
67 y/o F. A&Ox4. VSS. 3L via NC Afib, on tele. Patient denies pain/n/v at this time. BiPap applied @ night  Mepilex applied to sacrum. Purewick in place  Safety precautions in place. Isolation maintained. Plan of care to continue. Problem: Diabetes/Glucose Control  Goal: Glucose maintained within prescribed range  Description: INTERVENTIONS:  - Monitor Blood Glucose as ordered  - Assess for signs and symptoms of hyperglycemia and hypoglycemia  - Administer ordered medications to maintain glucose within target range  - Assess barriers to adequate nutritional intake and initiate nutrition consult as needed  - Instruct patient on self management of diabetes  Outcome: Progressing     Problem: PAIN - ADULT  Goal: Verbalizes/displays adequate comfort level or patient's stated pain goal  Description: INTERVENTIONS:  - Encourage pt to monitor pain and request assistance  - Assess pain using appropriate pain scale  - Administer analgesics based on type and severity of pain and evaluate response  - Implement non-pharmacological measures as appropriate and evaluate response  - Consider cultural and social influences on pain and pain management  - Manage/alleviate anxiety  - Utilize distraction and/or relaxation techniques  - Monitor for opioid side effects  - Notify MD/LIP if interventions unsuccessful or patient reports new pain  - Anticipate increased pain with activity and pre-medicate as appropriate  Outcome: Progressing     Problem: SAFETY ADULT - FALL  Goal: Free from fall injury  Description: INTERVENTIONS:  - Assess pt frequently for physical needs  - Identify cognitive and physical deficits and behaviors that affect risk of falls.   - [place] fall precautions as indicated by assessment.  - Educate pt/family on patient safety including physical limitations  - Instruct pt to call for assistance with activity based on assessment  - Modify environment to reduce risk of injury  - Provide assistive devices as appropriate  - Consider OT/PT consult to assist with strengthening/mobility  - Encourage toileting schedule  Outcome: Progressing

## 2023-01-03 NOTE — PLAN OF CARE
Assumed care of patient at 0730  A&Ox4  2L NC  Denies pain or discomfort  Contact plus isolation for Cdiff  Cardiac diet  Afib on tele    Clinitron bed  Dressings to BLE and sacrum intact    Safety precautions in place  All needs met at this time      Problem: Diabetes/Glucose Control  Goal: Glucose maintained within prescribed range  Description: INTERVENTIONS:  - Monitor Blood Glucose as ordered  - Assess for signs and symptoms of hyperglycemia and hypoglycemia  - Administer ordered medications to maintain glucose within target range  - Assess barriers to adequate nutritional intake and initiate nutrition consult as needed  - Instruct patient on self management of diabetes  Outcome: Progressing     Problem: Patient/Family Goals  Goal: Patient/Family Long Term Goal  Description: Patient's Long Term Goal: To be healed and back to normal activity    Interventions:  -Pain management  -PT/OT  -Follow up with PCP as recommended   Outcome: Progressing  Goal: Patient/Family Short Term Goal  Description: Patients Short Term goal: to be able to eat, drink, go to the bathroom, change positions, sit in chair and work with therapy with pain controlled    Interventions:  -Pain management  -Activity as tolerated  -PT/OT  -Follow up with PCP as recommended    Outcome: Progressing     Problem: PAIN - ADULT  Goal: Verbalizes/displays adequate comfort level or patient's stated pain goal  Description: INTERVENTIONS:  - Encourage pt to monitor pain and request assistance  - Assess pain using appropriate pain scale  - Administer analgesics based on type and severity of pain and evaluate response  - Implement non-pharmacological measures as appropriate and evaluate response  - Consider cultural and social influences on pain and pain management  - Manage/alleviate anxiety  - Utilize distraction and/or relaxation techniques  - Monitor for opioid side effects  - Notify MD/LIP if interventions unsuccessful or patient reports new pain  - Anticipate increased pain with activity and pre-medicate as appropriate  Outcome: Progressing     Problem: SAFETY ADULT - FALL  Goal: Free from fall injury  Description: INTERVENTIONS:  - Assess pt frequently for physical needs  - Identify cognitive and physical deficits and behaviors that affect risk of falls.   - [place] fall precautions as indicated by assessment.  - Educate pt/family on patient safety including physical limitations  - Instruct pt to call for assistance with activity based on assessment  - Modify environment to reduce risk of injury  - Provide assistive devices as appropriate  - Consider OT/PT consult to assist with strengthening/mobility  - Encourage toileting schedule  Outcome: Progressing     Problem: DISCHARGE PLANNING  Goal: Discharge to home or other facility with appropriate resources  Description: INTERVENTIONS:  - Identify barriers to discharge w/pt and caregiver  - Include patient/family/discharge partner in discharge planning  - Arrange for needed discharge resources and transportation as appropriate  - Identify discharge learning needs (meds, wound care, etc)  - Arrange for interpreters to assist at discharge as needed  - Consider post-discharge preferences of patient/family/discharge partner  - Complete POLST form as appropriate  - Assess patient's ability to be responsible for managing their own health  - Refer to Case Management Department for coordinating discharge planning if the patient needs post-hospital services based on physician/LIP order or complex needs related to functional status, cognitive ability or social support system  Outcome: Progressing     Problem: Delirium  Goal: Minimize duration of delirium  Description: Interventions:  - Encourage use of hearing aids, eye glasses  - Promote highest level of mobility daily  - Provide frequent reorientation  - Promote wakefulness i.e. lights on, blinds open  - Promote sleep, encourage patient's normal rest cycle i.e. lights off, TV off, minimize noise and interruptions  - Encourage family to assist in orientation and promotion of home routines  Outcome: Progressing     Problem: RESPIRATORY - ADULT  Goal: Achieves optimal ventilation and oxygenation  Description: INTERVENTIONS:  - Assess for changes in respiratory status  - Assess for changes in mentation and behavior  - Position to facilitate oxygenation and minimize respiratory effort  - Oxygen supplementation based on oxygen saturation or ABGs  - Provide Smoking Cessation handout, if applicable  - Encourage broncho-pulmonary hygiene including cough, deep breathe, Incentive Spirometry  - Assess the need for suctioning and perform as needed  - Assess and instruct to report SOB or any respiratory difficulty  - Respiratory Therapy support as indicated  - Manage/alleviate anxiety  - Monitor for signs/symptoms of CO2 retention  Outcome: Progressing     Problem: METABOLIC/FLUID AND ELECTROLYTES - ADULT  Goal: Glucose maintained within prescribed range  Description: INTERVENTIONS:  - Monitor Blood Glucose as ordered  - Assess for signs and symptoms of hyperglycemia and hypoglycemia  - Administer ordered medications to maintain glucose within target range  - Assess barriers to adequate nutritional intake and initiate nutrition consult as needed  - Instruct patient on self management of diabetes  Outcome: Progressing     Problem: SKIN/TISSUE INTEGRITY - ADULT  Goal: Skin integrity remains intact  Description: INTERVENTIONS  - Assess and document risk factors for pressure ulcer development  - Assess and document skin integrity  - Monitor for areas of redness and/or skin breakdown  - Initiate interventions, skin care algorithm/standards of care as needed  Outcome: Progressing

## 2023-01-03 NOTE — PROGRESS NOTES
Assumed care of pt at 1330. AxO x4. On 2L NC. Pt coughing up intermittent blood clots that are dark in color, looks like old blood from prior trauma from NGT. On Eliquis. IV abx. Xenia Furry. BLE dressings changed. On clinitron bed, sacral dressing CDI. Medically cleared for discharge to the [facility] today, no iso room available tonight so no discharge. Pt updated on POC, call light within reach.

## 2023-01-03 NOTE — DISCHARGE INSTRUCTIONS
Currently on 2L and using AVAPS w/ sleep  Recommend 1/4 strength Dakin's to the sacral wound daily or as needed with bowel movement. Cleansed B LE and sacral wound with saline. Needs BMP / CBC every Mon + Thurs     Need to be faxed to Nephrology [phone] fang    WOUND CARE  Cleanse with saline  Pack with SINGLE piece of 1/4 strength Dakin's impregated kerlix  Cover with Sacral Border Foam or ABD pad/tape  Change daily or as needed with bowel movements.

## 2023-01-04 VITALS
BODY MASS INDEX: 42.94 KG/M2 | TEMPERATURE: 98 F | HEART RATE: 60 BPM | RESPIRATION RATE: 23 BRPM | HEIGHT: 65.98 IN | OXYGEN SATURATION: 100 % | WEIGHT: 267.19 LBS | SYSTOLIC BLOOD PRESSURE: 121 MMHG | DIASTOLIC BLOOD PRESSURE: 56 MMHG

## 2023-01-04 LAB
ANION GAP SERPL CALC-SCNC: 2 MMOL/L (ref 0–18)
BUN BLD-MCNC: 55 MG/DL (ref 7–18)
CALCIUM BLD-MCNC: 8.9 MG/DL (ref 8.5–10.1)
CHLORIDE SERPL-SCNC: 107 MMOL/L (ref 98–112)
CO2 SERPL-SCNC: 37 MMOL/L (ref 21–32)
CREAT BLD-MCNC: 2.2 MG/DL
ERYTHROCYTE [DISTWIDTH] IN BLOOD BY AUTOMATED COUNT: 15.7 %
GFR SERPLBLD BASED ON 1.73 SQ M-ARVRAT: 23 ML/MIN/1.73M2 (ref 60–?)
GLUCOSE BLD-MCNC: 153 MG/DL (ref 70–99)
GLUCOSE BLD-MCNC: 83 MG/DL (ref 70–99)
GLUCOSE BLD-MCNC: 89 MG/DL (ref 70–99)
HCT VFR BLD AUTO: 28.9 %
HGB BLD-MCNC: 8.3 G/DL
MCH RBC QN AUTO: 28.4 PG (ref 26–34)
MCHC RBC AUTO-ENTMCNC: 28.7 G/DL (ref 31–37)
MCV RBC AUTO: 99 FL
OSMOLALITY SERPL CALC.SUM OF ELEC: 317 MOSM/KG (ref 275–295)
PLATELET # BLD AUTO: 189 10(3)UL (ref 150–450)
POTASSIUM SERPL-SCNC: 4.2 MMOL/L (ref 3.5–5.1)
RBC # BLD AUTO: 2.92 X10(6)UL
SODIUM SERPL-SCNC: 146 MMOL/L (ref 136–145)
WBC # BLD AUTO: 4.7 X10(3) UL (ref 4–11)

## 2023-01-04 PROCEDURE — 99239 HOSP IP/OBS DSCHRG MGMT >30: CPT | Performed by: INTERNAL MEDICINE

## 2023-01-04 PROCEDURE — 99233 SBSQ HOSP IP/OBS HIGH 50: CPT | Performed by: INTERNAL MEDICINE

## 2023-01-04 NOTE — PROGRESS NOTES
Wound Care + FYI    -BIPAP @ night  arnold & NG tube  -Wound care ---> Legs day shift, sacral @ night  **Sacral wound dressing change q day: Kerlix soaked in dakins with mepilex    **BLE edema: Xerofoam with kerlix

## 2023-01-04 NOTE — PLAN OF CARE
The patient is A/Ox4, on 2L NC, AVAPS PRNs and HS, NSR on tele, no c/o pain, afebrile. Sacral dressing changed. QID accuchecks, insuline coverage. Isolation precautions maintained. -DC planning to [facility] today.   -Nurse to nurse report given to [facility]-ER RN @ [facility].  -Covid vaccine not given, the patient reports having Covid in the last 90 days.              Problem: Diabetes/Glucose Control  Goal: Glucose maintained within prescribed range  Description: INTERVENTIONS:  - Monitor Blood Glucose as ordered  - Assess for signs and symptoms of hyperglycemia and hypoglycemia  - Administer ordered medications to maintain glucose within target range  - Assess barriers to adequate nutritional intake and initiate nutrition consult as needed  - Instruct patient on self management of diabetes  Outcome: Progressing     Problem: Patient/Family Goals  Goal: Patient/Family Long Term Goal  Description: Patient's Long Term Goal: To be healed and back to normal activity    Interventions:  -Pain management  -PT/OT  -Follow up with PCP as recommended   Outcome: Progressing  Goal: Patient/Family Short Term Goal  Description: Patients Short Term goal: to be able to eat, drink, go to the bathroom, change positions, sit in chair and work with therapy with pain controlled    Interventions:  -Pain management  -Activity as tolerated  -PT/OT  -Follow up with PCP as recommended    Outcome: Progressing     Problem: PAIN - ADULT  Goal: Verbalizes/displays adequate comfort level or patient's stated pain goal  Description: INTERVENTIONS:  - Encourage pt to monitor pain and request assistance  - Assess pain using appropriate pain scale  - Administer analgesics based on type and severity of pain and evaluate response  - Implement non-pharmacological measures as appropriate and evaluate response  - Consider cultural and social influences on pain and pain management  - Manage/alleviate anxiety  - Utilize distraction and/or relaxation techniques  - Monitor for opioid side effects  - Notify MD/LIP if interventions unsuccessful or patient reports new pain  - Anticipate increased pain with activity and pre-medicate as appropriate  Outcome: Progressing     Problem: SAFETY ADULT - FALL  Goal: Free from fall injury  Description: INTERVENTIONS:  - Assess pt frequently for physical needs  - Identify cognitive and physical deficits and behaviors that affect risk of falls.   - [place] fall precautions as indicated by assessment.  - Educate pt/family on patient safety including physical limitations  - Instruct pt to call for assistance with activity based on assessment  - Modify environment to reduce risk of injury  - Provide assistive devices as appropriate  - Consider OT/PT consult to assist with strengthening/mobility  - Encourage toileting schedule  Outcome: Progressing     Problem: DISCHARGE PLANNING  Goal: Discharge to home or other facility with appropriate resources  Description: INTERVENTIONS:  - Identify barriers to discharge w/pt and caregiver  - Include patient/family/discharge partner in discharge planning  - Arrange for needed discharge resources and transportation as appropriate  - Identify discharge learning needs (meds, wound care, etc)  - Arrange for interpreters to assist at discharge as needed  - Consider post-discharge preferences of patient/family/discharge partner  - Complete POLST form as appropriate  - Assess patient's ability to be responsible for managing their own health  - Refer to Case Management Department for coordinating discharge planning if the patient needs post-hospital services based on physician/LIP order or complex needs related to functional status, cognitive ability or social support system  Outcome: Progressing     Problem: Delirium  Goal: Minimize duration of delirium  Description: Interventions:  - Encourage use of hearing aids, eye glasses  - Promote highest level of mobility daily  - Provide frequent reorientation  - Promote wakefulness i.e. lights on, blinds open  - Promote sleep, encourage patient's normal rest cycle i.e. lights off, TV off, minimize noise and interruptions  - Encourage family to assist in orientation and promotion of home routines  Outcome: Progressing     Problem: RESPIRATORY - ADULT  Goal: Achieves optimal ventilation and oxygenation  Description: INTERVENTIONS:  - Assess for changes in respiratory status  - Assess for changes in mentation and behavior  - Position to facilitate oxygenation and minimize respiratory effort  - Oxygen supplementation based on oxygen saturation or ABGs  - Provide Smoking Cessation handout, if applicable  - Encourage broncho-pulmonary hygiene including cough, deep breathe, Incentive Spirometry  - Assess the need for suctioning and perform as needed  - Assess and instruct to report SOB or any respiratory difficulty  - Respiratory Therapy support as indicated  - Manage/alleviate anxiety  - Monitor for signs/symptoms of CO2 retention  Outcome: Progressing     Problem: METABOLIC/FLUID AND ELECTROLYTES - ADULT  Goal: Glucose maintained within prescribed range  Description: INTERVENTIONS:  - Monitor Blood Glucose as ordered  - Assess for signs and symptoms of hyperglycemia and hypoglycemia  - Administer ordered medications to maintain glucose within target range  - Assess barriers to adequate nutritional intake and initiate nutrition consult as needed  - Instruct patient on self management of diabetes  Outcome: Progressing     Problem: SKIN/TISSUE INTEGRITY - ADULT  Goal: Skin integrity remains intact  Description: INTERVENTIONS  - Assess and document risk factors for pressure ulcer development  - Assess and document skin integrity  - Monitor for areas of redness and/or skin breakdown  - Initiate interventions, skin care algorithm/standards of care as needed  Outcome: Progressing

## 2023-01-04 NOTE — PLAN OF CARE
67 y/o F. A&Ox4. VSS. 2L via NC Afib, on tele. Patient denies pain/n/v at this time. BiPap applied @ night  Mepilex applied to sacrum. Purewick in place  Safety precautions in place. Isolation maintained. Plan of care to continue.     [facility]  Problem: DISCHARGE PLANNING  Goal: Discharge to home or other facility with appropriate resources  Description: INTERVENTIONS:  - Identify barriers to discharge w/pt and caregiver  - Include patient/family/discharge partner in discharge planning  - Arrange for needed discharge resources and transportation as appropriate  - Identify discharge learning needs (meds, wound care, etc)  - Arrange for interpreters to assist at discharge as needed  - Consider post-discharge preferences of patient/family/discharge partner  - Complete POLST form as appropriate  - Assess patient's ability to be responsible for managing their own health  - Refer to Case Management Department for coordinating discharge planning if the patient needs post-hospital services based on physician/LIP order or complex needs related to functional status, cognitive ability or social support system  Outcome: Progressing

## 2023-01-04 NOTE — PROGRESS NOTES
Pts AVAPS settings and parameters listed below. 01/04/23 0955   BiPAP   $ RT Standby Charge (per 15 min) 1  (pt requested avaps be put on per RN)   $ BiPAP in use daily Yes   Device V60   BiPAP / CPAP CE# v26   BiPAP bacteria filter Yes   BiPAP Pre-use check ok? Yes   BIPAP plugged into main power?  Yes   Mode AVAPS   Interface Full face mask   Mask Size Medium   AVAPS   Min IPAP 15   Max IPAP 30   EPAP Level 5   Set rate 22   Tidal volume 500   SIPAP   FiO2 (%) 40 %   BiPAP/CPAP Alarm Settings   Hi Rate 45   Low Rate 10   Hi VT 1200   Low    Hi Pressure 40   Low Pressure 5   Low Pressure Delay 20   Low MV 2   High MV (L/min) 20   BiPAP/CPAP Monitored Parameters   Pulse 62   SpO2 100 %   PIP 16   Total Rate 25 breaths/min   Minute Volume 10   Tidal Volume 407   Total Leak 17   Trigger % 86   Ti/Ttot % 23   Toleration Well

## 2023-01-04 NOTE — PROGRESS NOTES
NURSING DISCHARGE NOTE    Discharged Rehab facility via Ambulance. Accompanied by Support staff  Belongings Taken by patient/family. IVs and tele removed.

## 2023-01-04 NOTE — CM/SW NOTE
Expected Discharge Plan:    Destination: Subacute Rehab: [facility]  Call for report to: [phone]  Transportation provider-[facility]     DC Time: 1pm  Pt/Family aware of plan: message left for spouse, however Pt is alert. [facility] Staff aware of dc plan: Patient discussed in care rounds.   DON req  PCS form completed  Snf updated on bipap/avap at night and pt currently on 02

## 2023-01-05 DIAGNOSIS — I48.91 A-FIB (HCC): Primary | ICD-10-CM

## 2023-01-06 ENCOUNTER — SNF ADMIT/H&P (OUTPATIENT)
Dept: INTERNAL MEDICINE CLINIC | Age: 78
End: 2023-01-06

## 2023-01-06 DIAGNOSIS — D64.9 CHRONIC ANEMIA: ICD-10-CM

## 2023-01-06 DIAGNOSIS — K60.3 ANAL FISTULA: ICD-10-CM

## 2023-01-06 DIAGNOSIS — Z71.2 ENCOUNTER TO DISCUSS TEST RESULTS: ICD-10-CM

## 2023-01-06 DIAGNOSIS — J96.22 ACUTE ON CHRONIC RESPIRATORY FAILURE WITH HYPOXIA AND HYPERCAPNIA (HCC): ICD-10-CM

## 2023-01-06 DIAGNOSIS — Z71.89 ENCOUNTER FOR MEDICATION REVIEW AND COUNSELING: ICD-10-CM

## 2023-01-06 DIAGNOSIS — J96.21 ACUTE ON CHRONIC RESPIRATORY FAILURE WITH HYPOXIA AND HYPERCAPNIA (HCC): ICD-10-CM

## 2023-01-06 DIAGNOSIS — N28.9 ACUTE ON CHRONIC RENAL INSUFFICIENCY: ICD-10-CM

## 2023-01-06 DIAGNOSIS — L03.115 CELLULITIS OF RIGHT LOWER EXTREMITY: ICD-10-CM

## 2023-01-06 DIAGNOSIS — N17.9 AKI (ACUTE KIDNEY INJURY) (HCC): ICD-10-CM

## 2023-01-06 DIAGNOSIS — N18.9 ACUTE ON CHRONIC RENAL INSUFFICIENCY: ICD-10-CM

## 2023-01-06 DIAGNOSIS — I48.0 AF (PAROXYSMAL ATRIAL FIBRILLATION) (HCC): ICD-10-CM

## 2023-01-06 DIAGNOSIS — J44.1 COPD EXACERBATION (HCC): ICD-10-CM

## 2023-01-06 DIAGNOSIS — A04.72 C. DIFFICILE DIARRHEA: ICD-10-CM

## 2023-01-06 DIAGNOSIS — I48.0 PAROXYSMAL ATRIAL FIBRILLATION (HCC): ICD-10-CM

## 2023-01-06 PROCEDURE — 1111F DSCHRG MED/CURRENT MED MERGE: CPT | Performed by: NURSE PRACTITIONER

## 2023-01-06 PROCEDURE — 99306 1ST NF CARE HIGH MDM 50: CPT | Performed by: NURSE PRACTITIONER

## 2023-01-09 ENCOUNTER — APPOINTMENT (OUTPATIENT)
Dept: GENERAL RADIOLOGY | Facility: HOSPITAL | Age: 78
End: 2023-01-09
Payer: MEDICARE

## 2023-01-09 ENCOUNTER — HOSPITAL ENCOUNTER (INPATIENT)
Facility: HOSPITAL | Age: 78
LOS: 4 days | Discharge: NURSING, REHAB OR LONG-TERM CARE | End: 2023-01-13
Attending: STUDENT IN AN ORGANIZED HEALTH CARE EDUCATION/TRAINING PROGRAM | Admitting: HOSPITALIST
Payer: MEDICARE

## 2023-01-09 ENCOUNTER — TELEPHONE (OUTPATIENT)
Dept: CARDIOLOGY CLINIC | Facility: HOSPITAL | Age: 78
End: 2023-01-09

## 2023-01-09 DIAGNOSIS — G93.41 ACUTE METABOLIC ENCEPHALOPATHY: ICD-10-CM

## 2023-01-09 DIAGNOSIS — J96.22 ACUTE ON CHRONIC RESPIRATORY FAILURE WITH HYPOXIA AND HYPERCAPNIA (HCC): Primary | ICD-10-CM

## 2023-01-09 DIAGNOSIS — J96.21 ACUTE ON CHRONIC RESPIRATORY FAILURE WITH HYPOXIA AND HYPERCAPNIA (HCC): Primary | ICD-10-CM

## 2023-01-09 DIAGNOSIS — N39.0 URINARY TRACT INFECTION WITHOUT HEMATURIA, SITE UNSPECIFIED: ICD-10-CM

## 2023-01-09 DIAGNOSIS — E11.628 TYPE 2 DIABETES MELLITUS WITH OTHER SKIN COMPLICATIONS (HCC): ICD-10-CM

## 2023-01-09 DIAGNOSIS — I50.9 ACUTE ON CHRONIC CONGESTIVE HEART FAILURE, UNSPECIFIED HEART FAILURE TYPE (HCC): ICD-10-CM

## 2023-01-09 PROBLEM — R06.00 DYSPNEA: Status: ACTIVE | Noted: 2023-01-01

## 2023-01-09 PROBLEM — R06.00 DYSPNEA: Status: ACTIVE | Noted: 2023-01-09

## 2023-01-09 LAB
ALBUMIN SERPL-MCNC: 2.6 G/DL (ref 3.4–5)
ALBUMIN/GLOB SERPL: 0.6 {RATIO} (ref 1–2)
ALP LIVER SERPL-CCNC: 154 U/L
ALT SERPL-CCNC: 34 U/L
ANION GAP SERPL CALC-SCNC: 2 MMOL/L (ref 0–18)
ARTERIAL PATENCY WRIST A: POSITIVE
ARTERIAL PATENCY WRIST A: POSITIVE
AST SERPL-CCNC: 57 U/L (ref 15–37)
BASE EXCESS BLDA CALC-SCNC: 18.3 MMOL/L (ref ?–2)
BASE EXCESS BLDA CALC-SCNC: 19.4 MMOL/L (ref ?–2)
BASOPHILS # BLD AUTO: 0.05 X10(3) UL (ref 0–0.2)
BASOPHILS NFR BLD AUTO: 0.7 %
BILIRUB SERPL-MCNC: 1 MG/DL (ref 0.1–2)
BILIRUB UR QL STRIP.AUTO: NEGATIVE
BODY TEMPERATURE: 97.5 F
BODY TEMPERATURE: 98.6 F
BUN BLD-MCNC: 49 MG/DL (ref 7–18)
CA-I BLD-SCNC: 1.12 MMOL/L (ref 0.95–1.32)
CALCIUM BLD-MCNC: 8.8 MG/DL (ref 8.5–10.1)
CHLORIDE SERPL-SCNC: 104 MMOL/L (ref 98–112)
CO2 SERPL-SCNC: 38 MMOL/L (ref 21–32)
COHGB MFR BLD: 3.4 % SAT (ref 0–3)
COHGB MFR BLD: 3.5 % SAT (ref 0–3)
COLOR UR AUTO: YELLOW
CREAT BLD-MCNC: 1.89 MG/DL
EOSINOPHIL # BLD AUTO: 0.07 X10(3) UL (ref 0–0.7)
EOSINOPHIL NFR BLD AUTO: 1 %
ERYTHROCYTE [DISTWIDTH] IN BLOOD BY AUTOMATED COUNT: 16.3 %
EST. AVERAGE GLUCOSE BLD GHB EST-MCNC: 126 MG/DL (ref 68–126)
EXPIRATORY PRESSURE: 6 CM H2O
FIO2: 40 %
GFR SERPLBLD BASED ON 1.73 SQ M-ARVRAT: 27 ML/MIN/1.73M2 (ref 60–?)
GLOBULIN PLAS-MCNC: 4.3 G/DL (ref 2.8–4.4)
GLUCOSE BLD-MCNC: 118 MG/DL (ref 70–99)
GLUCOSE BLD-MCNC: 139 MG/DL (ref 70–99)
GLUCOSE UR STRIP.AUTO-MCNC: NEGATIVE MG/DL
HBA1C MFR BLD: 6 % (ref ?–5.7)
HCO3 BLDA-SCNC: 39.2 MEQ/L (ref 21–27)
HCO3 BLDA-SCNC: 40.1 MEQ/L (ref 21–27)
HCT VFR BLD AUTO: 31.9 %
HGB BLD-MCNC: 9.2 G/DL
HGB BLD-MCNC: 9.3 G/DL
HGB BLD-MCNC: 9.3 G/DL
HYALINE CASTS #/AREA URNS AUTO: PRESENT /LPF
IMM GRANULOCYTES # BLD AUTO: 0.17 X10(3) UL (ref 0–1)
IMM GRANULOCYTES NFR BLD: 2.5 %
INSP PRESSURE: 12 CM H2O
KETONES UR STRIP.AUTO-MCNC: NEGATIVE MG/DL
L/M: 15 L/MIN
LACTATE BLD-SCNC: 1 MMOL/L (ref 0.5–2)
LACTATE SERPL-SCNC: 1.6 MMOL/L (ref 0.4–2)
LYMPHOCYTES # BLD AUTO: 0.63 X10(3) UL (ref 1–4)
LYMPHOCYTES NFR BLD AUTO: 9.3 %
MCH RBC QN AUTO: 28.5 PG (ref 26–34)
MCHC RBC AUTO-ENTMCNC: 29.2 G/DL (ref 31–37)
MCV RBC AUTO: 97.9 FL
METHGB MFR BLD: 0 % SAT (ref 0.4–1.5)
METHGB MFR BLD: 0.1 % SAT (ref 0.4–1.5)
MONOCYTES # BLD AUTO: 0.44 X10(3) UL (ref 0.1–1)
MONOCYTES NFR BLD AUTO: 6.5 %
NEUTROPHILS # BLD AUTO: 5.4 X10 (3) UL (ref 1.5–7.7)
NEUTROPHILS # BLD AUTO: 5.4 X10(3) UL (ref 1.5–7.7)
NEUTROPHILS NFR BLD AUTO: 80 %
NITRITE UR QL STRIP.AUTO: NEGATIVE
NT-PROBNP SERPL-MCNC: ABNORMAL PG/ML (ref ?–450)
OSMOLALITY SERPL CALC.SUM OF ELEC: 313 MOSM/KG (ref 275–295)
OXYHGB MFR BLDA: 94.8 % (ref 92–100)
OXYHGB MFR BLDA: 96.2 % (ref 92–100)
P/F RATIO: 213 MMHG
P/F RATIO: 235 MMHG
PCO2 BLDA: 74 MM HG (ref 35–45)
PCO2 BLDA: 74 MM HG (ref 35–45)
PH BLDA: 7.4 [PH] (ref 7.35–7.45)
PH BLDA: 7.41 [PH] (ref 7.35–7.45)
PH UR STRIP.AUTO: 5 [PH] (ref 5–8)
PLATELET # BLD AUTO: 157 10(3)UL (ref 150–450)
PO2 BLDA: 232 MM HG (ref 80–100)
PO2 BLDA: 85 MM HG (ref 80–100)
POTASSIUM BLD-SCNC: 3.9 MMOL/L (ref 3.6–5.1)
POTASSIUM SERPL-SCNC: 3.9 MMOL/L (ref 3.5–5.1)
PROT SERPL-MCNC: 6.9 G/DL (ref 6.4–8.2)
PROT UR STRIP.AUTO-MCNC: 30 MG/DL
RBC # BLD AUTO: 3.26 X10(6)UL
RBC #/AREA URNS AUTO: >10 /HPF
SARS-COV-2 RNA RESP QL NAA+PROBE: NOT DETECTED
SODIUM BLD-SCNC: 146 MMOL/L (ref 135–145)
SODIUM SERPL-SCNC: 144 MMOL/L (ref 136–145)
SP GR UR STRIP.AUTO: 1.01 (ref 1–1.03)
TROPONIN I HIGH SENSITIVITY: 42 NG/L
UROBILINOGEN UR STRIP.AUTO-MCNC: <2 MG/DL
VENT RATE: 16 /MIN
WBC # BLD AUTO: 6.8 X10(3) UL (ref 4–11)
WBC #/AREA URNS AUTO: >50 /HPF
WBC CLUMPS UR QL AUTO: PRESENT /HPF

## 2023-01-09 PROCEDURE — 99291 CRITICAL CARE FIRST HOUR: CPT | Performed by: NURSE PRACTITIONER

## 2023-01-09 PROCEDURE — 71045 X-RAY EXAM CHEST 1 VIEW: CPT | Performed by: STUDENT IN AN ORGANIZED HEALTH CARE EDUCATION/TRAINING PROGRAM

## 2023-01-09 PROCEDURE — 99223 1ST HOSP IP/OBS HIGH 75: CPT | Performed by: HOSPITALIST

## 2023-01-09 PROCEDURE — 5A09357 ASSISTANCE WITH RESPIRATORY VENTILATION, LESS THAN 24 CONSECUTIVE HOURS, CONTINUOUS POSITIVE AIRWAY PRESSURE: ICD-10-PCS | Performed by: STUDENT IN AN ORGANIZED HEALTH CARE EDUCATION/TRAINING PROGRAM

## 2023-01-09 RX ORDER — ESCITALOPRAM OXALATE 10 MG/1
10 TABLET ORAL DAILY
Status: DISCONTINUED | OUTPATIENT
Start: 2023-01-09 | End: 2023-01-13

## 2023-01-09 RX ORDER — MELATONIN
3 NIGHTLY PRN
Status: DISCONTINUED | OUTPATIENT
Start: 2023-01-09 | End: 2023-01-13

## 2023-01-09 RX ORDER — METOCLOPRAMIDE HYDROCHLORIDE 5 MG/ML
5 INJECTION INTRAMUSCULAR; INTRAVENOUS EVERY 8 HOURS PRN
Status: DISCONTINUED | OUTPATIENT
Start: 2023-01-09 | End: 2023-01-13

## 2023-01-09 RX ORDER — DEXTROSE MONOHYDRATE 25 G/50ML
50 INJECTION, SOLUTION INTRAVENOUS
Status: DISCONTINUED | OUTPATIENT
Start: 2023-01-09 | End: 2023-01-13

## 2023-01-09 RX ORDER — FEBUXOSTAT 40 MG/1
80 TABLET, FILM COATED ORAL DAILY
Status: DISCONTINUED | OUTPATIENT
Start: 2023-01-09 | End: 2023-01-13

## 2023-01-09 RX ORDER — IPRATROPIUM BROMIDE AND ALBUTEROL SULFATE 2.5; .5 MG/3ML; MG/3ML
3 SOLUTION RESPIRATORY (INHALATION)
Status: DISCONTINUED | OUTPATIENT
Start: 2023-01-09 | End: 2023-01-10

## 2023-01-09 RX ORDER — METHYLPREDNISOLONE SODIUM SUCCINATE 125 MG/2ML
125 INJECTION, POWDER, LYOPHILIZED, FOR SOLUTION INTRAMUSCULAR; INTRAVENOUS ONCE
Status: COMPLETED | OUTPATIENT
Start: 2023-01-09 | End: 2023-01-09

## 2023-01-09 RX ORDER — LEVOTHYROXINE SODIUM 0.15 MG/1
150 TABLET ORAL
Status: DISCONTINUED | OUTPATIENT
Start: 2023-01-09 | End: 2023-01-13

## 2023-01-09 RX ORDER — NICOTINE POLACRILEX 4 MG
30 LOZENGE BUCCAL
Status: DISCONTINUED | OUTPATIENT
Start: 2023-01-09 | End: 2023-01-13

## 2023-01-09 RX ORDER — FAMOTIDINE 10 MG
10 TABLET ORAL DAILY
Status: DISCONTINUED | OUTPATIENT
Start: 2023-01-09 | End: 2023-01-13

## 2023-01-09 RX ORDER — METOPROLOL SUCCINATE 25 MG/1
25 TABLET, EXTENDED RELEASE ORAL 2 TIMES DAILY
Status: DISCONTINUED | OUTPATIENT
Start: 2023-01-09 | End: 2023-01-13

## 2023-01-09 RX ORDER — ONDANSETRON 2 MG/ML
4 INJECTION INTRAMUSCULAR; INTRAVENOUS EVERY 6 HOURS PRN
Status: DISCONTINUED | OUTPATIENT
Start: 2023-01-09 | End: 2023-01-13

## 2023-01-09 RX ORDER — GABAPENTIN 300 MG/1
300 CAPSULE ORAL 2 TIMES DAILY
Status: DISCONTINUED | OUTPATIENT
Start: 2023-01-09 | End: 2023-01-13

## 2023-01-09 RX ORDER — BUMETANIDE 0.25 MG/ML
2 INJECTION, SOLUTION INTRAMUSCULAR; INTRAVENOUS ONCE
Status: COMPLETED | OUTPATIENT
Start: 2023-01-09 | End: 2023-01-09

## 2023-01-09 RX ORDER — NICOTINE POLACRILEX 4 MG
15 LOZENGE BUCCAL
Status: DISCONTINUED | OUTPATIENT
Start: 2023-01-09 | End: 2023-01-13

## 2023-01-09 RX ORDER — ACETAMINOPHEN 500 MG
500 TABLET ORAL EVERY 4 HOURS PRN
Status: DISCONTINUED | OUTPATIENT
Start: 2023-01-09 | End: 2023-01-13

## 2023-01-09 RX ORDER — METHYLPREDNISOLONE SODIUM SUCCINATE 125 MG/2ML
80 INJECTION, POWDER, LYOPHILIZED, FOR SOLUTION INTRAMUSCULAR; INTRAVENOUS EVERY 12 HOURS
Status: DISCONTINUED | OUTPATIENT
Start: 2023-01-10 | End: 2023-01-10

## 2023-01-09 RX ORDER — DILTIAZEM HYDROCHLORIDE 120 MG/1
120 CAPSULE, EXTENDED RELEASE ORAL DAILY
Status: DISCONTINUED | OUTPATIENT
Start: 2023-01-09 | End: 2023-01-13

## 2023-01-09 NOTE — ED INITIAL ASSESSMENT (HPI)
Pt arrives via EMS from the [facility]. Pt c/o hypotension 80s/60s, and SOB was hypoxic upon medic arrival in 80s. Per call in note, staff at the [facility] found pt hypoxic in the 60s and noted pt to be altered. Pt currently dx with PNA and C. Diff. Pt not answering questions upon arrival, pt states \"I feel shaky. \"

## 2023-01-10 ENCOUNTER — APPOINTMENT (OUTPATIENT)
Dept: CV DIAGNOSTICS | Facility: HOSPITAL | Age: 78
End: 2023-01-10
Attending: INTERNAL MEDICINE
Payer: MEDICARE

## 2023-01-10 LAB
ANION GAP SERPL CALC-SCNC: 0 MMOL/L (ref 0–18)
ATRIAL RATE: 127 BPM
BASOPHILS # BLD AUTO: 0.02 X10(3) UL (ref 0–0.2)
BASOPHILS NFR BLD AUTO: 0.6 %
BUN BLD-MCNC: 52 MG/DL (ref 7–18)
CALCIUM BLD-MCNC: 8.8 MG/DL (ref 8.5–10.1)
CHLORIDE SERPL-SCNC: 102 MMOL/L (ref 98–112)
CO2 SERPL-SCNC: 41 MMOL/L (ref 21–32)
CREAT BLD-MCNC: 1.85 MG/DL
EOSINOPHIL # BLD AUTO: 0 X10(3) UL (ref 0–0.7)
EOSINOPHIL NFR BLD AUTO: 0 %
ERYTHROCYTE [DISTWIDTH] IN BLOOD BY AUTOMATED COUNT: 17 %
GFR SERPLBLD BASED ON 1.73 SQ M-ARVRAT: 28 ML/MIN/1.73M2 (ref 60–?)
GLUCOSE BLD-MCNC: 187 MG/DL (ref 70–99)
GLUCOSE BLD-MCNC: 229 MG/DL (ref 70–99)
GLUCOSE BLD-MCNC: 242 MG/DL (ref 70–99)
GLUCOSE BLD-MCNC: 261 MG/DL (ref 70–99)
HCT VFR BLD AUTO: 34.8 %
HGB BLD-MCNC: 9.9 G/DL
IMM GRANULOCYTES # BLD AUTO: 0.1 X10(3) UL (ref 0–1)
IMM GRANULOCYTES NFR BLD: 2.9 %
LYMPHOCYTES # BLD AUTO: 0.23 X10(3) UL (ref 1–4)
LYMPHOCYTES NFR BLD AUTO: 6.7 %
MCH RBC QN AUTO: 28.4 PG (ref 26–34)
MCHC RBC AUTO-ENTMCNC: 28.4 G/DL (ref 31–37)
MCV RBC AUTO: 99.7 FL
MONOCYTES # BLD AUTO: 0.04 X10(3) UL (ref 0.1–1)
MONOCYTES NFR BLD AUTO: 1.2 %
NEUTROPHILS # BLD AUTO: 3.06 X10 (3) UL (ref 1.5–7.7)
NEUTROPHILS # BLD AUTO: 3.06 X10(3) UL (ref 1.5–7.7)
NEUTROPHILS NFR BLD AUTO: 88.6 %
OSMOLALITY SERPL CALC.SUM OF ELEC: 315 MOSM/KG (ref 275–295)
P-R INTERVAL: 174 MS
PLATELET # BLD AUTO: 127 10(3)UL (ref 150–450)
POTASSIUM SERPL-SCNC: 4.4 MMOL/L (ref 3.5–5.1)
Q-T INTERVAL: 278 MS
QRS DURATION: 82 MS
QTC CALCULATION (BEZET): 404 MS
R AXIS: 108 DEGREES
RBC # BLD AUTO: 3.49 X10(6)UL
SODIUM SERPL-SCNC: 143 MMOL/L (ref 136–145)
T AXIS: 188 DEGREES
VENTRICULAR RATE: 127 BPM
WBC # BLD AUTO: 3.5 X10(3) UL (ref 4–11)

## 2023-01-10 PROCEDURE — 93306 TTE W/DOPPLER COMPLETE: CPT | Performed by: INTERNAL MEDICINE

## 2023-01-10 PROCEDURE — 99233 SBSQ HOSP IP/OBS HIGH 50: CPT | Performed by: HOSPITALIST

## 2023-01-10 RX ORDER — IPRATROPIUM BROMIDE AND ALBUTEROL SULFATE 2.5; .5 MG/3ML; MG/3ML
3 SOLUTION RESPIRATORY (INHALATION)
Status: DISCONTINUED | OUTPATIENT
Start: 2023-01-10 | End: 2023-01-12

## 2023-01-10 RX ORDER — BUMETANIDE 1 MG/1
1 TABLET ORAL
Status: DISCONTINUED | OUTPATIENT
Start: 2023-01-10 | End: 2023-01-13

## 2023-01-10 RX ORDER — VANCOMYCIN HYDROCHLORIDE 125 MG/1
125 CAPSULE ORAL DAILY
Status: DISCONTINUED | OUTPATIENT
Start: 2023-01-10 | End: 2023-01-10

## 2023-01-10 RX ORDER — METHYLPREDNISOLONE SODIUM SUCCINATE 40 MG/ML
40 INJECTION, POWDER, LYOPHILIZED, FOR SOLUTION INTRAMUSCULAR; INTRAVENOUS EVERY 12 HOURS
Status: COMPLETED | OUTPATIENT
Start: 2023-01-10 | End: 2023-01-11

## 2023-01-10 RX ORDER — VANCOMYCIN HYDROCHLORIDE 125 MG/1
125 CAPSULE ORAL 4 TIMES DAILY
Status: COMPLETED | OUTPATIENT
Start: 2023-01-10 | End: 2023-01-11

## 2023-01-10 NOTE — PLAN OF CARE
Received patient from ED around 2230. Patient alert, oriented x2-3. Cooperative, follows commands. No complaints of pain. BiPAP. Transitioned to HFNC for drinks and medication administration. Oxygen saturations stable. Afib on tele monitor, rate controlled. BP stable overnight. Purewick in place. Wound care consulted for sacral wound.

## 2023-01-10 NOTE — PROGRESS NOTES
[facility] Pharmacy Note:  Renal Dose Adjustment    Iline Schilder has been prescribed famotidine (PEPCID) 20 mg orally every 24 hours. Estimated Creatinine Clearance: 25.1 mL/min (A) (based on SCr of 1.89 mg/dL (H)). Calculated creatinine clearance is < 50 ml/min, therefore the dose of famotidine (Pepcid) has been changed to 10 mg orally every 24 hours per P&T approved protocol. Pharmacy will continue to follow, and if renal function improves, will resume the original order.     Thank you,  Paul Tubbs, [facility]  1/10/2023 12:04 AM

## 2023-01-10 NOTE — CM/SW NOTE
01/10/23 1400   CM/KALIE Referral Data   Referral Source Physician   Reason for Referral Discharge planning;Readmission   Informant Patient;EMR   Readmission Assessment   Factors that patient feels contributed to this readmission Acute/Chronic Clinical Presentation   Pt's living situation prior to admission? Subacute rehab  ([facility])   Was full assessment completed by KALIE/CALVIN on prior admission? Yes   Was the recommended discharge plan achieved? Yes   Was pt. discharged w/out services? Yes   Pertinent Medical Hx   Does patient have an established PCP? Yes   Patient Info   Patient's Current Mental Status at Time of Assessment Alert;Oriented   Choice of Post-Acute Provider   Informed patient of right to choose their preferred provider Yes     Met with patient, her  and her daughter at the bedside for eval.  Pt [facility] from [facility] after she was found in her room by staff with confusion, dyspnea and had her oxygen off. Pt does not recall removing her oxygen. Called [facility] and spoke to the nurse on her floor, Vern Roach. Vern Roach reports that when she cared for pt during the day, she kept her O2 on. She also checked with her manager who reports that pt is not compliant with using CPAP at night at [facility]    Pt reports she prefers to discharge home, but dtr and  unsure if  can manage her in her current condition. Pt agreeable to rehab, but prefers not to return to [facility]. She mentioned previous rehabs she's used and requested referrals to those [address] [place] [address])  Sent referral to those providers via [facility] as well as [facility] as backup    / to remain available for support and/or discharge planning.      Lon NOVOAA MSN, RN CTL/  [id number]

## 2023-01-11 LAB
ALBUMIN SERPL-MCNC: 2.7 G/DL (ref 3.4–5)
ALBUMIN/GLOB SERPL: 0.6 {RATIO} (ref 1–2)
ALP LIVER SERPL-CCNC: 131 U/L
ALT SERPL-CCNC: 23 U/L
ANION GAP SERPL CALC-SCNC: 3 MMOL/L (ref 0–18)
AST SERPL-CCNC: 21 U/L (ref 15–37)
BASOPHILS # BLD AUTO: 0 X10(3) UL (ref 0–0.2)
BASOPHILS NFR BLD AUTO: 0 %
BILIRUB SERPL-MCNC: 0.4 MG/DL (ref 0.1–2)
BUN BLD-MCNC: 56 MG/DL (ref 7–18)
CALCIUM BLD-MCNC: 8.7 MG/DL (ref 8.5–10.1)
CHLORIDE SERPL-SCNC: 99 MMOL/L (ref 98–112)
CO2 SERPL-SCNC: 42 MMOL/L (ref 21–32)
CREAT BLD-MCNC: 1.98 MG/DL
EOSINOPHIL # BLD AUTO: 0 X10(3) UL (ref 0–0.7)
EOSINOPHIL NFR BLD AUTO: 0 %
ERYTHROCYTE [DISTWIDTH] IN BLOOD BY AUTOMATED COUNT: 16.9 %
GFR SERPLBLD BASED ON 1.73 SQ M-ARVRAT: 26 ML/MIN/1.73M2 (ref 60–?)
GLOBULIN PLAS-MCNC: 4.4 G/DL (ref 2.8–4.4)
GLUCOSE BLD-MCNC: 160 MG/DL (ref 70–99)
GLUCOSE BLD-MCNC: 164 MG/DL (ref 70–99)
GLUCOSE BLD-MCNC: 265 MG/DL (ref 70–99)
GLUCOSE BLD-MCNC: 279 MG/DL (ref 70–99)
GLUCOSE BLD-MCNC: 327 MG/DL (ref 70–99)
HCT VFR BLD AUTO: 30.6 %
HGB BLD-MCNC: 8.8 G/DL
IMM GRANULOCYTES # BLD AUTO: 0.02 X10(3) UL (ref 0–1)
IMM GRANULOCYTES NFR BLD: 0.7 %
INR BLD: 1.88 (ref 0.85–1.16)
LYMPHOCYTES # BLD AUTO: 0.31 X10(3) UL (ref 1–4)
LYMPHOCYTES NFR BLD AUTO: 10.5 %
MAGNESIUM SERPL-MCNC: 1.5 MG/DL (ref 1.6–2.6)
MCH RBC QN AUTO: 28 PG (ref 26–34)
MCHC RBC AUTO-ENTMCNC: 28.8 G/DL (ref 31–37)
MCV RBC AUTO: 97.5 FL
MONOCYTES # BLD AUTO: 0.07 X10(3) UL (ref 0.1–1)
MONOCYTES NFR BLD AUTO: 2.4 %
NEUTROPHILS # BLD AUTO: 2.54 X10 (3) UL (ref 1.5–7.7)
NEUTROPHILS # BLD AUTO: 2.54 X10(3) UL (ref 1.5–7.7)
NEUTROPHILS NFR BLD AUTO: 86.4 %
OSMOLALITY SERPL CALC.SUM OF ELEC: 317 MOSM/KG (ref 275–295)
PLATELET # BLD AUTO: 139 10(3)UL (ref 150–450)
POTASSIUM SERPL-SCNC: 4.2 MMOL/L (ref 3.5–5.1)
PROT SERPL-MCNC: 7.1 G/DL (ref 6.4–8.2)
PROTHROMBIN TIME: 21.5 SECONDS (ref 11.6–14.8)
RBC # BLD AUTO: 3.14 X10(6)UL
SODIUM SERPL-SCNC: 144 MMOL/L (ref 136–145)
WBC # BLD AUTO: 2.9 X10(3) UL (ref 4–11)

## 2023-01-11 PROCEDURE — 99232 SBSQ HOSP IP/OBS MODERATE 35: CPT | Performed by: STUDENT IN AN ORGANIZED HEALTH CARE EDUCATION/TRAINING PROGRAM

## 2023-01-11 PROCEDURE — 99222 1ST HOSP IP/OBS MODERATE 55: CPT | Performed by: NURSE PRACTITIONER

## 2023-01-11 RX ORDER — MAGNESIUM OXIDE 400 MG/1
800 TABLET ORAL ONCE
Status: COMPLETED | OUTPATIENT
Start: 2023-01-11 | End: 2023-01-11

## 2023-01-11 RX ORDER — PREDNISONE 20 MG/1
40 TABLET ORAL
Status: DISCONTINUED | OUTPATIENT
Start: 2023-01-12 | End: 2023-01-13

## 2023-01-11 NOTE — PLAN OF CARE
Received pt at change of shift, drowsy but arousable. Pt confused when first awoken. Oriented to person and place. On 8 L HFNC. Lungs diminished. Set up dinner tray. Pt ate approx 50%. Changed diaper which was saturated and had small loose BM. Dressing to anal fistula changed with NS wet to dry. Pt aware of her meds and what they are for. Requesting CPAP mask at bedtime. Tolerating well. Awaiting bed assignment to transfer.

## 2023-01-11 NOTE — CM/SW NOTE
01/11/23 1200   Discharge Needs   Anticipated D/C needs Subacute rehab   Choice of Post-Acute Provider   Informed patient of right to choose their preferred provider Yes   List of appropriate post-acute services provided to patient/family with quality data Yes   Patient/family choice [facility]     Met with patient at the bedside to discuss list of MO    Pt reports she already knows she was accepted at [facility] and that is her choice  Reserved in [facility]    Attempted to call  and daughter, left voicemail    Addendum [phone]  Notified by [facility] that they cannot accept pts on AVAPS. Pt using Trilogy at [facility], rented through [employer]. Message left for Nadira Buckner from [address] to determine how to transfer machine to new provider  Called daughter to discuss and [facility] is choice as pt does not want to return to [facility] to remain available for support and/or discharge planning.      Ber NOVOAA MSN, RN CTL/  [id number]

## 2023-01-11 NOTE — PLAN OF CARE
Received patient on 8 L HFNC. CPAP 40% PRN & HS. Accu check QID with coverage. A/Ox3. Disoriented to time. Loose association and rambling of speech. Per Case management/Social work, patient does not want to go back to [facility], pending acceptance to other facilities. Floor orders.

## 2023-01-12 LAB
ANION GAP SERPL CALC-SCNC: 4 MMOL/L (ref 0–18)
BASOPHILS # BLD AUTO: 0.01 X10(3) UL (ref 0–0.2)
BASOPHILS NFR BLD AUTO: 0.3 %
BUN BLD-MCNC: 63 MG/DL (ref 7–18)
CALCIUM BLD-MCNC: 8.5 MG/DL (ref 8.5–10.1)
CHLORIDE SERPL-SCNC: 98 MMOL/L (ref 98–112)
CO2 SERPL-SCNC: 40 MMOL/L (ref 21–32)
CREAT BLD-MCNC: 1.96 MG/DL
EOSINOPHIL # BLD AUTO: 0 X10(3) UL (ref 0–0.7)
EOSINOPHIL NFR BLD AUTO: 0 %
ERYTHROCYTE [DISTWIDTH] IN BLOOD BY AUTOMATED COUNT: 17.6 %
GFR SERPLBLD BASED ON 1.73 SQ M-ARVRAT: 26 ML/MIN/1.73M2 (ref 60–?)
GLUCOSE BLD-MCNC: 265 MG/DL (ref 70–99)
GLUCOSE BLD-MCNC: 311 MG/DL (ref 70–99)
GLUCOSE BLD-MCNC: 317 MG/DL (ref 70–99)
GLUCOSE BLD-MCNC: 319 MG/DL (ref 70–99)
GLUCOSE BLD-MCNC: 322 MG/DL (ref 70–99)
GLUCOSE BLD-MCNC: 333 MG/DL (ref 70–99)
HCT VFR BLD AUTO: 28.4 %
HGB BLD-MCNC: 8.4 G/DL
IMM GRANULOCYTES # BLD AUTO: 0.04 X10(3) UL (ref 0–1)
IMM GRANULOCYTES NFR BLD: 1.4 %
LYMPHOCYTES # BLD AUTO: 0.17 X10(3) UL (ref 1–4)
LYMPHOCYTES NFR BLD AUTO: 5.9 %
MAGNESIUM SERPL-MCNC: 1.5 MG/DL (ref 1.6–2.6)
MCH RBC QN AUTO: 28.5 PG (ref 26–34)
MCHC RBC AUTO-ENTMCNC: 29.6 G/DL (ref 31–37)
MCV RBC AUTO: 96.3 FL
MONOCYTES # BLD AUTO: 0.06 X10(3) UL (ref 0.1–1)
MONOCYTES NFR BLD AUTO: 2.1 %
NEUTROPHILS # BLD AUTO: 2.6 X10 (3) UL (ref 1.5–7.7)
NEUTROPHILS # BLD AUTO: 2.6 X10(3) UL (ref 1.5–7.7)
NEUTROPHILS NFR BLD AUTO: 90.3 %
OSMOLALITY SERPL CALC.SUM OF ELEC: 324 MOSM/KG (ref 275–295)
PLATELET # BLD AUTO: 124 10(3)UL (ref 150–450)
POTASSIUM SERPL-SCNC: 4 MMOL/L (ref 3.5–5.1)
RBC # BLD AUTO: 2.95 X10(6)UL
SODIUM SERPL-SCNC: 142 MMOL/L (ref 136–145)
WBC # BLD AUTO: 2.9 X10(3) UL (ref 4–11)

## 2023-01-12 PROCEDURE — 99232 SBSQ HOSP IP/OBS MODERATE 35: CPT | Performed by: STUDENT IN AN ORGANIZED HEALTH CARE EDUCATION/TRAINING PROGRAM

## 2023-01-12 RX ORDER — IPRATROPIUM BROMIDE AND ALBUTEROL SULFATE 2.5; .5 MG/3ML; MG/3ML
3 SOLUTION RESPIRATORY (INHALATION)
Status: DISCONTINUED | OUTPATIENT
Start: 2023-01-12 | End: 2023-01-13

## 2023-01-12 RX ORDER — MAGNESIUM OXIDE 400 MG/1
800 TABLET ORAL ONCE
Status: COMPLETED | OUTPATIENT
Start: 2023-01-12 | End: 2023-01-12

## 2023-01-12 NOTE — PROGRESS NOTES
[facility]  Inpatient Wound Care Contact Note    Wyatt Condon Patient Status:  Inpatient    1945 MRN [id number]   [facility] 4SW-A Attending Gustavo Rodríguez, *   Hosp Day # 3 PCP Darron Gupta MD     Attempted to see patient for follow up wound care assessment/session. Patient is currently unavailable secondary to room change. Will set date to reassess area tomorrow . We will continue to follow this patient while in-house and assist with wound care discharge planning. Please page #[phone] if any questions.       Thank you,    ANDI GalvinN   [address] [facility]  [address] [facility] 12  [place], [address] [place] [address] Rd  [phone]

## 2023-01-12 NOTE — PLAN OF CARE
Received pt on 7L HF NC at change of shift. Lungs clear, diminished. VSS. Afeb. CHG bath done and diaper and pure wick changed. Had small soft BM. Anal fistula irrigated and packed with wet to dry dressing and covered with mepilex. Bipap placed on for the night. Pt woke around midnight and was confused, thought it was morning. Has difficulty finding her words and rambles at times. Stated this just started a couple days ago. Also has hand tremors intermittently when she is trying to do something.

## 2023-01-12 NOTE — CM/SW NOTE
Plan: [facility] pending bed availability. No bed available today      [facility] using Trilogy AVAPs nocturnally at their facility, rented through [employer] [phone] to notify them pt is going to a new facility. They confirmed they have all current settings and do not need anything from [address] [facility] will need to call them to order the machine. Notified [facility] liaison, [facility]    / to remain available for support and/or discharge planning.      Bre NOVOAA MSN, RN CTL/  [id number]

## 2023-01-12 NOTE — PLAN OF CARE
Assumed care of pt after shift report. Alert and oriented x4, intermittently forgetful. Afebrile. Controlled a-fib/a-flutter per monitor. Normotensive. Received on 7L HFNC, weaned to [place] as tolerated. Good appetite. BG 300s, insulin adjusted per hospitalist team. No BM. Purewick intact, adequate urine output. Denies pain. Dangle with PT/OT this afternoon. Discharge planning per case management. Transfer orders. Report given to [facility]. Pt updated and agreeable to plan of care. See MAR and flowsheets for additional information.

## 2023-01-12 NOTE — PROGRESS NOTES
NURSING TRANSFER NOTE      Patient admitted via bed  Oriented to room. Safety precautions initiated. Bed in low position. Call light in reach. Received pt at 1420. Pt is A&Ox4, forgetful at times. Wears glasses. 3L NC, continuous pulse oxygen, O2 sating WNL. Tele-aflutter/afib, controlled. Purewick. Briefed. Pt updated on POC, all questions and concerns addressed, pt verbalized understanding. Safety precautions in place, no further needs at this time.

## 2023-01-13 VITALS
HEIGHT: 68 IN | WEIGHT: 243.5 LBS | DIASTOLIC BLOOD PRESSURE: 55 MMHG | SYSTOLIC BLOOD PRESSURE: 122 MMHG | RESPIRATION RATE: 17 BRPM | BODY MASS INDEX: 36.9 KG/M2 | HEART RATE: 67 BPM | OXYGEN SATURATION: 99 % | TEMPERATURE: 98 F

## 2023-01-13 LAB
ANION GAP SERPL CALC-SCNC: 5 MMOL/L (ref 0–18)
BASOPHILS # BLD AUTO: 0 X10(3) UL (ref 0–0.2)
BASOPHILS NFR BLD AUTO: 0 %
BUN BLD-MCNC: 62 MG/DL (ref 7–18)
CALCIUM BLD-MCNC: 9 MG/DL (ref 8.5–10.1)
CHLORIDE SERPL-SCNC: 96 MMOL/L (ref 98–112)
CO2 SERPL-SCNC: 39 MMOL/L (ref 21–32)
CREAT BLD-MCNC: 1.9 MG/DL
EOSINOPHIL # BLD AUTO: 0 X10(3) UL (ref 0–0.7)
EOSINOPHIL NFR BLD AUTO: 0 %
ERYTHROCYTE [DISTWIDTH] IN BLOOD BY AUTOMATED COUNT: 17.6 %
GFR SERPLBLD BASED ON 1.73 SQ M-ARVRAT: 27 ML/MIN/1.73M2 (ref 60–?)
GLUCOSE BLD-MCNC: 204 MG/DL (ref 70–99)
GLUCOSE BLD-MCNC: 215 MG/DL (ref 70–99)
GLUCOSE BLD-MCNC: 222 MG/DL (ref 70–99)
HCT VFR BLD AUTO: 30.9 %
HGB BLD-MCNC: 9.1 G/DL
IMM GRANULOCYTES # BLD AUTO: 0.02 X10(3) UL (ref 0–1)
IMM GRANULOCYTES NFR BLD: 0.6 %
LYMPHOCYTES # BLD AUTO: 0.25 X10(3) UL (ref 1–4)
LYMPHOCYTES NFR BLD AUTO: 7.3 %
MAGNESIUM SERPL-MCNC: 1.7 MG/DL (ref 1.6–2.6)
MCH RBC QN AUTO: 28.3 PG (ref 26–34)
MCHC RBC AUTO-ENTMCNC: 29.4 G/DL (ref 31–37)
MCV RBC AUTO: 96 FL
MONOCYTES # BLD AUTO: 0.14 X10(3) UL (ref 0.1–1)
MONOCYTES NFR BLD AUTO: 4.1 %
NEUTROPHILS # BLD AUTO: 3 X10 (3) UL (ref 1.5–7.7)
NEUTROPHILS # BLD AUTO: 3 X10(3) UL (ref 1.5–7.7)
NEUTROPHILS NFR BLD AUTO: 88 %
OSMOLALITY SERPL CALC.SUM OF ELEC: 313 MOSM/KG (ref 275–295)
PLATELET # BLD AUTO: 125 10(3)UL (ref 150–450)
POTASSIUM SERPL-SCNC: 3.8 MMOL/L (ref 3.5–5.1)
RBC # BLD AUTO: 3.22 X10(6)UL
SARS-COV-2 RNA RESP QL NAA+PROBE: NOT DETECTED
SODIUM SERPL-SCNC: 140 MMOL/L (ref 136–145)
WBC # BLD AUTO: 3.4 X10(3) UL (ref 4–11)

## 2023-01-13 PROCEDURE — 99239 HOSP IP/OBS DSCHRG MGMT >30: CPT | Performed by: STUDENT IN AN ORGANIZED HEALTH CARE EDUCATION/TRAINING PROGRAM

## 2023-01-13 RX ORDER — MAGNESIUM OXIDE 400 MG/1
400 TABLET ORAL ONCE
Status: COMPLETED | OUTPATIENT
Start: 2023-01-13 | End: 2023-01-13

## 2023-01-13 RX ORDER — PREDNISONE 20 MG/1
40 TABLET ORAL
Qty: 8 TABLET | Refills: 0 | Status: SHIPPED | OUTPATIENT
Start: 2023-01-14 | End: 2023-01-18

## 2023-01-13 RX ORDER — INSULIN DEGLUDEC INJECTION 100 U/ML
20 INJECTION, SOLUTION SUBCUTANEOUS EVERY MORNING
Qty: 3 EACH | Refills: 0 | Status: SHIPPED | OUTPATIENT
Start: 2023-01-13

## 2023-01-13 RX ORDER — INSULIN ASPART 100 [IU]/ML
INJECTION, SOLUTION INTRAVENOUS; SUBCUTANEOUS
Qty: 6 ML | Refills: 0 | Status: SHIPPED | OUTPATIENT
Start: 2023-01-13

## 2023-01-13 RX ORDER — POTASSIUM CHLORIDE 20 MEQ/1
40 TABLET, EXTENDED RELEASE ORAL ONCE
Status: COMPLETED | OUTPATIENT
Start: 2023-01-13 | End: 2023-01-13

## 2023-01-13 NOTE — PLAN OF CARE
NURSING DISCHARGE NOTE    Discharged Rehab facility via Ambulance. Accompanied by EMTs  Belongings Taken by patient/family. Patient discharged to [facility]. Patient on 2L NC at baseline. Sacral wound dressing changed two times today, cleansed with normal saline and packed with Aquacel Ag. Patients  called x3 and daughter called x2 by this writer. Patients family did not answer the phone, unable to leave message d/t voicemail not being set up. Patient notified that this writer attempted to call family. Unable to locate patients clothes, per patient she came with a shirt and pants. Report given to Jenn at [facility]. IV x2 taken out, tele monitor taken off. Pressure was held on IV sites for approximately 12 minutes d/t bleeding.

## 2023-01-13 NOTE — PLAN OF CARE
Patient is alert and oriented x3-4. Req 5L nc while awake. Bipap at night. Duonebs. NS on tele. VSS. On eliquis. BM tonight. On PO steroids. QID accuchecks. Up with total lift. PT/OT on. POC discussed with pt, all questions answered at this time, pt verbalizes understanding. Problem: Patient/Family Goals  Goal: Patient/Family Long Term Goal  Description: Patient's Long Term Goal: discharge with appropriate means    Interventions:  - consults  -meds per mar  -PT/OT eval  - See additional Care Plan goals for specific interventions  Outcome: Progressing  Goal: Patient/Family Short Term Goal  Description: Patient's Short Term Goal:   1/12 noc: use bipap all night    Interventions:   - , hob 30  - See additional Care Plan goals for specific interventions  Outcome: Progressing     Problem: RESPIRATORY - ADULT  Goal: Achieves optimal ventilation and oxygenation  Description: INTERVENTIONS:  - Assess for changes in respiratory status  - Assess for changes in mentation and behavior  - Position to facilitate oxygenation and minimize respiratory effort  - Oxygen supplementation based on oxygen saturation or ABGs  - Provide Smoking Cessation handout, if applicable  - Encourage broncho-pulmonary hygiene including cough, deep breathe, Incentive Spirometry  - Assess the need for suctioning and perform as needed  - Assess and instruct to report SOB or any respiratory difficulty  - Respiratory Therapy support as indicated  - Manage/alleviate anxiety  - Monitor for signs/symptoms of CO2 retention  Outcome: Progressing     Problem: SAFETY ADULT - FALL  Goal: Free from fall injury  Description: INTERVENTIONS:  - Assess pt frequently for physical needs  - Identify cognitive and physical deficits and behaviors that affect risk of falls.   - [place] fall precautions as indicated by assessment.  - Educate pt/family on patient safety including physical limitations  - Instruct pt to call for assistance with activity based on assessment  - Modify environment to reduce risk of injury  - Provide assistive devices as appropriate  - Consider OT/PT consult to assist with strengthening/mobility  - Encourage toileting schedule  Outcome: Progressing     Problem: Diabetes/Glucose Control  Goal: Glucose maintained within prescribed range  Description: INTERVENTIONS:  - Monitor Blood Glucose as ordered  - Assess for signs and symptoms of hyperglycemia and hypoglycemia  - Administer ordered medications to maintain glucose within target range  - Assess barriers to adequate nutritional intake and initiate nutrition consult as needed  - Instruct patient on self management of diabetes  Outcome: Not Progressing     Problem: MUSCULOSKELETAL - ADULT  Goal: Return mobility to safest level of function  Description: INTERVENTIONS:  - Assess patient stability and activity tolerance for standing, transferring and ambulating w/ or w/o assistive devices  - Assist with transfers and ambulation using safe patient handling equipment as needed  - Ensure adequate protection for wounds/incisions during mobilization  - Obtain PT/OT consults as needed  - Advance activity as appropriate  - Communicate ordered activity level and limitations with patient/family  Outcome: Not Progressing

## 2023-01-13 NOTE — CM/SW NOTE
01/13/23 1500   Discharge disposition   Expected discharge disposition [address] [facility] Provider   ([facility] [place])   Discharge transportation [employer]     Notified by RN pt is medically cleared for discharge. SW spoke with Augusto Grover at [facility] who confirmed they have a bed available and can accept pt today. [employer] arranged for 5:30pm. Updated RN and Augusto Grover at [facility]. RN to update pt. Received message from Augusto Grover stating they will order trilogy through their own company, not [employer], and requested the settings. Settings uploaded to [facility] referral. PCS form completed and available for RN to print.       [facility]  [phone]    [facility]  [phone]    [facility]  Discharge Planner

## 2023-01-13 NOTE — DISCHARGE INSTRUCTIONS
Continue to monitor glucose closely while on insulin/steroids. Continue steroid taper as directed by pulmonology  Needs to use mask with all sleep  Continue to encourage activity, PT OT    Wound instructions:   Showering directions: May shower and/or cleanse wound with mild soap and water   Wound cleansing:  Cleanse with normal saline or wound cleanser   Wound product: Silver aquacel- cut into rope, leave a wick. Cover with bordered foam.   Dressing change frequency:  Change dressing daily and/or PRN

## 2023-01-17 ENCOUNTER — TELEPHONE (OUTPATIENT)
Dept: CARDIOLOGY CLINIC | Facility: HOSPITAL | Age: 78
End: 2023-01-17

## 2023-01-17 NOTE — TELEPHONE ENCOUNTER
Joseline on 1/20 cancelled due to recent hospitalization, new date of 2/17 given. Will f/u with further instructions.

## 2023-01-20 ENCOUNTER — HOSPITAL ENCOUNTER (INPATIENT)
Dept: INTERVENTIONAL RADIOLOGY/VASCULAR | Facility: HOSPITAL | Age: 78
Discharge: HOME | End: 2023-01-20
Attending: INTERNAL MEDICINE
Payer: MEDICARE

## 2023-01-30 DIAGNOSIS — I48.91 A-FIB (HCC): Primary | ICD-10-CM

## 2023-01-30 NOTE — CDS QUERY
Impaired Skin Integrity - Documented Pressure (Decubitus) Ulcer  CLINICAL Dolan Bamberger  Dear Doctor:  Clinical information (provided below) includes documentation by the Wound Care Consultant of Pressure (Decubitus) Ulcers. For accuracy in coding,   PLEASE INDICATE IF YOU ARE IN AGREEMENT WITH THE FOLLOWING   ASSESSMENTS BY THE WOUND CARE CONSULTANT:     Stage 3 Pressure Injury (Pressure Ulcer) to Sacrum   [  ] Yes, I agree with this assessment      [  ] No, I do not agree with this assessment  If not in agreement with these assessments, please provide your independent assessment of the Sacral wound:  ____________________________      Documentation from the Medical Record:   1/13/23 Inpatient Wound Care Consultation note:   Wound 10/26/22 #6 Pressure Injury Sacrum (Active)  Date First Assessed/Time First Assessed: 10/26/22 1431 Wound Number (Wound  Clinic Only): #6 Primary Wound Type: Pressure Injury Location: Sacrum    Assessments 1/13/2023 2:02 PM  Wound Image  Drainage Amount Moderate  Drainage Description Serosanguineous  Wound Length (cm) 4.5 cm  Wound Width (cm) 3.5 cm  Wound Surface Area (cm^2) 15.75 cm^2  Wound Depth (cm) 0.7 cm  Wound Volume (cm^3) 11. 025 cm^3  Wound Healing % -6025  Margins Well-defined edges  Milagro-wound Assessment Intact  Wound Granulation Tissue Pink;Pale Brown;Firm  Wound Bed Granulation (%) 100 % (with some fibrin)  Wound Odor None  Undermining 8 to 12 o' clock. Deepest undermining at 9 o' clock and is 0.8 cm  Pressure Injury Stage Stage 3    For questions regarding this query, please contact Clinical Documentation : Hubert Plascencia [phone]                                                                 THIS FORM IS A PERMANENT PART OF THE MEDICAL RECORD

## 2023-02-04 PROCEDURE — 99233 SBSQ HOSP IP/OBS HIGH 50: CPT | Performed by: INTERNAL MEDICINE

## 2023-02-13 ENCOUNTER — TELEPHONE (OUTPATIENT)
Dept: CARDIOLOGY CLINIC | Facility: HOSPITAL | Age: 78
End: 2023-02-13

## 2023-02-17 ENCOUNTER — HOSPITAL ENCOUNTER (INPATIENT)
Dept: INTERVENTIONAL RADIOLOGY/VASCULAR | Facility: HOSPITAL | Age: 78
Discharge: HOME | End: 2023-02-17
Attending: INTERNAL MEDICINE
Payer: MEDICARE

## 2023-08-29 NOTE — PLAN OF CARE
Assumed care at [address] [place] [address]. AOx3 can be forgetful at times. Denies any pain or discomfort. Midline incision C/D/I with staples. Right GRANT intact. Left colostomy intact and draining dark brown drainage. TPN infusing per order. Pt currently on low fiber diet. ADULT    • Absence of fever/infection during anticipated neutropenic period Progressing        SKIN/TISSUE INTEGRITY - ADULT    • Skin integrity remains intact Progressing    • Incision(s), wounds(s) or drain site(s) healing without S/S of infection Progre yes

## 2023-09-06 NOTE — PROGRESS NOTES
Start time: 12:15  End time: 12:45    Pt brought in BG log: has been testing BG's TID:  FBS's 112, 120's, 156, 173, 138 mostly 100-120  Post-B 150's  Post-D 140's, 110, 119    Hypoglycemia: Hasn't been low since Humalog before dinner reduced from 12 to 10 normal

## 2023-12-29 NOTE — PROGRESS NOTES
NURSING DISCHARGE NOTE    Report was called to the The [address] [place] [address]. A priscription was sent over for [address] [place] [address] via Ambulance. Accompanied by Support staff  Belongings Taken by patient/family. Physical Therapy Inpatient Rehab Treatment    Patient Name:  Chichi Mckee   MRN:  [id number]    Recommendations:     Discharge Recommendations:  Low Intensity Therapy   Discharge Equipment Recommendations:     Barriers to discharge: Impaired functional mobility     Assessment:     Chichi Mckee is a 89 y.o. male admitted with a medical diagnosis of Stroke.  He presents with the following impairments/functional limitations:  weakness, impaired functional mobility, gait instability, visual deficits, decreased coordination, decreased upper extremity function, decreased lower extremity function, decreased safety awareness, impaired fine motor, impaired coordination .    Rehab Diagnosis: Acute left posterior frontal and temporal lobe CVA s/p TNK with right upper extremity weakness. Pt. Has a past medical history of HTN, HLD, PAD, aortic stenosis, bladder mass, BPH, and very [place]    General Precautions: Standard, hearing impaired     Orthopedic Precautions:N/A     Braces: N/A    Rehab Prognosis: Fair; patient would benefit from acute skilled PT services to address these deficits and reach maximum level of function.      History:     Past Medical History:   Diagnosis Date    Hypertension        No past surgical history on file.    Subjective     Patient comments: n/a    Respiratory Status: Room air    Patients cultural, spiritual, Anglican conflicts given the current situation:      Objective:     Communicated with rn prior to session.  Patient found up in chair with peripheral IV  upon PT entry to room.    Vitals   Vitals at Rest  /66   HR 82   O2 Sat    Pain      Vitals With Activity  /73   HR 81   O2 Sat     Pain         Functional Mobility:        Current   Status  Discharge   Goal   Functional Area: Care Score:    Roll Left and Right   Independent   Sit to Lying   Independent   Lying to Sitting on Side of Bed   Independent   Sit to Stand 4  W/rw Set-up/clean-up   Chair/Bed-to-Chair Transfer 4  W/rw  Set-up/clean-up   Car Transfer   Set-up/clean-up   Walk 10 Feet 4 Set-up/clean-up   Walk 50 Feet with Two Turns 4 Set-up/clean-up   Walk 150 Feet 4  Pt amb 250ft w/rw and cga for safety. Pt amb with decrease speed. Set-up/clean-up   Walk 10 Feet Uneven Surface   Supervision or touching assistance   1 Step (Curb)   Supervision or touching assistance   4 Steps   Not applicable   12 Steps   Not applicable   Picking Up Object  Independent   Wheel 50 Feet with Two Turns  Not applicable   Wheel 150 Feet   Not applicable       Therapeutic Activities and Exercises:  Patient performed 2 set(s) of 10 repetitions of the following seated exercises: ankle pumps, long arc quads, marches, hip abduction, hip adduction, and knee flexion for bilateral LE. Patient required skilled PT for instruction of exercises and appropriate cues to perform exercises safely and appropriately.  Pt preformed arm bike 5'/5'.     Activity Tolerance: Good    Patient left up in chair with all lines intact and call button in reach.    Education provided: roles and goals of PT/PTA, transfer training, gait training, and strengthening exercises    Expected compliance: High compliance    Plan:     During this hospitalization, patient to be seen 5 x/week (5-7 x week) to address the identified rehab impairments via gait training, therapeutic activities, therapeutic exercises, neuromuscular re-education, wheelchair management/training and progress toward the following goals:    GOALS:   Multidisciplinary Problems       Physical Therapy Goals          Problem: Physical Therapy    Goal Priority Disciplines Outcome Goal Variances Interventions   Physical Therapy Goal     PT, PT/OT Ongoing, Progressing     Description: Bed Mobility:  Roll left and right with setup/clean-up assist.  Sit to supine transfer with setup/clean-up assist.  Supine to sit transfer with setup/clean-up assist.    Transfers:  Sit to stand transfer with setup/clean-up assist using RW.  Bed to  chair transfer with setup/clean-up assist Stand Step  using RW.  Car transfer with supervision/touching assist using RW.   an object from the ground in standing position with supervision/touching assist using RW.    Mobility:  Ambulate 150 feet with supervision/touching assist using RW.  Ambulate 15 feet on uneven surfaces/ramps with supervision/touching assist using RW.  Pt ascended/descended a 4 inch curb with supervision/touching assist using RW.  Ascend/descend 4 stairs with supervision/touching assist using bilateral handrails.                        Plan of Care Expires:  01/03/24  PT Next Visit Date: 01/03/24  Plan of Care reviewed with: patient    Additional Information:         Time Tracking:     Therapy Time  PT Received On: 12/29/23  PT Start Time: 1100  PT Stop Time: 1200  PT Total Time (min): 60 min   PT Individual: 60  Missed Time:    Time Missed due to:      Billable Minutes: Gait Training 15, Therapeutic Activity 30, and Therapeutic Exercise 15    12/29/2023

## 2024-02-13 NOTE — ED INITIAL ASSESSMENT (HPI)
Pain in the left lower abdominal area after taking an antifungal rxd by her uroloigist. Nausea and not eating with no bowel movement for 3 days.    Hx of bowel obstruction hide

## 2024-03-07 NOTE — PROGRESS NOTES
FM Addendum:     PCXR result from this am still pending (I cannot even see images). ABG better, mild hypercapnia, acidosis improved. Of note, the Hgb was 10.7 as opposed to 8.1 on the venous specimen. CT Scan OK - nothing obviously acute. Will observe.  Sukhdev De Oliveira Quality 226: Preventive Care And Screening: Tobacco Use: Screening And Cessation Intervention: Patient screened for tobacco use and is an ex/non-smoker Detail Level: Detailed Quality 485: Psoriasis - Improvement In Patient-Reported Itch Severity: Itch severity assessment score is reduced by 2 or more points from the initial (index) assessment score to the follow-up visit score

## 2024-06-11 NOTE — RESPIRATORY THERAPY NOTE
PATIENT HAS HISTORY OF GAYLE. PATIENT REFUSES TO USE CPAP DURING HOSPITAL STAY. While in Labor & Delivery

## 2025-03-06 NOTE — PROGRESS NOTES
Luz Elena Villa  : 1945 attended Diabetic Education:    Date: 2018   Start time: 1:30 End time: 2:30    Pt here with  Yola Gonzalez. Pt states A1C improved to 8% from 11%.     HgbA1C (%)   Date Value   2017 7.5 (H)   ----------     Bentley she could test post-prandial. Gave her/reviewed BG targets.     Problem Solving: Prevention,detection and treatment of acute complications: taught symptoms of hypoglycemia, hyperglycemia, how to treat low blood sugar (Rule of 15) and actions for lowering hi Dr. Harmon

## 2025-03-20 NOTE — PROGRESS NOTES
Duard Krabbe, 4/25/1945, 67year old, female is being discharged from Facility: [facility] [address]    Date of Admission:  9/14/17    Date of Discharge: 10/2/17 anticipated                             Admitting Diagnoses: bruits  BREAST: deferred exam  RESPIRATORY: CTA  CARDIOVASCULAR: S1, S2 normal  ABDOMEN:  normal active BS+, soft, obese abd; no organomegaly, no apparent masses  :Deferred  LYMPHATIC:no lymphedema  MUSCULOSKELETAL: no acute synovitis upper or lower extr  educated on wound care     Anxiety, Depression, Insomnia  1. Alprazolam 0.25mg po q 6 hrs PRN anxiety/insomnia  2. Melatonin 3mg po qhs due to pt c/o insomnia  3. Escitalopram increased to 10mg po daily     DM II, with stage 3 CKD  1.  Accuchecks ac 1:10pm    Greater than 30 minutes spent in coordination of care in preparation for discharge.     CAITLIN Chandler  9/29/2017  11:48 AM [Dizziness] : dizziness [Negative] : Gastrointestinal [SOB] : no shortness of breath [Dyspnea on exertion] : not dyspnea during exertion [Chest Discomfort] : no chest discomfort [Lower Ext Edema] : no extremity edema [Leg Claudication] : no intermittent leg claudication [Palpitations] : no palpitations [Orthopnea] : no orthopnea [Syncope] : no syncope [Rash] : no rash [Anxiety] : no anxiety

## 2025-04-02 NOTE — OCCUPATIONAL THERAPY NOTE
OCCUPATIONAL THERAPY TREATMENT NOTE - INPATIENT     Room Number: 971/777-H  Session: 1   Number of Visits to Meet Established Goals: 8    Presenting Problem: Left leg cellulitis    History related to current admission: Pt admittefd from home with weakness, FOOT SURGERY      foot   • HERNIA SURGERY     • HYSTERECTOMY      CAROL 2007   • OTHER SURGICAL HISTORY  5/3/17    cystoscopy Dr. Lisa Egan   • OTHER SURGICAL HISTORY  06/27/2017    Cysto w/ Dr. Lisa Egan   • OTHER SURGICAL HISTORY  07/26/2017    cysto Dr. Lisa Egan   • OTHER utilizing RW and bed height raised. Pt facilitated in functional mobility within room several feet during transfer from bed to chair requiring MOD A utilizing RW. Pt instructed in LB dressing lacy/doffing socks at TOTAL assist while seated.     Pt left amantadine 100 mg oral tablet: 2 tab(s) orally 2 times a day  amLODIPine 5 mg oral tablet: 1 tab(s) orally once a day  lidocaine 4% topical film: Apply topically to affected area once a day  melatonin 3 mg oral tablet: 1 tab(s) orally once a day (at bedtime), As needed, Insomnia  valbenazine 80 mg oral capsule: 1 cap(s) orally once a day

## 2025-06-30 NOTE — PROGRESS NOTES
[facility]  Progress Note    Joann Blanton Patient Status:  Inpatient    1945 MRN [id number]   [facility] 7NE-A Attending Lakesha Jimenez MD   [facility] Day # 6 PCP Gabriella Titus (Inactive)       SUBJECTIVE: \"They're trying speech velocity.       Data Review:     Labs:     Lab Results  Component Value Date   WBC 6.5 08/30/2017   HGB 10.0 08/30/2017   HCT 32.1 08/30/2017   .0 08/31/2017   CREATSERUM 1.78 08/30/2017   BUN 27 08/30/2017    08/30/2017   K 4.5 08/30/20 IVPB-minibag 2 g Intravenous Q12H   Metoclopramide HCl (REGLAN) injection 5 mg 5 mg Intravenous Q6H PRN   HYDROcodone-acetaminophen (NORCO) 5-325 MG per tab 1 tablet 1 tablet Oral Q4H PRN   Or      HYDROcodone-acetaminophen (NORCO) 5-325 MG per tab 2 table admission - from a Cardiac standpoint, I think she is euvolemic; from an intravascular and renal standpoint, she might be tipping over into azotemia again. She is very malnourished. Good LVSF is reassuring. The HR and BP are under adequate control.  I thin Admission

## (undated) DEVICE — SOLUTION  .9 1000ML BTL

## (undated) DEVICE — REM POLYHESIVE ADULT PATIENT RETURN ELECTRODE: Brand: VALLEYLAB

## (undated) DEVICE — POOLE SUCTION HANDLE: Brand: CARDINAL HEALTH

## (undated) DEVICE — MEDI-VAC YANKAUER SUCTION HANDLE W/BULBOUS TIP: Brand: CARDINAL HEALTH

## (undated) DEVICE — KENDALL SCD EXPRESS SLEEVES, KNEE LENGTH, MEDIUM: Brand: KENDALL SCD

## (undated) DEVICE — Device: Brand: DEFENDO AIR/WATER/SUCTION AND BIOPSY VALVE

## (undated) DEVICE — SYRINGE 10ML LL TIP

## (undated) DEVICE — DRAPE,UNDRBUT,WHT GRAD PCH,CAPPORT,20/CS: Brand: MEDLINE

## (undated) DEVICE — PROXIMATE LINEAR CUTTER RELOAD (STNADARD) , BLUE, 55MM: Brand: PROXIMATE

## (undated) DEVICE — STERILE POLYISOPRENE POWDER-FREE SURGICAL GLOVES WITH EMOLLIENT COATING: Brand: PROTEXIS

## (undated) DEVICE — COVER,MAYO STAND,STERILE: Brand: MEDLINE

## (undated) DEVICE — SOL  .9 3000ML

## (undated) DEVICE — PROXIMATE RELOADABLE LINEAR CUTTER WITH SAFETY LOCK-OUT.  55MM LINEAR CUTTER.: Brand: PROXIMATE

## (undated) DEVICE — SUTURE SILK 0

## (undated) DEVICE — TOWEL: OR BLU 80/CS: Brand: MEDICAL ACTION INDUSTRIES

## (undated) DEVICE — 1200CC GUARDIAN II: Brand: GUARDIAN

## (undated) DEVICE — DRESSING OPTIFOAM AG 3.5X10

## (undated) DEVICE — GAUZE STERILE 4X4 12PLY

## (undated) DEVICE — Device

## (undated) DEVICE — SUTURE CHROMIC GUT 3-0 SH

## (undated) DEVICE — BAG DRAIN INFECTION CNTRL 2000

## (undated) DEVICE — BLADE ELECTRODE: Brand: EDGE

## (undated) DEVICE — SYRINGE 30ML LL TIP

## (undated) DEVICE — PROXIMATE LINEAR CUTTER RELOAD, BLUE, 75MM: Brand: PROXIMATE

## (undated) DEVICE — VIOLET BRAIDED (POLYGLACTIN 910), SYNTHETIC ABSORBABLE SUTURE: Brand: COATED VICRYL

## (undated) DEVICE — DRAPE LEGGING STERILE

## (undated) DEVICE — ABSORBABLE HEMOSTAT (OXIDIZED REGENERATED CELLULOSE, U.S.P.): Brand: SURGICEL

## (undated) DEVICE — CHLORAPREP 26ML APPLICATOR

## (undated) DEVICE — SUTURE VICRYL 2-0

## (undated) DEVICE — SIGMOIDOSCOPE DISP STERILE

## (undated) DEVICE — STERILE POLYISOPRENE POWDER-FREE SURGICAL GLOVES: Brand: PROTEXIS

## (undated) DEVICE — SLEEVE KENDALL SCD EXPRESS MED

## (undated) DEVICE — SUTURE SILK 3-0 SH

## (undated) DEVICE — FILTERLINE NASAL ADULT O2/CO2

## (undated) DEVICE — 3M(TM) MICROPORE TAPE DISPENSER 1535-2: Brand: 3M™ MICROPORE™

## (undated) DEVICE — TUBING CYSTO

## (undated) DEVICE — SUTURE SILK 2-0

## (undated) DEVICE — DRESSING OPTIFOAM AG 3.5X6

## (undated) DEVICE — SUT ETHIBOND 5 CCS MB47G

## (undated) DEVICE — SUTURE PDS II 1 ST-21

## (undated) DEVICE — SOL  .9 1000ML BTL

## (undated) DEVICE — ENDOSCOPY PACK - LOWER: Brand: MEDLINE INDUSTRIES, INC.

## (undated) DEVICE — MEDI-VAC TUBING CONNECTOR 6-IN-1 "Y" POLYPROPYLENE: Brand: CARDINAL HEALTH

## (undated) DEVICE — PROXIMATE RELOADABLE LINEAR STAPLER: Brand: PROXIMATE

## (undated) DEVICE — TOWEL OR BLU 16X26 STRL

## (undated) DEVICE — PROXIMATE SKIN STAPLERS (35 WIDE) CONTAINS 35 STAINLESS STEEL STAPLES (FIXED HEAD): Brand: PROXIMATE

## (undated) DEVICE — RECTAL CDS-LF: Brand: MEDLINE INDUSTRIES, INC.

## (undated) DEVICE — SOLUTION SURG DURA PREP HAZMAT

## (undated) DEVICE — PROXIMATE LINEAR STAPLER RELOADS: Brand: PROXIMATE

## (undated) DEVICE — HYDROGEN PEROXIDE SOL 4OZ BTL

## (undated) DEVICE — PROXIMATE RELOADABLE LINEAR CUTTER WITH SAFETY LOCK-OUT, 75MM: Brand: PROXIMATE

## (undated) DEVICE — STAPLER CIRCULAR ENDO 29MM

## (undated) DEVICE — CATH IV ANGIOCATH 16G 5.25IN

## (undated) DEVICE — LAPAROTOMY CDS: Brand: MEDLINE INDUSTRIES, INC.

## (undated) DEVICE — CLOSURE EXOFIN 1.0ML

## (undated) DEVICE — HEX-LOCKING BLADE ELECTRODE: Brand: EDGE

## (undated) DEVICE — SPONGE STICK WITH PVP-I: Brand: KENDALL

## (undated) DEVICE — SUTURE VICRYL 0

## (undated) DEVICE — LIGASURE IMPACT OPEN DEVICE

## (undated) DEVICE — JELLY,LUBE,STERILE,FLIP TOP,TUBE,2-OZ: Brand: MEDLINE

## (undated) DEVICE — GOWN,SIRUS,FABRIC-REINFORCED,X-LARGE: Brand: MEDLINE

## (undated) DEVICE — GLOVE SURG TRIUMPH SZ 71/2

## (undated) DEVICE — SIGMOIDOSCOPE LIGHTED BIOSEAL

## (undated) DEVICE — SUTURE PDS II 1 TP-1

## (undated) DEVICE — SUTURE PROLENE 2-0 SH

## (undated) NOTE — IP AVS SNAPSHOT
[address]            (For Outpatient Use Only) Initial Admit Date: 2022   Inpt/Obs Admit Date: Inpt: 22 / Obs: N/A   Discharge Date:    Hospital Acct:  [de-identified]   MRN: [de-identified]   CSN: [id number]   CEID: [id number]        ENCOUNTER  Patient Class: Inpatient Admitting Provider: Merlyn Stahl DO Unit: [place] [address] Service: Medical Attending Provider: Kel Staton DO   Bed: 3622-A   Visit Type:   Referring Physician: No ref. provider found Billing Flag:    Admit Diagnosis: Hallucinations [R44.3]      PATIENT  Legal Name:   Irvin Nieves   Legal Sex: Female  Gender ID:              Pref Name:   FELIBERTO PCP:  Haritha Grey MD Home: [phone]   Address:  [address] [place] [address] : 1945 (77 yrs) Mobile: [phone]         City/State/Zip: [address] [place] [address], [facility] Marital:  Language: Alejandra park: Brandon SSN4: [id number] Adventism: Juanetta Danas Not Pastor Apa*     Race: White Ethnicity: Non  Or [address] [place] [address] Street   Name Relationship Legal Guardian? Home Phone Work Phone Mobile Phone   1.  Gomez Esteban  2. *No Contact Specified* Spouse      [phone]       GUARANTOR  Guarantor: Jhonatan Carter : 1945 Home Phone: [phone]   Address: [address] [place] [address]  Sex: Female Work Phone:    City/State/Zip: [address] [place] [address], [facility]   Rel. to Patient: Self Guarantor ID: [id number]   GUARANTOR EMPLOYER   Employer:  Status: RETIRED     COVERAGE  PRIMARY INSURANCE   Payor: MEDICARE Plan: MEDICARE PART A&B   Group Number:  Insurance Type: INDEMNITY   Subscriber Name: Ronny Francois : 1945   Subscriber ID: [id number] Pt Rel to Subscriber: Self   SECONDARY INSURANCE   Payor: [place] PPO Plan: [facility]   Group Number: [id number] Insurance Type: [address] Name: Ronny Francois : 1945   Subscriber ID: [id number] Pt Rel to Subscriber: SELF   TERTIARY INSURANCE   Payor: Plan:    Group Number:  Insurance Type:    Subscriber Name:  Subscriber :    Subscriber ID:  Pt Rel to Subscriber:    Hospital Account Financial Class: Medicare    2023

## (undated) NOTE — IP AVS SNAPSHOT
[facility] [place]  [address] [place], [address] ~ [phone]                Discharge Summary   1/2/2017    Hieu Chang           Admission Information        Provider Department    1/2/2017 Della Pringle MD  3nw-A Commonly known as:  AMARYL        Take 1 tablet (1 mg total) by mouth 2 (two) times daily before meals.     Kelvin Drake     [    ]    [    ]    [    ]    [    ]       levofloxacin 500 MG Tabs   Commonly known as:  LEVAQUIN        1 tab daily for 7 day - levofloxacin 500 MG Tabs      Please  your prescriptions at the location directed by your doctor or nurse     Bring a paper prescription for each of these medications    - glimepiride 1 MG Tabs  - metRONIDAZOLE 500 MG Tabs  - ondansetron 4 MG Tbd 32.2 (01/02/17)  32.6 -- (01/02/17)  151.0 --      Recent Hematology Lab Results (cont.)  (Last 3 results in the past 90 days)    Neutrophil % Lymphocyte % Monocyte % Eosinophil % Basophil % Prelim Neut Abs Final Neut Abs Lymphocyte Abso Monocyte Absolu Eo and ask to get set up for an insurance coverage that is in-network with [facility].         Shelley                    _____________________________________________________________________________    Medication Side Effects - Medications to be t Use: Increase blood cell counts   Most common side effects: Pain, fever, rash, fatigue, joint pain, blood clots, high blood pressure   What to report to your healthcare team: Pain, swelling, rash, fever           Blood Sugar Medications     glimepiride 1

## (undated) NOTE — IP AVS SNAPSHOT
Patient Demographics     Address  [address] [place] [address] Phone  [phone] [facility]) *Preferred*  [phone] [facility])      Emergency Contact(s)     Name Relation Home Work LELA Spouse [phone]      A Commonly known as: COLACE      Take 100 mg by mouth 2 (two) times daily. Enoxaparin Sodium 40 MG/0.4ML Soln  Commonly known as: LOVENOX      Inject 40 mg into the skin daily.           escitalopram 10 MG Tabs  Commonly known as: LEXAPRO      Take 1 Commonly known as: KENALOG      Apply topically 2 (two) times daily. Apply to BLE after cleansing BID          vancomycin 50 mg/ml Soln  Commonly known as: VANCOCIN      Take 2.5 mL (125 mg total) by mouth daily for 7 days.   Stop taking on: March 4, 2021 Temp  97.2 °F (36.2 °C) Filed at 02/25/2021 0825   SpO2  100 % Filed at 02/25/2021 0827      Patient's Most Recent Weight       Most Recent Value   Patient Weight  115.9 kg (255 lb 8.2 oz)      CPAP Settings (Inpatient)       Most Recent Value   Mode  AVAP Glucose 117 70 - 99 mg/dL [facility])   Sodium 142 136 - 145 mmol/L — [facility])   Potassium 3.7 3.5 - 5.1 mmol/L — [facility])   Chloride 98 98 - 112 mmol/L — [facility] ([facility])   CO2 40.0 21.0 - 32.0 mmol/L  [facility] ([facility])   Anion Gap Clostridium difficile(toxigenic)PCR Once [[id number]]  (Normal) Collected: 02/22/21 1042    Order Status: Completed Lab Status: Final result Updated: 02/22/21 1242    Specimen: Stool      C.  Difficile Toxin B Gene Negative    Rapid SARS-CoV-2 by PCR STAT [ Past Medical History:  Past Medical History:   Diagnosis Date   • [facility]    • C. difficile diarrhea    • Cataract    • CKD (chronic kidney disease)    • Diabetes ([facility]    • DISH (diffuse idiopathic skeletal hyperostosis)    • Disorder of • Cancer Mother         breast and bladder        Allergies:   Macrobid [Nitrofura*    HIVES  Codeine                 NAUSEA ONLY    Comment:From Tylenol #3    Medications:  No current facility-administered medications on file prior to encounter.      •  Ch •  dilTIAZem HCl ER Coated Beads 120 MG Oral Capsule SR 24 Hr, Take 1 capsule (120 mg total) by mouth daily. , Disp:  , Rfl: 0    •  bumetanide 1 MG Oral Tab, Take 1 tablet (1 mg total) by mouth daily. , Disp: 30 tablet, Rfl: 11    •  Nystatin 816644 UNIT/GM Extremities: No edema or cyanosis. Integument: No rashes or lesions.          Diagnostic Data:      Labs:  Recent Labs   Lab 02/22/21  0742   WBC 4.9   HGB 10.0*   .5*   .0*       Recent Labs   Lab 02/22/21  0742   GLU 94   BUN 37*   CREATSER Author: Justus Selby MD Service: Pulmonology Author Type: Physician    Filed: 2/22/2021  3:22 PM Date of Service: 2/22/2021  3:07 PM Status: Addendum    : Justus Selby MD (Physician)    Related Notes: Original Note by Justus Selby, AHI 39/ RDI 48/ REM AHI 71/ SaO2 75%/ CPAP 12/ [facility]   • Osteoarthritis    • Recurrent UTI    • Rheumatoid arthritis(714.0) 2012   • Small bowel obstruction ([facility] 1/2017   • Uterine cancer ([facility]    • Visual impairment       Past Surgical History:   Proced •  insulin glargine (SEMGLEE) 100 UNIT/ML Subcutaneous Solution Pen-injector, Inject 18 Units into the skin every morning., Disp: , Rfl: , 2/22/2021 at 0600    •  Saline Nasal Spray 0.65 % Nasal Solution, 1 spray by Nasal route 3 (three) times daily. , Disp •  escitalopram 10 MG Oral Tab, Take 10 mg by mouth daily. , Disp: , Rfl: , 2/21/2021 at 0900    •  Pravastatin Sodium 20 MG Oral Tab, Take 20 mg by mouth nightly., Disp: , Rfl: , 2/21/2021 at 2100    •  Cyanocobalamin ([employer] VITAMIN B-12 OR), Take 1 tablet b •  Saline Nasal Spray 0.65 % Nasal Solution, 1 spray by Nasal route 3 (three) times daily. , Disp: , Rfl:     •  aspirin 81 MG Oral Chew Tab, Chew 1 tablet (81 mg total) by mouth daily. , Disp:  , Rfl: 0    •  Insulin Aspart Pen 100 UNIT/ML Subcutaneous Solu • Heparin Sodium (Porcine)  5,000 Units Subcutaneous Q8H [address]   • piperacillin-tazobactam  3.375 g Intravenous Q8H BRITT[ND.2]         Continuous Infusing Medication:      PRN Medication:       OBJECTIVE:[ND.1]   02/22/21  1300 02/22/21  1328 02/22/21  1400 0 - ICU. At high risk for decompensation     Critical care time: 35 minutes     Myrna Siddiqui MD  Pulmonary and Critical Care[ND.1]       Electronically signed by Majo Joyce MD on 2/22/2021  3:22 PM   Attribution Rubio    ND. 1 - Majo Joyce, • CKD (chronic kidney disease)    • Diabetes (HCC)    • DISH (diffuse idiopathic skeletal hyperostosis)    • Disorder of thyroid    • Diverticulosis    • HTN (hypertension)    • Hyperlipidemia    • Lymphedema of lower extremity    • Morbid obesity ([facility] Pt typically lives with spouse in 2 story home. Pt stays on the main level of home. Pt ambulates with RW and sleeps in a lift recliner chair. Pt independent with ADL and mobility. SUBJECTIVE  \"I can't stand! \"     Patient self-stated goal is not state -   Moving to and from a bed to a chair (including a wheelchair)?: Total   -   Need to walk in hospital room?: Total   -   Climbing 3-5 steps with a railing?: Total       AM-PAC Score:  Raw Score: 8   Approx Degree of Impairment: 86.62%   Standardized Scor Patient is a 76year old female admitted on 2/22/2021 with dyspnea and AMS. Pt presenting with acute on chronic hypercapnic respiratory failure. Rapid and PCR COVID testing negative.  Pertinent comorbidities and personal factors impacting therapy include DM Goal #6    Goal Comments: Goals established on 2/23/2021    PPE donned by therapist; N95 mask, gown, gloves, goggles[KW. 1]     Attribution Rubio    KW.1 Bryson Vizcaino PT on 2/23/2021  4:30 PM                        Occupational Therapy Notes (last 72 • Hyperlipidemia    • Lymphedema of lower extremity    • Morbid obesity (HCC)    • GAYLE (obstructive sleep apnea) DMG DX 1-23-12 [place] 2-9-12    AHI 39/ RDI 48/ REM AHI 71/ SaO2 75%/ CPAP 12/ [facility]   • Osteoarthritis    • Recurrent UTI    • Rheumatoid arthriti OBJECTIVE  Precautions: Bed/chair alarm  Fall Risk: High fall risk    WEIGHT BEARING RESTRICTION  Weight Bearing Restriction: None                PAIN ASSESSMENT  Ratin  Location: denies       COGNITION  Attention Span:  difficulty attending to directi Score: 11  Approx Degree of Impairment: 70.42%  Standardized Score (AM-PAC Scale): 29.04  CMS Modifier (G-Code): CL    FUNCTIONAL TRANSFER ASSESSMENT  Supine to Sit : Moderate assistance  Sit to Stand: Dependent assistance    Skilled Therapy Provided: Coreen The patient is functioning below her previous functional level and would benefit from skilled inpatient OT to address the above deficits, maximizing patient’s ability to return safely to her prior level of function.     Subacute rehab is recommended for 12- AO.1 Oscar Harris OT on 2/23/2021  5:58 PM                     Video Swallow Study Notes    No notes of this type exist for this encounter.         SLP Note - SLP Notes      SLP Note signed by YENY Nichols at 2/24/2021  3:17 PM  Version 1 of 1 Goal #1 The patient will tolerate regular consistency and thin liquids without overt signs or symptoms of aspiration with 90 % accuracy over 1 session(s).   Met                                        FOLLOW UP  Follow Up Needed: No  SLP Follow-up Date: 02/2

## (undated) NOTE — LETTER
[facility] [facility] [place] FOR BLOOD [place] BLOOD COMPONENTS      In the course of your treatment, it may become necessary to administer a transfusion of blood or blood components. This form provides basic information concerning this procedure and, if signed by you, authorizes its performance by qualified medical personnel. DESCRIPTION OF PROCEDURE:  Blood is introduced into one of your veins, commonly in the arm, using a sterilized disposable needle. The amount of blood transfused, and whether the transfusion will be of blood or blood components is a judgment the physician will make based on your particular needs. RISKS:  The transfusion is a common procedure of low risk. MINOR AND TEMPORARY REACTIONS ARE NOT UNCOMMON, including a slight bruise, swelling or local reaction in the area where the needle pierces your skin, or a non-serious reaction to the transfused material itself, including headache, fever or a mild skin reaction, such as rash. Serious reactions are possible, though very unlikely and include severe allergic reaction (shock)  and destruction (hemolysis) of transfused blood cells. Infectious diseases which are known to be transmitted by blood transfusion include CERTAIN TYPES OF VIRAL HEPATITIS, a viral infection of the liver, HUMAN IMMUNODEFICIENCY VIRUS (HIV-1,2) infection, a viral infection known to cause ACQUIRED IMMUNODEFICIENCY SYNDROME (AIDS) AS WELL AS CERTAIN OTHER BACTERIAL, VIRAL AND PARASITIC DISEASES. While a minimal risk of acquiring an infectious disease from transfused blood exists, in accordance with Federal and State law all due care has been taken in donor selection and testing to avoid transmission of disease. ALTERNATIVES:  If loss of blood poses serious threats in the course of your treatment, THERE IS NO EFFECTIVE ALTERNATIVE TO BLOOD TRANSFUSION.  However, if you have any further questions on this matter, your physician will fully explain the alternatives to you if it has not already been done. I,Maira Esteban, have read/had read to me the above. I understand the matters bearing on the decision whether or not to authorize a transfusion of blood or blood components. I have no questions which have not been answered to my full satisfaction.  I hereby consent to such transfusion as  my physician may deem necessary or advisable in the course of my treatment.        _______________   __________________________________________________  Date     Signature of Patient, Parent or Legal Guardian      ([place])      __________________________________________  Witness to Signature (title or relationship to patient)    Patient Name: Sienna Goodrich     : 1945                 Printed: 2022     Medical Record #: [id number]                    Page 1 of 1

## (undated) NOTE — IP AVS SNAPSHOT
[address]            (For Outpatient Use Only) Initial Admit Date: 2/8/2021   Inpt/Obs Admit Date: Inpt: 2/8/21 / Obs: N/A   Discharge Date:    Dre Rubio:  [de-identified]   MRN: [de-identified]   CSN: [id number]   CEID: [id number] Payor:  Plan:    Group Number:  Insurance Type:    Subscriber Name:  Subscriber :    Subscriber ID:  Pt Rel to Subscriber:    Hospital Account Financial Class: Medicare    2021

## (undated) NOTE — LETTER
Date: 11/10/2021  Patient name: Jenny Aguilar  YOB: 1945  Medical Record Number: [id number]  Coverage: Payor: MEDICARE / Plan: MEDICARE PART A&B / Product Type: *No Product type* /   Insurance ID: [id number]  Patient Address: [address] [place] [address] Linked orders to display       Wound 10/22/21 #2 Left Lower Leg Venous Ulcer Leg Right (Active)   Date First Assessed/Time First Assessed: 10/22/21 1007    Wound Number (Wound Clinic Only): #2 Left Lower Leg  Primary Wound Type: Venous Ulcer  Location: Leg (Active)   Placement Date/Time: 10/22/21 1124   Location: Leg  Wound Location Orientation: Anterior;Right      Assessments 10/22/2021 11:24 AM 11/10/2021 10:39 AM   Response to Treatment Well tolerated Well tolerated   Compression Layers Multilayer 2   Com

## (undated) NOTE — IP AVS SNAPSHOT
[address]            (For Outpatient Use Only) Initial Admit Date: 12/21/2020   Inpt/Obs Admit Date: Inpt: 12/21/20 / Obs: N/A   Discharge Date:    Alex Smith:  [de-identified]   MRN: [de-identified]   CSN: [id number]   CEID: [id number] TERTIARY INSURANCE   Payor:  Plan:    Group Number:  Insurance Type:    Subscriber Name:  Subscriber :    Subscriber ID:  Pt Rel to Subscriber:    Hospital Account Financial Class: Medicare    2020

## (undated) NOTE — LETTER
[facility] [address] [place], [address] [facility]  Consent for Procedure/Sedation    Date:     Time:       1. I authorize the performance upon Dean Head the following:  Cardioversion     2.  I authorize Dr. Laura Nazario (and whomever is designated Witness: _________________________      Date: ___________________    Printed: 2018   9:20 AM  Patient Name: Duard Krabbe        : 1945       Medical Record #: [id number]

## (undated) NOTE — LETTER
20    Patient: Sridhar Mc  : [birth year] Visit date: 2020    Dear  Dr. Gala Rivera MD,    Thank you for referring Sridhar Mc to my practice. Please find my assessment and plan below.         Assessment   Colostomy stricture ([facility] [address]  (pr procedure. She states she had a large amount of pain and delusion following the procedure including hallucinations.      The patient states that she has refused up until this point to undergo a colostomy reversal.     1 year ago, the patient states that sh rectal remnant. That will also require extreme resection of the proximal colon involved in the subcutaneous portions of her current colostomy.     Her morbid obesity will complicate everything including the laparotomy required to lyse adhesions, takedown h

## (undated) NOTE — LETTER
19    Patient: Laxmi Spence  :  Visit date: 2019    Dear  Dr. Riana Bryant MD,    Thank you for referring Laxmi Spence to my practice. Please find my assessment and plan below.         Assessment   Proctitis  (primary encounter movement. She also states that she has blood that will leak onto her underwear. 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 She has a very long remnant of the colon that remains.   This is obvi I will see her again in 1 month to note her progress on the above listed regimen. We will make plans to either revise the colostomy, or get rid of it altogether.              Sincerely,       Jagdish Erickson MD   CC: No Recipients

## (undated) NOTE — LETTER
[facility] [address], [address] [facility]    Consent for Anesthesia   1.    Kelby Hiral agree to be cared for by an anesthesiologist, who is specially trained to monitor me and give me medicine to put me to sleep or keep me comfor vision, nerves, or muscles and in extremely rare instances death. 5. My doctor has explained to me other choices available to me for my care (alternatives).   6. Pregnant Patients (“epidural”):  I understand that the risks of having an epidural (medicine g

## (undated) NOTE — IP AVS SNAPSHOT
[address]            (For Outpatient Use Only) Initial Admit Date: 8/25/2017   Inpt/Obs Admit Date: Inpt: 8/25/17 / Obs: N/A   Discharge Date:    Minna Marinelli:  [de-identified]   MRN: [de-identified]   CSN: [id number]        ENCOUNTER  Patient Subscriber Name:  Minh Wilson :    Subscriber ID:  Pt Rel to Subscriber:    Hospital Account Financial Class: Medicare    2017

## (undated) NOTE — ED AVS SNAPSHOT
Shiramoon Rincon   MRN: [id number]    Department:  [facility] Emergency Department   Date of Visit:  9/17/2017           Disclosure     Insurance plans vary and the physician(s) referred by the ER may not be covered by your plan.  Please contact y If you have been prescribed any medication(s), please fill your prescription right away and begin taking the medication(s) as directed    If the emergency physician has read X-rays, these will be re-interpreted by a radiologist.  If there is a significant

## (undated) NOTE — IP AVS SNAPSHOT
[facility] [place] [address] [place], [address] ~ [phone]                Discharge Summary   2/28/2017    Luz Elena Villa           Admission Information        Provider Department    2/28/2017 Herb Rush MD  Endoscopy [    ]    [    ]    [    ]                 Patient Instructions       Home Care Instructions for Colonoscopy With Sedation    Diet:  - Resume your regular diet as tolerated unless otherwise instructed.   - start with light meals to minimize bloating.  - Do WBC RBC Hemoglobin Hematocrit MCV MCH MCHC RDW Platelet MPV    (00/43/05)  3.9 (L) (01/02/17)  3.94 (01/02/17)  12.7 (01/02/17)  39.0 (01/02/17)  99.0 -- -- -- (01/02/17)  151.0 --      Recent Hematology Lab Results (cont.)  (Last 3 results in the past 90 Visit Information        Department Dept Phone    2/28/2017  5:45 AM  Endoscopy [phone]         We want to hear from you       We want to hear from you, please share your experience with us by returning the survey you will receive in the mail.

## (undated) NOTE — LETTER
Patient Name: Guadalupe Julien CSN: [id number]  -Age / Sex: 1945-A: 68 y  female Medical Records: [id number]    ABNORMAL VALUES  Surgeon(s):  Zabrina Wilson MD  Anesthesia Type: General  Procedure Description: ANAL EXAM UNDER ANESTHESIA POSSIBLE INCISION AND DRAINAGE, FISTULOTOMY POSSIBLE SETON  Primary Surgeon:  Zabrina Wilson MD  Phone Number: [phone]    PLEASE NOTE THE FOLLOWING ABNORMALITIES:   Chemistry  elevated bun & creatinine  ________________________________________________________  Anesthesia to review patient's chart

## (undated) NOTE — LETTER
Date: 1/10/2022  Patient name: Aramis Maya  YOB: 1945  Medical Record Number: [id number]  Coverage: Payor: MEDICARE / Plan: MEDICARE PART A&B / Product Type: *No Product type* /   Insurance ID: [id number]  Patient Address: [address] [place] [address] Wound Bed Slough (%) 20 % —   Wound Odor None None   Shape — Bridged       No Linked orders to display       Wound 10/22/21 #2 Left Lower Leg Venous Ulcer Leg Right (Active)   Date First Assessed/Time First Assessed: 10/22/21 1007    Wound Number (Wound Location Orientation: Anterior;Right      Assessments 10/22/2021 11:24 AM 1/10/2022 12:13 PM   Response to Treatment Well tolerated Well tolerated   Compression Layers Multilayer Multilayer   Compression Product Type Stockinette 4in; Comprilan 8cm; Comprilan

## (undated) NOTE — LETTER
21    Patient: Shea Moctezuma  :  Visit date: 2021    Dear  Estelle Huddleston MD    Thank you for referring Shea Moctezuma to my practice. Please find my assessment and plan below.     Assessment   Ventral hernia without obstruc that she has been having a work-up for the reason why she has been short of breath, however she states that they have not made any diagnosis yet.     The patient states that she is also been seeing her cardiologist Dr. Aixa Robbins about her heart as well, and she

## (undated) NOTE — LETTER
Date: 1/31/2022  Patient name: María Prieto  YOB: 1945  Medical Record Number: [id number]  Coverage: Payor: MEDICARE / Plan: MEDICARE PART A&B / Product Type: *No Product type* /   Insurance ID: [id number]  Patient Address: [address] [place] [address] 20 % —   Wound Odor None None       Inactive Orders   Date Order Priority Status Authorizing Provider   01/20/22 2023 Consult to Wound Ostomy Routine Completed Geoffrey Lin MD     - Reason for Consult:    Wound Care     - Wound Care Reason for Consult: Product Type Stockinette 4in; Comprilan 10cm; Comprilan 12cm; Comprilan 8cm; Coban Coflex   Dressing Applied Yes Yes   Compression Wrap Location Toes to Knee Toes to Knee   Compression Wrap Status Clean;Dry; Intact Clean;Dry       No Linked orders to display

## (undated) NOTE — IP AVS SNAPSHOT
[address]            (For Outpatient Use Only) Initial Admit Date: 2023   Inpt/Obs Admit Date: Inpt: 23 / Obs: N/A   Discharge Date:    Hospital Acct:  [de-identified]   MRN: [de-identified]   CSN: [id number]   CEID: [id number]        ENCOUNTER  Patient Class: Inpatient Admitting Provider: Trish Dash DO Unit: [address] Service: Medical Attending Provider: Jose Gillis MD   Bed: 523-A   Visit Type:   Referring Physician: No ref. provider found Billing Flag:    Admit Diagnosis: Urinary tract infection without hematuria, site unspecified [N39.0]      PATIENT  Legal Name:   Vipul Guerrero   Legal Sex: Female  Gender ID:              [address] [facility] Name:   FELIBERTO PCP:  Prince Rueda MD Home: [phone]   Address:  [address] [place] [address] : 1945 (77 yrs) Mobile: [phone]         City/State/Zip: [place], [address] Marital:  Language: Alejandra park: [place] SSN4: [id number] Hoahaoism: Eulas Sell Not Genora Clapper*     Race: White Ethnicity: Non  Or [address] [place] [address]   Name Relationship Legal Guardian? Home Phone Work Phone Mobile Phone   Spike Ware Spouse      [phone]  [phone]     GUARANTOR  Guarantor: Jolenebuck Valenciado : 1945 Home Phone: [phone]   Address: [address] [place] [address]  Sex: Female Work Phone:    City/State/Zip: [place], [address]   Rel. to Patient: Self Guarantor ID: [id number]   GUARANTOR EMPLOYER   Employer:  Status: RETIRED     COVERAGE  PRIMARY INSURANCE   Payor: MEDICARE Plan: MEDICARE PART A&B   Group Number:  Insurance Type: INDEMNITY   Subscriber Name: Romelia Condon : 1945   Subscriber ID: [id number] Pt Rel to Subscriber: Self   SECONDARY INSURANCE   [place]   Group Number: [id number] Insurance Type: [id number] Name: Romelia Condon : 1945   Subscriber ID: [id number] Pt Rel to Subscriber: SELF   TERTIARY INSURANCE   Payor:  Plan:    Group Number:  Insurance Type:    Subscriber Name:  Subscriber :    Subscriber ID:  Pt Rel to Subscriber:    Hospital Account Financial Class: Medicare    2023

## (undated) NOTE — LETTER
10/28/19    Patient: Catarino Andino  :  Visit date: 10/28/2019    Dear  Dr. Silvina Pa MD,    Thank you for referring Catarino Andino to my practice. Please find my assessment and plan below.              Assessment   Colostomy stricture ([facility] [address] bright red blood per rectum. She notes that she has noted blood well wiping after having stool from her rectum. The patient states that she has a small amount of mucus from her rectum and a ball of stool that she notes on occasion.   When this happens she patient's bright red blood per rectum. We will evaluate the patient's rectum as well as do a colonoscopy through the patient's ostomy.     After the patient undergoes the colonoscopy, I will have the patient follow-up for further discussion of potential co

## (undated) NOTE — LETTER
[facility]  [address] [facility], [address] [place] [address]    Consent for Operation    Date: __________________    Time: _______________    1.  I authorize the performance upon Lena Ortez the following operation:    Procedure(s):  EXPLORATORY LAPARO procedure has been videotaped, the surgeon will obtain the original videotape. The hospital will not be responsible for storage or maintenance of this tape.     6. For the purpose of advancing medical education, I consent to the admittance of observers to t STATEMENTS REQUIRING INSERTION OR COMPLETION WERE FILLED IN.     Signature of Patient:   ___________________________    When the patient is a minor or mentally incompetent to give consent:  Signature of person authorized to consent for patient: ____________ supplements, and pills I can buy without a prescription (including street drugs/illegal medications). Failure to inform my anesthesiologist about these medicines may increase my risk of anesthetic complications.   · If I am allergic to anything or have had Anesthesiologist Signature     Date   Time  I have discussed the procedure and information above with the patient (or patient’s representative) and answered their questions. The patient or their representative has agreed to have anesthesia services.     ___

## (undated) NOTE — IP AVS SNAPSHOT
Patient Demographics     Address  [address] [place] [address] Phone  [phone] (Home) *Preferred*      Emergency Contact(s)     Name Relation Home Work Maddison Pickett [phone]        Allergies as of 9/14/2017  Reviewed on: 9 [address] [place] [id number] [phone]                  Your medication list      TAKE these medications       Instructions Authorizing Provider Morning Afternoon Evening As Needed   ALPRAZolam 0.25 MG Tabs  Commonly known as: Regina Tipton MD               Where to Get Your Medications      Please  your prescriptions at the location directed by your doctor or nurse    Bring a paper prescription for each of these medications  ALPRAZolam 0.25 MG Tabs  apixaban 5 MG Ta [id number] Insulin Aspart Pen (NOVOLOG) 100 UNIT/ML flexpen 4-20 Units 09/13/17 1848 Given                    Recent Vital Signs    Flowsheet Row Most Recent Value   Vitals  131/51 Filed at 09/14/2017 0929   Pulse  75 Filed at 09/14/2017 0929   Resp  15 Fi referred to the ER at [facility] for evaluation. Testing suggested another flare of increased sigmoid colon inflammation with proximal colonic distention and obstruction. She has had 2 previous flares of this problem since January 2017.  Colonoscopy ea Comment: Procedure: COLONOSCOPY;  Surgeon: Michelet Carmona MD;  Location: [address] [place] [address] ENDOSCOPY  3-1-11: CYSTOURETHROSCOPY      Comment: cysto flow US, dr Dugan Signs  No date: EYE SURGERY      Comment: eye  No date: FOOT SURGERY      Comment: foot  N METOPROLOL SUCCINATE 25 MG OR TB24 1 TABLET DAILY Disp:  Rfl:  8/25/2017 at Unknown time       Review of Systems:  Pertinent items are noted in HPI. No HA.  Cognition is normal.     Physical Exam:[MP.1]  /57 (BP Location: Left arm)   Pulse 80   Temp 9 CREATSERUM 1.10 08/25/2017   BUN 32 08/25/2017    08/25/2017   K 4.0 08/25/2017    08/25/2017   CO2 27.0 08/25/2017    08/25/2017   CA 9.2 08/25/2017   ALB 3.4 08/25/2017   ALKPHO 70 08/25/2017   BILT 0.4 08/25/2017   TP 7.2 08/25/2017 1 night. Sliding Scale Insulin for Glucose Coverage. Hydration. Will follow. If goes to ICU, [facility] (Jamie Maldonado MD, Rodrigue Campos MD etc). Total Admission Day Care = 75 minutes.  Above d/w the patient Azotemia     Hyperglycemia     Abdominal pain, left lower quadrant     Abnormal abdominal CT scan     Bowel obstruction (HCC)     High risk medications (not anticoagulants) long-term use     Acute renal failure superimposed on stage 3 chronic kidney dis No date: HYSTERECTOMY      Comment: CAROL 2007  5/3/17: OTHER SURGICAL HISTORY      Comment: cystoscopy Dr. Holland Guaman  06/27/2017: OTHER SURGICAL HISTORY      Comment: Cysto w/ Dr. Holland Guaman  07/26/2017: OTHER SURGICAL HISTORY      Comment: cysto Dr. Holland Guaman  No date: [facility] •  Metoclopramide HCl (REGLAN) injection 5 mg, 5 mg, Intravenous, Q6H PRN  •  HYDROcodone-acetaminophen (NORCO) 5-325 MG per tab 1 tablet, 1 tablet, Oral, Q4H PRN **OR** HYDROcodone-acetaminophen (NORCO) 5-325 MG per tab 2 tablet, 2 tablet, Oral, Q4H PRN excursions and effort. Abdomen: Soft, non-tender. Extremities: Without clubbing, cyanosis or edema. Peripheral pulses are 2+. Neurologic: Alert and oriented, normal affect. Skin: Warm and dry.      Laboratories and Data:  Diagnostics:  EKG: afib @ 147 Obstructive sleep apnea, adult    Controlled type 2 diabetes mellitus with stage 3 chronic kidney disease, without long-term current use of insulin (HCC)    Acute renal failure superimposed on stage 3 chronic kidney disease (HCC)    High risk medications Precautions: Limb alert - right;Limb alert - left (Ostomy pouch, Abdominal incision, Drain, Anxiety)    WEIGHT BEARING RESTRICTION  Weight Bearing Restriction: None                PAIN ASSESSMENT   Rating: Unable to rate  Location: abdominal incision, whol negative and not willing to participate in session. Pt requires max encouragement x 3 staff members and verbally rude. Pt able to ambulate 50 - 25 with use of RW. Pt refusing to ambulate farther and demo's good eccentric lowering into chair.      Sonali Ramos Goal #1 Patient is able to demonstrate supine - sit EOB @ level: minimum assistance   Goal #2 Patient is able to demonstrate transfers Sit to/from Stand at assistance level: minimum assistance to rw   Goal #3 Patient is able to ambulate 50 feet with assist Past Medical History[AR.1]  Past Medical History:   Diagnosis Date   • Anesthesia complication     patient states 'it took me 6 hours to wake up after my hysterectomy'   • Back problem     DISH   • CANCER     uterine   • Cancer [facility])     Uterine CA   • D Static Sitting: Fair  Dynamic Sitting: Fair -           Static Standing: Poor +  Dynamic Standing: Poor +[AR.2]    ACTIVITY TOLERANCE  O2 Saturation: 94%  Liters of O2:  1  No shortness of breath    AM-P Knee extension     Upper Extremity  - open/close     Position Sitting     Repetitions   5   Sets   1[AR.1]     Patient End of Session: Up in chair;Needs met;Call light within reach;RN aware of session/findings; All patient questions and concerns address Goal Comments: Goals established on 8/27/2017 - all goals ongoing as of[AR.1] 9/12/2017[AR.2]      Attribution Rubio    AR. 1 Ok Aponte, PTA on 9/12/2017  1:19 PM  AR. 2 - Rishi Luz, PTA on 9/12/2017  1:20 PM               Physical Therapy Note • Rheumatoid arthritis(714.0) ([facility] [address] 2012   • SLEEP APNEA DMG DX 1-23-12 TX 2-9-12    AHI 39/ RDI 48/ REM AHI 71/ SaO2 75%/ CPAP 12/ [employer]   • Sleep apnea    • Small bowel obstruction (HCC) 1/2017[AR.2]       Past Surgical History[AR.1]  Past Surgical Histo -   Turning over in bed (including adjusting bedclothes, sheets and blankets)?: A Lot   -   Sitting down on and standing up from a chair with arms (e.g., wheelchair, bedside commode, etc.): Unable   -   Moving from lying on back to sitting on the side of t Position Sitting     Repetitions   5   Sets   1[AR.1]     Patient End of Session: Up in chair;Needs met;Call light within reach;RN aware of session/findings; All patient questions and concerns addressed; Family present[AR.2]    ASSESSMENT     Pt seen for gai Goal Comments: Goals established on 8/27/2017 - all goals ongoing as of[AR.1] 9/11/2017[AR.2]      Attribution Rubio    AR. 1 Justo Beverly, PTA on 9/11/2017  3:12 PM  AR. 2 - Juan Jose Farrar, PTA on 9/11/2017  3:14 PM                        Occupational High risk medications (not anticoagulants) long-term use    Recurrent UTI    DISH (diffuse idiopathic skeletal hyperostosis)    Dehydration    Anemia    Azotemia    Hyperglycemia    Acidosis    Protein-calorie malnutrition, mild (HCC)    Disorientation Management Techniques:  Activity promotion     ACTIVITY TOLERANCE  O2 Saturation: 98%    ACTIVITIES OF DAILY LIVING ASSESSMENT  AM-PAC ‘6-Clicks’ Inpatient Daily Activity Short Form  How much help from another person does the patient currently need…  -   Pu maximizing patient's ability to return to prior level of function. Based on -[facility] Daily Living Assessment, pt presents with 53.32% degree of ADL impairment. Research supports that pt with this level of impairment often benefit from discharging to rehab. 8/25/2017 from home with c/o LLQ pain. Pt diagnosed with large bowel obstruction. Pt is s/p EXPLORATORY LAPAROTOMY, SIGMOID RESECTION  WITH COLOSTOMY, primary repair of ventral hernia 8/26. ** Pt was transferred to CTU on 8/30 for A-fib.   VQ lung scan o Comment: Procedure: COLONOSCOPY;  Surgeon: Michelet Carmona MD;  Location: [address] [place] [address] ENDOSCOPY  3-1-11: CYSTOURETHROSCOPY      Comment: cysto flow US, dr Dugan Signs  No date: EYE SURGERY      Comment: eye  No date: FOOT SURGERY      Comment: foot  N Skilled Therapy Provided: 90% room air, placed 1 liter oxygen back on, 94%. Mod A x 1 supine to sit. Mod encouragement provided. [MS.1]  Pt[MS. 2] completed sit to stand mod A x 2. Pt donned gown while seated, min A.   Pt was able to raise her arms and lan Rehab Potential : Good  Frequency (Obs): 5x/week[MS.1]        OT Goals:   ADL Goals  Patient will perform upper body dressing:  with supervision-ongoing  Patient will perform lower body dressing:  with min assist and with adaptive equipment PRN-ongoing  Pa Active Problems:    Abdominal pain, left lower quadrant    Abnormal abdominal CT scan    Atrial fibrillation with RVR (HCC)    Obstructive sleep apnea, adult    Controlled type 2 diabetes mellitus with stage 3 chronic kidney disease, without long-term curr Comment: shoulder    SUBJECTIVE  \"I am just not going to do that. \" When asked about further mobility.       OBJECTIVE  Precautions: Limb alert - right;Limb alert - left (Ostomy pouch, Abdominal incision, Drain, Anxiety)    WEIGHT BEARING RESTRICTION aware of session/findings; All patient questions and concerns addressed; Family present    ASSESSMENT   Pt demonstrated decreased activity endurance.   Patient presents with the following impairments: decreased trunk mobility, decreased standing balance, decr No notes of this type exist for this encounter. SLP Notes     No notes of this type exist for this encounter.             Immunizations     Name Date      Depo-Medrol 40mg Inj 11/04/15       Future Appointments        Provider Megha Roque    11/

## (undated) NOTE — ED AVS SNAPSHOT
[facility] Emergency Department    [place] [address]    Phone:  [phone]    Fax:  [phone]           Tabitha Rosenberg   MRN: [id number]    Department:  [facility] Emergency Department   Date of Visit:  6/ IF THERE IS ANY CHANGE OR WORSENING OF YOUR CONDITION, CALL YOUR PRIMARY CARE PHYSICIAN AT ONCE OR RETURN IMMEDIATELY TO THE EMERGENCY DEPARTMENT.     If you have been prescribed any medication(s), please fill your prescription right away and begin taking t

## (undated) NOTE — IP AVS SNAPSHOT
Patient Demographics     Address  [address] [address] Phone  [phone] [facility]) *Preferred*  [phone] [facility])      Emergency Contact(s)     Name Relation Home Work [address] Spouse [phone]        Allergies as Take 50 mg by mouth 2 (two) times a day. Pravastatin Sodium 20 MG Tabs  Commonly known as: PRAVACHOL      Take 20 mg by mouth nightly.           Swapna [place] FlexTouch 100 UNIT/ML Sopn  Generic drug: Insulin Degludec      Inject 50 Units into the skin [id number] Insulin Aspart Pen (NOVOLOG) 100 UNIT/ML flexpen 2-10 Units 12/26/20 0839 Given      [id number] Insulin Aspart Pen (NOVOLOG) 100 UNIT/ML flexpen 2-10 Units 12/26/20 1244 Given      [id number] Insulin Aspart Pen (NOVOLOG) 100 UNIT/ML flexpen 8 Unit Order Status: Completed Lab Status: Final result Updated: 12/24/20 0641    Specimen: Blood,peripheral      Blood Culture Result Morganella morganii     Blood Culture Smear Gram Negative Rods    Susceptibility      Morganella morganii     Not Specified Location [address] [place] [address] 4NW-A Attending Edwin Luna DO   Hosp Day # 0 PCP Rodrigo Medellin MD     Chief Complaint: Lower leg swelling    History of Present Illness: Tristen Blancas is a 76year old female with past medical history of type 2 diabetes Procedure Laterality Date   •      • CHOLECYSTECTOMY  12 Matt EDW   • COLON RESECTION LEFT N/A 2017    Performed by Bettina Kumar MD at [facility] MAIN OR   • COLONOSCOPY  2019   • COLONOSCOPY N/A 2017    Performed by Cristina Srinivasan •  Insulin Aspart Pen 100 UNIT/ML Subcutaneous Solution Pen-injector, Inject 10 Units into the skin every evening., Disp: , Rfl:     •  Metoprolol Succinate ER 50 MG Oral Tablet 24 Hr, Take 50 mg by mouth 2 (two) times a day., Disp: , Rfl:     •  acetamino Musculoskeletal: Moves all extremities. Extremities: ++ LE pitting edema b/l, LLE is circumferentially erythematous and tender, RLE less so but 2nd toe is swollen  Integument: No rashes or lesions. Psychiatric: Appropriate mood and affect.       Diagnost RM.1 - Randy Ortiz DO on 12/21/2020  4:09 PM                        Consults - MD Consult Notes      Consults signed by Victoriano Luna MD at 12/24/2020  2:11 PM     Author: Victoriano Luna MD Service: Gastroenterology Author Type: Physician HPI: This is a[MG.3 76year old[MG.2] female with a[MG. 1] PMHx[MG. 3] that includes[MG. 1] AF s/p cardioversion, DM, RA, colo-vesicular fistula, sigmoid resection and colostomy reversal (2/2020), and recurrent C Diff infections ([MG.3]10/2020, 9/2020, 6/202 • Diverticulosis of large intestine    • High blood pressure    • High cholesterol    • Lymphedema of lower extremity    • OBESITY    • Osteoarthritis    • Recurrent UTI     rec UTI   • Rheumatoid arthritis(714.0) 2012   • SLEEP APNEA DMG DX 1-23-12 TX 2-9 •  [COMPLETED] ceFAZolin sodium (ANCEF/KEFZOL) 2 GM/20ML premix IV syringe 2 g, 2 g, Intravenous, Once    •  [COMPLETED] 0.9% NaCl infusion, 1,000 mL, Intravenous, Once    •  [] 0.9% NaCl infusion, , Intravenous, Continuous    •  escitalopram (LEXAP Codeine                 NAUSEA ONLY    Comment:From Tylenol #3[MG. 2]    SocHx:[MG.1]        Smoking status: Former Smoker        Quit date: 5/7/2003      Alcohol use: Yes        Frequency: 2-4 times a month      Drug use: [address] 2]    FamHx: The patient has CV: Regular rate and rhythm, with normal S1 and S2    Resp: Clear to auscultation bilaterally without wheezes; rubs, rhonchi, or rales    Abdomen:[MG.1] Obese, s[MG.6]oft, non-tender, non-distended with the presence of bowel sounds;  No hepatosplenomegaly; Impression:[MG.3 76year old[MG.2] female with a[MG. 1] PMHx[MG. 3] of[MG. 6] AF s/p cardioversion, DM, RA, colo-vesicular fistula, sigmoid resection[MG.3],[MG.6] colostomy reversal (2/2020), and recurrent C Diff infections ([MG.3]10/2020, 9/2020, 6/2020[MG. Briefly, this is a 58-year-old female with a history of A. fib , colovesicular fistula status post sigmoid resection and colostomy reversal, recurrent C. difficile presenting with hematochezia, anemia.   Patient admitted with cellulitis at this time and low No notes of this type exist for this encounter. Occupational Therapy Notes (last 72 hours) (Notes from 12/23/2020  5:41 PM through 12/26/2020  5:41 PM)    No notes of this type exist for this encounter.      Video Swallow Study Notes    No notes of this

## (undated) NOTE — IP AVS SNAPSHOT
[address]            (For Outpatient Use Only) Initial Admit Date: 2/22/2021   Inpt/Obs Admit Date: Inpt: 2/22/21 / Obs: N/A   Discharge Date:    Silvana Haddad:  [de-identified]   MRN: [de-identified]   CSN: [id number]   CEID: [id number] Subscriber ID: [id number] Pt Rel to Subscriber: SELF   TERTIARY INSURANCE   Payor:  Plan:    Group Number:  Insurance Type:    Subscriber Name:  Subscriber :    Subscriber ID:  Pt Rel to Subscriber:    Hospital Account Financial Class: Medicare    Feb

## (undated) NOTE — LETTER
To: Dr. Harriet Sanchez practioner  Date:  12/7/2022  Fax #: [phone]   Patient Name: Daisha Singh / Sex: 4/25/1945-A: 68 y  female  Phone: [phone] CSN: [id number]     Medical Records: [id number]    Clearance for Surgery Requested by Surgeon    Request for:  Cardiac Clearance  And anticoagulant management  Requested by Surgeon: Dr. Mike Conroy    Surgical Date: 12/9/2022    Procedure: ANAL EXAM UNDER ANESTHESIA POSSIBLE INCISION AND DRAINAGE, FISTULOTOMY POSSIBLE SETON, N/A  ANAL FISTULECTOMY, N/A      Please fax the clearance note to the [place] [phone]. Thank you.

## (undated) NOTE — LETTER
[facility]  [address] [facility], [address] [place] [address]    Consent for Operation    Date: __________________    Time: _______________    1.  I authorize the performance upon Hieu Chang the following operation:    Procedure(s):  COLONOSCOPY *** videotape. The [facility] will not be responsible for storage or maintenance of this tape. 6. For the purpose of advancing medical education, I consent to the admittance of observers to the Operating Room.     7. I authorize the use of any specimen, organs Signature of Patient:   ___________________________    When the patient is a minor or mentally incompetent to give consent:  Signature of person authorized to consent for patient: ___________________________   Relationship to patient: _____________________ drugs/illegal medications). Failure to inform my anesthesiologist about these medicines may increase my risk of anesthetic complications. · If I am allergic to anything or have had a reaction to anesthesia before.     3. I understand how the anesthesia med I have discussed the procedure and information above with the patient (or patient’s representative) and answered their questions. The patient or their representative has agreed to have anesthesia services.     _______________________________________________

## (undated) NOTE — LETTER
Aleksandr Hughes M.D., F.A.C.S.,F.A.C.R.S. Surgical Clearance Needed    Date: 12/12/2019                                                                       From: Rufina Richardsn: DR. Juliette Ordonez

## (undated) NOTE — IP AVS SNAPSHOT
Patient Demographics     Address  [address] [address] Phone  [phone] [facility]) *Preferred*  [phone] [facility])      Emergency Contact(s)     Name Relation Home Work [address] Spouse [phone]        Allergies as Instructions Authorizing Provider Morning Afternoon Evening As Needed   acetaminophen 500 MG Tabs  Commonly known as: TYLENOL EXTRA STRENGTH      Take 1 tablet (500 mg total) by mouth every 6 (six) hours as needed.    Ayesha Caal MD         aspirin 8 Metoprolol Succinate ER 50 MG Tb24  Commonly known as: Toprol XL  Next dose due: Today in the evening      Take 50 mg by mouth 2 (two) times a day. Nystatin 593860 UNIT/GM Powd  Next dose due: This evening      Apply topically 2 (two) times daily. [id number] dilTIAZem HCl (cardIZEM) injection 10 mg 01/29/21 1115 Given      [id number] escitalopram (LEXAPRO) tablet 10 mg 01/29/21 1009 Given      [id number] furosemide (LASIX) injection 40 mg 01/29/21 1628 Given            LEFT LOWER ABDOMEN     Order ID PATH CELL CT COMMENT [[id number]]  Resulted: 01/29/21 1631, Result status: Final result   Ordering provider: Jenna Holbrook MD  01/28/21 1504 Resulting lab: [facility])    Specimen Information    Type Source Collected Procedure                               Abnormality         Status                     ---------                               -----------         ------                     CBC W/ DIFFERENTIAL[[id number]]          Abnormal            Final result Specimen: Blood,peripheral      Blood Culture Result No Growth 5 Days    Clostridium difficile(toxigenic)PCR Once [[id number]]  (Normal) Collected: 01/13/21 1912    Order Status: Completed Lab Status: Final result Updated: 01/13/21 2039    Specimen: Stool History of Present Illness: Nanci Garcia is a 76year old female with[DK.1] past medical history significant for DMII, HTN, DL, PAF, morbid obesity was recently admitted from 12/21-12/26 for bacteremia, cellulitis and dc to The [address] [place] [address].   Pt completed Performed by Severo Posey, MD at [facility] ENDOSCOPY   • COLOSTOMY TAKEDOWN N/A 2/5/2020    Performed by Darwin Hoyos MD at [facility]     • CYSTOURETHROSCOPY  3-1-11    cysto flow US, dr Emmanuel Rg   • EYE SURGERY      eye   • FO •  acetaminophen 500 MG Oral Tab, Take 1 tablet (500 mg total) by mouth every 6 (six) hours as needed. , Disp: , Rfl: 0    •  escitalopram 10 MG Oral Tab, Take 10 mg by mouth daily. , Disp: , Rfl:     •  Cholecalciferol (VITAMIN D3) 1.25 MG (14698 UT) Oral C Musculoskeletal: Moves all extremities. Extremities:[DK.1] venous stasis changes[DK.2]. Integument: No rashes or lesions. Psychiatric: Appropriate mood and affect.       Diagnostic Data:      Labs:  Recent Labs   Lab 01/07/21  0630 01/11/21  0750 01/11/ Plan of care discussed with[DK.1] pt and . [DK.2]    Bailey Thornton MD  1/11/2021[DK. 1]                        Electronically signed by Dayne Amor MD on 1/11/2021  6:05 PM   Attribution Rubio    DK. 1 - Dayne Amor MD on 1/11/2021  3:58 PM  DK. 2 - Katlin Pineda is a a(n) 76year old female, followed by Dr. Giles Buckley. 1] who manages patient's paroxysmal atrial fibrillation/flutter outpatient[SS.3],[SS.1] not anticoagulated since she has been in sinus[SS.3] rhythm since successful cardioversion She uses walker at home but recently she is mostly bedridden. She has some history of GI bleeding but no recent blood in stool but hemoglobin runs low compared to baseline.   Hemoglobin runs between 7.5 and 8.6 and her baseline was at 10 in 2020 and and • COLON RESECTION LEFT N/A 8/26/2017    Performed by Soumya Kingston MD at [address]   • COLONOSCOPY  11/07/2019   • COLONOSCOPY N/A 2/28/2017    Performed by Viridiana Jimenez MD at [facility] ENDOSCOPY   • COLOSTOMY TAKEDOWN N/A 2/5/2020    Performed by Ruth Harrison •  Heparin Sodium (Porcine) 5000 UNIT/ML injection 7,500 Units, 7,500 Units, Subcutaneous, Q8H BRITT  •  Miconazole Nitrate 2 % powder, , Topical, BID PRN  •  haloperidol lactate (HALDOL) 5 MG/ML injection 2 mg, 2 mg, Intravenous, Q4H PRN  •  glucose (DEX4) Allergic/Immunologic: no rhinitis or environmental allergies[SS.1].[SS.2]      Physical Exam:  Blood pressure 145/62, pulse 73, temperature 98.3 °F (36.8 °C), temperature source Oral, resp. rate 21, height 67\", weight 282 lb 3.2 oz (128 kg), SpO2 97 %.   Pat Russian PROCEDURE:  CT BRAIN OR HEAD (68576)  COMPARISON:  [facility] , CT, CT BRAIN OR HEAD (27778), 12/21/2020, 5:06 PM.  INDICATIONS:  lethargy, left facial droop, slurred speech since 3pm yesterday, abnormal labs--K of 6.1  TECHNIQUE:  Noncontrast CT scanning is p PROCEDURE:  NM LUNG PERFUSION SCAN ONLY  (CPT=78580)  COMPARISON:  KATHY VALLES, NM LUNG VQ VENT / PERFUSION SCAN  (CPT=78582), 8/30/2017, 8:33 AM.  [facility] , XR, XR CHEST AP PORTABLE  (CPT=71045), 1/13/2021, 10:30 AM.  INDICATIONS:  Hypoxia  TECHNIQUE:  Tc- Lab Results   Component Value Date    INR 1.00 06/19/2020    INR 1.63 (H) 09/17/2017        Lab Results   Component Value Date    PGLU 188 01/21/2021       Impression:  1.[SS.1]  Rapid[SS.2] atrial fibrillation[SS.1] triggered by respiratory issues with CO In April 2018, Petra Chavez nuclear stress test was negative for ischemia with normal heart pump function and LVEF of 63%. [SS.2]    Plan:  -[SS.1]Patient was in A. fib for 5 hours and responded well to couple shots of IV Cardizem now[SS.2]  -[SS.1]Start Cardiz Filed: 1/28/2021  2:00 PM Date of Service: 1/28/2021  2:00 PM Status: Signed    : Moustapha Aguilar PT (Physical Therapist)       Attempted PT session, despite maximal encouragement for participation, pt adamantly refusing mobility at this time sec Anemia    Acute renal failure (ARF) (HCC)    Acute kidney injury ([facility] [address]    Metabolic alkalosis    Respiratory acidosis    Nosocomial pneumonia    Acute cystitis with hematuria    Encephalopathy acute[SH.2]      Past Medical History[SH.1]  Past Medical His Cysto joan    • PART REMOVAL COLON W END COLOSTOMY  2017   • REMOVAL GALLBLADDER     • SHOULDER SURG PROC UNLISTED      shoulder[SH.2]       SUBJECTIVE  \"I'm Jaidendeedee Sullivan need a 123\" to get up    Patient self-stated goal is none identified     OBJECTIVE Pt seen semi supine, agreeable to therapy. Min (A) to EOB with HOB elevated. C/o dizziness EOB; BP and O2 wfl. Sit to stand min of 2 from elevated EOB via RW. Static stand approximately 20 seconds. Lack of eccentric control with stand to sit transfer.  Afte Patient will transfer from sit to stand:  with supervision  Patient will transfer to toilet:  with supervision     UE Exercise Program Goal  Patient will be supervision with bilateral AROM HEP (home exercise program).      Additional Goals:  Pt will verbali INFLUENZA 09/11/18     INFLUENZA 11/21/17     INFLUENZA 10/25/16     INFLUENZA 09/17/15     INFLUENZA 09/17/11     INFLUENZA 09/30/10     Pneumococcal (Prevnar 13) 06/04/15     TDAP 06/07/16     Zoster Vaccine Live (Zostavax) 04/26/10       Future Appoint

## (undated) NOTE — LETTER
Date: 10/28/2022  Patient name: Temo Ace  YOB: 1945  Medical Record Number: [id number]  Coverage: Payor: MEDICARE / Plan: MEDICARE PART A&B / Product Type: *No Product type* /   Insurance ID: [id number]  Patient Address: [address] [place] [address]  Telephone Information:   Home Phone [phone]   Mobile [phone]       Encounter Date: 10/26/2022  Physician: Guillermina Cifuentes MD  Diagnosis: Pressure injury of sacral region, stage 3 ([address]  (primary encounter diagnosis)  Ulcer of right lower leg, with fat layer exposed ([address]  Skin ulcer of left lower leg with fat layer exposed ([address]  Idiopathic chronic venous hypertension of both lower extremities with ulcer and inflammation ([address]  Stage 3b chronic kidney disease ([address]  Diabetes mellitus with skin ulcer ([address]    Wound 10/26/22 #4 Venous Ulcer Leg Right; Anterior (Active)   Date First Assessed/Time First Assessed: 10/26/22 1421    Wound Number (Wound Clinic Only): #4  Primary Wound Type: Venous Ulcer  Location: Leg  Wound Location Orientation: Right; Anterior      Assessments 10/26/2022  2:27 PM   Wound Image       Drainage Amount Copious   Drainage Description Serous;Green   Treatments Compression   Wound Length (cm) 27 cm   Wound Width (cm) 30.2 cm   Wound Surface Area (cm^2) 815.4 cm^2   Wound Depth (cm) 0 cm   Wound Volume (cm^3) 0 cm^3   Margins Undefined edges   Non-staged Wound Description Full thickness   Milagro-wound Assessment Moist;Maceration;Edema   Wound Granulation Tissue Firm;Pink   Wound Bed Granulation (%) 50 %   Wound Bed Epithelium (%) 50 %   Wound Odor Mild       No Linked orders to display       Wound 10/26/22 #5 Venous Ulcer Leg Left; Anterior (Active)   Date First Assessed/Time First Assessed: 10/26/22 1422    Wound Number (Wound Clinic Only): #5  Primary Wound Type: Venous Ulcer  Location: Leg  Wound Location Orientation: Left; Anterior      Assessments 10/26/2022  2:28 PM   Wound Image       Drainage Amount Copious   Drainage Description Serous;Clear   Treatments Compression   Wound Length (cm) 22 cm   Wound Width (cm) 29.1 cm   Wound Surface Area (cm^2) 640.2 cm^2   Wound Depth (cm) 0 cm   Wound Volume (cm^3) 0 cm^3   Margins Undefined edges   Non-staged Wound Description Full thickness   Milagro-wound Assessment Moist;Maceration;Edema   Wound Granulation Tissue Firm;Pink   Wound Bed Granulation (%) 50 %   Wound Bed Epithelium (%) 50 %   Wound Odor Mild       No Linked orders to display       Wound 10/26/22 #6 Pressure Injury Sacrum (Active)   Date First Assessed/Time First Assessed: 10/26/22 1431    Wound Number (Wound Clinic Only): #6  Primary Wound Type: Pressure Injury  Location: Sacrum      Assessments 10/26/2022  2:31 PM   Wound Image     Drainage Amount Small   Drainage Description Serosanguineous   Wound Length (cm) 0.9 cm   Wound Width (cm) 0.2 cm   Wound Surface Area (cm^2) 0.18 cm^2   Wound Depth (cm) 1 cm   Wound Volume (cm^3) 0.18 cm^3   Margins Well-defined edges   Non-staged Wound Description Full thickness   Wound Granulation Tissue Firm;Pink   Wound Bed Granulation (%) 100 %   Wound Odor None   Pressure Injury Stage Stage 3       No Linked orders to display       Compression Wrap 10/26/22 Leg Anterior;Right (Active)   Placement Date: 10/26/22   Location: Leg  Wound Location Orientation: Anterior;Right      Assessments 10/26/2022  3:52 PM   Response to Treatment Well tolerated   Compression Layers 2   Compression Product Type Coflex   Dressing Applied Yes   Compression Wrap Location Toes to Knee   Compression Wrap Status Clean;Dry; Intact       No Linked orders to display       Compression Wrap 10/26/22 Leg Anterior; Left (Active)   Placement Date: 10/26/22   Location: Leg  Wound Location Orientation: Anterior; Left      Assessments 10/26/2022  3:52 PM   Response to Treatment Well tolerated   Compression Layers 2   Compression Product Type Coflex   Dressing Applied Yes   Compression Wrap Location Toes to Knee   Compression Wrap Status Clean;Dry; Intact       No Linked orders to display       Wound Cleaning and Dressings: SACRAL wound  Showering directions: May shower with protection  Wound cleansing:  Cleanse with normal saline or wound cleanser and Clean with soap and water  Wound cleaning frequency: Daily  Wound product: Other gentamycin ointment, plain packing strip, bordered gauze  Dressing change frequency:  Change dressing twice daily    Miscellaneous/Additional Orders:  Offloading: EHOB cushion and Turn Q 2 hours  Miscellaneous orders: Home health care nurse for wound care    Care Summary:  Care Summary: Discussed Plan of Care at beside with patient. Patient verbally acknowledges understanding of all instructions and all questions were answered. Follow Up:  Return in about 2 weeks (around 11/9/2022) for Wound followup.       Wound Cleaning and Dressings: LEGS  Showering directions: May shower with protection  Wound cleansing:  Cleanse with normal saline or wound cleanser and Clean with soap and water  Wound cleaning frequency: during dressing changes  Wound product: ABD pad and Kerlix, gauze between toes  Dressing change frequency:  Change dressing 3x per week  Enzymatic agent:  Not applicable      Compression Therapy:   Coflex 2 layers

## (undated) NOTE — LETTER
Patient Name: Mukesh Reyes  YOB: 1945          MRN :  [id number]  Date:  8/28/2020  Referring Physician:  Ronda Adams    Progress Summary  Pt has attended 5/36 visits in Occupational Therapy.       Subjective:   Pt reports she purcha non-pitting; lateral surfaces are boggy, pitting with poor superficial tissue mobility.  Proximal 1/2 of lower legs are boggy, pitting with fair superficial tissue mobility; distal 1/2 are densely boggy to boggy, pitting over medial surfaces with fair super Patient/Family/Caregiver was advised of these findings, precautions, and treatment options and has agreed to actively participate in planning and for this course of care.     Thank you for your referral. If you have any questions, please contact me at Dept:

## (undated) NOTE — IP AVS SNAPSHOT
[address]            (For Outpatient Use Only) Initial Admit Date: 9/10/2021   Inpt/Obs Admit Date: Inpt: 9/10/21 / Obs: N/A   Discharge Date:    Chau Binet:  [de-identified]   MRN: [de-identified]   CSN: [id number]   CEID: [id number] INSURANCE   Payor:  Plan:    Group Number:  Insurance Type:    Subscriber Name:  Subscriber :    Subscriber ID:  Pt Rel to Subscriber:    Hospital Account Financial Class: Medicare    2021

## (undated) NOTE — LETTER
Date: 12/2/2022  Patient name: Elizabeth Post  YOB: 1945  Medical Record Number: [id number]  Coverage: Payor: MEDICARE / Plan: MEDICARE PART A&B / Product Type: *No Product type* /   Insurance ID: [id number]  Patient Address: [address] [place] [address] [place] [address]  Telephone Information:   Home Phone [phone]   Mobile [phone]       Encounter Date: 12/2/2022  Physician: Zoe Morillo MD  Diagnosis: Pressure injury of sacral region, stage 3 ([address]  (primary encounter diagnosis)  Ulcer of right lower leg, with fat layer exposed ([address]  Idiopathic chronic venous hypertension of both lower extremities with ulcer and inflammation ([address]  Skin ulcer of left lower leg with fat layer exposed ([address]  Pressure ulcer, buttock, right, unstageable ([address]  Stage 3b chronic kidney disease ([address]  Diabetes mellitus with skin ulcer ([address]    Wound 10/26/22 #4 Venous Ulcer Leg Right; Anterior (Active)   Date First Assessed/Time First Assessed: 10/26/22 1421    Wound Number (Wound Clinic Only): #4  Primary Wound Type: Venous Ulcer  Location: Leg  Wound Location Orientation: Right; Anterior      Assessments 10/26/2022  2:27 PM 12/2/2022  1:51 PM   Wound Image         Drainage Amount Copious Copious   Drainage Description Serous;Green Serous; Yellow   Treatments Compression Compression   Wound Length (cm) 27 cm 20.5 cm   Wound Width (cm) 30.2 cm 29.8 cm   Wound Surface Area (cm^2) 815.4 cm^2 610.9 cm^2   Wound Depth (cm) 0 cm 0.1 cm   Wound Volume (cm^3) 0 cm^3 61.09 cm^3   Margins Undefined edges Undefined edges   Non-staged Wound Description Full thickness Full thickness   Milagro-wound Assessment Moist;Maceration;Edema Moist;Maceration;Edema; Red   Wound Granulation Tissue Firm;Pink Pink;Pale Brown;Spongy   Wound Bed Granulation (%) 50 % 50 %   Wound Bed Epithelium (%) 50 % 50 %   Wound Odor Mild Mild       No Linked orders to display       Wound 10/26/22 #5 Venous Ulcer Leg Left; Anterior (Active)   Date First Assessed/Time First Assessed: 10/26/22 1422    Wound Number (Wound Clinic Only): #5  Primary Wound Type: Venous Ulcer  Location: Leg  Wound Location Orientation: Left; Anterior      Assessments 10/26/2022  2:28 PM 12/2/2022  1:52 PM   Wound Image         Drainage Amount Copious Copious   Drainage Description Serous;Clear Serous; Yellow   Treatments Compression Compression   Wound Length (cm) 22 cm 24 cm   Wound Width (cm) 29.1 cm 28.8 cm   Wound Surface Area (cm^2) 640.2 cm^2 691.2 cm^2   Wound Depth (cm) 0 cm 0.1 cm   Wound Volume (cm^3) 0 cm^3 69.12 cm^3   Margins Undefined edges Undefined edges   Non-staged Wound Description Full thickness Full thickness   Milagro-wound Assessment Moist;Maceration;Edema Moist;Maceration;Edema; Red   Wound Granulation Tissue Firm;Pink Pink;Pale Brown;Spongy   Wound Bed Granulation (%) 50 % 50 %   Wound Bed Epithelium (%) 50 % 50 %   Wound Odor Mild Mild       No Linked orders to display       Wound 10/26/22 #6 Pressure Injury Sacrum (Active)   Date First Assessed/Time First Assessed: 10/26/22 1431    Wound Number (Wound Clinic Only): #6  Primary Wound Type: Pressure Injury  Location: Sacrum      Assessments 10/26/2022  2:31 PM 11/9/2022  2:37 PM   Wound Image       Drainage Amount Small Unable to assess   Drainage Description Serosanguineous --   Wound Length (cm) 0.9 cm 0.9 cm   Wound Width (cm) 0.2 cm 0.2 cm   Wound Surface Area (cm^2) 0.18 cm^2 0. 18 cm^2   Wound Depth (cm) 1 cm 1 cm   Wound Volume (cm^3) 0.18 cm^3 0. 18 cm^3   Wound Healing % -- 0   Margins Well-defined edges Well-defined edges   Non-staged Wound Description Full thickness Full thickness   Milagro-wound Assessment -- Red;Moist   Wound Granulation Tissue Firm;Pink Firm;Pink   Wound Bed Granulation (%) 100 % 100 %   Wound Odor None None   Undermining -- 12-12 6cm   Pressure Injury Stage Stage 3 Stage 3       No Linked orders to display       Compression Wrap 10/26/22 Leg Anterior;Right (Active)   Placement Date: 10/26/22 Location: Leg  Wound Location Orientation: Anterior;Right      Assessments 10/26/2022  3:52 PM 11/9/2022  3:42 PM   Response to Treatment Well tolerated Well tolerated   Compression Layers 2 2   Compression Product Type Coflex Coflex   Dressing Applied Yes Yes   Compression Wrap Location Toes to Knee Toes to Knee   Compression Wrap Status Clean;Dry; Intact Clean;Dry; Intact       No Linked orders to display       Compression Wrap 10/26/22 Leg Anterior; Left (Active)   Placement Date: 10/26/22   Location: Leg  Wound Location Orientation: Anterior; Left      Assessments 10/26/2022  3:52 PM 11/9/2022  3:42 PM   Response to Treatment Well tolerated Well tolerated   Compression Layers 2 2   Compression Product Type Coflex Coflex   Dressing Applied Yes Yes   Compression Wrap Location Toes to Knee Toes to Knee   Compression Wrap Status Clean;Dry; Intact Clean;Dry; Intact       No Linked orders to display       Wound Number: All wounds    Wound Cleaning and Dressings:  Showering directions: May shower with protection  Wound cleansing:  Cleanse with normal saline or wound cleanser  Wound cleaning frequency: with dressing changes  Wound product: Silver alginate, ABD pad, Kerlix and Other Gentamicin  Dressing change frequency:  Change dressing 3x per week  Enzymatic agent:  Not applicable      Compression Therapy:   Spandagrip F    Miscellaneous/Additional Orders:  Miscellaneous orders: Home health care nurse for wound care    Care Summary:  Care Summary: Discussed Plan of Care at beside with patient. Patient verbally acknowledges understanding of all instructions and all questions were answered. Follow Up:  Return in about 4 weeks (around 12/30/2022) for Wound followup. Additional Notes:  HH to see patient three times per week for dressing changes. Pt is to change dressings on days HH not there. Apply gentamicin, Silver alginate, ABD pads, kerlix, and spandagrip F to bilateral legs.  Pt refused assessment of sacral wound, continue gentamicin and bordered gauze.      Additional home health DME: No

## (undated) NOTE — LETTER
OUTSIDE TESTING RESULT REQUEST     IMPORTANT: FOR YOUR IMMEDIATE ATTENTION  Please FAX all test results listed below to: [phone]     Testing already done on or about: 2022     * * * * If testing is NOT complete, arrange with patient A.S.A.P. * * * *      Patient Name: Rigoberto Mortimer  Surgery Date: 2022  CSN: [id number]  Medical Record: [id number]   : 1945 - A: 68 y      Sex: female  Surgeon(s):  Kai Vazquez MD  Procedure: ANAL EXAM UNDER ANESTHESIA POSSIBLE INCISION AND DRAINAGE, FISTULOTOMY POSSIBLE SETON  Anesthesia Type: General     Surgeon: Kai Vazquez MD     The following Testing and Time Line are REQUIRED PER ANESTHESIA     EKG READ AND SIGNED WITHIN   90 days    PLEASE CONFIRM & SIGN EKG    Thank You,   Sent by: Catherine Mullins RN

## (undated) NOTE — LETTER
Tan Borrero M.D., F.A.C.S.,F.A.C.R.S. Surgical Clearance Needed    Date: 12/12/2019                                                                       From: Laila LEES    Attn: DR. Garcia [place]

## (undated) NOTE — ED AVS SNAPSHOT
Amylora Perez   MRN: [id number]    Department:  [facility] Emergency Department   Date of Visit:  12/10/2017           Disclosure     Insurance plans vary and the physician(s) referred by the ER may not be covered by your plan.  Please contact tell this physician (or your personal doctor if your instructions are to return to your personal doctor) about any new or lasting problems. The primary care or specialist physician will see patients referred from the [facility] Emergency Department.  Charlotte Oliveira

## (undated) NOTE — ED AVS SNAPSHOT
[facility] Emergency Department    [place]  [address] [place] [address]    Phone:  [phone]    Fax:  [phone]           Yeni Hernandez   MRN: [id number]    Department:  [facility] Emergency Department   Date of Visit:  6/ doctor. Please ask your health care provider, pharmacist or nurse if you have any questions regarding your home medications, including potential side effects.               Medication List      START taking these medications     ondansetron 4 MG Danni Carroll Or call [phone]    If you have any problems with your follow-up, please call our  at [phone]    Si usted tiene algun problema con uribe sequimiento, por favor llame a nuestro adminstrador de casos al [phone]    Expect to Pharmacy Address Phone Number   [address] ([address]) [place] [address].  ([address] [place] [address]) [phone]   [facility] reflect a stricture or tumor. Acute or acute on chronic inflammatory changes are also possible as there   is colonic diverticulosis. However diverticulitis does not typically result in obstruction.  Given prior hysterectomy, postoperative changes/adhesions of 6 cm. There are small scattered colonic diverticula. There is no free air or ascites. ABDOMINAL WALL:  Stable. 3 fat containing ventral hernias. URINARY BLADDER:  Nondistended. PELVIC NODES:  Stable. None enlarged. PELVIC ORGANS:  Stable.  Surgically

## (undated) NOTE — LETTER
Patient Name: Katlin Pineda  YOB: 1945          MRN :  [id number]  Date:  9/25/2020  Referring Physician:  Monika Gonzales    Progress Summary  Pt has attended 15/36 visits in Occupational Therapy.        Subjective:   Pt reports she tole There have been frequent discussions re: pt's need to obtain assistance in the home setting for donning her garments.  After training in garment donning this week, confirmed that pt is unable to don her ankle/foot garments and pt has been more open to discu 1. Pt will tolerate bilateral lower leg short stretch compression bandaging. ACHIEVED   2. Pt will be independent in decongestive exercises. 3. Pt/Caregiver will be independent in bilateral lower leg short stretch compression bandaging. NOT APPLICABLE.

## (undated) NOTE — IP AVS SNAPSHOT
Patient Demographics     Address  [address] [address] Phone  [phone] [facility]) *Preferred*  [phone] [facility])      Emergency Contact(s)     Name Relation Home Work [address] Spouse [phone]        Allergies as Commonly known as: NOVOLOG      Inject 2-10 Units into the skin TID CC and HS.  Continue to give correction insulin (Novolog/aspart) even if NPO; DO NOT HOLD OR ALTER INSULIN DOSE WITHOUT A PHYSICIAN ORDER  1 unit of Novolog/aspart insulin expected to decre [id number] Metoprolol Succinate ER (Toprol XL) 24 hr tab 50 mg 02/12/21 0941 Given      [id number] Pravastatin Sodium (PRAVACHOL) tab 20 mg 02/11/21 2135 Given      [id number] aspirin EC tab 81 mg 02/12/21 0940 Given      [id number] bumetanide (BUMEX) tab 1 m ARTERIAL BLOOD GAS [[id number]] (Abnormal)  Resulted: 02/12/21 1202, Result status: Final result   Ordering provider: Cecilia Hernandez MD  02/12/21 1116 Resulting lab: [place] [facility] ([facility])    Specimen Information    Type Source BASIC METABOLIC PANEL (8) [[id number]] (Abnormal)  Resulted: 02/12/21 1049, Result status: Final result   Ordering provider: Kya Jo MD  02/11/21 2300 Resulting lab: [facility])    Specimen Information    Type Source Blood Culture Result No Growth 3 Days    Urine Culture, Routine Once [[id number]]  (Abnormal)  (Susceptibility) Collected: 02/08/21 1413    Order Status: Completed Lab Status: Final result Updated: 02/10/21 2656    Specimen: Urine, clean catch      Urine History of Present Illness: Edwin Hoyos is a 76year old female with a past medical history of[FA.1] hypercapnic respiratory failure secondary to pulmonary hypertension and noncompliance with CPAP, PAF, CKD, hypertension, diabetes, hypothyroidism and • CHOLECYSTECTOMY  5-17-12 [place] EDW   • COLON RESECTION LEFT N/A 8/26/2017    Performed by Ramos Wallace MD at [address]   • COLONOSCOPY  11/07/2019   • COLONOSCOPY N/A 2/28/2017    Performed by Sylvia Schumacher MD at [address] [facility] •  aspirin 81 MG Oral Chew Tab, Chew 1 tablet (81 mg total) by mouth daily. , Disp:  , Rfl: 0    •  Insulin Aspart Pen 100 UNIT/ML Subcutaneous Solution Pen-injector, Inject 2-10 Units into the skin TID CC and HS.  Continue to give correction insulin (Novolo •  Cyanocobalamin ([facility] VITAMIN B-12 OR), Take 1 tablet by mouth daily.  1000mcg , Disp: , Rfl:     •  Levothyroxine Sodium 150 MCG Oral Tab, Take 150 mcg by mouth before breakfast.  , Disp: , Rfl:         Review of Systems:   A comprehensive 14 point review 1. Acute on chronic hypercapnic and hypoxic respiratory failure  1. Secondary to uncontrolled sleep apnea, pulmonary edema and pleural effusions as well as rapid a flutter  2. CTA was negative for pulmonary embolism  3. Start AVAPS  4. Diuresis  2.  [facility] # 0 PCP Mary Galindo MD     Chief Complaint:[FA.1] confusion[FA.2]    History of Present Illness: Lena Ortez is a 76year old female with a past medical history of[FA.1] hypercapnic respiratory failure secondary to pulmonary hypertensi Past Surgical History:   Procedure Laterality Date   •      • CHOLECYSTECTOMY  12 [place] EDW   • COLON RESECTION LEFT N/A 2017    Performed by Christofer Martinez MD at [facility] MAIN OR   • COLONOSCOPY  2019   • COLONOSCOPY N/A 2017 ASSESSMENT / PLAN:[FA.1]     1. Acute on chronic hypercapnic and hypoxic respiratory failure  1. Secondary to uncontrolled sleep apnea, pulmonary edema and pleural effusions as well as rapid a flutter  2. CTA was negative for pulmonary embolism  3.  Start A 1) CKD 3- multifactorial; in part due diuretic effect; at recent baseline. UA noted- suggestive of UA.  Accept azotemia with diuresis     2) Recurrent acute on chronic hypercapnic resp failure- due to GAYLE / OHS, pleural effusions, rapid Aflutter; no PE by C Procedure Laterality Date   •      • CHOLECYSTECTOMY  12 [place] EDW   • COLON RESECTION LEFT N/A 2017    Performed by Ramos Wallace MD at [facility] MAIN OR   • COLONOSCOPY  2019   • COLONOSCOPY N/A 2017    Performed by Brady Quintanilla •  glucose (DEX4) oral liquid 15 g, 15 g, Oral, Q15 Min PRN **OR** Glucose-Vitamin C (DEX-4) chewable tab 4 tablet, 4 tablet, Oral, Q15 Min PRN **OR** dextrose 50 % injection 50 mL, 50 mL, Intravenous, Q15 Min PRN **OR** glucose (DEX4) oral liquid 30 g, 30 Cardiac: Regular rate and rhythm, S1, S2 normal, no murmur, rub, or gallop  Lungs: Decreased breath sounds at the bases bilaterally.    Abdomen: Soft, non-tender. + bowel sounds, no palpable organomegaly  Extremities: Without clubbing, cyanosis; minimal erick Presenting Problem: A-flutter, Recurrent acute on chronic hypercapnic resp failure  Reason for Therapy: Mobility Dysfunction and Discharge Planning    History related to current admission: Pt admitted from The [address] [place] [address],[address] for acute respiratory failure, hypox • COLOSTOMY TAKEDOWN N/A 2/5/2020    Performed by Taty Carlos MD at [facility]     • CYSTOURETHROSCOPY  3-1-11    cysto flow US, dr Kelvin James   • EYE SURGERY      eye   • FOOT SURGERY      foot   • HERNIA SURGERY     • [facility] Right Dorsiflexion  3+/5  Left Dorsiflexion  3+/5[EP.2]    BALANCE                ADDITIONAL TESTS                                    NEUROLOGICAL FINDINGS                      ACTIVITY TOLERANCE                         O2 WALK        SPO2% Ambulation on O Patient End of Session: In bed;Needs met;Call light within reach;RN aware of session/findings; All patient questions and concerns addressed;SCDs in place; Alarm set; Family present    ASSESSMENT   Patient is a 76year old female admitted on 2/8/2021 for[EP.1] Goal Comments: Goals established on 2/11/2021[EP.1]  PPE donned for session: gloves, gown, surgical mask, eyewear, hair protection. Pt and  masked during session. [EP.2]        Attribution Rubio    EP. 1 Shanti Steiner, PT on 2/11/2021 11:11 AM  EP • C. difficile diarrhea    • Cataract    • CKD (chronic kidney disease)    • Diabetes (HCC)    • DISH (diffuse idiopathic skeletal hyperostosis)    • Disorder of thyroid    • Diverticulosis    • HTN (hypertension)    • Hyperlipidemia    • Lymphedema of low Prior Level of Function: Prior to 3 recent hospitalizations, pt was living at home with spouse, sleeps up in recliner chair, ambulating short distances with RW. She has been in and out of [facility].  She shares she is working with PT/OT on exercises, using lift f -   Taking care of personal grooming such as brushing teeth?: A Little  -   Eating meals?: A Little[KK. 2]    AM-PAC Score:[KK.1]  Score: 14  Approx Degree of Impairment: 59.67%  Standardized Score (AM-PAC Scale): 33.39  CMS Modifier (G-Code): CK[KK. 2]    F Specific performance deficits impacting engagement in ADL/IADL MODERATE  3 - 5 performance deficits   Client Assessment/Performance Deficits MODERATE - Comorbidities and min to mod modifications of tasks    Clinical Decision Making MODERATE - Analysis of o Depo-Medrol 40mg Inj 11/04/15     INFLUENZA defer-02/12/21     Deferral: Patient Refused     INFLUENZA 09/11/18     INFLUENZA 11/21/17     INFLUENZA 10/25/16     INFLUENZA 09/17/15     INFLUENZA 09/17/11     INFLUENZA 09/30/10     Pneumococcal (Prevnar 13

## (undated) NOTE — LETTER
Surgical Clearance Needed    Date: 11/21/2022                                                                       From: ISRAEL    Attn: DR. STALLWORTH                                                                                    RE: Surgical Clearance               # of Pages: 1        Patient Name: Juanita Tsai    Patient YOB: 1945    Consult For: CARDIAC CLEARANCE AND ANTICOAGULATION MANAGEMENT     Surgery: ANAL EXAM UNDER ANESTHESIA POSSIBLE INCISION AND DRAINAGE, FISTULOTOMY POSSIBLE SETON    Date of Surgery: 12-09-22        This facsimile transmission is provided for your internal use only. If the reader of this message is not the intended recipient, you are hereby notified that you have received this document in error, and that any review, dissemination, distribution, or copying of this message is strictly prohibited. If you have received this communication in error, please notify us immediately by telephone and return the original message to us by mail.

## (undated) NOTE — LETTER
12/15/19    Patient: Nanci Garcia  :  Visit date: 2019    Dear  Dr. Trent Ferraro MD,    Thank you for referring Nanci Garcia to my practice. Please find my assessment and plan below.         Assessment   Status post [place] stricture.  She also had a colovesical fistula at that time.  She underwent an exploratory laparotomy with creation of a colostomy.  The patient states that she had a very difficult recovery following the procedure.  She states she had a large amount of pa

## (undated) NOTE — IP AVS SNAPSHOT
Patient Demographics     Address  [address] [place] [address] Phone  [phone] [facility]) *Preferred*  [phone] [facility])      Emergency Contact(s)     Name Relation Home Work LELA Spouse [phone]      A known as: TYLENOL      Take 650 mg by mouth every 6 (six) hours as needed. Ghislaine Warner MD         aspirin 81 MG Chew      Chew 1 tablet (81 mg total) by mouth daily.    Robbie Foreman NP         Breo Ellipta 100-25 MCG/INH Aepb  Generic drug: Flu glucose 261-300 mg/dL  Give 10 units if blood glucose 301-350 mg/dL  Call Physician if blood glucose is greater than 351 mg/dl   Kiran Cornelius, AMY         levothyroxine 150 MCG Tabs  Commonly known as: SYNTHROID      Take 150 mcg by mouth before breakfa prescriptions at the location directed by your doctor or nurse    Bring a paper prescription for each of these medications  Cefadroxil 500 MG Caps  HYDROcodone-acetaminophen  MG Tabs  vancomycin 125 MG Caps           [id number] - MAR ACTION REPORT  (l 2-10 Units 09/13/21 2126 Given      [id number] insulin detemir (LEVEMIR) 100 UNIT/ML flextouch 10 Units 09/13/21 2125 Given            LEFT UPPER ARM     Order ID Medication Name Action Time Action Reason Comments    [id number] Insulin Aspart Pen (Coit Leavens) 1 result Updated: 09/13/21 2201    Specimen: Blood,peripheral      Blood Culture Result No Growth 3 Days    Rapid SARS-CoV-2 by PCR [[id number]]  (Normal) Collected: 09/10/21 1707    Order Status: Completed Lab Status: Final result Updated: 09/10/21 1734    S fibrillation (HCC)    • C. difficile diarrhea    • Cataract    • CKD (chronic kidney disease)    • Diabetes ([facility] [address] [address]    • DISH (diffuse idiopathic skeletal hyperostosis)    • Disorder of thyroid    • Diverticulosis    • HTN (hypertension)    • Hyperlipidemia encounter. Ciprofloxacin HCl 250 MG Oral Tab, , Disp: , Rfl:   febuxostat 40 MG Oral Tab, Take by mouth., Disp: , Rfl:   furosemide 40 MG Oral Tab, Take 40 mg by mouth daily. , Disp: , Rfl:   TRESIBA FLEXTOUCH 100 UNIT/ML Subcutaneous Solution Pen-injector PHYSICIAN ORDER  1 unit of Novolog/aspart insulin expected to decrease blood glucose by 20 mg/dL    Give 2 unit if blood glucose 141-180 mg/dL  Give 3 units if blood glucose 181-220 mg/dL  Give 6 units if blood glucose 221-260 mg/dL  Give 8 units if blood Anicteric. Neck: No lymphadenopathy. Respiratory: Clear to auscultation bilaterally. No wheezes. No rhonchi. Cardiovascular: S1, S2. Regular rate and rhythm. No murmurs, rubs or gallops. Equal pulses. Chest and Back: No tenderness or deformity.   Abdom above  · Ucx negative but sample taken after initiation of Antibx  · Ceftriaxone   3.  Acute Kidney Injury on CKD  · Despite edema, pt likely intravascularly depleted, hold usual Bumex  · Baseline Cr appears to be around~1.5  4. Type II DM   · Last A1c 7. 9 History:  Past Medical History:   Diagnosis Date   • Atrial fibrillation (HCC)    • C. difficile diarrhea    • Cataract    • CKD (chronic kidney disease)    • Diabetes ([facility] [address] [address]    • DISH (diffuse idiopathic skeletal hyperostosis)    • Disorder of thyroid (VANCOCIN) cap 125 mg, 125 mg, Oral, Daily  •  aspirin chewable tab 81 mg, 81 mg, Oral, Daily  •  dilTIAZem (DILACOR XR) 24 hr cap 120 mg, 120 mg, Oral, Daily  •  escitalopram (LEXAPRO) tablet 10 mg, 10 mg, Oral, Daily  •  febuxostat (ULORIC) tab 40 mg, 40 throat: Negative for nasal congestion, sore throat  Respiratory: Negative for cough, wheezing  Cardiovascular: Negative for chest pain, dyspnea  Gastrointestinal: Negative for vomiting, diarrhea, abdominal pain  : Negative for hematuria or flank pain  In C-Reactive Protein (mg/dL)   Date Value   09/10/2021 5.72 (H)      No results found for: VANCT  No results for input(s): Trudy Severin, [address] [facility], [address] [facility], [address], [address], [address] [facility], [address] [facility], [facility] [address], [facility] [address], [address], [address] [address], [facility], 68year old female admitted on 9/10/2021 from home with c/o BLE pain. Pt diagnosed with cellulitis of BLE on chronic lymphedema.       Therapy significant co-morbidities:  Lymphedema, RA, recurrent UTIs, OA, morbid obesity, CKD, afib, uterine cancer    Add HISTORY  07/26/2017    cysto Dr. Xiomara Elizondo   • OTHER SURGICAL HISTORY  09/07/2018    Cysto     • PART REMOVAL COLON W END COLOSTOMY  2017   • REMOVAL GALLBLADDER     • SHOULDER SURG PROC UNLISTED      shoulder       HOME SITUATION  Type of Home: [address] activity tolerance due to pain.                        O2 WALK  Oxygen Therapy  SPO2% on Oxygen at Rest: 97  At rest oxygen flow (liters per minute): 3  SPO2% Ambulation on Oxygen: 90  Ambulation oxygen flow (liters per minute): 3    AM-PAC '6-Clicks' INPAT assist. Pt returned to supine with max assist of 2 and was dependent to boost in bed. Pt had wanted to have a BM on the commode but was unsafe to transfer to [facility] this session.  Pt was assist in rolling to R and L onto bed pan with max assist of 1 while to Meet Established Goals: 4      CURRENT GOALS    Goal #1 Patient is able to demonstrate supine - sit EOB @ level: minimum assistance     Goal #2 Patient is able to demonstrate transfers Sit to/from Stand at assistance level: minimum assistance     Goal # 10/25/2021 8:00 AM Lamberto Banks, LATAHSA Espinal    10/27/2021 8:00 AM Lamberto Banks, LATASHA Espinal    10/28/2021 8:30 AM Alejandro Morel, [address] [place] [address] Occupational Ther

## (undated) NOTE — LETTER
Date: 12/8/2021  Patient name: Luz Elena Villa  YOB: 1945  Medical Record Number: [id number]  Coverage: Payor: MEDICARE / Plan: MEDICARE PART A&B / Product Type: *No Product type* /   Insurance ID: [id number]  Patient Address: [address] [place] [address] Wound Bed Granulation (%) 50 % 100 %   Wound Bed Epithelium (%) 30 % —   Wound Bed Slough (%) 20 % —   Wound Odor None Mild       No Linked orders to display       Wound 10/22/21 #2 Left Lower Leg Venous Ulcer Leg Right (Active)   Date First Assessed/Jose Compression Wrap Status Clean;Dry; Intact Clean;Dry; Intact       No Linked orders to display       Compression Wrap 10/22/21 Leg Anterior;Right (Active)   Placement Date/Time: 10/22/21 1124   Location: Leg  Wound Location Orientation: Anterior;Right

## (undated) NOTE — LETTER
Patient Name: Maia Francisco  YOB: 1945          MRN number:  [id number]  Date:  7/28/2020  Referring Physician:  Liss CROCKETT LYMPHEDEMA EVALUATION:    Referring Physician: Dr. Fred Sheriff  Diagnosis: Lymphedema of lower extremities compression garments. [place] Marr describes prior level of function as independent in ADL/IADLs / has cleaning lady; uses Rollator walker in the community. Retired from [employer] position. Social History:  Lives with .  Enjoys visiting wi wound care products are not carried in the lymphedema clinic. This would best be addressed by the Wound Care clinic. Warner Bishopkaryn is agreeable to this recommendation.  She would continue to be followed in the lymphedema clinic for her Left LE while being seen in Bed mobility/Transfers: modified independent  Gait: modified independent with RW      Today’s Treatment and Response:     **Note:  Extensive soaking of Right lower leg synthetic padding and dressings required to safely remove products d/t padding and dress compression on until next appt in 2 days.      Charges: Occupational Therapy Eval: High Complexity, Self-Care/Home Management Training x2, Manual Therapy x2      Total Timed Treatment: 60 min     Total Treatment Time: 120 min     Based on clinical rationale [de-identified] certification required: Yes  I certify the need for these services furnished under this plan of treatment and while under my care.     X___________________________________________________ Date____________________    Certification From: 2/83/7819

## (undated) NOTE — LETTER
URGENT: SURGERY IS TOMORROW 2022  NEED SIGNED EKG TRACING    OUTSIDE TESTING RESULT REQUEST     IMPORTANT: FOR YOUR IMMEDIATE ATTENTION  Please FAX all test results listed below to: [phone]       * * * * If testing is NOT complete, arrange with patient A.S.A.P. * * * *      Patient Name: Erika Martin  Surgery Date: 2022  CSN: [id number]  Medical Record: [id number]   : 1945 - A: 68 y      Sex: female  Surgeon(s):  Vahid Dennison MD  Procedure: ANAL EXAM UNDER ANESTHESIA POSSIBLE INCISION AND DRAINAGE, FISTULOTOMY POSSIBLE SETON  Anesthesia Type: General     Surgeon: Vahid Dennison MD     The following Testing and Time Line are REQUIRED PER ANESTHESIA     EKG READ AND SIGNED WITHIN   90 days      Thank Anais Leija,   Sent by: Krista Mendez RN

## (undated) NOTE — IP AVS SNAPSHOT
[address]            (For Outpatient Use Only) Initial Admit Date: 1/11/2021   Inpt/Obs Admit Date: Inpt: 1/11/21 / Obs: N/A   Discharge Date:    Harmeet Murphy:  [de-identified]   MRN: [de-identified]   CSN: [id number]   CEID: [id number] Group Number:  Insurance Type:    Subscriber Name:  Subscriber :    Subscriber ID:  Pt Rel to Subscriber:    Hospital Account Financial Class: Medicare    2021